# Patient Record
Sex: FEMALE | Race: WHITE | NOT HISPANIC OR LATINO | Employment: UNEMPLOYED | URBAN - METROPOLITAN AREA
[De-identification: names, ages, dates, MRNs, and addresses within clinical notes are randomized per-mention and may not be internally consistent; named-entity substitution may affect disease eponyms.]

---

## 2020-02-22 ENCOUNTER — APPOINTMENT (INPATIENT)
Dept: CT IMAGING | Facility: HOSPITAL | Age: 80
DRG: 195 | End: 2020-02-22
Payer: COMMERCIAL

## 2020-02-22 ENCOUNTER — APPOINTMENT (EMERGENCY)
Dept: RADIOLOGY | Facility: HOSPITAL | Age: 80
DRG: 195 | End: 2020-02-22
Payer: COMMERCIAL

## 2020-02-22 ENCOUNTER — HOSPITAL ENCOUNTER (INPATIENT)
Facility: HOSPITAL | Age: 80
LOS: 3 days | Discharge: HOME | DRG: 195 | End: 2020-02-25
Attending: EMERGENCY MEDICINE | Admitting: STUDENT IN AN ORGANIZED HEALTH CARE EDUCATION/TRAINING PROGRAM
Payer: COMMERCIAL

## 2020-02-22 DIAGNOSIS — J18.9 RIGHT LOWER LOBE PNEUMONIA: Primary | ICD-10-CM

## 2020-02-22 PROBLEM — J96.01 ACUTE RESPIRATORY FAILURE WITH HYPOXIA (HCC): Status: ACTIVE | Noted: 2020-02-22

## 2020-02-22 PROBLEM — E03.9 HYPOTHYROIDISM: Status: ACTIVE | Noted: 2020-02-22

## 2020-02-22 PROBLEM — E78.5 HYPERLIPIDEMIA: Status: ACTIVE | Noted: 2020-02-22

## 2020-02-22 PROBLEM — I48.0 PAROXYSMAL ATRIAL FIBRILLATION (HCC): Status: ACTIVE | Noted: 2020-02-22

## 2020-02-22 PROBLEM — I10 HYPERTENSION: Status: ACTIVE | Noted: 2020-02-22

## 2020-02-22 PROBLEM — I48.91 A-FIB (HCC): Status: ACTIVE | Noted: 2020-02-22

## 2020-02-22 LAB
ALBUMIN SERPL BCP-MCNC: 3.6 G/DL (ref 3.5–5)
ALP SERPL-CCNC: 102 U/L (ref 46–116)
ALT SERPL W P-5'-P-CCNC: 22 U/L (ref 12–78)
ANION GAP SERPL CALCULATED.3IONS-SCNC: 8 MMOL/L (ref 4–13)
APTT PPP: 49 SECONDS (ref 23–37)
AST SERPL W P-5'-P-CCNC: 24 U/L (ref 5–45)
BACTERIA UR QL AUTO: ABNORMAL /HPF
BASOPHILS # BLD AUTO: 0.03 THOUSANDS/ΜL (ref 0–0.1)
BASOPHILS NFR BLD AUTO: 0 % (ref 0–1)
BILIRUB SERPL-MCNC: 0.7 MG/DL (ref 0.2–1)
BILIRUB UR QL STRIP: NEGATIVE
BUN SERPL-MCNC: 12 MG/DL (ref 5–25)
CALCIUM SERPL-MCNC: 8.7 MG/DL (ref 8.3–10.1)
CHLORIDE SERPL-SCNC: 100 MMOL/L (ref 100–108)
CLARITY UR: CLEAR
CO2 SERPL-SCNC: 27 MMOL/L (ref 21–32)
COLOR UR: YELLOW
CREAT SERPL-MCNC: 0.85 MG/DL (ref 0.6–1.3)
EOSINOPHIL # BLD AUTO: 0.02 THOUSAND/ΜL (ref 0–0.61)
EOSINOPHIL NFR BLD AUTO: 0 % (ref 0–6)
ERYTHROCYTE [DISTWIDTH] IN BLOOD BY AUTOMATED COUNT: 13.2 % (ref 11.6–15.1)
FLUAV RNA NPH QL NAA+PROBE: NORMAL
FLUBV RNA NPH QL NAA+PROBE: NORMAL
GFR SERPL CREATININE-BSD FRML MDRD: 66 ML/MIN/1.73SQ M
GLUCOSE SERPL-MCNC: 108 MG/DL (ref 65–140)
GLUCOSE UR STRIP-MCNC: NEGATIVE MG/DL
HCT VFR BLD AUTO: 39.4 % (ref 34.8–46.1)
HGB BLD-MCNC: 13 G/DL (ref 11.5–15.4)
HGB UR QL STRIP.AUTO: ABNORMAL
IMM GRANULOCYTES # BLD AUTO: 0.04 THOUSAND/UL (ref 0–0.2)
IMM GRANULOCYTES NFR BLD AUTO: 0 % (ref 0–2)
INR PPP: 1.93 (ref 0.84–1.19)
INR PPP: 2.19 (ref 0.84–1.19)
KETONES UR STRIP-MCNC: NEGATIVE MG/DL
LACTATE SERPL-SCNC: 2 MMOL/L (ref 0.5–2)
LEUKOCYTE ESTERASE UR QL STRIP: NEGATIVE
LYMPHOCYTES # BLD AUTO: 1.39 THOUSANDS/ΜL (ref 0.6–4.47)
LYMPHOCYTES NFR BLD AUTO: 14 % (ref 14–44)
MCH RBC QN AUTO: 31.1 PG (ref 26.8–34.3)
MCHC RBC AUTO-ENTMCNC: 33 G/DL (ref 31.4–37.4)
MCV RBC AUTO: 94 FL (ref 82–98)
MONOCYTES # BLD AUTO: 0.76 THOUSAND/ΜL (ref 0.17–1.22)
MONOCYTES NFR BLD AUTO: 8 % (ref 4–12)
NEUTROPHILS # BLD AUTO: 7.86 THOUSANDS/ΜL (ref 1.85–7.62)
NEUTS SEG NFR BLD AUTO: 78 % (ref 43–75)
NITRITE UR QL STRIP: NEGATIVE
NON-SQ EPI CELLS URNS QL MICRO: ABNORMAL /HPF
NRBC BLD AUTO-RTO: 0 /100 WBCS
NT-PROBNP SERPL-MCNC: 518 PG/ML
PH UR STRIP.AUTO: 5.5 [PH]
PLATELET # BLD AUTO: 167 THOUSANDS/UL (ref 149–390)
PMV BLD AUTO: 11.2 FL (ref 8.9–12.7)
POTASSIUM SERPL-SCNC: 3.8 MMOL/L (ref 3.5–5.3)
PROT SERPL-MCNC: 8.2 G/DL (ref 6.4–8.2)
PROT UR STRIP-MCNC: NEGATIVE MG/DL
PROTHROMBIN TIME: 22.2 SECONDS (ref 11.6–14.5)
PROTHROMBIN TIME: 24.6 SECONDS (ref 11.6–14.5)
RBC # BLD AUTO: 4.18 MILLION/UL (ref 3.81–5.12)
RBC #/AREA URNS AUTO: ABNORMAL /HPF
RSV RNA NPH QL NAA+PROBE: NORMAL
SODIUM SERPL-SCNC: 135 MMOL/L (ref 136–145)
SP GR UR STRIP.AUTO: <=1.005 (ref 1–1.03)
TROPONIN I SERPL-MCNC: <0.02 NG/ML
UROBILINOGEN UR QL STRIP.AUTO: 0.2 E.U./DL
WBC # BLD AUTO: 10.1 THOUSAND/UL (ref 4.31–10.16)
WBC #/AREA URNS AUTO: ABNORMAL /HPF

## 2020-02-22 PROCEDURE — 99285 EMERGENCY DEPT VISIT HI MDM: CPT

## 2020-02-22 PROCEDURE — 80053 COMPREHEN METABOLIC PANEL: CPT | Performed by: EMERGENCY MEDICINE

## 2020-02-22 PROCEDURE — 83880 ASSAY OF NATRIURETIC PEPTIDE: CPT | Performed by: EMERGENCY MEDICINE

## 2020-02-22 PROCEDURE — 99223 1ST HOSP IP/OBS HIGH 75: CPT | Performed by: STUDENT IN AN ORGANIZED HEALTH CARE EDUCATION/TRAINING PROGRAM

## 2020-02-22 PROCEDURE — 85610 PROTHROMBIN TIME: CPT | Performed by: STUDENT IN AN ORGANIZED HEALTH CARE EDUCATION/TRAINING PROGRAM

## 2020-02-22 PROCEDURE — 81001 URINALYSIS AUTO W/SCOPE: CPT | Performed by: EMERGENCY MEDICINE

## 2020-02-22 PROCEDURE — 99285 EMERGENCY DEPT VISIT HI MDM: CPT | Performed by: EMERGENCY MEDICINE

## 2020-02-22 PROCEDURE — 85730 THROMBOPLASTIN TIME PARTIAL: CPT | Performed by: EMERGENCY MEDICINE

## 2020-02-22 PROCEDURE — 85025 COMPLETE CBC W/AUTO DIFF WBC: CPT | Performed by: EMERGENCY MEDICINE

## 2020-02-22 PROCEDURE — 71046 X-RAY EXAM CHEST 2 VIEWS: CPT

## 2020-02-22 PROCEDURE — 71250 CT THORAX DX C-: CPT

## 2020-02-22 PROCEDURE — 87040 BLOOD CULTURE FOR BACTERIA: CPT | Performed by: EMERGENCY MEDICINE

## 2020-02-22 PROCEDURE — 83605 ASSAY OF LACTIC ACID: CPT | Performed by: EMERGENCY MEDICINE

## 2020-02-22 PROCEDURE — 84484 ASSAY OF TROPONIN QUANT: CPT | Performed by: EMERGENCY MEDICINE

## 2020-02-22 PROCEDURE — 87631 RESP VIRUS 3-5 TARGETS: CPT | Performed by: EMERGENCY MEDICINE

## 2020-02-22 PROCEDURE — 36415 COLL VENOUS BLD VENIPUNCTURE: CPT | Performed by: EMERGENCY MEDICINE

## 2020-02-22 PROCEDURE — 85610 PROTHROMBIN TIME: CPT | Performed by: EMERGENCY MEDICINE

## 2020-02-22 PROCEDURE — 84145 PROCALCITONIN (PCT): CPT | Performed by: EMERGENCY MEDICINE

## 2020-02-22 PROCEDURE — 93005 ELECTROCARDIOGRAM TRACING: CPT

## 2020-02-22 RX ORDER — LISINOPRIL 20 MG/1
TABLET ORAL
COMMUNITY
Start: 2019-04-09 | End: 2022-02-13

## 2020-02-22 RX ORDER — VENLAFAXINE HYDROCHLORIDE 150 MG/1
CAPSULE, EXTENDED RELEASE ORAL
COMMUNITY
Start: 2019-03-07

## 2020-02-22 RX ORDER — WARFARIN SODIUM 7.5 MG/1
7.5 TABLET ORAL
COMMUNITY

## 2020-02-22 RX ORDER — AZITHROMYCIN 250 MG/1
500 TABLET, FILM COATED ORAL EVERY 24 HOURS
Status: COMPLETED | OUTPATIENT
Start: 2020-02-23 | End: 2020-02-24

## 2020-02-22 RX ORDER — GUAIFENESIN 600 MG
600 TABLET, EXTENDED RELEASE 12 HR ORAL EVERY 12 HOURS SCHEDULED
Status: DISCONTINUED | OUTPATIENT
Start: 2020-02-22 | End: 2020-02-24

## 2020-02-22 RX ORDER — ATORVASTATIN CALCIUM 40 MG/1
TABLET, FILM COATED ORAL
COMMUNITY
Start: 2019-11-14

## 2020-02-22 RX ORDER — LEVOTHYROXINE SODIUM 0.1 MG/1
88 TABLET ORAL
COMMUNITY

## 2020-02-22 RX ORDER — LEVOTHYROXINE SODIUM 0.1 MG/1
100 TABLET ORAL
Status: DISCONTINUED | OUTPATIENT
Start: 2020-02-23 | End: 2020-02-25 | Stop reason: HOSPADM

## 2020-02-22 RX ORDER — WARFARIN SODIUM 7.5 MG/1
3.75 TABLET ORAL
COMMUNITY
End: 2020-10-02 | Stop reason: HOSPADM

## 2020-02-22 RX ORDER — LISINOPRIL 20 MG/1
20 TABLET ORAL DAILY
Status: DISCONTINUED | OUTPATIENT
Start: 2020-02-22 | End: 2020-02-25 | Stop reason: HOSPADM

## 2020-02-22 RX ORDER — ATORVASTATIN CALCIUM 10 MG/1
10 TABLET, FILM COATED ORAL
Status: DISCONTINUED | OUTPATIENT
Start: 2020-02-22 | End: 2020-02-25 | Stop reason: HOSPADM

## 2020-02-22 RX ORDER — WARFARIN SODIUM 7.5 MG/1
7.5 TABLET ORAL
Status: COMPLETED | OUTPATIENT
Start: 2020-02-22 | End: 2020-02-22

## 2020-02-22 RX ORDER — ACETAMINOPHEN 325 MG/1
650 TABLET ORAL EVERY 6 HOURS PRN
Status: DISCONTINUED | OUTPATIENT
Start: 2020-02-22 | End: 2020-02-25 | Stop reason: HOSPADM

## 2020-02-22 RX ADMIN — WARFARIN SODIUM 7.5 MG: 7.5 TABLET ORAL at 18:12

## 2020-02-22 RX ADMIN — CEFTRIAXONE SODIUM 1000 MG: 10 INJECTION, POWDER, FOR SOLUTION INTRAVENOUS at 13:10

## 2020-02-22 RX ADMIN — GUAIFENESIN 600 MG: 600 TABLET ORAL at 21:49

## 2020-02-22 RX ADMIN — AZITHROMYCIN MONOHYDRATE 500 MG: 500 INJECTION, POWDER, LYOPHILIZED, FOR SOLUTION INTRAVENOUS at 14:53

## 2020-02-22 RX ADMIN — ATORVASTATIN CALCIUM 10 MG: 10 TABLET, FILM COATED ORAL at 16:24

## 2020-02-22 RX ADMIN — LISINOPRIL 20 MG: 20 TABLET ORAL at 15:06

## 2020-02-22 NOTE — ASSESSMENT & PLAN NOTE
· Patient likely has community-acquired pneumonia as evidenced by hypoxia, fevers, crackles in left lower lung field with worsening cough  · Chest x-ray does not demonstrate any overt opacities or infiltrates, will order CT imaging for further evaluation given patient's severity of symptoms on initial presentation  · Continue with azithromycin and ceftriaxone for community-acquired pneumonia, follow-up cultures, trend fever curve, trend white count, continue with supportive care

## 2020-02-22 NOTE — ASSESSMENT & PLAN NOTE
· Blood pressure adequately controlled, continue lisinopril 20 mg daily  · continue to monitor with vital signs per floor protocol

## 2020-02-22 NOTE — ED NOTES
1  CC - SOB    2  Admission related to injury? - n/a    3  Trauma - no    4  Orientation status - Ox4    5  Abnormal labs/abnormal focused assessment/abnormal vitals - abnormal xray (pneumonia), 2L O2, fever    6  Medications/ drips - rocephin    7  Last time narcotics were given/ pain medication - n/a    8  IV lines/drains/ etc  - 20 L AC    9  Isolation status - standard    10  Skin - intact    11  Ambulation status - Assist x1    12   ED phone #     Tisha Corea RN  02/22/20 [id number]

## 2020-02-22 NOTE — ED PROVIDER NOTES
History  Chief Complaint   Patient presents with    Shortness of Breath     pt is being sent from her PCP for low oxygen and SOB and cough      HPI 68-year-old female apparently at her family physician today was hypoxic oxygen saturation was 89% improved with albuterol there  Patient reports she is very short of breath when she moves around apparently went into her closet at in became increasingly short of breath then collapsed  Patient denies any loss of consciousness  She denies any injury  She did not hit her head  Patient reports regressive shortness of breath over the last few days  She reports being short of breath at rest but markedly worse when she tries to move around  She complains of cough and congestion  Past medical history hypertensive on Coumadin  Family history noncontributory  Social history nonsmoker no history of drug abuse  Prior to Admission Medications   Prescriptions Last Dose Informant Patient Reported? Taking?   atorvastatin (LIPITOR) 40 mg tablet   Yes Yes   Sig: atorvastatin 40 mg tablet   levothyroxine 100 mcg tablet   Yes Yes   Sig: levothyroxine 100 mcg tablet   lisinopril (ZESTRIL) 20 mg tablet   Yes Yes   Sig: lisinopril 20 mg tablet   venlafaxine (EFFEXOR-XR) 150 mg 24 hr capsule   Yes Yes   Sig: venlafaxine  mg capsule,extended release 24 hr   warfarin (COUMADIN) 7 5 mg tablet   Yes Yes   Sig: Take 7 5 mg by mouth daily   warfarin (COUMADIN) 7 5 mg tablet   Yes Yes   Sig: Take 3 75 mg by mouth daily       Facility-Administered Medications: None       History reviewed  No pertinent past medical history  History reviewed  No pertinent surgical history  History reviewed  No pertinent family history  I have reviewed and agree with the history as documented      Social History     Tobacco Use    Smoking status: Never Smoker    Smokeless tobacco: Never Used   Substance Use Topics    Alcohol use: Never     Frequency: Never    Drug use: Never       Review of Systems   Constitutional: Negative for diaphoresis, fatigue and fever  HENT: Negative for congestion, ear pain, nosebleeds and sore throat  Eyes: Negative for photophobia, pain, discharge and visual disturbance  Respiratory: Positive for shortness of breath  Negative for cough, choking, chest tightness and wheezing  Cardiovascular: Negative for chest pain and palpitations  Gastrointestinal: Negative for abdominal distention, abdominal pain, diarrhea and vomiting  Genitourinary: Negative for dysuria, flank pain and frequency  Musculoskeletal: Negative for back pain, gait problem and joint swelling  Skin: Negative for color change and rash  Neurological: Negative for dizziness, syncope and headaches  Psychiatric/Behavioral: Negative for behavioral problems and confusion  The patient is not nervous/anxious  All other systems reviewed and are negative  Physical Exam  Physical Exam   Constitutional: She is oriented to person, place, and time  She appears well-developed and well-nourished  HENT:   Head: Normocephalic  Right Ear: External ear normal    Left Ear: External ear normal    Nose: Nose normal    Mouth/Throat: Oropharynx is clear and moist    Eyes: Pupils are equal, round, and reactive to light  EOM and lids are normal    Neck: Normal range of motion  Neck supple  Cardiovascular: Normal rate, regular rhythm, normal heart sounds and intact distal pulses  Pulmonary/Chest: Effort normal and breath sounds normal  No respiratory distress  Abdominal: Soft  Bowel sounds are normal  There is no tenderness  Musculoskeletal: Normal range of motion  She exhibits no deformity  Neurological: She is alert and oriented to person, place, and time  Skin: Skin is warm and dry  Psychiatric: She has a normal mood and affect  Nursing note and vitals reviewed    Pulse oximetry normal at 94% adequate oxygenation, there is no hypoxia  Patient is febrile    Vital Signs  ED Triage Vitals Temperature Pulse Respirations Blood Pressure SpO2   02/22/20 1101 02/22/20 1101 02/22/20 1101 02/22/20 1101 02/22/20 1101   (!) 101 4 °F (38 6 °C) (!) 110 18 139/82 94 %      Temp Source Heart Rate Source Patient Position - Orthostatic VS BP Location FiO2 (%)   02/22/20 1101 02/22/20 1101 02/22/20 1101 02/22/20 1101 --   Oral Monitor Sitting Left arm       Pain Score       02/22/20 1400       No Pain           Vitals:    02/22/20 1424 02/22/20 1425 02/22/20 1548 02/22/20 2315   BP: 138/75 138/75 137/72 145/75   Pulse: 96 95 98 101   Patient Position - Orthostatic VS:             Visual Acuity      ED Medications  Medications   lisinopril (ZESTRIL) tablet 20 mg (20 mg Oral Given 2/22/20 1506)   levothyroxine tablet 100 mcg (has no administration in time range)   atorvastatin (LIPITOR) tablet 10 mg (10 mg Oral Given 2/22/20 1624)   acetaminophen (TYLENOL) tablet 650 mg (has no administration in time range)   guaiFENesin (MUCINEX) 12 hr tablet 600 mg (600 mg Oral Given 2/22/20 2149)   cefTRIAXone (ROCEPHIN) 1,000 mg in dextrose 5 % 50 mL IVPB (has no administration in time range)   azithromycin (ZITHROMAX) tablet 500 mg (has no administration in time range)   ceftriaxone (ROCEPHIN) 1 g/50 mL in dextrose IVPB (0 mg Intravenous Stopped 2/22/20 1933)   azithromycin (ZITHROMAX) 500 mg in sodium chloride 0 9% 250mL IVPB 500 mg (0 mg Intravenous Stopped 2/22/20 1933)   warfarin (COUMADIN) tablet 7 5 mg (7 5 mg Oral Given 2/22/20 1812)       Diagnostic Studies  Results Reviewed     Procedure Component Value Units Date/Time    Procalcitonin [[id number]] Collected:  02/22/20 1238    Lab Status:   In process Specimen:  Blood from Arm, Left Updated:  02/22/20 1241    Influenza A/B and RSV PCR [[id number]]  (Normal) Collected:  02/22/20 1120    Lab Status:  Final result Specimen:  Nose Updated:  02/22/20 1233     INFLUENZA A PCR None Detected     INFLUENZA B PCR None Detected     RSV PCR None Detected    Blood culture #1 [[id number]] Collected:  02/22/20 1223    Lab Status:   In process Specimen:  Blood from Arm, Right Updated:  02/22/20 1228    Comprehensive metabolic panel [[id number]]  (Abnormal) Collected:  02/22/20 1120    Lab Status:  Final result Specimen:  Blood from Arm, Left Updated:  02/22/20 1226     Sodium 135 mmol/L      Potassium 3 8 mmol/L      Chloride 100 mmol/L      CO2 27 mmol/L      ANION GAP 8 mmol/L      BUN 12 mg/dL      Creatinine 0 85 mg/dL      Glucose 108 mg/dL      Calcium 8 7 mg/dL      AST 24 U/L      ALT 22 U/L      Alkaline Phosphatase 102 U/L      Total Protein 8 2 g/dL      Albumin 3 6 g/dL      Total Bilirubin 0 70 mg/dL      eGFR 66 ml/min/1 73sq m     Narrative:       [place] guidelines for Chronic Kidney Disease (CKD):     Stage 1 with normal or high GFR (GFR > 90 mL/min/1 73 square meters)    Stage 2 Mild CKD (GFR = 60-89 mL/min/1 73 square meters)    Stage 3A Moderate CKD (GFR = 45-59 mL/min/1 73 square meters)    Stage 3B Moderate CKD (GFR = 30-44 mL/min/1 73 square meters)    Stage 4 Severe CKD (GFR = 15-29 mL/min/1 73 square meters)    Stage 5 End Stage CKD (GFR <15 mL/min/1 73 square meters)  Note: GFR calculation is accurate only with a steady state creatinine    NT-BNP PRO [[id number]]  (Abnormal) Collected:  02/22/20 1120    Lab Status:  Final result Specimen:  Blood from Arm, Left Updated:  02/22/20 1226     NT-proBNP 518 pg/mL     Protime-INR [[id number]]  (Abnormal) Collected:  02/22/20 1120    Lab Status:  Final result Specimen:  Blood from Arm, Left Updated:  02/22/20 1225     Protime 22 2 seconds      INR 1 93    APTT [[id number]]  (Abnormal) Collected:  02/22/20 1120    Lab Status:  Final result Specimen:  Blood from Arm, Left Updated:  02/22/20 1225     PTT 49 seconds     Lactic acid x2 [[id number]]  (Normal) Collected:  02/22/20 1120    Lab Status:  Final result Specimen:  Blood from Arm, Left Updated:  02/22/20 1219     LACTIC ACID 2 0 mmol/L Narrative:       Result may be elevated if tourniquet was used during collection  Troponin I [[id number]]  (Normal) Collected:  02/22/20 1120    Lab Status:  Final result Specimen:  Blood from Arm, Left Updated:  02/22/20 1219     Troponin I <0 02 ng/mL     Urine Microscopic [[id number]]  (Abnormal) Collected:  02/22/20 1148    Lab Status:  Final result Specimen:  Urine, Clean Catch Updated:  02/22/20 1207     RBC, UA 0-1 /hpf      WBC, UA None Seen /hpf      Epithelial Cells None Seen /hpf      Bacteria, UA None Seen /hpf     UA w Reflex to Microscopic w Reflex to Culture [[id number]]  (Abnormal) Collected:  02/22/20 1148    Lab Status:  Final result Specimen:  Urine, Clean Catch Updated:  02/22/20 1201     Color, UA Yellow     Clarity, UA Clear     Specific Gravity, UA <=1 005     pH, UA 5 5     Leukocytes, UA Negative     Nitrite, UA Negative     Protein, UA Negative mg/dl      Glucose, UA Negative mg/dl      Ketones, UA Negative mg/dl      Urobilinogen, UA 0 2 E U /dl      Bilirubin, UA Negative     Blood, UA Trace-lysed    CBC and differential [[id number]]  (Abnormal) Collected:  02/22/20 1120    Lab Status:  Final result Specimen:  Blood from Arm, Left Updated:  02/22/20 1156     WBC 10 10 Thousand/uL      RBC 4 18 Million/uL      Hemoglobin 13 0 g/dL      Hematocrit 39 4 %      MCV 94 fL      MCH 31 1 pg      MCHC 33 0 g/dL      RDW 13 2 %      MPV 11 2 fL      Platelets 095 Thousands/uL      nRBC 0 /100 WBCs      Neutrophils Relative 78 %      Immat GRANS % 0 %      Lymphocytes Relative 14 %      Monocytes Relative 8 %      Eosinophils Relative 0 %      Basophils Relative 0 %      Neutrophils Absolute 7 86 Thousands/µL      Immature Grans Absolute 0 04 Thousand/uL      Lymphocytes Absolute 1 39 Thousands/µL      Monocytes Absolute 0 76 Thousand/µL      Eosinophils Absolute 0 02 Thousand/µL      Basophils Absolute 0 03 Thousands/µL     Blood culture #2 [[id number]] Collected:  02/22/20 1120    Lab Status:   In process Specimen:  Blood from Arm, Left Updated:  02/22/20 9195                 CT chest wo contrast   Final Result by Jeb Dugan MD (02/22 2143)      Left lower lobe pneumonia               Workstation performed: [id number]         XR chest pa & lateral   Final Result by Gladys Childs MD (02/22 4694)      No acute cardiopulmonary disease  Workstation performed: [id number]                    Procedures  Procedures         ED Course              chest x-ray showed a normal cardiac silhouette, no pneumothorax, interpreted as a right lower lobe pneumonia, interpreted by me I was initial , later radiology felt it was a low left lower lobe pneumonia  Influenza testing was negative, electrolytes were within normal limits, normal BUN creatinine no sign of renal dysfunction  lactate was within normal limits,  Cardiac troponin was     white count was normal at [address], hemoglobin was normal at 13 no sign of anemia          MDM medical decision making 73-year-old female presents emergency department with cough congestion some hypoxia at home, shortness of breath with minimal exertion, chest x-ray consistent with pneumonia  Treated with IV antibiotics  Discussed with hospitalist service  Lactate was normal   Not hypotensive  No sign of severe sepsis  Discussed with patient agreed to admission        Disposition  Final diagnoses:   Right lower lobe pneumonia ([address] )     Time reflects when diagnosis was documented in both MDM as applicable and the Disposition within this note     Time User Action Codes Description Comment    2/22/2020 12:04 PM Mita Mahoney Add [J18 1] Right lower lobe pneumonia [facility])       ED Disposition     ED Disposition Condition Date/Time Comment    Admit Stable Sat Feb 22, 2020 12:44 PM case was discussed with the hospitalist service patient will be a 2 midnight admission to the service of [address] [place] [address]          Follow-up Information    None         Current Discharge Medication List      CONTINUE these medications which have NOT CHANGED    Details   atorvastatin (LIPITOR) 40 mg tablet atorvastatin 40 mg tablet      levothyroxine 100 mcg tablet levothyroxine 100 mcg tablet      lisinopril (ZESTRIL) 20 mg tablet lisinopril 20 mg tablet      venlafaxine (EFFEXOR-XR) 150 mg 24 hr capsule venlafaxine  mg capsule,extended release 24 hr      !! warfarin (COUMADIN) 7 5 mg tablet Take 7 5 mg by mouth daily      !! warfarin (COUMADIN) 7 5 mg tablet Take 3 75 mg by mouth daily        ! ! - Potential duplicate medications found  Please discuss with provider  No discharge procedures on file      PDMP Review     None          ED Provider  Electronically Signed by           Romayne Dawson, MD  02/22/20 7288

## 2020-02-22 NOTE — H&P
H&P- Ana Ibarra 1940, 70 y o  female MRN: [id number]    Unit/Bed#: -01 Encounter: [id number]    Primary Care Provider: Christina Vera DO   Date and time admitted to hospital: 2/22/2020 11:03 AM        * Community acquired pneumonia  Assessment & Plan  · Patient likely has community-acquired pneumonia as evidenced by hypoxia, fevers, crackles in left lower lung field with worsening cough  · Chest x-ray does not demonstrate any overt opacities or infiltrates, will order CT imaging for further evaluation given patient's severity of symptoms on initial presentation  · Continue with azithromycin and ceftriaxone for community-acquired pneumonia, follow-up cultures, trend fever curve, trend white count, continue with supportive care    Hyperlipidemia  Assessment & Plan  · Continue home dose Lipitor 40 mg daily    Hypertension  Assessment & Plan  · Blood pressure adequately controlled, continue lisinopril 20 mg daily  · continue to monitor with vital signs per floor protocol    Hypothyroidism  Assessment & Plan  · Continue home dose Synthroid 100 mcg daily    Acute respiratory failure with hypoxia ([address] )  Assessment & Plan  · See plan for community-acquired pneumonia    Paroxysmal atrial fibrillation (HCC)  Assessment & Plan  · On Coumadin 7 5 mg daily, continue with daily INR and dose Coumadin accordingly                VTE Prophylaxis: Warfarin (Coumadin)  / sequential compression device   Code Status:  Full code  POLST: POLST form is not discussed and not completed at this time  Discussion with family:  Pneumonia, paroxysmal AFib, hyperlipidemia, hypertension, hypothyroidism    Anticipated Length of Stay:  Patient will be admitted on an Inpatient basis with an anticipated length of stay of 2 midnights  Justification for Hospital Stay:  See above    Total Time for Visit, including Counseling / Coordination of Care: 45 minutes    Greater than 50% of this total time spent on direct patient counseling and coordination of care  Chief Complaint:   Hypoxia, cough, fevers, shortness of breath    History of Present Illness:    Ana Ibarra is a 70 y o  female who presents with worsening shortness of breath and cough for the last several days  Patient went to her primary care doctor was concern for pneumonia however did not give her antibiotics secondary to concerns for interaction with her Coumadin  Now presenting to the ED with worsening hypoxia, shortness of breath, cough, subjective fevers, chills that have been progressively worsening  Patient denies any chest pain, chest palpitations, nausea, vomiting, sore throat, night sweats, lower extremity swelling, abdominal pain, diarrhea, dysuria  Patient also endorses that her granddaughter has been sick with similar symptoms  In the ED patient is noted to be hypoxic to 89% and admitted to Medicine for further management  Review of Systems:    See above    Past Medical and Surgical History:     History reviewed  No pertinent past medical history  History reviewed  No pertinent surgical history  Meds/Allergies:    Prior to Admission medications    Medication Sig Start Date End Date Taking? Authorizing Provider   atorvastatin (LIPITOR) 40 mg tablet atorvastatin 40 mg tablet 11/14/19  Yes Historical Provider, MD   levothyroxine 100 mcg tablet levothyroxine 100 mcg tablet   Yes Historical Provider, MD   lisinopril (ZESTRIL) 20 mg tablet lisinopril 20 mg tablet 4/9/19  Yes Historical Provider, MD   venlafaxine (EFFEXOR-XR) 150 mg 24 hr capsule venlafaxine  mg capsule,extended release 24 hr 3/7/19  Yes Historical Provider, MD   warfarin (COUMADIN) 7 5 mg tablet Take 7 5 mg by mouth daily   Yes Historical Provider, MD   warfarin (COUMADIN) 7 5 mg tablet Take 3 75 mg by mouth daily    Yes Historical Provider, MD     I have reviewed home medications with patient personally      Allergies: No Known Allergies    Social History:     Marital Status: /Civil Union   Occupation: na  Patient Pre-hospital Living Situation: na  Patient Pre-hospital Level of Mobility: na  Patient Pre-hospital Diet Restrictions: na  Substance Use History:   Social History     Substance and Sexual Activity   Alcohol Use Never    Frequency: Never     Social History     Tobacco Use   Smoking Status Never Smoker   Smokeless Tobacco Never Used     Social History     Substance and Sexual Activity   Drug Use Never       Family History:    non-contributory    Physical Exam:     Vitals:    02/22/20 1548   BP: 137/72   Pulse: 98   Resp: 17   Temp: 98 4 °F (36 9 °C)   SpO2: 90%       Resting comfortably, no acute distress  Regular rate rhythm  Crackles in left lower lung field  Bowel sounds positive, nontender nondistended  No lower extremity edema  Alert and oriented x3      Additional Data:     Lab Results: I have personally reviewed pertinent reports  Results from last 7 days   Lab Units 02/22/20  1120   WBC Thousand/uL 10 10   HEMOGLOBIN g/dL 13 0   HEMATOCRIT % 39 4   PLATELETS Thousands/uL 167   NEUTROS PCT % 78*   LYMPHS PCT % 14   MONOS PCT % 8   EOS PCT % 0     Results from last 7 days   Lab Units 02/22/20  1120   SODIUM mmol/L 135*   POTASSIUM mmol/L 3 8   CHLORIDE mmol/L 100   CO2 mmol/L 27   BUN mg/dL 12   CREATININE mg/dL 0 85   ANION GAP mmol/L 8   CALCIUM mg/dL 8 7   ALBUMIN g/dL 3 6   TOTAL BILIRUBIN mg/dL 0 70   ALK PHOS U/L 102   ALT U/L 22   AST U/L 24   GLUCOSE RANDOM mg/dL 108     Results from last 7 days   Lab Units 02/22/20  1120   INR  1 93*             Results from last 7 days   Lab Units 02/22/20  1120   LACTIC ACID mmol/L 2 0       Imaging: I have personally reviewed pertinent reports  XR chest pa & lateral   Final Result by Olga Barry MD (02/22 3749)      No acute cardiopulmonary disease              Workstation performed: [id number]         CT chest wo contrast    (Results Pending)       EKG, Pathology, and Other Studies Reviewed on Admission: · EKG: Na    Allscripts / Epic Records Reviewed: Yes     ** Please Note: This note has been constructed using a voice recognition system   **

## 2020-02-23 LAB
ALBUMIN SERPL BCP-MCNC: 3 G/DL (ref 3.5–5)
ALP SERPL-CCNC: 79 U/L (ref 46–116)
ALT SERPL W P-5'-P-CCNC: 14 U/L (ref 12–78)
ANION GAP SERPL CALCULATED.3IONS-SCNC: 7 MMOL/L (ref 4–13)
AST SERPL W P-5'-P-CCNC: 20 U/L (ref 5–45)
ATRIAL RATE: 101 BPM
BASOPHILS # BLD AUTO: 0.01 THOUSANDS/ΜL (ref 0–0.1)
BASOPHILS NFR BLD AUTO: 0 % (ref 0–1)
BILIRUB SERPL-MCNC: 0.7 MG/DL (ref 0.2–1)
BUN SERPL-MCNC: 11 MG/DL (ref 5–25)
CALCIUM SERPL-MCNC: 8.8 MG/DL (ref 8.3–10.1)
CHLORIDE SERPL-SCNC: 105 MMOL/L (ref 100–108)
CO2 SERPL-SCNC: 29 MMOL/L (ref 21–32)
CREAT SERPL-MCNC: 0.81 MG/DL (ref 0.6–1.3)
EOSINOPHIL # BLD AUTO: 0.04 THOUSAND/ΜL (ref 0–0.61)
EOSINOPHIL NFR BLD AUTO: 0 % (ref 0–6)
ERYTHROCYTE [DISTWIDTH] IN BLOOD BY AUTOMATED COUNT: 13.3 % (ref 11.6–15.1)
GFR SERPL CREATININE-BSD FRML MDRD: 70 ML/MIN/1.73SQ M
GLUCOSE SERPL-MCNC: 121 MG/DL (ref 65–140)
HCT VFR BLD AUTO: 38.4 % (ref 34.8–46.1)
HGB BLD-MCNC: 12.6 G/DL (ref 11.5–15.4)
IMM GRANULOCYTES # BLD AUTO: 0.03 THOUSAND/UL (ref 0–0.2)
IMM GRANULOCYTES NFR BLD AUTO: 0 % (ref 0–2)
INR PPP: 2.03 (ref 0.84–1.19)
LYMPHOCYTES # BLD AUTO: 1.19 THOUSANDS/ΜL (ref 0.6–4.47)
LYMPHOCYTES NFR BLD AUTO: 13 % (ref 14–44)
MAGNESIUM SERPL-MCNC: 2.2 MG/DL (ref 1.6–2.6)
MCH RBC QN AUTO: 31.2 PG (ref 26.8–34.3)
MCHC RBC AUTO-ENTMCNC: 32.8 G/DL (ref 31.4–37.4)
MCV RBC AUTO: 95 FL (ref 82–98)
MONOCYTES # BLD AUTO: 1.01 THOUSAND/ΜL (ref 0.17–1.22)
MONOCYTES NFR BLD AUTO: 11 % (ref 4–12)
NEUTROPHILS # BLD AUTO: 6.71 THOUSANDS/ΜL (ref 1.85–7.62)
NEUTS SEG NFR BLD AUTO: 76 % (ref 43–75)
NRBC BLD AUTO-RTO: 0 /100 WBCS
P AXIS: 38 DEGREES
PLATELET # BLD AUTO: 151 THOUSANDS/UL (ref 149–390)
PMV BLD AUTO: 11 FL (ref 8.9–12.7)
POTASSIUM SERPL-SCNC: 4.1 MMOL/L (ref 3.5–5.3)
PR INTERVAL: 172 MS
PROCALCITONIN SERPL-MCNC: <0.05 NG/ML
PROT SERPL-MCNC: 7.4 G/DL (ref 6.4–8.2)
PROTHROMBIN TIME: 23.1 SECONDS (ref 11.6–14.5)
QRS AXIS: 61 DEGREES
QRSD INTERVAL: 74 MS
QT INTERVAL: 358 MS
QTC INTERVAL: 464 MS
RBC # BLD AUTO: 4.04 MILLION/UL (ref 3.81–5.12)
SODIUM SERPL-SCNC: 141 MMOL/L (ref 136–145)
T WAVE AXIS: 23 DEGREES
VENTRICULAR RATE: 101 BPM
WBC # BLD AUTO: 8.99 THOUSAND/UL (ref 4.31–10.16)

## 2020-02-23 PROCEDURE — 93010 ELECTROCARDIOGRAM REPORT: CPT | Performed by: INTERNAL MEDICINE

## 2020-02-23 PROCEDURE — 85025 COMPLETE CBC W/AUTO DIFF WBC: CPT | Performed by: STUDENT IN AN ORGANIZED HEALTH CARE EDUCATION/TRAINING PROGRAM

## 2020-02-23 PROCEDURE — 99233 SBSQ HOSP IP/OBS HIGH 50: CPT | Performed by: STUDENT IN AN ORGANIZED HEALTH CARE EDUCATION/TRAINING PROGRAM

## 2020-02-23 PROCEDURE — 80053 COMPREHEN METABOLIC PANEL: CPT | Performed by: STUDENT IN AN ORGANIZED HEALTH CARE EDUCATION/TRAINING PROGRAM

## 2020-02-23 PROCEDURE — 83735 ASSAY OF MAGNESIUM: CPT | Performed by: STUDENT IN AN ORGANIZED HEALTH CARE EDUCATION/TRAINING PROGRAM

## 2020-02-23 PROCEDURE — 85610 PROTHROMBIN TIME: CPT | Performed by: STUDENT IN AN ORGANIZED HEALTH CARE EDUCATION/TRAINING PROGRAM

## 2020-02-23 RX ADMIN — LEVOTHYROXINE SODIUM 100 MCG: 100 TABLET ORAL at 07:00

## 2020-02-23 RX ADMIN — GUAIFENESIN 600 MG: 600 TABLET ORAL at 08:32

## 2020-02-23 RX ADMIN — AZITHROMYCIN MONOHYDRATE 500 MG: 250 TABLET ORAL at 15:46

## 2020-02-23 RX ADMIN — CEFTRIAXONE SODIUM 1000 MG: 10 INJECTION, POWDER, FOR SOLUTION INTRAVENOUS at 13:28

## 2020-02-23 RX ADMIN — LISINOPRIL 20 MG: 20 TABLET ORAL at 08:32

## 2020-02-23 RX ADMIN — ACETAMINOPHEN 650 MG: 325 TABLET, FILM COATED ORAL at 01:13

## 2020-02-23 RX ADMIN — GUAIFENESIN 600 MG: 600 TABLET ORAL at 21:43

## 2020-02-23 RX ADMIN — ATORVASTATIN CALCIUM 10 MG: 10 TABLET, FILM COATED ORAL at 15:46

## 2020-02-23 NOTE — UTILIZATION REVIEW
Initial Clinical Review    Admission: Date/Time/Statement: Admission Orders (From admission, onward)     Ordered        02/22/20 1245  Inpatient Admission  Once                   Orders Placed This Encounter   Procedures    Inpatient Admission     Standing Status:   Standing     Number of Occurrences:   1     Order Specific Question:   Admitting Physician     Answer:   Ami Parmar     Order Specific Question:   Level of Care     Answer:   Med Surg [16]     Order Specific Question:   Estimated length of stay     Answer:   More than 2 Midnights     Order Specific Question:   Certification     Answer:   I certify that inpatient services are medically necessary for this patient for a duration of greater than two midnights  See H&P and MD Progress Notes for additional information about the patient's course of treatment  ED Arrival Information     Expected Arrival Acuity Means of Arrival Escorted By Service Admission Type    - 2/22/2020 10:52 Urgent Walk-In Family Member General Medicine Urgent    Arrival Complaint    SOB        Chief Complaint   Patient presents with    Shortness of Breath     pt is being sent from her PCP for low oxygen and SOB and cough      Assessment/Plan:   70 y o  female to ED presents with worsening shortness of breath and cough for the last several days  She was seen by her PCP who was concern for pneumonia but no antibiotics given as he was worried about interaction with her coumadin  Granddaughter is ill with similar symptoms  PMH of Hyperlipidemia, Hypertension, Hypothyroidism and Paroxysmal atrial fibrillation  In ED noted to be hypoxic to 89%  Admit Inpatient level of care for Community acquired pneumonia and Acute respiratory failure with hypoxia  Patient likely has community-acquired pneumonia as evidenced by hypoxia, fevers, crackles in left lower lung field with worsening cough  Continue Iv antibiotics  F/u cultures, trend fever curve and Wbc  Continue home meds   On exam, crackles in left lower lung field  2/23 Progress notes; Chest x-ray does not demonstrate any overt opacities or infiltrates, CT imaging confirming left lower lobe pneumonia  Now clinically appears to be improving  Continue Iv antibiotics  Persistent crackles in left lower lung field      ED Triage Vitals   Temperature Pulse Respirations Blood Pressure SpO2   02/22/20 1101 02/22/20 1101 02/22/20 1101 02/22/20 1101 02/22/20 1101   (!) 101 4 °F (38 6 °C) (!) 110 18 139/82 94 %      Temp Source Heart Rate Source Patient Position - Orthostatic VS BP Location FiO2 (%)   02/22/20 1101 02/22/20 1101 02/22/20 1101 02/22/20 1101 --   Oral Monitor Sitting Left arm       Pain Score       02/22/20 1400       No Pain        Wt Readings from Last 1 Encounters:   02/22/20 92 8 kg (204 lb 9 4 oz)     Additional Vital Signs:   O2 3L N/C    02/23/20 08:31:44  98 5 °F (36 9 °C)  91    119/69  86  93 %       02/22/20 23:15:12  100 4 °F (38 °C)  101  17  145/75  98  91 %       02/22/20 2150            93 %       02/22/20 15:48:56  98 4 °F (36 9 °C)  98  17  137/72  94  90 %       02/22/20 1425  99 6 °F (37 6 °C)  95    138/75  96  95 %       02/22/20 14:24:27    96    138/75  96  95 %       02/22/20 1400  99 6 °F (37 6 °C)  96  18  138/75           02/22/20 1312  99 9 °F (37 7 °C)                 02/22/20 1300    93  20  138/63  88  97 %  Nasal cannula  Lying   02/22/20 1238    93  18  134/61    94 %  None (Room air)  Lying     Pertinent Labs/Diagnostic Test Results:   2/22 CXR - No acute cardiopulmonary disease    2/22 CT Chest - Left lower lobe pneumonia    2/22 EKG - Sinus tachycardia     Results from last 7 days   Lab Units 02/23/20  0549 02/22/20  1120   WBC Thousand/uL 8 99 10 10   HEMOGLOBIN g/dL 12 6 13 0   HEMATOCRIT % 38 4 39 4   PLATELETS Thousands/uL 151 167   NEUTROS ABS Thousands/µL 6 71 7 86*         Results from last 7 days   Lab Units 02/23/20  0549 02/22/20  1120   SODIUM mmol/L 141 135*   POTASSIUM mmol/L 4 1 3 8   CHLORIDE mmol/L 105 100   CO2 mmol/L 29 27   ANION GAP mmol/L 7 8   BUN mg/dL 11 12   CREATININE mg/dL 0 81 0 85   EGFR ml/min/1 73sq m 70 66   CALCIUM mg/dL 8 8 8 7   MAGNESIUM mg/dL 2 2  --      Results from last 7 days   Lab Units 02/23/20  0549 02/22/20  1120   AST U/L 20 24   ALT U/L 14 22   ALK PHOS U/L 79 102   TOTAL PROTEIN g/dL 7 4 8 2   ALBUMIN g/dL 3 0* 3 6   TOTAL BILIRUBIN mg/dL 0 70 0 70         Results from last 7 days   Lab Units 02/23/20  0549 02/22/20  1120   GLUCOSE RANDOM mg/dL 121 108     Results from last 7 days   Lab Units 02/22/20  1120   TROPONIN I ng/mL <0 02         Results from last 7 days   Lab Units 02/23/20  0549 02/22/20  1717 02/22/20  1120   PROTIME seconds 23 1* 24 6* 22 2*   INR  2 03* 2 19* 1 93*   PTT seconds  --   --  49*         Results from last 7 days   Lab Units 02/22/20  1238   PROCALCITONIN ng/ml <0 05     Results from last 7 days   Lab Units 02/22/20  1120   LACTIC ACID mmol/L 2 0             Results from last 7 days   Lab Units 02/22/20  1120   NT-PRO BNP pg/mL 518*     Results from last 7 days   Lab Units 02/22/20  1148   CLARITY UA  Clear   COLOR UA  Yellow   SPEC GRAV UA  <=1 005   PH UA  5 5   GLUCOSE UA mg/dl Negative   KETONES UA mg/dl Negative   BLOOD UA  Trace-lysed*   PROTEIN UA mg/dl Negative   NITRITE UA  Negative   BILIRUBIN UA  Negative   UROBILINOGEN UA E U /dl 0 2   LEUKOCYTES UA  Negative   WBC UA /hpf None Seen   RBC UA /hpf 0-1*   BACTERIA UA /hpf None Seen   EPITHELIAL CELLS WET PREP /hpf None Seen     Results from last 7 days   Lab Units 02/22/20  1120   INFLUENZA A PCR  None Detected   INFLUENZA B PCR  None Detected   RSV PCR  None Detected     Results from last 7 days   Lab Units 02/22/20  1223 02/22/20  1120   BLOOD CULTURE  Received in Microbiology Lab  Culture in Progress  Received in Microbiology Lab  Culture in Progress       ED Treatment:   Medication Administration from 02/22/2020 1051 to 02/22/2020 1351 Date/Time Order Dose Route Action     02/22/2020 1310 ceftriaxone (ROCEPHIN) 1 g/50 mL in dextrose IVPB 1,000 mg Intravenous New Bag        History reviewed  No pertinent past medical history  Present on Admission:  **None**      Admitting Diagnosis: SOB (shortness of breath) [R06 02]  Right lower lobe pneumonia (HCC) [J18 1]  Age/Sex: 70 y o  female     Admission Orders:  Bld culture x2    Scheduled Medications:  Medications:  atorvastatin 10 mg Oral Daily With Dinner   azithromycin 500 mg Oral Q24H   cefTRIAXone 1,000 mg Intravenous Q24H   guaiFENesin 600 mg Oral Q12H [address]   levothyroxine 100 mcg Oral Early Morning   lisinopril 20 mg Oral Daily     Continuous IV Infusions: None     PRN Meds:  acetaminophen 650 mg Oral Q6H PRN  2/23 x1     Network Utilization Review Department  [email or web address]  ATTENTION: Please call with any questions or concerns to [phone] and carefully listen to the prompts so that you are directed to the right person  All voicemails are confidential   Sylvester Whatley all requests for admission clinical reviews, approved or denied determinations and any other requests to dedicated fax number below belonging to the campus where the patient is receiving treatment   List of dedicated fax numbers for the Facilities:  [address] DENIALS (Administrative/Medical Necessity) [phone]   [address] (Maternity/NICU/Pediatrics) [phone]   Cl Rdedy [phone]   Maria Ines Reza [phone]   Jose [place] [phone]   David Mcginnis [phone]   [facility] [address] [place] [address] [phone]   [facility]  [phone]   [address] [facility] [address]  [address] [address] [address] [facility] [phone]

## 2020-02-23 NOTE — PROGRESS NOTES
Progress Note - Raquel Davalos 1940, 70 y o  female MRN: [id number]    Unit/Bed#: -01 Encounter: [id number]    Primary Care Provider: Enrique Bhandari DO   Date and time admitted to hospital: 2/22/2020 11:03 AM        * Community acquired pneumonia  Assessment & Plan  · Patient likely has community-acquired pneumonia as evidenced by hypoxia, fevers, crackles in left lower lung field with worsening cough  · Chest x-ray does not demonstrate any overt opacities or infiltrates, CT imaging confirming left lower lobe pneumonia  · Now clinically appears to be improving  · Continue with azithromycin and ceftriaxone for community-acquired pneumonia, follow-up cultures, trend fever curve, trend white count, continue with supportive care    Hyperlipidemia  Assessment & Plan  · Continue home dose Lipitor 40 mg daily    Hypertension  Assessment & Plan  · Blood pressure adequately controlled, continue lisinopril 20 mg daily  · continue to monitor with vital signs per floor protocol    Hypothyroidism  Assessment & Plan  · Continue home dose Synthroid 100 mcg daily    Acute respiratory failure with hypoxia (HCC)  Assessment & Plan  · See plan for community-acquired pneumonia    Paroxysmal atrial fibrillation (HCC)  Assessment & Plan  · On Coumadin 7 5 mg daily, continue with daily INR and dose Coumadin accordingly          VTE Pharmacologic Prophylaxis:   Pharmacologic: Warfarin (Coumadin)  Mechanical VTE Prophylaxis in Place: No    Patient Centered Rounds: I have performed bedside rounds with nursing staff today  Discussions with Specialists or Other Care Team Provider:  None    Education and Discussions with Family / Patient:  Pneumonia    Time Spent for Care: 45 minutes  More than 50% of total time spent on counseling and coordination of care as described above      Current Length of Stay: 1 day(s)    Current Patient Status: Inpatient   Certification Statement: The patient will continue to require additional inpatient hospital stay due to See above    Discharge Plan:  24-48 hours    Code Status: No Order      Subjective:   Patient feels well today    Objective:     Vitals:   Temp (24hrs), Av 1 °F (37 3 °C), Min:98 4 °F (36 9 °C), Max:100 4 °F (38 °C)    Temp:  [98 4 °F (36 9 °C)-100 4 °F (38 °C)] 98 7 °F (37 1 °C)  HR:  [] 93  Resp:  [17-19] 19  BP: (117-145)/(66-75) 117/66  SpO2:  [90 %-95 %] 94 %  Body mass index is 34 05 kg/m²  Input and Output Summary (last 24 hours): Intake/Output Summary (Last 24 hours) at 2020 1420  Last data filed at 2020 0800  Gross per 24 hour   Intake 820 ml   Output    Net 820 ml       Physical Exam:        Resting comfortably, no acute distress  Regular rate rhythm  Crackles in left lower lung field  Bowel sounds positive, nontender nondistended  No lower extremity edema  Alert and oriented x3       Additional Data:     Labs:    Results from last 7 days   Lab Units 20  0549   WBC Thousand/uL 8 99   HEMOGLOBIN g/dL 12 6   HEMATOCRIT % 38 4   PLATELETS Thousands/uL 151   NEUTROS PCT % 76*   LYMPHS PCT % 13*   MONOS PCT % 11   EOS PCT % 0     Results from last 7 days   Lab Units 20  0549   SODIUM mmol/L 141   POTASSIUM mmol/L 4 1   CHLORIDE mmol/L 105   CO2 mmol/L 29   BUN mg/dL 11   CREATININE mg/dL 0 81   ANION GAP mmol/L 7   CALCIUM mg/dL 8 8   ALBUMIN g/dL 3 0*   TOTAL BILIRUBIN mg/dL 0 70   ALK PHOS U/L 79   ALT U/L 14   AST U/L 20   GLUCOSE RANDOM mg/dL 121     Results from last 7 days   Lab Units 20  0549   INR  2 03*             Results from last 7 days   Lab Units 20  1238 20  1120   LACTIC ACID mmol/L  --  2 0   PROCALCITONIN ng/ml <0 05  --            * I Have Reviewed All Lab Data Listed Above  * Additional Pertinent Lab Tests Reviewed:  All Labs For Current Hospital Admission Reviewed    Imaging:    Imaging Reports Reviewed Today Include:  CT imaging  Imaging Personally Reviewed by Myself Includes:  CT imaging    Recent Cultures (last 7 days):     Results from last 7 days   Lab Units 02/22/20  1223 02/22/20  1120   BLOOD CULTURE  Received in Microbiology Lab  Culture in Progress  Received in Microbiology Lab  Culture in Progress  Last 24 Hours Medication List:     Current Facility-Administered Medications:  acetaminophen 650 mg Oral Q6H PRN Carmen Lan MD    atorvastatin 10 mg Oral Daily With Teri Martinez MD    azithromycin 500 mg Oral Q24H Carmen Lan MD    cefTRIAXone 1,000 mg Intravenous Q24H Carmen Lan MD Last Rate: 1,000 mg (02/23/20 1328)   guaiFENesin 600 mg Oral Q12H [id number] Minor Caldera MD    levothyroxine 100 mcg Oral Early Morning Carmen Lan MD    lisinopril 20 mg Oral Daily Carmen Lan MD         Today, Patient Was Seen By: CAROLINE Guthrie      ** Please Note: Dictation voice to text software may have been used in the creation of this document   **

## 2020-02-23 NOTE — ASSESSMENT & PLAN NOTE
· Patient likely has community-acquired pneumonia as evidenced by hypoxia, fevers, crackles in left lower lung field with worsening cough  · Chest x-ray does not demonstrate any overt opacities or infiltrates, CT imaging confirming left lower lobe pneumonia  · Now clinically appears to be improving  · Continue with azithromycin and ceftriaxone for community-acquired pneumonia, follow-up cultures, trend fever curve, trend white count, continue with supportive care

## 2020-02-24 LAB
ALBUMIN SERPL BCP-MCNC: 3.1 G/DL (ref 3.5–5)
ALP SERPL-CCNC: 86 U/L (ref 46–116)
ALT SERPL W P-5'-P-CCNC: 15 U/L (ref 12–78)
ANION GAP SERPL CALCULATED.3IONS-SCNC: 11 MMOL/L (ref 4–13)
AST SERPL W P-5'-P-CCNC: 19 U/L (ref 5–45)
BASOPHILS # BLD AUTO: 0.03 THOUSANDS/ΜL (ref 0–0.1)
BASOPHILS NFR BLD AUTO: 1 % (ref 0–1)
BILIRUB SERPL-MCNC: 0.5 MG/DL (ref 0.2–1)
BUN SERPL-MCNC: 14 MG/DL (ref 5–25)
CALCIUM SERPL-MCNC: 9 MG/DL (ref 8.3–10.1)
CHLORIDE SERPL-SCNC: 105 MMOL/L (ref 100–108)
CO2 SERPL-SCNC: 25 MMOL/L (ref 21–32)
CREAT SERPL-MCNC: 0.95 MG/DL (ref 0.6–1.3)
EOSINOPHIL # BLD AUTO: 0.13 THOUSAND/ΜL (ref 0–0.61)
EOSINOPHIL NFR BLD AUTO: 2 % (ref 0–6)
ERYTHROCYTE [DISTWIDTH] IN BLOOD BY AUTOMATED COUNT: 13.5 % (ref 11.6–15.1)
GFR SERPL CREATININE-BSD FRML MDRD: 58 ML/MIN/1.73SQ M
GLUCOSE SERPL-MCNC: 166 MG/DL (ref 65–140)
HCT VFR BLD AUTO: 41.7 % (ref 34.8–46.1)
HGB BLD-MCNC: 13.4 G/DL (ref 11.5–15.4)
IMM GRANULOCYTES # BLD AUTO: 0.01 THOUSAND/UL (ref 0–0.2)
IMM GRANULOCYTES NFR BLD AUTO: 0 % (ref 0–2)
INR PPP: 1.99 (ref 0.84–1.19)
LYMPHOCYTES # BLD AUTO: 1.2 THOUSANDS/ΜL (ref 0.6–4.47)
LYMPHOCYTES NFR BLD AUTO: 21 % (ref 14–44)
MAGNESIUM SERPL-MCNC: 2.1 MG/DL (ref 1.6–2.6)
MCH RBC QN AUTO: 30.5 PG (ref 26.8–34.3)
MCHC RBC AUTO-ENTMCNC: 32.1 G/DL (ref 31.4–37.4)
MCV RBC AUTO: 95 FL (ref 82–98)
MONOCYTES # BLD AUTO: 0.65 THOUSAND/ΜL (ref 0.17–1.22)
MONOCYTES NFR BLD AUTO: 12 % (ref 4–12)
NEUTROPHILS # BLD AUTO: 3.63 THOUSANDS/ΜL (ref 1.85–7.62)
NEUTS SEG NFR BLD AUTO: 64 % (ref 43–75)
NRBC BLD AUTO-RTO: 0 /100 WBCS
PLATELET # BLD AUTO: 174 THOUSANDS/UL (ref 149–390)
PMV BLD AUTO: 10.9 FL (ref 8.9–12.7)
POTASSIUM SERPL-SCNC: 3.9 MMOL/L (ref 3.5–5.3)
PROT SERPL-MCNC: 7.9 G/DL (ref 6.4–8.2)
PROTHROMBIN TIME: 22.8 SECONDS (ref 11.6–14.5)
RBC # BLD AUTO: 4.39 MILLION/UL (ref 3.81–5.12)
SODIUM SERPL-SCNC: 141 MMOL/L (ref 136–145)
WBC # BLD AUTO: 5.65 THOUSAND/UL (ref 4.31–10.16)

## 2020-02-24 PROCEDURE — 80053 COMPREHEN METABOLIC PANEL: CPT | Performed by: STUDENT IN AN ORGANIZED HEALTH CARE EDUCATION/TRAINING PROGRAM

## 2020-02-24 PROCEDURE — 99233 SBSQ HOSP IP/OBS HIGH 50: CPT | Performed by: STUDENT IN AN ORGANIZED HEALTH CARE EDUCATION/TRAINING PROGRAM

## 2020-02-24 PROCEDURE — 85610 PROTHROMBIN TIME: CPT | Performed by: STUDENT IN AN ORGANIZED HEALTH CARE EDUCATION/TRAINING PROGRAM

## 2020-02-24 PROCEDURE — 94761 N-INVAS EAR/PLS OXIMETRY MLT: CPT

## 2020-02-24 PROCEDURE — 85025 COMPLETE CBC W/AUTO DIFF WBC: CPT | Performed by: STUDENT IN AN ORGANIZED HEALTH CARE EDUCATION/TRAINING PROGRAM

## 2020-02-24 PROCEDURE — 83735 ASSAY OF MAGNESIUM: CPT | Performed by: STUDENT IN AN ORGANIZED HEALTH CARE EDUCATION/TRAINING PROGRAM

## 2020-02-24 RX ORDER — WARFARIN SODIUM 7.5 MG/1
7.5 TABLET ORAL
Status: DISCONTINUED | OUTPATIENT
Start: 2020-02-24 | End: 2020-02-25 | Stop reason: HOSPADM

## 2020-02-24 RX ORDER — VENLAFAXINE HYDROCHLORIDE 150 MG/1
150 CAPSULE, EXTENDED RELEASE ORAL DAILY
Status: DISCONTINUED | OUTPATIENT
Start: 2020-02-24 | End: 2020-02-25 | Stop reason: HOSPADM

## 2020-02-24 RX ORDER — LEVALBUTEROL INHALATION SOLUTION 0.63 MG/3ML
0.63 SOLUTION RESPIRATORY (INHALATION) ONCE
Status: DISCONTINUED | OUTPATIENT
Start: 2020-02-24 | End: 2020-02-24

## 2020-02-24 RX ORDER — GUAIFENESIN/DEXTROMETHORPHAN 100-10MG/5
10 SYRUP ORAL EVERY 4 HOURS PRN
Status: DISCONTINUED | OUTPATIENT
Start: 2020-02-24 | End: 2020-02-25 | Stop reason: HOSPADM

## 2020-02-24 RX ADMIN — GUAIFENESIN AND DEXTROMETHORPHAN 10 ML: 100; 10 SYRUP ORAL at 06:35

## 2020-02-24 RX ADMIN — AZITHROMYCIN MONOHYDRATE 500 MG: 250 TABLET ORAL at 16:15

## 2020-02-24 RX ADMIN — ATORVASTATIN CALCIUM 10 MG: 10 TABLET, FILM COATED ORAL at 16:15

## 2020-02-24 RX ADMIN — CEFTRIAXONE SODIUM 1000 MG: 10 INJECTION, POWDER, FOR SOLUTION INTRAVENOUS at 13:27

## 2020-02-24 RX ADMIN — WARFARIN SODIUM 7.5 MG: 7.5 TABLET ORAL at 17:41

## 2020-02-24 RX ADMIN — VENLAFAXINE HYDROCHLORIDE 150 MG: 37.5 CAPSULE, EXTENDED RELEASE ORAL at 17:43

## 2020-02-24 RX ADMIN — LEVOTHYROXINE SODIUM 100 MCG: 100 TABLET ORAL at 06:35

## 2020-02-24 RX ADMIN — GUAIFENESIN AND DEXTROMETHORPHAN 10 ML: 100; 10 SYRUP ORAL at 01:33

## 2020-02-24 NOTE — UTILIZATION REVIEW
Notification of Inpatient Admission/Inpatient Authorization Request   This is a Notification of Inpatient Admission for [address]  Be advised that this patient was admitted to our facility under Inpatient Status  Contact Keegan Foy at [phone] for additional admission information  [address] [facility] DEPT DEDICATED Joshua Murray [phone]  Patient Name:   Jose Guadalupe Morocho   YOB: 1940       [address]   [address]:   [facility]    Tax ID: [id number]  NPI: [id number] Attending Provider/NPI: Clyda Carrel, Md [[id number]]   Place of Service Code: 24     Place of Service Name:  [address] [place] [address]   Start Date: 2/22/20 1245     Discharge Date & Time: No discharge date for patient encounter  Type of Admission: Inpatient Status Discharge Disposition   (if discharged): Final discharge disposition not confirmed   Patient Diagnoses: SOB (shortness of breath) [R06 02]  Right lower lobe pneumonia ([address] ) [J18 1]     Orders: Admission Orders (From admission, onward)     Ordered        02/22/20 1245  Inpatient Admission  Once                    Assigned Utilization Review Contact: Keegan Foy  Utilization   Network Utilization Review Department  Phone: [phone]; Fax [phone]  Email: [email or web address]   ATTENTION PAYERS: Please call the assigned Utilization  directly with any questions or concerns ALL voicemails in the department are confidential  Send all requests for admission clinical reviews, approved or denied determinations and any other requests to dedicated fax number belonging to the campus where the patient is receiving treatment

## 2020-02-24 NOTE — SOCIAL WORK
LOS 2 DAYS  PATIENT IS NOT A READMISSION  PATIENT IS NOT A BUNDLE PATIENT  CM met with patient at bedside  Patient reports she lives in [facility] with her daughter in a 2 story home  Patient reports [place]  Patient reports no need for DME  Patient reports she is independent with ADLs  Patient reports no hx of HHC or inpatient rehab  Patient reports her dtr is her caregiver  Patient reports [facility] in [facility] [facility] as her pharmacy and denies any issues affording medication  Patient reports no hx of mental health or substance abuse  Patient reports her dtr, Jazzy Yesica is her health care representative  Patient reports she does drive  Patient reports her dtr will provide transportation at time of discharge  CM reviewed d/c planning process including the following: identifying help at home, patient preference for d/c planning needs,  availability of treatment team to discuss questions or concerns patient and/or family may have regarding understanding medications and recognizing signs and symptoms once discharged  CM also encouraged patient to follow up with all recommended appointments after discharge  Patient advised of importance for patient and family to participate in managing patients medical well being  Patient will be having a home O2 evaluation, needs will be pending results of the evaluation  CM department will follow through discharge

## 2020-02-24 NOTE — ASSESSMENT & PLAN NOTE
· Patient likely has community-acquired pneumonia as evidenced by hypoxia, fevers, crackles in left lower lung field with worsening cough  · Chest x-ray does not demonstrate any overt opacities or infiltrates, CT imaging confirming left lower lobe pneumonia  · Now clinically appears to be improving  · Plan to complete azithromycin today, continue ceftriaxone, cultures negative, afebrile, no white count  · Patient noted to have worsening cough today, oxygen saturations also in the low 90s  · Will order home O2 evaluation, likely has persistent pneumonia, does not appear fluid overloaded suggesting CHF exacerbation, also does have any chest pain, chest palpitations and is on anticoagulation thus PE not likely

## 2020-02-24 NOTE — RESPIRATORY THERAPY NOTE
Home Oxygen Qualifying Test       Patient name: Nelson Balderas        : 1940   Date of Test:  2020  Diagnosis: Community acquired pneumonia     Home Oxygen Test:    **Medicare Guidelines require item(s) 1-5 on all ambulatory patients or 1 and 2 on non-ambulatory patients  1   Baseline SPO2 on Room Air at rest 97%  2   SPO2 during exercise on Room Air 91 %  During exercise monitor SpO2  If SPO2 increases >=89% with ambulation do not add supplemental             oxygen  If <= 88% on room air add O2 via NC and titrate patient  Patient must be ambulated with O2 and titrated to > 88% with exertion  3   Exercise performed:          walking, duration 10 (min), distance 800 (feet)          []  Supplemental Home Oxygen is indicated  [x]  Client does not qualify for home oxygen        Respiratory Additional Notes-   Abigail Guillermo, RT

## 2020-02-24 NOTE — PROGRESS NOTES
Progress Note - Lisa Morel 1940, 70 y o  female MRN: [id number]    Unit/Bed#: -01 Encounter: [id number]    Primary Care Provider: Chhaya Burgess DO   Date and time admitted to hospital: 2/22/2020 11:03 AM        * Community acquired pneumonia  Assessment & Plan  · Patient likely has community-acquired pneumonia as evidenced by hypoxia, fevers, crackles in left lower lung field with worsening cough  · Chest x-ray does not demonstrate any overt opacities or infiltrates, CT imaging confirming left lower lobe pneumonia  · Now clinically appears to be improving  · Plan to complete azithromycin today, continue ceftriaxone, cultures negative, afebrile, no white count  · Patient noted to have worsening cough today, oxygen saturations also in the low 90s  · Will order home O2 evaluation, likely has persistent pneumonia, does not appear fluid overloaded suggesting CHF exacerbation, also does have any chest pain, chest palpitations and is on anticoagulation thus PE not likely    Hyperlipidemia  Assessment & Plan  · Continue home dose Lipitor 40 mg daily    Hypertension  Assessment & Plan  · Blood pressure adequately controlled, continue lisinopril 20 mg daily  · continue to monitor with vital signs per floor protocol    Hypothyroidism  Assessment & Plan  · Continue home dose Synthroid 100 mcg daily    Acute respiratory failure with hypoxia ([facility] [address] )  Assessment & Plan  · See plan for community-acquired pneumonia    Paroxysmal atrial fibrillation (HCC)  Assessment & Plan  · On Coumadin 7 5 mg daily, continue with daily INR and dose Coumadin accordingly          VTE Pharmacologic Prophylaxis:   Pharmacologic: Warfarin (Coumadin)  Mechanical VTE Prophylaxis in Place: Yes    Patient Centered Rounds: I have performed bedside rounds with nursing staff today      Discussions with Specialists or Other Care Team Provider:  None    Education and Discussions with Family / Patient:  Pneumonia, INR, hypoxia    Time Spent for Care: 45 minutes  More than 50% of total time spent on counseling and coordination of care as described above  Current Length of Stay: 2 day(s)    Current Patient Status: Inpatient   Certification Statement: The patient will continue to require additional inpatient hospital stay due to See above    Discharge Plan:  24 hours    Code Status: No Order      Subjective:   Patient feels better today but endorsing worsening cough    Objective:     Vitals:   Temp (24hrs), Av 1 °F (37 3 °C), Min:98 4 °F (36 9 °C), Max:100 °F (37 8 °C)    Temp:  [98 4 °F (36 9 °C)-100 °F (37 8 °C)] 98 4 °F (36 9 °C)  HR:  [90-96] 90  Resp:  [18] 18  BP: ()/(62-79) 98/66  SpO2:  [88 %-94 %] 94 %  Body mass index is 34 05 kg/m²  Input and Output Summary (last 24 hours):     No intake or output data in the 24 hours ending 20 1314    Physical Exam:     Physical Exam   Constitutional: She is oriented to person, place, and time  She appears well-developed and well-nourished  Cardiovascular: Normal rate and regular rhythm  Pulmonary/Chest: Effort normal and breath sounds normal    Abdominal: Soft  Bowel sounds are normal    Neurological: She is alert and oriented to person, place, and time  Skin: Skin is warm and dry  Psychiatric: She has a normal mood and affect   Her behavior is normal        Additional Data:     Labs:    Results from last 7 days   Lab Units 20  0740   WBC Thousand/uL 5 65   HEMOGLOBIN g/dL 13 4   HEMATOCRIT % 41 7   PLATELETS Thousands/uL 174   NEUTROS PCT % 64   LYMPHS PCT % 21   MONOS PCT % 12   EOS PCT % 2     Results from last 7 days   Lab Units 20  0740   SODIUM mmol/L 141   POTASSIUM mmol/L 3 9   CHLORIDE mmol/L 105   CO2 mmol/L 25   BUN mg/dL 14   CREATININE mg/dL 0 95   ANION GAP mmol/L 11   CALCIUM mg/dL 9 0   ALBUMIN g/dL 3 1*   TOTAL BILIRUBIN mg/dL 0 50   ALK PHOS U/L 86   ALT U/L 15   AST U/L 19   GLUCOSE RANDOM mg/dL 166*     Results from last 7 days   Lab Units 02/24/20  0740   INR  1 99*             Results from last 7 days   Lab Units 02/22/20  1238 02/22/20  1120   LACTIC ACID mmol/L  --  2 0   PROCALCITONIN ng/ml <0 05  --            * I Have Reviewed All Lab Data Listed Above  * Additional Pertinent Lab Tests Reviewed: [address] Admission Reviewed    Imaging:    Imaging Reports Reviewed Today Include: na  Imaging Personally Reviewed by Myself Includes:  na    Recent Cultures (last 7 days):     Results from last 7 days   Lab Units 02/22/20  1223 02/22/20  1120   BLOOD CULTURE  No Growth at 24 hrs  No Growth at 24 hrs  Last 24 Hours Medication List:     Current Facility-Administered Medications:  acetaminophen 650 mg Oral Q6H PRN Padmini Cain MD    atorvastatin 10 mg Oral Daily With Janet Crowell MD    azithromycin 500 mg Oral Q24H Padmini Cain MD    cefTRIAXone 1,000 mg Intravenous Q24H Padmini Cain MD Last Rate: 1,000 mg (02/23/20 1328)   dextromethorphan-guaiFENesin 10 mL Oral Q4H PRN Elgin Hernandez MD    levothyroxine 100 mcg Oral Early Morning Padmini Cain MD    lisinopril 20 mg Oral Daily Padmini Cain MD    warfarin 7 5 mg Oral Daily (warfarin) Padmini Cain MD         Today, Patient Was Seen By: CAROLINE Cates      ** Please Note: Dictation voice to text software may have been used in the creation of this document   **

## 2020-02-24 NOTE — RESPIRATORY THERAPY NOTE
RT Protocol Note  Ana Ibarra 70 y o  female MRN: [id number]  Unit/Bed#: -01 Encounter: [id number]    Assessment    Principal Problem:    Community acquired pneumonia  Active Problems:    Paroxysmal atrial fibrillation (HCC)    Acute respiratory failure with hypoxia (HCC)    Hypothyroidism    Hypertension    Hyperlipidemia      Home Pulmonary Medications:  none       History reviewed  No pertinent past medical history    Social History     Socioeconomic History    Marital status: /Civil Union     Spouse name: None    Number of children: None    Years of education: None    Highest education level: None   Occupational History    None   Social Needs    Financial resource strain: None    Food insecurity:     Worry: None     Inability: None    Transportation needs:     Medical: None     Non-medical: None   Tobacco Use    Smoking status: Never Smoker    Smokeless tobacco: Never Used   Substance and Sexual Activity    Alcohol use: Never     Frequency: Never    Drug use: Never    Sexual activity: None   Lifestyle    Physical activity:     Days per week: None     Minutes per session: None    Stress: None   Relationships    Social connections:     Talks on phone: None     Gets together: None     Attends Restorationist service: None     Active member of club or organization: None     Attends meetings of clubs or organizations: None     Relationship status: None    Intimate partner violence:     Fear of current or ex partner: None     Emotionally abused: None     Physically abused: None     Forced sexual activity: None   Other Topics Concern    None   Social History Narrative    None       Subjective         Objective    Physical Exam:   Assessment Type: (P) Pre-treatment  General Appearance: (P) Alert, Awake  Respiratory Pattern: (P) Normal  Chest Assessment: (P) Chest expansion symmetrical  Bilateral Breath Sounds: (P) Diminished    Vitals:  Blood pressure 139/96, pulse 83, temperature 97 9 °F (36 6 °C), resp  rate (P) 18, height 5' 5" (1 651 m), weight 92 8 kg (204 lb 9 4 oz), SpO2 94 %  Imaging and other studies: I have personally reviewed pertinent reports  Plan    Respiratory Plan: (P) No distress/Pulmonary history        Resp Comments: (P) Pt assessed per protocol  Pt  had a one time low saturation  Home 02 evaluation patients oxygen remained steady at 91%  Pt  believes coughing caused lower saturation  No breathing treatments indicated at this time

## 2020-02-25 VITALS
WEIGHT: 204.59 LBS | DIASTOLIC BLOOD PRESSURE: 74 MMHG | BODY MASS INDEX: 34.09 KG/M2 | RESPIRATION RATE: 18 BRPM | OXYGEN SATURATION: 91 % | SYSTOLIC BLOOD PRESSURE: 138 MMHG | TEMPERATURE: 97.9 F | HEART RATE: 84 BPM | HEIGHT: 65 IN

## 2020-02-25 LAB
ALBUMIN SERPL BCP-MCNC: 2.8 G/DL (ref 3.5–5)
ALP SERPL-CCNC: 79 U/L (ref 46–116)
ALT SERPL W P-5'-P-CCNC: 17 U/L (ref 12–78)
ANION GAP SERPL CALCULATED.3IONS-SCNC: 9 MMOL/L (ref 4–13)
AST SERPL W P-5'-P-CCNC: 24 U/L (ref 5–45)
BASOPHILS # BLD AUTO: 0.03 THOUSANDS/ΜL (ref 0–0.1)
BASOPHILS NFR BLD AUTO: 1 % (ref 0–1)
BILIRUB SERPL-MCNC: 0.4 MG/DL (ref 0.2–1)
BUN SERPL-MCNC: 21 MG/DL (ref 5–25)
CALCIUM SERPL-MCNC: 8.8 MG/DL (ref 8.3–10.1)
CHLORIDE SERPL-SCNC: 104 MMOL/L (ref 100–108)
CO2 SERPL-SCNC: 27 MMOL/L (ref 21–32)
CREAT SERPL-MCNC: 0.85 MG/DL (ref 0.6–1.3)
EOSINOPHIL # BLD AUTO: 0.18 THOUSAND/ΜL (ref 0–0.61)
EOSINOPHIL NFR BLD AUTO: 3 % (ref 0–6)
ERYTHROCYTE [DISTWIDTH] IN BLOOD BY AUTOMATED COUNT: 13.4 % (ref 11.6–15.1)
GFR SERPL CREATININE-BSD FRML MDRD: 66 ML/MIN/1.73SQ M
GLUCOSE SERPL-MCNC: 110 MG/DL (ref 65–140)
HCT VFR BLD AUTO: 37 % (ref 34.8–46.1)
HGB BLD-MCNC: 12.1 G/DL (ref 11.5–15.4)
IMM GRANULOCYTES # BLD AUTO: 0.02 THOUSAND/UL (ref 0–0.2)
IMM GRANULOCYTES NFR BLD AUTO: 0 % (ref 0–2)
INR PPP: 1.66 (ref 0.84–1.19)
LYMPHOCYTES # BLD AUTO: 1.41 THOUSANDS/ΜL (ref 0.6–4.47)
LYMPHOCYTES NFR BLD AUTO: 24 % (ref 14–44)
MAGNESIUM SERPL-MCNC: 2.1 MG/DL (ref 1.6–2.6)
MCH RBC QN AUTO: 31.2 PG (ref 26.8–34.3)
MCHC RBC AUTO-ENTMCNC: 32.7 G/DL (ref 31.4–37.4)
MCV RBC AUTO: 95 FL (ref 82–98)
MONOCYTES # BLD AUTO: 0.78 THOUSAND/ΜL (ref 0.17–1.22)
MONOCYTES NFR BLD AUTO: 13 % (ref 4–12)
NEUTROPHILS # BLD AUTO: 3.38 THOUSANDS/ΜL (ref 1.85–7.62)
NEUTS SEG NFR BLD AUTO: 59 % (ref 43–75)
NRBC BLD AUTO-RTO: 0 /100 WBCS
PLATELET # BLD AUTO: 186 THOUSANDS/UL (ref 149–390)
PMV BLD AUTO: 11.1 FL (ref 8.9–12.7)
POTASSIUM SERPL-SCNC: 4.1 MMOL/L (ref 3.5–5.3)
PROT SERPL-MCNC: 7.2 G/DL (ref 6.4–8.2)
PROTHROMBIN TIME: 19.7 SECONDS (ref 11.6–14.5)
RBC # BLD AUTO: 3.88 MILLION/UL (ref 3.81–5.12)
SODIUM SERPL-SCNC: 140 MMOL/L (ref 136–145)
WBC # BLD AUTO: 5.8 THOUSAND/UL (ref 4.31–10.16)

## 2020-02-25 PROCEDURE — 83735 ASSAY OF MAGNESIUM: CPT | Performed by: STUDENT IN AN ORGANIZED HEALTH CARE EDUCATION/TRAINING PROGRAM

## 2020-02-25 PROCEDURE — 80053 COMPREHEN METABOLIC PANEL: CPT | Performed by: STUDENT IN AN ORGANIZED HEALTH CARE EDUCATION/TRAINING PROGRAM

## 2020-02-25 PROCEDURE — 85610 PROTHROMBIN TIME: CPT | Performed by: STUDENT IN AN ORGANIZED HEALTH CARE EDUCATION/TRAINING PROGRAM

## 2020-02-25 PROCEDURE — 99239 HOSP IP/OBS DSCHRG MGMT >30: CPT | Performed by: STUDENT IN AN ORGANIZED HEALTH CARE EDUCATION/TRAINING PROGRAM

## 2020-02-25 PROCEDURE — 85025 COMPLETE CBC W/AUTO DIFF WBC: CPT | Performed by: STUDENT IN AN ORGANIZED HEALTH CARE EDUCATION/TRAINING PROGRAM

## 2020-02-25 RX ORDER — CEFDINIR 300 MG/1
300 CAPSULE ORAL EVERY 12 HOURS SCHEDULED
Qty: 6 CAPSULE | Refills: 0 | Status: SHIPPED | OUTPATIENT
Start: 2020-02-25 | End: 2020-02-28

## 2020-02-25 RX ADMIN — LEVOTHYROXINE SODIUM 100 MCG: 100 TABLET ORAL at 05:39

## 2020-02-25 RX ADMIN — LISINOPRIL 20 MG: 20 TABLET ORAL at 09:36

## 2020-02-25 RX ADMIN — VENLAFAXINE HYDROCHLORIDE 150 MG: 37.5 CAPSULE, EXTENDED RELEASE ORAL at 09:36

## 2020-02-25 RX ADMIN — CEFTRIAXONE SODIUM 1000 MG: 10 INJECTION, POWDER, FOR SOLUTION INTRAVENOUS at 13:37

## 2020-02-25 NOTE — DISCHARGE INSTR - AVS FIRST PAGE
Recommended close follow-up with primary care provider in 3-5 days of discharge  Recommended to have repeat CT chest outpatient in 6-8 weeks to monitor resolution  Recommended Close follow-up outpatient for for INR monitoring and adjust Coumadin as needed

## 2020-02-25 NOTE — PLAN OF CARE
Problem: Potential for Falls  Goal: Patient will remain free of falls  Description  INTERVENTIONS:  - Assess patient frequently for physical needs  -  Identify cognitive and physical deficits and behaviors that affect risk of falls    -  [place] fall precautions as indicated by assessment   - Educate patient/family on patient safety including physical limitations  - Instruct patient to call for assistance with activity based on assessment  - Modify environment to reduce risk of injury  - Consider OT/PT consult to assist with strengthening/mobility  Outcome: Progressing     Problem: PAIN - ADULT  Goal: Verbalizes/displays adequate comfort level or baseline comfort level  Description  Interventions:  - Encourage patient to monitor pain and request assistance  - Assess pain using appropriate pain scale  - Administer analgesics based on type and severity of pain and evaluate response  - Implement non-pharmacological measures as appropriate and evaluate response  - Consider cultural and social influences on pain and pain management  - Notify physician/advanced practitioner if interventions unsuccessful or patient reports new pain  Outcome: Progressing     Problem: INFECTION - ADULT  Goal: Absence or prevention of progression during hospitalization  Description  INTERVENTIONS:  - Assess and monitor for signs and symptoms of infection  - Monitor lab/diagnostic results  - Monitor all insertion sites, i e  indwelling lines, tubes, and drains  - Monitor endotracheal if appropriate and nasal secretions for changes in amount and color  - [place]eer appropriate cooling/warming therapies per order  - Administer medications as ordered  - Instruct and encourage patient and family to use good hand hygiene technique  - Identify and instruct in appropriate isolation precautions for identified infection/condition  Outcome: Progressing  Goal: Absence of fever/infection during neutropenic period  Description  INTERVENTIONS:  - Monitor WBC    Outcome: Progressing     Problem: SAFETY ADULT  Goal: Maintain or return to baseline ADL function  Description  INTERVENTIONS:  -  Assess patient's ability to carry out ADLs; assess patient's baseline for ADL function and identify physical deficits which impact ability to perform ADLs (bathing, care of mouth/teeth, toileting, grooming, dressing, etc )  - Assess/evaluate cause of self-care deficits   - Assess range of motion  - Assess patient's mobility; develop plan if impaired  - Assess patient's need for assistive devices and provide as appropriate  - Encourage maximum independence but intervene and supervise when necessary  - Involve family in performance of ADLs  - Assess for home care needs following discharge   - Consider OT consult to assist with ADL evaluation and planning for discharge  - Provide patient education as appropriate  Outcome: Progressing  Goal: Maintain or return mobility status to optimal level  Description  INTERVENTIONS:  - Assess patient's baseline mobility status (ambulation, transfers, stairs, etc )    - Identify cognitive and physical deficits and behaviors that affect mobility  - Identify mobility aids required to assist with transfers and/or ambulation (gait belt, sit-to-stand, lift, walker, cane, etc )  - [place] fall precautions as indicated by assessment  - Record patient progress and toleration of activity level on Mobility SBAR; progress patient to next Phase/Stage  - Instruct patient to call for assistance with activity based on assessment  - Consider rehabilitation consult to assist with strengthening/weightbearing, etc   Outcome: Progressing     Problem: DISCHARGE PLANNING  Goal: Discharge to home or other facility with appropriate resources  Description  INTERVENTIONS:  - Identify barriers to discharge w/patient and caregiver  - Arrange for needed discharge resources and transportation as appropriate  - Identify discharge learning needs (meds, wound care, etc )  - Arrange for interpretive services to assist at discharge as needed  - Refer to Case Management Department for coordinating discharge planning if the patient needs post-hospital services based on physician/advanced practitioner order or complex needs related to functional status, cognitive ability, or social support system  Outcome: Progressing     Problem: Knowledge Deficit  Goal: Patient/family/caregiver demonstrates understanding of disease process, treatment plan, medications, and discharge instructions  Description  Complete learning assessment and assess knowledge base    Interventions:  - Provide teaching at level of understanding  - Provide teaching via preferred learning methods  Outcome: Progressing

## 2020-02-25 NOTE — UTILIZATION REVIEW
Notification of Discharge  This is a Notification of Discharge from our facility [address]  Please be advised that this patient has been discharge from our facility  Below you will find the admission and discharge date and time including the patients disposition  PRESENTATION DATE: 2/22/2020 11:03 AM  OBS ADMISSION DATE:   IP ADMISSION DATE: 2/22/20 1245   DISCHARGE DATE: No discharge date for patient encounter  DISPOSITION:   Final discharge disposition not confirmed   Admission Orders listed below:  Admission Orders (From admission, onward)     Ordered        02/22/20 1245  Inpatient Admission  Once                   Please contact the UR Department if additional information is required to close this patient's authorization/case  [address] [facility] Utilization Review Department  Main: [phone] x carefully listen to the prompts  All voicemails are confidential   [email or web address]  Send all requests for admission clinical reviews, approved or denied determinations and any other requests to dedicated fax number below belonging to the campus where the patient is receiving treatment   List of dedicated fax numbers:  [address] DENIALS (Administrative/Medical Necessity) [phone]   [address] (Maternity/NICU/Pediatrics) [phone]   Tanner Miramontes [phone]   Boom Christianson [phone]   Jt Srinivasan [phone]   [facility] [address] [place] [address] [phone]   [facility]  [phone]   [address] [facility]  2401 [facility] [place][note 2] [address] [facility] [phone]

## 2020-02-25 NOTE — PROGRESS NOTES
Pt AAOx4 in bed  Pt offers no c/o pain at this time  AM medications given at this time  Call bell in reach

## 2020-02-25 NOTE — DISCHARGE INSTRUCTIONS
Bacterial Pneumonia   WHAT YOU NEED TO KNOW:   Bacterial pneumonia is a lung infection caused by bacteria  It makes your lungs inflamed, which means they cannot work well  Bacterial pneumonia germs are easily spread when an infected person coughs, sneezes, or has close contact with others  DISCHARGE INSTRUCTIONS:   Seek care immediately if:   · You are confused and cannot think clearly  · You are urinating less or not at all  · You cough up blood  · You have more trouble breathing, or your breathing seems faster than normal     · Your heart or pulse beats more than 100 times in 1 minute  · Your lips or fingernails turn blue  Contact your healthcare provider if:   · Your symptoms are the same or get worse 48 hours after you start antibiotics  · You cannot eat or have loss of appetite, nausea, or are vomiting  · You have questions or concerns about your condition or care  Medicines:   · Antibiotics  treat pneumonia caused by bacteria  · Acetaminophen  decreases pain and fever  It is available without a doctor's order  Ask how much to take and how often to take it  Follow directions  Read the labels of all other medicines you are using to see if they also contain acetaminophen, or ask your doctor or pharmacist  Acetaminophen can cause liver damage if not taken correctly  Do not use more than 4 grams (4,000 milligrams) total of acetaminophen in one day  · NSAIDs , such as ibuprofen, help decrease swelling, pain, and fever  This medicine is available with or without a doctor's order  NSAIDs can cause stomach bleeding or kidney problems in certain people  If you take blood thinner medicine, always ask your healthcare provider if NSAIDs are safe for you  Always read the medicine label and follow directions  · Take your medicine as directed  Contact your healthcare provider if you think your medicine is not helping or if you have side effects   Tell him or her if you are allergic to any medicine  Keep a list of the medicines, vitamins, and herbs you take  Include the amounts, and when and why you take them  Bring the list or the pill bottles to follow-up visits  Carry your medicine list with you in case of an emergency  Follow up with your healthcare provider as directed:  Write down your questions so you remember to ask them during your visits  Manage your symptoms:   · Rest as needed  Rest often while you recover  Slowly start to do more each day  · Drink liquids as directed  Ask how much liquid to drink each day and which liquids are best for you  Liquids help thin your mucus, which may make it easier for you to cough it up  · Do not smoke  Avoid secondhand smoke  Smoking increases your risk for pneumonia  Smoking also makes it harder for you to get better after you have had pneumonia  Ask your healthcare provider for information if you currently smoke and need help to quit  E-cigarettes or smokeless tobacco still contain nicotine  Talk to your healthcare provider before you use these products  · Use a cool mist humidifier  to increase air moisture in your home  This may make it easier for you to breathe and help decrease your cough  · Keep your head elevated  You may be able to breathe better if you lie down with the head of your bed up  Prevent bacterial pneumonia:   · Prevent the spread of germs  Wash your hands often with soap and water  Use gel hand cleanser when there is no soap and water available  Do not touch your eyes, nose, or mouth unless you have washed your hands first  Cover your mouth when you cough  Cough into a tissue or your shirtsleeve so you do not spread germs from your hands  If you are sick, stay away from others as much as possible  · Limit alcohol  Women should limit alcohol to 1 drink a day  Men should limit alcohol to 2 drinks a day  A drink of alcohol is 12 ounces of beer, 5 ounces of wine, or 1½ ounces of liquor       · Ask about vaccines  You may need a vaccine to help prevent pneumonia  Get an influenza (flu) vaccine every year as soon as it becomes available  © 2017 [address] [employer]  Information is for End User's use only and may not be sold, redistributed or otherwise used for commercial purposes  All illustrations and images included in CareNotes® are the copyrighted property of A D A M , Inc  or [employer]  The above information is an  only  It is not intended as medical advice for individual conditions or treatments  Talk to your doctor, nurse or pharmacist before following any medical regimen to see if it is safe and effective for you  Cefdinir (By mouth)   Cefdinir (SEF-di-konrad)  Treats bacterial infections  This medicine is a cephalosporin antibiotic  Brand Name(s):   There may be other brand names for this medicine  When This Medicine Should Not Be Used: This medicine is not right for everyone  Do not use it if you had an allergic reaction to cefdinir or to any type of cephalosporin  How to Use This Medicine:   Capsule, Liquid  · Your doctor will tell you how much medicine to use  Do not use more than directed  · Shake the oral liquid well before use  · Measure the oral liquid medicine with a marked measuring spoon, oral syringe, or medicine cup  · Take all of the medicine in your prescription to clear up your infection, even if you feel better after the first few doses  · This medicine is not for long-term use  · Missed dose: Take a dose as soon as you remember  If it is almost time for your next dose, wait until then and take a regular dose  Do not take extra medicine to make up for a missed dose  · Store the medicine in a closed container at room temperature, away from heat, moisture, and direct light  Discard any unused portion of the oral liquid after 10 days    Drugs and Foods to Avoid:   Ask your doctor or pharmacist before using any other medicine, including over-the-counter medicines, vitamins, and herbal products  · Some medicines can affect how cefdinir works  Tell your doctor if you are also taking probenecid  · If you use antacids or iron supplements (including those found in multivitamins), take them at least 2 hours before or 2 hours after you take cefdinir  Warnings While Using This Medicine:   · Tell your doctor if you are pregnant or breastfeeding, have kidney disease, or had an allergic reaction to penicillin antibiotics or any other medicines  · This medicine can cause diarrhea  Call your doctor if the diarrhea becomes severe, does not stop, or is bloody  Do not take any medicine to stop diarrhea until you have talked to your doctor  Diarrhea can occur 2 months or more after you stop taking this medicine  · If you have diabetes, use Clinistix® or Pat-Tape® for urine glucose tests while you are taking cefdinir  Cefdinir capsules and liquid may cause incorrect results on the Clinitest® urine glucose test   · Tell any doctor or dentist who treats you that you are using this medicine  This medicine may affect certain medical test results  · Call your doctor if your symptoms do not improve or if they get worse  · Keep all medicine out of the reach of children  Never share your medicine with anyone    Possible Side Effects While Using This Medicine:   Call your doctor right away if you notice any of these side effects:  · Allergic reaction: Itching or hives, swelling in your face or hands, swelling or tingling in your mouth or throat, chest tightness, trouble breathing  · Blistering, peeling, red skin rash  · Red or black stools  · Severe diarrhea or stomach pain  · Sores or white patches on your lips, mouth, or throat  If you notice these less serious side effects, talk with your doctor:   · Bloated feeling, constipation, loss of appetite, nausea, vomiting, or upset stomach  · Dizziness, drowsiness, sleepiness, or unusual weakness  · Dry mouth  · Trouble sleeping  If you notice other side effects that you think are caused by this medicine, tell your doctor  Call your doctor for medical advice about side effects  You may report side effects to FDA at 1-514-NHI-3062  © 2017 [address] [employer] Information is for End User's use only and may not be sold, redistributed or otherwise used for commercial purposes  The above information is an  only  It is not intended as medical advice for individual conditions or treatments  Talk to your doctor, nurse or pharmacist before following any medical regimen to see if it is safe and effective for you

## 2020-02-26 NOTE — UTILIZATION REVIEW
Notification of Discharge  This is a Notification of Discharge from our facility [address]  Please be advised that this patient has been discharge from our facility  Below you will find the admission and discharge date and time including the patients disposition  PRESENTATION DATE: 2/22/2020 11:03 AM  OBS ADMISSION DATE:   IP ADMISSION DATE: 2/22/20 1245   DISCHARGE DATE: 2/25/2020  2:34 PM  DISPOSITION: Home/Self Care Home/Self Care   Admission Orders listed below:  Admission Orders (From admission, onward)     Ordered        02/22/20 1245  Inpatient Admission  Once                   Please contact the UR Department if additional information is required to close this patient's authorization/case  [address] [facility] Utilization Review Department  Main: [phone] x carefully listen to the prompts  All voicemails are confidential   [email or web address]  Send all requests for admission clinical reviews, approved or denied determinations and any other requests to dedicated fax number below belonging to the campus where the patient is receiving treatment   List of dedicated fax numbers:  [address] DENIALS (Administrative/Medical Necessity) [phone]   [address] (Maternity/NICU/Pediatrics) [phone]   Silvina Purdy [phone]   Krissy Rebel [phone]   [place]  [phone]   145 [facility] 15[address] [facility] [phone]   [facility]  [phone]   [address] [facility]  2401 [facility] [place] 1000 [address] [facility] [phone]

## 2020-02-28 LAB
BACTERIA BLD CULT: NORMAL
BACTERIA BLD CULT: NORMAL

## 2020-03-04 NOTE — DISCHARGE SUMMARY
Discharge- Lisa Morel 1940, 70 y o  female MRN: [id number]    Unit/Bed#: -01 Encounter: [id number]    Primary Care Provider: Chhaya Burgess DO   Date and time admitted to hospital: 2/22/2020 11:03 AM        * Community acquired pneumonia  Assessment & Plan  · Patient likely has community-acquired pneumonia as evidenced by hypoxia, fevers, crackles in left lower lung field with worsening cough  · Chest x-ray does not demonstrate any overt opacities or infiltrates, CT imaging confirming left lower lobe pneumonia  · Now clinically appears to be improving  · No leukocytosis  Procalcitonin within normal limits  · Does not qualify for home O2 per home O2 test   · Since patient clinically doing better will continue Omnicef for 3 more days upon discharge  · Recommended close follow-up with primary care provider  · Recommended to have repeat chest imaging in 6-8 weeks to monitor resolution  · Patient agreeable with above plan  · Hemodynamically stable for discharge  Hyperlipidemia  Assessment & Plan  · Continue home dose Lipitor 40 mg daily    Hypertension  Assessment & Plan  · Blood pressure adequately controlled, continue lisinopril 20 mg daily  · continue to monitor with vital signs per floor protocol    Hypothyroidism  Assessment & Plan  · Continue home dose Synthroid 100 mcg daily    Acute respiratory failure with hypoxia (HCC)  Assessment & Plan  · See plan for community-acquired pneumonia    Paroxysmal atrial fibrillation (HCC)  Assessment & Plan  · On Coumadin 7 5 mg daily, continue with daily INR and dose Coumadin accordingly  · Outpatient follow-up  · Patient agreeable with above plan  · Spoke with Albaro Casas who also agrees with outpatient follow-up plan  Jose M Bowens         Discharging Physician / Practitioner: Trina Gomez MD  PCP: Chhaya Burgess DO  Admission Date:   Admission Orders (From admission, onward)     Ordered        02/22/20 1245  Inpatient Admission  Once Discharge Date: 02/25/20    Resolved Problems  Date Reviewed: 3/3/2020    None            Reason for Admission:  Community-acquired pneumonia    Hospital Course:     Addy Hernandez is a 70 y o  female patient with past medical history of hypothyroidism, paroxysmal AFib on Coumadin, hypertension, depression, who originally presented to the hospital on 2/22/2020 due to hypoxia, cough, fever, dyspnea  Chest x-ray noted for left lower lobe pneumonia  Patient was treated with IV antibiotics  Currently afebrile, hemodynamically stable  No leukocytosis noted  Procalcitonin within normal limits  Suspected viral etiology but since patient has been doing well on antibiotics will continue Omnicef for 3 more days upon discharge  Recommended close follow-up with primary care provider outpatient  Recommended to have repeat chest imaging in 6-8 weeks to monitor resolution  Does not qualify for home O2 per test   Currently hemodynamically stable for discharge  Recommended close follow-up outpatient for INR monitoring and Coumadin adjustment as needed  Discussed with patient's daughter Aren Phillips at length  Recommended close outpatient follow-up  Patient and daughter both agreeable with outpatient follow-up plan  No other events reported  Refer to earlier notes for further clarification  Please see above list of diagnoses and related plan for additional information  Condition at Discharge: fair     Discharge Day Visit / Exam:     Subjective:  Afebrile, hemodynamically stable  O2 saturation well on room air  Patient reports feeling better and eager to go home  Seen ambulating in her room without any new complaints or concerns  Denies chest pain, fever, chills, nausea, vomiting, any new complaints  No other events reported      Vitals: Blood Pressure: 138/74 (02/25/20 0722)  Pulse: 84 (02/25/20 0722)  Temperature: 97 9 °F (36 6 °C) (02/25/20 0722)  Temp Source: Oral (02/25/20 4313)  Respirations: 18 (02/25/20 3927)  Height: 5' 5" (165 1 cm) (02/22/20 1700)  Weight - Scale: 92 8 kg (204 lb 9 4 oz) (02/22/20 1700)  SpO2: 91 % (02/25/20 0722)  Exam:   Physical Exam   HENT:   Head: Normocephalic and atraumatic  Eyes: Pupils are equal, round, and reactive to light  Neck: Normal range of motion  Neck supple  Cardiovascular: Normal rate  Pulmonary/Chest: Effort normal and breath sounds normal  No stridor  No respiratory distress  Abdominal: Soft  Bowel sounds are normal  She exhibits no distension  Musculoskeletal: Normal range of motion  Skin: She is not diaphoretic  Psychiatric: Her behavior is normal      Discussion with Family:  Spoke with daughter at length  Discharge instructions/Information to patient and family:   See after visit summary for information provided to patient and family  Provisions for Follow-Up Care:  See after visit summary for information related to follow-up care and any pertinent home health orders  Disposition:     Home    For Discharges to [facility] SNF:   · Not Applicable to this Patient - Not Applicable to this Patient    Planned Readmission:  None     Discharge Statement:  I spent 35 minutes discharging the patient  This time was spent on the day of discharge  I had direct contact with the patient on the day of discharge  Greater than 50% of the total time was spent examining patient, answering all patient questions, arranging and discussing plan of care with patient as well as directly providing post-discharge instructions  Additional time then spent on discharge activities  Discharge Medications:  See after visit summary for reconciled discharge medications provided to patient and family        ** Please Note: This note has been constructed using a voice recognition system **

## 2020-09-30 ENCOUNTER — APPOINTMENT (EMERGENCY)
Dept: CT IMAGING | Facility: HOSPITAL | Age: 80
DRG: 554 | End: 2020-09-30
Payer: COMMERCIAL

## 2020-09-30 ENCOUNTER — HOSPITAL ENCOUNTER (INPATIENT)
Facility: HOSPITAL | Age: 80
LOS: 1 days | Discharge: HOME | DRG: 554 | End: 2020-10-02
Attending: EMERGENCY MEDICINE | Admitting: INTERNAL MEDICINE
Payer: COMMERCIAL

## 2020-09-30 ENCOUNTER — APPOINTMENT (EMERGENCY)
Dept: RADIOLOGY | Facility: HOSPITAL | Age: 80
DRG: 554 | End: 2020-09-30
Payer: COMMERCIAL

## 2020-09-30 DIAGNOSIS — M25.461 PAIN AND SWELLING OF RIGHT KNEE: ICD-10-CM

## 2020-09-30 DIAGNOSIS — M25.061 KNEE HEMARTHROSIS, RIGHT: Primary | ICD-10-CM

## 2020-09-30 DIAGNOSIS — Z79.01 ANTICOAGULATED ON WARFARIN: ICD-10-CM

## 2020-09-30 DIAGNOSIS — M25.561 PAIN AND SWELLING OF RIGHT KNEE: ICD-10-CM

## 2020-09-30 LAB
ANION GAP SERPL CALCULATED.3IONS-SCNC: 9 MMOL/L (ref 4–13)
APTT PPP: 34 SECONDS (ref 23–37)
BASOPHILS # BLD AUTO: 0.02 THOUSANDS/ΜL (ref 0–0.1)
BASOPHILS NFR BLD AUTO: 0 % (ref 0–1)
BUN SERPL-MCNC: 17 MG/DL (ref 5–25)
CALCIUM SERPL-MCNC: 8.6 MG/DL (ref 8.3–10.1)
CHLORIDE SERPL-SCNC: 103 MMOL/L (ref 100–108)
CO2 SERPL-SCNC: 27 MMOL/L (ref 21–32)
CREAT SERPL-MCNC: 1.01 MG/DL (ref 0.6–1.3)
EOSINOPHIL # BLD AUTO: 0.32 THOUSAND/ΜL (ref 0–0.61)
EOSINOPHIL NFR BLD AUTO: 3 % (ref 0–6)
ERYTHROCYTE [DISTWIDTH] IN BLOOD BY AUTOMATED COUNT: 13.8 % (ref 11.6–15.1)
GFR SERPL CREATININE-BSD FRML MDRD: 54 ML/MIN/1.73SQ M
GLUCOSE SERPL-MCNC: 127 MG/DL (ref 65–140)
HCT VFR BLD AUTO: 38.8 % (ref 34.8–46.1)
HGB BLD-MCNC: 12.6 G/DL (ref 11.5–15.4)
INR PPP: 1.84 (ref 0.84–1.19)
LYMPHOCYTES # BLD AUTO: 1.22 THOUSANDS/ΜL (ref 0.6–4.47)
LYMPHOCYTES NFR BLD AUTO: 12 % (ref 14–44)
MCH RBC QN AUTO: 30.7 PG (ref 26.8–34.3)
MCHC RBC AUTO-ENTMCNC: 32.5 G/DL (ref 31.4–37.4)
MCV RBC AUTO: 94 FL (ref 82–98)
MONOCYTES # BLD AUTO: 0.75 THOUSAND/ΜL (ref 0.17–1.22)
MONOCYTES NFR BLD AUTO: 7 % (ref 4–12)
NEUTROPHILS # BLD AUTO: 8.31 THOUSANDS/ΜL (ref 1.85–7.62)
NEUTS SEG NFR BLD AUTO: 78 % (ref 43–75)
PLATELET # BLD AUTO: 209 THOUSANDS/UL (ref 149–390)
PMV BLD AUTO: 11.1 FL (ref 8.9–12.7)
POTASSIUM SERPL-SCNC: 3.9 MMOL/L (ref 3.5–5.3)
PROTHROMBIN TIME: 21.5 SECONDS (ref 11.6–14.5)
RBC # BLD AUTO: 4.11 MILLION/UL (ref 3.81–5.12)
SODIUM SERPL-SCNC: 139 MMOL/L (ref 136–145)
WBC # BLD AUTO: 10.62 THOUSAND/UL (ref 4.31–10.16)

## 2020-09-30 PROCEDURE — 99285 EMERGENCY DEPT VISIT HI MDM: CPT

## 2020-09-30 PROCEDURE — 36415 COLL VENOUS BLD VENIPUNCTURE: CPT | Performed by: PHYSICIAN ASSISTANT

## 2020-09-30 PROCEDURE — 85025 COMPLETE CBC W/AUTO DIFF WBC: CPT | Performed by: PHYSICIAN ASSISTANT

## 2020-09-30 PROCEDURE — 85730 THROMBOPLASTIN TIME PARTIAL: CPT | Performed by: PHYSICIAN ASSISTANT

## 2020-09-30 PROCEDURE — 73701 CT LOWER EXTREMITY W/DYE: CPT

## 2020-09-30 PROCEDURE — 85610 PROTHROMBIN TIME: CPT | Performed by: PHYSICIAN ASSISTANT

## 2020-09-30 PROCEDURE — 80048 BASIC METABOLIC PNL TOTAL CA: CPT | Performed by: PHYSICIAN ASSISTANT

## 2020-09-30 PROCEDURE — 73564 X-RAY EXAM KNEE 4 OR MORE: CPT

## 2020-09-30 PROCEDURE — 96374 THER/PROPH/DIAG INJ IV PUSH: CPT

## 2020-09-30 PROCEDURE — 99285 EMERGENCY DEPT VISIT HI MDM: CPT | Performed by: PHYSICIAN ASSISTANT

## 2020-09-30 RX ORDER — HYDROMORPHONE HCL/PF 1 MG/ML
0.5 SYRINGE (ML) INJECTION ONCE
Status: COMPLETED | OUTPATIENT
Start: 2020-09-30 | End: 2020-09-30

## 2020-09-30 RX ORDER — LIDOCAINE HYDROCHLORIDE AND EPINEPHRINE 20; 5 MG/ML; UG/ML
5 INJECTION, SOLUTION EPIDURAL; INFILTRATION; INTRACAUDAL; PERINEURAL ONCE
Status: COMPLETED | OUTPATIENT
Start: 2020-10-01 | End: 2020-09-30

## 2020-09-30 RX ORDER — LIDOCAINE HYDROCHLORIDE AND EPINEPHRINE BITARTRATE 20; .01 MG/ML; MG/ML
5 INJECTION, SOLUTION SUBCUTANEOUS ONCE
Status: DISCONTINUED | OUTPATIENT
Start: 2020-10-01 | End: 2020-09-30

## 2020-09-30 RX ORDER — FENTANYL CITRATE 50 UG/ML
1 INJECTION, SOLUTION INTRAMUSCULAR; INTRAVENOUS ONCE
Status: COMPLETED | OUTPATIENT
Start: 2020-09-30 | End: 2020-09-30

## 2020-09-30 RX ORDER — ONDANSETRON 2 MG/ML
1 INJECTION INTRAMUSCULAR; INTRAVENOUS ONCE
Status: COMPLETED | OUTPATIENT
Start: 2020-09-30 | End: 2020-09-30

## 2020-09-30 RX ADMIN — LIDOCAINE HYDROCHLORIDE AND EPINEPHRINE 5 ML: 20; 5 INJECTION, SOLUTION EPIDURAL; INFILTRATION; INTRACAUDAL; PERINEURAL at 23:56

## 2020-09-30 RX ADMIN — IOHEXOL 90 ML: 350 INJECTION, SOLUTION INTRAVENOUS at 23:46

## 2020-09-30 RX ADMIN — HYDROMORPHONE HYDROCHLORIDE 0.5 MG: 1 INJECTION, SOLUTION INTRAMUSCULAR; INTRAVENOUS; SUBCUTANEOUS at 23:29

## 2020-10-01 PROBLEM — M17.10 OSTEOARTHRITIS OF KNEE: Status: ACTIVE | Noted: 2017-08-07

## 2020-10-01 PROBLEM — R26.2 AMBULATORY DYSFUNCTION: Status: ACTIVE | Noted: 2020-10-01

## 2020-10-01 PROBLEM — M17.9 OSTEOARTHRITIS OF KNEE: Status: ACTIVE | Noted: 2017-08-07

## 2020-10-01 PROBLEM — M25.061 HEMARTHROSIS INVOLVING KNEE JOINT, RIGHT: Status: ACTIVE | Noted: 2020-10-01

## 2020-10-01 PROBLEM — F32.0 MAJOR DEPRESSIVE DISORDER, SINGLE EPISODE, MILD (HCC): Status: ACTIVE | Noted: 2019-03-04

## 2020-10-01 LAB
APPEARANCE FLD: ABNORMAL
COLOR FLD: ABNORMAL
CRYSTALS SNV QL MICRO: NORMAL
EOSINOPHIL NFR SNV MANUAL: 2 %
ERYTHROCYTE [DISTWIDTH] IN BLOOD BY AUTOMATED COUNT: 13.3 % (ref 11.6–15.1)
GRAM STN SPEC: NORMAL
GRAM STN SPEC: NORMAL
HCT VFR BLD AUTO: 41.7 % (ref 34.8–46.1)
HGB BLD-MCNC: 13.5 G/DL (ref 11.5–15.4)
LYMPHOCYTES # SNV MANUAL: 24 %
MCH RBC QN AUTO: 30.5 PG (ref 26.8–34.3)
MCHC RBC AUTO-ENTMCNC: 32.4 G/DL (ref 31.4–37.4)
MCV RBC AUTO: 94 FL (ref 82–98)
MONOCYTES NFR SNV MANUAL: 31 %
NEUTROPHILS NFR SNV MANUAL: 43 %
PLATELET # BLD AUTO: 206 THOUSANDS/UL (ref 149–390)
PMV BLD AUTO: 10.8 FL (ref 8.9–12.7)
RBC # BLD AUTO: 4.42 MILLION/UL (ref 3.81–5.12)
SITE: ABNORMAL
TOTAL CELLS COUNTED SPEC: 100
WBC # BLD AUTO: 9.44 THOUSAND/UL (ref 4.31–10.16)
WBC # FLD MANUAL: 2141 /UL (ref 0–200)

## 2020-10-01 PROCEDURE — 87205 SMEAR GRAM STAIN: CPT | Performed by: PHYSICIAN ASSISTANT

## 2020-10-01 PROCEDURE — 97167 OT EVAL HIGH COMPLEX 60 MIN: CPT

## 2020-10-01 PROCEDURE — 99222 1ST HOSP IP/OBS MODERATE 55: CPT | Performed by: ORTHOPAEDIC SURGERY

## 2020-10-01 PROCEDURE — 99220 PR INITIAL OBSERVATION CARE/DAY 70 MINUTES: CPT | Performed by: INTERNAL MEDICINE

## 2020-10-01 PROCEDURE — 0S9C3ZX DRAINAGE OF RIGHT KNEE JOINT, PERCUTANEOUS APPROACH, DIAGNOSTIC: ICD-10-PCS | Performed by: ORTHOPAEDIC SURGERY

## 2020-10-01 PROCEDURE — 20610 DRAIN/INJ JOINT/BURSA W/O US: CPT | Performed by: PHYSICIAN ASSISTANT

## 2020-10-01 PROCEDURE — 97163 PT EVAL HIGH COMPLEX 45 MIN: CPT

## 2020-10-01 PROCEDURE — 89051 BODY FLUID CELL COUNT: CPT | Performed by: PHYSICIAN ASSISTANT

## 2020-10-01 PROCEDURE — 89060 EXAM SYNOVIAL FLUID CRYSTALS: CPT | Performed by: PHYSICIAN ASSISTANT

## 2020-10-01 PROCEDURE — 85027 COMPLETE CBC AUTOMATED: CPT | Performed by: PHYSICIAN ASSISTANT

## 2020-10-01 PROCEDURE — 87070 CULTURE OTHR SPECIMN AEROBIC: CPT | Performed by: PHYSICIAN ASSISTANT

## 2020-10-01 RX ORDER — ALBUTEROL SULFATE 90 UG/1
2 AEROSOL, METERED RESPIRATORY (INHALATION)
COMMUNITY
Start: 2020-07-21 | End: 2022-02-13

## 2020-10-01 RX ORDER — LEVOTHYROXINE SODIUM 88 UG/1
88 TABLET ORAL
Status: DISCONTINUED | OUTPATIENT
Start: 2020-10-01 | End: 2020-10-02 | Stop reason: HOSPADM

## 2020-10-01 RX ORDER — OXYCODONE HYDROCHLORIDE 5 MG/1
2.5 TABLET ORAL EVERY 4 HOURS PRN
Status: DISCONTINUED | OUTPATIENT
Start: 2020-10-01 | End: 2020-10-02 | Stop reason: HOSPADM

## 2020-10-01 RX ORDER — MAGNESIUM HYDROXIDE/ALUMINUM HYDROXICE/SIMETHICONE 120; 1200; 1200 MG/30ML; MG/30ML; MG/30ML
30 SUSPENSION ORAL EVERY 6 HOURS PRN
Status: DISCONTINUED | OUTPATIENT
Start: 2020-10-01 | End: 2020-10-02 | Stop reason: HOSPADM

## 2020-10-01 RX ORDER — OXYCODONE HYDROCHLORIDE 5 MG/1
5 TABLET ORAL EVERY 4 HOURS PRN
Status: DISCONTINUED | OUTPATIENT
Start: 2020-10-01 | End: 2020-10-02 | Stop reason: HOSPADM

## 2020-10-01 RX ORDER — ATORVASTATIN CALCIUM 40 MG/1
40 TABLET, FILM COATED ORAL
Status: DISCONTINUED | OUTPATIENT
Start: 2020-10-01 | End: 2020-10-02 | Stop reason: HOSPADM

## 2020-10-01 RX ORDER — VENLAFAXINE HYDROCHLORIDE 37.5 MG/1
150 CAPSULE, EXTENDED RELEASE ORAL DAILY
Status: DISCONTINUED | OUTPATIENT
Start: 2020-10-01 | End: 2020-10-02 | Stop reason: HOSPADM

## 2020-10-01 RX ORDER — ACETAMINOPHEN 325 MG/1
975 TABLET ORAL EVERY 8 HOURS SCHEDULED
Status: DISCONTINUED | OUTPATIENT
Start: 2020-10-01 | End: 2020-10-02 | Stop reason: HOSPADM

## 2020-10-01 RX ORDER — LISINOPRIL 20 MG/1
20 TABLET ORAL DAILY
Status: DISCONTINUED | OUTPATIENT
Start: 2020-10-01 | End: 2020-10-02 | Stop reason: HOSPADM

## 2020-10-01 RX ORDER — ONDANSETRON 2 MG/ML
4 INJECTION INTRAMUSCULAR; INTRAVENOUS EVERY 6 HOURS PRN
Status: DISCONTINUED | OUTPATIENT
Start: 2020-10-01 | End: 2020-10-02 | Stop reason: HOSPADM

## 2020-10-01 RX ORDER — LIDOCAINE HYDROCHLORIDE 10 MG/ML
5 INJECTION, SOLUTION EPIDURAL; INFILTRATION; INTRACAUDAL; PERINEURAL ONCE
Status: COMPLETED | OUTPATIENT
Start: 2020-10-01 | End: 2020-10-01

## 2020-10-01 RX ORDER — ALBUTEROL SULFATE 90 UG/1
2 AEROSOL, METERED RESPIRATORY (INHALATION) EVERY 6 HOURS PRN
Status: DISCONTINUED | OUTPATIENT
Start: 2020-10-01 | End: 2020-10-02 | Stop reason: HOSPADM

## 2020-10-01 RX ORDER — DOCUSATE SODIUM 100 MG/1
100 CAPSULE, LIQUID FILLED ORAL 2 TIMES DAILY PRN
Status: DISCONTINUED | OUTPATIENT
Start: 2020-10-01 | End: 2020-10-02 | Stop reason: HOSPADM

## 2020-10-01 RX ORDER — HYDRALAZINE HYDROCHLORIDE 20 MG/ML
5 INJECTION INTRAMUSCULAR; INTRAVENOUS EVERY 6 HOURS PRN
Status: DISCONTINUED | OUTPATIENT
Start: 2020-10-01 | End: 2020-10-02 | Stop reason: HOSPADM

## 2020-10-01 RX ORDER — ACETAMINOPHEN 325 MG/1
975 TABLET ORAL EVERY 8 HOURS SCHEDULED
Status: DISCONTINUED | OUTPATIENT
Start: 2020-10-01 | End: 2020-10-01

## 2020-10-01 RX ADMIN — ACETAMINOPHEN 975 MG: 325 TABLET, FILM COATED ORAL at 03:58

## 2020-10-01 RX ADMIN — LEVOTHYROXINE SODIUM 88 MCG: 88 TABLET ORAL at 05:22

## 2020-10-01 RX ADMIN — ATORVASTATIN CALCIUM 40 MG: 40 TABLET, FILM COATED ORAL at 17:41

## 2020-10-01 RX ADMIN — LISINOPRIL 20 MG: 20 TABLET ORAL at 08:50

## 2020-10-01 RX ADMIN — ACETAMINOPHEN 975 MG: 325 TABLET, FILM COATED ORAL at 11:31

## 2020-10-01 RX ADMIN — ACETAMINOPHEN 975 MG: 325 TABLET, FILM COATED ORAL at 19:33

## 2020-10-01 RX ADMIN — VENLAFAXINE HYDROCHLORIDE 150 MG: 37.5 CAPSULE, EXTENDED RELEASE ORAL at 08:51

## 2020-10-01 RX ADMIN — LIDOCAINE HYDROCHLORIDE 5 ML: 10 INJECTION, SOLUTION EPIDURAL; INFILTRATION; INTRACAUDAL; PERINEURAL at 09:47

## 2020-10-02 VITALS
WEIGHT: 224.21 LBS | RESPIRATION RATE: 19 BRPM | DIASTOLIC BLOOD PRESSURE: 84 MMHG | HEIGHT: 65 IN | TEMPERATURE: 99.2 F | BODY MASS INDEX: 37.36 KG/M2 | SYSTOLIC BLOOD PRESSURE: 139 MMHG | HEART RATE: 86 BPM | OXYGEN SATURATION: 90 %

## 2020-10-02 LAB
ANION GAP SERPL CALCULATED.3IONS-SCNC: 9 MMOL/L (ref 4–13)
BASOPHILS # BLD AUTO: 0.02 THOUSANDS/ΜL (ref 0–0.1)
BASOPHILS NFR BLD AUTO: 0 % (ref 0–1)
BUN SERPL-MCNC: 17 MG/DL (ref 5–25)
CALCIUM SERPL-MCNC: 8.5 MG/DL (ref 8.3–10.1)
CHLORIDE SERPL-SCNC: 106 MMOL/L (ref 100–108)
CO2 SERPL-SCNC: 27 MMOL/L (ref 21–32)
CREAT SERPL-MCNC: 0.88 MG/DL (ref 0.6–1.3)
EOSINOPHIL # BLD AUTO: 0.32 THOUSAND/ΜL (ref 0–0.61)
EOSINOPHIL NFR BLD AUTO: 5 % (ref 0–6)
ERYTHROCYTE [DISTWIDTH] IN BLOOD BY AUTOMATED COUNT: 13.7 % (ref 11.6–15.1)
GFR SERPL CREATININE-BSD FRML MDRD: 63 ML/MIN/1.73SQ M
GLUCOSE SERPL-MCNC: 101 MG/DL (ref 65–140)
HCT VFR BLD AUTO: 39.9 % (ref 34.8–46.1)
HGB BLD-MCNC: 12.5 G/DL (ref 11.5–15.4)
IMM GRANULOCYTES # BLD AUTO: 0.02 THOUSAND/UL (ref 0–0.2)
IMM GRANULOCYTES NFR BLD AUTO: 0 % (ref 0–2)
INR PPP: 1.62 (ref 0.84–1.19)
LYMPHOCYTES # BLD AUTO: 1.83 THOUSANDS/ΜL (ref 0.6–4.47)
LYMPHOCYTES NFR BLD AUTO: 29 % (ref 14–44)
MCH RBC QN AUTO: 30.3 PG (ref 26.8–34.3)
MCHC RBC AUTO-ENTMCNC: 31.3 G/DL (ref 31.4–37.4)
MCV RBC AUTO: 97 FL (ref 82–98)
MONOCYTES # BLD AUTO: 0.56 THOUSAND/ΜL (ref 0.17–1.22)
MONOCYTES NFR BLD AUTO: 9 % (ref 4–12)
NEUTROPHILS # BLD AUTO: 3.59 THOUSANDS/ΜL (ref 1.85–7.62)
NEUTS SEG NFR BLD AUTO: 57 % (ref 43–75)
NRBC BLD AUTO-RTO: 0 /100 WBCS
PLATELET # BLD AUTO: 193 THOUSANDS/UL (ref 149–390)
PMV BLD AUTO: 10.7 FL (ref 8.9–12.7)
POTASSIUM SERPL-SCNC: 4.3 MMOL/L (ref 3.5–5.3)
PROTHROMBIN TIME: 19.5 SECONDS (ref 11.6–14.5)
RBC # BLD AUTO: 4.12 MILLION/UL (ref 3.81–5.12)
SODIUM SERPL-SCNC: 142 MMOL/L (ref 136–145)
WBC # BLD AUTO: 6.34 THOUSAND/UL (ref 4.31–10.16)

## 2020-10-02 PROCEDURE — 99231 SBSQ HOSP IP/OBS SF/LOW 25: CPT | Performed by: ORTHOPAEDIC SURGERY

## 2020-10-02 PROCEDURE — 97530 THERAPEUTIC ACTIVITIES: CPT

## 2020-10-02 PROCEDURE — 97116 GAIT TRAINING THERAPY: CPT

## 2020-10-02 PROCEDURE — 80048 BASIC METABOLIC PNL TOTAL CA: CPT | Performed by: INTERNAL MEDICINE

## 2020-10-02 PROCEDURE — 85610 PROTHROMBIN TIME: CPT | Performed by: PHYSICIAN ASSISTANT

## 2020-10-02 PROCEDURE — 85025 COMPLETE CBC W/AUTO DIFF WBC: CPT | Performed by: INTERNAL MEDICINE

## 2020-10-02 PROCEDURE — 99239 HOSP IP/OBS DSCHRG MGMT >30: CPT | Performed by: INTERNAL MEDICINE

## 2020-10-02 RX ORDER — OXYCODONE HYDROCHLORIDE 5 MG/1
2.5 TABLET ORAL EVERY 6 HOURS PRN
Qty: 12 TABLET | Refills: 0 | OUTPATIENT
Start: 2020-10-02 | End: 2022-02-13

## 2020-10-02 RX ADMIN — LEVOTHYROXINE SODIUM 88 MCG: 88 TABLET ORAL at 06:19

## 2020-10-02 RX ADMIN — ACETAMINOPHEN 975 MG: 325 TABLET, FILM COATED ORAL at 10:11

## 2020-10-02 RX ADMIN — ACETAMINOPHEN 975 MG: 325 TABLET, FILM COATED ORAL at 03:38

## 2020-10-02 RX ADMIN — VENLAFAXINE HYDROCHLORIDE 150 MG: 37.5 CAPSULE, EXTENDED RELEASE ORAL at 10:00

## 2020-10-02 RX ADMIN — LISINOPRIL 20 MG: 20 TABLET ORAL at 10:00

## 2020-10-04 LAB
BACTERIA SPEC BFLD CULT: NO GROWTH
GRAM STN SPEC: NORMAL

## 2022-01-24 ENCOUNTER — APPOINTMENT (EMERGENCY)
Dept: RADIOLOGY | Facility: HOSPITAL | Age: 82
End: 2022-01-24
Payer: COMMERCIAL

## 2022-01-24 ENCOUNTER — HOSPITAL ENCOUNTER (EMERGENCY)
Facility: HOSPITAL | Age: 82
Discharge: HOME | End: 2022-01-24
Attending: EMERGENCY MEDICINE
Payer: COMMERCIAL

## 2022-01-24 VITALS
BODY MASS INDEX: 36.61 KG/M2 | SYSTOLIC BLOOD PRESSURE: 150 MMHG | OXYGEN SATURATION: 95 % | TEMPERATURE: 98.1 F | RESPIRATION RATE: 18 BRPM | HEART RATE: 90 BPM | WEIGHT: 220 LBS | DIASTOLIC BLOOD PRESSURE: 85 MMHG

## 2022-01-24 DIAGNOSIS — U07.1 COVID-19: Primary | ICD-10-CM

## 2022-01-24 LAB
2HR DELTA HS TROPONIN: 0 NG/L
ALBUMIN SERPL BCP-MCNC: 3.7 G/DL (ref 3.5–5)
ALP SERPL-CCNC: 104 U/L (ref 46–116)
ALT SERPL W P-5'-P-CCNC: 24 U/L (ref 12–78)
ANION GAP SERPL CALCULATED.3IONS-SCNC: 9 MMOL/L (ref 4–13)
AST SERPL W P-5'-P-CCNC: 28 U/L (ref 5–45)
BASOPHILS # BLD AUTO: 0.03 THOUSANDS/ΜL (ref 0–0.1)
BASOPHILS NFR BLD AUTO: 1 % (ref 0–1)
BILIRUB SERPL-MCNC: 0.36 MG/DL (ref 0.2–1)
BUN SERPL-MCNC: 16 MG/DL (ref 5–25)
CALCIUM SERPL-MCNC: 8.2 MG/DL (ref 8.3–10.1)
CARDIAC TROPONIN I PNL SERPL HS: 12 NG/L
CARDIAC TROPONIN I PNL SERPL HS: 12 NG/L
CHLORIDE SERPL-SCNC: 103 MMOL/L (ref 100–108)
CO2 SERPL-SCNC: 26 MMOL/L (ref 21–32)
CREAT SERPL-MCNC: 0.97 MG/DL (ref 0.6–1.3)
EOSINOPHIL # BLD AUTO: 0.03 THOUSAND/ΜL (ref 0–0.61)
EOSINOPHIL NFR BLD AUTO: 1 % (ref 0–6)
ERYTHROCYTE [DISTWIDTH] IN BLOOD BY AUTOMATED COUNT: 13.7 % (ref 11.6–15.1)
FLUAV RNA RESP QL NAA+PROBE: NEGATIVE
FLUBV RNA RESP QL NAA+PROBE: NEGATIVE
GFR SERPL CREATININE-BSD FRML MDRD: 55 ML/MIN/1.73SQ M
GLUCOSE SERPL-MCNC: 97 MG/DL (ref 65–140)
HCT VFR BLD AUTO: 41 % (ref 34.8–46.1)
HGB BLD-MCNC: 13.4 G/DL (ref 11.5–15.4)
IMM GRANULOCYTES # BLD AUTO: 0 THOUSAND/UL (ref 0–0.2)
IMM GRANULOCYTES NFR BLD AUTO: 0 % (ref 0–2)
LYMPHOCYTES # BLD AUTO: 1.02 THOUSANDS/ΜL (ref 0.6–4.47)
LYMPHOCYTES NFR BLD AUTO: 23 % (ref 14–44)
MCH RBC QN AUTO: 30.1 PG (ref 26.8–34.3)
MCHC RBC AUTO-ENTMCNC: 32.7 G/DL (ref 31.4–37.4)
MCV RBC AUTO: 92 FL (ref 82–98)
MONOCYTES # BLD AUTO: 0.87 THOUSAND/ΜL (ref 0.17–1.22)
MONOCYTES NFR BLD AUTO: 20 % (ref 4–12)
NEUTROPHILS # BLD AUTO: 2.52 THOUSANDS/ΜL (ref 1.85–7.62)
NEUTS SEG NFR BLD AUTO: 55 % (ref 43–75)
NRBC BLD AUTO-RTO: 0 /100 WBCS
NT-PROBNP SERPL-MCNC: 129 PG/ML
PLATELET # BLD AUTO: 158 THOUSANDS/UL (ref 149–390)
PMV BLD AUTO: 10.6 FL (ref 8.9–12.7)
POTASSIUM SERPL-SCNC: 3.7 MMOL/L (ref 3.5–5.3)
PROT SERPL-MCNC: 7.7 G/DL (ref 6.4–8.2)
RBC # BLD AUTO: 4.45 MILLION/UL (ref 3.81–5.12)
RSV RNA RESP QL NAA+PROBE: NEGATIVE
SARS-COV-2 RNA RESP QL NAA+PROBE: POSITIVE
SODIUM SERPL-SCNC: 138 MMOL/L (ref 136–145)
WBC # BLD AUTO: 4.47 THOUSAND/UL (ref 4.31–10.16)

## 2022-01-24 PROCEDURE — 0241U HB NFCT DS VIR RESP RNA 4 TRGT: CPT | Performed by: EMERGENCY MEDICINE

## 2022-01-24 PROCEDURE — 71045 X-RAY EXAM CHEST 1 VIEW: CPT

## 2022-01-24 PROCEDURE — 80053 COMPREHEN METABOLIC PANEL: CPT | Performed by: EMERGENCY MEDICINE

## 2022-01-24 PROCEDURE — 99284 EMERGENCY DEPT VISIT MOD MDM: CPT

## 2022-01-24 PROCEDURE — 84484 ASSAY OF TROPONIN QUANT: CPT | Performed by: EMERGENCY MEDICINE

## 2022-01-24 PROCEDURE — 99285 EMERGENCY DEPT VISIT HI MDM: CPT | Performed by: EMERGENCY MEDICINE

## 2022-01-24 PROCEDURE — 93005 ELECTROCARDIOGRAM TRACING: CPT

## 2022-01-24 PROCEDURE — 85025 COMPLETE CBC W/AUTO DIFF WBC: CPT | Performed by: EMERGENCY MEDICINE

## 2022-01-24 PROCEDURE — 83880 ASSAY OF NATRIURETIC PEPTIDE: CPT | Performed by: EMERGENCY MEDICINE

## 2022-01-24 PROCEDURE — 36415 COLL VENOUS BLD VENIPUNCTURE: CPT | Performed by: EMERGENCY MEDICINE

## 2022-01-24 NOTE — ED PROVIDER NOTES
History  Chief Complaint   Patient presents with    Cough     Pt reports she had a positive covid test and has a cough  PCP told her to come in for chest xray to r/o pneumonia      Patient is 57-year-old female past medical history of atrial fibrillation on Coumadin, hypertension, hyperlipidemia, hypothyroid presenting with cough  Patient notes shortness of breath which has been intermittent since yesterday and 3 days central nonradiating intermittent chest pain which is sharp and she states not exertional in nature  She states that she feels lightheaded with standing for the past week and also notes a cough, tested positive for COVID-19  PCP wanted her to come to emergency department for x-ray  Patient denies any fevers, nausea vomiting, leg swelling, dysuria, rashes, vision changes  Prior to Admission Medications   Prescriptions Last Dose Informant Patient Reported? Taking? albuterol (PROVENTIL HFA,VENTOLIN HFA) 90 mcg/act inhaler   Yes No   Sig: Inhale 2 puffs   atorvastatin (LIPITOR) 40 mg tablet   Yes No   Sig: atorvastatin 40 mg tablet   guaiFENesin (ROBITUSSIN) 100 MG/5ML oral liquid   No No   Sig: Take 10 mL (200 mg total) by mouth 3 (three) times a day as needed for cough   levothyroxine 100 mcg tablet   Yes No   Sig: levothyroxine 100 mcg tablet   lisinopril (ZESTRIL) 20 mg tablet   Yes No   Sig: lisinopril 20 mg tablet   oxyCODONE (ROXICODONE) 5 mg immediate release tablet   No No   Sig: Take 0 5 tablets (2 5 mg total) by mouth every 6 (six) hours as needed for moderate pain or severe painMax Daily Amount: 10 mg   venlafaxine (EFFEXOR-XR) 150 mg 24 hr capsule   Yes No   Sig: venlafaxine  mg capsule,extended release 24 hr   warfarin (COUMADIN) 7 5 mg tablet   Yes No   Sig: Take 7 5 mg by mouth daily      Facility-Administered Medications: None       History reviewed  No pertinent past medical history  History reviewed  No pertinent surgical history  History reviewed   No pertinent family history  I have reviewed and agree with the history as documented  E-Cigarette/Vaping    E-Cigarette Use Never User      E-Cigarette/Vaping Substances    Nicotine No     THC No     CBD No     Flavoring No     Other No     Unknown No      Social History     Tobacco Use    Smoking status: Never Smoker    Smokeless tobacco: Never Used   Vaping Use    Vaping Use: Never used   Substance Use Topics    Alcohol use: Never    Drug use: Never       Review of Systems   All other systems reviewed and are negative  Physical Exam  Physical Exam  Vitals reviewed  Constitutional:       General: She is not in acute distress  Appearance: Normal appearance  She is not ill-appearing  HENT:      Mouth/Throat:      Mouth: Mucous membranes are moist    Eyes:      Conjunctiva/sclera: Conjunctivae normal    Cardiovascular:      Rate and Rhythm: Normal rate and regular rhythm  Heart sounds: Normal heart sounds  Pulmonary:      Effort: Pulmonary effort is normal       Breath sounds: Normal breath sounds  Abdominal:      General: Abdomen is flat  Palpations: Abdomen is soft  Tenderness: There is no abdominal tenderness  Musculoskeletal:         General: No swelling  Normal range of motion  Cervical back: Neck supple  Right lower leg: No edema  Left lower leg: No edema  Skin:     General: Skin is warm and dry  Neurological:      General: No focal deficit present  Mental Status: She is alert     Psychiatric:         Mood and Affect: Mood normal          Vital Signs  ED Triage Vitals   Temperature Pulse Respirations Blood Pressure SpO2   01/24/22 1437 01/24/22 1437 01/24/22 1437 01/24/22 1437 01/24/22 1437   98 1 °F (36 7 °C) 100 20 (!) 183/86 95 %      Temp Source Heart Rate Source Patient Position - Orthostatic VS BP Location FiO2 (%)   01/24/22 1437 01/24/22 1437 01/24/22 1908 01/24/22 1437 --   Oral Monitor Lying Left arm       Pain Score       01/24/22 1437       No Pain           Vitals:    01/24/22 1437 01/24/22 1630 01/24/22 1908   BP: (!) 183/86 155/91 150/85   Pulse: 100 93 90   Patient Position - Orthostatic VS:   Lying         Visual Acuity      ED Medications  Medications - No data to display    Diagnostic Studies  Results Reviewed     Procedure Component Value Units Date/Time    HS Troponin I 2hr [[id number]] Collected: 01/24/22 1809    Lab Status: Final result Specimen: Blood from Arm, Left Updated: 01/24/22 1841     hs TnI 2hr 12 ng/L      Delta 2hr hsTnI 0 ng/L     COVID/FLU/RSV - 2 hour TAT [[id number]]  (Abnormal) Collected: 01/24/22 1532    Lab Status: Final result Specimen: Nares from Nose Updated: 01/24/22 1621     SARS-CoV-2 Positive     INFLUENZA A PCR Negative     INFLUENZA B PCR Negative     RSV PCR Negative    Narrative:      FOR PEDIATRIC PATIENTS - copy/paste COVID Guidelines URL to browser: [email or web address].ly/  [employer]    SARS-CoV-2 assay is a Nucleic Acid Amplification assay intended for the  qualitative detection of nucleic acid from SARS-CoV-2 in nasopharyngeal  swabs  Results are for the presumptive identification of SARS-CoV-2 RNA  Positive results are indicative of infection with SARS-CoV-2, the virus  causing COVID-19, but do not rule out bacterial infection or co-infection  with other viruses  Laboratories within the [place] and its  territories are required to report all positive results to the appropriate  public health authorities  Negative results do not preclude SARS-CoV-2  infection and should not be used as the sole basis for treatment or other  patient management decisions  Negative results must be combined with  clinical observations, patient history, and epidemiological information  This test has not been FDA cleared or approved  This test has been authorized by FDA under an Emergency Use Authorization  (EUA)   This test is only authorized for the duration of time the  declaration that circumstances exist justifying the authorization of the  emergency use of an in vitro diagnostic tests for detection of SARS-CoV-2  virus and/or diagnosis of COVID-19 infection under section 564(b)(1) of  the Act, 21 U  [place] C  667PBE-4(R)(2), unless the authorization is terminated  or revoked sooner  The test has been validated but independent review by FDA  and CLIA is pending  Test performed using [employer] GeneXpert: This RT-PCR assay targets N2,  a region unique to SARS-CoV-2  A conserved region in the E-gene was chosen  for pan-Sarbecovirus detection which includes SARS-CoV-2      NT-BNP PRO [[id number]]  (Normal) Collected: 01/24/22 1532    Lab Status: Final result Specimen: Blood from Arm, Left Updated: 01/24/22 1609     NT-proBNP 129 pg/mL     Comprehensive metabolic panel [[id number]]  (Abnormal) Collected: 01/24/22 1532    Lab Status: Final result Specimen: Blood from Arm, Left Updated: 01/24/22 1603     Sodium 138 mmol/L      Potassium 3 7 mmol/L      Chloride 103 mmol/L      CO2 26 mmol/L      ANION GAP 9 mmol/L      BUN 16 mg/dL      Creatinine 0 97 mg/dL      Glucose 97 mg/dL      Calcium 8 2 mg/dL      AST 28 U/L      ALT 24 U/L      Alkaline Phosphatase 104 U/L      Total Protein 7 7 g/dL      Albumin 3 7 g/dL      Total Bilirubin 0 36 mg/dL      eGFR 55 ml/min/1 73sq m     Narrative:      Zhao guidelines for Chronic Kidney Disease (CKD):     Stage 1 with normal or high GFR (GFR > 90 mL/min/1 73 square meters)    Stage 2 Mild CKD (GFR = 60-89 mL/min/1 73 square meters)    Stage 3A Moderate CKD (GFR = 45-59 mL/min/1 73 square meters)    Stage 3B Moderate CKD (GFR = 30-44 mL/min/1 73 square meters)    Stage 4 Severe CKD (GFR = 15-29 mL/min/1 73 square meters)    Stage 5 End Stage CKD (GFR <15 mL/min/1 73 square meters)  Note: GFR calculation is accurate only with a steady state creatinine    HS Troponin 0hr (reflex protocol) [[id number]] Collected: 01/24/22 1532    Lab Status: Final result Specimen: Blood from Arm, Left Updated: 01/24/22 1559     hs TnI 0hr 12 ng/L     CBC and differential [[id number]]  (Abnormal) Collected: 01/24/22 1532    Lab Status: Final result Specimen: Blood from Arm, Left Updated: 01/24/22 1537     WBC 4 47 Thousand/uL      RBC 4 45 Million/uL      Hemoglobin 13 4 g/dL      Hematocrit 41 0 %      MCV 92 fL      MCH 30 1 pg      MCHC 32 7 g/dL      RDW 13 7 %      MPV 10 6 fL      Platelets 937 Thousands/uL      nRBC 0 /100 WBCs      Neutrophils Relative 55 %      Immat GRANS % 0 %      Lymphocytes Relative 23 %      Monocytes Relative 20 %      Eosinophils Relative 1 %      Basophils Relative 1 %      Neutrophils Absolute 2 52 Thousands/µL      Immature Grans Absolute 0 00 Thousand/uL      Lymphocytes Absolute 1 02 Thousands/µL      Monocytes Absolute 0 87 Thousand/µL      Eosinophils Absolute 0 03 Thousand/µL      Basophils Absolute 0 03 Thousands/µL                  XR chest portable   Final Result by Gissell Lobo MD (01/24 1659)      No acute cardiopulmonary disease                    Workstation performed: [id number]                    Procedures  ECG 12 Lead Documentation Only    Date/Time: 1/24/2022 3:40 PM  Performed by: Pamela Tamez DO  Authorized by: Pamela Tamez DO     ECG reviewed by me, the ED Provider: yes    Patient location:  ED  Previous ECG:     Previous ECG:  Compared to current    Similarity:  No change  Interpretation:     Interpretation: non-specific    Rate:     ECG rate assessment: normal    Rhythm:     Rhythm: sinus rhythm    Ectopy:     Ectopy: none    QRS:     QRS axis:  Normal    QRS intervals:  Normal  Conduction:     Conduction: normal    ST segments:     ST segments:  Normal  T waves:     T waves: normal               ED Course  ED Course as of 01/26/22 1510   Mon Jan 24, 2022   1706 Patient tolerated walking pulse ox, will obtain delta troponin if unremarkable will discharge home with pulse oximetry and outpatient follow-up  Repeat blood pressure in the 160s, patient notes that she did not take her blood pressure medication today  1849 Delta troponin unremarkable, patient discharge with pulse oximeter  Identification of Seniors at Risk      Most Recent Value   (ISAR) Identification of Seniors at Risk    Before the illness or injury that brought you to the Emergency, did you need someone to help you on a regular basis? 0 Filed at: 01/24/2022 1439   In the last 24 hours, have you needed more help than usual? 0 Filed at: 01/24/2022 1439   Have you been hospitalized for one or more nights during the past 6 months? 1 Filed at: 01/24/2022 1439   In general, do you see well? 0 Filed at: 01/24/2022 1439   In general, do you have serious problems with your memory? 0 Filed at: 01/24/2022 1439   Do you take more than three different medications every day? 1 Filed at: 01/24/2022 1439   ISAR Score 2 Filed at: 01/24/2022 1439                                    MDM  Number of Diagnoses or Management Options  COVID-19  Diagnosis management comments: Patient is 72-year-old female past medical history of hypertension, hyperlipidemia, atrial fibrillation on Coumadin, hypothyroid presenting with shortness of breath in the setting COVID-19  Due to patient's age comorbidities will obtain cardiac workup, patient currently saturating appropriately on room air with intermittent desaturations during exam to 88-89%, will obtain walking pulse ox  Disposition  Final diagnoses:   [id number]     Time reflects when diagnosis was documented in both MDM as applicable and the Disposition within this note     Time User Action Codes Description Comment    1/24/2022  6:48 PM Keesha Mcdaniel Add [U07 1] COVID-19       ED Disposition     ED Disposition Condition Date/Time Comment    Discharge Stable Mon Jan 24, 2022  6:48 PM Linda Montague discharge to home/self care              Follow-up Information Follow up With Specialties Details Why [facility], [address] [place] [address] In 1 week  [address] [address] [facility] [address]  [phone]            Discharge Medication List as of 1/24/2022  6:49 PM      CONTINUE these medications which have NOT CHANGED    Details   albuterol (PROVENTIL HFA,VENTOLIN HFA) 90 mcg/act inhaler Inhale 2 puffs, Starting Tue 7/21/2020, Historical Med      atorvastatin (LIPITOR) 40 mg tablet atorvastatin 40 mg tablet, Historical Med      guaiFENesin (ROBITUSSIN) 100 MG/5ML oral liquid Take 10 mL (200 mg total) by mouth 3 (three) times a day as needed for cough, Starting Tue 2/25/2020, Normal      levothyroxine 100 mcg tablet levothyroxine 100 mcg tablet, Historical Med      lisinopril (ZESTRIL) 20 mg tablet lisinopril 20 mg tablet, Historical Med      oxyCODONE (ROXICODONE) 5 mg immediate release tablet Take 0 5 tablets (2 5 mg total) by mouth every 6 (six) hours as needed for moderate pain or severe painMax Daily Amount: 10 mg, Starting Fri 10/2/2020, Normal      venlafaxine (EFFEXOR-XR) 150 mg 24 hr capsule venlafaxine  mg capsule,extended release 24 hr, Historical Med      warfarin (COUMADIN) 7 5 mg tablet Take 7 5 mg by mouth daily, Historical Med             No discharge procedures on file      PDMP Review       Value Time User    PDMP Reviewed  Yes 10/1/2020  2:22 AM Bethanie Woods PA-C          ED Provider  Electronically Signed by           Rangel Cadena DO  01/26/22 5365

## 2022-01-24 NOTE — ED NOTES
Ambulated pt in hallway, HR remained 100 and pulse ox 94-96%   Pt denied SOB  +dry cough     Shahla Zafar, RN  01/24/22 8114

## 2022-01-25 LAB
ATRIAL RATE: 94 BPM
P AXIS: 50 DEGREES
PR INTERVAL: 182 MS
QRS AXIS: 63 DEGREES
QRSD INTERVAL: 70 MS
QT INTERVAL: 360 MS
QTC INTERVAL: 450 MS
T WAVE AXIS: 7 DEGREES
VENTRICULAR RATE: 94 BPM

## 2022-01-25 PROCEDURE — 93010 ELECTROCARDIOGRAM REPORT: CPT | Performed by: INTERNAL MEDICINE

## 2022-02-12 ENCOUNTER — HOSPITAL ENCOUNTER (OUTPATIENT)
Facility: HOSPITAL | Age: 82
Setting detail: OBSERVATION
Discharge: HOME | End: 2022-02-13
Attending: EMERGENCY MEDICINE | Admitting: INTERNAL MEDICINE
Payer: COMMERCIAL

## 2022-02-12 ENCOUNTER — APPOINTMENT (OUTPATIENT)
Dept: CT IMAGING | Facility: HOSPITAL | Age: 82
End: 2022-02-12
Payer: COMMERCIAL

## 2022-02-12 ENCOUNTER — APPOINTMENT (EMERGENCY)
Dept: RADIOLOGY | Facility: HOSPITAL | Age: 82
End: 2022-02-12
Payer: COMMERCIAL

## 2022-02-12 DIAGNOSIS — R07.9 CHEST PAIN, UNSPECIFIED: Primary | ICD-10-CM

## 2022-02-12 DIAGNOSIS — I10 PRIMARY HYPERTENSION: ICD-10-CM

## 2022-02-12 DIAGNOSIS — N39.0 UTI (URINARY TRACT INFECTION): ICD-10-CM

## 2022-02-12 PROBLEM — R10.9 BILATERAL FLANK PAIN: Status: ACTIVE | Noted: 2022-02-12

## 2022-02-12 PROBLEM — E66.9 OBESITY: Status: ACTIVE | Noted: 2022-02-12

## 2022-02-12 PROBLEM — E11.9 DIABETES (HCC): Status: ACTIVE | Noted: 2022-02-12

## 2022-02-12 PROBLEM — R07.89 CHEST TIGHTNESS: Status: ACTIVE | Noted: 2022-02-12

## 2022-02-12 LAB
2HR DELTA HS TROPONIN: 0 NG/L
4HR DELTA HS TROPONIN: -1 NG/L
ALBUMIN SERPL BCP-MCNC: 3.7 G/DL (ref 3.5–5)
ALP SERPL-CCNC: 107 U/L (ref 46–116)
ALT SERPL W P-5'-P-CCNC: 21 U/L (ref 12–78)
ANION GAP SERPL CALCULATED.3IONS-SCNC: 7 MMOL/L (ref 4–13)
AST SERPL W P-5'-P-CCNC: 21 U/L (ref 5–45)
ATRIAL RATE: 93 BPM
BACTERIA UR QL AUTO: ABNORMAL /HPF
BASOPHILS # BLD AUTO: 0.06 THOUSANDS/ΜL (ref 0–0.1)
BASOPHILS NFR BLD AUTO: 1 % (ref 0–1)
BILIRUB SERPL-MCNC: 0.35 MG/DL (ref 0.2–1)
BILIRUB UR QL STRIP: NEGATIVE
BUN SERPL-MCNC: 12 MG/DL (ref 5–25)
CALCIUM SERPL-MCNC: 9 MG/DL (ref 8.3–10.1)
CARDIAC TROPONIN I PNL SERPL HS: 10 NG/L
CARDIAC TROPONIN I PNL SERPL HS: 11 NG/L
CARDIAC TROPONIN I PNL SERPL HS: 11 NG/L
CHLORIDE SERPL-SCNC: 104 MMOL/L (ref 100–108)
CLARITY UR: CLEAR
CO2 SERPL-SCNC: 28 MMOL/L (ref 21–32)
COLOR UR: YELLOW
CREAT SERPL-MCNC: 0.97 MG/DL (ref 0.6–1.3)
D DIMER PPP FEU-MCNC: 0.3 UG/ML FEU
EOSINOPHIL # BLD AUTO: 0.11 THOUSAND/ΜL (ref 0–0.61)
EOSINOPHIL NFR BLD AUTO: 2 % (ref 0–6)
ERYTHROCYTE [DISTWIDTH] IN BLOOD BY AUTOMATED COUNT: 13.4 % (ref 11.6–15.1)
GFR SERPL CREATININE-BSD FRML MDRD: 55 ML/MIN/1.73SQ M
GLUCOSE SERPL-MCNC: 102 MG/DL (ref 65–140)
GLUCOSE SERPL-MCNC: 113 MG/DL (ref 65–140)
GLUCOSE UR STRIP-MCNC: NEGATIVE MG/DL
HCT VFR BLD AUTO: 42.4 % (ref 34.8–46.1)
HGB BLD-MCNC: 14 G/DL (ref 11.5–15.4)
HGB UR QL STRIP.AUTO: NEGATIVE
IMM GRANULOCYTES # BLD AUTO: 0.01 THOUSAND/UL (ref 0–0.2)
IMM GRANULOCYTES NFR BLD AUTO: 0 % (ref 0–2)
INR PPP: 1.83 (ref 0.84–1.19)
KETONES UR STRIP-MCNC: NEGATIVE MG/DL
LEUKOCYTE ESTERASE UR QL STRIP: NEGATIVE
LYMPHOCYTES # BLD AUTO: 1.81 THOUSANDS/ΜL (ref 0.6–4.47)
LYMPHOCYTES NFR BLD AUTO: 24 % (ref 14–44)
MCH RBC QN AUTO: 31 PG (ref 26.8–34.3)
MCHC RBC AUTO-ENTMCNC: 33 G/DL (ref 31.4–37.4)
MCV RBC AUTO: 94 FL (ref 82–98)
MONOCYTES # BLD AUTO: 0.66 THOUSAND/ΜL (ref 0.17–1.22)
MONOCYTES NFR BLD AUTO: 9 % (ref 4–12)
NEUTROPHILS # BLD AUTO: 4.77 THOUSANDS/ΜL (ref 1.85–7.62)
NEUTS SEG NFR BLD AUTO: 64 % (ref 43–75)
NITRITE UR QL STRIP: NEGATIVE
NON-SQ EPI CELLS URNS QL MICRO: ABNORMAL /HPF
NRBC BLD AUTO-RTO: 0 /100 WBCS
NT-PROBNP SERPL-MCNC: 308 PG/ML
P AXIS: 38 DEGREES
PH UR STRIP.AUTO: 6 [PH]
PLATELET # BLD AUTO: 204 THOUSANDS/UL (ref 149–390)
PMV BLD AUTO: 11 FL (ref 8.9–12.7)
POTASSIUM SERPL-SCNC: 4.1 MMOL/L (ref 3.5–5.3)
PR INTERVAL: 176 MS
PROT SERPL-MCNC: 7.8 G/DL (ref 6.4–8.2)
PROT UR STRIP-MCNC: ABNORMAL MG/DL
PROTHROMBIN TIME: 20.9 SECONDS (ref 11.6–14.5)
QRS AXIS: 32 DEGREES
QRSD INTERVAL: 74 MS
QT INTERVAL: 332 MS
QTC INTERVAL: 404 MS
RBC # BLD AUTO: 4.51 MILLION/UL (ref 3.81–5.12)
RBC #/AREA URNS AUTO: ABNORMAL /HPF
SODIUM SERPL-SCNC: 139 MMOL/L (ref 136–145)
SP GR UR STRIP.AUTO: >=1.03 (ref 1–1.03)
T WAVE AXIS: -23 DEGREES
UROBILINOGEN UR QL STRIP.AUTO: 0.2 E.U./DL
VENTRICULAR RATE: 89 BPM
WBC # BLD AUTO: 7.42 THOUSAND/UL (ref 4.31–10.16)
WBC #/AREA URNS AUTO: ABNORMAL /HPF

## 2022-02-12 PROCEDURE — 84484 ASSAY OF TROPONIN QUANT: CPT | Performed by: INTERNAL MEDICINE

## 2022-02-12 PROCEDURE — 74176 CT ABD & PELVIS W/O CONTRAST: CPT

## 2022-02-12 PROCEDURE — 80053 COMPREHEN METABOLIC PANEL: CPT | Performed by: EMERGENCY MEDICINE

## 2022-02-12 PROCEDURE — 85379 FIBRIN DEGRADATION QUANT: CPT

## 2022-02-12 PROCEDURE — 93010 ELECTROCARDIOGRAM REPORT: CPT | Performed by: INTERNAL MEDICINE

## 2022-02-12 PROCEDURE — 99220 PR INITIAL OBSERVATION CARE/DAY 70 MINUTES: CPT | Performed by: INTERNAL MEDICINE

## 2022-02-12 PROCEDURE — 85610 PROTHROMBIN TIME: CPT | Performed by: INTERNAL MEDICINE

## 2022-02-12 PROCEDURE — 85025 COMPLETE CBC W/AUTO DIFF WBC: CPT | Performed by: EMERGENCY MEDICINE

## 2022-02-12 PROCEDURE — 93005 ELECTROCARDIOGRAM TRACING: CPT

## 2022-02-12 PROCEDURE — 87086 URINE CULTURE/COLONY COUNT: CPT

## 2022-02-12 PROCEDURE — 99285 EMERGENCY DEPT VISIT HI MDM: CPT

## 2022-02-12 PROCEDURE — 71045 X-RAY EXAM CHEST 1 VIEW: CPT

## 2022-02-12 PROCEDURE — 96374 THER/PROPH/DIAG INJ IV PUSH: CPT

## 2022-02-12 PROCEDURE — 82948 REAGENT STRIP/BLOOD GLUCOSE: CPT

## 2022-02-12 PROCEDURE — 84484 ASSAY OF TROPONIN QUANT: CPT | Performed by: EMERGENCY MEDICINE

## 2022-02-12 PROCEDURE — 83880 ASSAY OF NATRIURETIC PEPTIDE: CPT

## 2022-02-12 PROCEDURE — 81001 URINALYSIS AUTO W/SCOPE: CPT

## 2022-02-12 PROCEDURE — 99285 EMERGENCY DEPT VISIT HI MDM: CPT | Performed by: EMERGENCY MEDICINE

## 2022-02-12 PROCEDURE — G1004 CDSM NDSC: HCPCS

## 2022-02-12 PROCEDURE — 36415 COLL VENOUS BLD VENIPUNCTURE: CPT

## 2022-02-12 RX ORDER — VENLAFAXINE HYDROCHLORIDE 150 MG/1
150 CAPSULE, EXTENDED RELEASE ORAL DAILY
Status: DISCONTINUED | OUTPATIENT
Start: 2022-02-12 | End: 2022-02-13 | Stop reason: HOSPADM

## 2022-02-12 RX ORDER — LEVOTHYROXINE SODIUM 88 UG/1
88 TABLET ORAL
Status: DISCONTINUED | OUTPATIENT
Start: 2022-02-13 | End: 2022-02-13 | Stop reason: HOSPADM

## 2022-02-12 RX ORDER — ACETAMINOPHEN 325 MG/1
650 TABLET ORAL EVERY 6 HOURS PRN
Status: DISCONTINUED | OUTPATIENT
Start: 2022-02-12 | End: 2022-02-13 | Stop reason: HOSPADM

## 2022-02-12 RX ORDER — WARFARIN SODIUM 2.5 MG/1
7.5 TABLET ORAL
Status: DISCONTINUED | OUTPATIENT
Start: 2022-02-12 | End: 2022-02-13 | Stop reason: HOSPADM

## 2022-02-12 RX ORDER — ATORVASTATIN CALCIUM 40 MG/1
40 TABLET, FILM COATED ORAL
Status: DISCONTINUED | OUTPATIENT
Start: 2022-02-12 | End: 2022-02-13 | Stop reason: HOSPADM

## 2022-02-12 RX ORDER — HYDRALAZINE HYDROCHLORIDE 20 MG/ML
10 INJECTION INTRAMUSCULAR; INTRAVENOUS EVERY 6 HOURS PRN
Status: DISCONTINUED | OUTPATIENT
Start: 2022-02-12 | End: 2022-02-13 | Stop reason: HOSPADM

## 2022-02-12 RX ORDER — ALBUTEROL SULFATE 90 UG/1
2 AEROSOL, METERED RESPIRATORY (INHALATION) ONCE
Status: COMPLETED | OUTPATIENT
Start: 2022-02-12 | End: 2022-02-12

## 2022-02-12 RX ORDER — ASPIRIN 325 MG
325 TABLET ORAL ONCE
Status: COMPLETED | OUTPATIENT
Start: 2022-02-12 | End: 2022-02-12

## 2022-02-12 RX ORDER — ACETAMINOPHEN 325 MG/1
975 TABLET ORAL EVERY 8 HOURS
Status: DISCONTINUED | OUTPATIENT
Start: 2022-02-12 | End: 2022-02-13 | Stop reason: HOSPADM

## 2022-02-12 RX ORDER — ONDANSETRON 2 MG/ML
4 INJECTION INTRAMUSCULAR; INTRAVENOUS EVERY 6 HOURS PRN
Status: DISCONTINUED | OUTPATIENT
Start: 2022-02-12 | End: 2022-02-13 | Stop reason: HOSPADM

## 2022-02-12 RX ADMIN — ALBUTEROL SULFATE 2 PUFF: 90 AEROSOL, METERED RESPIRATORY (INHALATION) at 18:19

## 2022-02-12 RX ADMIN — WARFARIN SODIUM 7.5 MG: 2.5 TABLET ORAL at 20:27

## 2022-02-12 RX ADMIN — VENLAFAXINE HYDROCHLORIDE 150 MG: 150 CAPSULE, EXTENDED RELEASE ORAL at 20:27

## 2022-02-12 RX ADMIN — ASPIRIN 325 MG: 325 TABLET ORAL at 18:19

## 2022-02-12 RX ADMIN — ACETAMINOPHEN 975 MG: 325 TABLET, FILM COATED ORAL at 20:27

## 2022-02-12 RX ADMIN — ATORVASTATIN CALCIUM 40 MG: 40 TABLET, FILM COATED ORAL at 20:27

## 2022-02-12 RX ADMIN — Medication 1000 MG: at 18:19

## 2022-02-13 VITALS
RESPIRATION RATE: 16 BRPM | DIASTOLIC BLOOD PRESSURE: 71 MMHG | SYSTOLIC BLOOD PRESSURE: 136 MMHG | HEIGHT: 65 IN | TEMPERATURE: 98.3 F | HEART RATE: 82 BPM | BODY MASS INDEX: 36.36 KG/M2 | WEIGHT: 218.26 LBS | OXYGEN SATURATION: 94 %

## 2022-02-13 LAB
ANION GAP SERPL CALCULATED.3IONS-SCNC: 8 MMOL/L (ref 4–13)
BASOPHILS # BLD AUTO: 0.05 THOUSANDS/ΜL (ref 0–0.1)
BASOPHILS NFR BLD AUTO: 1 % (ref 0–1)
BUN SERPL-MCNC: 15 MG/DL (ref 5–25)
CALCIUM SERPL-MCNC: 8.8 MG/DL (ref 8.3–10.1)
CHLORIDE SERPL-SCNC: 105 MMOL/L (ref 100–108)
CO2 SERPL-SCNC: 29 MMOL/L (ref 21–32)
CREAT SERPL-MCNC: 1.05 MG/DL (ref 0.6–1.3)
EOSINOPHIL # BLD AUTO: 0.12 THOUSAND/ΜL (ref 0–0.61)
EOSINOPHIL NFR BLD AUTO: 2 % (ref 0–6)
ERYTHROCYTE [DISTWIDTH] IN BLOOD BY AUTOMATED COUNT: 13.7 % (ref 11.6–15.1)
GFR SERPL CREATININE-BSD FRML MDRD: 50 ML/MIN/1.73SQ M
GLUCOSE SERPL-MCNC: 106 MG/DL (ref 65–140)
GLUCOSE SERPL-MCNC: 108 MG/DL (ref 65–140)
GLUCOSE SERPL-MCNC: 110 MG/DL (ref 65–140)
GLUCOSE SERPL-MCNC: 96 MG/DL (ref 65–140)
HCT VFR BLD AUTO: 41.4 % (ref 34.8–46.1)
HGB BLD-MCNC: 13.6 G/DL (ref 11.5–15.4)
IMM GRANULOCYTES # BLD AUTO: 0.02 THOUSAND/UL (ref 0–0.2)
IMM GRANULOCYTES NFR BLD AUTO: 0 % (ref 0–2)
INR PPP: 1.74 (ref 0.84–1.19)
LYMPHOCYTES # BLD AUTO: 1.99 THOUSANDS/ΜL (ref 0.6–4.47)
LYMPHOCYTES NFR BLD AUTO: 32 % (ref 14–44)
MCH RBC QN AUTO: 31 PG (ref 26.8–34.3)
MCHC RBC AUTO-ENTMCNC: 32.9 G/DL (ref 31.4–37.4)
MCV RBC AUTO: 94 FL (ref 82–98)
MONOCYTES # BLD AUTO: 0.65 THOUSAND/ΜL (ref 0.17–1.22)
MONOCYTES NFR BLD AUTO: 10 % (ref 4–12)
NEUTROPHILS # BLD AUTO: 3.47 THOUSANDS/ΜL (ref 1.85–7.62)
NEUTS SEG NFR BLD AUTO: 55 % (ref 43–75)
NRBC BLD AUTO-RTO: 0 /100 WBCS
PLATELET # BLD AUTO: 208 THOUSANDS/UL (ref 149–390)
PMV BLD AUTO: 11.2 FL (ref 8.9–12.7)
POTASSIUM SERPL-SCNC: 4 MMOL/L (ref 3.5–5.3)
PROTHROMBIN TIME: 20.2 SECONDS (ref 11.6–14.5)
RBC # BLD AUTO: 4.39 MILLION/UL (ref 3.81–5.12)
SODIUM SERPL-SCNC: 142 MMOL/L (ref 136–145)
WBC # BLD AUTO: 6.3 THOUSAND/UL (ref 4.31–10.16)

## 2022-02-13 PROCEDURE — 85610 PROTHROMBIN TIME: CPT | Performed by: INTERNAL MEDICINE

## 2022-02-13 PROCEDURE — 80048 BASIC METABOLIC PNL TOTAL CA: CPT | Performed by: INTERNAL MEDICINE

## 2022-02-13 PROCEDURE — 82948 REAGENT STRIP/BLOOD GLUCOSE: CPT

## 2022-02-13 PROCEDURE — 99217 PR OBSERVATION CARE DISCHARGE MANAGEMENT: CPT | Performed by: INTERNAL MEDICINE

## 2022-02-13 PROCEDURE — 85025 COMPLETE CBC W/AUTO DIFF WBC: CPT | Performed by: INTERNAL MEDICINE

## 2022-02-13 PROCEDURE — 36415 COLL VENOUS BLD VENIPUNCTURE: CPT | Performed by: INTERNAL MEDICINE

## 2022-02-13 RX ORDER — WARFARIN SODIUM 2.5 MG/1
2.5 TABLET ORAL
Status: DISCONTINUED | OUTPATIENT
Start: 2022-02-13 | End: 2022-02-13

## 2022-02-13 RX ORDER — LISINOPRIL 5 MG/1
10 TABLET ORAL DAILY
Status: DISCONTINUED | OUTPATIENT
Start: 2022-02-13 | End: 2022-02-13 | Stop reason: HOSPADM

## 2022-02-13 RX ORDER — LISINOPRIL 5 MG/1
20 TABLET ORAL ONCE
Status: DISCONTINUED | OUTPATIENT
Start: 2022-02-13 | End: 2022-02-13

## 2022-02-13 RX ORDER — WARFARIN SODIUM 2.5 MG/1
2.5 TABLET ORAL
Status: COMPLETED | OUTPATIENT
Start: 2022-02-13 | End: 2022-02-13

## 2022-02-13 RX ORDER — LISINOPRIL 5 MG/1
20 TABLET ORAL DAILY
Status: DISCONTINUED | OUTPATIENT
Start: 2022-02-14 | End: 2022-02-13

## 2022-02-13 RX ORDER — CEFDINIR 300 MG/1
300 CAPSULE ORAL EVERY 12 HOURS SCHEDULED
Qty: 8 CAPSULE | Refills: 0 | Status: SHIPPED | OUTPATIENT
Start: 2022-02-13 | End: 2022-02-17

## 2022-02-13 RX ORDER — LISINOPRIL 10 MG/1
10 TABLET ORAL DAILY
Qty: 30 TABLET | Refills: 0 | Status: SHIPPED | OUTPATIENT
Start: 2022-02-13 | End: 2022-03-15

## 2022-02-13 RX ADMIN — METOPROLOL TARTRATE 12.5 MG: 25 TABLET, FILM COATED ORAL at 10:15

## 2022-02-13 RX ADMIN — LISINOPRIL 10 MG: 5 TABLET ORAL at 12:42

## 2022-02-13 RX ADMIN — LEVOTHYROXINE SODIUM 88 MCG: 88 TABLET ORAL at 05:39

## 2022-02-13 RX ADMIN — ACETAMINOPHEN 975 MG: 325 TABLET, FILM COATED ORAL at 04:29

## 2022-02-13 RX ADMIN — WARFARIN SODIUM 2.5 MG: 2.5 TABLET ORAL at 12:43

## 2022-02-13 RX ADMIN — HYDRALAZINE HYDROCHLORIDE 10 MG: 20 INJECTION, SOLUTION INTRAMUSCULAR; INTRAVENOUS at 09:06

## 2022-02-13 NOTE — ASSESSMENT & PLAN NOTE
POA bilateral flank pain  UA showed positive WBC and bacteria  CT renal: No urinary tract calculi or acute intra-abdominal abnormality  Redundant sigmoid colon with diverticulosis but no evidence of diverticulitis  Plan:   Take Omnicef 300 mg twice daily for four days for treatment of UTI

## 2022-02-13 NOTE — ASSESSMENT & PLAN NOTE
Lab Results   Component Value Date    HGBA1C 6 7 (H) 10/14/2021       Recent Labs     02/12/22 2032 02/13/22  0704   POCGLU 102 108       Blood Sugar Average: Last 72 hrs:  (P) 105   Currently diet-controlled diabetes  Not taking medications for this    Monitor blood glucose at home

## 2022-02-13 NOTE — UTILIZATION REVIEW
Initial Clinical Review    Admission: Date/Time/Statement:   Admission Orders (From admission, onward)     Ordered        02/12/22 1833  Place in Observation  Once                      Orders Placed This Encounter   Procedures    Place in Observation     Standing Status:   Standing     Number of Occurrences:   1     Order Specific Question:   Level of Care     Answer:   Med Surg [16]     ED Arrival Information     Expected Arrival Acuity    - 2/12/2022 13:32 Urgent         Means of arrival Escorted by Service Admission type    Walk-In Family Member [facility] Urgent         Arrival complaint    CHEST TIGHTNESS/SOB        Chief Complaint   Patient presents with    Chest Pain     chest tightness/sob onset last night       Initial Presentation: 80 y o  female with a PMH of PAFon Coumadin, hypothyroidism, hypertension-not on medication , diabetes who presents with sudden onset chest tightness while  sleeping last night with associated diaphoresis ans palpitations and worse than usual dyspnea since COVID + 1/24/22  Pt also reports bilat flank pain  On exam, BLE edema, irreg heart rhythm, BP elevated  CXR shows nothing acute per ED provider read  Initial EKG showed TWI in II, III, aVF, and V1, V2  Second EKG showed NSR  Labs- troponin negx2, D dimer neg, UA + WBC and bacteria  Pt given  IV abx , ASA and albuterol in ED  Pt admitted as OBS to telemetry with chest tightness, bilateral flank pain, PAF  Plan - telemetry, trend troponin, obtain echo, continue IV Ceftriaxone, renal stone CT study   Continue statin, monitor BP    Date:     ED Triage Vitals [02/12/22 1342]   Temperature Pulse Respirations Blood Pressure SpO2   97 7 °F (36 5 °C) 91 18 166/93 96 %      Temp Source Heart Rate Source Patient Position - Orthostatic VS BP Location FiO2 (%)   Oral Monitor Sitting Left arm --      Pain Score       4          Wt Readings from Last 1 Encounters:   02/12/22 99 kg (218 lb 4 1 oz)     Additional Vital Signs:   Date/Time Temp Pulse Resp BP MAP (mmHg) SpO2   02/13/22 0710 -- 80 18 176/97 Abnormal  -- 95 %   02/13/22 0438 -- 88 18 173/90 Abnormal  -- 95 %   02/12/22 2159 98 3 °F (36 8 °C) 50 Abnormal  18 129/77 -- 93 %   02/12/22 2020 -- 41 Abnormal  16 135/60 87 92 %   02/12/22 1849 -- 90 18 140/64 -- 93 %   02/12/22 1554 -- 89 18 146/77 -- 91 %       Pertinent Labs/Diagnostic Test Results:   2/12 CXR  2/12 CT renal study  2/12 ECG-Normal sinus rhythm  Nonspecific ST and T wave abnormality      Results from last 7 days   Lab Units 02/13/22  0436 02/12/22  1405   WBC Thousand/uL 6 30 7 42   HEMOGLOBIN g/dL 13 6 14 0   HEMATOCRIT % 41 4 42 4   PLATELETS Thousands/uL 208 204   NEUTROS ABS Thousands/µL 3 47 4 77         Results from last 7 days   Lab Units 02/13/22  0436 02/12/22  1405   SODIUM mmol/L 142 139   POTASSIUM mmol/L 4 0 4 1   CHLORIDE mmol/L 105 104   CO2 mmol/L 29 28   ANION GAP mmol/L 8 7   BUN mg/dL 15 12   CREATININE mg/dL 1 05 0 97   EGFR ml/min/1 73sq m 50 55   CALCIUM mg/dL 8 8 9 0     Results from last 7 days   Lab Units 02/12/22  1405   AST U/L 21   ALT U/L 21   ALK PHOS U/L 107   TOTAL PROTEIN g/dL 7 8   ALBUMIN g/dL 3 7   TOTAL BILIRUBIN mg/dL 0 35     Results from last 7 days   Lab Units 02/13/22  0704 02/12/22  2032   POC GLUCOSE mg/dl 108 102     Results from last 7 days   Lab Units 02/13/22  0436 02/12/22  1405   GLUCOSE RANDOM mg/dL 106 113               Results from last 7 days   Lab Units 02/12/22  2157 02/12/22  1606 02/12/22  1405   HS TNI 0HR ng/L  --   --  11   HS TNI 2HR ng/L  --  11  --    HSTNI D2 ng/L  --  0  --    HS TNI 4HR ng/L 10  --   --    HSTNI D4 ng/L -1  --   --      Results from last 7 days   Lab Units 02/12/22  1405   D-DIMER QUANTITATIVE ug/ml FEU 0 30     Results from last 7 days   Lab Units 02/13/22  0436 02/12/22  2157   PROTIME seconds 20 2* 20 9*   INR  1 74* 1 83*                         Results from last 7 days   Lab Units 02/12/22  1405   NT-PRO BNP pg/mL 308 Results from last 7 days   Lab Units 02/12/22  1527   CLARITY UA  Clear   COLOR UA  Yellow   SPEC GRAV UA  >=1 030   PH UA  6 0   GLUCOSE UA mg/dl Negative   KETONES UA mg/dl Negative   BLOOD UA  Negative   PROTEIN UA mg/dl 30 (1+)*   NITRITE UA  Negative   BILIRUBIN UA  Negative   UROBILINOGEN UA E U /dl 0 2   LEUKOCYTES UA  Negative   WBC UA /hpf 4-10*   RBC UA /hpf 0-5   BACTERIA UA /hpf Moderate*   EPITHELIAL CELLS WET PREP /hpf Occasional                                               ED Treatment:   Medication Administration from 02/12/2022 1331 to 02/13/2022 0718       Date/Time Order Dose Route Action     02/12/2022 1819 ceftriaxone (ROCEPHIN) 1 g/50 mL in dextrose IVPB 1,000 mg Intravenous New Bag     02/12/2022 1819 aspirin tablet 325 mg 325 mg Oral Given     02/12/2022 1819 albuterol (PROVENTIL HFA,VENTOLIN HFA) inhaler 2 puff 2 puff Inhalation Given     02/12/2022 2027 atorvastatin (LIPITOR) tablet 40 mg 40 mg Oral Given     02/13/2022 0539 levothyroxine tablet 88 mcg 88 mcg Oral Given     02/12/2022 2027 venlafaxine (EFFEXOR-XR) 24 hr capsule 150 mg 150 mg Oral Given     02/12/2022 2027 warfarin (COUMADIN) tablet 7 5 mg 7 5 mg Oral Given     02/13/2022 0429 acetaminophen (TYLENOL) tablet 975 mg 975 mg Oral Given     02/12/2022 2027 acetaminophen (TYLENOL) tablet 975 mg 975 mg Oral Given        No past medical history on file    Present on Admission:   Hyperlipidemia   Hypertension   Paroxysmal atrial fibrillation (HCC)   Hypothyroidism      Admitting Diagnosis: Chest tightness [R07 89]  Age/Sex: 80 y o  female  Admission Orders:  Scheduled Medications:  acetaminophen, 975 mg, Oral, Q8H  atorvastatin, 40 mg, Oral, Daily With Dinner  cefTRIAXone, 1,000 mg, Intravenous, Q24H  insulin lispro, 1-5 Units, Subcutaneous, HS  insulin lispro, 1-6 Units, Subcutaneous, TID AC  levothyroxine, 88 mcg, Oral, Early Morning  venlafaxine, 150 mg, Oral, Daily  warfarin, 7 5 mg, Oral, Daily (warfarin)      Continuous IV Infusions:     PRN Meds:  acetaminophen, 650 mg, Oral, Q6H PRN  hydrALAZINE, 10 mg, Intravenous, Q6H PRN  ondansetron, 4 mg, Intravenous, Q6H PRN    telemetry  O2 nc to keep sat at least 90 %  continuous pulse ox  OOB as ronak   SCD's  monitor for hypoglycemia      Network Utilization Review Department  ATTENTION: Please call with any questions or concerns to [phone] and carefully listen to the prompts so that you are directed to the right person  All voicemails are confidential   Robbie Pinedo all requests for admission clinical reviews, approved or denied determinations and any other requests to dedicated fax number below belonging to the campus where the patient is receiving treatment   List of dedicated fax numbers for the Facilities:  [address] DENIALS (Administrative/Medical Necessity) [phone]   [address] (Maternity/NICU/Pediatrics) [phone]   [address]  [address] Medical [place] [address] [address] [place] [address] [address] [place] [address] [phone]

## 2022-02-13 NOTE — DISCHARGE INSTR - AVS FIRST PAGE
Dear Patricia Gray,     It was our pleasure to care for you here at [place], [facility]  It is our hope that we were always able to exceed the expected standards for your care during your stay  You were hospitalized due to chest tightness  You were cared for in the ED by Jael Gee DO under the service of Savage Ventura MD with the [facility] who covers for your primary care physician (PCP), Leopoldo Corrigan, DO, while you were hospitalized  If you have any questions or concerns related to this hospitalization, you may contact us at [phone]  For follow up as well as any medication refills, we recommend that you follow up with your primary care physician  A registered nurse will reach out to you by phone within a few days after your discharge to answer any additional questions that you may have after going home  However, at this time we provide for you here, the most important instructions / recommendations at discharge:     Notable Medication Adjustments -   Start lisinopril 10 mg 1 tablet daily  Start metoprolol 12 5 mg 1 tablet twice daily  Testing Required after Discharge -   Please check your INR and have your warfarin adjusted per PCP/managing providers  Important follow up information -   Follow-up with PCP regarding her blood pressure management and warfarin dose adjustment  Other Instructions -   Monitor blood pressure  If you have significant chest pain or dizziness or falling spells or uncontrolable headache please return to ED  Please review this entire after visit summary as additional general instructions including medication list, appointments, activity, diet, any pertinent wound care, and other additional recommendations from your care team that may be provided for you        Sincerely,     Jael Gee DO

## 2022-02-13 NOTE — ASSESSMENT & PLAN NOTE
Acute onset chest tightness at midnight last night while sleeping  Positive diaphoresis and palpitation  Vital signs were stable in the ED   Troponin negative x2  D-dimer was negative  Initial EKG showed T-wave inversion in 2 3 AVF and 2nd EKG showed normal sinus rhythm  Patient was tested positive for COVID on 1/24/2022  CXR preliminary read was clear  NAN score was 2  Differential diagnosis include:  AFib with RVR, and ACS rule out    Plan:  Trend troponin  Obtain echocardiogram   Continue on telemetry overnight

## 2022-02-13 NOTE — ASSESSMENT & PLAN NOTE
History of atrial fibrillation currently anticoagulated with warfarin  Per cardiology note in 10/2020, patient was supposed to take metoprolol 75 mg once a day  Patient reported currently not taking this medication    Might need to resume it if AFib RVR noted on telemetry

## 2022-02-13 NOTE — H&P
[facility]  H&P- Macho Velasquez 1940, 80 y o  female MRN: [id number]  Unit/Bed#: FT 03 Encounter: [id number]  Primary Care Provider: Kiko Dolanw,    Date and time admitted to hospital: 2/12/2022  2:26 PM    * Chest tightness  Assessment & Plan  Acute onset chest tightness at midnight last night while sleeping  Positive diaphoresis and palpitation  Vital signs were stable in the ED   Troponin negative x2  D-dimer was negative  Initial EKG showed T-wave inversion in 2 3 AVF and 2nd EKG showed normal sinus rhythm  Patient was tested positive for COVID on 1/24/2022  CXR preliminary read was clear  NAN score was 2  Differential diagnosis include:  AFib with RVR, ACS rule out vs CHF    Plan:  Trend troponin  Obtain echocardiogram   Continue on telemetry overnight  Bilateral flank pain  Assessment & Plan  POA bilateral flank pain  UA showed positive WBC and bacteria  Will continue ceftriaxone  Will obtain CT scan renal stone study    Paroxysmal atrial fibrillation [facility])  Assessment & Plan  History of atrial fibrillation currently anticoagulated with warfarin  Per cardiology note in 10/2020, patient was supposed to take metoprolol 75 mg once a day  Patient reported currently not taking this medication  Might need to resume it if AFib RVR noted on telemetry     Obesity  Assessment & Plan  Lifestyle modification    Diabetes [facility])  Assessment & Plan  Lab Results   Component Value Date    HGBA1C 6 7 (H) 10/14/2021       Recent Labs     02/12/22 2032 02/13/22  0704   POCGLU 102 108       Blood Sugar Average: Last 72 hrs:  (P) 105   Currently diet-controlled diabetes  Not taking medications for this  Continue sliding scale  Hypoglycemia protocol      Hyperlipidemia  Assessment & Plan  Continue Lipitor 40 mg once a day    Hypertension  Assessment & Plan  Was noted in the chart however patient is not taking any medication for it    Close monitor blood pressure  Hydralazine 10 mg for SBP more than 160    Hypothyroidism  Assessment & Plan  Continue levothyroxine 88 mcq    VTE Pharmacologic Prophylaxis: VTE Score: 7 High Risk (Score >/= 5) - Pharmacological DVT Prophylaxis Ordered: enoxaparin (Lovenox)  Sequential Compression Devices Ordered  Code Status: Level 1 - Full Code full code  Discussion with family: Updated  (daughter) at bedside  Anticipated Length of Stay: Patient will be admitted on an observation basis with an anticipated length of stay of less than 2 midnights secondary to chest tightness  Chief Complaint: chest tightness while asleep    History of Present Illness:  Lisa Morel is a 80 y o  female with a PMH of PAF, hypothyroidism, hypertension, diabetes who presents with sudden onset chest tightness and midnight while she was sleeping last night  She also endorsed diaphoresis and palpitation at the time  She is short of breath at baseline however at the time she felt like her dyspnea is worse than before  She denied chest pain, orthopnea, radiation, N/V, acid reflux symptoms, fever, or chills  She reported her leg swelling has been stable  She also complained bilateral flank pain but denied urinary symptoms such as dysuria, urinary frequency, or urgency  In the ED, she has received ceftriaxone due to UA showed positive for UTI  Trended troponin was negative times 2  D dimer was negative  CXR was clear  EKG showed likely non specific T wave abnormality  Of note, she was tested positive for COVID on 1/24/2022 and is short of breath at baseline  She will be admitted for further cardiac workup  Review of Systems:  Review of Systems   Constitutional: Positive for diaphoresis  Negative for chills and fever  HENT: Negative for ear pain and sore throat  Eyes: Negative for pain and visual disturbance  Respiratory: Positive for chest tightness and shortness of breath  Negative for cough      Cardiovascular: Positive for palpitations and leg swelling  Negative for chest pain  Gastrointestinal: Negative for abdominal pain and vomiting  Genitourinary: Negative for dysuria and hematuria  Musculoskeletal: Positive for back pain  Negative for arthralgias  Skin: Negative for color change and rash  Neurological: Negative for seizures and syncope  All other systems reviewed and are negative  Past Medical and Surgical History:   No past medical history on file  No past surgical history on file  Meds/Allergies:  Prior to Admission medications    Medication Sig Start Date End Date Taking? Authorizing Provider   atorvastatin (LIPITOR) 40 mg tablet atorvastatin 40 mg tablet 11/14/19  Yes Historical Provider, MD   levothyroxine 100 mcg tablet Take 88 mcg by mouth     Yes Historical Provider, MD   venlafaxine (EFFEXOR-XR) 150 mg 24 hr capsule venlafaxine  mg capsule,extended release 24 hr 3/7/19  Yes Historical Provider, MD   warfarin (COUMADIN) 7 5 mg tablet Take 7 5 mg by mouth daily   Yes Historical Provider, MD   albuterol (PROVENTIL HFA,VENTOLIN HFA) 90 mcg/act inhaler Inhale 2 puffs  Patient not taking: Reported on 2/12/2022 7/21/20   Historical Provider, MD   guaiFENesin (ROBITUSSIN) 100 MG/5ML oral liquid Take 10 mL (200 mg total) by mouth 3 (three) times a day as needed for cough  Patient not taking: Reported on 2/12/2022 2/25/20   Jaimie Mendoza MD   lisinopril (ZESTRIL) 20 mg tablet lisinopril 20 mg tablet  Patient not taking: Reported on 2/12/2022 4/9/19   Historical Provider, MD   oxyCODONE (ROXICODONE) 5 mg immediate release tablet Take 0 5 tablets (2 5 mg total) by mouth every 6 (six) hours as needed for moderate pain or severe painMax Daily Amount: 10 mg  Patient not taking: Reported on 2/12/2022  10/2/20   Magdy Roberts MD     I have reviewed home medications with patient personally      Allergies: No Known Allergies    Social History:  Marital Status: /Civil Union   Occupation: none  Patient Pre-hospital Living Situation: Home  Patient Pre-hospital Level of Mobility: walks  Patient Pre-hospital Diet Restrictions: none  Substance Use History:   Social History     Substance and Sexual Activity   Alcohol Use Never     Social History     Tobacco Use   Smoking Status Never Smoker   Smokeless Tobacco Never Used     Social History     Substance and Sexual Activity   Drug Use Never       Family History:  No family history on file  Physical Exam:     Vitals:   Blood Pressure: (!) 173/90 (02/13/22 0438)  Pulse: 88 (02/13/22 0438)  Temperature: 98 3 °F (36 8 °C) (02/12/22 2159)  Temp Source: Oral (02/12/22 2159)  Respirations: 18 (02/13/22 0438)  Height: 5' 5" (165 1 cm) (02/12/22 2159)  Weight - Scale: 99 kg (218 lb 4 1 oz) (02/12/22 2159)  SpO2: 95 % (02/13/22 0438)    Physical Exam  Vitals and nursing note reviewed  Constitutional:       General: She is not in acute distress  Appearance: She is well-developed  She is obese  HENT:      Head: Normocephalic and atraumatic  Eyes:      Conjunctiva/sclera: Conjunctivae normal    Cardiovascular:      Rate and Rhythm: Normal rate  Rhythm irregular  Heart sounds: No murmur heard  Pulmonary:      Effort: Pulmonary effort is normal  No respiratory distress  Breath sounds: Normal breath sounds  No wheezing or rales  Chest:      Chest wall: No tenderness  Abdominal:      Palpations: Abdomen is soft  Tenderness: There is no abdominal tenderness  Musculoskeletal:      Cervical back: Neck supple  Right lower leg: Edema present  Left lower leg: Edema present  Skin:     General: Skin is warm and dry  Neurological:      Mental Status: She is alert           Additional Data:     Lab Results:  Results from last 7 days   Lab Units 02/13/22  0436   WBC Thousand/uL 6 30   HEMOGLOBIN g/dL 13 6   HEMATOCRIT % 41 4   PLATELETS Thousands/uL 208   NEUTROS PCT % 55   LYMPHS PCT % 32   MONOS PCT % 10   EOS PCT % 2     Results from last 7 days   Lab Units 02/13/22  0436 02/12/22  1405 02/12/22  1405   SODIUM mmol/L 142   < > 139   POTASSIUM mmol/L 4 0   < > 4 1   CHLORIDE mmol/L 105   < > 104   CO2 mmol/L 29   < > 28   BUN mg/dL 15   < > 12   CREATININE mg/dL 1 05   < > 0 97   ANION GAP mmol/L 8   < > 7   CALCIUM mg/dL 8 8   < > 9 0   ALBUMIN g/dL  --   --  3 7   TOTAL BILIRUBIN mg/dL  --   --  0 35   ALK PHOS U/L  --   --  107   ALT U/L  --   --  21   AST U/L  --   --  21   GLUCOSE RANDOM mg/dL 106   < > 113    < > = values in this interval not displayed  Results from last 7 days   Lab Units 02/13/22  0436   INR  1 74*     Results from last 7 days   Lab Units 02/13/22  0704 02/12/22 2032   POC GLUCOSE mg/dl 108 102               Imaging: Personally reviewed the following imaging: chest xray  XR chest 1 view portable    (Results Pending)   CT renal stone study abdomen pelvis wo contrast    (Results Pending)       EKG and Other Studies Reviewed on Admission:   · EKG: initial EKG showed TWI in II, III, aVF, and V1, V2  Second EKG showed NSR     ** Please Note: This note has been constructed using a voice recognition system   **

## 2022-02-13 NOTE — ASSESSMENT & PLAN NOTE
POA bilateral flank pain  UA showed positive WBC and bacteria  Will continue ceftriaxone  Will obtain CT scan renal stone study

## 2022-02-13 NOTE — ASSESSMENT & PLAN NOTE
History of atrial fibrillation currently anticoagulated with warfarin      Plan:   Start Metoprolol 12 5 mg BID  Monitor blood pressure at home  Repeat NR/PT and follow-up with her PCP for Warfarin adjustment

## 2022-02-13 NOTE — DISCHARGE SUMMARY
[facility]  Discharge- [facility] 1940, 80 y o  female MRN: [id number]  Unit/Bed#: FT 03 Encounter: [id number]  Primary Care Provider: Mali Ruelas DO   Date and time admitted to hospital: 2/12/2022  2:26 PM    * Chest tightness  Assessment & Plan  Acute onset chest tightness at midnight while sleeping  Positive diaphoresis and palpitation  Vital signs were stable in the ED   Troponin negative x2  D-dimer was negative  Initial EKG showed T-wave inversion in 2 3 AVF and 2nd EKG showed normal sinus rhythm  Patient was tested positive for COVID on 1/24/2022  CXR: no acute cardiopulmonary disease   NAN score was 2  Etiology: Hypertensive urgency due to noncompliance with her blood pressure medications  Plan:  Start Lisinopril at 10 mg and Metoprolol Tartrate 25 mg           Hypertension  Assessment & Plan  POA: Chest tightness with diaphoresis and palpitations likely in the setting of hypertensive urgency due to noncompliance with her lisinopril as pt reports she had stopped taking it 2-3 weeks ago  Denied chest pain, orthopnea, radiation, N/V, reflux symptoms, fever, and chills  On admission, blood pressure was elevated at 166/93  Received IV Hydralazine with improvement to 146/97 and asymptomatic  Plan:  Start Lisinopril 10 mg daily and Metoprolol Tartrate 25 mg   Monitor blood pressure      Bilateral flank pain  Assessment & Plan  POA bilateral flank pain  UA showed positive WBC and bacteria  CT renal: No urinary tract calculi or acute intra-abdominal abnormality  Redundant sigmoid colon with diverticulosis but no evidence of diverticulitis  Plan:   Take Omnicef 300 mg twice daily for four days for treatment of UTI         Obesity  Assessment & Plan  Lifestyle modification    Diabetes [facility])  Assessment & Plan  Lab Results   Component Value Date    HGBA1C 6 7 (H) 10/14/2021       Recent Labs     02/12/22 2032 02/13/22  0704   POCGLU 102 108       Blood Sugar Average: Last 72 hrs:  (P) 105   Currently diet-controlled diabetes  Not taking medications for this  Monitor blood glucose at home      Hyperlipidemia  Assessment & Plan  Continue Lipitor 40 mg once a day    Hypothyroidism  Assessment & Plan  Continue levothyroxine 88 mcq    Paroxysmal atrial fibrillation (HCC)  Assessment & Plan  History of atrial fibrillation currently anticoagulated with warfarin  Plan:   Start Metoprolol 12 5 mg BID  Monitor blood pressure at home  Repeat NR/PT and follow-up with her PCP for Warfarin adjustment          Medical Problems             Resolved Problems  Date Reviewed: 2/12/2022    None              Discharging Resident: Alejandra Magallanes DO  Discharging Attending: Jana Milton MD  PCP: Sara Avila DO  Admission Date:   Admission Orders (From admission, onward)     Ordered        02/13/22 1102  Inpatient Admission  Once,   Status:  Canceled            02/12/22 1833  Place in Observation  Once                      Discharge Date: 02/13/22    Consultations During Hospital Stay:  · None    Procedures Performed:   · None    Significant Findings / Test Results:   XR chest 1 view portable    Result Date: 2/13/2022  Impression: No acute cardiopulmonary disease  Workstation performed: [id number]     CT renal stone study abdomen pelvis wo contrast    Result Date: 2/13/2022  Impression: No urinary tract calculi or acute intra-abdominal abnormality  Redundant sigmoid colon with diverticulosis but no evidence of diverticulitis  Limited study without IV or oral contrast  Workstation performed: [id number]       XR chest 1 view portable    Result Date: 2/13/2022  Impression No acute cardiopulmonary disease  Workstation performed: [id number]   ·    ·     Incidental Findings:   · None    Test Results Pending at Discharge (will require follow up):   · None     Outpatient Tests Requested:  · Repeat INR/PT with primary care physician    Complications:  none    Reason for Admission: chest tightness and ACS r/o  Hospital Course:   Amy Lopez is a 80 y o  female patient with past medical history of paroxysmal atrial fibrillation on warfarin, HTN, hypothyroidism, and diabetes who originally presented to the hospital on 2/12/2022 due to chest tightness with diaphoresis and palpitations  She additionally reported bilateral flank pain without dysuria, pyuria, or urinary urgency or frequency  In the ED, she was started on Ceftriaxone due to UA findings of proteinuria, moderate bacteruria, and 4-10 WBCs  Troponin was negative x3 and D-dimer was negative  CXR revealed no acute cardiopulmonary findings  CT renal stone study demonstrated no urinary tract calculi, hydronephrosis, or hydroureters  There was no evidence of diverticulitis  On admission, her blood pressure was elevated at 166/93 and improved with IV Hydralazine  On the day of discharge, patient denied any chest pain, tightness, shortness of breath, but did endorse some mild flank pain  Pt was deemed hemodynamically stable and was discharged with Lisinopril 10 mg, Metoprolol Tartrate 25 mg, and Omnicef 300 mg for four days  The patient, initially admitted to the hospital as inpatient, was discharged earlier than expected given the following: resolved symptoms, negative CT findings  Please see above list of diagnoses and related plan for additional information  Condition at Discharge: fair    Discharge Day Visit / Exam:   Subjective:  Pt reports she is feeling well today  Denies any complaints except some mild flank pain  Pt feels she is ready for discharge and was recommended to continue monitoring her blood pressure at home     Vitals: Blood Pressure: 136/71 (02/13/22 1200)  Pulse: 82 (02/13/22 1200)  Temperature: 98 3 °F (36 8 °C) (02/12/22 2159)  Temp Source: Oral (02/12/22 2159)  Respirations: 16 (02/13/22 1200)  Height: 5' 5" (165 1 cm) (02/12/22 2159)  Weight - Scale: 99 kg (218 lb 4 1 oz) (02/12/22 2159)  SpO2: 94 % (02/13/22 1200)  Exam:   Physical Exam  Constitutional:       General: She is awake  She is not in acute distress  Appearance: She is obese  She is not ill-appearing  HENT:      Head: Normocephalic and atraumatic  Nose: Nose normal  No congestion  Mouth/Throat:      Pharynx: Oropharynx is clear  Eyes:      Conjunctiva/sclera: Conjunctivae normal    Cardiovascular:      Rate and Rhythm: Normal rate and regular rhythm  Heart sounds: Normal heart sounds  Pulmonary:      Effort: Pulmonary effort is normal       Breath sounds: Normal breath sounds  Abdominal:      General: Bowel sounds are normal  There is no distension  Palpations: Abdomen is soft  Tenderness: There is no abdominal tenderness  Musculoskeletal:      Right lower leg: Edema present  Left lower leg: Edema present  Skin:     General: Skin is warm and dry  Neurological:      Mental Status: She is alert and oriented to person, place, and time  Psychiatric:         Mood and Affect: Mood normal          Behavior: Behavior normal  Behavior is cooperative  Discussion with Family: Updated  (daughter) via phone  Discharge instructions/Information to patient and family:   See after visit summary for information provided to patient and family  Provisions for Follow-Up Care:  See after visit summary for information related to follow-up care and any pertinent home health orders  Disposition:   Home    Planned Readmission: no    Discharge Medications:  See after visit summary for reconciled discharge medications provided to patient and/or family        **Please Note: This note may have been constructed using a voice recognition system**

## 2022-02-13 NOTE — DISCHARGE INSTRUCTIONS
Chronic Hypertension   WHAT YOU NEED TO KNOW:   Hypertension is high blood pressure  Your blood pressure is the force of your blood moving against the walls of your arteries  Hypertension causes your blood pressure to get so high that your heart has to work much harder than normal  This can damage your heart  Even if you have hypertension for years, lifestyle changes, medicines, or both can help bring your blood pressure to normal   DISCHARGE INSTRUCTIONS:   Call your local emergency number (911 in the [address] [facility]) or have someone call if:   · You have chest pain  · You have any of the following signs of a heart attack:      ? Squeezing, pressure, or pain in your chest    ? You may  also have any of the following:     § Discomfort or pain in your back, neck, jaw, stomach, or arm    § Shortness of breath    § Nausea or vomiting    § Lightheadedness or a sudden cold sweat    · You become confused or have difficulty speaking  · You suddenly feel lightheaded or have trouble breathing  Seek care immediately if:   · You have a severe headache or vision loss  · You have weakness in an arm or leg  Call your doctor or cardiologist if:   · You feel faint, dizzy, confused, or drowsy  · You have been taking your blood pressure medicine but your pressure is higher than your provider says it should be  · You have questions or concerns about your condition or care  Medicines: You may need any of the following:  · Antihypertensives  may be used to help lower your blood pressure  Several kinds of medicines are available  Your healthcare provider may change the medicine or medicines you currently take  This may be needed if your blood pressure is often high when you check it at home or you are having other problems with blood pressure control  · Diuretics  help decrease extra fluid that collects in your body  This will help lower your BP  You may urinate more often while you take this medicine      · Cholesterol medicine  helps lower your cholesterol level  A low cholesterol level helps prevent heart disease and makes it easier to control your blood pressure  · Take your medicine as directed  Contact your healthcare provider if you think your medicine is not helping or if you have side effects  Tell him or her if you are allergic to any medicine  Keep a list of the medicines, vitamins, and herbs you take  Include the amounts, and when and why you take them  Bring the list or the pill bottles to follow-up visits  Carry your medicine list with you in case of an emergency  Stages of hypertension:       · Normal blood pressure is 119/79 or lower   Your healthcare provider may only check your blood pressure each year if it stays at a normal level  · Elevated blood pressure is 120/79 to 129/79   This is sometimes called prehypertension  Your healthcare provider may suggest lifestyle changes to help lower your blood pressure to a normal level  He or she may then check it again in 3 to 6 months  · Stage 1 hypertension is 130/80  to 139/89   Your provider may recommend lifestyle changes, medication, and checks every 3 to 6 months until your blood pressure is controlled  · Stage 2 hypertension is 140/90 or higher   Your provider will recommend lifestyle changes and have you take 2 kinds of hypertension medicines  You will also need to have your blood pressure checked monthly until it is controlled  Manage chronic hypertension:   · Check your blood pressure at home  Avoid smoking, caffeine, and exercise at least 30 minutes before checking your blood pressure  Sit and rest for 5 minutes before you take your blood pressure  Extend your arm and support it on a flat surface  Your arm should be at the same level as your heart  Follow the directions that came with your blood pressure monitor  Check your blood pressure 2 times, 1 minute apart, before you take your medicine in the morning   Also check your blood pressure before your evening meal  Keep a record of your readings and bring it to your follow-up visits  Ask your healthcare provider what your blood pressure should be  · Manage any other health conditions you have  Health conditions such as diabetes can increase your risk for hypertension  Follow your healthcare provider's instructions and take all your medicines as directed  Talk to your healthcare provider about any new health conditions you have recently developed  · Ask about all medicines  Certain medicines can increase your blood pressure  Examples include oral birth control pills, decongestants, herbal supplements, and NSAIDs, such as ibuprofen  Your healthcare provider can tell you which medicines are safe for you to take  This includes prescription and over-the-counter medicines  Lifestyle changes you can make to lower your blood pressure: Your provider may want you to make more lifestyle changes if you are having trouble controlling your blood pressure  This may feel difficult over time, especially if you think you are making good changes but your pressure is still high  It might help to focus on one new change at a time  For example, try to add 1 more day of exercise, or exercise for an extra 10 minutes on 2 days  Small changes can make a big difference  Your healthcare provider can also refer you to specialists such as a dietitian who can help you make small changes  Your family members may be included in helping you learn to create lifestyle changes, such as the following:     · Limit sodium (salt) as directed  Too much sodium can affect your fluid balance  Check labels to find low-sodium or no-salt-added foods  Some low-sodium foods use potassium salts for flavor  Too much potassium can also cause health problems  Your healthcare provider will tell you how much sodium and potassium are safe for you to have in a day  He or she may recommend that you limit sodium to 2,300 mg a day  · Follow the meal plan recommended by your healthcare provider  A dietitian or your provider can give you more information on low-sodium plans or the DASH (Dietary Approaches to Stop Hypertension) eating plan  The DASH plan is low in sodium, processed sugar, unhealthy fats, and total fat  It is high in potassium, calcium, and fiber  These can be found in vegetables, fruit, and whole-grain foods  · Be physically active throughout the day  Physical activity, such as exercise, can help control your blood pressure and your weight  Be physically active for at least 30 minutes per day, on most days of the week  Include aerobic activity, such as walking or riding a bicycle  Also include strength training at least 2 times each week  Your healthcare providers can help you create a physical activity plan  · Decrease stress  This may help lower your blood pressure  Learn ways to relax, such as deep breathing or listening to music  · Limit alcohol as directed  Alcohol can increase your blood pressure  A drink of alcohol is 12 ounces of beer, 5 ounces of wine, or 1½ ounces of liquor  · Do not smoke  Nicotine and other chemicals in cigarettes and cigars can increase your blood pressure and also cause lung damage  Ask your healthcare provider for information if you currently smoke and need help to quit  E-cigarettes or smokeless tobacco still contain nicotine  Talk to your healthcare provider before you use these products  Follow up with your doctor or cardiologist as directed: You will need to return to have your blood pressure checked and to have other lab tests done  Write down your questions so you remember to ask them during your visits  © Copyright [employer] 2021 Information is for End User's use only and may not be sold, redistributed or otherwise used for commercial purposes   All illustrations and images included in CareNotes® are the copyrighted property of [employer]  or Brendan Bradley  The above information is an  only  It is not intended as medical advice for individual conditions or treatments  Talk to your doctor, nurse or pharmacist before following any medical regimen to see if it is safe and effective for you

## 2022-02-13 NOTE — ASSESSMENT & PLAN NOTE
Lab Results   Component Value Date    HGBA1C 6 7 (H) 10/14/2021       No results for input(s): POCGLU in the last 72 hours  Blood Sugar Average: Last 72 hrs:     Currently diet-controlled diabetes  Not taking medications for this    Continue sliding scale  Hypoglycemia protocol

## 2022-02-13 NOTE — ASSESSMENT & PLAN NOTE
POA: Chest tightness with diaphoresis and palpitations likely in the setting of hypertensive urgency due to noncompliance with her lisinopril as pt reports she had stopped taking it 2-3 weeks ago  Denied chest pain, orthopnea, radiation, N/V, reflux symptoms, fever, and chills  On admission, blood pressure was elevated at 166/93  Received IV Hydralazine with improvement to 146/97 and asymptomatic       Plan:  Start Lisinopril 10 mg daily and Metoprolol Tartrate 25 mg   Monitor blood pressure

## 2022-02-13 NOTE — ASSESSMENT & PLAN NOTE
Acute onset chest tightness at midnight while sleeping  Positive diaphoresis and palpitation  Vital signs were stable in the ED   Troponin negative x2  D-dimer was negative  Initial EKG showed T-wave inversion in 2 3 AVF and 2nd EKG showed normal sinus rhythm  Patient was tested positive for COVID on 1/24/2022  CXR: no acute cardiopulmonary disease   NAN score was 2  Etiology: Hypertensive urgency due to noncompliance with her blood pressure medications      Plan:  Start Lisinopril at 10 mg and Metoprolol Tartrate 25 mg

## 2022-02-13 NOTE — ED NOTES
Patient to receive coumadin and then be discharged, per SLIM  Patient finishing lunch, then will assist to waiting room       Patricia Mayer RN  02/13/22 [id number]

## 2022-02-13 NOTE — ASSESSMENT & PLAN NOTE
Was noted in the chart however patient is not taking any medication for it    Close monitor blood pressure  Hydralazine 10 mg for SBP more than 160

## 2022-02-14 LAB — BACTERIA UR CULT: NORMAL

## 2022-02-16 NOTE — ED PROVIDER NOTES
History  Chief Complaint   Patient presents with    Chest Pain     chest tightness/sob onset last night     Patient is a 53-year-old female with a past medical history significant for AFib on Coumadin, hypertension, hyperlipidemia, hypothyroid presenting for chief complaint chest pain  Last night patient states she began experiencing acute onset shortness of breath  She went to bed trying to sleep off this discomfort woke up this morning with a discomfort still there  She denies nausea, vomiting, diarrhea, constipation, abdominal pain  She denies history of MI  she does also endorse some mild left flank pain that began yesterday and is worse today  Prior to Admission Medications   Prescriptions Last Dose Informant Patient Reported? Taking?    albuterol (PROVENTIL HFA,VENTOLIN HFA) 90 mcg/act inhaler Not Taking at Unknown time  Yes No   Sig: Inhale 2 puffs   Patient not taking: Reported on 2/12/2022    atorvastatin (LIPITOR) 40 mg tablet 2/11/2022 at Unknown time  Yes Yes   Sig: atorvastatin 40 mg tablet   guaiFENesin (ROBITUSSIN) 100 MG/5ML oral liquid Not Taking at Unknown time  No No   Sig: Take 10 mL (200 mg total) by mouth 3 (three) times a day as needed for cough   Patient not taking: Reported on 2/12/2022    levothyroxine 100 mcg tablet 2/12/2022 at Unknown time  Yes Yes   Sig: Take 88 mcg by mouth     lisinopril (ZESTRIL) 20 mg tablet Not Taking at Unknown time  Yes No   Sig: lisinopril 20 mg tablet   Patient not taking: Reported on 2/12/2022   oxyCODONE (ROXICODONE) 5 mg immediate release tablet Not Taking at Unknown time  No No   Sig: Take 0 5 tablets (2 5 mg total) by mouth every 6 (six) hours as needed for moderate pain or severe painMax Daily Amount: 10 mg   Patient not taking: Reported on 2/12/2022    venlafaxine (EFFEXOR-XR) 150 mg 24 hr capsule 2/11/2022 at Unknown time  Yes Yes   Sig: venlafaxine  mg capsule,extended release 24 hr   warfarin (COUMADIN) 7 5 mg tablet 2/11/2022 at Unknown time  Yes Yes   Sig: Take 7 5 mg by mouth daily      Facility-Administered Medications: None       No past medical history on file  No past surgical history on file  No family history on file  I have reviewed and agree with the history as documented  E-Cigarette/Vaping    E-Cigarette Use Never User      E-Cigarette/Vaping Substances    Nicotine No     THC No     CBD No     Flavoring No     Other No     Unknown No      Social History     Tobacco Use    Smoking status: Never Smoker    Smokeless tobacco: Never Used   Vaping Use    Vaping Use: Never used   Substance Use Topics    Alcohol use: Never    Drug use: Never        Review of Systems   Constitutional: Negative  HENT: Negative  Eyes: Negative  Respiratory: Positive for chest tightness and shortness of breath  Cardiovascular: Negative  Gastrointestinal: Negative  Endocrine: Negative  Genitourinary: Positive for flank pain  Skin: Negative  Allergic/Immunologic: Negative  Neurological: Negative  Hematological: Negative  Psychiatric/Behavioral: Negative  All other systems reviewed and are negative  Physical Exam  ED Triage Vitals [02/12/22 1342]   Temperature Pulse Respirations Blood Pressure SpO2   97 7 °F (36 5 °C) 91 18 166/93 96 %      Temp Source Heart Rate Source Patient Position - Orthostatic VS BP Location FiO2 (%)   Oral Monitor Sitting Left arm --      Pain Score       4             Orthostatic Vital Signs  Vitals:    02/13/22 1015 02/13/22 1030 02/13/22 1100 02/13/22 1200   BP: 142/65 159/72 140/63 136/71   Pulse: 98 96 90 82   Patient Position - Orthostatic VS:   Sitting Sitting       Physical Exam  Vitals and nursing note reviewed  Constitutional:       General: She is not in acute distress  Appearance: Normal appearance  She is not ill-appearing, toxic-appearing or diaphoretic  HENT:      Head: Normocephalic and atraumatic  Eyes:      General: No scleral icterus  Right eye: No discharge  Left eye: No discharge  Extraocular Movements: Extraocular movements intact  Conjunctiva/sclera: Conjunctivae normal       Pupils: Pupils are equal, round, and reactive to light  Cardiovascular:      Rate and Rhythm: Normal rate  Pulses: Normal pulses  Heart sounds: Normal heart sounds  No murmur heard  No friction rub  No gallop  Pulmonary:      Effort: Pulmonary effort is normal  No respiratory distress  Breath sounds: Normal breath sounds  No stridor  No wheezing, rhonchi or rales  Abdominal:      General: Abdomen is flat  Bowel sounds are normal  There is no distension  Palpations: Abdomen is soft  Tenderness: There is no abdominal tenderness  There is no guarding or rebound  Musculoskeletal:         General: No swelling  Normal range of motion  Cervical back: Normal range of motion  No rigidity  Skin:     General: Skin is warm and dry  Capillary Refill: Capillary refill takes less than 2 seconds  Coloration: Skin is not jaundiced  Findings: No bruising or lesion  Neurological:      General: No focal deficit present  Mental Status: She is alert and oriented to person, place, and time  Mental status is at baseline  Sensory: No sensory deficit  Motor: No weakness  Gait: Gait normal    Psychiatric:         Mood and Affect: Mood normal          Behavior: Behavior normal          Thought Content:  Thought content normal          Judgment: Judgment normal          ED Medications  Medications   ceftriaxone (ROCEPHIN) 1 g/50 mL in dextrose IVPB (0 mg Intravenous Stopped 2/12/22 1849)   aspirin tablet 325 mg (325 mg Oral Given 2/12/22 1819)   albuterol (PROVENTIL HFA,VENTOLIN HFA) inhaler 2 puff (2 puffs Inhalation Given 2/12/22 1819)   warfarin (COUMADIN) tablet 2 5 mg (2 5 mg Oral Given 2/13/22 1243)       Diagnostic Studies  Results Reviewed     Procedure Component Value Units Date/Time    Urine culture [[id number]] Collected: 02/12/22 1527    Lab Status: Final result Specimen: Urine, Clean Catch Updated: 02/14/22 0702     Urine Culture 30,000-39,000 cfu/ml     Fingerstick Glucose (POCT) [[id number]]  (Normal) Collected: 02/13/22 1139    Lab Status: Final result Updated: 02/13/22 1139     POC Glucose 110 mg/dl     Fingerstick Glucose (POCT) [[id number]]  (Normal) Collected: 02/13/22 0904    Lab Status: Final result Updated: 02/13/22 0909     POC Glucose 96 mg/dl     Fingerstick Glucose (POCT) [[id number]]  (Normal) Collected: 02/13/22 0704    Lab Status: Final result Updated: 02/13/22 0705     POC Glucose 108 mg/dl     Basic metabolic panel [[id number]] Collected: 02/13/22 0436    Lab Status: Final result Specimen: Blood from Arm, Left Updated: 02/13/22 0458     Sodium 142 mmol/L      Potassium 4 0 mmol/L      Chloride 105 mmol/L      CO2 29 mmol/L      ANION GAP 8 mmol/L      BUN 15 mg/dL      Creatinine 1 05 mg/dL      Glucose 106 mg/dL      Calcium 8 8 mg/dL      eGFR 50 ml/min/1 73sq m     Narrative:      [place] guidelines for Chronic Kidney Disease (CKD):     Stage 1 with normal or high GFR (GFR > 90 mL/min/1 73 square meters)    Stage 2 Mild CKD (GFR = 60-89 mL/min/1 73 square meters)    Stage 3A Moderate CKD (GFR = 45-59 mL/min/1 73 square meters)    Stage 3B Moderate CKD (GFR = 30-44 mL/min/1 73 square meters)    Stage 4 Severe CKD (GFR = 15-29 mL/min/1 73 square meters)    Stage 5 End Stage CKD (GFR <15 mL/min/1 73 square meters)  Note: GFR calculation is accurate only with a steady state creatinine    Protime-INR [[id number]]  (Abnormal) Collected: 02/13/22 0436    Lab Status: Final result Specimen: Blood from Arm, Left Updated: 02/13/22 0458     Protime 20 2 seconds      INR 1 74    CBC and differential [[id number]] Collected: 02/13/22 0436    Lab Status: Final result Specimen: Blood from Arm, Left Updated: 02/13/22 0447     WBC 6 30 Thousand/uL      RBC 4 39 Million/uL Hemoglobin 13 6 g/dL      Hematocrit 41 4 %      MCV 94 fL      MCH 31 0 pg      MCHC 32 9 g/dL      RDW 13 7 %      MPV 11 2 fL      Platelets 348 Thousands/uL      nRBC 0 /100 WBCs      Neutrophils Relative 55 %      Immat GRANS % 0 %      Lymphocytes Relative 32 %      Monocytes Relative 10 %      Eosinophils Relative 2 %      Basophils Relative 1 %      Neutrophils Absolute 3 47 Thousands/µL      Immature Grans Absolute 0 02 Thousand/uL      Lymphocytes Absolute 1 99 Thousands/µL      Monocytes Absolute 0 65 Thousand/µL      Eosinophils Absolute 0 12 Thousand/µL      Basophils Absolute 0 05 Thousands/µL     HS Troponin I 4hr [[id number]]  (Normal) Collected: 02/12/22 2157    Lab Status: Final result Specimen: Blood from Arm, Left Updated: 02/12/22 2228     hs TnI 4hr 10 ng/L      Delta 4hr hsTnI -1 ng/L     STAT INR [[id number]]  (Abnormal) Collected: 02/12/22 2157    Lab Status: Final result Specimen: Blood from Arm, Left Updated: 02/12/22 2220     Protime 20 9 seconds      INR 1 83    Fingerstick Glucose (POCT) [[id number]]  (Normal) Collected: 02/12/22 2032    Lab Status: Final result Updated: 02/12/22 2036     POC Glucose 102 mg/dl     NT-BNP PRO [[id number]]  (Normal) Collected: 02/12/22 1405    Lab Status: Final result Specimen: Blood from Arm, Right Updated: 02/12/22 1739     NT-proBNP 308 pg/mL     D-dimer, quantitative [[id number]]  (Normal) Collected: 02/12/22 1405    Lab Status: Final result Specimen: Blood from Arm, Right Updated: 02/12/22 1709     D-Dimer, Quant 0 30 ug/ml FEU     HS Troponin I 2hr [[id number]]  (Normal) Collected: 02/12/22 1606    Lab Status: Final result Specimen: Blood from Arm, Left Updated: 02/12/22 1641     hs TnI 2hr 11 ng/L      Delta 2hr hsTnI 0 ng/L     Urine Microscopic [[id number]]  (Abnormal) Collected: 02/12/22 1527    Lab Status: Final result Specimen: Urine, Clean Catch Updated: 02/12/22 1632     RBC, UA 0-5 /hpf      WBC, UA 4-10 /hpf      Epithelial Cells Occasional /hpf      Bacteria, UA Moderate /hpf     UA w Reflex to Microscopic w Reflex to Culture [[id number]]  (Abnormal) Collected: 02/12/22 1527    Lab Status: Final result Specimen: Urine, Clean Catch Updated: 02/12/22 1554     Color, UA Yellow     Clarity, UA Clear     Specific Gravity, UA >=1 030     pH, UA 6 0     Leukocytes, UA Negative     Nitrite, UA Negative     Protein, UA 30 (1+) mg/dl      Glucose, UA Negative mg/dl      Ketones, UA Negative mg/dl      Urobilinogen, UA 0 2 E U /dl      Bilirubin, UA Negative     Blood, UA Negative    HS Troponin 0hr (reflex protocol) [[id number]]  (Normal) Collected: 02/12/22 1405    Lab Status: Final result Specimen: Blood from Arm, Right Updated: 02/12/22 1446     hs TnI 0hr 11 ng/L     Comprehensive metabolic panel [[id number]] Collected: 02/12/22 1405    Lab Status: Final result Specimen: Blood from Arm, Right Updated: 02/12/22 1438     Sodium 139 mmol/L      Potassium 4 1 mmol/L      Chloride 104 mmol/L      CO2 28 mmol/L      ANION GAP 7 mmol/L      BUN 12 mg/dL      Creatinine 0 97 mg/dL      Glucose 113 mg/dL      Calcium 9 0 mg/dL      AST 21 U/L      ALT 21 U/L      Alkaline Phosphatase 107 U/L      Total Protein 7 8 g/dL      Albumin 3 7 g/dL      Total Bilirubin 0 35 mg/dL      eGFR 55 ml/min/1 73sq m     Narrative:      [place] guidelines for Chronic Kidney Disease (CKD):     Stage 1 with normal or high GFR (GFR > 90 mL/min/1 73 square meters)    Stage 2 Mild CKD (GFR = 60-89 mL/min/1 73 square meters)    Stage 3A Moderate CKD (GFR = 45-59 mL/min/1 73 square meters)    Stage 3B Moderate CKD (GFR = 30-44 mL/min/1 73 square meters)    Stage 4 Severe CKD (GFR = 15-29 mL/min/1 73 square meters)    Stage 5 End Stage CKD (GFR <15 mL/min/1 73 square meters)  Note: GFR calculation is accurate only with a steady state creatinine    CBC and differential [[id number]] Collected: 02/12/22 1405    Lab Status: Final result Specimen: Blood from Arm, Right Updated: 02/12/22 1419     WBC 7 42 Thousand/uL      RBC 4 51 Million/uL      Hemoglobin 14 0 g/dL      Hematocrit 42 4 %      MCV 94 fL      MCH 31 0 pg      MCHC 33 0 g/dL      RDW 13 4 %      MPV 11 0 fL      Platelets 392 Thousands/uL      nRBC 0 /100 WBCs      Neutrophils Relative 64 %      Immat GRANS % 0 %      Lymphocytes Relative 24 %      Monocytes Relative 9 %      Eosinophils Relative 2 %      Basophils Relative 1 %      Neutrophils Absolute 4 77 Thousands/µL      Immature Grans Absolute 0 01 Thousand/uL      Lymphocytes Absolute 1 81 Thousands/µL      Monocytes Absolute 0 66 Thousand/µL      Eosinophils Absolute 0 11 Thousand/µL      Basophils Absolute 0 06 Thousands/µL                  CT renal stone study abdomen pelvis wo contrast   Final Result by Abby Zhu DO (02/13 1119)      No urinary tract calculi or acute intra-abdominal abnormality  Redundant sigmoid colon with diverticulosis but no evidence of diverticulitis  Limited study without IV or oral contrast       Workstation performed: [id number]         XR chest 1 view portable   Final Result by Stephanie Welch MD (02/13 1224)      No acute cardiopulmonary disease                    Workstation performed: [id number]               Procedures  ECG 12 Lead Documentation Only    Date/Time: 2/15/2022 9:09 PM  Performed by: Marylou Parada DO  Authorized by: Marylou Parada DO     Indications / Diagnosis:  Sob  Previous ECG:     Previous ECG:  Compared to current    Similarity:  No change  Interpretation:     Interpretation: normal    Rate:     ECG rate:  89    ECG rate assessment: normal    Rhythm:     Rhythm: sinus rhythm    Ectopy:     Ectopy: none    QRS:     QRS axis:  Normal  Conduction:     Conduction: normal    ST segments:     ST segments:  Normal  T waves:     T waves: normal            ED Course             HEART Risk Score      Most Recent Value   Heart Score Risk Calculator    History 2 Filed at: 02/12/2022 1455   ECG 0 Filed at: 02/12/2022 1455   Age 2 Filed at: 02/12/2022 1455   Risk Factors 2 Filed at: 02/12/2022 1455   Troponin 0 Filed at: 02/12/2022 1455   HEART Score 6 Filed at: 02/12/2022 1455                      SBIRT 20yo+      Most Recent Value   SBIRT (23 yo +)    In order to provide better care to our patients, we are screening all of our patients for alcohol and drug use  Would it be okay to ask you these screening questions? Unable to answer at this time Filed at: 02/12/2022 2157        NAN Risk Score      Most Recent Value   Age >= 72 1 Filed at: 02/12/2022 1859   Known CAD (stenosis >= 50%) 0 Filed at: 02/12/2022 1859   Recent (<=24 hrs) Service Angina 0 Filed at: 02/12/2022 1859   ST Deviation >= 0 5 mm 0 Filed at: 02/12/2022 1859   3+ CAD Risk Factors (FHx, HTN, HLP, DM, Smoker) 1 Filed at: 02/12/2022 1859   Aspirin Use Past 7 Days 0 Filed at: 02/12/2022 1859   Elevated Cardiac Markers 0 Filed at: 02/12/2022 1859   NAN Risk Score (Calculated) 2 Filed at: 02/12/2022 1859              MDM  Number of Diagnoses or Management Options  Chest pain, unspecified: new and requires workup  UTI (urinary tract infection): new and requires workup  Diagnosis management comments: -patient 82-year-old female brought in for chief complaint of chest pressure   -physical examination unremarkable  -patient able to ambulate emergency department without drop in  SpO2   -lab work grossly normal per urinalysis  -The patient is still having chest discomfort  Given the heart score of 6, risk factors for cardiac ischemia I think it prudent to bring this patient in for observation         Amount and/or Complexity of Data Reviewed  Clinical lab tests: ordered and reviewed  Tests in the medicine section of CPT®: ordered and reviewed  Decide to obtain previous medical records or to obtain history from someone other than the patient: yes  Review and summarize past medical records: yes  Discuss the patient with other providers: yes  Independent visualization of images, tracings, or specimens: yes        Disposition  Final diagnoses:   Chest pain, unspecified   UTI (urinary tract infection)     Time reflects when diagnosis was documented in both MDM as applicable and the Disposition within this note     Time User Action Codes Description Comment    2/12/2022  6:08 PM Flavio Section Add [R07 9] Chest pain, unspecified     2/12/2022  6:28 PM Dylan Griffin A Add [N39 0] UTI (urinary tract infection)     2/13/2022 12:25 PM Booker Galarza Add [I10] Primary hypertension       ED Disposition     ED Disposition Condition Date/Time Comment    Admit Stable Sat Feb 12, 2022  6:28 PM Case was discussed with Dr Gómez Rouse and the patient's admission status was agreed to be Admission Status: observation status to the service of Dr Gómez Rouse           Follow-up Information     Follow up With Specialties Details Why [address] [place] [address],  Family Medicine Follow up  [facility] [place] [address]  [phone]      Miguel Owen MD Sleep Medicine, Family Medicine   [address] [place] [address]  N  [phone]            Discharge Medication List as of 2/13/2022  1:01 PM      START taking these medications    Details   cefdinir (OMNICEF) 300 mg capsule Take 1 capsule (300 mg total) by mouth every 12 (twelve) hours for 4 days, Starting Sun 2/13/2022, Until u 2/17/2022, Normal      metoprolol tartrate (LOPRESSOR) 25 mg tablet Take 0 5 tablets (12 5 mg total) by mouth every 12 (twelve) hours, Starting Sun 2/13/2022, Until Tue 3/15/2022, Normal         CONTINUE these medications which have CHANGED    Details   lisinopril (ZESTRIL) 10 mg tablet Take 1 tablet (10 mg total) by mouth daily, Starting Sun 2/13/2022, Until Tue 3/15/2022, Normal         CONTINUE these medications which have NOT CHANGED    Details   atorvastatin (LIPITOR) 40 mg tablet atorvastatin 40 mg tablet, Historical Med levothyroxine 100 mcg tablet Take 88 mcg by mouth  , Historical Med      venlafaxine (EFFEXOR-XR) 150 mg 24 hr capsule venlafaxine  mg capsule,extended release 24 hr, Historical Med      warfarin (COUMADIN) 7 5 mg tablet Take 7 5 mg by mouth daily, Historical Med         STOP taking these medications       albuterol (PROVENTIL HFA,VENTOLIN HFA) 90 mcg/act inhaler Comments:   Reason for Stopping:         guaiFENesin (ROBITUSSIN) 100 MG/5ML oral liquid Comments:   Reason for Stopping:         oxyCODONE (ROXICODONE) 5 mg immediate release tablet Comments:   Reason for Stopping:             No discharge procedures on file  PDMP Review       Value Time User    PDMP Reviewed  Yes 10/1/2020  2:22 AM Jiles Osgood, PA-C           ED Provider  Attending physically available and evaluated Tatiana Yonathan MIDDLETON managed the patient along with the ED Attending      Electronically Signed by         Jan Ratliff DO  02/15/22 6305

## 2022-04-18 ENCOUNTER — APPOINTMENT (EMERGENCY)
Dept: CT IMAGING | Facility: HOSPITAL | Age: 82
End: 2022-04-18
Payer: COMMERCIAL

## 2022-04-18 ENCOUNTER — APPOINTMENT (EMERGENCY)
Dept: RADIOLOGY | Facility: HOSPITAL | Age: 82
End: 2022-04-18
Payer: COMMERCIAL

## 2022-04-18 ENCOUNTER — HOSPITAL ENCOUNTER (EMERGENCY)
Facility: HOSPITAL | Age: 82
Discharge: HOME | End: 2022-04-18
Attending: EMERGENCY MEDICINE
Payer: COMMERCIAL

## 2022-04-18 VITALS
SYSTOLIC BLOOD PRESSURE: 142 MMHG | TEMPERATURE: 98.2 F | DIASTOLIC BLOOD PRESSURE: 74 MMHG | OXYGEN SATURATION: 97 % | HEART RATE: 90 BPM | RESPIRATION RATE: 23 BRPM

## 2022-04-18 DIAGNOSIS — J20.9 ACUTE BRONCHITIS WITH BRONCHOSPASM: Primary | ICD-10-CM

## 2022-04-18 LAB
ANION GAP SERPL CALCULATED.3IONS-SCNC: 6 MMOL/L (ref 4–13)
APTT PPP: 30 SECONDS (ref 23–37)
BASOPHILS # BLD MANUAL: 0.16 THOUSAND/UL (ref 0–0.1)
BASOPHILS NFR MAR MANUAL: 2 % (ref 0–1)
BUN SERPL-MCNC: 20 MG/DL (ref 5–25)
CALCIUM SERPL-MCNC: 8.3 MG/DL (ref 8.3–10.1)
CARDIAC TROPONIN I PNL SERPL HS: 10 NG/L
CHLORIDE SERPL-SCNC: 109 MMOL/L (ref 100–108)
CO2 SERPL-SCNC: 27 MMOL/L (ref 21–32)
CREAT SERPL-MCNC: 0.78 MG/DL (ref 0.6–1.3)
EOSINOPHIL # BLD MANUAL: 0 THOUSAND/UL (ref 0–0.4)
EOSINOPHIL NFR BLD MANUAL: 0 % (ref 0–6)
ERYTHROCYTE [DISTWIDTH] IN BLOOD BY AUTOMATED COUNT: 14.2 % (ref 11.6–15.1)
FLUAV RNA RESP QL NAA+PROBE: NEGATIVE
FLUBV RNA RESP QL NAA+PROBE: NEGATIVE
GFR SERPL CREATININE-BSD FRML MDRD: 72 ML/MIN/1.73SQ M
GLUCOSE SERPL-MCNC: 84 MG/DL (ref 65–140)
HCT VFR BLD AUTO: 37.8 % (ref 34.8–46.1)
HGB BLD-MCNC: 12.5 G/DL (ref 11.5–15.4)
INR PPP: 1.93 (ref 0.84–1.19)
LYMPHOCYTES # BLD AUTO: 3.84 THOUSAND/UL (ref 0.6–4.47)
LYMPHOCYTES # BLD AUTO: 47 % (ref 14–44)
MCH RBC QN AUTO: 30.9 PG (ref 26.8–34.3)
MCHC RBC AUTO-ENTMCNC: 33.1 G/DL (ref 31.4–37.4)
MCV RBC AUTO: 94 FL (ref 82–98)
MONOCYTES # BLD AUTO: 0.57 THOUSAND/UL (ref 0–1.22)
MONOCYTES NFR BLD: 7 % (ref 4–12)
NEUTROPHILS # BLD MANUAL: 3.6 THOUSAND/UL (ref 1.85–7.62)
NEUTS BAND NFR BLD MANUAL: 1 % (ref 0–8)
NEUTS SEG NFR BLD AUTO: 43 % (ref 43–75)
NT-PROBNP SERPL-MCNC: 504 PG/ML
PLATELET # BLD AUTO: 182 THOUSANDS/UL (ref 149–390)
PLATELET BLD QL SMEAR: ADEQUATE
PMV BLD AUTO: 11 FL (ref 8.9–12.7)
POTASSIUM SERPL-SCNC: 3.4 MMOL/L (ref 3.5–5.3)
PROTHROMBIN TIME: 21.1 SECONDS (ref 11.6–14.5)
RBC # BLD AUTO: 4.04 MILLION/UL (ref 3.81–5.12)
RBC MORPH BLD: NORMAL
RSV RNA RESP QL NAA+PROBE: NEGATIVE
SARS-COV-2 RNA RESP QL NAA+PROBE: NEGATIVE
SODIUM SERPL-SCNC: 142 MMOL/L (ref 136–145)
WBC # BLD AUTO: 8.18 THOUSAND/UL (ref 4.31–10.16)

## 2022-04-18 PROCEDURE — 83880 ASSAY OF NATRIURETIC PEPTIDE: CPT | Performed by: PHYSICIAN ASSISTANT

## 2022-04-18 PROCEDURE — 71045 X-RAY EXAM CHEST 1 VIEW: CPT

## 2022-04-18 PROCEDURE — 99285 EMERGENCY DEPT VISIT HI MDM: CPT

## 2022-04-18 PROCEDURE — 80048 BASIC METABOLIC PNL TOTAL CA: CPT | Performed by: PHYSICIAN ASSISTANT

## 2022-04-18 PROCEDURE — 84484 ASSAY OF TROPONIN QUANT: CPT | Performed by: PHYSICIAN ASSISTANT

## 2022-04-18 PROCEDURE — 36415 COLL VENOUS BLD VENIPUNCTURE: CPT | Performed by: PHYSICIAN ASSISTANT

## 2022-04-18 PROCEDURE — 0241U HB NFCT DS VIR RESP RNA 4 TRGT: CPT | Performed by: PHYSICIAN ASSISTANT

## 2022-04-18 PROCEDURE — G1004 CDSM NDSC: HCPCS

## 2022-04-18 PROCEDURE — 99285 EMERGENCY DEPT VISIT HI MDM: CPT | Performed by: PHYSICIAN ASSISTANT

## 2022-04-18 PROCEDURE — 87040 BLOOD CULTURE FOR BACTERIA: CPT | Performed by: PHYSICIAN ASSISTANT

## 2022-04-18 PROCEDURE — 85027 COMPLETE CBC AUTOMATED: CPT | Performed by: PHYSICIAN ASSISTANT

## 2022-04-18 PROCEDURE — 85610 PROTHROMBIN TIME: CPT | Performed by: PHYSICIAN ASSISTANT

## 2022-04-18 PROCEDURE — 94640 AIRWAY INHALATION TREATMENT: CPT

## 2022-04-18 PROCEDURE — 71275 CT ANGIOGRAPHY CHEST: CPT

## 2022-04-18 PROCEDURE — 85007 BL SMEAR W/DIFF WBC COUNT: CPT | Performed by: PHYSICIAN ASSISTANT

## 2022-04-18 PROCEDURE — 93005 ELECTROCARDIOGRAM TRACING: CPT

## 2022-04-18 PROCEDURE — 85730 THROMBOPLASTIN TIME PARTIAL: CPT | Performed by: PHYSICIAN ASSISTANT

## 2022-04-18 PROCEDURE — 96374 THER/PROPH/DIAG INJ IV PUSH: CPT

## 2022-04-18 PROCEDURE — 96361 HYDRATE IV INFUSION ADD-ON: CPT

## 2022-04-18 RX ORDER — ALBUTEROL SULFATE 2.5 MG/3ML
5 SOLUTION RESPIRATORY (INHALATION) ONCE
Status: COMPLETED | OUTPATIENT
Start: 2022-04-18 | End: 2022-04-18

## 2022-04-18 RX ORDER — ALBUTEROL SULFATE 2.5 MG/3ML
5 SOLUTION RESPIRATORY (INHALATION) EVERY 6 HOURS PRN
Qty: 75 ML | Refills: 0 | Status: SHIPPED | OUTPATIENT
Start: 2022-04-18

## 2022-04-18 RX ORDER — ALBUTEROL SULFATE 2.5 MG/3ML
5 SOLUTION RESPIRATORY (INHALATION) EVERY 6 HOURS PRN
Qty: 75 ML | Refills: 0 | Status: SHIPPED | OUTPATIENT
Start: 2022-04-18 | End: 2022-04-18 | Stop reason: SDUPTHER

## 2022-04-18 RX ORDER — AZITHROMYCIN 250 MG/1
TABLET, FILM COATED ORAL
Qty: 6 TABLET | Refills: 0 | Status: SHIPPED | OUTPATIENT
Start: 2022-04-18 | End: 2022-04-18 | Stop reason: SDUPTHER

## 2022-04-18 RX ORDER — AZITHROMYCIN 250 MG/1
TABLET, FILM COATED ORAL
Qty: 6 TABLET | Refills: 0 | Status: SHIPPED | OUTPATIENT
Start: 2022-04-18 | End: 2022-04-22

## 2022-04-18 RX ORDER — METHYLPREDNISOLONE SODIUM SUCCINATE 125 MG/2ML
125 INJECTION, POWDER, LYOPHILIZED, FOR SOLUTION INTRAMUSCULAR; INTRAVENOUS ONCE
Status: COMPLETED | OUTPATIENT
Start: 2022-04-18 | End: 2022-04-18

## 2022-04-18 RX ORDER — PREDNISONE 1 MG/1
TABLET ORAL
Qty: 21 TABLET | Refills: 0 | Status: SHIPPED | OUTPATIENT
Start: 2022-04-18 | End: 2022-04-18 | Stop reason: SDUPTHER

## 2022-04-18 RX ORDER — PREDNISONE 1 MG/1
TABLET ORAL
Qty: 21 TABLET | Refills: 0 | Status: SHIPPED | OUTPATIENT
Start: 2022-04-18

## 2022-04-18 RX ADMIN — SODIUM CHLORIDE 1000 ML: 0.9 INJECTION, SOLUTION INTRAVENOUS at 07:02

## 2022-04-18 RX ADMIN — METHYLPREDNISOLONE SODIUM SUCCINATE 125 MG: 125 INJECTION, POWDER, FOR SOLUTION INTRAMUSCULAR; INTRAVENOUS at 07:02

## 2022-04-18 RX ADMIN — ALBUTEROL SULFATE 5 MG: 2.5 SOLUTION RESPIRATORY (INHALATION) at 10:50

## 2022-04-18 RX ADMIN — IOHEXOL 85 ML: 350 INJECTION, SOLUTION INTRAVENOUS at 09:41

## 2022-04-18 RX ADMIN — IPRATROPIUM BROMIDE 0.5 MG: 0.5 SOLUTION RESPIRATORY (INHALATION) at 10:50

## 2022-04-18 RX ADMIN — ALBUTEROL SULFATE 5 MG: 2.5 SOLUTION RESPIRATORY (INHALATION) at 06:56

## 2022-04-18 RX ADMIN — IPRATROPIUM BROMIDE 0.5 MG: 0.5 SOLUTION RESPIRATORY (INHALATION) at 06:56

## 2022-04-18 NOTE — ED PROVIDER NOTES
History  Chief Complaint   Patient presents with    Shortness of Breath     Pt c/o SOB for the last week  Was seen at the doctor on Thursday and was given steroids but states that it is not getting any better  Audible wheezes noted  Pt also c/o cough  History provided by:  Patient and relative   used: No    Shortness of Breath  Severity:  Moderate  Onset quality:  Sudden  Duration:  1 week  Timing:  Constant  Progression:  Worsening  Chronicity:  New  Context: URI    Context: not animal exposure, not emotional upset, not known allergens, not smoke exposure, not strong odors and not weather changes    Relieved by:  Nothing  Worsened by:  Exertion  Ineffective treatments: prednisone  Associated symptoms: cough, sputum production and wheezing    Associated symptoms: no abdominal pain, no chest pain, no claudication, no diaphoresis, no ear pain, no fever, no headaches, no hemoptysis, no neck pain, no PND, no rash, no sore throat, no syncope, no swollen glands and no vomiting    Risk factors: obesity    Risk factors: no recent alcohol use, no family hx of DVT, no hx of cancer, no hx of PE/DVT, no oral contraceptive use, no prolonged immobilization, no recent surgery and no tobacco use        Prior to Admission Medications   Prescriptions Last Dose Informant Patient Reported?  Taking?   atorvastatin (LIPITOR) 40 mg tablet   Yes No   Sig: atorvastatin 40 mg tablet   levothyroxine 100 mcg tablet   Yes No   Sig: Take 88 mcg by mouth     lisinopril (ZESTRIL) 10 mg tablet   No No   Sig: Take 1 tablet (10 mg total) by mouth daily   metoprolol tartrate (LOPRESSOR) 25 mg tablet   No No   Sig: Take 0 5 tablets (12 5 mg total) by mouth every 12 (twelve) hours   venlafaxine (EFFEXOR-XR) 150 mg 24 hr capsule   Yes No   Sig: venlafaxine  mg capsule,extended release 24 hr   warfarin (COUMADIN) 7 5 mg tablet   Yes No   Sig: Take 7 5 mg by mouth daily      Facility-Administered Medications: None History reviewed  No pertinent past medical history  History reviewed  No pertinent surgical history  History reviewed  No pertinent family history  I have reviewed and agree with the history as documented  E-Cigarette/Vaping    E-Cigarette Use Never User      E-Cigarette/Vaping Substances    Nicotine No     THC No     CBD No     Flavoring No     Other No     Unknown No      Social History     Tobacco Use    Smoking status: Never Smoker    Smokeless tobacco: Never Used   Vaping Use    Vaping Use: Never used   Substance Use Topics    Alcohol use: Never    Drug use: Never       Review of Systems   Constitutional: Negative for activity change, appetite change, chills, diaphoresis, fatigue, fever and unexpected weight change  HENT: Positive for congestion  Negative for dental problem, drooling, ear discharge, ear pain, facial swelling, hearing loss, mouth sores, nosebleeds, postnasal drip, sinus pressure, sinus pain, sneezing, sore throat, tinnitus, trouble swallowing and voice change  Eyes: Negative for photophobia, pain, discharge, redness, itching and visual disturbance  Respiratory: Positive for cough, sputum production, shortness of breath and wheezing  Negative for apnea, hemoptysis, choking, chest tightness and stridor  Cardiovascular: Negative for chest pain, palpitations, claudication, leg swelling, syncope and PND  Gastrointestinal: Negative for abdominal distention, abdominal pain, anal bleeding, blood in stool, constipation, diarrhea, nausea, rectal pain and vomiting  Endocrine: Negative for cold intolerance, heat intolerance, polydipsia, polyphagia and polyuria  Genitourinary: Negative for decreased urine volume, difficulty urinating, dysuria, flank pain, hematuria and urgency  Musculoskeletal: Negative for arthralgias, back pain, gait problem, joint swelling, myalgias, neck pain and neck stiffness  Skin: Negative for color change, pallor, rash and wound  Allergic/Immunologic: Negative for environmental allergies, food allergies and immunocompromised state  Neurological: Negative for dizziness, tremors, seizures, syncope, facial asymmetry, speech difficulty, weakness, light-headedness, numbness and headaches  Hematological: Negative for adenopathy  Does not bruise/bleed easily  Psychiatric/Behavioral: Negative for agitation, behavioral problems and confusion  The patient is not nervous/anxious  All other systems reviewed and are negative  Physical Exam  Physical Exam  Vitals and nursing note reviewed  Constitutional:       General: She is not in acute distress  Appearance: Normal appearance  She is not ill-appearing  Comments: BP (!) 185/88 (BP Location: Left arm)   Pulse 74   Temp 98 2 °F (36 8 °C) (Oral)   Resp 20   SpO2 94%      HENT:      Head: Normocephalic and atraumatic  Right Ear: External ear normal       Left Ear: External ear normal       Nose: Nose normal       Mouth/Throat:      Mouth: Mucous membranes are moist    Eyes:      General: No scleral icterus  Right eye: No discharge  Left eye: No discharge  Extraocular Movements: Extraocular movements intact  Conjunctiva/sclera: Conjunctivae normal    Cardiovascular:      Rate and Rhythm: Normal rate and regular rhythm  Pulses: Normal pulses  Pulmonary:      Effort: No respiratory distress  Breath sounds: Wheezing and rhonchi present  Chest:      Chest wall: No tenderness  Abdominal:      General: Abdomen is flat  Palpations: Abdomen is soft  Tenderness: There is no abdominal tenderness  Musculoskeletal:         General: No swelling, tenderness, deformity or signs of injury  Normal range of motion  Cervical back: Normal range of motion and neck supple  No rigidity or tenderness  Skin:     General: Skin is warm and dry  Capillary Refill: Capillary refill takes less than 2 seconds        Coloration: Skin is not jaundiced or pale  Findings: No erythema or rash  Neurological:      General: No focal deficit present  Mental Status: She is alert and oriented to person, place, and time  Mental status is at baseline  Cranial Nerves: No cranial nerve deficit  Sensory: No sensory deficit  Motor: No weakness  Psychiatric:         Mood and Affect: Mood normal          Behavior: Behavior normal          Thought Content:  Thought content normal          Judgment: Judgment normal          Vital Signs  ED Triage Vitals [04/18/22 0629]   Temperature Pulse Respirations Blood Pressure SpO2   98 2 °F (36 8 °C) 74 20 (!) 185/88 94 %      Temp Source Heart Rate Source Patient Position - Orthostatic VS BP Location FiO2 (%)   Oral Monitor Sitting Left arm --      Pain Score       --           Vitals:    04/18/22 0629 04/18/22 0830 04/18/22 1100 04/18/22 1115   BP: (!) 185/88 152/78 142/74    Pulse: 74 74 81 90   Patient Position - Orthostatic VS: Sitting Sitting Sitting          Visual Acuity      ED Medications  Medications   sodium chloride 0 9 % bolus 1,000 mL (0 mL Intravenous Stopped 4/18/22 1051)   methylPREDNISolone sodium succinate (Solu-MEDROL) injection 125 mg (125 mg Intravenous Given 4/18/22 0702)   albuterol inhalation solution 5 mg (5 mg Nebulization Given 4/18/22 0656)   ipratropium (ATROVENT) 0 02 % inhalation solution 0 5 mg (0 5 mg Nebulization Given 4/18/22 0656)   albuterol inhalation solution 5 mg (5 mg Nebulization Given 4/18/22 1050)   ipratropium (ATROVENT) 0 02 % inhalation solution 0 5 mg (0 5 mg Nebulization Given 4/18/22 1050)   iohexol (OMNIPAQUE) 350 MG/ML injection (SINGLE-DOSE) 85 mL (85 mL Intravenous Given 4/18/22 0941)       Diagnostic Studies  Results Reviewed     Procedure Component Value Units Date/Time    Blood culture #1 [[id number]] Collected: 04/18/22 0702    Lab Status: Preliminary result Specimen: Blood from Arm, Right Updated: 04/18/22 1301     Blood Culture Received in Microbiology Lab  Culture in Progress  Blood culture #2 [[id number]] Collected: 04/18/22 0702    Lab Status: Preliminary result Specimen: Blood from Hand, Right Updated: 04/18/22 1301     Blood Culture Received in Microbiology Lab  Culture in Progress  COVID/FLU/RSV - 2 hour TAT [[id number]]  (Normal) Collected: 04/18/22 0702    Lab Status: Final result Specimen: Nares from Nasopharyngeal Swab Updated: 04/18/22 0752     SARS-CoV-2 Negative     INFLUENZA A PCR Negative     INFLUENZA B PCR Negative     RSV PCR Negative    Narrative:      FOR PEDIATRIC PATIENTS - copy/paste COVID Guidelines URL to browser: [email or web address]/  [email or web address]    SARS-CoV-2 assay is a Nucleic Acid Amplification assay intended for the  qualitative detection of nucleic acid from SARS-CoV-2 in nasopharyngeal  swabs  Results are for the presumptive identification of SARS-CoV-2 RNA  Positive results are indicative of infection with SARS-CoV-2, the virus  causing COVID-19, but do not rule out bacterial infection or co-infection  with other viruses  Laboratories within the [place] and its  territories are required to report all positive results to the appropriate  public health authorities  Negative results do not preclude SARS-CoV-2  infection and should not be used as the sole basis for treatment or other  patient management decisions  Negative results must be combined with  clinical observations, patient history, and epidemiological information  This test has not been FDA cleared or approved  This test has been authorized by FDA under an Emergency Use Authorization  (EUA)  This test is only authorized for the duration of time the  declaration that circumstances exist justifying the authorization of the  emergency use of an in vitro diagnostic tests for detection of SARS-CoV-2  virus and/or diagnosis of COVID-19 infection under section 564(b)(1) of  the Act, 21 U  S C  969IYB-3(W)(2), unless the authorization is terminated  or revoked sooner  The test has been validated but independent review by FDA  and CLIA is pending  Test performed using [employer] Gene[employer]pert: This RT-PCR assay targets N2,  a region unique to SARS-CoV-2  A conserved region in the E-gene was chosen  for pan-Sarbecovirus detection which includes SARS-CoV-2  HS Troponin 0hr (reflex protocol) [[id number]]  (Normal) Collected: 04/18/22 0702    Lab Status: Final result Specimen: Blood from Arm, Right Updated: 04/18/22 0737     hs TnI 0hr 10 ng/L     CBC and differential [[id number]]  (Normal) Collected: 04/18/22 0702    Lab Status: Final result Specimen: Blood from Arm, Right Updated: 04/18/22 0736     WBC 8 18 Thousand/uL      RBC 4 04 Million/uL      Hemoglobin 12 5 g/dL      Hematocrit 37 8 %      MCV 94 fL      MCH 30 9 pg      MCHC 33 1 g/dL      RDW 14 2 %      MPV 11 0 fL      Platelets 135 Thousands/uL     Narrative: This is an appended report  These results have been appended to a previously verified report      Manual Differential(PHLEBS Do Not Order) [[id number]]  (Abnormal) Collected: 04/18/22 0702    Lab Status: Final result Specimen: Blood from Arm, Right Updated: 04/18/22 0736     Segmented % 43 %      Bands % 1 %      Lymphocytes % 47 %      Monocytes % 7 %      Eosinophils, % 0 %      Basophils % 2 %      Absolute Neutrophils 3 60 Thousand/uL      Lymphocytes Absolute 3 84 Thousand/uL      Monocytes Absolute 0 57 Thousand/uL      Eosinophils Absolute 0 00 Thousand/uL      Basophils Absolute 0 16 Thousand/uL      Total Counted --     RBC Morphology Normal     Platelet Estimate Adequate    NT-BNP PRO [[id number]]  (Abnormal) Collected: 04/18/22 0702    Lab Status: Final result Specimen: Blood from Arm, Right Updated: 04/18/22 0735     NT-proBNP 504 pg/mL     Basic metabolic panel [[id number]]  (Abnormal) Collected: 04/18/22 0702    Lab Status: Final result Specimen: Blood from Arm, Right Updated: 04/18/22 8681 Sodium 142 mmol/L      Potassium 3 4 mmol/L      Chloride 109 mmol/L      CO2 27 mmol/L      ANION GAP 6 mmol/L      BUN 20 mg/dL      Creatinine 0 78 mg/dL      Glucose 84 mg/dL      Calcium 8 3 mg/dL      eGFR 72 ml/min/1 73sq m     Narrative:      [place] guidelines for Chronic Kidney Disease (CKD):     Stage 1 with normal or high GFR (GFR > 90 mL/min/1 73 square meters)    Stage 2 Mild CKD (GFR = 60-89 mL/min/1 73 square meters)    Stage 3A Moderate CKD (GFR = 45-59 mL/min/1 73 square meters)    Stage 3B Moderate CKD (GFR = 30-44 mL/min/1 73 square meters)    Stage 4 Severe CKD (GFR = 15-29 mL/min/1 73 square meters)    Stage 5 End Stage CKD (GFR <15 mL/min/1 73 square meters)  Note: GFR calculation is accurate only with a steady state creatinine    Protime-INR [[id number]]  (Abnormal) Collected: 04/18/22 0702    Lab Status: Final result Specimen: Blood from Arm, Right Updated: 04/18/22 0724     Protime 21 1 seconds      INR 1 93    APTT [[id number]]  (Normal) Collected: 04/18/22 4479    Lab Status: Final result Specimen: Blood from Arm, Right Updated: 04/18/22 0724     PTT 30 seconds                  CTA ED chest PE study   Final Result by Ameena Anand MD (04/18 1009)      No pulmonary embolus  No acute pulmonary disease  Small hiatal hernia  Workstation performed: [id number]         XR chest 1 view portable   Final Result by Ameena Anand MD (04/18 1066)      No acute cardiopulmonary disease                    Workstation performed: [id number]                    Procedures  ECG 12 Lead Documentation Only    Date/Time: 4/18/2022 6:34 AM  Performed by: Monika Ortiz PA-C  Authorized by: Monika Ortiz PA-C     Indications / Diagnosis:  Sob  ECG reviewed by me, the ED Provider: yes    Patient location:  ED  Previous ECG:     Previous ECG:  Compared to current    Comparison ECG info:  Feb 12, 2022    Similarity:  Changes noted Comparison to cardiac monitor: Yes    Interpretation:     Interpretation: normal    Rate:     ECG rate:  73    ECG rate assessment: normal    Rhythm:     Rhythm: sinus rhythm    Ectopy:     Ectopy: none    QRS:     QRS axis:  Normal    QRS intervals:  Normal  Conduction:     Conduction: normal    ST segments:     ST segments:  Normal  T waves:     T waves: normal               ED Course  ED Course as of 04/18/22 1503   Mon Apr 18, 2022   0859 Re-evaluation,  still with wheezing/sob on repeat exam   Will pursue CT scan to evaluate for pneumonia  Repeat neb treatment  Ambulatory o2 saturation              HEART Risk Score      Most Recent Value   Heart Score Risk Calculator    History 0 Filed at: 04/18/2022 1502   ECG 0 Filed at: 04/18/2022 1502   Age 2 Filed at: 04/18/2022 1502   Risk Factors 2 Filed at: 04/18/2022 1502   Troponin 0 Filed at: 04/18/2022 1502   HEART Score 4 Filed at: 04/18/2022 1502                        SBIRT 20yo+      Most Recent Value   SBIRT (23 yo +)    In order to provide better care to our patients, we are screening all of our patients for alcohol and drug use  Would it be okay to ask you these screening questions? Yes Filed at: 04/18/2022 0719   Initial Alcohol Screen: US AUDIT-C     1  How often do you have a drink containing alcohol? 0 Filed at: 04/18/2022 0719   2  How many drinks containing alcohol do you have on a typical day you are drinking? 0 Filed at: 04/18/2022 0719   3b  FEMALE Any Age, or MALE 65+: How often do you have 4 or more drinks on one occassion? 0 Filed at: 04/18/2022 0719   Audit-C Score 0 Filed at: 04/18/2022 3987   GERALD: How many times in the past year have you    Used an illegal drug or used a prescription medication for non-medical reasons?  Never Filed at: 04/18/2022 5058          Wells' Criteria for PE      Most Recent Value   Wells' Criteria for PE    Clinical signs and symptoms of DVT 0 Filed at: 04/18/2022 1503   PE is primary diagnosis or equally likely 3 Filed at: 04/18/2022 1503   HR >100 0 Filed at: 04/18/2022 1503   Immobilization at least 3 days or Surgery in the previous 4 weeks 0 Filed at: 04/18/2022 1503   Previous, objectively diagnosed PE or DVT 0 Filed at: 04/18/2022 1503   Hemoptysis 0 Filed at: 04/18/2022 1503   Malignancy with treatment within 6 months or palliative 0 Filed at: 04/18/2022 1503   Wells' Criteria Total 3 Filed at: 04/18/2022 1503                MDM  Number of Diagnoses or Management Options  Acute bronchitis with bronchospasm: new and requires workup  Diagnosis management comments: MDM:  Given the large differential diagnosis for this patient, the decision making in this case is of high complexity  ED Summary:  77-year-old female with prior history of legionnaires disease, atrial fibrillation presents to the emergency department with chief complaint of increasing shortness of breath and wheezing x1 week  Seen by PCP this past Thursday and prescribed prednisone without improvement in symptoms  Continues to have persistent wheezing  O2 saturation 94% on room air on arrival      DDX:  Differential diagnosis includes but is not limited to COPD exacerbation, asthma, pneumonia, pleural effusion, pericardial effusion, congestive heart failure, pleurisy, bronchospasm, pericarditis, bronchitis, influenza    Initial ED Plan:  plan SOB working including ekg, cxr, and labs  Bronchodilators, prednisone and reassessment  ED Results:  Reviewed at bedside: CXR images independently visualized and interpreted by me  I was the initial   no infiltrate or PTX  CTA chest negative for PE or infiltrate  Labs reviewed   hgb 12, hct 37 - no anemia  Bmp  BUN 20, cr 0 78 no renal failure  INR 1 93, subtherapeutic  Covid/flu negative  Troponin 10  ED Final Assessment patient received 2 breathing treatments in ED in addition to solumedrol - symptoms improved but still with mild residual wheezing on repeat auscultation    Patient reports she feels improved  No acute infiltrate, PTX or PE on CXR/CTA chest   ekg and troponin not consistent with acute ACS  Discussed treatment options with patient and offered admission for further treatment of bronchospasm but patient declined  Discussed treatment with albuterol via nebulizer, prednisone taper, Zithromax for treatment of bronchitis  F/u with pcp and pulmonology in 2- 3 days instructed  I discussed diagnosis and treatment plan with patient at bedside  Extended discussion with patient regarding the diagnosis, pathophysiology, expectant coarse and treatment plan  Instructed to follow up with pcp and recommended specialist in 3-5 days  Reviewed reasons to return to ed  Patient verbalized understanding of diagnosis and agreement with discharge plan of care as well as understanding of reasons to return to ed  Daughter at bedside agreeable with plan  Amount and/or Complexity of Data Reviewed  Clinical lab tests: ordered and reviewed  Tests in the radiology section of CPT®: ordered and reviewed  Tests in the medicine section of CPT®: ordered and reviewed  Discussion of test results with the performing providers: yes  Obtain history from someone other than the patient: yes (Relative at bedside  )  Review and summarize past medical records: yes  Independent visualization of images, tracings, or specimens: yes    Risk of Complications, Morbidity, and/or Mortality  General comments: Disclaimers:    I have reasonably determine that electronically prescribing a controlled substance would be impractical for the patient to obtain the controlled substance prescribed by electronic prescription or would cause an untimely delay resulting in an adverse impact on the patient's medical condition        Patient was seen during the outbreak of the corona virus epidemic   Resources are limited due to the severity of patient illnesses associated with virus   Testing is also limited at this time   Discussed with patient at the time of this evaluation   Due to the fact that limited resources are available -treatment options are limited  Patient Progress  Patient progress: improved      Disposition  Final diagnoses:   Acute bronchitis with bronchospasm     Time reflects when diagnosis was documented in both MDM as applicable and the Disposition within this note     Time User Action Codes Description Comment    4/18/2022 11:47 AM Doug Dave Add [J20 [id number]] Acute bronchitis with bronchospasm       ED Disposition     ED Disposition Condition Date/Time Comment    Discharge Stable Mon Apr 18, 2022 11:47 AM Joe Eli discharge to home/self care  Follow-up Information     Follow up With Specialties Details Why Contact Info Additional [address], DO Family Medicine Call in 1 day for further evaluation of symptoms [address] [phone] [facility] Emergency Department Emergency Medicine Go to  If symptoms worsen [address] [facility] 109 [facility] Emergency Department, [address] [place] [address]    Janis Mojica MD Pulmonology, Neurology, Pulmonary Disease, Sleep Medicine Call in 3 days for further evaluation of symptoms [address] [address]  [phone]             Discharge Medication List as of 4/18/2022 11:49 AM      START taking these medications    Details   albuterol (2 5 mg/3 mL) 0 083 % nebulizer solution Take 6 mL (5 mg total) by nebulization every 6 (six) hours as needed for wheezing, Starting Mon 4/18/2022, Normal      azithromycin (ZITHROMAX) 250 mg tablet 2 tablets PO on day 1, then 1 tablet PO daily x 4 days  , Normal      predniSONE 5 mg tablet 40 mg PO day 1, then 30 mg PO day 2, 20 mg PO day 3, 10 mg PO day 4, 5 mg PO day 5 then stop, Normal         CONTINUE these medications which have NOT CHANGED    Details   atorvastatin (LIPITOR) 40 mg tablet atorvastatin 40 mg tablet, Historical Med      levothyroxine 100 mcg tablet Take 88 mcg by mouth  , Historical Med      lisinopril (ZESTRIL) 10 mg tablet Take 1 tablet (10 mg total) by mouth daily, Starting Sun 2/13/2022, Until Tue 3/15/2022, Normal      metoprolol tartrate (LOPRESSOR) 25 mg tablet Take 0 5 tablets (12 5 mg total) by mouth every 12 (twelve) hours, Starting Sun 2/13/2022, Until Tue 3/15/2022, Normal      venlafaxine (EFFEXOR-XR) 150 mg 24 hr capsule venlafaxine  mg capsule,extended release 24 hr, Historical Med      warfarin (COUMADIN) 7 5 mg tablet Take 7 5 mg by mouth daily, Historical Med             Outpatient Discharge Orders   Nebulizer       PDMP Review       Value Time User    PDMP Reviewed  Yes 10/1/2020  2:22 AM Alberta Meza PA-C          ED Provider  Electronically Signed by           Annemarie Chavez PA-C  04/18/22 8568

## 2022-04-18 NOTE — Clinical Note
Chryl Lefort was seen and treated in our emergency department on 4/18/2022  Diagnosis:     Anju Rose    She may return on this date: If you have any questions or concerns, please don't hesitate to call        Sapna Flores, DO    ______________________________           _______________          _______________  Hospital Representative                              Date                                Time

## 2022-04-19 LAB
ATRIAL RATE: 73 BPM
P AXIS: 43 DEGREES
PR INTERVAL: 166 MS
QRS AXIS: 38 DEGREES
QRSD INTERVAL: 76 MS
QT INTERVAL: 392 MS
QTC INTERVAL: 431 MS
T WAVE AXIS: 11 DEGREES
VENTRICULAR RATE: 73 BPM

## 2022-04-19 PROCEDURE — 93010 ELECTROCARDIOGRAM REPORT: CPT | Performed by: INTERNAL MEDICINE

## 2022-04-23 LAB
BACTERIA BLD CULT: NORMAL
BACTERIA BLD CULT: NORMAL

## 2022-12-06 ENCOUNTER — HOSPITAL ENCOUNTER (INPATIENT)
Facility: HOSPITAL | Age: 82
LOS: 5 days | Discharge: HOME | End: 2022-12-12
Attending: EMERGENCY MEDICINE | Admitting: STUDENT IN AN ORGANIZED HEALTH CARE EDUCATION/TRAINING PROGRAM

## 2022-12-06 ENCOUNTER — APPOINTMENT (EMERGENCY)
Dept: RADIOLOGY | Facility: HOSPITAL | Age: 82
End: 2022-12-06

## 2022-12-06 ENCOUNTER — APPOINTMENT (EMERGENCY)
Dept: CT IMAGING | Facility: HOSPITAL | Age: 82
End: 2022-12-06

## 2022-12-06 DIAGNOSIS — R05.9 COUGH: ICD-10-CM

## 2022-12-06 DIAGNOSIS — J21.9 BRONCHIOLITIS: ICD-10-CM

## 2022-12-06 DIAGNOSIS — J20.9 BRONCHOSPASM WITH BRONCHITIS, ACUTE: ICD-10-CM

## 2022-12-06 DIAGNOSIS — R77.8 ELEVATED TROPONIN: ICD-10-CM

## 2022-12-06 DIAGNOSIS — R07.9 CHEST PAIN, UNSPECIFIED: ICD-10-CM

## 2022-12-06 DIAGNOSIS — R06.02 SHORTNESS OF BREATH: Primary | ICD-10-CM

## 2022-12-06 PROBLEM — D72.829 LEUKOCYTOSIS: Status: ACTIVE | Noted: 2022-12-06

## 2022-12-06 LAB
ALBUMIN SERPL BCP-MCNC: 3.6 G/DL (ref 3.5–5)
ALP SERPL-CCNC: 95 U/L (ref 46–116)
ALT SERPL W P-5'-P-CCNC: 18 U/L (ref 12–78)
ANION GAP SERPL CALCULATED.3IONS-SCNC: 12 MMOL/L (ref 4–13)
AST SERPL W P-5'-P-CCNC: 21 U/L (ref 5–45)
BASOPHILS # BLD AUTO: 0.02 THOUSANDS/ÂΜL (ref 0–0.1)
BASOPHILS NFR BLD AUTO: 0 % (ref 0–1)
BILIRUB SERPL-MCNC: 0.58 MG/DL (ref 0.2–1)
BUN SERPL-MCNC: 19 MG/DL (ref 5–25)
CALCIUM SERPL-MCNC: 8.6 MG/DL (ref 8.3–10.1)
CARDIAC TROPONIN I PNL SERPL HS: 70 NG/L
CHLORIDE SERPL-SCNC: 103 MMOL/L (ref 96–108)
CO2 SERPL-SCNC: 25 MMOL/L (ref 21–32)
CREAT SERPL-MCNC: 0.84 MG/DL (ref 0.6–1.3)
EOSINOPHIL # BLD AUTO: 0.01 THOUSAND/ÂΜL (ref 0–0.61)
EOSINOPHIL NFR BLD AUTO: 0 % (ref 0–6)
ERYTHROCYTE [DISTWIDTH] IN BLOOD BY AUTOMATED COUNT: 13.8 % (ref 11.6–15.1)
FLUAV RNA RESP QL NAA+PROBE: NEGATIVE
FLUBV RNA RESP QL NAA+PROBE: NEGATIVE
GFR SERPL CREATININE-BSD FRML MDRD: 65 ML/MIN/1.73SQ M
GLUCOSE SERPL-MCNC: 123 MG/DL (ref 65–140)
HCT VFR BLD AUTO: 38 % (ref 34.8–46.1)
HGB BLD-MCNC: 12.6 G/DL (ref 11.5–15.4)
IMM GRANULOCYTES # BLD AUTO: 0.13 THOUSAND/UL (ref 0–0.2)
IMM GRANULOCYTES NFR BLD AUTO: 1 % (ref 0–2)
LYMPHOCYTES # BLD AUTO: 1.01 THOUSANDS/ÂΜL (ref 0.6–4.47)
LYMPHOCYTES NFR BLD AUTO: 8 % (ref 14–44)
MCH RBC QN AUTO: 31.2 PG (ref 26.8–34.3)
MCHC RBC AUTO-ENTMCNC: 33.2 G/DL (ref 31.4–37.4)
MCV RBC AUTO: 94 FL (ref 82–98)
MONOCYTES # BLD AUTO: 0.78 THOUSAND/ÂΜL (ref 0.17–1.22)
MONOCYTES NFR BLD AUTO: 6 % (ref 4–12)
NEUTROPHILS # BLD AUTO: 11.24 THOUSANDS/ÂΜL (ref 1.85–7.62)
NEUTS SEG NFR BLD AUTO: 85 % (ref 43–75)
NRBC BLD AUTO-RTO: 0 /100 WBCS
PLATELET # BLD AUTO: 238 THOUSANDS/UL (ref 149–390)
PMV BLD AUTO: 10.7 FL (ref 8.9–12.7)
POTASSIUM SERPL-SCNC: 4.3 MMOL/L (ref 3.5–5.3)
PROT SERPL-MCNC: 7.6 G/DL (ref 6.4–8.4)
RBC # BLD AUTO: 4.04 MILLION/UL (ref 3.81–5.12)
RSV RNA RESP QL NAA+PROBE: NEGATIVE
SARS-COV-2 RNA RESP QL NAA+PROBE: NEGATIVE
SODIUM SERPL-SCNC: 140 MMOL/L (ref 135–147)
WBC # BLD AUTO: 13.19 THOUSAND/UL (ref 4.31–10.16)

## 2022-12-06 RX ORDER — ENOXAPARIN SODIUM 100 MG/ML
40 INJECTION SUBCUTANEOUS DAILY
Status: DISCONTINUED | OUTPATIENT
Start: 2022-12-07 | End: 2022-12-12 | Stop reason: HOSPADM

## 2022-12-06 RX ORDER — ONDANSETRON 2 MG/ML
4 INJECTION INTRAMUSCULAR; INTRAVENOUS EVERY 6 HOURS PRN
Status: DISCONTINUED | OUTPATIENT
Start: 2022-12-06 | End: 2022-12-12 | Stop reason: HOSPADM

## 2022-12-06 RX ORDER — GUAIFENESIN 600 MG/1
600 TABLET, EXTENDED RELEASE ORAL EVERY 12 HOURS SCHEDULED
Status: DISCONTINUED | OUTPATIENT
Start: 2022-12-06 | End: 2022-12-07

## 2022-12-06 RX ORDER — LISINOPRIL 10 MG/1
10 TABLET ORAL DAILY
Status: DISCONTINUED | OUTPATIENT
Start: 2022-12-07 | End: 2022-12-12 | Stop reason: HOSPADM

## 2022-12-06 RX ORDER — LEVALBUTEROL 1.25 MG/.5ML
1.25 SOLUTION, CONCENTRATE RESPIRATORY (INHALATION)
Status: DISCONTINUED | OUTPATIENT
Start: 2022-12-06 | End: 2022-12-12 | Stop reason: HOSPADM

## 2022-12-06 RX ORDER — METHYLPREDNISOLONE SODIUM SUCCINATE 40 MG/ML
40 INJECTION, POWDER, LYOPHILIZED, FOR SOLUTION INTRAMUSCULAR; INTRAVENOUS EVERY 8 HOURS SCHEDULED
Status: DISCONTINUED | OUTPATIENT
Start: 2022-12-06 | End: 2022-12-07

## 2022-12-06 RX ORDER — ACETAMINOPHEN 325 MG/1
650 TABLET ORAL EVERY 6 HOURS PRN
Status: DISCONTINUED | OUTPATIENT
Start: 2022-12-06 | End: 2022-12-12 | Stop reason: HOSPADM

## 2022-12-06 RX ORDER — IPRATROPIUM BROMIDE AND ALBUTEROL SULFATE 2.5; .5 MG/3ML; MG/3ML
3 SOLUTION RESPIRATORY (INHALATION) ONCE
Status: DISCONTINUED | OUTPATIENT
Start: 2022-12-06 | End: 2022-12-06

## 2022-12-06 RX ORDER — HYDROCODONE POLISTIREX AND CHLORPHENIRAMINE POLISTIREX 10; 8 MG/5ML; MG/5ML
5 SUSPENSION, EXTENDED RELEASE ORAL EVERY 12 HOURS PRN
Status: DISCONTINUED | OUTPATIENT
Start: 2022-12-06 | End: 2022-12-08

## 2022-12-06 RX ORDER — VENLAFAXINE HYDROCHLORIDE 150 MG/1
150 CAPSULE, EXTENDED RELEASE ORAL DAILY
Status: DISCONTINUED | OUTPATIENT
Start: 2022-12-07 | End: 2022-12-12 | Stop reason: HOSPADM

## 2022-12-06 RX ORDER — ASPIRIN 81 MG/1
81 TABLET, CHEWABLE ORAL DAILY
Status: DISCONTINUED | OUTPATIENT
Start: 2022-12-07 | End: 2022-12-12 | Stop reason: HOSPADM

## 2022-12-06 RX ORDER — BUDESONIDE 0.5 MG/2ML
0.5 INHALANT ORAL
Status: DISCONTINUED | OUTPATIENT
Start: 2022-12-06 | End: 2022-12-12 | Stop reason: HOSPADM

## 2022-12-06 RX ORDER — ATORVASTATIN CALCIUM 40 MG/1
40 TABLET, FILM COATED ORAL
Status: DISCONTINUED | OUTPATIENT
Start: 2022-12-06 | End: 2022-12-12 | Stop reason: HOSPADM

## 2022-12-06 RX ORDER — DOXYCYCLINE HYCLATE 100 MG/1
100 CAPSULE ORAL EVERY 12 HOURS
Status: DISCONTINUED | OUTPATIENT
Start: 2022-12-06 | End: 2022-12-07

## 2022-12-06 RX ORDER — SODIUM CHLORIDE FOR INHALATION 0.9 %
3 VIAL, NEBULIZER (ML) INHALATION
Status: DISCONTINUED | OUTPATIENT
Start: 2022-12-06 | End: 2022-12-06

## 2022-12-06 RX ORDER — LEVOTHYROXINE SODIUM 88 UG/1
88 TABLET ORAL
Status: DISCONTINUED | OUTPATIENT
Start: 2022-12-07 | End: 2022-12-12 | Stop reason: HOSPADM

## 2022-12-06 RX ORDER — ALBUTEROL SULFATE 90 UG/1
2 AEROSOL, METERED RESPIRATORY (INHALATION) EVERY 4 HOURS PRN
Status: DISCONTINUED | OUTPATIENT
Start: 2022-12-06 | End: 2022-12-12 | Stop reason: HOSPADM

## 2022-12-06 RX ADMIN — METOPROLOL TARTRATE 12.5 MG: 50 TABLET, FILM COATED ORAL at 22:50

## 2022-12-06 RX ADMIN — METHYLPREDNISOLONE SODIUM SUCCINATE 40 MG: 40 INJECTION, POWDER, FOR SOLUTION INTRAMUSCULAR; INTRAVENOUS at 19:11

## 2022-12-06 RX ADMIN — LEVALBUTEROL HYDROCHLORIDE 1.25 MG: 1.25 SOLUTION, CONCENTRATE RESPIRATORY (INHALATION) at 21:33

## 2022-12-06 RX ADMIN — ATORVASTATIN CALCIUM 40 MG: 40 TABLET, FILM COATED ORAL at 19:11

## 2022-12-06 RX ADMIN — GUAIFENESIN 600 MG: 600 TABLET ORAL at 22:50

## 2022-12-06 RX ADMIN — BUDESONIDE 0.5 MG: 0.5 INHALANT ORAL at 21:34

## 2022-12-06 RX ADMIN — IOHEXOL 100 ML: 350 INJECTION, SOLUTION INTRAVENOUS at 14:35

## 2022-12-06 RX ADMIN — IPRATROPIUM BROMIDE 0.5 MG: 0.5 SOLUTION RESPIRATORY (INHALATION) at 21:33

## 2022-12-06 RX ADMIN — DOXYCYCLINE 100 MG: 100 CAPSULE ORAL at 19:11

## 2022-12-06 NOTE — ASSESSMENT & PLAN NOTE
Patient denies any fevers, chills, CT imaging without pneumonia  Suspect likely due to recent steroid which patient had completed given by PCP

## 2022-12-06 NOTE — H&P
[facility]  H&P- Katja Olvera 1940, [de-identified] y o  female MRN: [id number]  Unit/Bed#: OVR 15 Encounter: [id number]  Primary Care Provider: Jackie Hill DO   Date and time admitted to hospital: 12/6/2022  1:16 PM    * Chest pain  Assessment & Plan  Presented with chest discomfort, substernal pressure-like sensation  Suspect likely due to ongoing bronchiolitis/bronchitis  CT imaging without acute processes, negative for PE  Mildly elevated troponin  No EKG, ordered  Trend troponins, monitor telemetry  Aspirin, statin      Bronchospasm with bronchitis, acute  Assessment & Plan  Reports ongoing cough for 1 week  Flu, COVID and RSV negative  Recently seen PCP last week, prescribe albuterol, Tessalon Perles, Augmentin steroid taper  Patient reported completing medications and continued to have cough with some chest discomfort  Diffuse wheezing noted on lung sounds  CT chest without acute process, negative for PE, tree-in-bud appearance consistent with bronchiolitis  Will give IV steroids, nebulizers and Doxy antibiotic  Continue to monitor at this time and possible discharge home tomorrow    Leukocytosis  Assessment & Plan  Patient denies any fevers, chills, CT imaging without pneumonia  Suspect likely due to recent steroid which patient had completed given by PCP    Hyperlipidemia  Assessment & Plan  Continue statin    Hypertension  Assessment & Plan  Continue lisinopril and metoprolol    Hypothyroidism  Assessment & Plan  Continue levothyroxine    Paroxysmal atrial fibrillation (HCC)  Assessment & Plan  Hold coumadin until INR check  Continue metoprolol 12 5mg BID, rate controlled  EKG ordered  Monitor on telemetry    VTE Prophylaxis: Enoxaparin (Lovenox)  / sequential compression device   Code Status: FC  POLST: There is no POLST form on file for this patient (pre-hospital)  Discussion with family: patient, family bedside    Anticipated Length of Stay:  Patient will be admitted on an Observation basis with an anticipated length of stay of 2 midnights  Justification for Hospital Stay: IV steroids, nebs, trend trop    Total Time for Visit, including Counseling / Coordination of Care: 45 minutes  Greater than 50% of this total time spent on direct patient counseling and coordination of care  Chief Complaint:   CP, Cough, SOB    History of Present Illness:    Dany Laws is a [de-identified] y o  female who presents with cough and shortness of breath for 1 week  Previously seen her PCP, did prescribe Augmentin, short steroid taper, Tessalon Perles with no significant improvement  She denies any sputum production, fevers or chills  Does note worsening chest pain that began 2 days ago mainly substernal of her chest that is nonradiating  Troponin was checked, minimally elevated at 70  No prior history of MI, denies any diaphoresis, radiation of her pain  Wheezing noted on exam, CT imaging without any acute processes that showed tree-in-bud appearance consistent with bronchiolitis  Denies any nausea, vomiting, diarrhea, abdominal pain or other GI symptoms  Review of Systems:    Review of Systems   Constitutional: Negative for activity change, appetite change, chills and fever  HENT: Negative for congestion, facial swelling, sinus pain and sore throat  Respiratory: Positive for cough and shortness of breath  Negative for wheezing  Cardiovascular: Positive for chest pain  Negative for palpitations and leg swelling  Gastrointestinal: Negative for abdominal distention, abdominal pain, constipation, diarrhea, nausea and vomiting  Endocrine: Negative for cold intolerance, heat intolerance, polydipsia, polyphagia and polyuria  Genitourinary: Negative for dysuria, flank pain and urgency  Skin: Negative for color change and pallor  Neurological: Negative for dizziness, syncope, weakness, light-headedness and headaches     Psychiatric/Behavioral: Negative for agitation, confusion and dysphoric mood  Past Medical and Surgical History:     Past Medical History:   Diagnosis Date   • Asthma        History reviewed  No pertinent surgical history  Meds/Allergies:    Prior to Admission medications    Medication Sig Start Date End Date Taking? Authorizing Provider   albuterol (2 5 mg/3 mL) 0 083 % nebulizer solution Take 6 mL (5 mg total) by nebulization every 6 (six) hours as needed for wheezing 4/18/22   Marleny Potter PA-C   atorvastatin (LIPITOR) 40 mg tablet atorvastatin 40 mg tablet 11/14/19   Historical Provider, MD   levothyroxine 100 mcg tablet Take 88 mcg by mouth      Historical Provider, MD   lisinopril (ZESTRIL) 10 mg tablet Take 1 tablet (10 mg total) by mouth daily 2/13/22 3/15/22  Lai Costello MD   metoprolol tartrate (LOPRESSOR) 25 mg tablet Take 0 5 tablets (12 5 mg total) by mouth every 12 (twelve) hours 2/13/22 3/15/22  Lai Costello MD   predniSONE 5 mg tablet 40 mg PO day 1, then 30 mg PO day 2, 20 mg PO day 3, 10 mg PO day 4, 5 mg PO day 5 then stop 4/18/22   Marleny Potter PA-C   venlafaxine [facility] ) 150 mg 24 hr capsule venlafaxine  mg capsule,extended release 24 hr 3/7/19   Historical Provider, MD   warfarin (COUMADIN) 7 5 mg tablet Take 7 5 mg by mouth daily    Historical Provider, MD     I have reviewed home medications using allscripts  Allergies: No Known Allergies    Social History:     Marital Status: /Civil Union   Occupation: Retired  Patient Pre-hospital Living Situation: home  Patient Pre-hospital Level of Mobility: ambulatory  Patient Pre-hospital Diet Restrictions: none  Substance Use History:   Social History     Substance and Sexual Activity   Alcohol Use Never     Social History     Tobacco Use   Smoking Status Never   Smokeless Tobacco Never     Social History     Substance and Sexual Activity   Drug Use Never       Family History:    History reviewed  No pertinent family history      Physical Exam:     Vitals: Blood Pressure: (!) 175/84 (12/06/22 1206)  Pulse: 80 (12/06/22 1206)  Temperature: 97 8 °F (36 6 °C) (12/06/22 1206)  Respirations: (!) 24 (12/06/22 1206)  SpO2: 96 % (12/06/22 1206)    Physical Exam  Vitals and nursing note reviewed  Constitutional:       Appearance: Normal appearance  She is normal weight  HENT:      Head: Normocephalic and atraumatic  Eyes:      General: No scleral icterus  Conjunctiva/sclera: Conjunctivae normal    Cardiovascular:      Rate and Rhythm: Normal rate and regular rhythm  Heart sounds: Normal heart sounds  Pulmonary:      Effort: Pulmonary effort is normal  No respiratory distress  Breath sounds: Wheezing present  No rhonchi  Abdominal:      General: Bowel sounds are normal  There is no distension  Palpations: Abdomen is soft  Musculoskeletal:         General: No swelling  Normal range of motion  Skin:     General: Skin is warm and dry  Neurological:      General: No focal deficit present  Mental Status: She is alert  Mental status is at baseline  Psychiatric:         Mood and Affect: Mood normal            Additional Data:     Lab Results: I have personally reviewed pertinent reports  Results from last 7 days   Lab Units 12/06/22  1210   WBC Thousand/uL 13 19*   HEMOGLOBIN g/dL 12 6   HEMATOCRIT % 38 0   PLATELETS Thousands/uL 238   NEUTROS PCT % 85*   LYMPHS PCT % 8*   MONOS PCT % 6   EOS PCT % 0     Results from last 7 days   Lab Units 12/06/22  1210   SODIUM mmol/L 140   POTASSIUM mmol/L 4 3   CHLORIDE mmol/L 103   CO2 mmol/L 25   BUN mg/dL 19   CREATININE mg/dL 0 84   ANION GAP mmol/L 12   CALCIUM mg/dL 8 6   ALBUMIN g/dL 3 6   TOTAL BILIRUBIN mg/dL 0 58   ALK PHOS U/L 95   ALT U/L 18   AST U/L 21   GLUCOSE RANDOM mg/dL 123                       Imaging: I have personally reviewed pertinent reports  CTA ED chest PE Study   Final Result by Eros Reardon MD (12/06 2830)      No pulmonary embolus        Mild multifocal tree-in-bud nodularity compatible with bronchiolitis  Pulmonary artery enlargement which can be seen with pulmonary hypertension  Small hiatal hernia  Workstation performed: [id number]         XR chest 2 views    (Results Pending)       EKG, Pathology, and Other Studies Reviewed on Admission:   · EKG: None, ordered    Allscripts / Epic Records Reviewed: Yes     ** Please Note: This note has been constructed using a voice recognition system   **

## 2022-12-06 NOTE — RESPIRATORY THERAPY NOTE
RT Protocol Note  Oumar Almazan [de-identified] y o  female MRN: [id number]  Unit/Bed#: OVR 15 Encounter: [id number]    Assessment    Principal Problem:    Chest pain  Active Problems:    Paroxysmal atrial fibrillation (HCC)    Hypothyroidism    Hypertension    Hyperlipidemia    Bronchospasm with bronchitis, acute    Leukocytosis      Home Pulmonary Medications:  PRN Albuterol       Past Medical History:   Diagnosis Date   • Asthma      Social History     Socioeconomic History   • Marital status: /Civil Union     Spouse name: None   • Number of children: None   • Years of education: None   • Highest education level: None   Occupational History   • None   Tobacco Use   • Smoking status: Never   • Smokeless tobacco: Never   Vaping Use   • Vaping Use: Never used   Substance and Sexual Activity   • Alcohol use: Never   • Drug use: Never   • Sexual activity: None   Other Topics Concern   • None   Social History Narrative   • None     Social Determinants of Health     Financial Resource Strain: Not on file   Food Insecurity: Not on file   Transportation Needs: Not on file   Physical Activity: Not on file   Stress: Not on file   Social Connections: Not on file   Intimate Partner Violence: Not on file   Housing Stability: Not on file       Subjective         Objective    Physical Exam:   Assessment Type: Assess only  General Appearance: Alert, Awake  Respiratory Pattern: Dyspnea with exertion  Chest Assessment: Chest expansion symmetrical  Bilateral Breath Sounds: Diminished  Cough: Non-productive, Strong  O2 Device: Room Air    Vitals:  Blood pressure (!) 175/84, pulse 84, temperature 97 8 °F (36 6 °C), resp  rate 20, SpO2 96 %  Imaging and other studies: I have personally reviewed pertinent reports        O2 Device: Room Air     Plan    Respiratory Plan: Mild Distress pathway      Will continue Xopenex/ Atrovent/ Pulmicort ordered by provider

## 2022-12-06 NOTE — ASSESSMENT & PLAN NOTE
Reports ongoing cough for 1 week  Flu, COVID and RSV negative  Recently seen PCP last week, prescribe albuterol, Tessalon Perles, Augmentin steroid taper  Patient reported completing medications and continued to have cough with some chest discomfort  Diffuse wheezing noted on lung sounds  CT chest without acute process, negative for PE, tree-in-bud appearance consistent with bronchiolitis  Will give IV steroids, nebulizers and Doxy antibiotic  Continue to monitor at this time and possible discharge home tomorrow

## 2022-12-06 NOTE — ED PROVIDER NOTES
History  Chief Complaint   Patient presents with   • Shortness of Breath     Pt reports she has been SOB and coughing x1 week and the steroids are not working  Debra Arauz is an 82-year-old female with past medical history including paroxysmal atrial fibrillation on Coumadin and asthma presenting to the emergency department for evaluation with complaint of chest pain, cough, and shortness of breath  Patient states that she has been experiencing symptoms of cough and shortness of breath for approximately 1 week  She was treated with antibiotics and steroids by her PCP, stating that she has completed these treatments without relief  Patient reports dry, nonproductive cough  Denies hemoptysis  She states that yesterday evening she did experience left-sided chest discomfort which was ultimately self limited  She has no active chest pain at this time  Patient denies calf pain or leg swelling, orthopnea, or exertional dyspnea  Prior to Admission Medications   Prescriptions Last Dose Informant Patient Reported? Taking?    albuterol (2 5 mg/3 mL) 0 083 % nebulizer solution   No No   Sig: Take 6 mL (5 mg total) by nebulization every 6 (six) hours as needed for wheezing   atorvastatin (LIPITOR) 40 mg tablet   Yes No   Sig: atorvastatin 40 mg tablet   levothyroxine 100 mcg tablet   Yes No   Sig: Take 88 mcg by mouth     lisinopril (ZESTRIL) 10 mg tablet   No No   Sig: Take 1 tablet (10 mg total) by mouth daily   metoprolol tartrate (LOPRESSOR) 25 mg tablet   No No   Sig: Take 0 5 tablets (12 5 mg total) by mouth every 12 (twelve) hours   predniSONE 5 mg tablet   No No   Si mg PO day 1, then 30 mg PO day 2, 20 mg PO day 3, 10 mg PO day 4, 5 mg PO day 5 then stop   venlafaxine (EFFEXOR-XR) 150 mg 24 hr capsule   Yes No   Sig: venlafaxine  mg capsule,extended release 24 hr   warfarin (COUMADIN) 7 5 mg tablet   Yes No   Sig: Take 7 5 mg by mouth daily      Facility-Administered Medications: None Past Medical History:   Diagnosis Date   • Asthma        History reviewed  No pertinent surgical history  History reviewed  No pertinent family history  I have reviewed and agree with the history as documented  E-Cigarette/Vaping   • E-Cigarette Use Never User      E-Cigarette/Vaping Substances   • Nicotine No    • THC No    • CBD No    • Flavoring No    • Other No    • Unknown No      Social History     Tobacco Use   • Smoking status: Never   • Smokeless tobacco: Never   Vaping Use   • Vaping Use: Never used   Substance Use Topics   • Alcohol use: Never   • Drug use: Never       Review of Systems   Constitutional: Negative for chills, diaphoresis, fatigue and fever  Respiratory: Positive for cough and shortness of breath  Cardiovascular: Positive for chest pain  Gastrointestinal: Negative for abdominal pain, nausea and vomiting  Musculoskeletal: Negative for myalgias  Skin: Negative for rash  Neurological: Negative for dizziness, weakness, light-headedness and headaches  All other systems reviewed and are negative  Physical Exam  Physical Exam  Vitals and nursing note reviewed  Constitutional:       General: She is not in acute distress  Appearance: Normal appearance  She is well-developed  She is not ill-appearing, toxic-appearing or diaphoretic  HENT:      Head: Normocephalic and atraumatic  Right Ear: External ear normal       Left Ear: External ear normal    Eyes:      Conjunctiva/sclera: Conjunctivae normal    Cardiovascular:      Rate and Rhythm: Normal rate and regular rhythm  Pulses: Normal pulses  Pulmonary:      Effort: Pulmonary effort is normal  No tachypnea, accessory muscle usage or respiratory distress  Breath sounds: Wheezing present  No rhonchi or rales  Comments: Dry cough elicited with deep breathing  There is wheeze on auscultation of the posterior lung fields  Patient speaking in full sentences without conversational dyspnea  Oxygen saturation stable  Chest:      Chest wall: No tenderness  Abdominal:      General: There is no distension  Palpations: Abdomen is soft  Tenderness: There is no abdominal tenderness  Musculoskeletal:         General: Normal range of motion  Cervical back: Normal range of motion and neck supple  Right lower leg: No tenderness  No edema  Left lower leg: No tenderness  No edema  Skin:     General: Skin is warm and dry  Capillary Refill: Capillary refill takes less than 2 seconds  Neurological:      Mental Status: She is alert  Motor: Motor function is intact  No weakness  Gait: Gait is intact  Psychiatric:         Mood and Affect: Mood normal          Vital Signs  ED Triage Vitals   Temperature Pulse Respirations Blood Pressure SpO2   12/06/22 1205 12/06/22 1205 12/06/22 1205 12/06/22 1206 12/06/22 1206   97 8 °F (36 6 °C) 89 (!) 24 (!) 175/84 96 %      Temp src Heart Rate Source Patient Position - Orthostatic VS BP Location FiO2 (%)   -- -- -- -- --             Pain Score       --                  Vitals:    12/06/22 1205 12/06/22 1206   BP:  (!) 175/84   Pulse: 89 80         Visual Acuity      ED Medications  Medications   iohexol (OMNIPAQUE) 350 MG/ML injection (SINGLE-DOSE) 100 mL (100 mL Intravenous Given 12/6/22 1435)       Diagnostic Studies  Results Reviewed     Procedure Component Value Units Date/Time    HS Troponin I 4hr [[id number]]     Lab Status: No result Specimen: Blood     FLU/RSV/COVID - if FLU/RSV clinically relevant [[id number]]  (Normal) Collected: 12/06/22 1210    Lab Status: Final result Specimen: Nares from Nose Updated: 12/06/22 1313     SARS-CoV-2 Negative     INFLUENZA A PCR Negative     INFLUENZA B PCR Negative     RSV PCR Negative    Narrative:      FOR PEDIATRIC PATIENTS - copy/paste COVID Guidelines URL to browser: [email or web address]    SARS-CoV-2 assay is a Nucleic Acid Amplification assay intended for the  qualitative detection of nucleic acid from SARS-CoV-2 in nasopharyngeal  swabs  Results are for the presumptive identification of SARS-CoV-2 RNA  Positive results are indicative of infection with SARS-CoV-2, the virus  causing COVID-19, but do not rule out bacterial infection or co-infection  with other viruses  Laboratories within the [place] and its  territories are required to report all positive results to the appropriate  public health authorities  Negative results do not preclude SARS-CoV-2  infection and should not be used as the sole basis for treatment or other  patient management decisions  Negative results must be combined with  clinical observations, patient history, and epidemiological information  This test has not been FDA cleared or approved  This test has been authorized by FDA under an Emergency Use Authorization  (EUA)  This test is only authorized for the duration of time the  declaration that circumstances exist justifying the authorization of the  emergency use of an in vitro diagnostic tests for detection of SARS-CoV-2  virus and/or diagnosis of COVID-19 infection under section 564(b)(1) of  the Act, 21 U  S C  225DHN-4(P)(2), unless the authorization is terminated  or revoked sooner  The test has been validated but independent review by FDA  and CLIA is pending  Test performed using [employer]: This RT-PCR assay targets N2,  a region unique to SARS-CoV-2  A conserved region in the E-gene was chosen  for pan-Sarbecovirus detection which includes SARS-CoV-2  According to CMS-2020-01-R, this platform meets the definition of high-throughput technology      HS Troponin 0hr (reflex protocol) [[id number]]  (Abnormal) Collected: 12/06/22 1210    Lab Status: Final result Specimen: Blood from Arm, Right Updated: 12/06/22 1250     hs TnI 0hr 70 ng/L     HS Troponin I 2hr [[id number]]     Lab Status: No result Specimen: Blood     Comprehensive metabolic panel [[id number]] Collected: 12/06/22 1210    Lab Status: Final result Specimen: Blood from Arm, Right Updated: 12/06/22 1242     Sodium 140 mmol/L      Potassium 4 3 mmol/L      Chloride 103 mmol/L      CO2 25 mmol/L      ANION GAP 12 mmol/L      BUN 19 mg/dL      Creatinine 0 84 mg/dL      Glucose 123 mg/dL      Calcium 8 6 mg/dL      AST 21 U/L      ALT 18 U/L      Alkaline Phosphatase 95 U/L      Total Protein 7 6 g/dL      Albumin 3 6 g/dL      Total Bilirubin 0 58 mg/dL      eGFR 65 ml/min/1 73sq m     Narrative:      National Kidney Disease Foundation guidelines for Chronic Kidney Disease (CKD):   •  Stage 1 with normal or high GFR (GFR > 90 mL/min/1 73 square meters)  •  Stage 2 Mild CKD (GFR = 60-89 mL/min/1 73 square meters)  •  Stage 3A Moderate CKD (GFR = 45-59 mL/min/1 73 square meters)  •  Stage 3B Moderate CKD (GFR = 30-44 mL/min/1 73 square meters)  •  Stage 4 Severe CKD (GFR = 15-29 mL/min/1 73 square meters)  •  Stage 5 End Stage CKD (GFR <15 mL/min/1 73 square meters)  Note: GFR calculation is accurate only with a steady state creatinine    CBC and differential [[id number]]  (Abnormal) Collected: 12/06/22 1210    Lab Status: Final result Specimen: Blood from Arm, Right Updated: 12/06/22 1216     WBC 13 19 Thousand/uL      RBC 4 04 Million/uL      Hemoglobin 12 6 g/dL      Hematocrit 38 0 %      MCV 94 fL      MCH 31 2 pg      MCHC 33 2 g/dL      RDW 13 8 %      MPV 10 7 fL      Platelets 785 Thousands/uL      nRBC 0 /100 WBCs      Neutrophils Relative 85 %      Immat GRANS % 1 %      Lymphocytes Relative 8 %      Monocytes Relative 6 %      Eosinophils Relative 0 %      Basophils Relative 0 %      Neutrophils Absolute 11 24 Thousands/µL      Immature Grans Absolute 0 13 Thousand/uL      Lymphocytes Absolute 1 01 Thousands/µL      Monocytes Absolute 0 78 Thousand/µL      Eosinophils Absolute 0 01 Thousand/µL      Basophils Absolute 0 02 Thousands/µL                  CTA ED chest PE Study Final Result by Margaret Phillips MD (12/06 2425)      No pulmonary embolus  Mild multifocal tree-in-bud nodularity compatible with bronchiolitis  Pulmonary artery enlargement which can be seen with pulmonary hypertension  Small hiatal hernia  Workstation performed: [id number]         XR chest 2 views    (Results Pending)              Procedures  Procedures         ED Course                                             MDM  Number of Diagnoses or Management Options  Bronchiolitis: new and requires workup  Chest pain, unspecified: new and requires workup  Cough: new and requires workup  Elevated troponin: new and requires workup  Shortness of breath: new and requires workup  Diagnosis management comments: This is an 51-year-old female presenting for evaluation of cough, chest pain, shortness of breath  Symptoms developed 1 week ago, and have been persistent despite treatment with antibiotics, antitussives, and steroid course  Differential diagnosis includes but is not limited to: acs/ua/anginal equivalent, arrhythmia, CHF, PE, ptx, pneumonia, bronchitis, bronchospasm, pleurisy, costochondritis, covid/flu/rsv, allergies    Initial ED plan: labs, imaging    Final ED Assessment: Vital signs reviewed on ED presentation, examination as above  All labs and imaging independently reviewed with imaging interpreted by the Radiologist   The patient has a leukocytosis of 13 which may be due to recent steroid use  She also has an elevated troponin of 70  She is negative for COVID/flu/RSV  CTA chest obtained which reports no pulmonary embolus and mild multifocal tree-in-bud nodularities compatible with bronchiolitis  Patient had reported symptom improvement with breathing treatments in the emergency department    All test results were discussed at bedside and patient advised of recommendation for admission for continued care and further management per son which the patient is understanding of and agreeable with  The case was discussed with Dr Gauri Saavedra, who accepts patient for hospital admission  The patient has remained hemodynamically stable without new or worsening symptoms and she is stable for admission  Bridging orders placed  Amount and/or Complexity of Data Reviewed  Clinical lab tests: ordered and reviewed  Tests in the radiology section of CPT®: reviewed and ordered  Review and summarize past medical records: yes  Discuss the patient with other providers: yes  Independent visualization of images, tracings, or specimens: yes    Risk of Complications, Morbidity, and/or Mortality  Presenting problems: moderate  Diagnostic procedures: moderate  Management options: moderate    Patient Progress  Patient progress: stable      Disposition  Final diagnoses:   None     ED Disposition     ED Disposition   Admit    Condition   Stable    Date/Time   Tue Dec 6, 2022  1:36 PM    Comment   Case was discussed with Dr Parviz Anthony and the patient's admission status was agreed to be Admission Status: observation status to the service of Dr aPrviz Anthony   Follow-up Information    None         Patient's Medications   Discharge Prescriptions    No medications on file       No discharge procedures on file      PDMP Review       Value Time User    PDMP Reviewed  Yes 10/1/2020  2:22 AM Bethanie Woods PA-C          ED Provider  Electronically Signed by           Chary Paul PA-C  12/11/22 0054

## 2022-12-06 NOTE — ASSESSMENT & PLAN NOTE
Hold coumadin until INR check  Continue metoprolol 12 5mg BID, rate controlled  EKG ordered  Monitor on telemetry

## 2022-12-06 NOTE — ASSESSMENT & PLAN NOTE
Presented with chest discomfort, substernal pressure-like sensation  Suspect likely due to ongoing bronchiolitis/bronchitis  CT imaging without acute processes, negative for PE  Mildly elevated troponin  No EKG, ordered  Trend troponins, monitor telemetry  Aspirin, statin

## 2022-12-07 LAB
2HR DELTA HS TROPONIN: -4 NG/L
4HR DELTA HS TROPONIN: -4 NG/L
ANION GAP SERPL CALCULATED.3IONS-SCNC: 9 MMOL/L (ref 4–13)
BASOPHILS # BLD AUTO: 0.01 THOUSANDS/ÂΜL (ref 0–0.1)
BASOPHILS NFR BLD AUTO: 0 % (ref 0–1)
BUN SERPL-MCNC: 18 MG/DL (ref 5–25)
CALCIUM SERPL-MCNC: 8.1 MG/DL (ref 8.3–10.1)
CARDIAC TROPONIN I PNL SERPL HS: 35 NG/L
CARDIAC TROPONIN I PNL SERPL HS: 35 NG/L
CARDIAC TROPONIN I PNL SERPL HS: 39 NG/L
CHLORIDE SERPL-SCNC: 102 MMOL/L (ref 96–108)
CO2 SERPL-SCNC: 28 MMOL/L (ref 21–32)
CREAT SERPL-MCNC: 0.92 MG/DL (ref 0.6–1.3)
EOSINOPHIL # BLD AUTO: 0 THOUSAND/ÂΜL (ref 0–0.61)
EOSINOPHIL NFR BLD AUTO: 0 % (ref 0–6)
ERYTHROCYTE [DISTWIDTH] IN BLOOD BY AUTOMATED COUNT: 13.7 % (ref 11.6–15.1)
GFR SERPL CREATININE-BSD FRML MDRD: 58 ML/MIN/1.73SQ M
GLUCOSE SERPL-MCNC: 161 MG/DL (ref 65–140)
HCT VFR BLD AUTO: 34 % (ref 34.8–46.1)
HGB BLD-MCNC: 11.1 G/DL (ref 11.5–15.4)
IMM GRANULOCYTES # BLD AUTO: 0.11 THOUSAND/UL (ref 0–0.2)
IMM GRANULOCYTES NFR BLD AUTO: 1 % (ref 0–2)
INR PPP: 1.45 (ref 0.84–1.19)
INR PPP: 1.79 (ref 0.84–1.19)
LYMPHOCYTES # BLD AUTO: 0.86 THOUSANDS/ÂΜL (ref 0.6–4.47)
LYMPHOCYTES NFR BLD AUTO: 9 % (ref 14–44)
MCH RBC QN AUTO: 30.6 PG (ref 26.8–34.3)
MCHC RBC AUTO-ENTMCNC: 32.6 G/DL (ref 31.4–37.4)
MCV RBC AUTO: 94 FL (ref 82–98)
MONOCYTES # BLD AUTO: 0.43 THOUSAND/ÂΜL (ref 0.17–1.22)
MONOCYTES NFR BLD AUTO: 4 % (ref 4–12)
NEUTROPHILS # BLD AUTO: 8.53 THOUSANDS/ÂΜL (ref 1.85–7.62)
NEUTS SEG NFR BLD AUTO: 86 % (ref 43–75)
NRBC BLD AUTO-RTO: 0 /100 WBCS
PLATELET # BLD AUTO: 226 THOUSANDS/UL (ref 149–390)
PMV BLD AUTO: 10.3 FL (ref 8.9–12.7)
POTASSIUM SERPL-SCNC: 4.3 MMOL/L (ref 3.5–5.3)
PROCALCITONIN SERPL-MCNC: <0.05 NG/ML
PROCALCITONIN SERPL-MCNC: <0.05 NG/ML
PROTHROMBIN TIME: 17.3 SECONDS (ref 11.6–14.5)
PROTHROMBIN TIME: 20.4 SECONDS (ref 11.6–14.5)
RBC # BLD AUTO: 3.63 MILLION/UL (ref 3.81–5.12)
SODIUM SERPL-SCNC: 139 MMOL/L (ref 135–147)
WBC # BLD AUTO: 9.94 THOUSAND/UL (ref 4.31–10.16)

## 2022-12-07 RX ORDER — DOXYCYCLINE HYCLATE 100 MG/1
100 CAPSULE ORAL EVERY 12 HOURS
Status: DISCONTINUED | OUTPATIENT
Start: 2022-12-07 | End: 2022-12-07

## 2022-12-07 RX ORDER — AZITHROMYCIN 500 MG/1
500 TABLET, FILM COATED ORAL EVERY 24 HOURS
Status: DISCONTINUED | OUTPATIENT
Start: 2022-12-07 | End: 2022-12-08

## 2022-12-07 RX ORDER — BENZONATATE 100 MG/1
100 CAPSULE ORAL 3 TIMES DAILY PRN
Status: DISCONTINUED | OUTPATIENT
Start: 2022-12-07 | End: 2022-12-07

## 2022-12-07 RX ORDER — GUAIFENESIN/DEXTROMETHORPHAN 100-10MG/5
10 SYRUP ORAL EVERY 6 HOURS PRN
Status: DISCONTINUED | OUTPATIENT
Start: 2022-12-07 | End: 2022-12-07

## 2022-12-07 RX ORDER — PREDNISONE 20 MG/1
40 TABLET ORAL DAILY
Status: DISCONTINUED | OUTPATIENT
Start: 2022-12-08 | End: 2022-12-10

## 2022-12-07 RX ORDER — GUAIFENESIN/DEXTROMETHORPHAN 100-10MG/5
10 SYRUP ORAL EVERY 8 HOURS
Status: DISCONTINUED | OUTPATIENT
Start: 2022-12-07 | End: 2022-12-12 | Stop reason: HOSPADM

## 2022-12-07 RX ORDER — WARFARIN SODIUM 7.5 MG/1
7.5 TABLET ORAL
Status: DISCONTINUED | OUTPATIENT
Start: 2022-12-08 | End: 2022-12-12 | Stop reason: HOSPADM

## 2022-12-07 RX ADMIN — ENOXAPARIN SODIUM 40 MG: 40 INJECTION SUBCUTANEOUS at 09:00

## 2022-12-07 RX ADMIN — IPRATROPIUM BROMIDE 0.5 MG: 0.5 SOLUTION RESPIRATORY (INHALATION) at 13:32

## 2022-12-07 RX ADMIN — VENLAFAXINE HYDROCHLORIDE 150 MG: 150 CAPSULE, EXTENDED RELEASE ORAL at 09:00

## 2022-12-07 RX ADMIN — DOXYCYCLINE 100 MG: 100 CAPSULE ORAL at 06:25

## 2022-12-07 RX ADMIN — GUAIFENESIN AND DEXTROMETHORPHAN 10 ML: 100; 10 SYRUP ORAL at 17:34

## 2022-12-07 RX ADMIN — ASPIRIN 81 MG 81 MG: 81 TABLET ORAL at 09:00

## 2022-12-07 RX ADMIN — IPRATROPIUM BROMIDE 0.5 MG: 0.5 SOLUTION RESPIRATORY (INHALATION) at 20:04

## 2022-12-07 RX ADMIN — METOPROLOL TARTRATE 12.5 MG: 50 TABLET, FILM COATED ORAL at 09:00

## 2022-12-07 RX ADMIN — IPRATROPIUM BROMIDE 0.5 MG: 0.5 SOLUTION RESPIRATORY (INHALATION) at 07:45

## 2022-12-07 RX ADMIN — LEVALBUTEROL HYDROCHLORIDE 1.25 MG: 1.25 SOLUTION, CONCENTRATE RESPIRATORY (INHALATION) at 07:45

## 2022-12-07 RX ADMIN — LEVALBUTEROL HYDROCHLORIDE 1.25 MG: 1.25 SOLUTION, CONCENTRATE RESPIRATORY (INHALATION) at 20:04

## 2022-12-07 RX ADMIN — BUDESONIDE 0.5 MG: 0.5 INHALANT ORAL at 20:11

## 2022-12-07 RX ADMIN — GUAIFENESIN 600 MG: 600 TABLET ORAL at 09:00

## 2022-12-07 RX ADMIN — METOPROLOL TARTRATE 12.5 MG: 50 TABLET, FILM COATED ORAL at 21:11

## 2022-12-07 RX ADMIN — LEVALBUTEROL HYDROCHLORIDE 1.25 MG: 1.25 SOLUTION, CONCENTRATE RESPIRATORY (INHALATION) at 13:32

## 2022-12-07 RX ADMIN — ATORVASTATIN CALCIUM 40 MG: 40 TABLET, FILM COATED ORAL at 17:33

## 2022-12-07 RX ADMIN — LISINOPRIL 10 MG: 10 TABLET ORAL at 09:00

## 2022-12-07 RX ADMIN — METHYLPREDNISOLONE SODIUM SUCCINATE 40 MG: 40 INJECTION, POWDER, FOR SOLUTION INTRAMUSCULAR; INTRAVENOUS at 06:25

## 2022-12-07 RX ADMIN — AZITHROMYCIN 500 MG: 500 TABLET, FILM COATED ORAL at 17:33

## 2022-12-07 RX ADMIN — BUDESONIDE 0.5 MG: 0.5 INHALANT ORAL at 07:45

## 2022-12-07 RX ADMIN — LEVOTHYROXINE SODIUM 88 MCG: 88 TABLET ORAL at 06:25

## 2022-12-07 NOTE — ASSESSMENT & PLAN NOTE
Reports ongoing cough for 1 week  Flu, COVID and RSV negative  Recently seen PCP last week, prescribe albuterol, Tessalon Perles, Augmentin steroid taper  Patient reported completing medications and continued to have cough with some chest discomfort  Diffuse wheezing noted on lung sounds  CT chest without acute process, negative for PE, tree-in-bud appearance consistent with bronchiolitis  Will give IV steroids, nebulizers and PO azithromycin  O2 saturation 91% on room air at rest   Continue to supplement to keep O2 saturation more than 93%  Continue to wean off of oxygen as able

## 2022-12-07 NOTE — RESPIRATORY THERAPY NOTE
RT Protocol Note  Nelson Balderas [de-identified] y o  female MRN: [id number]  Unit/Bed#: ED 17 Encounter: [id number]    Assessment    Principal Problem:    Chest pain  Active Problems:    Paroxysmal atrial fibrillation (HCC)    Hypothyroidism    Hypertension    Hyperlipidemia    Bronchospasm with bronchitis, acute    Leukocytosis      Home Pulmonary Medications:     Home Devices/Therapy: Other (Comment) (MDI and NEB prn)    Past Medical History:   Diagnosis Date   • Asthma      Social History     Socioeconomic History   • Marital status: /Civil Union     Spouse name: None   • Number of children: None   • Years of education: None   • Highest education level: None   Occupational History   • None   Tobacco Use   • Smoking status: Never   • Smokeless tobacco: Never   Vaping Use   • Vaping Use: Never used   Substance and Sexual Activity   • Alcohol use: Never   • Drug use: Never   • Sexual activity: None   Other Topics Concern   • None   Social History Narrative   • None     Social Determinants of Health     Financial Resource Strain: Not on file   Food Insecurity: Not on file   Transportation Needs: Not on file   Physical Activity: Not on file   Stress: Not on file   Social Connections: Not on file   Intimate Partner Violence: Not on file   Housing Stability: Not on file       Subjective         Objective    Physical Exam:   Assessment Type: Pre-treatment  General Appearance: Alert, Awake  Respiratory Pattern: Dyspnea with exertion  Chest Assessment: Chest expansion symmetrical  Bilateral Breath Sounds: Diminished  Cough: Non-productive, Strong  O2 Device: Room Air    Vitals:  Blood pressure 170/83, pulse 66, temperature 98 8 °F (37 1 °C), temperature source Oral, resp  rate 18, SpO2 93 %  Imaging and other studies: I have personally reviewed pertinent reports  O2 Device: Room Air     Plan    Respiratory Plan: Mild Distress pathway        Resp Comments: pt not in distress at the moment   pt does have consistent cough which makes her short of breath  lungs are clear diminished  spo2 on room air 93%  continue tx's as ordered

## 2022-12-07 NOTE — ASSESSMENT & PLAN NOTE
Hold coumadin until INR check  Continue metoprolol 12 5mg BID, rate controlled  INR subtherapeutic  Resume home dose of Coumadin  Monitor daily INR

## 2022-12-07 NOTE — ASSESSMENT & PLAN NOTE
Patient denies any fevers, chills, CT imaging without pneumonia  Suspect likely due to recent steroid which patient had completed given by PCP  Procalcitonin negative x2

## 2022-12-07 NOTE — UTILIZATION REVIEW
OBSERVATION 12/6/22 @ 1619 CONVERTED TO INAPTIENT 12/7/22 @ 1534 DUE TO BRONCHOSPASMS WITH BRONCHITIS, CHEST PAIN  Initial Clinical Review    Admission: Date/Time/Statement:   Admission Orders (From admission, onward)     Ordered        12/07/22 1534  Inpatient Admission  Once                      Orders Placed This Encounter   Procedures   • Inpatient Admission     Standing Status:   Standing     Number of Occurrences:   1     Order Specific Question:   Level of Care     Answer:   Med Surg [16]     Order Specific Question:   Estimated length of stay     Answer:   More than 2 Midnights     Order Specific Question:   Certification     Answer:   I certify that inpatient services are medically necessary for this patient for a duration of greater than two midnights  See H&P and MD Progress Notes for additional information about the patient's course of treatment  ED Arrival Information     Expected   -    Arrival   12/6/2022 12:01    Acuity   Emergent            Means of arrival   Walk-In    Escorted by   Family Member    Service   [facility]    Admission type   Emergency            Arrival complaint   SOB & cough           Chief Complaint   Patient presents with   • Shortness of Breath     Pt reports she has been SOB and coughing x1 week and the steroids are not working  Initial Presentation: [de-identified] y o  female to the ED form home with complaints of shortness of breath, coughing for a week, now with chest pain  Seen by pcp 12/1, prescribed amoxicillin, cough drops, steroid taper without improvement  COvid, RSV, flu neg  Admitted under observation then converted to inpatient for chest pain, bronchospasm with bronchitis  Wheezing breath sounds  CT chest shows no acute abnormality  Started on iv steroids, nebulizers  Arrives tachypneic  Date: 12/7  PUlse ox 91% on room air at rest   Continue with [facility], wean as able  Mildly elevated troponin, likely nonischemic  Reproducible chest pain   Continue to trend troponins, monitor tele  Continue steroids  Cough, short of breath  Wheezing breath sounds  12/8 UPdate:  Chest pain likely due to coughing  Procal neg  Continue steroids  Check ECHO  Afib with good rate control  Continue Coumadin  Lungs with wheezing breath sounds     ED Triage Vitals   Temperature Pulse Respirations Blood Pressure SpO2   12/06/22 1205 12/06/22 1205 12/06/22 1205 12/06/22 1206 12/06/22 1206   97 8 °F (36 6 °C) 89 (!) 24 (!) 175/84 96 %      Temp Source Heart Rate Source Patient Position - Orthostatic VS BP Location FiO2 (%)   12/06/22 1939 12/06/22 2045 12/06/22 1939 12/06/22 1939 --   Oral Monitor Lying Left arm       Pain Score       12/06/22 2045       No Pain          Wt Readings from Last 1 Encounters:   02/12/22 99 kg (218 lb 4 1 oz)     Additional Vital Signs:   /Time Temp Pulse Resp BP MAP (mmHg) SpO2 Calculated FIO2 (%) - Nasal Cannula Nasal Cannula O2 Flow Rate (L/min) O2 Device Patient Position - Orthostatic VS   12/08/22 0833 -- 77 -- -- -- -- -- -- -- --   12/08/22 0812 -- -- -- -- -- 95 % 28 2 L/min Nasal cannula --   12/08/22 0800 -- 69 20 139/80 99 94 % 28 2 L/min Nasal cannula Sitting   12/08/22 0700 -- 66 14 158/76 -- 97 % 28 2 L/min None (Room air) Sitting   12/08/22 0500 -- 64 14 168/75 -- 93 % 28 2 L/min Nasal cannula --   12/08/22 0000 -- 74 14 171/87 Abnormal  123 94 % 28 2 L/min Nasal cannula        /Time Temp Pulse Resp BP MAP (mmHg) SpO2 Calculated FIO2 (%) - Nasal Cannula Nasal Cannula O2 Flow Rate (L/min) O2 Device Patient Position - Orthostatic VS   12/07/22 0745 -- -- -- -- -- 93 % 28 2 L/min Nasal cannula --   12/07/22 0500 -- 63 16 177/80 Abnormal  115 92 % -- -- -- --   12/07/22 0300 -- 64 16 132/70 95 92 % -- -- -- --   12/06/22 2250 -- 91 -- 145/71 -- -- 28 2 L/min Nasal cannula --   12/06/22 2200 -- 88 26 Abnormal  -- -- 90 % -- -- -- --   12/06/22 2135 -- 66 18 -- -- 93 % -- -- -- --   12/06/22 2100 -- 67 19 170/83 119 88 % Abnormal  28 2 L/min Nasal cannula --   12/06/22 2045 98 8 °F (37 1 °C) -- 18 170/83 -- 93 % -- -- None (Room air) Lying   12/06/22 1939 98 6 °F (37 °C) 69 16 161/80 111 92 % -- -- None (Room air) Lying   12/06/22 1838 -- 84 20 -- -- 96 % -- -- None (Room air) --   12/06/22 1206 97 8 °F (36 6 °C) 80 24 Abnormal  175/84 Abnormal  -- 96 % -- -- None (Room air) --   12/06/22 1205 97 8 °F (36 6 °C) 89 24 Abnormal  -- -- -- -- -- -- --       Pertinent Labs/Diagnostic Test Results:   CTA ED chest PE Study   Final Result by Fabienne Blackburn MD (12/06 1504)      No pulmonary embolus  Mild multifocal tree-in-bud nodularity compatible with bronchiolitis  Pulmonary artery enlargement which can be seen with pulmonary hypertension  Small hiatal hernia  Workstation performed: [id number]         XR chest 2 views   Final Result by Fabienne Blackburn MD (12/06 1634)      No consolidation  Subtle increase in interstitial markings corresponds with tree-in-bud nodularity/bronchiolitis on CT                    Workstation performed: [id number]           Results from last 7 days   Lab Units 12/06/22  1210   SARS-COV-2  Negative     Results from last 7 days   Lab Units 12/08/22  0545 12/07/22  0434 12/06/22  1210   WBC Thousand/uL 13 14* 9 94 13 19*   HEMOGLOBIN g/dL 11 6 11 1* 12 6   HEMATOCRIT % 36 0 34 0* 38 0   PLATELETS Thousands/uL 225 226 238   NEUTROS ABS Thousands/µL  --  8 53* 11 24*         Results from last 7 days   Lab Units 12/07/22  0434 12/06/22  1210   SODIUM mmol/L 139 140   POTASSIUM mmol/L 4 3 4 3   CHLORIDE mmol/L 102 103   CO2 mmol/L 28 25   ANION GAP mmol/L 9 12   BUN mg/dL 18 19   CREATININE mg/dL 0 92 0 84   EGFR ml/min/1 73sq m 58 65   CALCIUM mg/dL 8 1* 8 6     Results from last 7 days   Lab Units 12/06/22  1210   AST U/L 21   ALT U/L 18   ALK PHOS U/L 95   TOTAL PROTEIN g/dL 7 6   ALBUMIN g/dL 3 6   TOTAL BILIRUBIN mg/dL 0 58         Results from last 7 days   Lab Units 12/07/22  0434 12/06/22  1210 GLUCOSE RANDOM mg/dL 161* 123           Results from last 7 days   Lab Units 12/07/22  2111 12/07/22  1909 12/07/22  1700 12/06/22  1210   HS TNI 0HR ng/L  --   --  39 70*   HS TNI 2HR ng/L  --  35  --   --    HSTNI D2 ng/L  --  -4  --   --    HS TNI 4HR ng/L 35  --   --   --    HSTNI D4 ng/L -4  --   --   --          Results from last 7 days   Lab Units 12/08/22  0545 12/07/22  1653 12/07/22  0434   PROTIME seconds 16 9* 17 3* 20 4*   INR  1 41* 1 45* 1 79*         Results from last 7 days   Lab Units 12/08/22  0545 12/07/22  1701 12/07/22  0434   PROCALCITONIN ng/ml <0 05 <0 05 <0 05         Results from last 7 days   Lab Units 12/06/22  1210   INFLUENZA A PCR  Negative   INFLUENZA B PCR  Negative   RSV PCR  Negative       ED Treatment:   Medication Administration from 12/06/2022 1201 to 12/07/2022 0759       Date/Time Order Dose Route Action Comments     12/06/2022 1911 EST atorvastatin (LIPITOR) tablet 40 mg 40 mg Oral Given --     12/07/2022 0625 EST levothyroxine tablet 88 mcg 88 mcg Oral Given --     12/06/2022 2250 EST metoprolol tartrate (LOPRESSOR) tablet 12 5 mg 12 5 mg Oral Given new admission     12/07/2022 0745 EST levalbuterol (XOPENEX) inhalation solution 1 25 mg 1 25 mg Nebulization Given --     12/06/2022 2133 EST levalbuterol (XOPENEX) inhalation solution 1 25 mg 1 25 mg Nebulization Given --     12/07/2022 0745 EST ipratropium (ATROVENT) 0 02 % inhalation solution 0 5 mg 0 5 mg Nebulization Given --     12/06/2022 2133 EST ipratropium (ATROVENT) 0 02 % inhalation solution 0 5 mg 0 5 mg Nebulization Given --     12/07/2022 0745 EST budesonide (PULMICORT) inhalation solution 0 5 mg 0 5 mg Nebulization Given --     12/06/2022 2134 EST budesonide (PULMICORT) inhalation solution 0 5 mg 0 5 mg Nebulization Given --     12/06/2022 1911 EST doxycycline hyclate (VIBRAMYCIN) capsule 100 mg 100 mg Oral Given --     12/07/2022 0625 EST methylPREDNISolone sodium succinate (Solu-MEDROL) injection 40 mg 40 mg Intravenous Given --     12/06/2022 2251 EST methylPREDNISolone sodium succinate (Solu-MEDROL) injection 40 mg 0 mg Intravenous Hold jusdt given at 1900     12/06/2022 1911 EST methylPREDNISolone sodium succinate (Solu-MEDROL) injection 40 mg 40 mg Intravenous Given --     12/06/2022 2250 EST guaiFENesin (MUCINEX) 12 hr tablet 600 mg 600 mg Oral Given new admission     12/07/2022 0625 EST doxycycline hyclate (VIBRAMYCIN) capsule 100 mg 100 mg Oral Given --        Past Medical History:   Diagnosis Date   • Asthma        Admitting Diagnosis: SOB (shortness of breath) [R06 02]  Age/Sex: [de-identified] y o  female  Admission Orders:  Scheduled Medications:  aspirin, 81 mg, Oral, Daily  atorvastatin, 40 mg, Oral, Daily With Dinner  budesonide, 0 5 mg, Nebulization, Q12H  doxycycline hyclate, 100 mg, Oral, Q12H  enoxaparin, 40 mg, Subcutaneous, Daily  guaiFENesin, 600 mg, Oral, Q12H JUAN PABLO  ipratropium, 0 5 mg, Nebulization, TID  levalbuterol, 1 25 mg, Nebulization, TID  levothyroxine, 88 mcg, Oral, Early Morning  lisinopril, 10 mg, Oral, Daily  methylPREDNISolone sodium succinate, 40 mg, Intravenous, Q8H JUAN PABLO  metoprolol tartrate, 12 5 mg, Oral, Q12H JUAN PABLO  venlafaxine, 150 mg, Oral, Daily      Continuous IV Infusions:     PRN Meds:  acetaminophen, 650 mg, Oral, Q6H PRN  albuterol, 2 puff, Inhalation, Q4H PRN  HYDROcodone Bit-Homatrop MBr, 5 mL, Oral, Q6H PRN  ondansetron, 4 mg, Intravenous, Q6H PRN            Network Utilization Review Department  ATTENTION: Please call with any questions or concerns to [phone] and carefully listen to the prompts so that you are directed to the right person  All voicemails are confidential   Alessandra Skipper all requests for admission clinical reviews, approved or denied determinations and any other requests to dedicated fax number below belonging to the campus where the patient is receiving treatment   List of dedicated fax numbers for the Facilities:  FACILITY NAME UR FAX NUMBER   ADMISSION DENIALS (Administrative/Medical Necessity) [phone]   [address] (Maternity/NICU/Pediatrics) Lorin [place] [address] Rachana Piedra  [phone]   [address] [facility] [address] [place] [place] [address] [facility] [address] [facility] [place] [address] [phone]

## 2022-12-07 NOTE — PROGRESS NOTES
[facility]  Progress Note Annemarie Gao 1940, [de-identified] y o  female MRN: [id number]  Unit/Bed#: ED 17 Encounter: [id number]  Primary Care Provider: Christina Vera DO   Date and time admitted to hospital: 12/6/2022  1:16 PM    * Bronchospasm with bronchitis, acute  Assessment & Plan  Reports ongoing cough for 1 week  Flu, COVID and RSV negative  Recently seen PCP last week, prescribe albuterol, Tessalon Perles, Augmentin steroid taper  Patient reported completing medications and continued to have cough with some chest discomfort  Diffuse wheezing noted on lung sounds  CT chest without acute process, negative for PE, tree-in-bud appearance consistent with bronchiolitis  Will give IV steroids, nebulizers and PO azithromycin  O2 saturation 91% on room air at rest   Continue to supplement to keep O2 saturation more than 93%  Continue to wean off of oxygen as able  Chest pain  Assessment & Plan  Presented with chest discomfort, substernal pressure-like sensation  Suspect likely due to ongoing bronchiolitis/bronchitis  CT imaging without acute processes, negative for PE  Mildly elevated troponin likely nonischemic  Currently patient reports feeling little better but  Chest pain is reproducible on exam for  Likely secondary to coughing causing musculoskeletal pain  Trend troponins, monitor telemetry  Aspirin, statin      Leukocytosis  Assessment & Plan  Patient denies any fevers, chills, CT imaging without pneumonia  Suspect likely due to recent steroid which patient had completed given by PCP  Procalcitonin negative x2    Hyperlipidemia  Assessment & Plan  Continue statin    Hypertension  Assessment & Plan  Continue lisinopril and metoprolol    Hypothyroidism  Assessment & Plan  Continue levothyroxine    Paroxysmal atrial fibrillation (HCC)  Assessment & Plan  Hold coumadin until INR check  Continue metoprolol 12 5mg BID, rate controlled  INR subtherapeutic    Resume home dose of Coumadin  Monitor daily INR  VTE Pharmacologic Prophylaxis:   Moderate Risk (Score 3-4) - Pharmacological DVT Prophylaxis Ordered: warfarin (Coumadin)  Patient Centered Rounds: I performed bedside rounds with nursing staff today  Education and Discussions with Family / Patient: Updated  (daughter) at bedside  Time Spent for Care: 30 minutes  More than 50% of total time spent on counseling and coordination of care as described above  Current Length of Stay: 0 day(s)  Current Patient Status: Inpatient   Certification Statement: The patient will continue to require additional inpatient hospital stay due to Mild hypoxia on exertion  Discharge Plan: Anticipate discharge in 24-48 hrs to home  Code Status: Level 1 - Full Code    Subjective:   Patient comfortable when resting however noted with mild hypoxia 87 to 88% on minimal oxygen  Saturation 91% on room air at rest   Complaining of cough and shortness of breath  Reports feeling better compared to yesterday  Denies fever, chills, nausea, vomiting, diaphoresis, any other new complaints  Objective:     Vitals:   Temp (24hrs), Av 7 °F (37 1 °C), Min:98 6 °F (37 °C), Max:98 8 °F (37 1 °C)    Temp:  [98 6 °F (37 °C)-98 8 °F (37 1 °C)] 98 8 °F (37 1 °C)  HR:  [62-91] 71  Resp:  [15-26] 22  BP: (132-177)/(70-83) 161/76  SpO2:  [87 %-96 %] 92 %  There is no height or weight on file to calculate BMI  Input and Output Summary (last 24 hours):   No intake or output data in the 24 hours ending 22 7742    Physical Exam:   Physical Exam  Constitutional:       General: She is not in acute distress  Appearance: Normal appearance  She is obese  She is not ill-appearing  HENT:      Head: Normocephalic and atraumatic  Eyes:      Pupils: Pupils are equal, round, and reactive to light  Cardiovascular:      Rate and Rhythm: Normal rate  Pulses: Normal pulses     Pulmonary:      Effort: Pulmonary effort is normal  No respiratory distress  Breath sounds: Wheezing present  Abdominal:      General: Bowel sounds are normal  There is no distension  Palpations: Abdomen is soft  Tenderness: There is no abdominal tenderness  Musculoskeletal:      Cervical back: Neck supple  No rigidity  Right lower leg: No edema  Left lower leg: No edema  Neurological:      Mental Status: She is alert and oriented to person, place, and time  Mental status is at baseline     Psychiatric:         Mood and Affect: Mood normal          Behavior: Behavior normal           Additional Data:     Labs:  Results from last 7 days   Lab Units 12/07/22  0434   WBC Thousand/uL 9 94   HEMOGLOBIN g/dL 11 1*   HEMATOCRIT % 34 0*   PLATELETS Thousands/uL 226   NEUTROS PCT % 86*   LYMPHS PCT % 9*   MONOS PCT % 4   EOS PCT % 0     Results from last 7 days   Lab Units 12/07/22  0434 12/06/22  1210   SODIUM mmol/L 139 140   POTASSIUM mmol/L 4 3 4 3   CHLORIDE mmol/L 102 103   CO2 mmol/L 28 25   BUN mg/dL 18 19   CREATININE mg/dL 0 92 0 84   ANION GAP mmol/L 9 12   CALCIUM mg/dL 8 1* 8 6   ALBUMIN g/dL  --  3 6   TOTAL BILIRUBIN mg/dL  --  0 58   ALK PHOS U/L  --  95   ALT U/L  --  18   AST U/L  --  21   GLUCOSE RANDOM mg/dL 161* 123     Results from last 7 days   Lab Units 12/07/22  1653   INR  1 45*             Results from last 7 days   Lab Units 12/07/22  1701 12/07/22  0434   PROCALCITONIN ng/ml <0 05 <0 05       Lines/Drains:  Invasive Devices     Peripheral Intravenous Line  Duration           Peripheral IV 12/06/22 Left Antecubital 1 day                  Imaging: Reviewed radiology reports from this admission including: chest CT scan    Recent Cultures (last 7 days):         Last 24 Hours Medication List:   Current Facility-Administered Medications   Medication Dose Route Frequency Provider Last Rate   • acetaminophen  650 mg Oral Q6H PRN Tino Brock MD     • albuterol  2 puff Inhalation Q4H PRN Tino Brock MD     • aspirin  81 mg Oral Daily Kimmy Arthur MD     • atorvastatin  40 mg Oral Daily With Raymond Romero MD     • azithromycin  500 mg Oral Q24H Royer LINK MD     • benzonatate  100 mg Oral TID PRN Andreia LINK MD     • budesonide  0 5 mg Nebulization Q12H Kimmy Arthur MD     • dextromethorphan-guaiFENesin  10 mL Oral Q6H PRN Andreia LINK MD     • enoxaparin  40 mg Subcutaneous Daily Kimmy Arthur MD     • hydrocodone-chlorpheniramine polistirex  5 mL Oral Q12H PRN Kimmy Arthur MD     • ipratropium  0 5 mg Nebulization TID Kimmy Arthur MD     • levalbuterol  1 25 mg Nebulization TID Kimmy Arthur MD     • levothyroxine  88 mcg Oral Early Morning Kimmy Arthur MD     • lisinopril  10 mg Oral Daily Kimmy Arthur MD     • metoprolol tartrate  12 5 mg Oral Q12H Jorge Leo MD     • ondansetron  4 mg Intravenous Q6H PRN Kimmy Arthur MD     • venlafaxine  150 mg Oral Daily Kimmy Arthur MD          Today, Patient Was Seen By: José Lyle MD    **Please Note: This note may have been constructed using a voice recognition system  **

## 2022-12-07 NOTE — ASSESSMENT & PLAN NOTE
Presented with chest discomfort, substernal pressure-like sensation  Suspect likely due to ongoing bronchiolitis/bronchitis  CT imaging without acute processes, negative for PE  Mildly elevated troponin likely nonischemic  Currently patient reports feeling little better but  Chest pain is reproducible on exam for  Likely secondary to coughing causing musculoskeletal pain    Trend troponins, monitor telemetry  Aspirin, statin

## 2022-12-08 ENCOUNTER — APPOINTMENT (INPATIENT)
Dept: NON INVASIVE DIAGNOSTICS | Facility: HOSPITAL | Age: 82
End: 2022-12-08

## 2022-12-08 LAB
AORTIC ROOT: 3.1 CM
APICAL FOUR CHAMBER EJECTION FRACTION: 64 %
ASCENDING AORTA: 3.6 CM
AV LVOT MEAN GRADIENT: 3 MMHG
AV LVOT PEAK GRADIENT: 5 MMHG
AV PEAK GRADIENT: 9 MMHG
DOP CALC LVOT PEAK VEL VTI: 27.5 CM
DOP CALC LVOT PEAK VEL: 1.15 M/S
E WAVE DECELERATION TIME: 278 MS
ERYTHROCYTE [DISTWIDTH] IN BLOOD BY AUTOMATED COUNT: 14.1 % (ref 11.6–15.1)
FRACTIONAL SHORTENING: 33 % (ref 28–44)
HCT VFR BLD AUTO: 36 % (ref 34.8–46.1)
HGB BLD-MCNC: 11.6 G/DL (ref 11.5–15.4)
INR PPP: 1.41 (ref 0.84–1.19)
INTERVENTRICULAR SEPTUM IN DIASTOLE (PARASTERNAL SHORT AXIS VIEW): 1.3 CM
INTERVENTRICULAR SEPTUM: 1.3 CM (ref 0.6–1.1)
LA/AORTA RATIO 2D: 1.35
LAAS-AP2: 22 CM2
LAAS-AP4: 22.1 CM2
LEFT ATRIUM SIZE: 4.2 CM
LEFT INTERNAL DIMENSION IN SYSTOLE: 2.6 CM (ref 2.1–4)
LEFT VENTRICULAR INTERNAL DIMENSION IN DIASTOLE: 3.9 CM (ref 3.5–6)
LEFT VENTRICULAR POSTERIOR WALL IN END DIASTOLE: 1.1 CM
LEFT VENTRICULAR STROKE VOLUME: 41 ML
LVSV (TEICH): 41 ML
MCH RBC QN AUTO: 30.3 PG (ref 26.8–34.3)
MCHC RBC AUTO-ENTMCNC: 32.2 G/DL (ref 31.4–37.4)
MCV RBC AUTO: 94 FL (ref 82–98)
MV E'TISSUE VEL-SEP: 5 CM/S
MV PEAK A VEL: 1.25 M/S
MV PEAK E VEL: 119 CM/S
MV STENOSIS PRESSURE HALF TIME: 81 MS
MV VALVE AREA P 1/2 METHOD: 2.72 CM2
PLATELET # BLD AUTO: 225 THOUSANDS/UL (ref 149–390)
PMV BLD AUTO: 10.3 FL (ref 8.9–12.7)
PROCALCITONIN SERPL-MCNC: <0.05 NG/ML
PROTHROMBIN TIME: 16.9 SECONDS (ref 11.6–14.5)
RBC # BLD AUTO: 3.83 MILLION/UL (ref 3.81–5.12)
SL CV LV EF: 65
SL CV PED ECHO LEFT VENTRICLE DIASTOLIC VOLUME (MOD BIPLANE) 2D: 66 ML
SL CV PED ECHO LEFT VENTRICLE SYSTOLIC VOLUME (MOD BIPLANE) 2D: 25 ML
TR MAX PG: 45 MMHG
TR PEAK VELOCITY: 2.9 M/S
TRICUSPID ANNULAR PLANE SYSTOLIC EXCURSION: 3.1 CM
TRICUSPID VALVE PEAK REGURGITATION VELOCITY: 2.88 M/S
WBC # BLD AUTO: 13.14 THOUSAND/UL (ref 4.31–10.16)

## 2022-12-08 RX ORDER — HYDROCODONE BITARTRATE AND HOMATROPINE METHYLBROMIDE ORAL SOLUTION 5; 1.5 MG/5ML; MG/5ML
5 LIQUID ORAL EVERY 6 HOURS PRN
Status: DISCONTINUED | OUTPATIENT
Start: 2022-12-08 | End: 2022-12-12 | Stop reason: HOSPADM

## 2022-12-08 RX ORDER — AZITHROMYCIN 500 MG/1
250 TABLET, FILM COATED ORAL EVERY 24 HOURS
Status: COMPLETED | OUTPATIENT
Start: 2022-12-08 | End: 2022-12-09

## 2022-12-08 RX ADMIN — LEVALBUTEROL HYDROCHLORIDE 1.25 MG: 1.25 SOLUTION, CONCENTRATE RESPIRATORY (INHALATION) at 21:10

## 2022-12-08 RX ADMIN — AZITHROMYCIN 250 MG: 250 TABLET, FILM COATED ORAL at 16:08

## 2022-12-08 RX ADMIN — ASPIRIN 81 MG 81 MG: 81 TABLET ORAL at 08:33

## 2022-12-08 RX ADMIN — IPRATROPIUM BROMIDE 0.5 MG: 0.5 SOLUTION RESPIRATORY (INHALATION) at 13:10

## 2022-12-08 RX ADMIN — LEVALBUTEROL HYDROCHLORIDE 1.25 MG: 1.25 SOLUTION, CONCENTRATE RESPIRATORY (INHALATION) at 08:11

## 2022-12-08 RX ADMIN — GUAIFENESIN AND DEXTROMETHORPHAN 10 ML: 100; 10 SYRUP ORAL at 17:06

## 2022-12-08 RX ADMIN — ENOXAPARIN SODIUM 40 MG: 40 INJECTION SUBCUTANEOUS at 08:41

## 2022-12-08 RX ADMIN — ATORVASTATIN CALCIUM 40 MG: 40 TABLET, FILM COATED ORAL at 16:08

## 2022-12-08 RX ADMIN — HYDROCODONE POLISTIREX AND CHLORPHENIRAMINE POLISTIREX 5 ML: 10; 8 SUSPENSION, EXTENDED RELEASE ORAL at 07:22

## 2022-12-08 RX ADMIN — LEVOTHYROXINE SODIUM 88 MCG: 88 TABLET ORAL at 05:30

## 2022-12-08 RX ADMIN — METOPROLOL TARTRATE 12.5 MG: 50 TABLET, FILM COATED ORAL at 21:01

## 2022-12-08 RX ADMIN — GUAIFENESIN AND DEXTROMETHORPHAN 10 ML: 100; 10 SYRUP ORAL at 08:36

## 2022-12-08 RX ADMIN — BUDESONIDE 0.5 MG: 0.5 INHALANT ORAL at 08:12

## 2022-12-08 RX ADMIN — IPRATROPIUM BROMIDE 0.5 MG: 0.5 SOLUTION RESPIRATORY (INHALATION) at 21:10

## 2022-12-08 RX ADMIN — LISINOPRIL 10 MG: 10 TABLET ORAL at 08:33

## 2022-12-08 RX ADMIN — METOPROLOL TARTRATE 12.5 MG: 50 TABLET, FILM COATED ORAL at 08:33

## 2022-12-08 RX ADMIN — PREDNISONE 40 MG: 20 TABLET ORAL at 08:33

## 2022-12-08 RX ADMIN — WARFARIN SODIUM 7.5 MG: 7.5 TABLET ORAL at 17:06

## 2022-12-08 RX ADMIN — IPRATROPIUM BROMIDE 0.5 MG: 0.5 SOLUTION RESPIRATORY (INHALATION) at 08:11

## 2022-12-08 RX ADMIN — LEVALBUTEROL HYDROCHLORIDE 1.25 MG: 1.25 SOLUTION, CONCENTRATE RESPIRATORY (INHALATION) at 13:10

## 2022-12-08 RX ADMIN — VENLAFAXINE HYDROCHLORIDE 150 MG: 150 CAPSULE, EXTENDED RELEASE ORAL at 08:40

## 2022-12-08 RX ADMIN — BUDESONIDE 0.5 MG: 0.5 INHALANT ORAL at 21:10

## 2022-12-08 RX ADMIN — ALBUTEROL SULFATE 2 PUFF: 90 AEROSOL, METERED RESPIRATORY (INHALATION) at 07:21

## 2022-12-08 NOTE — UTILIZATION REVIEW
NOTIFICATION OF INPATIENT ADMISSION   AUTHORIZATION REQUEST   SERVICING FACILITY:   [address] [place] [address]  Tax ID: [id number]  NPI: [id number] ATTENDING PROVIDER:  Attending Name and NPI#: Reza Miranda [[id number]]  Address: [address] [place] [address]  Phone: [phone]     ADMISSION INFORMATION:  Place of Service: Inpatient [facility]  [address]  Place of Service Code: 21  Inpatient Admission Date/Time: 12/7/22  3:34 PM  Discharge Date/Time: No discharge date for patient encounter  Admitting Diagnosis Code/Description:  SOB (shortness of breath) [R06 02]     UTILIZATION REVIEW CONTACT:  Lauren Jung Utilization   Network Utilization Review Department  Phone: [phone]  Fax [phone]  Email: [email or web address]  Contact for approvals/pending authorizations, clinical reviews, and discharge  PHYSICIAN ADVISORY SERVICES:  Medical Necessity Denial & [facility] Review  Phone: [phone]  Fax: [phone]  Email: [email or web address]  [email or web address]

## 2022-12-08 NOTE — PLAN OF CARE
Problem: MOBILITY - ADULT  Goal: Maintain or return to baseline ADL function  Description: INTERVENTIONS:  -  Assess patient's ability to carry out ADLs; assess patient's baseline for ADL function and identify physical deficits which impact ability to perform ADLs (bathing, care of mouth/teeth, toileting, grooming, dressing, etc )  - Assess/evaluate cause of self-care deficits   - Assess range of motion  - Assess patient's mobility; develop plan if impaired  - Assess patient's need for assistive devices and provide as appropriate  - Encourage maximum independence but intervene and supervise when necessary  - Involve family in performance of ADLs  - Assess for home care needs following discharge   - Consider OT consult to assist with ADL evaluation and planning for discharge  - Provide patient education as appropriate  Outcome: Progressing  Goal: Maintains/Returns to pre admission functional level  Description: INTERVENTIONS:  - Perform BMAT or MOVE assessment daily    - Set and communicate daily mobility goal to care team and patient/family/caregiver  - Collaborate with rehabilitation services on mobility goals if consulted  - Perform active Range of Motion as tolerated  - Reposition patient every 2 hours    - Dangle patient   - Stand patient   - Ambulate patient   - Out of bed to chair    - Out of bed for meals   - Out of bed for toileting  - Record patient progress and toleration of activity level   Outcome: Progressing     Problem: Potential for Falls  Goal: Patient will remain free of falls  Description: INTERVENTIONS:  - Educate patient/family on patient safety including physical limitations  - Instruct patient to call for assistance with activity   - Consult OT/PT to assist with strengthening/mobility   - Keep Call bell within reach  - Keep bed low and locked with side rails adjusted as appropriate  - Keep care items and personal belongings within reach  - Initiate and maintain comfort rounds  - Make Fall Risk Sign visible to staff  - Offer Toileting every 2 Hours, in advance of need  - Apply yellow socks and bracelet for high fall risk patients  - Consider moving patient to room near nurses station  Outcome: Progressing

## 2022-12-08 NOTE — ASSESSMENT & PLAN NOTE
Currently controlled  Continue metoprolol 12 5mg BID, rate controlled  INR subtherapeutic  Resume home dose of Coumadin  Monitor daily INR

## 2022-12-08 NOTE — ASSESSMENT & PLAN NOTE
Reports ongoing cough for 1 week  Flu, COVID and RSV negative  Recently seen PCP last week, prescribe albuterol, Tessalon Perles, Augmentin steroid taper  Patient reported completing medications and continued to have cough with some chest discomfort    Diffuse wheezing noted on lung sounds  CT chest without acute process, negative for PE, tree-in-bud appearance consistent with bronchiolitis  Will give IV steroids, nebulizers and PO azithromycin  O2 saturation 92-93% on ra at rest  Improves to   Continue to supplement to keep O2 saturation more than 93%  Continue prednisone 40 mg daily, p o  azithromycin, scheduled nebs  Follow-up with 2D echo since finding of pulmonary HTN on CT chest   Continue to wean off of oxygen as able  Continue with supportive care

## 2022-12-08 NOTE — PROGRESS NOTES
[facility]  Progress Note Lidia Simmons 1940, [de-identified] y o  female MRN: [id number]  Unit/Bed#: ED 17 Encounter: [id number]  Primary Care Provider: Michelle Allison DO   Date and time admitted to hospital: 12/6/2022  1:16 PM    * Bronchospasm with bronchitis, acute  Assessment & Plan  Reports ongoing cough for 1 week  Flu, COVID and RSV negative  Recently seen PCP last week, prescribe albuterol, Tessalon Perles, Augmentin steroid taper  Patient reported completing medications and continued to have cough with some chest discomfort  Procal negative x2  Diffuse wheezing noted on lung sounds  CT chest without acute process, negative for PE, tree-in-bud appearance consistent with bronchiolitis  Will give IV steroids, nebulizers and PO azithromycin  O2 saturation 92-93% on ra at rest  Improves to 96% on 1-2NC  Continue to supplement to keep O2 saturation more than 93%  Continue prednisone 40 mg daily, p o  azithromycin, scheduled nebs  Follow-up with 2D echo since finding of pulmonary HTN on CT chest   Continue to wean off of oxygen as able  Continue with supportive care  Chest pain  Assessment & Plan  Presented with chest discomfort, substernal pressure-like sensation  Suspect likely due to ongoing bronchiolitis/bronchitis  CT imaging without acute processes, negative for PE  Mildly elevated troponin likely nonischemic  Follow-up troponins negative with negative delta  Currently patient reports feeling little better  Currently denies chest pain  Likely secondary to coughing causing musculoskeletal pain    Trend troponins, monitor telemetry  Aspirin, statin      Leukocytosis  Assessment & Plan  Patient denies any fevers, chills, CT imaging without pneumonia  Suspect likely due to recent steroid which patient had completed given by PCP  Procalcitonin negative x2    Hyperlipidemia  Assessment & Plan  Continue statin    Hypertension  Assessment & Plan  Continue lisinopril and metoprolol    Hypothyroidism  Assessment & Plan  Continue levothyroxine    Paroxysmal atrial fibrillation (HCC)  Assessment & Plan  Currently controlled  Continue metoprolol 12 5mg BID, rate controlled  INR subtherapeutic  Resume home dose of Coumadin  Monitor daily INR  VTE Pharmacologic Prophylaxis:   Moderate Risk (Score 3-4) - Pharmacological DVT Prophylaxis Ordered: enoxaparin (Lovenox)  Patient Centered Rounds: I performed bedside rounds with nursing staff today  Education and Discussions with Family / Patient: Updated  (daughter) at bedside  Time Spent for Care: 30 minutes  More than 50% of total time spent on counseling and coordination of care as described above  Current Length of Stay: 1 day(s)  Current Patient Status: Inpatient   Certification Statement: The patient will continue to require additional inpatient hospital stay due to Hypoxia improving slowly  Discharge Plan: Anticipate discharge tomorrow to home  Code Status: Level 1 - Full Code    Subjective:   Currently O2 saturation 91 to 93% on room air at rest   Oxygen decreases to 88 to 89% with minimal oxygen  Clinically patient is complaining of cough however reports shortness of breath is improved  Denies chest pain, nausea, vomiting, diaphoresis, any other new complaints  Objective:     Vitals:   No data recorded  HR:  [64-86] 77  Resp:  [14-22] 20  BP: (138-171)/(75-87) 139/80  SpO2:  [87 %-99 %] 95 %  There is no height or weight on file to calculate BMI  Input and Output Summary (last 24 hours):   No intake or output data in the 24 hours ending 12/08/22 1211    Physical Exam:   Physical Exam  Constitutional:       General: She is not in acute distress  Appearance: Normal appearance  She is obese  She is not ill-appearing  HENT:      Head: Normocephalic and atraumatic  Eyes:      Pupils: Pupils are equal, round, and reactive to light  Cardiovascular:      Rate and Rhythm: Normal rate  Pulses: Normal pulses  Pulmonary:      Effort: Pulmonary effort is normal  No respiratory distress  Breath sounds: Wheezing present  Comments: Not in acute respite distress  Respiratory 91-93% on room air at rest   Abdominal:      General: Bowel sounds are normal  There is no distension  Palpations: Abdomen is soft  Tenderness: There is no abdominal tenderness  Musculoskeletal:      Cervical back: Neck supple  No rigidity  Right lower leg: No edema  Left lower leg: No edema  Neurological:      Mental Status: She is alert and oriented to person, place, and time  Mental status is at baseline     Psychiatric:         Mood and Affect: Mood normal          Behavior: Behavior normal        Additional Data:     Labs:  Results from last 7 days   Lab Units 12/08/22  0545 12/07/22  0434   WBC Thousand/uL 13 14* 9 94   HEMOGLOBIN g/dL 11 6 11 1*   HEMATOCRIT % 36 0 34 0*   PLATELETS Thousands/uL 225 226   NEUTROS PCT %  --  86*   LYMPHS PCT %  --  9*   MONOS PCT %  --  4   EOS PCT %  --  0     Results from last 7 days   Lab Units 12/07/22  0434 12/06/22  1210   SODIUM mmol/L 139 140   POTASSIUM mmol/L 4 3 4 3   CHLORIDE mmol/L 102 103   CO2 mmol/L 28 25   BUN mg/dL 18 19   CREATININE mg/dL 0 92 0 84   ANION GAP mmol/L 9 12   CALCIUM mg/dL 8 1* 8 6   ALBUMIN g/dL  --  3 6   TOTAL BILIRUBIN mg/dL  --  0 58   ALK PHOS U/L  --  95   ALT U/L  --  18   AST U/L  --  21   GLUCOSE RANDOM mg/dL 161* 123     Results from last 7 days   Lab Units 12/08/22  0545   INR  1 41*             Results from last 7 days   Lab Units 12/08/22  0545 12/07/22  1701 12/07/22  0434   PROCALCITONIN ng/ml <0 05 <0 05 <0 05       Lines/Drains:  Invasive Devices     Peripheral Intravenous Line  Duration           Peripheral IV 12/06/22 Left Antecubital 1 day                      Recent Cultures (last 7 days):         Last 24 Hours Medication List:   Current Facility-Administered Medications   Medication Dose Route Frequency Provider Last Rate   • acetaminophen  650 mg Oral Q6H PRN Jcaobo Abbott MD     • albuterol  2 puff Inhalation Q4H PRN Jacobo Abbott MD     • aspirin  81 mg Oral Daily Jacobo Abbott MD     • atorvastatin  40 mg Oral Daily With Umesh Leal MD     • azithromycin  500 mg Oral Q24H Royer LINK MD     • budesonide  0 5 mg Nebulization Q12H David Hines MD     • dextromethorphan-guaiFENesin  10 mL Oral Q8H Royer LINK MD     • enoxaparin  40 mg Subcutaneous Daily Jacobo Abbott MD     • HYDROcodone Bit-Homatrop MBr  5 mL Oral Q6H PRN Royer LINK MD     • ipratropium  0 5 mg Nebulization TID Jacobo Abbott MD     • levalbuterol  1 25 mg Nebulization TID Jacobo Abbott MD     • levothyroxine  88 mcg Oral Early Morning Jacobo Abbott MD     • lisinopril  10 mg Oral Daily Jacobo Abbott MD     • metoprolol tartrate  12 5 mg Oral Q12H [address] Lila Hines MD     • ondansetron  4 mg Intravenous Q6H PRN Jacobo Abbott MD     • predniSONE  40 mg Oral Daily Greg LINK MD     • venlafaxine  150 mg Oral Daily Jacobo Abbott MD     • warfarin  7 5 mg Oral Daily (warfarin) Ethel Thomas MD          Today, Patient Was Seen By: Kevin Reese MD    **Please Note: This note may have been constructed using a voice recognition system  **

## 2022-12-08 NOTE — ASSESSMENT & PLAN NOTE
Presented with chest discomfort, substernal pressure-like sensation  Suspect likely due to ongoing bronchiolitis/bronchitis  CT imaging without acute processes, negative for PE  Mildly elevated troponin likely nonischemic  Follow-up troponins negative with negative delta  Currently patient reports feeling little better  Currently denies chest pain  Likely secondary to coughing causing musculoskeletal pain    Trend troponins, monitor telemetry  Aspirin, statin

## 2022-12-09 LAB
ERYTHROCYTE [DISTWIDTH] IN BLOOD BY AUTOMATED COUNT: 14.2 % (ref 11.6–15.1)
HCT VFR BLD AUTO: 36.7 % (ref 34.8–46.1)
HGB BLD-MCNC: 11.8 G/DL (ref 11.5–15.4)
INR PPP: 1.28 (ref 0.84–1.19)
MCH RBC QN AUTO: 31.2 PG (ref 26.8–34.3)
MCHC RBC AUTO-ENTMCNC: 32.2 G/DL (ref 31.4–37.4)
MCV RBC AUTO: 97 FL (ref 82–98)
PLATELET # BLD AUTO: 220 THOUSANDS/UL (ref 149–390)
PMV BLD AUTO: 10.8 FL (ref 8.9–12.7)
PROTHROMBIN TIME: 15.8 SECONDS (ref 11.6–14.5)
RBC # BLD AUTO: 3.78 MILLION/UL (ref 3.81–5.12)
WBC # BLD AUTO: 13.31 THOUSAND/UL (ref 4.31–10.16)

## 2022-12-09 RX ADMIN — VENLAFAXINE HYDROCHLORIDE 150 MG: 150 CAPSULE, EXTENDED RELEASE ORAL at 08:54

## 2022-12-09 RX ADMIN — AZITHROMYCIN 250 MG: 250 TABLET, FILM COATED ORAL at 17:47

## 2022-12-09 RX ADMIN — LEVALBUTEROL HYDROCHLORIDE 1.25 MG: 1.25 SOLUTION, CONCENTRATE RESPIRATORY (INHALATION) at 20:23

## 2022-12-09 RX ADMIN — METOPROLOL TARTRATE 12.5 MG: 50 TABLET, FILM COATED ORAL at 08:54

## 2022-12-09 RX ADMIN — LEVOTHYROXINE SODIUM 88 MCG: 88 TABLET ORAL at 05:42

## 2022-12-09 RX ADMIN — ASPIRIN 81 MG 81 MG: 81 TABLET ORAL at 08:54

## 2022-12-09 RX ADMIN — GUAIFENESIN AND DEXTROMETHORPHAN 10 ML: 100; 10 SYRUP ORAL at 08:54

## 2022-12-09 RX ADMIN — LISINOPRIL 10 MG: 10 TABLET ORAL at 08:54

## 2022-12-09 RX ADMIN — IPRATROPIUM BROMIDE 0.5 MG: 0.5 SOLUTION RESPIRATORY (INHALATION) at 20:23

## 2022-12-09 RX ADMIN — LEVALBUTEROL HYDROCHLORIDE 1.25 MG: 1.25 SOLUTION, CONCENTRATE RESPIRATORY (INHALATION) at 08:27

## 2022-12-09 RX ADMIN — ATORVASTATIN CALCIUM 40 MG: 40 TABLET, FILM COATED ORAL at 17:47

## 2022-12-09 RX ADMIN — WARFARIN SODIUM 7.5 MG: 7.5 TABLET ORAL at 17:47

## 2022-12-09 RX ADMIN — ACETAMINOPHEN 650 MG: 325 TABLET, FILM COATED ORAL at 10:24

## 2022-12-09 RX ADMIN — ENOXAPARIN SODIUM 40 MG: 40 INJECTION SUBCUTANEOUS at 08:54

## 2022-12-09 RX ADMIN — METOPROLOL TARTRATE 12.5 MG: 50 TABLET, FILM COATED ORAL at 20:52

## 2022-12-09 RX ADMIN — PREDNISONE 40 MG: 20 TABLET ORAL at 08:54

## 2022-12-09 RX ADMIN — HYDROCODONE BITARTRATE AND HOMATROPINE METHYLBROMIDE 5 ML: 5; 1.5 SOLUTION ORAL at 10:24

## 2022-12-09 RX ADMIN — BUDESONIDE 0.5 MG: 0.5 INHALANT ORAL at 08:27

## 2022-12-09 RX ADMIN — IPRATROPIUM BROMIDE 0.5 MG: 0.5 SOLUTION RESPIRATORY (INHALATION) at 08:27

## 2022-12-09 RX ADMIN — LEVALBUTEROL HYDROCHLORIDE 1.25 MG: 1.25 SOLUTION, CONCENTRATE RESPIRATORY (INHALATION) at 13:18

## 2022-12-09 RX ADMIN — IPRATROPIUM BROMIDE 0.5 MG: 0.5 SOLUTION RESPIRATORY (INHALATION) at 13:18

## 2022-12-09 RX ADMIN — GUAIFENESIN AND DEXTROMETHORPHAN 10 ML: 100; 10 SYRUP ORAL at 17:47

## 2022-12-09 RX ADMIN — GUAIFENESIN AND DEXTROMETHORPHAN 10 ML: 100; 10 SYRUP ORAL at 01:19

## 2022-12-09 RX ADMIN — BUDESONIDE 0.5 MG: 0.5 INHALANT ORAL at 20:23

## 2022-12-09 NOTE — PROGRESS NOTES
[facility]  Progress Note Dione Servant 1940, [de-identified] y o  female MRN: [id number]  Unit/Bed#: -01 Encounter: [id number]  Primary Care Provider: Enrique Bhandari DO   Date and time admitted to hospital: 12/6/2022  1:16 PM    * Bronchospasm with bronchitis, acute  Assessment & Plan  · Reports ongoing cough for 1 week  · Flu, COVID and RSV negative  · Patient reported completing medications and continued to have cough with some chest discomfort  · CT chest without acute process, negative for PE, tree-in-bud appearance consistent with bronchiolitis  · Continue IV steroids, nebulizers and PO azithromycin  · Continue to supplement to keep O2 saturation more than 93%  · Continue prednisone 40 mg daily, p o  azithromycin, scheduled nebs  · Echocardiogram-EF 65% and tricuspid valve with mild regurgitation and right ventricular pressure mildly to moderately elevated  · Continue to wean off of oxygen as able  · Continue with supportive care  Leukocytosis  Assessment & Plan  · Patient denies any fevers, chills, CT imaging without pneumonia  · Suspect likely due to recent steroid which patient had completed given by PCP  · Procalcitonin negative x2    Chest pain  Assessment & Plan  · Presented with chest discomfort, substernal pressure-like sensation  · Suspect likely due to ongoing bronchiolitis/bronchitis  · CT imaging without acute processes, negative for PE  · Mildly elevated troponin likely nonischemic  · Follow-up troponins negative with negative delta  · Likely secondary to coughing causing musculoskeletal pain    · Aspirin, statin      Hyperlipidemia  Assessment & Plan  · Continue statin    Hypertension  Assessment & Plan  · Continue lisinopril and metoprolol    Hypothyroidism  Assessment & Plan  Continue levothyroxine    Paroxysmal atrial fibrillation (HCC)  Assessment & Plan  · Currently controlled  · Continue metoprolol 12 5mg BID, rate controlled  · INR subtherapeutic  · Resume home dose of Coumadin  · Monitor daily INR  VTE Pharmacologic Prophylaxis:   Moderate Risk (Score 3-4) - Pharmacological DVT Prophylaxis Ordered: warfarin (Coumadin)  Patient Centered Rounds: I performed bedside rounds with nursing staff today  Discussions with Specialists or Other Care Team Provider: Case management    Education and Discussions with Family / Patient: Updated  (daughter) at bedside  Time Spent for Care: 40 minutes  More than 50% of total time spent on counseling and coordination of care as described above  Current Length of Stay: 2 day(s)  Current Patient Status: Inpatient   Certification Statement: The patient will continue to require additional inpatient hospital stay due to Shortness of breath  Discharge Plan: Anticipate discharge in 24-48 hrs to home  Code Status: Level 1 - Full Code    Subjective:   Patient resting comfortable on examination  Patient had no overnight events or complaints on exam this morning  Objective:     Vitals:   Temp (24hrs), Av 2 °F (36 8 °C), Min:97 7 °F (36 5 °C), Max:98 5 °F (36 9 °C)    Temp:  [97 7 °F (36 5 °C)-98 5 °F (36 9 °C)] 98 5 °F (36 9 °C)  HR:  [72-91] 91  Resp:  [14-18] 18  BP: (133-159)/(64-86) 157/86  SpO2:  [91 %-96 %] 92 %  Body mass index is 36 28 kg/m²  Input and Output Summary (last 24 hours): Intake/Output Summary (Last 24 hours) at 2022 1331  Last data filed at 2022 0854  Gross per 24 hour   Intake 260 ml   Output --   Net 260 ml       Physical Exam:   Physical Exam  Vitals and nursing note reviewed  Constitutional:       General: She is not in acute distress  Appearance: She is well-developed  Comments: 2 L nasal cannula  Chronically ill-appearing   HENT:      Head: Normocephalic and atraumatic  Eyes:      General: No scleral icterus  Conjunctiva/sclera: Conjunctivae normal    Cardiovascular:      Rate and Rhythm: Normal rate and regular rhythm  Heart sounds: Normal heart sounds  No murmur heard  No friction rub  No gallop  Pulmonary:      Effort: Pulmonary effort is normal  No respiratory distress  Breath sounds: Normal breath sounds  No wheezing or rales  Abdominal:      General: Bowel sounds are normal  There is no distension  Palpations: Abdomen is soft  Tenderness: There is no abdominal tenderness  Musculoskeletal:         General: Normal range of motion  Skin:     General: Skin is warm  Findings: No rash  Neurological:      Mental Status: She is alert and oriented to person, place, and time  Additional Data:     Labs:  Results from last 7 days   Lab Units 12/09/22  0532 12/08/22  0545 12/07/22  0434   WBC Thousand/uL 13 31*   < > 9 94   HEMOGLOBIN g/dL 11 8   < > 11 1*   HEMATOCRIT % 36 7   < > 34 0*   PLATELETS Thousands/uL 220   < > 226   NEUTROS PCT %  --   --  86*   LYMPHS PCT %  --   --  9*   MONOS PCT %  --   --  4   EOS PCT %  --   --  0    < > = values in this interval not displayed  Results from last 7 days   Lab Units 12/07/22  0434 12/06/22  1210   SODIUM mmol/L 139 140   POTASSIUM mmol/L 4 3 4 3   CHLORIDE mmol/L 102 103   CO2 mmol/L 28 25   BUN mg/dL 18 19   CREATININE mg/dL 0 92 0 84   ANION GAP mmol/L 9 12   CALCIUM mg/dL 8 1* 8 6   ALBUMIN g/dL  --  3 6   TOTAL BILIRUBIN mg/dL  --  0 58   ALK PHOS U/L  --  95   ALT U/L  --  18   AST U/L  --  21   GLUCOSE RANDOM mg/dL 161* 123     Results from last 7 days   Lab Units 12/09/22  0532   INR  1 28*             Results from last 7 days   Lab Units 12/08/22  0545 12/07/22  1701 12/07/22  0434   PROCALCITONIN ng/ml <0 05 <0 05 <0 05       Lines/Drains:  Invasive Devices     Peripheral Intravenous Line  Duration           Peripheral IV 12/06/22 Left Antecubital 2 days                      Imaging: No pertinent imaging reviewed      Recent Cultures (last 7 days):         Last 24 Hours Medication List:   Current Facility-Administered Medications Medication Dose Route Frequency Provider Last Rate   • acetaminophen  650 mg Oral Q6H PRN Jacobo Abbott MD     • albuterol  2 puff Inhalation Q4H PRN Jacobo Abbott MD     • aspirin  81 mg Oral Daily Jacobo Abbott MD     • atorvastatin  40 mg Oral Daily With Umesh Leal MD     • azithromycin  250 mg Oral Q24H Royer LINK MD     • budesonide  0 5 mg Nebulization Q12H Jacobo Abbott MD     • dextromethorphan-guaiFENesin  10 mL Oral Q8H Royer LINK MD     • enoxaparin  40 mg Subcutaneous Daily Jacobo Abbott MD     • HYDROcodone Bit-Homatrop MBr  5 mL Oral Q6H PRN Royer LINK MD     • ipratropium  0 5 mg Nebulization TID Jacobo Abbott MD     • levalbuterol  1 25 mg Nebulization TID Jacobo Abbott MD     • levothyroxine  88 mcg Oral Early Morning Jacobo Abbott MD     • lisinopril  10 mg Oral Daily Jacobo Abbott MD     • metoprolol tartrate  12 5 mg Oral Q12H [address] Lila Hines MD     • ondansetron  4 mg Intravenous Q6H PRN Jacobo Abbott MD     • predniSONE  40 mg Oral Daily Royer LINK MD     • venlafaxine  150 mg Oral Daily Jacobo Abbott MD     • warfarin  7 5 mg Oral Daily (warfarin) Ethel Thomas MD          Today, Patient Was Seen By: Parviz Anthony DO    **Please Note: This note may have been constructed using a voice recognition system  **

## 2022-12-09 NOTE — ASSESSMENT & PLAN NOTE
· Currently controlled  · Continue metoprolol 12 5mg BID, rate controlled  · INR subtherapeutic  · Resume home dose of Coumadin  · Monitor daily INR

## 2022-12-09 NOTE — ASSESSMENT & PLAN NOTE
· Patient denies any fevers, chills, CT imaging without pneumonia  · Suspect likely due to recent steroid which patient had completed given by PCP  · Procalcitonin negative x2

## 2022-12-09 NOTE — ASSESSMENT & PLAN NOTE
· Presented with chest discomfort, substernal pressure-like sensation  · Suspect likely due to ongoing bronchiolitis/bronchitis  · CT imaging without acute processes, negative for PE  · Mildly elevated troponin likely nonischemic  · Follow-up troponins negative with negative delta  · Likely secondary to coughing causing musculoskeletal pain    · Aspirin, statin

## 2022-12-09 NOTE — ASSESSMENT & PLAN NOTE
· Reports ongoing cough for 1 week  · Flu, COVID and RSV negative  · Patient reported completing medications and continued to have cough with some chest discomfort  · CT chest without acute process, negative for PE, tree-in-bud appearance consistent with bronchiolitis  · Continue IV steroids, nebulizers and PO azithromycin  · Continue to supplement to keep O2 saturation more than 93%  · Continue prednisone 40 mg daily, p o  azithromycin, scheduled nebs  · Echocardiogram-EF 65% and tricuspid valve with mild regurgitation and right ventricular pressure mildly to moderately elevated  · Continue to wean off of oxygen as able  · Continue with supportive care

## 2022-12-09 NOTE — RESPIRATORY THERAPY NOTE
RT Protocol Note  Vivian Anger [de-identified] y o  female MRN: [id number]  Unit/Bed#: -01 Encounter: [id number]    Assessment    Principal Problem:    Bronchospasm with bronchitis, acute  Active Problems:    Paroxysmal atrial fibrillation (HCC)    Hypothyroidism    Hypertension    Hyperlipidemia    Chest pain    Leukocytosis      Home Pulmonary Medications:    Home Devices/Therapy: Other (Comment) (Nebs and inhaler daily)    Past Medical History:   Diagnosis Date   • Asthma      Social History     Socioeconomic History   • Marital status: /Civil Union     Spouse name: None   • Number of children: None   • Years of education: None   • Highest education level: None   Occupational History   • None   Tobacco Use   • Smoking status: Never   • Smokeless tobacco: Never   Vaping Use   • Vaping Use: Never used   Substance and Sexual Activity   • Alcohol use: Never   • Drug use: Never   • Sexual activity: None   Other Topics Concern   • None   Social History Narrative   • None     Social Determinants of Health     Financial Resource Strain: Not on file   Food Insecurity: Not on file   Transportation Needs: Not on file   Physical Activity: Not on file   Stress: Not on file   Social Connections: Not on file   Intimate Partner Violence: Not on file   Housing Stability: Not on file       Subjective         Objective    Physical Exam:   Assessment Type: Pre-treatment  General Appearance: Alert, Awake  Respiratory Pattern: Dyspnea with exertion  Chest Assessment: Chest expansion symmetrical  Bilateral Breath Sounds: Diminished, Expiratory wheezes  O2 Device: nc    Vitals:  Blood pressure 157/86, pulse 91, temperature 98 5 °F (36 9 °C), resp  rate 18, height 5' 5" (1 651 m), weight 98 9 kg (218 lb), SpO2 94 %  Imaging and other studies: I have personally reviewed pertinent reports  O2 Device: nc     Plan    Respiratory Plan: Home Bronchodilator Patient pathway        Resp Comments: pt not in distress   lungs are diminished with scattered wheezes  spo2 on 2L nc 94%  continue tx's as ordered

## 2022-12-09 NOTE — PLAN OF CARE
Problem: Potential for Falls  Goal: Patient will remain free of falls  Description: INTERVENTIONS:  - Educate patient/family on patient safety including physical limitations  - Instruct patient to call for assistance with activity   - Consult OT/PT to assist with strengthening/mobility   - Keep Call bell within reach  - Keep bed low and locked with side rails adjusted as appropriate  - Keep care items and personal belongings within reach  - Initiate and maintain comfort rounds  - Make Fall Risk Sign visible to staff  - Offer Toileting every 2 Hours, in advance of need  - Initiate/Maintain bed alarm  - Obtain necessary fall risk management equipment:  - Apply yellow socks and bracelet for high fall risk patients  - Consider moving patient to room near nurses station  Outcome: Progressing     Problem: MOBILITY - ADULT  Goal: Maintain or return to baseline ADL function  Description: INTERVENTIONS:  -  Assess patient's ability to carry out ADLs; assess patient's baseline for ADL function and identify physical deficits which impact ability to perform ADLs (bathing, care of mouth/teeth, toileting, grooming, dressing, etc )  - Assess/evaluate cause of self-care deficits   - Assess range of motion  - Assess patient's mobility; develop plan if impaired  - Assess patient's need for assistive devices and provide as appropriate  - Encourage maximum independence but intervene and supervise when necessary  - Involve family in performance of ADLs  - Assess for home care needs following discharge   - Consider OT consult to assist with ADL evaluation and planning for discharge  - Provide patient education as appropriate  Outcome: Progressing  Goal: Maintains/Returns to pre admission functional level  Description: INTERVENTIONS:  - Perform BMAT or MOVE assessment daily    - Set and communicate daily mobility goal to care team and patient/family/caregiver     - Collaborate with rehabilitation services on mobility goals if consulted  - Perform Range of Motion 3 times a day  - Reposition patient every 2 hours    - Dangle patient 3 times a day  - Stand patient 3 times a day  - Ambulate patient 3 times a day  - Out of bed to chair 3 times a day   - Out of bed for meals 3 times a day  - Out of bed for toileting  - Record patient progress and toleration of activity level   Outcome: Progressing

## 2022-12-10 LAB
ERYTHROCYTE [DISTWIDTH] IN BLOOD BY AUTOMATED COUNT: 14.2 % (ref 11.6–15.1)
HCT VFR BLD AUTO: 37.3 % (ref 34.8–46.1)
HGB BLD-MCNC: 11.9 G/DL (ref 11.5–15.4)
INR PPP: 1.35 (ref 0.84–1.19)
MCH RBC QN AUTO: 30.5 PG (ref 26.8–34.3)
MCHC RBC AUTO-ENTMCNC: 31.9 G/DL (ref 31.4–37.4)
MCV RBC AUTO: 96 FL (ref 82–98)
PLATELET # BLD AUTO: 222 THOUSANDS/UL (ref 149–390)
PMV BLD AUTO: 10.1 FL (ref 8.9–12.7)
PROTHROMBIN TIME: 16.4 SECONDS (ref 11.6–14.5)
RBC # BLD AUTO: 3.9 MILLION/UL (ref 3.81–5.12)
WBC # BLD AUTO: 14.95 THOUSAND/UL (ref 4.31–10.16)

## 2022-12-10 RX ORDER — METHYLPREDNISOLONE SODIUM SUCCINATE 40 MG/ML
40 INJECTION, POWDER, LYOPHILIZED, FOR SOLUTION INTRAMUSCULAR; INTRAVENOUS EVERY 12 HOURS SCHEDULED
Status: DISCONTINUED | OUTPATIENT
Start: 2022-12-10 | End: 2022-12-12 | Stop reason: HOSPADM

## 2022-12-10 RX ADMIN — LEVALBUTEROL HYDROCHLORIDE 1.25 MG: 1.25 SOLUTION, CONCENTRATE RESPIRATORY (INHALATION) at 07:33

## 2022-12-10 RX ADMIN — ATORVASTATIN CALCIUM 40 MG: 40 TABLET, FILM COATED ORAL at 17:59

## 2022-12-10 RX ADMIN — GUAIFENESIN AND DEXTROMETHORPHAN 10 ML: 100; 10 SYRUP ORAL at 10:00

## 2022-12-10 RX ADMIN — METHYLPREDNISOLONE SODIUM SUCCINATE 40 MG: 40 INJECTION, POWDER, FOR SOLUTION INTRAMUSCULAR; INTRAVENOUS at 21:40

## 2022-12-10 RX ADMIN — METOPROLOL TARTRATE 12.5 MG: 50 TABLET, FILM COATED ORAL at 21:37

## 2022-12-10 RX ADMIN — DICLOFENAC SODIUM 2 G: 10 GEL TOPICAL at 12:47

## 2022-12-10 RX ADMIN — IPRATROPIUM BROMIDE 0.5 MG: 0.5 SOLUTION RESPIRATORY (INHALATION) at 21:00

## 2022-12-10 RX ADMIN — BUDESONIDE 0.5 MG: 0.5 INHALANT ORAL at 21:00

## 2022-12-10 RX ADMIN — IPRATROPIUM BROMIDE 0.5 MG: 0.5 SOLUTION RESPIRATORY (INHALATION) at 14:10

## 2022-12-10 RX ADMIN — BUDESONIDE 0.5 MG: 0.5 INHALANT ORAL at 07:34

## 2022-12-10 RX ADMIN — IPRATROPIUM BROMIDE 0.5 MG: 0.5 SOLUTION RESPIRATORY (INHALATION) at 07:33

## 2022-12-10 RX ADMIN — HYDROCODONE BITARTRATE AND HOMATROPINE METHYLBROMIDE 5 ML: 5; 1.5 SOLUTION ORAL at 05:47

## 2022-12-10 RX ADMIN — LEVALBUTEROL HYDROCHLORIDE 1.25 MG: 1.25 SOLUTION, CONCENTRATE RESPIRATORY (INHALATION) at 14:10

## 2022-12-10 RX ADMIN — GUAIFENESIN AND DEXTROMETHORPHAN 10 ML: 100; 10 SYRUP ORAL at 01:47

## 2022-12-10 RX ADMIN — ASPIRIN 81 MG 81 MG: 81 TABLET ORAL at 10:01

## 2022-12-10 RX ADMIN — METOPROLOL TARTRATE 12.5 MG: 50 TABLET, FILM COATED ORAL at 10:01

## 2022-12-10 RX ADMIN — LEVOTHYROXINE SODIUM 88 MCG: 88 TABLET ORAL at 05:45

## 2022-12-10 RX ADMIN — METHYLPREDNISOLONE SODIUM SUCCINATE 40 MG: 40 INJECTION, POWDER, FOR SOLUTION INTRAMUSCULAR; INTRAVENOUS at 10:00

## 2022-12-10 RX ADMIN — DICLOFENAC SODIUM 2 G: 10 GEL TOPICAL at 18:00

## 2022-12-10 RX ADMIN — LISINOPRIL 10 MG: 10 TABLET ORAL at 10:01

## 2022-12-10 RX ADMIN — GUAIFENESIN AND DEXTROMETHORPHAN 10 ML: 100; 10 SYRUP ORAL at 17:59

## 2022-12-10 RX ADMIN — WARFARIN SODIUM 7.5 MG: 7.5 TABLET ORAL at 17:59

## 2022-12-10 RX ADMIN — LEVALBUTEROL HYDROCHLORIDE 1.25 MG: 1.25 SOLUTION, CONCENTRATE RESPIRATORY (INHALATION) at 21:00

## 2022-12-10 RX ADMIN — ENOXAPARIN SODIUM 40 MG: 40 INJECTION SUBCUTANEOUS at 10:00

## 2022-12-10 RX ADMIN — DICLOFENAC SODIUM 2 G: 10 GEL TOPICAL at 21:39

## 2022-12-10 RX ADMIN — VENLAFAXINE HYDROCHLORIDE 150 MG: 150 CAPSULE, EXTENDED RELEASE ORAL at 10:01

## 2022-12-10 NOTE — ASSESSMENT & PLAN NOTE
· Reports ongoing cough for 1 week  · Flu, COVID and RSV negative  · Patient reported completing medications and continued to have cough with some chest discomfort  · CT chest without acute process, negative for PE, tree-in-bud appearance consistent with bronchiolitis  · Continue to supplement to keep O2 saturation more than 93%  · Continue with daily soluMedrol 40 mg twice daily, scheduled nebs  · Echocardiogram-EF 65% and tricuspid valve with mild regurgitation and right ventricular pressure mildly to moderately elevated  · Continue to wean off of oxygen as able  · Continue with supportive care

## 2022-12-10 NOTE — CASE MANAGEMENT
Case Management Assessment & Discharge Planning Note    Patient name Mohamud Check  Location [address] [id number]/-77 MRN [id number]  : 1940 Date 12/10/2022       Current Admission Date: 2022  Current Admission Diagnosis:Bronchospasm with bronchitis, acute   Patient Active Problem List    Diagnosis Date Noted   • Bronchospasm with bronchitis, acute 2022   • Chest pain 2022   • Leukocytosis 2022   • Chest tightness 2022   • Diabetes ([address] ) 2022   • Obesity 2022   • Bilateral flank pain 2022   • Hemarthrosis involving knee joint, right 10/01/2020   • Ambulatory dysfunction 10/01/2020   • Paroxysmal atrial fibrillation ([address] ) 2020   • Community acquired pneumonia 2020   • Acute respiratory failure with hypoxia ([address] ) 2020   • Hypothyroidism 2020   • Hypertension 2020   • Hyperlipidemia 2020   • Major depressive disorder, single episode, mild ([address] ) 2019   • Osteoarthritis of knee 2017      LOS (days): 3  Geometric Mean LOS (GMLOS) (days): 2 10  Days to GMLOS:-0 7     OBJECTIVE:    Risk of Unplanned Readmission Score: 11 32         Current admission status: Inpatient       Preferred Pharmacy:   [facility] [address]  [address] [facility] [address]  [address]  Phone: [phone] Fax: [phone]    Primary Care Provider: Eboni Ordoñez DO    Primary Insurance: [facility]  Secondary Insurance:     ASSESSMENT:  [address], [address] [facility] Representative - Daughter   Primary Phone: [phone] (Home)               Advance Directives  Does patient have a [address]?: No  Was patient offered paperwork?: Yes (declined)  Does patient currently have a Health Care decision maker?: Yes, please see Health Care Proxy section  Does patient have Advance Directives?: No  Was patient offered paperwork?: Yes (declined)  Primary Contact: daughter Joan Irwin Readmission [employer]  30 Day Readmission: No    Patient Information  Admitted from[de-identified] Home  Mental Status: Alert  During Assessment patient was accompanied by: Not accompanied during assessment  Assessment information provided by[de-identified] Patient  Primary Caregiver: Self  Support Systems: Daughter  [place] of Residence: [place] [place]u do you live in?: appEatIT entry access options   Select all that apply : Stairs  Number of steps to enter home : 2  Do the steps have railings?: No  Type of Current Residence: 2 story home  Upon entering residence, is there a bedroom on the main floor (no further steps)?: No  A bedroom is located on the following floor levels of residence (select all that apply):: 2nd Floor  Upon entering residence, is there a bathroom on the main floor (no further steps)?: Yes  Number of steps to 2nd floor from main floor: One Flight  In the last 12 months, how many places have you lived?: 1  In the last 12 months, was there a time when you did not have a steady place to sleep or slept in a shelter (including now)?: No  Homeless/housing insecurity resource given?: No  Living Arrangements: Lives w/ Daughter (lives with daughter Amandeep Vasquez)  Is patient a ?: No    Activities of Daily Living Prior to Admission  Functional Status: Independent  Completes ADLs independently?: Yes  Ambulates independently?: Yes  Does patient use assisted devices?: Yes  Assisted Devices (DME) used: Straight Cane  Does patient currently own DME?: Yes  What DME does the patient currently own?: Straight Cane  Does patient have a history of Outpatient Therapy (PT/OT)?: No  Does the patient have a history of Short-Term Rehab?: No  Does patient have a history of HHC?: No  Does patient currently have [address]?: No         Patient Information Continued  Income Source: Unemployed  Does patient have prescription coverage?: Yes  Within the past 12 months, you worried that your food would run out before you got the money to buy more : Never true  Within the past 12 months, the food you bought just didn't last and you didn't have money to get more : Never true  Food insecurity resource given?: No  Does patient receive dialysis treatments?: No  Does patient have a history of substance abuse?: No  Does patient have a history of Mental Health Diagnosis?: No    PHQ 2/9 Screening   Reviewed PHQ 2/9 Depression Screening Score?: No    Means of Transportation  Means of Transport to Appts[de-identified] Family transport  In the past 12 months, has lack of transportation kept you from medical appointments or from getting medications?: No  In the past 12 months, has lack of transportation kept you from meetings, work, or from getting things needed for daily living?: No  Was application for public transport provided?: No        DISCHARGE DETAILS:    Discharge planning discussed with[de-identified] patient  Freedom of Choice: Yes  Comments - Freedom of Choice: Pt refusing HHC-feels she has adequate family support  She lives with Michelle Earl, but her other daughter and grandchildren visit almost every day and provide food, transport to shopping and transport to appointments  CM contacted family/caregiver?: No- see comments (pt will update family herself)  Were Treatment Team discharge recommendations reviewed with patient/caregiver?: Yes  Did patient/caregiver verbalize understanding of patient care needs?: Yes  Were patient/caregiver advised of the risks associated with not following Treatment Team discharge recommendations?: Yes    Contacts  Patient Contacts: Chris Parks  Relationship to Patient[de-identified] [address]         [facility] the [facility]  at discharge?: No    DME Referral Provided  Referral made for DME?: No    Other Referral/Resources/Interventions Provided:  Referral Comments: Pt refusing HHC-feels she has adequate family support    She lives with Michelle Earl, but her other daughter and grandchildren visit almost every day and provide food, transport to shopping and transport to appointments      Would you like to participate in our [address] service program?  : No - Declined       Discharge Destination Plan[de-identified] Home

## 2022-12-10 NOTE — ASSESSMENT & PLAN NOTE
· Reports ongoing cough for 1 week  · Flu, COVID and RSV negative  · Patient reported completing medications and continued to have cough with some chest discomfort  · CT chest without acute process, negative for PE, tree-in-bud appearance consistent with bronchiolitis  · Continue to supplement to keep O2 saturation more than 93%  · Steroids increased back to 40 mg twice daily, p o  azithromycin, scheduled nebs  · Echocardiogram-EF 65% and tricuspid valve with mild regurgitation and right ventricular pressure mildly to moderately elevated  · Continue to wean off of oxygen as able  · Continue with supportive care

## 2022-12-10 NOTE — PROGRESS NOTES
[address] [facility]  Progress Note - Nader Zamarripa 1940, [de-identified] y o  female MRN: [id number]  Unit/Bed#: -01 Encounter: [id number]  Primary Care Provider: Mac Araya,    Date and time admitted to hospital: 12/6/2022  1:16 PM    * Bronchospasm with bronchitis, acute  Assessment & Plan  · Reports ongoing cough for 1 week  · Flu, COVID and RSV negative  · Patient reported completing medications and continued to have cough with some chest discomfort  · CT chest without acute process, negative for PE, tree-in-bud appearance consistent with bronchiolitis  · Continue to supplement to keep O2 saturation more than 93%  · Steroids increased back to 40 mg twice daily, p o  azithromycin, scheduled nebs  · Echocardiogram-EF 65% and tricuspid valve with mild regurgitation and right ventricular pressure mildly to moderately elevated  · Continue to wean off of oxygen as able  · Continue with supportive care  Leukocytosis  Assessment & Plan  · Patient denies any fevers, chills, CT imaging without pneumonia  · Suspect likely due to recent steroid which patient had completed given by PCP  · Procalcitonin negative x2    Chest pain  Assessment & Plan  · Presented with chest discomfort, substernal pressure-like sensation  · Suspect likely due to ongoing bronchiolitis/bronchitis  · CT imaging without acute processes, negative for PE  · Mildly elevated troponin likely nonischemic  · Follow-up troponins negative with negative delta  · Likely secondary to coughing causing musculoskeletal pain  · Aspirin, statin      Hyperlipidemia  Assessment & Plan  · Continue statin    Hypertension  Assessment & Plan  · Continue lisinopril and metoprolol    Hypothyroidism  Assessment & Plan  Continue levothyroxine    Paroxysmal atrial fibrillation (HCC)  Assessment & Plan  · Currently controlled  · Continue metoprolol 12 5mg BID, rate controlled  · INR subtherapeutic  · Resume home dose of Coumadin    · Monitor daily INR         VTE Pharmacologic Prophylaxis:   Moderate Risk (Score 3-4) - Pharmacological DVT Prophylaxis Ordered: warfarin (Coumadin)  Patient Centered Rounds: I performed bedside rounds with nursing staff today  Discussions with Specialists or Other Care Team Provider: Case management    Education and Discussions with Family / Patient: Attempted to update  (daughter) via phone  Unable to contact  Time Spent for Care: 40 minutes  More than 50% of total time spent on counseling and coordination of care as described above  Current Length of Stay: 3 day(s)  Current Patient Status: Inpatient   Certification Statement: The patient will continue to require additional inpatient hospital stay due to hypoxia   Discharge Plan: Anticipate discharge tomorrow to home  Code Status: Level 1 - Full Code    Subjective:   Patient resting comfortably on examination this morning  Patient states she is feeling slightly better today compared to yesterday  Patient had no other complaints on exam     Objective:     Vitals:   Temp (24hrs), Av 8 °F (37 1 °C), Min:98 7 °F (37 1 °C), Max:98 9 °F (37 2 °C)    Temp:  [98 7 °F (37 1 °C)-98 9 °F (37 2 °C)] 98 8 °F (37 1 °C)  HR:  [] 75  Resp:  [13-19] 13  BP: (128-146)/(71-91) 146/74  SpO2:  [90 %-95 %] 95 %  Body mass index is 36 28 kg/m²  Input and Output Summary (last 24 hours): Intake/Output Summary (Last 24 hours) at 12/10/2022 1133  Last data filed at 12/10/2022 0925  Gross per 24 hour   Intake 480 ml   Output 700 ml   Net -220 ml       Physical Exam:   Physical Exam  Vitals and nursing note reviewed  Constitutional:       General: She is not in acute distress  Appearance: She is well-developed  Comments: 2 L nasal cannula  Chronically ill-appearing   HENT:      Head: Normocephalic and atraumatic  Eyes:      General: No scleral icterus       Conjunctiva/sclera: Conjunctivae normal    Cardiovascular:      Rate and Rhythm: Normal rate and regular rhythm  Heart sounds: Normal heart sounds  No murmur heard  No friction rub  No gallop  Pulmonary:      Effort: Pulmonary effort is normal  No respiratory distress  Breath sounds: Wheezing present  No rales  Comments: Bilateral wheezing noted  Abdominal:      General: Bowel sounds are normal  There is no distension  Palpations: Abdomen is soft  Tenderness: There is no abdominal tenderness  Musculoskeletal:         General: Normal range of motion  Skin:     General: Skin is warm  Findings: No rash  Neurological:      Mental Status: She is alert and oriented to person, place, and time  Additional Data:     Labs:  Results from last 7 days   Lab Units 12/10/22  0643 12/08/22  0545 12/07/22  0434   WBC Thousand/uL 14 95*   < > 9 94   HEMOGLOBIN g/dL 11 9   < > 11 1*   HEMATOCRIT % 37 3   < > 34 0*   PLATELETS Thousands/uL 222   < > 226   NEUTROS PCT %  --   --  86*   LYMPHS PCT %  --   --  9*   MONOS PCT %  --   --  4   EOS PCT %  --   --  0    < > = values in this interval not displayed  Results from last 7 days   Lab Units 12/07/22  0434 12/06/22  1210   SODIUM mmol/L 139 140   POTASSIUM mmol/L 4 3 4 3   CHLORIDE mmol/L 102 103   CO2 mmol/L 28 25   BUN mg/dL 18 19   CREATININE mg/dL 0 92 0 84   ANION GAP mmol/L 9 12   CALCIUM mg/dL 8 1* 8 6   ALBUMIN g/dL  --  3 6   TOTAL BILIRUBIN mg/dL  --  0 58   ALK PHOS U/L  --  95   ALT U/L  --  18   AST U/L  --  21   GLUCOSE RANDOM mg/dL 161* 123     Results from last 7 days   Lab Units 12/10/22  0602   INR  1 35*             Results from last 7 days   Lab Units 12/08/22  0545 12/07/22  1701 12/07/22  0434   PROCALCITONIN ng/ml <0 05 <0 05 <0 05       Lines/Drains:  Invasive Devices     Peripheral Intravenous Line  Duration           Peripheral IV 12/06/22 Left Antecubital 3 days                      Imaging: No pertinent imaging reviewed      Recent Cultures (last 7 days):         Last 24 Hours Medication List: Current Facility-Administered Medications   Medication Dose Route Frequency Provider Last Rate   • acetaminophen  650 mg Oral Q6H PRN Ashley John MD     • albuterol  2 puff Inhalation Q4H PRN Ashley John MD     • aspirin  81 mg Oral Daily Ashley John MD     • atorvastatin  40 mg Oral Daily With Uri Guzman MD     • budesonide  0 5 mg Nebulization Q12H Ashley John MD     • dextromethorphan-guaiFENesin  10 mL Oral Q8H Kelly LINK MD     • Diclofenac Sodium  2 g Topical 4x Daily Ramiro Hong DO     • enoxaparin  40 mg Subcutaneous Daily Ashley John MD     • HYDROcodone Bit-Homatrop MBr  5 mL Oral Q6H PRN Royer LINK MD     • ipratropium  0 5 mg Nebulization TID Ashley John MD     • levalbuterol  1 25 mg Nebulization TID Ashley John MD     • levothyroxine  88 mcg Oral Early Morning Ashley John MD     • lisinopril  10 mg Oral Daily Ashley John MD     • methylPREDNISolone sodium succinate  40 mg Intravenous Q12H [facility] Ramiro Hong DO     • metoprolol tartrate  12 5 mg Oral Q12H [facility] Ashley John MD     • ondansetron  4 mg Intravenous Q6H PRN Ashley John MD     • venlafaxine  150 mg Oral Daily Ashley John MD     • warfarin  7 5 mg Oral Daily (warfarin) Livan Davila MD          Today, Patient Was Seen By: Ramiro Hong DO    **Please Note: This note may have been constructed using a voice recognition system  **

## 2022-12-10 NOTE — ASSESSMENT & PLAN NOTE
· Currently controlled  · Continue metoprolol 12 5mg BID, rate controlled  · Resume home dose of Coumadin  · Monitor daily INR

## 2022-12-10 NOTE — RESPIRATORY THERAPY NOTE
RT Protocol Note  Linda Montague [de-identified] y o  female MRN: [id number]  Unit/Bed#: -01 Encounter: [id number]    Assessment    Principal Problem:    Bronchospasm with bronchitis, acute  Active Problems:    Paroxysmal atrial fibrillation (HCC)    Hypothyroidism    Hypertension    Hyperlipidemia    Chest pain    Leukocytosis      Home Pulmonary Medications:     12/09/22 2024   Respiratory Protocol   Protocol Initiated? No   Protocol Selection Respiratory   Language Barrier? No   Medical & Social History Reviewed? Yes   Diagnostic Studies Reviewed? Yes   Physical Assessment Performed? Yes   Respiratory Plan Home Bronchodilator Patient pathway   Respiratory Assessment   Assessment Type During-treatment   General Appearance Alert; Awake   Respiratory Pattern Normal   Chest Assessment Chest expansion symmetrical   Bilateral Breath Sounds Diminished; Expiratory wheezes   R Breath Sounds Diminished; Expiratory wheezes   L Breath Sounds Diminished; Expiratory wheezes   Location Specific No   Cough None   Resp Comments Resp protocol completed   Cough Description   Sputum Amount None   Additional Assessments   SpO2 93 %     Home Devices/Therapy: Other (Comment) (Nebs and inhaler daily)    Past Medical History:   Diagnosis Date    Asthma      Social History     Socioeconomic History    Marital status: /Civil Union     Spouse name: None    Number of children: None    Years of education: None    Highest education level: None   Occupational History    None   Tobacco Use    Smoking status: Never    Smokeless tobacco: Never   Vaping Use    Vaping Use: Never used   Substance and Sexual Activity    Alcohol use: Never    Drug use: Never    Sexual activity: None   Other Topics Concern    None   Social History Narrative    None     Social Determinants of Health     Financial Resource Strain: Not on file   Food Insecurity: Not on file   Transportation Needs: Not on file   Physical Activity: Not on file   Stress: Not on file   Social Connections: Not on file   Intimate Partner Violence: Not on file   Housing Stability: Not on file       Subjective         Objective    Physical Exam:   Assessment Type: During-treatment  General Appearance: Alert, Awake  Respiratory Pattern: Normal  Chest Assessment: Chest expansion symmetrical  Bilateral Breath Sounds: Diminished, Expiratory wheezes  R Breath Sounds: Diminished, Expiratory wheezes  L Breath Sounds: Diminished, Expiratory wheezes  Location Specific: No  Cough: None    Vitals:  Blood pressure 128/71, pulse 87, temperature 98 9 °F (37 2 °C), resp  rate 18, height 5' 5" (1 651 m), weight 98 9 kg (218 lb), SpO2 93 %  Imaging and other studies: I have personally reviewed pertinent reports        O2 Device: nc     Plan    Respiratory Plan: Home Bronchodilator Patient pathway        Resp Comments: Resp protocol completed

## 2022-12-11 LAB
ANION GAP SERPL CALCULATED.3IONS-SCNC: 8 MMOL/L (ref 4–13)
BASOPHILS # BLD AUTO: 0.02 THOUSANDS/ÂΜL (ref 0–0.1)
BASOPHILS NFR BLD AUTO: 0 % (ref 0–1)
BUN SERPL-MCNC: 22 MG/DL (ref 5–25)
CALCIUM SERPL-MCNC: 8.2 MG/DL (ref 8.3–10.1)
CHLORIDE SERPL-SCNC: 102 MMOL/L (ref 96–108)
CO2 SERPL-SCNC: 27 MMOL/L (ref 21–32)
CREAT SERPL-MCNC: 0.86 MG/DL (ref 0.6–1.3)
EOSINOPHIL # BLD AUTO: 0 THOUSAND/ÂΜL (ref 0–0.61)
EOSINOPHIL NFR BLD AUTO: 0 % (ref 0–6)
ERYTHROCYTE [DISTWIDTH] IN BLOOD BY AUTOMATED COUNT: 13.9 % (ref 11.6–15.1)
GFR SERPL CREATININE-BSD FRML MDRD: 64 ML/MIN/1.73SQ M
GLUCOSE SERPL-MCNC: 165 MG/DL (ref 65–140)
HCT VFR BLD AUTO: 37.3 % (ref 34.8–46.1)
HGB BLD-MCNC: 12.2 G/DL (ref 11.5–15.4)
IMM GRANULOCYTES # BLD AUTO: 0.15 THOUSAND/UL (ref 0–0.2)
IMM GRANULOCYTES NFR BLD AUTO: 1 % (ref 0–2)
LYMPHOCYTES # BLD AUTO: 0.92 THOUSANDS/ÂΜL (ref 0.6–4.47)
LYMPHOCYTES NFR BLD AUTO: 6 % (ref 14–44)
MCH RBC QN AUTO: 30.7 PG (ref 26.8–34.3)
MCHC RBC AUTO-ENTMCNC: 32.7 G/DL (ref 31.4–37.4)
MCV RBC AUTO: 94 FL (ref 82–98)
MONOCYTES # BLD AUTO: 0.58 THOUSAND/ÂΜL (ref 0.17–1.22)
MONOCYTES NFR BLD AUTO: 4 % (ref 4–12)
NEUTROPHILS # BLD AUTO: 14.03 THOUSANDS/ÂΜL (ref 1.85–7.62)
NEUTS SEG NFR BLD AUTO: 89 % (ref 43–75)
NRBC BLD AUTO-RTO: 0 /100 WBCS
PLATELET # BLD AUTO: 226 THOUSANDS/UL (ref 149–390)
PMV BLD AUTO: 10.4 FL (ref 8.9–12.7)
POTASSIUM SERPL-SCNC: 4.8 MMOL/L (ref 3.5–5.3)
RBC # BLD AUTO: 3.97 MILLION/UL (ref 3.81–5.12)
SODIUM SERPL-SCNC: 137 MMOL/L (ref 135–147)
WBC # BLD AUTO: 15.7 THOUSAND/UL (ref 4.31–10.16)

## 2022-12-11 RX ADMIN — VENLAFAXINE HYDROCHLORIDE 150 MG: 150 CAPSULE, EXTENDED RELEASE ORAL at 10:08

## 2022-12-11 RX ADMIN — WARFARIN SODIUM 7.5 MG: 7.5 TABLET ORAL at 17:07

## 2022-12-11 RX ADMIN — IPRATROPIUM BROMIDE 0.5 MG: 0.5 SOLUTION RESPIRATORY (INHALATION) at 14:20

## 2022-12-11 RX ADMIN — LEVALBUTEROL HYDROCHLORIDE 1.25 MG: 1.25 SOLUTION, CONCENTRATE RESPIRATORY (INHALATION) at 14:20

## 2022-12-11 RX ADMIN — ENOXAPARIN SODIUM 40 MG: 40 INJECTION SUBCUTANEOUS at 10:08

## 2022-12-11 RX ADMIN — LEVOTHYROXINE SODIUM 88 MCG: 88 TABLET ORAL at 06:39

## 2022-12-11 RX ADMIN — DICLOFENAC SODIUM 2 G: 10 GEL TOPICAL at 12:00

## 2022-12-11 RX ADMIN — METOPROLOL TARTRATE 12.5 MG: 50 TABLET, FILM COATED ORAL at 22:00

## 2022-12-11 RX ADMIN — GUAIFENESIN AND DEXTROMETHORPHAN 10 ML: 100; 10 SYRUP ORAL at 10:08

## 2022-12-11 RX ADMIN — BUDESONIDE 0.5 MG: 0.5 INHALANT ORAL at 19:46

## 2022-12-11 RX ADMIN — DICLOFENAC SODIUM 2 G: 10 GEL TOPICAL at 22:00

## 2022-12-11 RX ADMIN — DICLOFENAC SODIUM 2 G: 10 GEL TOPICAL at 17:08

## 2022-12-11 RX ADMIN — DICLOFENAC SODIUM 2 G: 10 GEL TOPICAL at 10:13

## 2022-12-11 RX ADMIN — ATORVASTATIN CALCIUM 40 MG: 40 TABLET, FILM COATED ORAL at 17:07

## 2022-12-11 RX ADMIN — GUAIFENESIN AND DEXTROMETHORPHAN 10 ML: 100; 10 SYRUP ORAL at 01:36

## 2022-12-11 RX ADMIN — ASPIRIN 81 MG 81 MG: 81 TABLET ORAL at 10:12

## 2022-12-11 RX ADMIN — METHYLPREDNISOLONE SODIUM SUCCINATE 40 MG: 40 INJECTION, POWDER, FOR SOLUTION INTRAMUSCULAR; INTRAVENOUS at 10:08

## 2022-12-11 RX ADMIN — GUAIFENESIN AND DEXTROMETHORPHAN 10 ML: 100; 10 SYRUP ORAL at 17:07

## 2022-12-11 RX ADMIN — LEVALBUTEROL HYDROCHLORIDE 1.25 MG: 1.25 SOLUTION, CONCENTRATE RESPIRATORY (INHALATION) at 19:46

## 2022-12-11 RX ADMIN — IPRATROPIUM BROMIDE 0.5 MG: 0.5 SOLUTION RESPIRATORY (INHALATION) at 19:46

## 2022-12-11 RX ADMIN — METHYLPREDNISOLONE SODIUM SUCCINATE 40 MG: 40 INJECTION, POWDER, FOR SOLUTION INTRAMUSCULAR; INTRAVENOUS at 22:00

## 2022-12-11 NOTE — ASSESSMENT & PLAN NOTE
· Currently controlled  · Continue metoprolol 12 5mg BID, rate controlled  · Continue Coumadin on discharge

## 2022-12-11 NOTE — RESPIRATORY THERAPY NOTE
RT Protocol Note  Prateek Matta [de-identified] y o  female MRN: [id number]  Unit/Bed#: -01 Encounter: [id number]    Assessment    Principal Problem:    Bronchospasm with bronchitis, acute  Active Problems:    Paroxysmal atrial fibrillation ([address] )    Hypothyroidism    Hypertension    Hyperlipidemia    Chest pain    Leukocytosis      Home Pulmonary Medications:     12/10/22 2100   Respiratory Protocol   Protocol Initiated? No   Protocol Selection Respiratory   Language Barrier? No   Medical & Social History Reviewed? Yes   Diagnostic Studies Reviewed? Yes   Physical Assessment Performed? Yes   Respiratory Plan Home Bronchodilator Patient pathway   Respiratory Assessment   Assessment Type During-treatment   General Appearance Alert; Awake   Respiratory Pattern Normal   Chest Assessment Chest expansion symmetrical   Bilateral Breath Sounds Diminished; Expiratory wheezes   R Breath Sounds Diminished; Expiratory wheezes   L Breath Sounds Diminished; Expiratory wheezes   Location Specific No   Cough None   Resp Comments Resp protocol completed   Cough Description   Sputum Amount None   Additional Assessments   Pulse 86   Respirations 16   SpO2 95 %       Home Devices/Therapy: Other (Comment) (Nebs and inhaler daily)    Past Medical History:   Diagnosis Date    Asthma      Social History     Socioeconomic History    Marital status: /Civil Union     Spouse name: None    Number of children: None    Years of education: None    Highest education level: None   Occupational History    None   Tobacco Use    Smoking status: Never    Smokeless tobacco: Never   Vaping Use    Vaping Use: Never used   Substance and Sexual Activity    Alcohol use: Never    Drug use: Never    Sexual activity: None   Other Topics Concern    None   Social History Narrative    None     Social Determinants of Health     Financial Resource Strain: Not on file   Food Insecurity: No Food Insecurity    Worried About Running Out of Food in the Last Year: Never true Ran Out of Food in the Last Year: Never true   Transportation Needs: No Transportation Needs    Lack of Transportation (Medical): No    Lack of Transportation (Non-Medical): No   Physical Activity: Not on file   Stress: Not on file   Social Connections: Not on file   Intimate Partner Violence: Not on file   Housing Stability: Unknown    Unable to Pay for Housing in the Last Year: Not on file    Number of Places Lived in the Last Year: 1    Unstable Housing in the Last Year: No       Subjective         Objective    Physical Exam:   Assessment Type: During-treatment  General Appearance: Alert, Awake  Respiratory Pattern: Normal  Chest Assessment: Chest expansion symmetrical  Bilateral Breath Sounds: Diminished, Expiratory wheezes  R Breath Sounds: Diminished, Expiratory wheezes  L Breath Sounds: Diminished, Expiratory wheezes  Location Specific: No  Cough: None  O2 Device: nc    Vitals:  Blood pressure 123/69, pulse 86, temperature 98 3 °F (36 8 °C), resp  rate 16, height 5' 5" (1 651 m), weight 98 9 kg (218 lb), SpO2 95 %  Imaging and other studies: I have personally reviewed pertinent reports        O2 Device: nc     Plan    Respiratory Plan: Home Bronchodilator Patient pathway        Resp Comments: Resp protocol completed

## 2022-12-11 NOTE — PLAN OF CARE
Problem: Potential for Falls  Goal: Patient will remain free of falls  Description: INTERVENTIONS:  - Educate patient/family on patient safety including physical limitations  - Instruct patient to call for assistance with activity   - Consult OT/PT to assist with strengthening/mobility   - Keep Call bell within reach  - Keep bed low and locked with side rails adjusted as appropriate  - Keep care items and personal belongings within reach  - Initiate and maintain comfort rounds  - Make Fall Risk Sign visible to staff  - Offer Toileting [id number], in advance of need  - Initiate/[id number]  - Obtain necessary fall risk management equipment:   - Apply yellow socks and bracelet for high fall risk patients  - Consider moving patient to room near nurses station  Outcome: Progressing

## 2022-12-11 NOTE — PROGRESS NOTES
[facility]  Progress Note - Shante Serrano 1940, [de-identified] y o  female MRN: [id number]  Unit/Bed#: -01 Encounter: [id number]  Primary Care Provider: Chantel Singleton,    Date and time admitted to hospital: 12/6/2022  1:16 PM    * Bronchospasm with bronchitis, acute  Assessment & Plan  · Reports ongoing cough for 1 week  · Flu, COVID and RSV negative  · Patient reported completing medications and continued to have cough with some chest discomfort  · CT chest without acute process, negative for PE, tree-in-bud appearance consistent with bronchiolitis  · Continue to supplement to keep O2 saturation more than 93%  · Continue with daily soluMedrol 40 mg twice daily, scheduled nebs  · Echocardiogram-EF 65% and tricuspid valve with mild regurgitation and right ventricular pressure mildly to moderately elevated  · Continue to wean off of oxygen as able  · Continue with supportive care  Leukocytosis  Assessment & Plan  · Patient denies any fevers, chills, CT imaging without pneumonia  · Suspect likely due to recent steroid which patient had completed given by PCP  · Procalcitonin negative x2    Chest pain  Assessment & Plan  · Presented with chest discomfort, substernal pressure-like sensation  · Suspect likely due to ongoing bronchiolitis/bronchitis  · CT imaging without acute processes, negative for PE  · Mildly elevated troponin likely nonischemic  · Follow-up troponins negative with negative delta  · Likely secondary to coughing causing musculoskeletal pain  · Aspirin, statin      Hyperlipidemia  Assessment & Plan  · Continue statin    Hypertension  Assessment & Plan  · Continue lisinopril and metoprolol    Hypothyroidism  Assessment & Plan  Continue levothyroxine    Paroxysmal atrial fibrillation (HCC)  Assessment & Plan  · Currently controlled  · Continue metoprolol 12 5mg BID, rate controlled  · Resume home dose of Coumadin  · Monitor daily INR          VTE Pharmacologic Prophylaxis:   Moderate Risk (Score 3-4) - Pharmacological DVT Prophylaxis Ordered: warfarin (Coumadin)  Patient Centered Rounds: I performed bedside rounds with nursing staff today  Discussions with Specialists or Other Care Team Provider: Case management    Education and Discussions with Family / Patient: Updated  (daughter) at bedside  Time Spent for Care: 38 minutes  More than 50% of total time spent on counseling and coordination of care as described above  Current Length of Stay: 4 day(s)  Current Patient Status: Inpatient   Certification Statement: The patient will continue to require additional inpatient hospital stay due to Shortness of breath and wheezing  Discharge Plan: Anticipate discharge tomorrow to home  Code Status: Level 1 - Full Code    Subjective:   Patient resting comfortably on examination  Patient states that her breathing is better compared to yesterday  Patient had no other complaints on exam     Objective:     Vitals:   Temp (24hrs), Av 1 °F (37 3 °C), Min:98 3 °F (36 8 °C), Max:99 6 °F (37 6 °C)    Temp:  [98 3 °F (36 8 °C)-99 6 °F (37 6 °C)] 99 6 °F (37 6 °C)  HR:  [80-86] 85  Resp:  [15-17] 15  BP: ()/(69-79) 93/70  SpO2:  [90 %-98 %] 92 %  Body mass index is 36 28 kg/m²  Input and Output Summary (last 24 hours): Intake/Output Summary (Last 24 hours) at 2022 0914  Last data filed at 2022 0301  Gross per 24 hour   Intake 780 ml   Output 750 ml   Net 30 ml       Physical Exam:   Physical Exam  Vitals and nursing note reviewed  Constitutional:       General: She is not in acute distress  Appearance: She is well-developed  Comments: 2 L nasal cannula  Chronically ill-appearing   HENT:      Head: Normocephalic and atraumatic  Eyes:      General: No scleral icterus  Conjunctiva/sclera: Conjunctivae normal    Cardiovascular:      Rate and Rhythm: Normal rate and regular rhythm  Heart sounds: Normal heart sounds   No murmur heard  No friction rub  No gallop  Pulmonary:      Effort: Pulmonary effort is normal  No respiratory distress  Breath sounds: Wheezing present  No rales  Abdominal:      General: Bowel sounds are normal  There is no distension  Palpations: Abdomen is soft  Tenderness: There is no abdominal tenderness  Musculoskeletal:         General: Normal range of motion  Skin:     General: Skin is warm  Findings: No rash  Neurological:      Mental Status: She is alert and oriented to person, place, and time  Additional Data:     Labs:  Results from last 7 days   Lab Units 12/11/22  0524   WBC Thousand/uL 15 70*   HEMOGLOBIN g/dL 12 2   HEMATOCRIT % 37 3   PLATELETS Thousands/uL 226   NEUTROS PCT % 89*   LYMPHS PCT % 6*   MONOS PCT % 4   EOS PCT % 0     Results from last 7 days   Lab Units 12/11/22  0524 12/07/22  0434 12/06/22  1210   SODIUM mmol/L 137   < > 140   POTASSIUM mmol/L 4 8   < > 4 3   CHLORIDE mmol/L 102   < > 103   CO2 mmol/L 27   < > 25   BUN mg/dL 22   < > 19   CREATININE mg/dL 0 86   < > 0 84   ANION GAP mmol/L 8   < > 12   CALCIUM mg/dL 8 2*   < > 8 6   ALBUMIN g/dL  --   --  3 6   TOTAL BILIRUBIN mg/dL  --   --  0 58   ALK PHOS U/L  --   --  95   ALT U/L  --   --  18   AST U/L  --   --  21   GLUCOSE RANDOM mg/dL 165*   < > 123    < > = values in this interval not displayed  Results from last 7 days   Lab Units 12/10/22  0602   INR  1 35*             Results from last 7 days   Lab Units 12/08/22  0545 12/07/22  1701 12/07/22  0434   PROCALCITONIN ng/ml <0 05 <0 05 <0 05       Lines/Drains:  Invasive Devices     Peripheral Intravenous Line  Duration           Peripheral IV 12/10/22 Proximal;Right;Ventral (anterior) Forearm <1 day                      Imaging: No pertinent imaging reviewed      Recent Cultures (last 7 days):         Last 24 Hours Medication List:   Current Facility-Administered Medications   Medication Dose Route Frequency Provider Last Rate   • acetaminophen  650 mg Oral Q6H PRN Kathy Sewell MD     • albuterol  2 puff Inhalation Q4H PRN Kathy Sewell MD     • aspirin  81 mg Oral Daily Kathy Sewell MD     • atorvastatin  40 mg Oral Daily With Laura King MD     • budesonide  0 5 mg Nebulization Q12H Kathy Sewell MD     • dextromethorphan-guaiFENesin  10 mL Oral Q8H Memo Tatiana LINK MD     • Diclofenac Sodium  2 g Topical 4x Daily Mary Herr DO     • enoxaparin  40 mg Subcutaneous Daily Kathy Sewell MD     • HYDROcodone Bit-Homatrop MBr  5 mL Oral Q6H PRN Royer LINK MD     • ipratropium  0 5 mg Nebulization TID Kathy Sewell MD     • levalbuterol  1 25 mg Nebulization TID Kathy Sewell MD     • levothyroxine  88 mcg Oral Early Morning Kathy Sewell MD     • lisinopril  10 mg Oral Daily Kathy Sewell MD     • methylPREDNISolone sodium succinate  40 mg Intravenous Q12H [facility] Mary Herr DO     • metoprolol tartrate  12 5 mg Oral Q12H [facility] Kathy Sewell MD     • ondansetron  4 mg Intravenous Q6H PRN Kathy Sewell MD     • venlafaxine  150 mg Oral Daily Kathy Sewell MD     • warfarin  7 5 mg Oral Daily (warfarin) Armando Whyte MD          Today, Patient Was Seen By: Mary Herr DO    **Please Note: This note may have been constructed using a voice recognition system  **

## 2022-12-11 NOTE — ASSESSMENT & PLAN NOTE
· States that breathing is markedly improved compared to presentation  · Flu, COVID and RSV negative  · CT chest without acute process, negative for PE, tree-in-bud appearance consistent with bronchiolitis  · Echocardiogram-EF 65% and tricuspid valve with mild regurgitation and right ventricular pressure mildly to moderately elevated  · Home O2 eval completed patient does not require oxygen  · Steroid taper on discharge

## 2022-12-11 NOTE — ASSESSMENT & PLAN NOTE
· Suspect likely due to ongoing bronchiolitis/bronchitis  · CT imaging without acute processes, negative for PE  · Mildly elevated troponin likely nonischemic  · Follow-up troponins negative with negative delta  · Likely secondary to coughing causing musculoskeletal pain    · Aspirin, statin

## 2022-12-12 ENCOUNTER — ANESTHESIA (EMERGENCY)
Dept: RADIOLOGY | Facility: HOSPITAL | Age: 82
End: 2022-12-12

## 2022-12-12 ENCOUNTER — HOSPITAL ENCOUNTER (INPATIENT)
Facility: HOSPITAL | Age: 82
LOS: 15 days | Discharge: HOME HEALTH | End: 2022-12-28
Attending: SURGERY | Admitting: INTERNAL MEDICINE

## 2022-12-12 ENCOUNTER — APPOINTMENT (EMERGENCY)
Dept: CT IMAGING | Facility: HOSPITAL | Age: 82
End: 2022-12-12

## 2022-12-12 ENCOUNTER — ANESTHESIA EVENT (EMERGENCY)
Dept: RADIOLOGY | Facility: HOSPITAL | Age: 82
End: 2022-12-12

## 2022-12-12 ENCOUNTER — HOSPITAL ENCOUNTER (EMERGENCY)
Facility: HOSPITAL | Age: 82
End: 2022-12-12
Attending: EMERGENCY MEDICINE

## 2022-12-12 ENCOUNTER — APPOINTMENT (EMERGENCY)
Dept: RADIOLOGY | Facility: HOSPITAL | Age: 82
End: 2022-12-12

## 2022-12-12 ENCOUNTER — APPOINTMENT (EMERGENCY)
Dept: RADIOLOGY | Facility: HOSPITAL | Age: 82
End: 2022-12-12
Attending: INTERNAL MEDICINE

## 2022-12-12 VITALS
HEIGHT: 65 IN | HEART RATE: 85 BPM | WEIGHT: 218 LBS | DIASTOLIC BLOOD PRESSURE: 78 MMHG | RESPIRATION RATE: 16 BRPM | TEMPERATURE: 98.2 F | BODY MASS INDEX: 36.32 KG/M2 | OXYGEN SATURATION: 93 % | SYSTOLIC BLOOD PRESSURE: 137 MMHG

## 2022-12-12 DIAGNOSIS — R57.8 HEMORRHAGIC SHOCK (HCC): ICD-10-CM

## 2022-12-12 DIAGNOSIS — S30.1XXA HEMATOMA OF RECTUS SHEATH, INITIAL ENCOUNTER: ICD-10-CM

## 2022-12-12 DIAGNOSIS — R57.8 HEMORRHAGIC SHOCK (HCC): Primary | ICD-10-CM

## 2022-12-12 DIAGNOSIS — S30.1XXA ABDOMINAL WALL HEMATOMA, INITIAL ENCOUNTER: Primary | ICD-10-CM

## 2022-12-12 DIAGNOSIS — T14.8XXA HEMATOMA: ICD-10-CM

## 2022-12-12 DIAGNOSIS — J96.01 ACUTE RESPIRATORY FAILURE WITH HYPOXIA (HCC): ICD-10-CM

## 2022-12-12 LAB
ABO GROUP BLD: NORMAL
ALBUMIN SERPL BCP-MCNC: 2.5 G/DL (ref 3.5–5)
ALP SERPL-CCNC: 75 U/L (ref 46–116)
ALT SERPL W P-5'-P-CCNC: 23 U/L (ref 12–78)
ANION GAP SERPL CALCULATED.3IONS-SCNC: 10 MMOL/L (ref 4–13)
ANION GAP SERPL CALCULATED.3IONS-SCNC: 2 MMOL/L (ref 4–13)
ANION GAP SERPL CALCULATED.3IONS-SCNC: 6 MMOL/L (ref 4–13)
APTT PPP: 27 SECONDS (ref 23–37)
AST SERPL W P-5'-P-CCNC: 18 U/L (ref 5–45)
BASE EX.OXY STD BLDV CALC-SCNC: 93.1 % (ref 60–80)
BASE EXCESS BLDV CALC-SCNC: -0.3 MMOL/L
BASOPHILS # BLD AUTO: 0.03 THOUSANDS/ÂΜL (ref 0–0.1)
BASOPHILS # BLD AUTO: 0.03 THOUSANDS/ÂΜL (ref 0–0.1)
BASOPHILS # BLD AUTO: 0.04 THOUSANDS/ÂΜL (ref 0–0.1)
BASOPHILS NFR BLD AUTO: 0 % (ref 0–1)
BILIRUB SERPL-MCNC: 0.25 MG/DL (ref 0.2–1)
BLD GP AB SCN SERPL QL: NEGATIVE
BUN SERPL-MCNC: 22 MG/DL (ref 5–25)
BUN SERPL-MCNC: 30 MG/DL (ref 5–25)
BUN SERPL-MCNC: 30 MG/DL (ref 5–25)
CA-I BLD-SCNC: 0.99 MMOL/L (ref 1.12–1.32)
CALCIUM ALBUM COR SERPL-MCNC: 8.5 MG/DL (ref 8.3–10.1)
CALCIUM SERPL-MCNC: 7.2 MG/DL (ref 8.3–10.1)
CALCIUM SERPL-MCNC: 7.3 MG/DL (ref 8.3–10.1)
CALCIUM SERPL-MCNC: 8.2 MG/DL (ref 8.3–10.1)
CARDIAC TROPONIN I PNL SERPL HS: 18 NG/L
CHLORIDE SERPL-SCNC: 100 MMOL/L (ref 96–108)
CHLORIDE SERPL-SCNC: 103 MMOL/L (ref 96–108)
CHLORIDE SERPL-SCNC: 107 MMOL/L (ref 96–108)
CO2 SERPL-SCNC: 26 MMOL/L (ref 21–32)
CO2 SERPL-SCNC: 26 MMOL/L (ref 21–32)
CO2 SERPL-SCNC: 28 MMOL/L (ref 21–32)
CREAT SERPL-MCNC: 0.85 MG/DL (ref 0.6–1.3)
CREAT SERPL-MCNC: 1.09 MG/DL (ref 0.6–1.3)
CREAT SERPL-MCNC: 1.34 MG/DL (ref 0.6–1.3)
EOSINOPHIL # BLD AUTO: 0 THOUSAND/ÂΜL (ref 0–0.61)
EOSINOPHIL NFR BLD AUTO: 0 % (ref 0–6)
ERYTHROCYTE [DISTWIDTH] IN BLOOD BY AUTOMATED COUNT: 14.1 % (ref 11.6–15.1)
ERYTHROCYTE [DISTWIDTH] IN BLOOD BY AUTOMATED COUNT: 14.2 % (ref 11.6–15.1)
ERYTHROCYTE [DISTWIDTH] IN BLOOD BY AUTOMATED COUNT: 14.6 % (ref 11.6–15.1)
GFR SERPL CREATININE-BSD FRML MDRD: 37 ML/MIN/1.73SQ M
GFR SERPL CREATININE-BSD FRML MDRD: 48 ML/MIN/1.73SQ M
GFR SERPL CREATININE-BSD FRML MDRD: 64 ML/MIN/1.73SQ M
GLUCOSE SERPL-MCNC: 163 MG/DL (ref 65–140)
GLUCOSE SERPL-MCNC: 208 MG/DL (ref 65–140)
GLUCOSE SERPL-MCNC: 273 MG/DL (ref 65–140)
GLUCOSE SERPL-MCNC: 274 MG/DL (ref 65–140)
HCO3 BLDV-SCNC: 25.4 MMOL/L (ref 24–30)
HCT VFR BLD AUTO: 26 % (ref 34.8–46.1)
HCT VFR BLD AUTO: 27.9 % (ref 34.8–46.1)
HCT VFR BLD AUTO: 39.2 % (ref 34.8–46.1)
HGB BLD-MCNC: 12.5 G/DL (ref 11.5–15.4)
HGB BLD-MCNC: 8.1 G/DL (ref 11.5–15.4)
HGB BLD-MCNC: 8.7 G/DL (ref 11.5–15.4)
IMM GRANULOCYTES # BLD AUTO: 0.31 THOUSAND/UL (ref 0–0.2)
IMM GRANULOCYTES # BLD AUTO: 0.5 THOUSAND/UL (ref 0–0.2)
IMM GRANULOCYTES # BLD AUTO: >0.5 THOUSAND/UL (ref 0–0.2)
IMM GRANULOCYTES NFR BLD AUTO: 2 % (ref 0–2)
INR PPP: 1.8 (ref 0.84–1.19)
INR PPP: 2.54 (ref 0.84–1.19)
LACTATE SERPL-SCNC: 3.7 MMOL/L (ref 0.5–2)
LIPASE SERPL-CCNC: 44 U/L (ref 73–393)
LYMPHOCYTES # BLD AUTO: 0.94 THOUSANDS/ÂΜL (ref 0.6–4.47)
LYMPHOCYTES # BLD AUTO: 1.17 THOUSANDS/ÂΜL (ref 0.6–4.47)
LYMPHOCYTES # BLD AUTO: 1.57 THOUSANDS/ÂΜL (ref 0.6–4.47)
LYMPHOCYTES NFR BLD AUTO: 5 % (ref 14–44)
LYMPHOCYTES NFR BLD AUTO: 6 % (ref 14–44)
LYMPHOCYTES NFR BLD AUTO: 6 % (ref 14–44)
MAGNESIUM SERPL-MCNC: 2.1 MG/DL (ref 1.6–2.6)
MCH RBC QN AUTO: 30 PG (ref 26.8–34.3)
MCH RBC QN AUTO: 30.9 PG (ref 26.8–34.3)
MCH RBC QN AUTO: 31.8 PG (ref 26.8–34.3)
MCHC RBC AUTO-ENTMCNC: 31.2 G/DL (ref 31.4–37.4)
MCHC RBC AUTO-ENTMCNC: 31.9 G/DL (ref 31.4–37.4)
MCHC RBC AUTO-ENTMCNC: 31.9 G/DL (ref 31.4–37.4)
MCV RBC AUTO: 100 FL (ref 82–98)
MCV RBC AUTO: 96 FL (ref 82–98)
MCV RBC AUTO: 97 FL (ref 82–98)
MONOCYTES # BLD AUTO: 0.45 THOUSAND/ÂΜL (ref 0.17–1.22)
MONOCYTES # BLD AUTO: 1.4 THOUSAND/ÂΜL (ref 0.17–1.22)
MONOCYTES # BLD AUTO: 1.73 THOUSAND/ÂΜL (ref 0.17–1.22)
MONOCYTES NFR BLD AUTO: 3 % (ref 4–12)
MONOCYTES NFR BLD AUTO: 6 % (ref 4–12)
MONOCYTES NFR BLD AUTO: 6 % (ref 4–12)
NEUTROPHILS # BLD AUTO: 14.52 THOUSANDS/ÂΜL (ref 1.85–7.62)
NEUTROPHILS # BLD AUTO: 21.8 THOUSANDS/ÂΜL (ref 1.85–7.62)
NEUTROPHILS # BLD AUTO: 23.64 THOUSANDS/ÂΜL (ref 1.85–7.62)
NEUTS SEG NFR BLD AUTO: 86 % (ref 43–75)
NEUTS SEG NFR BLD AUTO: 87 % (ref 43–75)
NEUTS SEG NFR BLD AUTO: 89 % (ref 43–75)
NRBC BLD AUTO-RTO: 0 /100 WBCS
NT-PROBNP SERPL-MCNC: 452 PG/ML
O2 CT BLDV-SCNC: 13 ML/DL
PCO2 BLDV: 46.5 MM HG (ref 42–50)
PH BLDV: 7.36 [PH] (ref 7.3–7.4)
PLATELET # BLD AUTO: 186 THOUSANDS/UL (ref 149–390)
PLATELET # BLD AUTO: 201 THOUSANDS/UL (ref 149–390)
PLATELET # BLD AUTO: 239 THOUSANDS/UL (ref 149–390)
PMV BLD AUTO: 10.7 FL (ref 8.9–12.7)
PO2 BLDV: 74.1 MM HG (ref 35–45)
POTASSIUM SERPL-SCNC: 5 MMOL/L (ref 3.5–5.3)
POTASSIUM SERPL-SCNC: 5.1 MMOL/L (ref 3.5–5.3)
POTASSIUM SERPL-SCNC: 5.8 MMOL/L (ref 3.5–5.3)
PROT SERPL-MCNC: 5 G/DL (ref 6.4–8.4)
PROTHROMBIN TIME: 20.5 SECONDS (ref 11.6–14.5)
PROTHROMBIN TIME: 26.8 SECONDS (ref 11.6–14.5)
RBC # BLD AUTO: 2.61 MILLION/UL (ref 3.81–5.12)
RBC # BLD AUTO: 2.9 MILLION/UL (ref 3.81–5.12)
RBC # BLD AUTO: 4.04 MILLION/UL (ref 3.81–5.12)
RH BLD: POSITIVE
SODIUM SERPL-SCNC: 135 MMOL/L (ref 135–147)
SODIUM SERPL-SCNC: 136 MMOL/L (ref 135–147)
SODIUM SERPL-SCNC: 137 MMOL/L (ref 135–147)
SPECIMEN EXPIRATION DATE: NORMAL
TSH SERPL DL<=0.05 MIU/L-ACNC: 3.73 UIU/ML (ref 0.45–4.5)
WBC # BLD AUTO: 16.25 THOUSAND/UL (ref 4.31–10.16)
WBC # BLD AUTO: 24.94 THOUSAND/UL (ref 4.31–10.16)
WBC # BLD AUTO: 27.48 THOUSAND/UL (ref 4.31–10.16)

## 2022-12-12 PROCEDURE — 30233N1 TRANSFUSION OF NONAUTOLOGOUS RED BLOOD CELLS INTO PERIPHERAL VEIN, PERCUTANEOUS APPROACH: ICD-10-PCS | Performed by: ANESTHESIOLOGY

## 2022-12-12 RX ORDER — SUCCINYLCHOLINE/SOD CL,ISO/PF 100 MG/5ML
SYRINGE (ML) INTRAVENOUS AS NEEDED
Status: DISCONTINUED | OUTPATIENT
Start: 2022-12-12 | End: 2022-12-13

## 2022-12-12 RX ORDER — PREDNISONE 20 MG/1
TABLET ORAL
Qty: 15 TABLET | Refills: 0 | Status: ON HOLD | OUTPATIENT
Start: 2022-12-12 | End: 2022-12-24

## 2022-12-12 RX ORDER — FENTANYL CITRATE 50 UG/ML
50 INJECTION, SOLUTION INTRAMUSCULAR; INTRAVENOUS ONCE
Status: COMPLETED | OUTPATIENT
Start: 2022-12-12 | End: 2022-12-12

## 2022-12-12 RX ORDER — ONDANSETRON 2 MG/ML
1 INJECTION INTRAMUSCULAR; INTRAVENOUS ONCE
Status: COMPLETED | OUTPATIENT
Start: 2022-12-12 | End: 2022-12-12

## 2022-12-12 RX ORDER — FENTANYL CITRATE 50 UG/ML
1 INJECTION, SOLUTION INTRAMUSCULAR; INTRAVENOUS ONCE
Status: DISCONTINUED | OUTPATIENT
Start: 2022-12-12 | End: 2022-12-13

## 2022-12-12 RX ORDER — DEXTROSE MONOHYDRATE 25 G/50ML
INJECTION, SOLUTION INTRAVENOUS AS NEEDED
Status: DISCONTINUED | OUTPATIENT
Start: 2022-12-12 | End: 2022-12-13

## 2022-12-12 RX ORDER — PROPOFOL 10 MG/ML
INJECTION, EMULSION INTRAVENOUS AS NEEDED
Status: DISCONTINUED | OUTPATIENT
Start: 2022-12-12 | End: 2022-12-13

## 2022-12-12 RX ADMIN — BUDESONIDE 0.5 MG: 0.5 INHALANT ORAL at 07:21

## 2022-12-12 RX ADMIN — NOREPINEPHRINE BITARTRATE 5 MCG/MIN: 1 SOLUTION INTRAVENOUS at 21:07

## 2022-12-12 RX ADMIN — GUAIFENESIN AND DEXTROMETHORPHAN 10 ML: 100; 10 SYRUP ORAL at 00:30

## 2022-12-12 RX ADMIN — METOPROLOL TARTRATE 12.5 MG: 50 TABLET, FILM COATED ORAL at 10:37

## 2022-12-12 RX ADMIN — SODIUM CHLORIDE 1000 ML: 0.9 INJECTION, SOLUTION INTRAVENOUS at 20:30

## 2022-12-12 RX ADMIN — GUAIFENESIN AND DEXTROMETHORPHAN 10 ML: 100; 10 SYRUP ORAL at 10:34

## 2022-12-12 RX ADMIN — FENTANYL CITRATE 50 MCG: 50 INJECTION INTRAMUSCULAR; INTRAVENOUS at 21:40

## 2022-12-12 RX ADMIN — VENLAFAXINE HYDROCHLORIDE 150 MG: 150 CAPSULE, EXTENDED RELEASE ORAL at 10:35

## 2022-12-12 RX ADMIN — DICLOFENAC SODIUM 2 G: 10 GEL TOPICAL at 10:37

## 2022-12-12 RX ADMIN — ASPIRIN 81 MG 81 MG: 81 TABLET ORAL at 10:35

## 2022-12-12 RX ADMIN — NOREPINEPHRINE BITARTRATE 6 MCG/MIN: 1 INJECTION INTRAVENOUS at 23:22

## 2022-12-12 RX ADMIN — METHYLPREDNISOLONE SODIUM SUCCINATE 40 MG: 40 INJECTION, POWDER, FOR SOLUTION INTRAMUSCULAR; INTRAVENOUS at 10:45

## 2022-12-12 RX ADMIN — LISINOPRIL 10 MG: 10 TABLET ORAL at 10:35

## 2022-12-12 RX ADMIN — ENOXAPARIN SODIUM 40 MG: 40 INJECTION SUBCUTANEOUS at 10:35

## 2022-12-12 RX ADMIN — DICLOFENAC SODIUM 2 G: 10 GEL TOPICAL at 13:12

## 2022-12-12 RX ADMIN — INSULIN HUMAN 10 UNITS: 100 INJECTION, SOLUTION PARENTERAL at 23:49

## 2022-12-12 RX ADMIN — DEXTROSE MONOHYDRATE 12.5 G: 25 INJECTION, SOLUTION INTRAVENOUS at 23:49

## 2022-12-12 RX ADMIN — IPRATROPIUM BROMIDE 0.5 MG: 0.5 SOLUTION RESPIRATORY (INHALATION) at 13:06

## 2022-12-12 RX ADMIN — SODIUM CHLORIDE 1000 ML: 0.9 INJECTION, SOLUTION INTRAVENOUS at 20:45

## 2022-12-12 RX ADMIN — ROCURONIUM BROMIDE 40 MG: 50 INJECTION INTRAVENOUS at 23:35

## 2022-12-12 RX ADMIN — Medication 100 MG: at 23:28

## 2022-12-12 RX ADMIN — LEVALBUTEROL HYDROCHLORIDE 1.25 MG: 1.25 SOLUTION, CONCENTRATE RESPIRATORY (INHALATION) at 13:06

## 2022-12-12 RX ADMIN — SODIUM BICARBONATE 50 MEQ: 84 INJECTION, SOLUTION INTRAVENOUS at 23:47

## 2022-12-12 RX ADMIN — LEVOTHYROXINE SODIUM 88 MCG: 88 TABLET ORAL at 05:19

## 2022-12-12 RX ADMIN — HYDROCODONE BITARTRATE AND HOMATROPINE METHYLBROMIDE 5 ML: 5; 1.5 SOLUTION ORAL at 06:33

## 2022-12-12 RX ADMIN — IPRATROPIUM BROMIDE 0.5 MG: 0.5 SOLUTION RESPIRATORY (INHALATION) at 07:21

## 2022-12-12 RX ADMIN — PROPOFOL 120 MG: 10 INJECTION, EMULSION INTRAVENOUS at 23:28

## 2022-12-12 RX ADMIN — LIDOCAINE HYDROCHLORIDE 100 MG: 20 INJECTION INTRAVENOUS at 23:28

## 2022-12-12 RX ADMIN — Medication 1500 UNITS: at 21:55

## 2022-12-12 RX ADMIN — LEVALBUTEROL HYDROCHLORIDE 1.25 MG: 1.25 SOLUTION, CONCENTRATE RESPIRATORY (INHALATION) at 07:21

## 2022-12-12 RX ADMIN — IOHEXOL 100 ML: 350 INJECTION, SOLUTION INTRAVENOUS at 21:00

## 2022-12-12 RX ADMIN — SODIUM CHLORIDE: 0.9 INJECTION, SOLUTION INTRAVENOUS at 23:33

## 2022-12-12 NOTE — PLAN OF CARE
Problem: MOBILITY - ADULT  Goal: Maintain or return to baseline ADL function  Description: INTERVENTIONS:  -  Assess patient's ability to carry out ADLs; assess patient's baseline for ADL function and identify physical deficits which impact ability to perform ADLs (bathing, care of mouth/teeth, toileting, grooming, dressing, etc )  - Assess/evaluate cause of self-care deficits   - Assess range of motion  - Assess patient's mobility; develop plan if impaired  - Assess patient's need for assistive devices and provide as appropriate  - Encourage maximum independence but intervene and supervise when necessary  - Involve family in performance of ADLs  - Assess for home care needs following discharge   - Consider OT consult to assist with ADL evaluation and planning for discharge  - Provide patient education as appropriate  12/12/2022 1314 by Pieter Davidson LPN  Outcome: Adequate for Discharge  12/12/2022 1204 by Pieter Davidson LPN  Outcome: Progressing  Goal: Maintains/Returns to pre admission functional level  Description: INTERVENTIONS:  - Perform BMAT or MOVE assessment daily    - Set and communicate daily mobility goal to care team and patient/family/caregiver     - Collaborate with rehabilitation services on mobility goals if consulted  - Perform Range of Motion   - Reposition patient  - Dangle patient   - Stand patient   - Ambulate patient   - Out of bed to chair    - Out of bed for meals   - Out of bed for toileting  - Record patient progress and toleration of activity level   12/12/2022 1314 by Pieter Davidson LPN  Outcome: Adequate for Discharge  12/12/2022 1204 by Pieter Davidson LPN  Outcome: Progressing     Problem: Potential for Falls  Goal: Patient will remain free of falls  Description: INTERVENTIONS:  - Educate patient/family on patient safety including physical limitations  - Instruct patient to call for assistance with activity   - Consult OT/PT to assist with strengthening/mobility   - Keep Call bell within reach  - Keep bed low and locked with side rails adjusted as appropriate  - Keep care items and personal belongings within reach  - Initiate and maintain comfort rounds  - Make Fall Risk Sign visible to staff  - Offer Toileting every 2 Hours, in advance of need  - Initiate/Maintain bedalarm  - Obtain necessary fall risk management equipment:   - Apply yellow socks and bracelet for high fall risk patients  - Consider moving patient to room near nurses station  12/12/2022 1314 by Sara Kowalski LPN  Outcome: Adequate for Discharge  12/12/2022 1204 by Sraa Kowalski LPN  Outcome: Progressing     Problem: RESPIRATORY - ADULT  Goal: Achieves optimal ventilation and oxygenation  Description: INTERVENTIONS:  - Assess for changes in respiratory status  - Assess for changes in mentation and behavior  - Position to facilitate oxygenation and minimize respiratory effort  - Oxygen administered by appropriate delivery if ordered  - Initiate smoking cessation education as indicated  - Encourage broncho-pulmonary hygiene including cough, deep breathe, Incentive Spirometry  - Assess the need for suctioning and aspirate as needed  - Assess and instruct to report SOB or any respiratory difficulty  - Respiratory Therapy support as indicated  12/12/2022 1314 by Sara Kowalski LPN  Outcome: Adequate for Discharge  12/12/2022 1204 by Sara Kowalski LPN  Outcome: Progressing

## 2022-12-12 NOTE — UTILIZATION REVIEW
Continued Stay Review    Date:12/11                          Current Patient Class: INpatient   Current Level of Care: MEd/surg    HPI:82 y o  female initially admitted on 12/7     Assessment/Plan: Continue with IV solumedrol and scheduled nebulizers  Wheezing continues  Remains on [facility]        Vital Signs:   Date/Time Temp Pulse Resp BP MAP (mmHg) SpO2 Calculated FIO2 (%) - Nasal Cannula Nasal Cannula O2 Flow Rate (L/min) O2 Device   12/11/22 22:02:03 -- 102 -- 140/74 96 93 % -- -- --   12/11/22 2200 -- 102 -- 140/74 -- -- -- -- --   12/11/22 2000 -- -- -- -- -- 98 % -- -- --   12/11/22 1946 -- -- -- -- -- 92 % -- -- --   12/11/22 15:51:19 98 2 °F (36 8 °C) 97 17 134/72 93 91 % -- -- --   12/11/22 1420 -- -- -- -- -- 90 % -- -- None (Room air)   12/11/22 10:10:45 -- 80 -- 105/68 80 88 % Abnormal  -- -- --   12/11/22 1008 -- -- -- -- -- 93 % 22 0 5 L/min Nasal cannula   12/11/22 08:02:37 99 6 °F (37 6 °C) 85 15 93/70 78 92 % -- -- --         Pertinent Labs/Diagnostic Results:   Results from last 7 days   Lab Units 12/06/22  1210   SARS-COV-2  Negative     Results from last 7 days   Lab Units 12/11/22  0524 12/10/22  0643 12/09/22  0532 12/08/22  0545 12/07/22  0434   WBC Thousand/uL 15 70* 14 95* 13 31* 13 14* 9 94   HEMOGLOBIN g/dL 12 2 11 9 11 8 11 6 11 1*   HEMATOCRIT % 37 3 37 3 36 7 36 0 34 0*   PLATELETS Thousands/uL 226 222 220 225 226   NEUTROS ABS Thousands/µL 14 03*  --   --   --  8 53*         Results from last 7 days   Lab Units 12/11/22  0524 12/07/22  0434 12/06/22  1210   SODIUM mmol/L 137 139 140   POTASSIUM mmol/L 4 8 4 3 4 3   CHLORIDE mmol/L 102 102 103   CO2 mmol/L 27 28 25   ANION GAP mmol/L 8 9 12   BUN mg/dL 22 18 19   CREATININE mg/dL 0 86 0 92 0 84   EGFR ml/min/1 73sq m 64 58 65   CALCIUM mg/dL 8 2* 8 1* 8 6     Results from last 7 days   Lab Units 12/06/22  1210   AST U/L 21   ALT U/L 18   ALK PHOS U/L 95   TOTAL PROTEIN g/dL 7 6   ALBUMIN g/dL 3 6   TOTAL BILIRUBIN mg/dL 0 58 Results from last 7 days   Lab Units 12/11/22  0524 12/07/22  0434 12/06/22  1210   GLUCOSE RANDOM mg/dL 165* 161* 123         Results from last 7 days   Lab Units 12/07/22  2111 12/07/22  1909 12/07/22  1700 12/06/22  1210   HS TNI 0HR ng/L  --   --  39 70*   HS TNI 2HR ng/L  --  35  --   --    HSTNI D2 ng/L  --  -4  --   --    HS TNI 4HR ng/L 35  --   --   --    HSTNI D4 ng/L -4  --   --   --          Results from last 7 days   Lab Units 12/10/22  0602 12/09/22  0532   PROTIME seconds 16 4* 15 8*   INR  1 35* 1 28*         Results from last 7 days   Lab Units 12/08/22  0545 12/07/22  1701 12/07/22  0434   PROCALCITONIN ng/ml <0 05 <0 05 <0 05     Results from last 7 days   Lab Units 12/06/22  1210   INFLUENZA A PCR  Negative   INFLUENZA B PCR  Negative   RSV PCR  Negative         Medications:   Scheduled Medications:  aspirin, 81 mg, Oral, Daily  atorvastatin, 40 mg, Oral, Daily With Dinner  budesonide, 0 5 mg, Nebulization, Q12H  dextromethorphan-guaiFENesin, 10 mL, Oral, Q8H  Diclofenac Sodium, 2 g, Topical, 4x Daily  enoxaparin, 40 mg, Subcutaneous, Daily  ipratropium, 0 5 mg, Nebulization, TID  levalbuterol, 1 25 mg, Nebulization, TID  levothyroxine, 88 mcg, Oral, Early Morning  lisinopril, 10 mg, Oral, Daily  methylPREDNISolone sodium succinate, 40 mg, Intravenous, Q12H JUAN PABLO  metoprolol tartrate, 12 5 mg, Oral, Q12H JUAN PABLO  venlafaxine, 150 mg, Oral, Daily  warfarin, 7 5 mg, Oral, Daily (warfarin)      Continuous IV Infusions:     PRN Meds:  acetaminophen, 650 mg, Oral, Q6H PRN  albuterol, 2 puff, Inhalation, Q4H PRN  HYDROcodone Bit-Homatrop MBr, 5 mL, Oral, Q6H PRN  ondansetron, 4 mg, Intravenous, Q6H PRN        Discharge Plan: TBD    Network Utilization Review Department  ATTENTION: Please call with any questions or concerns to [phone] and carefully listen to the prompts so that you are directed to the right person   All voicemails are confidential   Schneider Alcira all requests for admission clinical reviews, approved or denied determinations and any other requests to dedicated fax number below belonging to the campus where the patient is receiving treatment   List of dedicated fax numbers for the Facilities:  [address] [place] [address] DENIALS (Administrative/Medical Necessity) [phone]   [address] (Maternity/NICU/Pediatrics) [phone]   [address] [phone]   [facility] [address] [phone]   [address] [place] [address] [place] [address] [phone]   [address] [facility] [address] [facility] [phone]

## 2022-12-12 NOTE — DISCHARGE SUMMARY
[facility]  Discharge- Tamela Arroyo 1940, [de-identified] y o  female MRN: [id number]  Unit/Bed#: -01 Encounter: [id number]  Primary Care Provider: Maria Alejandra Medina DO   Date and time admitted to hospital: 12/6/2022  1:16 PM    * Bronchospasm with bronchitis, acute  Assessment & Plan  · States that breathing is markedly improved compared to presentation  · Flu, COVID and RSV negative  · CT chest without acute process, negative for PE, tree-in-bud appearance consistent with bronchiolitis  · Echocardiogram-EF 65% and tricuspid valve with mild regurgitation and right ventricular pressure mildly to moderately elevated  · Home O2 eval completed patient does not require oxygen  · Steroid taper on discharge    Leukocytosis  Assessment & Plan  · Patient denies any fevers, chills, CT imaging without pneumonia  · Suspect likely due to recent steroid which patient had completed given by PCP  · Procalcitonin negative x2    Chest pain  Assessment & Plan  · Suspect likely due to ongoing bronchiolitis/bronchitis  · CT imaging without acute processes, negative for PE  · Mildly elevated troponin likely nonischemic  · Follow-up troponins negative with negative delta  · Likely secondary to coughing causing musculoskeletal pain    · Aspirin, statin      Hyperlipidemia  Assessment & Plan  · Continue statin    Hypertension  Assessment & Plan  · Continue lisinopril and metoprolol    Hypothyroidism  Assessment & Plan  Continue levothyroxine    Paroxysmal atrial fibrillation (HCC)  Assessment & Plan  · Currently controlled  · Continue metoprolol 12 5mg BID, rate controlled  · Continue Coumadin on discharge      Medical Problems     Resolved Problems  Date Reviewed: 12/8/2022   None       Discharging Physician / Practitioner: Deacon Melgoza DO  PCP: Maria Alejandra Medina DO  Admission Date:   Admission Orders (From admission, onward)     Ordered        12/07/22 1534  Inpatient Admission  Once            12/06/22 1619  Place in Observation  Once                      Discharge Date: 12/12/22    Consultations During Hospital Stay:  · None    Complications:  none    Reason for Admission: Shortness of breath    Hospital Course:   Patricia Gray is a [de-identified] y o  female patient who originally presented to the hospital on 12/6/2022 due to shortness of breath and chest pain  Patient shortness of breath likely from acute bronchitis  Patient had marked improvement with steroids and will be sent home on slow steroid taper  Also checked echocardiogram showing normal ejection fraction  Chest pain likely musculoskeletal etiology as it is associated with her cough  Patient was assessed for need of home oxygen on discharge and oxygen saturations stayed greater than 88% with ambulation  Patient is stable for discharge at this time and should follow-up with primary care provider  Please see above list of diagnoses and related plan for additional information  Condition at Discharge: stable    Discharge Day Visit / Exam:   Subjective: Patient resting comfortably on examination  Patient had no overnight events or complaints on exam   Vitals: Blood Pressure: 137/78 (12/12/22 0743)  Pulse: 85 (12/12/22 0743)  Temperature: 98 2 °F (36 8 °C) (12/12/22 0743)  Temp Source: Oral (12/08/22 1729)  Respirations: 16 (12/12/22 0743)  Height: 5' 5" (165 1 cm) (12/08/22 1312)  Weight - Scale: 98 9 kg (218 lb) (12/08/22 1312)  SpO2: 93 % (12/12/22 1306)  Exam:   Physical Exam  Vitals and nursing note reviewed  Constitutional:       General: She is not in acute distress  Appearance: She is well-developed  HENT:      Head: Normocephalic and atraumatic  Eyes:      General: No scleral icterus  Conjunctiva/sclera: Conjunctivae normal    Cardiovascular:      Rate and Rhythm: Normal rate and regular rhythm  Heart sounds: Normal heart sounds  No murmur heard  No friction rub  No gallop     Pulmonary:      Effort: Pulmonary effort is normal  No respiratory distress  Breath sounds: Normal breath sounds  No wheezing or rales  Abdominal:      General: Bowel sounds are normal  There is no distension  Palpations: Abdomen is soft  Tenderness: There is no abdominal tenderness  Musculoskeletal:         General: Normal range of motion  Skin:     General: Skin is warm  Findings: No rash  Neurological:      Mental Status: She is alert and oriented to person, place, and time  Discussion with Family: Updated  (daughter) at bedside  Discharge instructions/Information to patient and family:   See after visit summary for information provided to patient and family  Provisions for Follow-Up Care:  See after visit summary for information related to follow-up care and any pertinent home health orders  Disposition:   Home    Planned Readmission: none     Discharge Statement:  I spent 40 minutes discharging the patient  This time was spent on the day of discharge  I had direct contact with the patient on the day of discharge  Greater than 50% of the total time was spent examining patient, answering all patient questions, arranging and discussing plan of care with patient as well as directly providing post-discharge instructions  Additional time then spent on discharge activities  Discharge Medications:  See after visit summary for reconciled discharge medications provided to patient and/or family        **Please Note: This note may have been constructed using a voice recognition system**

## 2022-12-12 NOTE — PLAN OF CARE
Problem: RESPIRATORY - ADULT  Goal: Achieves optimal ventilation and oxygenation  Description: INTERVENTIONS:  - Assess for changes in respiratory status  - Assess for changes in mentation and behavior  - Position to facilitate oxygenation and minimize respiratory effort  - Oxygen administered by appropriate delivery if ordered  - Initiate smoking cessation education as indicated  - Encourage broncho-pulmonary hygiene including cough, deep breathe, Incentive Spirometry  - Assess the need for suctioning and aspirate as needed  - Assess and instruct to report SOB or any respiratory difficulty  - Respiratory Therapy support as indicated  Outcome: Progressing     Problem: MOBILITY - ADULT  Goal: Maintain or return to baseline ADL function  Description: INTERVENTIONS:  -  Assess patient's ability to carry out ADLs; assess patient's baseline for ADL function and identify physical deficits which impact ability to perform ADLs (bathing, care of mouth/teeth, toileting, grooming, dressing, etc )  - Assess/evaluate cause of self-care deficits   - Assess range of motion  - Assess patient's mobility; develop plan if impaired  - Assess patient's need for assistive devices and provide as appropriate  - Encourage maximum independence but intervene and supervise when necessary  - Involve family in performance of ADLs  - Assess for home care needs following discharge   - Consider OT consult to assist with ADL evaluation and planning for discharge  - Provide patient education as appropriate  Outcome: Progressing  Goal: Maintains/Returns to pre admission functional level  Description: INTERVENTIONS:  - Perform BMAT or MOVE assessment daily    - Set and communicate daily mobility goal to care team and patient/family/caregiver  - Collaborate with rehabilitation services on mobility goals if consulted  - Perform Range of Motion  - Reposition patient every2 hours    - Dangle patient   - Stand patient  - Ambulate patient  - Out of bed to chair   - Out of bed for meals   - Out of bed for toileting  - Record patient progress and toleration of activity level   Outcome: Progressing

## 2022-12-12 NOTE — NURSING NOTE
Pt O2 measured at rest with 2L O2 via NC Pox 95%  Pox 93% RA at rest  Pox 92% when Ambulating on 2L O2 via NC  Pox fluctuates between 88-90% when ambulating on RA  Pt shows no s/s acute respiratory distress   Will continue to monitor pt for any changes in condition

## 2022-12-13 VITALS
DIASTOLIC BLOOD PRESSURE: 50 MMHG | SYSTOLIC BLOOD PRESSURE: 92 MMHG | OXYGEN SATURATION: 94 % | HEART RATE: 86 BPM | TEMPERATURE: 97.8 F | RESPIRATION RATE: 18 BRPM

## 2022-12-13 PROBLEM — R57.8 HEMORRHAGIC SHOCK (HCC): Status: ACTIVE | Noted: 2022-12-13

## 2022-12-13 PROBLEM — J18.9 COMMUNITY ACQUIRED PNEUMONIA: Status: RESOLVED | Noted: 2020-02-22 | Resolved: 2022-12-13

## 2022-12-13 PROBLEM — S30.1XXA RECTUS SHEATH HEMATOMA: Status: ACTIVE | Noted: 2022-12-13

## 2022-12-13 PROBLEM — R05.3 PERSISTENT DRY COUGH: Status: ACTIVE | Noted: 2022-12-13

## 2022-12-13 LAB
ABO GROUP BLD BPU: NORMAL
ABO GROUP BLD: NORMAL
ALBUMIN SERPL BCP-MCNC: 2.4 G/DL (ref 3.5–5)
ALBUMIN SERPL BCP-MCNC: 2.7 G/DL (ref 3.5–5)
ALP SERPL-CCNC: 69 U/L (ref 46–116)
ALP SERPL-CCNC: 71 U/L (ref 46–116)
ALT SERPL W P-5'-P-CCNC: 19 U/L (ref 12–78)
ALT SERPL W P-5'-P-CCNC: 20 U/L (ref 12–78)
ANION GAP SERPL CALCULATED.3IONS-SCNC: 2 MMOL/L (ref 4–13)
ANION GAP SERPL CALCULATED.3IONS-SCNC: 2 MMOL/L (ref 4–13)
ANISOCYTOSIS BLD QL SMEAR: PRESENT
AST SERPL W P-5'-P-CCNC: 14 U/L (ref 5–45)
AST SERPL W P-5'-P-CCNC: 15 U/L (ref 5–45)
ATRIAL RATE: 85 BPM
ATRIAL RATE: 85 BPM
BASE EXCESS BLDA CALC-SCNC: -0.5 MMOL/L
BASE EXCESS BLDA CALC-SCNC: -2 MMOL/L (ref -2–3)
BASE EXCESS BLDA CALC-SCNC: -3 MMOL/L (ref -2–3)
BASE EXCESS BLDA CALC-SCNC: 0 MMOL/L (ref -2–3)
BASOPHILS # BLD AUTO: 0.05 THOUSANDS/ÂΜL (ref 0–0.1)
BASOPHILS # BLD MANUAL: 0 THOUSAND/UL (ref 0–0.1)
BASOPHILS NFR BLD AUTO: 0 % (ref 0–1)
BASOPHILS NFR MAR MANUAL: 0 % (ref 0–1)
BILIRUB SERPL-MCNC: 0.3 MG/DL (ref 0.2–1)
BILIRUB SERPL-MCNC: 0.44 MG/DL (ref 0.2–1)
BLD GP AB SCN SERPL QL: NEGATIVE
BODY TEMPERATURE: 97.9 DEGREES FEHRENHEIT
BPU ID: NORMAL
BUN SERPL-MCNC: 27 MG/DL (ref 5–25)
BUN SERPL-MCNC: 28 MG/DL (ref 5–25)
CA-I BLD-SCNC: 0.99 MMOL/L (ref 1.12–1.32)
CA-I BLD-SCNC: 1.08 MMOL/L (ref 1.12–1.32)
CA-I BLD-SCNC: 1.11 MMOL/L (ref 1.12–1.32)
CA-I BLD-SCNC: 1.17 MMOL/L (ref 1.12–1.32)
CA-I BLD-SCNC: 1.26 MMOL/L (ref 1.12–1.32)
CALCIUM ALBUM COR SERPL-MCNC: 9 MG/DL (ref 8.3–10.1)
CALCIUM ALBUM COR SERPL-MCNC: 9.3 MG/DL (ref 8.3–10.1)
CALCIUM SERPL-MCNC: 7.7 MG/DL (ref 8.3–10.1)
CALCIUM SERPL-MCNC: 8.3 MG/DL (ref 8.3–10.1)
CFFMA (FUNCTIONAL FIBRINOGEN MAX AMPLITUDE): 24.9 MM (ref 15–32)
CHLORIDE SERPL-SCNC: 109 MMOL/L (ref 96–108)
CHLORIDE SERPL-SCNC: 112 MMOL/L (ref 96–108)
CKLY30: 0.1 % (ref 0–2.6)
CKR(REACTION TIME): 4.8 MIN (ref 4.6–9.1)
CO2 SERPL-SCNC: 28 MMOL/L (ref 21–32)
CO2 SERPL-SCNC: 28 MMOL/L (ref 21–32)
CREAT SERPL-MCNC: 0.92 MG/DL (ref 0.6–1.3)
CREAT SERPL-MCNC: 0.95 MG/DL (ref 0.6–1.3)
CROSSMATCH: NORMAL
CRTMA(RAPIDTEG MAX AMPLITUDE): 62 MM (ref 52–70)
EOSINOPHIL # BLD AUTO: 0 THOUSAND/ÂΜL (ref 0–0.61)
EOSINOPHIL # BLD MANUAL: 0 THOUSAND/UL (ref 0–0.4)
EOSINOPHIL NFR BLD AUTO: 0 % (ref 0–6)
EOSINOPHIL NFR BLD MANUAL: 0 % (ref 0–6)
ERYTHROCYTE [DISTWIDTH] IN BLOOD BY AUTOMATED COUNT: 15.1 % (ref 11.6–15.1)
ERYTHROCYTE [DISTWIDTH] IN BLOOD BY AUTOMATED COUNT: 15.6 % (ref 11.6–15.1)
GFR SERPL CREATININE-BSD FRML MDRD: 56 ML/MIN/1.73SQ M
GFR SERPL CREATININE-BSD FRML MDRD: 58 ML/MIN/1.73SQ M
GLUCOSE SERPL-MCNC: 129 MG/DL (ref 65–140)
GLUCOSE SERPL-MCNC: 164 MG/DL (ref 65–140)
GLUCOSE SERPL-MCNC: 175 MG/DL (ref 65–140)
GLUCOSE SERPL-MCNC: 200 MG/DL (ref 65–140)
GLUCOSE SERPL-MCNC: 224 MG/DL (ref 65–140)
HCO3 BLDA-SCNC: 25.2 MMOL/L (ref 22–28)
HCO3 BLDA-SCNC: 25.3 MMOL/L (ref 22–28)
HCO3 BLDA-SCNC: 25.3 MMOL/L (ref 22–28)
HCO3 BLDA-SCNC: 26.5 MMOL/L (ref 22–28)
HCT VFR BLD AUTO: 32.1 % (ref 34.8–46.1)
HCT VFR BLD AUTO: 32.6 % (ref 34.8–46.1)
HCT VFR BLD AUTO: 32.9 % (ref 34.8–46.1)
HCT VFR BLD CALC: 20 % (ref 34.8–46.1)
HCT VFR BLD CALC: 24 % (ref 34.8–46.1)
HCT VFR BLD CALC: 25 % (ref 34.8–46.1)
HGB BLD-MCNC: 10.7 G/DL (ref 11.5–15.4)
HGB BLD-MCNC: 10.7 G/DL (ref 11.5–15.4)
HGB BLD-MCNC: 11 G/DL (ref 11.5–15.4)
HGB BLDA-MCNC: 6.8 G/DL (ref 11.5–15.4)
HGB BLDA-MCNC: 8.2 G/DL (ref 11.5–15.4)
HGB BLDA-MCNC: 8.5 G/DL (ref 11.5–15.4)
IMM GRANULOCYTES # BLD AUTO: 0.49 THOUSAND/UL (ref 0–0.2)
IMM GRANULOCYTES NFR BLD AUTO: 2 % (ref 0–2)
INR PPP: 1.25 (ref 0.84–1.19)
INR PPP: 1.38 (ref 0.84–1.19)
LACTATE SERPL-SCNC: 1.8 MMOL/L (ref 0.5–2)
LACTATE SERPL-SCNC: 2.9 MMOL/L (ref 0.5–2)
LYMPHOCYTES # BLD AUTO: 1.89 THOUSAND/UL (ref 0.6–4.47)
LYMPHOCYTES # BLD AUTO: 2.24 THOUSANDS/ÂΜL (ref 0.6–4.47)
LYMPHOCYTES # BLD AUTO: 6 % (ref 14–44)
LYMPHOCYTES NFR BLD AUTO: 7 % (ref 14–44)
MAGNESIUM SERPL-MCNC: 2.3 MG/DL (ref 1.6–2.6)
MAGNESIUM SERPL-MCNC: 2.3 MG/DL (ref 1.6–2.6)
MCH RBC QN AUTO: 30.2 PG (ref 26.8–34.3)
MCH RBC QN AUTO: 30.5 PG (ref 26.8–34.3)
MCHC RBC AUTO-ENTMCNC: 32.8 G/DL (ref 31.4–37.4)
MCHC RBC AUTO-ENTMCNC: 33.4 G/DL (ref 31.4–37.4)
MCV RBC AUTO: 91 FL (ref 82–98)
MCV RBC AUTO: 92 FL (ref 82–98)
MONOCYTES # BLD AUTO: 1.26 THOUSAND/UL (ref 0–1.22)
MONOCYTES # BLD AUTO: 2.29 THOUSAND/ÂΜL (ref 0.17–1.22)
MONOCYTES NFR BLD AUTO: 7 % (ref 4–12)
MONOCYTES NFR BLD: 4 % (ref 4–12)
NASAL CANNULA: 2
NEUTROPHILS # BLD AUTO: 27.19 THOUSANDS/ÂΜL (ref 1.85–7.62)
NEUTROPHILS # BLD MANUAL: 28.42 THOUSAND/UL (ref 1.85–7.62)
NEUTS BAND NFR BLD MANUAL: 1 % (ref 0–8)
NEUTS SEG NFR BLD AUTO: 84 % (ref 43–75)
NEUTS SEG NFR BLD AUTO: 89 % (ref 43–75)
NRBC BLD AUTO-RTO: 0 /100 WBCS
O2 CT BLDA-SCNC: 16 ML/DL (ref 16–23)
OXYHGB MFR BLDA: 96.3 % (ref 94–97)
P AXIS: 61 DEGREES
P AXIS: 62 DEGREES
PCO2 BLD: 27 MMOL/L (ref 21–32)
PCO2 BLD: 46.8 MM HG (ref 36–44)
PCO2 BLD: 52.7 MM HG (ref 36–44)
PCO2 BLD: 61.2 MM HG (ref 36–44)
PCO2 BLDA: 54.6 MM HG (ref 36–44)
PH BLD: 7.22 [PH] (ref 7.35–7.45)
PH BLD: 7.29 [PH] (ref 7.35–7.45)
PH BLD: 7.34 [PH] (ref 7.35–7.45)
PH BLDA: 7.3 [PH] (ref 7.35–7.45)
PHOSPHATE SERPL-MCNC: 4.5 MG/DL (ref 2.3–4.1)
PHOSPHATE SERPL-MCNC: 5.1 MG/DL (ref 2.3–4.1)
PLATELET # BLD AUTO: 144 THOUSANDS/UL (ref 149–390)
PLATELET # BLD AUTO: 144 THOUSANDS/UL (ref 149–390)
PLATELET BLD QL SMEAR: ABNORMAL
PMV BLD AUTO: 10.7 FL (ref 8.9–12.7)
PMV BLD AUTO: 10.9 FL (ref 8.9–12.7)
PO2 BLD: 149 MM HG (ref 75–129)
PO2 BLD: 205 MM HG (ref 75–129)
PO2 BLD: 223 MM HG (ref 75–129)
PO2 BLDA: 99 MM HG (ref 75–129)
POTASSIUM BLD-SCNC: 4.6 MMOL/L (ref 3.5–5.3)
POTASSIUM BLD-SCNC: 4.9 MMOL/L (ref 3.5–5.3)
POTASSIUM BLD-SCNC: 6.3 MMOL/L (ref 3.5–5.3)
POTASSIUM SERPL-SCNC: 4.8 MMOL/L (ref 3.5–5.3)
POTASSIUM SERPL-SCNC: 5.2 MMOL/L (ref 3.5–5.3)
PR INTERVAL: 179 MS
PR INTERVAL: 179 MS
PROT SERPL-MCNC: 4.7 G/DL (ref 6.4–8.4)
PROT SERPL-MCNC: 5.3 G/DL (ref 6.4–8.4)
PROTHROMBIN TIME: 15.9 SECONDS (ref 11.6–14.5)
PROTHROMBIN TIME: 17.2 SECONDS (ref 11.6–14.5)
QRS AXIS: 57 DEGREES
QRS AXIS: 58 DEGREES
QRSD INTERVAL: 71 MS
QRSD INTERVAL: 71 MS
QT INTERVAL: 375 MS
QT INTERVAL: 379 MS
QTC INTERVAL: 446 MS
QTC INTERVAL: 451 MS
RBC # BLD AUTO: 3.54 MILLION/UL (ref 3.81–5.12)
RBC # BLD AUTO: 3.61 MILLION/UL (ref 3.81–5.12)
RBC MORPH BLD: PRESENT
RH BLD: POSITIVE
SAO2 % BLD FROM PO2: 100 % (ref 60–85)
SAO2 % BLD FROM PO2: 100 % (ref 60–85)
SAO2 % BLD FROM PO2: 99 % (ref 60–85)
SODIUM BLD-SCNC: 136 MMOL/L (ref 136–145)
SODIUM BLD-SCNC: 140 MMOL/L (ref 136–145)
SODIUM BLD-SCNC: 141 MMOL/L (ref 136–145)
SODIUM SERPL-SCNC: 139 MMOL/L (ref 135–147)
SODIUM SERPL-SCNC: 142 MMOL/L (ref 135–147)
SPECIMEN EXPIRATION DATE: NORMAL
SPECIMEN SOURCE: ABNORMAL
T WAVE AXIS: 13 DEGREES
T WAVE AXIS: 25 DEGREES
UNIT DISPENSE STATUS: NORMAL
UNIT PRODUCT CODE: NORMAL
UNIT PRODUCT VOLUME: 350 ML
UNIT RH: NORMAL
VENTRICULAR RATE: 85 BPM
VENTRICULAR RATE: 85 BPM
WBC # BLD AUTO: 31.58 THOUSAND/UL (ref 4.31–10.16)
WBC # BLD AUTO: 32.26 THOUSAND/UL (ref 4.31–10.16)

## 2022-12-13 PROCEDURE — 04L23DZ OCCLUSION OF GASTRIC ARTERY WITH INTRALUMINAL DEVICE, PERCUTANEOUS APPROACH: ICD-10-PCS | Performed by: INTERNAL MEDICINE

## 2022-12-13 PROCEDURE — B41CYZZ FLUOROSCOPY OF PELVIC ARTERIES USING OTHER CONTRAST: ICD-10-PCS | Performed by: INTERNAL MEDICINE

## 2022-12-13 RX ORDER — ALBUTEROL SULFATE 2.5 MG/3ML
2.5 SOLUTION RESPIRATORY (INHALATION) EVERY 4 HOURS PRN
Status: DISCONTINUED | OUTPATIENT
Start: 2022-12-13 | End: 2022-12-28 | Stop reason: HOSPADM

## 2022-12-13 RX ORDER — OXYCODONE HYDROCHLORIDE 5 MG/1
5 TABLET ORAL EVERY 4 HOURS PRN
Status: DISCONTINUED | OUTPATIENT
Start: 2022-12-13 | End: 2022-12-28 | Stop reason: HOSPADM

## 2022-12-13 RX ORDER — FLUTICASONE FUROATE AND VILANTEROL 200; 25 UG/1; UG/1
1 POWDER RESPIRATORY (INHALATION) DAILY
Status: DISCONTINUED | OUTPATIENT
Start: 2022-12-13 | End: 2022-12-28 | Stop reason: HOSPADM

## 2022-12-13 RX ORDER — ATORVASTATIN CALCIUM 40 MG/1
40 TABLET, FILM COATED ORAL
Status: DISCONTINUED | OUTPATIENT
Start: 2022-12-13 | End: 2022-12-28 | Stop reason: HOSPADM

## 2022-12-13 RX ORDER — ACETAMINOPHEN 325 MG/1
975 TABLET ORAL EVERY 8 HOURS SCHEDULED
Status: DISCONTINUED | OUTPATIENT
Start: 2022-12-13 | End: 2022-12-28 | Stop reason: HOSPADM

## 2022-12-13 RX ORDER — BUDESONIDE 0.5 MG/2ML
0.5 INHALANT ORAL
Status: DISCONTINUED | OUTPATIENT
Start: 2022-12-13 | End: 2022-12-13

## 2022-12-13 RX ORDER — GUAIFENESIN/DEXTROMETHORPHAN 100-10MG/5
10 SYRUP ORAL EVERY 12 HOURS
Status: DISCONTINUED | OUTPATIENT
Start: 2022-12-13 | End: 2022-12-13

## 2022-12-13 RX ORDER — CALCIUM CHLORIDE 100 MG/ML
INJECTION INTRAVENOUS; INTRAVENTRICULAR AS NEEDED
Status: DISCONTINUED | OUTPATIENT
Start: 2022-12-13 | End: 2022-12-13

## 2022-12-13 RX ORDER — GUAIFENESIN/DEXTROMETHORPHAN 100-10MG/5
10 SYRUP ORAL EVERY 4 HOURS PRN
Status: DISCONTINUED | OUTPATIENT
Start: 2022-12-13 | End: 2022-12-15

## 2022-12-13 RX ORDER — SODIUM CHLORIDE, SODIUM GLUCONATE, SODIUM ACETATE, POTASSIUM CHLORIDE, MAGNESIUM CHLORIDE, SODIUM PHOSPHATE, DIBASIC, AND POTASSIUM PHOSPHATE .53; .5; .37; .037; .03; .012; .00082 G/100ML; G/100ML; G/100ML; G/100ML; G/100ML; G/100ML; G/100ML
75 INJECTION, SOLUTION INTRAVENOUS CONTINUOUS
Status: DISCONTINUED | OUTPATIENT
Start: 2022-12-13 | End: 2022-12-13

## 2022-12-13 RX ORDER — ONDANSETRON 2 MG/ML
INJECTION INTRAMUSCULAR; INTRAVENOUS AS NEEDED
Status: DISCONTINUED | OUTPATIENT
Start: 2022-12-13 | End: 2022-12-13

## 2022-12-13 RX ORDER — BENZONATATE 100 MG/1
100 CAPSULE ORAL 3 TIMES DAILY PRN
Status: DISCONTINUED | OUTPATIENT
Start: 2022-12-13 | End: 2022-12-28 | Stop reason: HOSPADM

## 2022-12-13 RX ORDER — ONDANSETRON 2 MG/ML
4 INJECTION INTRAMUSCULAR; INTRAVENOUS EVERY 4 HOURS PRN
Status: DISCONTINUED | OUTPATIENT
Start: 2022-12-13 | End: 2022-12-28 | Stop reason: HOSPADM

## 2022-12-13 RX ORDER — LEVALBUTEROL 1.25 MG/.5ML
1.25 SOLUTION, CONCENTRATE RESPIRATORY (INHALATION)
Status: DISCONTINUED | OUTPATIENT
Start: 2022-12-13 | End: 2022-12-22

## 2022-12-13 RX ORDER — GUAIFENESIN/DEXTROMETHORPHAN 100-10MG/5
10 SYRUP ORAL EVERY 12 HOURS
Status: DISCONTINUED | OUTPATIENT
Start: 2022-12-14 | End: 2022-12-15

## 2022-12-13 RX ORDER — LEVOTHYROXINE SODIUM 88 UG/1
88 TABLET ORAL
Status: DISCONTINUED | OUTPATIENT
Start: 2022-12-13 | End: 2022-12-28 | Stop reason: HOSPADM

## 2022-12-13 RX ORDER — SODIUM CHLORIDE 9 MG/ML
INJECTION, SOLUTION INTRAVENOUS CONTINUOUS PRN
Status: DISCONTINUED | OUTPATIENT
Start: 2022-12-12 | End: 2022-12-13

## 2022-12-13 RX ORDER — FENTANYL CITRATE 50 UG/ML
25 INJECTION, SOLUTION INTRAMUSCULAR; INTRAVENOUS ONCE
Status: COMPLETED | OUTPATIENT
Start: 2022-12-13 | End: 2022-12-13

## 2022-12-13 RX ORDER — VENLAFAXINE HYDROCHLORIDE 150 MG/1
150 CAPSULE, EXTENDED RELEASE ORAL DAILY
Status: DISCONTINUED | OUTPATIENT
Start: 2022-12-13 | End: 2022-12-28 | Stop reason: HOSPADM

## 2022-12-13 RX ORDER — AMOXICILLIN 250 MG
1 CAPSULE ORAL
Status: DISCONTINUED | OUTPATIENT
Start: 2022-12-13 | End: 2022-12-21

## 2022-12-13 RX ORDER — ALBUTEROL SULFATE 2.5 MG/3ML
5 SOLUTION RESPIRATORY (INHALATION) EVERY 6 HOURS PRN
Status: DISCONTINUED | OUTPATIENT
Start: 2022-12-13 | End: 2022-12-13

## 2022-12-13 RX ORDER — ROCURONIUM BROMIDE 10 MG/ML
INJECTION, SOLUTION INTRAVENOUS AS NEEDED
Status: DISCONTINUED | OUTPATIENT
Start: 2022-12-12 | End: 2022-12-13

## 2022-12-13 RX ORDER — IODIXANOL 320 MG/ML
100 INJECTION, SOLUTION INTRAVASCULAR
Status: COMPLETED | OUTPATIENT
Start: 2022-12-13 | End: 2022-12-13

## 2022-12-13 RX ORDER — CALCIUM GLUCONATE 20 MG/ML
2 INJECTION, SOLUTION INTRAVENOUS ONCE
Status: COMPLETED | OUTPATIENT
Start: 2022-12-13 | End: 2022-12-13

## 2022-12-13 RX ORDER — HYDROMORPHONE HCL IN WATER/PF 6 MG/30 ML
0.2 PATIENT CONTROLLED ANALGESIA SYRINGE INTRAVENOUS EVERY 4 HOURS PRN
Status: DISCONTINUED | OUTPATIENT
Start: 2022-12-13 | End: 2022-12-28 | Stop reason: HOSPADM

## 2022-12-13 RX ORDER — OXYCODONE HYDROCHLORIDE 5 MG/1
2.5 TABLET ORAL EVERY 4 HOURS PRN
Status: DISCONTINUED | OUTPATIENT
Start: 2022-12-13 | End: 2022-12-28 | Stop reason: HOSPADM

## 2022-12-13 RX ADMIN — FLUTICASONE FUROATE AND VILANTEROL TRIFENATATE 1 PUFF: 200; 25 POWDER RESPIRATORY (INHALATION) at 18:25

## 2022-12-13 RX ADMIN — OXYCODONE HYDROCHLORIDE 5 MG: 5 TABLET ORAL at 20:41

## 2022-12-13 RX ADMIN — IPRATROPIUM BROMIDE 0.5 MG: 0.5 SOLUTION RESPIRATORY (INHALATION) at 19:24

## 2022-12-13 RX ADMIN — FENTANYL CITRATE 25 MCG: 50 INJECTION INTRAMUSCULAR; INTRAVENOUS at 10:38

## 2022-12-13 RX ADMIN — BENZONATATE 100 MG: 100 CAPSULE ORAL at 20:46

## 2022-12-13 RX ADMIN — ACETAMINOPHEN 975 MG: 325 TABLET, FILM COATED ORAL at 05:15

## 2022-12-13 RX ADMIN — CALCIUM GLUCONATE 2 G: 20 INJECTION, SOLUTION INTRAVENOUS at 05:47

## 2022-12-13 RX ADMIN — Medication 12.5 MG: at 20:41

## 2022-12-13 RX ADMIN — BUDESONIDE 0.5 MG: 0.5 INHALANT ORAL at 08:43

## 2022-12-13 RX ADMIN — SENNOSIDES AND DOCUSATE SODIUM 1 TABLET: 8.6; 5 TABLET ORAL at 21:18

## 2022-12-13 RX ADMIN — SODIUM CHLORIDE: 0.9 INJECTION, SOLUTION INTRAVENOUS at 00:09

## 2022-12-13 RX ADMIN — ACETAMINOPHEN 975 MG: 325 TABLET, FILM COATED ORAL at 21:18

## 2022-12-13 RX ADMIN — LEVALBUTEROL HYDROCHLORIDE 1.25 MG: 1.25 SOLUTION, CONCENTRATE RESPIRATORY (INHALATION) at 19:24

## 2022-12-13 RX ADMIN — GUAIFENESIN AND DEXTROMETHORPHAN 10 ML: 100; 10 SYRUP ORAL at 20:41

## 2022-12-13 RX ADMIN — ONDANSETRON 4 MG: 2 INJECTION INTRAMUSCULAR; INTRAVENOUS at 00:32

## 2022-12-13 RX ADMIN — IPRATROPIUM BROMIDE 0.5 MG: 0.5 SOLUTION RESPIRATORY (INHALATION) at 14:44

## 2022-12-13 RX ADMIN — CALCIUM CHLORIDE 1 G: 100 INJECTION INTRAVENOUS; INTRAVENTRICULAR at 00:26

## 2022-12-13 RX ADMIN — GUAIFENESIN AND DEXTROMETHORPHAN 10 ML: 100; 10 SYRUP ORAL at 16:12

## 2022-12-13 RX ADMIN — OXYCODONE HYDROCHLORIDE 5 MG: 5 TABLET ORAL at 05:15

## 2022-12-13 RX ADMIN — LEVALBUTEROL HYDROCHLORIDE 1.25 MG: 1.25 SOLUTION, CONCENTRATE RESPIRATORY (INHALATION) at 08:43

## 2022-12-13 RX ADMIN — OXYCODONE HYDROCHLORIDE 5 MG: 5 TABLET ORAL at 09:45

## 2022-12-13 RX ADMIN — SUGAMMADEX 200 MG: 100 INJECTION, SOLUTION INTRAVENOUS at 00:31

## 2022-12-13 RX ADMIN — IPRATROPIUM BROMIDE 0.5 MG: 0.5 SOLUTION RESPIRATORY (INHALATION) at 08:43

## 2022-12-13 RX ADMIN — ATORVASTATIN CALCIUM 40 MG: 40 TABLET, FILM COATED ORAL at 16:12

## 2022-12-13 RX ADMIN — VENLAFAXINE HYDROCHLORIDE 150 MG: 150 CAPSULE, EXTENDED RELEASE ORAL at 12:40

## 2022-12-13 RX ADMIN — SODIUM CHLORIDE, SODIUM GLUCONATE, SODIUM ACETATE, POTASSIUM CHLORIDE AND MAGNESIUM CHLORIDE 75 ML/HR: 526; 502; 368; 37; 30 INJECTION, SOLUTION INTRAVENOUS at 05:16

## 2022-12-13 RX ADMIN — IODIXANOL 50 ML: 320 INJECTION, SOLUTION INTRAVASCULAR at 00:22

## 2022-12-13 RX ADMIN — Medication 12.5 MG: at 09:42

## 2022-12-13 RX ADMIN — ACETAMINOPHEN 975 MG: 325 TABLET, FILM COATED ORAL at 14:31

## 2022-12-13 RX ADMIN — LEVOTHYROXINE SODIUM 88 MCG: 88 TABLET ORAL at 05:15

## 2022-12-13 RX ADMIN — LEVALBUTEROL HYDROCHLORIDE 1.25 MG: 1.25 SOLUTION, CONCENTRATE RESPIRATORY (INHALATION) at 14:43

## 2022-12-13 NOTE — EMTALA/ACUTE CARE TRANSFER
[address] [facility] [address]  [address]  [place] [address]  Dept: [phone]      EMTALA TRANSFER CONSENT    NAME Jose Gomes                                         1940                              MRN [id number]    I have been informed of my rights regarding examination, treatment, and transfer   by Dr José Miguel Andersen DO    Benefits: Specialized equipment and/or services available at the receiving facility (Include comment)________________________    Risks: Potential for delay in receiving treatment, Potential deterioration of medical condition, Loss of IV      Consent for Transfer:  I acknowledge that my medical condition has been evaluated and explained to me by the emergency department physician or other qualified medical person and/or my attending physician, who has recommended that I be transferred to the service of  Accepting Physician: Dr Alysa Harrington at [facility] Name, [address] : [place]  The above potential benefits of such transfer, the potential risks associated with such transfer, and the probable risks of not being transferred have been explained to me, and I fully understand them  The doctor has explained that, in my case, the benefits of transfer outweigh the risks  I agree to be transferred  I authorize the performance of emergency medical procedures and treatments upon me in both transit and upon arrival at the receiving facility  Additionally, I authorize the release of any and all medical records to the receiving facility and request they be transported with me, if possible  I understand that the safest mode of transportation during a medical emergency is an ambulance and that the Hospital advocates the use of this mode of transport   Risks of traveling to the receiving facility by car, including absence of medical control, life sustaining equipment, such as oxygen, and medical personnel has been explained to me and I fully understand them  (MONICA CORRECT BOX BELOW)  [ X ]  I consent to the stated transfer and to be transported by ambulance/helicopter  [  ]  I consent to the stated transfer, but refuse transportation by ambulance and accept full responsibility for my transportation by car  I understand the risks of non-ambulance transfers and I exonerate the Hospital and its staff from any deterioration in my condition that results from this refusal     X___________________________________________    DATE  22  TIME________  Signature of patient or legally responsible individual signing on patient behalf           RELATIONSHIP TO PATIENT_________________________          Provider Certification    NAME Jose Gomes                                         1940                              MRN [id number]    A medical screening exam was performed on the above named patient  Based on the examination:    Condition Necessitating Transfer The primary encounter diagnosis was Abdominal wall hematoma, initial encounter  A diagnosis of Hemorrhagic shock (HCC) was also pertinent to this visit      Patient Condition: The patient has been stabilized such that within reasonable medical probability, no material deterioration of the patient condition or the condition of the unborn child(baylee) is likely to result from the transfer    Reason for Transfer: Level of Care needed not available at this facility    Transfer Requirements: Facility [facility]   · Space available and qualified personnel available for treatment as acknowledged by [facility]  · Agreed to accept transfer and to provide appropriate medical treatment as acknowledged by       Dr Alysa Harrington  · Appropriate medical records of the examination and treatment of the patient are provided at the time of transfer   [address] _______  · Transfer will be performed by qualified personnel from    and appropriate transfer equipment as required, including the use of necessary and appropriate life support measures  Provider Certification: I have examined the patient and explained the following risks and benefits of being transferred/refusing transfer to the patient/family:  General risk, such as traffic hazards, adverse weather conditions, rough terrain or turbulence, possible failure of equipment (including vehicle or aircraft), or consequences of actions of persons outside the control of the transport personnel      Based on these reasonable risks and benefits to the patient and/or the unborn child(baylee), and based upon the information available at the time of the patient’s examination, I certify that the medical benefits reasonably to be expected from the provision of appropriate medical treatments at another medical facility outweigh the increasing risks, if any, to the individual’s medical condition, and in the case of labor to the unborn child, from effecting the transfer      X____________________________________________ DATE 12/12/22        TIME_______      ORIGINAL - SEND TO MEDICAL RECORDS   COPY - SEND WITH PATIENT DURING TRANSFER

## 2022-12-13 NOTE — RESPIRATORY THERAPY NOTE
RT Protocol Note  Nay Arroyo [de-identified] y o  female MRN: [id number]  Unit/Bed#: ICU 02 Encounter: [id number]    Assessment    Principal Problem:    Hemorrhagic shock [facility])      Home Pulmonary Medications:     12/13/22 0501   Respiratory Protocol   Protocol Initiated? Yes   Protocol Selection Respiratory   Language Barrier? No   Medical & Social History Reviewed? Yes   Diagnostic Studies Reviewed? Yes   Physical Assessment Performed? Yes   Home Devices/Therapy   (pt takes nebs tid)   Respiratory Plan Home Bronchodilator Patient pathway   Respiratory Assessment   Assessment Type Assess only   General Appearance Alert; Awake   Respiratory Pattern Normal   Chest Assessment Chest expansion symmetrical   Bilateral Breath Sounds Diminished;Clear   Resp Comments Assessment of Respiratory protocol  Pt was transf from [facility] for higher Evaluation  Discharge 12/12 morning had Bronchitits  Was vomitting and lightheadedness during the  late day  Pt was pale diaphoretic  Found to have Large Abdominal wall hematoma by cat scan  Pt adm for Hemorrhagic shock  Pt said she takes resp nebulizers 2-3 x daily  Pt in no distress   Treatments as per home regiment       Home Devices/Therapy:  (pt takes nebs tid)    Past Medical History:   Diagnosis Date    Asthma     Legionnaire's disease ([facility] [address] )      Social History     Socioeconomic History    Marital status: /Civil Union     Spouse name: Not on file    Number of children: Not on file    Years of education: Not on file    Highest education level: Not on file   Occupational History    Not on file   Tobacco Use    Smoking status: Never    Smokeless tobacco: Never   Vaping Use    Vaping Use: Never used   Substance and Sexual Activity    Alcohol use: Never    Drug use: Never    Sexual activity: Not on file   Other Topics Concern    Not on file   Social History Narrative    Not on file     Social Determinants of Health     Financial Resource Strain: Not on file   Food Insecurity: No Food Insecurity    Worried About Running Out of Food in the Last Year: Never true    Ran Out of Food in the Last Year: Never true   Transportation Needs: No Transportation Needs    Lack of Transportation (Medical): No    Lack of Transportation (Non-Medical): No   Physical Activity: Not on file   Stress: Not on file   Social Connections: Not on file   Intimate Partner Violence: Not on file   Housing Stability: Unknown    Unable to Pay for Housing in the Last Year: Not on file    Number of Places Lived in the Last Year: 1    Unstable Housing in the Last Year: No       Subjective         Objective    Physical Exam:   Assessment Type: (P) Assess only  General Appearance: (P) Alert, Awake  Respiratory Pattern: (P) Normal  Chest Assessment: (P) Chest expansion symmetrical  Bilateral Breath Sounds: (P) Diminished, Clear    Vitals:  Blood pressure 115/63, pulse 84, temperature 98 2 °F (36 8 °C), resp  rate 16, height 5' 5" (1 651 m), weight 101 kg (223 lb 8 7 oz), SpO2 100 %  Results from last 7 days   Lab Units 12/13/22  0126   PH ART  7 304*   PCO2 ART mm Hg 54 6*   PO2 ART mm Hg 99 0   HCO3 ART mmol/L 26 5   BASE EXC ART mmol/L -0 5   O2 CONTENT ART mL/dL 16 0   O2 HGB, ARTERIAL % 96 3   ABG SOURCE  Line, Arterial       Imaging and other studies: I have personally reviewed pertinent reports  12/13/22 0501   Respiratory Protocol   Protocol Initiated? Yes   Protocol Selection Respiratory   Language Barrier? No   Medical & Social History Reviewed? Yes   Diagnostic Studies Reviewed? Yes   Physical Assessment Performed? Yes   Home Devices/Therapy   (pt takes nebs tid)   Respiratory Plan Home Bronchodilator Patient pathway   Respiratory Assessment   Assessment Type Assess only   General Appearance Alert; Awake   Respiratory Pattern Normal   Chest Assessment Chest expansion symmetrical   Bilateral Breath Sounds Diminished;Clear   Resp Comments Assessment of Respiratory protocol    Pt was transf from [facility] for higher Evaluation  Discharge 12/12 morning had Bronchitits  Was vomitting and lightheadedness during the  late day  Pt was pale diaphoretic  Found to have Large Abdominal wall hematoma by cat scan  Pt adm for Hemorrhagic shock  Pt said she takes resp nebulizers 2-3 x daily  Pt in no distress  Treatments as per home regiment               Plan    Respiratory Plan: (P) Home Bronchodilator Patient pathway        Resp Comments: (P) Assessment of Respiratory protocol  Pt was transf from [facility] for higher Evaluation  Discharge 12/12 morning had Bronchitits  Was vomitting and lightheadedness during the  late day  Pt was pale diaphoretic  Found to have Large Abdominal wall hematoma by cat scan  Pt adm for Hemorrhagic shock  Pt said she takes resp nebulizers 2-3 x daily  Pt in no distress   Treatments as per home regiment

## 2022-12-13 NOTE — PHYSICAL THERAPY NOTE
Physical Therapy Evaluation     Patient's Name: Jadyn Mayfield    Admitting Diagnosis  Hemorrhagic shock ([facility] [address] ) [R57 8]  Hematoma [T14  8XXA]    Problem List  Patient Active Problem List   Diagnosis    Paroxysmal atrial fibrillation (HCC)    Community acquired pneumonia    Acute respiratory failure with hypoxia (HCC)    Hypothyroidism    Hypertension    Hyperlipidemia    Major depressive disorder, single episode, mild (HCC)    Osteoarthritis of knee    Hemarthrosis involving knee joint, right    Ambulatory dysfunction    Chest tightness    Diabetes ([facility] [address] )    Obesity    Bilateral flank pain    Bronchospasm with bronchitis, acute    Chest pain    Leukocytosis    Hemorrhagic shock (HCC)       Past Medical History  Past Medical History:   Diagnosis Date    Asthma     Atrial fibrillation ([facility] [address] )     Diabetes ([facility] [address] )     Hyperlipidemia     Hypertension     Legionnaire's disease ([facility] [address] )        Past Surgical History  Past Surgical History:   Procedure Laterality Date    IR EMBOLIZATION (SPECIFY VESSEL OR SITE)  12/12/2022    LUNG LOBECTOMY              12/13/22 0855   PT Last Visit   PT Visit Date 12/13/22   Note Type   Note type Evaluation   Pain Assessment   Pain Assessment Tool 0-10   Pain Score 6   Pain Location/Orientation Orientation: Left; Location: Abdomen; Location: Groin   Pain Onset/Description Descriptor: Aching;Descriptor: Discomfort   Hospital Pain Intervention(s) Repositioned; Ambulation/increased activity; Emotional support   Restrictions/Precautions   Weight Bearing Precautions Per Order No   Other Precautions Chair Alarm;Multiple lines;Telemetry; Fall Risk;O2;Pain  (4L NC)   Home Living   Type of [address] [place] [address] Two level;Bed/bath upstairs;1/2 bath on main level;Stairs to enter with rails  (2 [place])   Bathroom Shower/Tub Walk-in shower   Bathroom Toilet Standard   Bathroom Equipment Shower chair   [address]   Prior Function   Level of [address] with ADLs;Independent with functional mobility; Needs assistance with IADLS   Lives With Family  (daughter and grandson)   Receives Help From Family   IADLs Independent with medication management; Independent with meal prep; Independent with driving   Falls in the last 6 months 0   Vocational Retired   General   Family/Caregiver Present No   Cognition   Overall Cognitive Status WFL   Arousal/Participation Cooperative   Attention Within functional limits   Orientation Level Oriented X4   Memory Within functional limits   Following Commands Follows all commands and directions without difficulty   Subjective   Subjective Pt pleasant and willing to participate in therapy  RLE Assessment   RLE Assessment WFL   LLE Assessment   LLE Assessment WFL   Coordination   Movements are Fluid and Coordinated 1   Rapid Alternating Movements Intact   Bed Mobility   Supine to Sit 5  Supervision   Additional items HOB elevated;Verbal cues   Sit to Supine Unable to assess   Additional Comments Pt sat EOB with fair trunk/postural control  Transfers   Sit to Stand 4  Minimal assistance   Additional items Assist x 1; Increased time required;Verbal cues   Stand to Sit 4  Minimal assistance   Additional items Assist x 1; Increased time required;Verbal cues   Additional Comments used RW   Ambulation/Elevation   Gait pattern Wide NASIR; Forward Flexion;Decreased foot clearance;Shuffling;Excessively slow   Gait Assistance 4  Minimal assist   Additional items Assist x 1;Verbal cues   Assistive Device Rolling walker   Distance 3ft to chair   Balance   Static Sitting Fair   Dynamic Sitting Fair -   Static Standing Poor +   Dynamic Standing Poor +   Ambulatory Poor +   Endurance Deficit   Endurance Deficit Yes   Endurance Deficit Description Pt on 4L O2   Activity Tolerance   Activity Tolerance Patient limited by pain; Patient limited by fatigue   Medical Staff Made Aware PT CI, OT, nsg   Nurse Made Aware yes   Assessment   Prognosis Fair   Problem List Decreased strength;Decreased endurance; Impaired balance;Decreased mobility;Pain   Assessment Pt is a [de-identified] y o  female  Presenting s/p embolization of L inferior epigastric artery  Pt  has a past medical history of Asthma, Atrial fibrillation ([facility] [address] ), Diabetes ([facility] ), Hyperlipidemia, Hypertension, and Legionnaire's disease ([facility] )  This is a high complexity evaluation  Pt presented with deficits in general strength, pain, balance, ambulation, and endurance impacting pt functional mobility, safety, and return to PLOF  Pt supine in bed upon arrival with reports of pain in L flank  Pt performed bed mobility at S level  Pt performed both transfers and ambulation with minAx1 using RW  Pt reported weakness during mobility activities, demonstrating below pt baseline and would benefit from continued pt before being D/C home  Pt ended session seated in chair, chair alarm on, all needs in reach  Performed at least 2 patient identifiers during session: Name and Gloria Arm The patient's AM-PAC Basic Mobility Inpatient Short Form Raw Score is 19  A Raw score of greater than 16 suggests the patient may benefit from discharge to home  Please also refer to the recommendation of the Physical Therapist for safe discharge planning  Pt would benefit from continued inpatient skilled physical therapy to address said deficits and to maximum patient functional mobility  PT recommendation is for pt to be D/C to home with HHPT  Barriers to Discharge Inaccessible home environment;Decreased caregiver support   Goals   Patient Goals none stated   [facility] Expiration Date 12/27/22   Short Term Goal #1 1  Pt will perform transfers with Karli to improve effectiveness, efficiency, and safe mobility throughout home  2  Pt will ambulate 50 ft under S level with RW to be able to navigate throughout home  3  Pt will improve static/dynamic balance to 10 minutes to be able to perform functional movements at EOB   4  Pt's strength will improve 1/2 grade to be able to perform functional activities safely  Plan   Treatment/Interventions Functional transfer training;LE strengthening/ROM; Therapeutic exercise; Endurance training;Patient/family training;Bed mobility;Gait training;Equipment eval/education;Spoke to nursing;OT   PT Frequency 3-5x/wk   Recommendation   PT Discharge Recommendation Home with home health rehabilitation   AM-PAC Basic Mobility Inpatient   Turning in Bed Without Bedrails 4   Lying on Back to Sitting on Edge of Flat Bed 4   Moving Bed to Chair 3   Standing Up From Chair 3   Walk in Room 3   Climb 3-5 Stairs 2   Basic Mobility Inpatient Raw Score 19   Basic Mobility Standardized Score 42 48   Highest Level Of Mobility   -HLM Goal 6: Walk 10 steps or more   JH-HLM Achieved 4: Move to chair/commode   Modified Ashley Scale   Modified Ashley Scale 4   End of Consult   Patient Position at End of Consult Bedside chair; All needs within reach;Bed/Chair alarm activated       Rj Steven, SPT

## 2022-12-13 NOTE — CONSULTS
Consult Note - Pulmonary   Karla Chu [de-identified] y o  female MRN: [id number]  Unit/Bed#: ICU 02 Encounter: [id number]      Reason for consultation: cough & dyspnea    Requesting physician: JUAN Bhatia    Assessment & Recommendations:   1  Post Infectious Cough  · Some improvements  · Will continue nebulized bronchodilators, add ICS/LABA, add tessalon perles  · Stop pulmicort nebs  · Trial robitussin DM  · Can add tussionex if cough persists  · No need for further systemic steroids    2  Prior Bronchospasm, possible asthma  · Add Breo 200mcg now  · Consider PFTs as outpatient once rectus hematoma is healed    3  Hemorrhagic shock - resolved - per trauma service    4  Rectus hematoma requiring embolization - per trauma service    5  Atrial fibrillation on warfarin      History of Present Illness   HPI:  Karla Chu is a [de-identified] y o  female who has HTN, HLP, DM, Atrial fibrillation on warfarin,  Presented to [facility] on 12/6 for cough, dyspnea and wheeze  She had seen her PCP 1 week prior and given augmentin and prednisone taper  She did have myalgias and fatigue but denied sick contacts  She reported productive cough  She was admitted and started on solumedrol, nebs, and doxycycline  She was discharged home 12/12  Later that day, she returned for LUQ flank pain, found to have large left flank hematoma with hemorrhagic shock  She was given resuscitation, PCC/DDAVP and taken to IR for left inferior epigastric embolization  Consult requested for continued cough  She reports continued cough, no purulent sputum production, no hemoptysis, no SSCP, no pleurisy  Flank pain is improved  She denied history of asthma but reported nebulizer use in the past  She denied daily inhaler use  She denied history of ILD, no emphysema, denied prior chest surgeries  She did admit to intermittent wheeze        Review of systems:  12 point review of systems was completed and was otherwise negative except as listed in HPI       Historical Information   Past Medical History:   Diagnosis Date   • Asthma    • Atrial fibrillation ([facility] [address] )    • Diabetes ([facility] )    • Hyperlipidemia    • Hypertension    • Legionnaire's disease ([facility] )      Past Surgical History:   Procedure Laterality Date   • IR EMBOLIZATION (SPECIFY VESSEL OR SITE)  12/12/2022   • LUNG LOBECTOMY       No family history on file      Occupational History: retired, housekeeping     Social History: lifelong nonsmoker, pet dog, no exotic animal exposures    Meds/Allergies   Current Facility-Administered Medications   Medication Dose Route Frequency   • acetaminophen (TYLENOL) tablet 975 mg  975 mg Oral Q8H [address]   • albuterol inhalation solution 2 5 mg  2 5 mg Nebulization Q4H PRN   • atorvastatin (LIPITOR) tablet 40 mg  40 mg Oral Daily With Dinner   • benzonatate (TESSALON PERLES) capsule 100 mg  100 mg Oral TID PRN   • dextromethorphan-guaiFENesin (ROBITUSSIN DM) oral syrup 10 mL  10 mL Oral Q4H PRN   • dextromethorphan-guaiFENesin (ROBITUSSIN DM) oral syrup 10 mL  10 mL Oral Q12H   • fentanyl citrate (PF) (FOR EMS ONLY) 100 mcg/2 mL injection 100 mcg  1 each Does not apply Once   • fluticasone-vilanterol 200-25 mcg/actuation 1 puff  1 puff Inhalation Daily   • HYDROmorphone HCl (DILAUDID) injection 0 2 mg  0 2 mg Intravenous Q4H PRN   • ipratropium (ATROVENT) 0 02 % inhalation solution 0 5 mg  0 5 mg Nebulization TID   • levalbuterol (XOPENEX) inhalation solution 1 25 mg  1 25 mg Nebulization TID   • levothyroxine tablet 88 mcg  88 mcg Oral Early Morning   • metoprolol tartrate (LOPRESSOR) partial tablet 12 5 mg  12 5 mg Oral Q12H [address]   • ondansetron (ZOFRAN) injection 4 mg  4 mg Intravenous Q4H PRN   • oxyCODONE (ROXICODONE) IR tablet 2 5 mg  2 5 mg Oral Q4H PRN   • oxyCODONE (ROXICODONE) IR tablet 5 mg  5 mg Oral Q4H PRN   • senna-docusate sodium (SENOKOT S) 8 6-50 mg per tablet 1 tablet  1 tablet Oral HS   • venlafaxine (EFFEXOR-XR) 24 hr capsule 150 mg  150 mg Oral Daily Medications Prior to Admission   Medication   • albuterol (2 5 mg/3 mL) 0 083 % nebulizer solution   • atorvastatin (LIPITOR) 40 mg tablet   • levothyroxine 100 mcg tablet   • lisinopril (ZESTRIL) 10 mg tablet   • metoprolol tartrate (LOPRESSOR) 25 mg tablet   • predniSONE 20 mg tablet   • venlafaxine (EFFEXOR-XR) 150 mg 24 hr capsule   • warfarin (COUMADIN) 7 5 mg tablet     No Known Allergies    Vitals: Blood pressure 115/63, pulse 74, temperature 99 3 °F (37 4 °C), resp  rate 14, height 5' 5" (1 651 m), weight 101 kg (223 lb 8 7 oz), SpO2 94 %  , 2LNC, Body mass index is 37 2 kg/m²  Intake/Output Summary (Last 24 hours) at 12/13/2022 1432  Last data filed at 12/13/2022 1200  Gross per 24 hour   Intake 2455 ml   Output 895 ml   Net 1560 ml       Physical Exam  General: Obese older woman in chair, awake alert and oriented x 3, conversant without conversational dyspnea, NAD, normal affect  HEENT:  PERRL, Sclera noninjected, nonicteric OU, Nares patent, no nasal flaring, no nasal drainage, Mucous membranes moist, no oral lesions, normal dentition  NECK: Trachea midline, no accessory muscle use, no stridor, no cervical or supraclavicular adenopathy, JVP not elevated  CARDIAC: Reg, single s1/S2, no m/r/g  PULM: intermittent NP cough, no wheeze, no rales, no rhonchi, good air movmeent  CHEST: No gross deformities, equal chest expansion on inspiration bilaterally  ABD: left abd hematoma noted, normoactive bowel sounds, soft mild left abd tenderness, no rebound, no rigidity, no guarding  : james in place yellow urine  EXT: No cyanosis, no clubbing, no edema, normal capillary refill  SKIN:  No rashes, no lesions  NEURO: no focal neurologic deficits, AAOx3, moving all extremities appropriately    Labs: I have personally reviewed pertinent lab results    Laboratory and Diagnostics  Results from last 7 days   Lab Units 12/13/22  1247 12/13/22  0459 12/13/22  0128 12/13/22  4748 12/13/22  0018 12/12/22  8200 12/12/22  2313 12/12/22  2025 12/12/22  0521 12/11/22  0524 12/10/22  0643 12/08/22  0545 12/07/22  0434   WBC Thousand/uL  --  32 26* 31 58*  --   --   --  27 48* 24 94* 16 25* 15 70* 14 95*   < > 9 94   HEMOGLOBIN g/dL 10 7* 11 0* 10 7*  --   --   --  8 7* 8 1* 12 5 12 2 11 9   < > 11 1*   I STAT HEMOGLOBIN g/dl  --   --   --  8 5* 6 8* 8 2*  --   --   --   --   --   --   --    HEMATOCRIT % 32 1* 32 9* 32 6*  --   --   --  27 9* 26 0* 39 2 37 3 37 3   < > 34 0*   HEMATOCRIT, ISTAT %  --   --   --  25* 20* 24*  --   --   --   --   --   --   --    PLATELETS Thousands/uL  --  144* 144*  --   --   --  201 186 239 226 222   < > 226   NEUTROS PCT %  --  84*  --   --   --   --  86* 87* 89* 89*  --   --  86*   BANDS PCT %  --   --  1  --   --   --   --   --   --   --   --   --   --    MONOS PCT %  --  7  --   --   --   --  6 6 3* 4  --   --  4   MONO PCT %  --   --  4  --   --   --   --   --   --   --   --   --   --     < > = values in this interval not displayed       Results from last 7 days   Lab Units 12/13/22  0459 12/13/22  0128 12/13/22  0046 12/13/22  0018 12/12/22 2343 12/12/22 2313 12/12/22 2025 12/12/22 0521 12/11/22 0524 12/07/22  0434   SODIUM mmol/L 139 142  --   --   --  135 137 136 137 139   POTASSIUM mmol/L 5 2 4 8  --   --   --  5 8* 5 1 5 0 4 8 4 3   CHLORIDE mmol/L 109* 112*  --   --   --  107 103 100 102 102   CO2 mmol/L 28 28  --   --   --  26 28 26 27 28   CO2, I-STAT mmol/L  --   --  27 27 27  --   --   --   --   --    ANION GAP mmol/L 2* 2*  --   --   --  2* 6 10 8 9   BUN mg/dL 27* 28*  --   --   --  30* 30* 22 22 18   CREATININE mg/dL 0 92 0 95  --   --   --  1 09 1 34* 0 85 0 86 0 92   CALCIUM mg/dL 8 3 7 7*  --   --   --  7 2* 7 3* 8 2* 8 2* 8 1*   GLUCOSE RANDOM mg/dL 129 164*  --   --   --  208* 273* 163* 165* 161*   ALT U/L 20 19  --   --   --   --  23  --   --   --    AST U/L 15 14  --   --   --   --  18  --   --   --    ALK PHOS U/L 71 69  --   --   --   --  75  --   --   -- ALBUMIN g/dL 2 7* 2 4*  --   --   --   --  2 5*  --   --   --    TOTAL BILIRUBIN mg/dL 0 44 0 30  --   --   --   --  0 25  --   --   --      Results from last 7 days   Lab Units 12/13/22 0459 12/13/22 0128 12/12/22 2025   MAGNESIUM mg/dL 2 3 2 3 2 1   PHOSPHORUS mg/dL 4 5* 5 1*  --       Results from last 7 days   Lab Units 12/13/22  0459 12/12/22  2313 12/12/22  2025 12/12/22  0844 12/10/22  0602 12/09/22  0532 12/08/22  0545   INR  1 38* 1 25* 2 54* 1 80* 1 35* 1 28* 1 41*   PTT seconds  --   --  27  --   --   --   --           Results from last 7 days   Lab Units 12/13/22 0128 12/12/22 2313 12/12/22 2025   LACTIC ACID mmol/L 1 8 2 9* 3 7*                     Results from last 7 days   Lab Units 12/08/22  0545 12/07/22  1701 12/07/22  0434   PROCALCITONIN ng/ml <0 05 <0 05 <0 05       ABG:   Results from last 7 days   Lab Units 12/13/22  0126   PH ART  7 304*   PCO2 ART mm Hg 54 6*   PO2 ART mm Hg 99 0   HCO3 ART mmol/L 26 5   BASE EXC ART mmol/L -0 5   ABG SOURCE  Line, Arterial       Imaging and other studies: I have personally reviewed pertinent reports  and I have personally reviewed pertinent films in PACS  CTA 12/12 - no PE/dissection, lungs clear, no sig effusion, mild basilar atelectasis, no TIB infiltrates appreciated as seen on prior CT angio, large left rectus hematoma with active extravasation    Pulmonary function testing: none available    EKG, Pathology, and Other Studies: I have personally reviewed pertinent reports  TTE 12/2022 EF 27%, grade I diastolic dysfunction    Code Status: Level 1 - Full Code      Luke Kaufman DO, [facility]

## 2022-12-13 NOTE — PROGRESS NOTES
Progress Note -Interventional Radiology NP  Susan Shelby [de-identified] y o  female MRN: [id number]  Unit/Bed#: ICU 02 Encounter: [id number]    Assessment/Plan:    Susan Shelby, [de-identified] female who presented with syncope, left abdominal/flank pain  Patient was on Coumadin  CTAP showed -   Large hematoma centered within the left rectus femoris muscle extending into the left lateral abdominal wall musculature/internal oblique muscle measuring up to 14 5 x 5 9 cm in cross-section  Inferiorly hematoma decompresses into the left space of Retzius with a hematoma component measuring on the order of 9 6 x 6 0 x 7 5 cm and resulting in mass effect upon the bladder  Active contrast extravasation noted within left rectus femoris muscle hematoma       Patient is s/p left inferior epigastric artery embolization yesterday  Gelfoam and coils  Right groin - soft, no hematoma    Hgb remains stable   Latest Reference Range & Units 12/12/22 23:13 12/13/22 01:28 12/13/22 04:59 12/13/22 12:47   Hemoglobin 11 5 - 15 4 g/dL 8 7 (L) 10 7 (L) 11 0 (L) 10 7 (L)   (L): Data is abnormally low    Recommend continuing to monitor Hgb and for s/s bleeding  Please have low threshold to reach out to IR with any questions or concerns for bleeding  Subjective:  Patient sitting in bedside chair awake, alert and oriented  Patient reports tenderness to abdomen - left mid and upper  Tenderness with palpation        Patient Active Problem List   Diagnosis   • Paroxysmal atrial fibrillation (HCC)   • Community acquired pneumonia   • Acute respiratory failure with hypoxia ([address] )   • Hypothyroidism   • Hypertension   • Hyperlipidemia   • Major depressive disorder, single episode, mild (HCC)   • Osteoarthritis of knee   • Hemarthrosis involving knee joint, right   • Ambulatory dysfunction   • Chest tightness   • Diabetes ([address] )   • Obesity   • Bilateral flank pain   • Bronchospasm with bronchitis, acute   • Chest pain   • Leukocytosis   • Hemorrhagic shock ([address] ) Objective:    Vitals:  /63   Pulse 74   Temp 99 3 °F (37 4 °C)   Resp 16   Ht 5' 5" (1 651 m)   Wt 101 kg (223 lb 8 7 oz)   SpO2 94%   BMI 37 20 kg/m²   Body mass index is 37 2 kg/m²  Weight (last 2 days)     Date/Time Weight    12/13/22 0130 101 (223 55)          I/Os:    Intake/Output Summary (Last 24 hours) at 12/13/2022 1510  Last data filed at 12/13/2022 1200  Gross per 24 hour   Intake 2455 ml   Output 895 ml   Net 1560 ml       Invasive Devices     Peripheral Intravenous Line  Duration           Peripheral IV 12/12/22 Distal;Right;Ventral (anterior) Forearm <1 day    Peripheral IV 12/12/22 Left Antecubital <1 day    Peripheral IV 12/12/22 Right Antecubital <1 day          Arterial Line  Duration           Arterial Line 12/12/22 Right Radial <1 day          Drain  Duration           Urethral Catheter Temperature probe 16 Fr  <1 day                Physical Exam:  Physical Exam  HENT:      Head: Normocephalic  Mouth/Throat:      Mouth: Mucous membranes are moist    Cardiovascular:      Rate and Rhythm: Normal rate  Pulses: Normal pulses  Pulmonary:      Effort: Pulmonary effort is normal       Comments: Cough  Abdominal:      Palpations: Abdomen is soft  Tenderness: There is abdominal tenderness  Musculoskeletal:      Cervical back: Normal range of motion  Skin:     General: Skin is warm and dry  Capillary Refill: Capillary refill takes less than 2 seconds  Findings: Bruising present  Neurological:      General: No focal deficit present  Mental Status: She is alert and oriented to person, place, and time  Psychiatric:         Mood and Affect: Mood normal          Behavior: Behavior normal          Thought Content:  Thought content normal          Judgment: Judgment normal                       Lab Results and Cultures:   CBC with diff:   Lab Results   Component Value Date    WBC 32 26 (HH) 12/13/2022    HGB 10 7 (L) 12/13/2022    HCT 32 1 (L) 12/13/2022    MCV 91 12/13/2022     (L) 12/13/2022    MCH 30 5 12/13/2022    MCHC 33 4 12/13/2022    RDW 15 6 (H) 12/13/2022    MPV 10 9 12/13/2022    NRBC 0 12/13/2022      BMP/CMP:  Lab Results   Component Value Date    K 5 2 12/13/2022     (H) 12/13/2022    CO2 28 12/13/2022    CO2 27 12/13/2022    BUN 27 (H) 12/13/2022    CREATININE 0 92 12/13/2022    GLUCOSE 175 (H) 12/13/2022    CALCIUM 8 3 12/13/2022    AST 15 12/13/2022    ALT 20 12/13/2022    ALKPHOS 71 12/13/2022    EGFR 58 12/13/2022   ,     Coags:   Lab Results   Component Value Date    PTT 27 12/12/2022    INR 1 38 (H) 12/13/2022   ,   Results from last 7 days   Lab Units 12/13/22  0459 12/12/22  2313 12/12/22 2025 12/12/22  0844 12/10/22  0602   PTT seconds  --   --  27  --   --    INR  1 38* 1 25* 2 54* 1 80* 1 35*        Lipid Panel: No results found for: CHOL  No results found for: HDL  No results found for: HDL  No results found for: LDLCALC  No results found for: TRIG    HgbA1c:   Lab Results   Component Value Date    HGBA1C 6 6 (H) 10/07/2022    HGBA1C 6 7 (H) 04/06/2022    HGBA1C 6 7 (H) 10/14/2021       Blood Culture:   Lab Results   Component Value Date    BLOODCX Received in Microbiology Lab  Culture in Progress  12/12/2022    BLOODCX Received in Microbiology Lab  Culture in Progress   12/12/2022   ,   Urinalysis:   Lab Results   Component Value Date    COLORU Yellow 02/12/2022    CLARITYU Clear 02/12/2022    SPECGRAV >=1 030 02/12/2022    PHUR 6 0 02/12/2022    LEUKOCYTESUR Negative 02/12/2022    NITRITE Negative 02/12/2022    GLUCOSEU Negative 02/12/2022    KETONESU Negative 02/12/2022    BILIRUBINUR Negative 02/12/2022    BLOODU Negative 02/12/2022   ,   Urine Culture:   Lab Results   Component Value Date    URINECX 30,000-39,000 cfu/ml 02/12/2022   ,   Wound Culure:  No results found for: WOUNDCULT    VTE Pharmacologic Prophylaxis: Sequential compression device (Venodyne)       Thank you for allowing me to participate in the care of [facility]  Please don't hesitate to call, text, email, or TigerText with any questions  This text is generated with voice recognition software  There may be translation, syntax,  or grammatical errors  If you have any questions, please contact the dictating provider

## 2022-12-13 NOTE — CONSULTS
Consult - 89 [address] [de-identified] y o  female MRN: [id number]  Unit/Bed#: [facility] Encounter: [id number]      -------------------------------------------------------------------------------------------------------------  Chief Complaint: hemorrhagic shock    History of Present Illness   Paty Kidd is a [de-identified] y o  female who presents as a transfer from [place] with left rectus sheath hematoma  She was discharged from [place] earlier that day after being admitted for treatment of bronchitis  She was feeling better overall, but family called EMS after patient had worsening pain in her left flank throughout the day  She was also vomiting, lightheaded, and had a syncopal event when EMS arrived  Of note, the patient was getting lovenox injections while hospitalized which is suspected to be the cause of this hematoma  The patient was started on levo and was transferred on levo 4 with 1u pRBC running  She was stable on arrival and taken straight to IR for embolization      History obtained from chart review and the patient   -------------------------------------------------------------------------------------------------------------  Assessment and Plan:    Neuro:   • Delirium precautions  o Plan:   - Neuro checks  - Maintain sleep/wake cycle  - CAM-ICU    CV:   • Hemorrhagic shock, resolved  o Off pressors, arrived on levo 4  - Plan:  • Cont to monitor  • Levo as needed for MAP >65 mmHg  • Continuous cardiac monitoring  • Paroxysmal atrial fibrillation  o Home meds: coumadin 7 5mg QD, metoprolol 12 5mg BID  o Plan:   - Holding anticoagulation in setting of hematoma  • Hyperlipidemia  o Home meds: atorvastatin 40mg QD  • Hypertension  o Home meds: lisinopril 10mg QD  o Plan:   - Restart home meds once able to take PO      Pulm:  • Acute bronchitis, resolved  o Discharged 12/12 after 6 days of hospitalization  o Treated with steroids, nebulizers, doxy  o Plan:   - Continuous pulse ox  - SpO2 >92%  - Supplemental oxygen as needed    GI:   • No acute issues  o Plan:   - Add bowel regimen as needed    :   • RUKHSANA  o Creatinine on admission 1 34  o Post-op creatinine 1 09  o No known CKD at baseline  o Baseline creatinine ~0 8  o Plan:   - Maintain james  - Strict I/Os  - Monitor UOP  - Monitor BMP    F/E/N:   • F: off IVF  • E: replete as necessary  • N: NPO      Heme/Onc:   • Acute blood loss anemia in setting of left rectus sheath hematoma  o S/p embolization of left inf epigastric artery with IR 12/12  o Products: kcentra, DDAVP, 1u pRBC  o hgb 10 7 after 1u pRBC (8 7)  o Plan:   - Monitor for s/s of active bleed  - Monitor hgb  - Serial exams  • Leukocytosis  o WBC 31 58, was 16 25 at discharge  o Partly reactive to IR procedure  o afebrile  o Plan:   - Cont to monitor      Endo:   • Hypothyroidism  o Plan:   - Restart home levothyroxine once tolerating PO  • No acute issues  o Plan:   - Monitor blood glucose      ID:   • No acute issues  o Plan:   - Monitor lines and skin for infection    MSK/Skin:   • Left rectus sheath hematoma  o S/p embolization of left inf epigastric artery with IR 12/12  o Products: kcentra, DDAVP, 1u pRBC  o hgb 10 7 after 1u pRBC (8 7)  o Plan:   - Monitor for s/s of active bleed  - Monitor hgb  - Serial exams    Disposition: Admit to Critical Care   Code Status: Level 1 - Full Code  --------------------------------------------------------------------------------------------------------------  Review of Systems   Constitutional: Negative  HENT: Negative  Respiratory: Negative  Cardiovascular: Negative  Gastrointestinal: Positive for abdominal pain  Genitourinary: Negative  Musculoskeletal: Negative  Skin: Negative  Neurological: Negative  Psychiatric/Behavioral: Negative  A 12-point, complete review of systems was reviewed and negative except as stated above     Physical Exam  Vitals reviewed  Constitutional:       General: She is not in acute distress       Appearance: She is obese  She is not ill-appearing  HENT:      Head: Normocephalic and atraumatic  Right Ear: External ear normal       Left Ear: External ear normal       Nose: Nose normal       Mouth/Throat:      Mouth: Mucous membranes are moist    Eyes:      Extraocular Movements: Extraocular movements intact  Pupils: Pupils are equal, round, and reactive to light  Cardiovascular:      Rate and Rhythm: Normal rate and regular rhythm  Pulses: Normal pulses  Pulmonary:      Effort: Pulmonary effort is normal  No respiratory distress  Comments: On supplemental oxygen  Abdominal:      General: There is no distension  Palpations: Abdomen is soft  Tenderness: There is abdominal tenderness  There is no guarding or rebound  Comments: Ecchymosis of the left abdomen   Genitourinary:     Comments: Right groin access site without evidence of hematoma  Musculoskeletal:         General: Normal range of motion  Cervical back: Normal range of motion  Skin:     General: Skin is warm and dry  Neurological:      General: No focal deficit present  Mental Status: She is alert and oriented to person, place, and time  --------------------------------------------------------------------------------------------------------------  Vitals:   Vitals:    12/12/22 2314   BP: 113/52   BP Location: Right arm   Pulse: 84   Resp: 20   SpO2: 98%     Temp  Min: 97 5 °F (36 4 °C)  Max: 99 6 °F (37 6 °C)        There is no height or weight on file to calculate BMI    N/A    Laboratory and Diagnostics:  Results from last 7 days   Lab Units 12/12/22 2313 12/12/22 2025 12/12/22 0521 12/11/22  0524 12/10/22  2081 12/09/22  0532 12/08/22  0545 12/07/22  0434 12/06/22  1210   WBC Thousand/uL 27 48* 24 94* 16 25* 15 70* 14 95* 13 31* 13 14* 9 94 13 19*   HEMOGLOBIN g/dL 8 7* 8 1* 12 5 12 2 11 9 11 8 11 6 11 1* 12 6   HEMATOCRIT % 27 9* 26 0* 39 2 37 3 37 3 36 7 36 0 34 0* 38 0   PLATELETS Thousands/uL 201 186 239 226 222 220 225 226 238   NEUTROS PCT % 86* 87* 89* 89*  --   --   --  86* 85*   MONOS PCT % 6 6 3* 4  --   --   --  4 6     Results from last 7 days   Lab Units 12/12/22 2313 12/12/22 2025 12/12/22  0521 12/11/22  0524 12/07/22  0434 12/06/22  1210   SODIUM mmol/L 135 137 136 137 139 140   POTASSIUM mmol/L 5 8* 5 1 5 0 4 8 4 3 4 3   CHLORIDE mmol/L 107 103 100 102 102 103   CO2 mmol/L 26 28 26 27 28 25   ANION GAP mmol/L 2* 6 10 8 9 12   BUN mg/dL 30* 30* 22 22 18 19   CREATININE mg/dL 1 09 1 34* 0 85 0 86 0 92 0 84   CALCIUM mg/dL 7 2* 7 3* 8 2* 8 2* 8 1* 8 6   GLUCOSE RANDOM mg/dL 208* 273* 163* 165* 161* 123   ALT U/L  --  23  --   --   --  18   AST U/L  --  18  --   --   --  21   ALK PHOS U/L  --  75  --   --   --  95   ALBUMIN g/dL  --  2 5*  --   --   --  3 6   TOTAL BILIRUBIN mg/dL  --  0 25  --   --   --  0 58     Results from last 7 days   Lab Units 12/12/22 2025   MAGNESIUM mg/dL 2 1      Results from last 7 days   Lab Units 12/12/22 2313 12/12/22 2025 12/12/22  0844 12/10/22  0602 12/09/22  0532 12/08/22  0545 12/07/22  1653   INR  1 25* 2 54* 1 80* 1 35* 1 28* 1 41* 1 45*   PTT seconds  --  27  --   --   --   --   --           Results from last 7 days   Lab Units 12/12/22 2025   LACTIC ACID mmol/L 3 7*     ABG:    VBG:  Results from last 7 days   Lab Units 12/12/22 2313   PH NORBERT  7 356   PCO2 NORBERT mm Hg 46 5   PO2 NORBERT mm Hg 74 1*   HCO3 NORBERT mmol/L 25 4   BASE EXC NORBERT mmol/L -0 3     Results from last 7 days   Lab Units 12/08/22  0545 12/07/22  1701 12/07/22  0434   PROCALCITONIN ng/ml <0 05 <0 05 <0 05       Micro:  Results from last 7 days   Lab Units 12/12/22 2025   BLOOD CULTURE  Received in Microbiology Lab  Culture in Progress  Received in Microbiology Lab  Culture in Progress         Imaging: I have personally reviewed pertinent films in PACS    Historical Information   Past Medical History:   Diagnosis Date   • Asthma    • Legionnaire's disease [facility])      Past Surgical History: Procedure Laterality Date   • LUNG LOBECTOMY       Social History   Social History     Substance and Sexual Activity   Alcohol Use Never     Social History     Substance and Sexual Activity   Drug Use Never     Social History     Tobacco Use   Smoking Status Never   Smokeless Tobacco Never     Family History:   No family history on file  I have reviewed this patient's family history and commented on sigificant items within the HPI      Medications:  Current Facility-Administered Medications   Medication Dose Route Frequency   • fentanyl citrate (PF) (FOR EMS ONLY) 100 mcg/2 mL injection 100 mcg  1 each Does not apply Once     Facility-Administered Medications Ordered in Other Encounters   Medication Dose Route Frequency   • dextrose 50 % IV solution   Intravenous PRN   • epinephrine bolus from bag   Intravenous PRN   • insulin regular (HumuLIN R,NovoLIN R) injection   Intravenous PRN   • lidocaine (cardiac) injection   Intravenous PRN   • norepinephrine (LEVOPHED) 4 mg (STANDARD CONCENTRATION) IV in sodium chloride 0 9% 250 mL   Intravenous Continuous PRN   • norepinephrine (LEVOPHED) bolus from bag   Intravenous PRN   • propofol (DIPRIVAN) 200 MG/20ML bolus injection   Intravenous PRN   • ROCuronium (ZEMURON) injection   Intravenous PRN   • sodium bicarbonate 8 4 % injection   Intravenous PRN   • sodium chloride 0 9 % infusion   Intravenous Continuous PRN   • Succinylcholine Chloride 100 mg/5 mL syringe   Intravenous PRN     Home medications:  Prior to Admission Medications   Prescriptions Last Dose Informant Patient Reported? Taking?    albuterol (2 5 mg/3 mL) 0 083 % nebulizer solution   No No   Sig: Take 6 mL (5 mg total) by nebulization every 6 (six) hours as needed for wheezing   atorvastatin (LIPITOR) 40 mg tablet   Yes No   Sig: atorvastatin 40 mg tablet   levothyroxine 100 mcg tablet   Yes No   Sig: Take 88 mcg by mouth     lisinopril (ZESTRIL) 10 mg tablet   No No   Sig: Take 1 tablet (10 mg total) by mouth daily   metoprolol tartrate (LOPRESSOR) 25 mg tablet   No No   Sig: Take 0 5 tablets (12 5 mg total) by mouth every 12 (twelve) hours   predniSONE 20 mg tablet   No No   Sig: Take 2 tablets (40 mg total) by mouth daily for 3 days, THEN 1 5 tablets (30 mg total) daily for 3 days, THEN 1 tablet (20 mg total) daily for 3 days, THEN 0 5 tablets (10 mg total) daily for 3 days  venlafaxine (EFFEXOR-XR) 150 mg 24 hr capsule   Yes No   Sig: venlafaxine  mg capsule,extended release 24 hr   warfarin (COUMADIN) 7 5 mg tablet   Yes No   Sig: Take 7 5 mg by mouth daily      Facility-Administered Medications: None     Allergies:  No Known Allergies  ------------------------------------------------------------------------------------------------------------  Advance Directive and Living Will:      Power of :    POLST:    ------------------------------------------------------------------------------------------------------------  Anticipated Length of Stay is > 2 midnights    Care Time Delivered:       Vasile Paredes MD        Portions of the record may have been created with voice recognition software  Occasional wrong word or "sound a like" substitutions may have occurred due to the inherent limitations of voice recognition software    Read the chart carefully and recognize, using context, where substitutions have occurred

## 2022-12-13 NOTE — ANESTHESIA PREPROCEDURE EVALUATION
Procedure:  IR EMBOLIZATION (SPECIFY VESSEL OR SITE)    Relevant Problems   CARDIO   (+) Chest pain   (+) Hyperlipidemia   (+) Hypertension   (+) Paroxysmal atrial fibrillation (HCC)      ENDO   (+) Hypothyroidism      MUSCULOSKELETAL   (+) Bronchospasm with bronchitis, acute   (+) Osteoarthritis of knee      NEURO/PSYCH   (+) Major depressive disorder, single episode, mild (HCC)      PULMONARY   (+) Acute respiratory failure with hypoxia (HCC)   (+) Community acquired pneumonia    Rectus sheath bleed    Physical Exam    Airway    Mallampati score: II         Dental   No notable dental hx     Cardiovascular      Pulmonary      Other Findings        Anesthesia Plan  ASA Score- 4 Emergent    Anesthesia Type- general with ASA Monitors  Additional Monitors: arterial line and central venous line  Airway Plan: ETT  Comment: I, Dr Simona Kohler, the attending physician, have personally seen and evaluated the patient prior to anesthetic care  I have reviewed the pre-anesthetic record, and other medical records if appropriate to the anesthetic care  If a CRNA is involved in the case, I have reviewed the CRNA assessment, if present, and agree  The patient is in a suitable condition to proceed with my formulated anesthetic plan          Plan Factors-    Chart reviewed  Induction- intravenous  Postoperative Plan-     Informed Consent- Anesthetic plan and risks discussed with patient  I personally reviewed this patient with the CRNA  Discussed and agreed on the Anesthesia Plan with the CRNA  Yaw Zafar

## 2022-12-13 NOTE — ASSESSMENT & PLAN NOTE
· Continue metoprolol as ordered  · Hold anticoagulation for now, discuss with acute care surgery when to resume

## 2022-12-13 NOTE — ANESTHESIA PROCEDURE NOTES
Arterial Line Insertion  Performed by: Tyler Solano MD  Authorized by: Tyler Solano MD   Consent given by: patient  Patient understanding: patient states understanding of the procedure being performed  Required items: required blood products, implants, devices, and special equipment available  Patient identity confirmed: arm band  Preparation: Patient was prepped and draped in the usual sterile fashion    Indications: multiple ABGs and hemodynamic monitoring  Orientation:  Right  Location: radial artery  Procedure Details:  Davon's test normal: yes  Needle gauge: 20  Number of attempts: 1    Post-procedure:  Post-procedure: dressing applied  Waveform: good waveform  Post-procedure CNS: normal  Patient tolerance: patient tolerated the procedure well with no immediate complications

## 2022-12-13 NOTE — ED PROVIDER NOTES
History  Chief Complaint   Patient presents with   • Syncope     Patient BIB EMS from home due to potential syncopal episode  Pt recently released from [facility] inpatient at [facility], which she was admitted for bronchitis  Pt's family reported pt appeared pale and diaphoretic  Hx Legionaires disease  72-year-old female brought in by EMS for syncopal event  She was admitted and  recently and just discharged today for bronchiolitis  Her symptoms had improved and she was feeling better and so she was discharged this morning  On the way out to the parking lot she began having left upper quadrant/left flank pain  Pain is worsened throughout the day  She has had multiple episodes of vomiting throughout the day  She began to feel lightheaded about 1 hour prior to arrival and had a syncopal event  Family was nearby and confirms there was no fall with this  Unclear if any trauma earlier in the day  Patient was pale and diaphoretic upon EMS arrival   She remains pale upon arrival to ED  She continues to complain of left upper quadrant pain worsened by movement and palpation  History provided by:  Patient   used: No    Syncope  Episode history:  Single  Most recent episode: Today  Timing:  Constant  Progression:  Resolved  Chronicity:  New  Relieved by:  Nothing  Worsened by:  Nothing  Ineffective treatments:  None tried  Associated symptoms: dizziness    Associated symptoms: no chest pain, no fever, no palpitations, no seizures, no shortness of breath and no vomiting        Prior to Admission Medications   Prescriptions Last Dose Informant Patient Reported? Taking?    albuterol (2 5 mg/3 mL) 0 083 % nebulizer solution   No No   Sig: Take 6 mL (5 mg total) by nebulization every 6 (six) hours as needed for wheezing   atorvastatin (LIPITOR) 40 mg tablet   Yes No   Sig: atorvastatin 40 mg tablet   levothyroxine 100 mcg tablet   Yes No   Sig: Take 88 mcg by mouth     lisinopril (ZESTRIL) 10 mg tablet   No No   Sig: Take 1 tablet (10 mg total) by mouth daily   metoprolol tartrate (LOPRESSOR) 25 mg tablet   No No   Sig: Take 0 5 tablets (12 5 mg total) by mouth every 12 (twelve) hours   predniSONE 20 mg tablet   No No   Sig: Take 2 tablets (40 mg total) by mouth daily for 3 days, THEN 1 5 tablets (30 mg total) daily for 3 days, THEN 1 tablet (20 mg total) daily for 3 days, THEN 0 5 tablets (10 mg total) daily for 3 days  venlafaxine (EFFEXOR-XR) 150 mg 24 hr capsule   Yes No   Sig: venlafaxine  mg capsule,extended release 24 hr   warfarin (COUMADIN) 7 5 mg tablet   Yes No   Sig: Take 7 5 mg by mouth daily      Facility-Administered Medications: None       Past Medical History:   Diagnosis Date   • Asthma    • Legionnaire's disease ([facility] [address] )        Past Surgical History:   Procedure Laterality Date   • LUNG LOBECTOMY         No family history on file  I have reviewed and agree with the history as documented  E-Cigarette/Vaping   • E-Cigarette Use Never User      E-Cigarette/Vaping Substances   • Nicotine No    • THC No    • CBD No    • Flavoring No    • Other No    • Unknown No      Social History     Tobacco Use   • Smoking status: Never   • Smokeless tobacco: Never   Vaping Use   • Vaping Use: Never used   Substance Use Topics   • Alcohol use: Never   • Drug use: Never       Review of Systems   Constitutional: Negative for chills and fever  HENT: Negative for ear pain and sore throat  Eyes: Negative for pain and visual disturbance  Respiratory: Negative for cough and shortness of breath  Cardiovascular: Positive for syncope  Negative for chest pain and palpitations  Gastrointestinal: Positive for abdominal pain  Negative for abdominal distention and vomiting  Genitourinary: Negative for dysuria and hematuria  Musculoskeletal: Negative for arthralgias and back pain  Skin: Negative for color change and rash  Neurological: Positive for dizziness, syncope and light-headedness  Negative for seizures  All other systems reviewed and are negative  Physical Exam  Physical Exam  Vitals and nursing note reviewed  Constitutional:       General: She is not in acute distress  Appearance: She is well-developed  HENT:      Head: Normocephalic and atraumatic  Eyes:      Conjunctiva/sclera: Conjunctivae normal    Cardiovascular:      Rate and Rhythm: Normal rate and regular rhythm  Heart sounds: No murmur heard  Pulmonary:      Effort: Pulmonary effort is normal  No respiratory distress  Breath sounds: Normal breath sounds  Abdominal:      Palpations: Abdomen is soft  Tenderness: There is abdominal tenderness in the left upper quadrant and left lower quadrant  Comments: There is tenderness in the left upper and left lower quadrant  Localized guarding  There is mild bruising surrounding the areas of reported subcutaneous injections from when she was recently admitted  Musculoskeletal:         General: No swelling  Cervical back: Neck supple  Skin:     General: Skin is warm and dry  Capillary Refill: Capillary refill takes less than 2 seconds  Neurological:      Mental Status: She is alert     Psychiatric:         Mood and Affect: Mood normal          Vital Signs  ED Triage Vitals   Temperature Pulse Respirations Blood Pressure SpO2   12/12/22 2135 12/12/22 2005 12/12/22 2008 12/12/22 2005 12/12/22 2005   97 6 °F (36 4 °C) 88 16 (!) 87/51 93 %      Temp Source Heart Rate Source Patient Position - Orthostatic VS BP Location FiO2 (%)   12/12/22 2140 12/12/22 2008 12/12/22 2008 12/12/22 2008 --   Oral Monitor Lying Right arm       Pain Score       12/12/22 2140       10 - Worst Possible Pain           Vitals:    12/12/22 2015 12/12/22 2027 12/12/22 2115 12/12/22 2140   BP: 95/52 (!) 77/49 151/66 118/54   Pulse: 86 83 82 79   Patient Position - Orthostatic VS: Lying  Lying          Visual Acuity      ED Medications  Medications   norepinephrine (LEVOPHED) 4 mg (STANDARD CONCENTRATION) IV in sodium chloride 0 9% 250 mL (0 mcg/min Intravenous Stopped 12/12/22 2126)   desmopressin (DDAVP) 29 6 mcg in sodium chloride 0 9 % 50 mL IVPB (has no administration in time range)   ondansetron (FOR EMS ONLY) (ZOFRAN) 4 mg/2 mL injection 4 mg (0 mg Does not apply Given to EMS 12/12/22 2029)   iohexol (OMNIPAQUE) 350 MG/ML injection (SINGLE-DOSE) 100 mL (100 mL Intravenous Given 12/12/22 2100)   prothrombin complex concentrate (human) (Kcentra) 1,500 Units (1,500 Units Intravenous Given 12/12/22 2155)   fentanyl citrate (PF) 100 MCG/2ML 50 mcg (50 mcg Intravenous Given 12/12/22 2140)   sodium chloride 0 9 % bolus 1,000 mL (0 mL Intravenous Stopped 12/12/22 2144)   sodium chloride 0 9 % bolus 1,000 mL (0 mL Intravenous Stopped 12/12/22 2144)       Diagnostic Studies  Results Reviewed     Procedure Component Value Units Date/Time    Lactic acid [[id number]]  (Abnormal) Collected: 12/12/22 2025    Lab Status: Final result Specimen: Blood from Arm, Left Updated: 12/12/22 2140     LACTIC ACID 3 7 mmol/L     Narrative:      Result may be elevated if tourniquet was used during collection      Lactic acid 2 Hours [[id number]]     Lab Status: No result Specimen: Blood     CBC and differential [[id number]]  (Abnormal) Collected: 12/12/22 2025    Lab Status: Final result Specimen: Blood from Arm, Left Updated: 12/12/22 2115     WBC 24 94 Thousand/uL      RBC 2 61 Million/uL      Hemoglobin 8 1 g/dL      Hematocrit 26 0 %       fL      MCH 31 8 pg      MCHC 31 9 g/dL      RDW 14 1 %      MPV 10 7 fL      Platelets 872 Thousands/uL      nRBC 0 /100 WBCs      Neutrophils Relative 87 %      Immat GRANS % 2 %      Lymphocytes Relative 5 %      Monocytes Relative 6 %      Eosinophils Relative 0 %      Basophils Relative 0 %      Neutrophils Absolute 21 80 Thousands/µL      Immature Grans Absolute >0 50 Thousand/uL      Lymphocytes Absolute 1 17 Thousands/µL      Monocytes Absolute 1 40 Thousand/µL      Eosinophils Absolute 0 00 Thousand/µL      Basophils Absolute 0 03 Thousands/µL     HS Troponin 0hr (reflex protocol) [[id number]]  (Normal) Collected: 12/12/22 2025    Lab Status: Final result Specimen: Blood from Arm, Left Updated: 12/12/22 2107     hs TnI 0hr 18 ng/L     HS Troponin I 2hr [[id number]]     Lab Status: No result Specimen: Blood     Protime-INR [[id number]]  (Abnormal) Collected: 12/12/22 2025    Lab Status: Final result Specimen: Blood from Arm, Left Updated: 12/12/22 2107     Protime 26 8 seconds      INR 2 54    APTT [[id number]]  (Normal) Collected: 12/12/22 2025    Lab Status: Final result Specimen: Blood from Arm, Left Updated: 12/12/22 2107     PTT 27 seconds     Comprehensive metabolic panel [[id number]]  (Abnormal) Collected: 12/12/22 2025    Lab Status: Final result Specimen: Blood from Arm, Left Updated: 12/12/22 2101     Sodium 137 mmol/L      Potassium 5 1 mmol/L      Chloride 103 mmol/L      CO2 28 mmol/L      ANION GAP 6 mmol/L      BUN 30 mg/dL      Creatinine 1 34 mg/dL      Glucose 273 mg/dL      Calcium 7 3 mg/dL      Corrected Calcium 8 5 mg/dL      AST 18 U/L      ALT 23 U/L      Alkaline Phosphatase 75 U/L      Total Protein 5 0 g/dL      Albumin 2 5 g/dL      Total Bilirubin 0 25 mg/dL      eGFR 37 ml/min/1 73sq m     Narrative:      Zhao guidelines for Chronic Kidney Disease (CKD):   •  Stage 1 with normal or high GFR (GFR > 90 mL/min/1 73 square meters)  •  Stage 2 Mild CKD (GFR = 60-89 mL/min/1 73 square meters)  •  Stage 3A Moderate CKD (GFR = 45-59 mL/min/1 73 square meters)  •  Stage 3B Moderate CKD (GFR = 30-44 mL/min/1 73 square meters)  •  Stage 4 Severe CKD (GFR = 15-29 mL/min/1 73 square meters)  •  Stage 5 End Stage CKD (GFR <15 mL/min/1 73 square meters)  Note: GFR calculation is accurate only with a steady state creatinine    Magnesium [[id number]]  (Normal) Collected: 12/12/22 2025    Lab Status: Final result Specimen: Blood from Arm, Left Updated: 12/12/22 2101     Magnesium 2 1 mg/dL     Lipase [[id number]]  (Abnormal) Collected: 12/12/22 2025    Lab Status: Final result Specimen: Blood from Arm, Left Updated: 12/12/22 2050     Lipase 44 u/L     TSH [[id number]] Collected: 12/12/22 2025    Lab Status: In process Specimen: Blood from Arm, Left Updated: 12/12/22 2031    NT-BNP PRO [[id number]] Collected: 12/12/22 2025    Lab Status: In process Specimen: Blood from Arm, Left Updated: 12/12/22 2031    Blood culture #2 [[id number]] Collected: 12/12/22 2025    Lab Status: In process Specimen: Blood from Hand, Left Updated: 12/12/22 2031    Blood culture #1 [[id number]] Collected: 12/12/22 2025    Lab Status: In process Specimen: Blood from Arm, Left Updated: 12/12/22 2031    Fingerstick Glucose (POCT) [[id number]]  (Abnormal) Collected: 12/12/22 2014    Lab Status: Final result Updated: 12/12/22 2016     POC Glucose 274 mg/dl     UA w Reflex to Microscopic w Reflex to Culture [[id number]]     Lab Status: No result Specimen: Urine                  CTA dissection protocol chest/abdomen/pelvis   Final Result by Joanne Saldana MD (12/12 2131)      No evidence of aortic aneurysm or dissection  Active contrast extravasation within the large intramuscular hematoma centered within the left rectus femoris muscle extending into the left lateral abdominal wall musculature and decompressing into the left space of Retzius as detailed above  The study was marked in [facility] for immediate notification           Workstation performed: [id number]         XR chest 1 view portable    (Results Pending)              Procedures  ECG 12 Lead Documentation Only    Date/Time: 12/12/2022 10:09 PM  Performed by: Hilario Cranker, PA-C  Authorized by: Hilario Cranker, PA-C     ECG reviewed by me, the ED Provider: yes    Patient location:  ED  Interpretation:     Interpretation: normal    Quality:     Tracing quality:  Limited by artifact  Rate:     ECG rate:  91    ECG rate assessment: normal    Rhythm:     Rhythm: sinus rhythm    Ectopy:     Ectopy: PVCs      PVCs:  Infrequent  QRS:     QRS axis:  Normal    QRS intervals:  Normal  Conduction:     Conduction: normal    ST segments:     ST segments:  Normal  T waves:     T waves: non-specific    CriticalCare Time  Performed by: Sushant Pearce PA-C  Authorized by: Sushant Pearce PA-C     Critical care provider statement:     Critical care time (minutes):  45    Critical care time was exclusive of:  Separately billable procedures and treating other patients and teaching time    Critical care was time spent personally by me on the following activities:  Evaluation of patient's response to treatment, examination of patient, re-evaluation of patient's condition, ordering and review of radiographic studies, ordering and review of laboratory studies, ordering and performing treatments and interventions, blood draw for specimens and obtaining history from patient or surrogate    I assumed direction of critical care for this patient from another provider in my specialty: no               ED Course  ED Course as of 12/12/22 2212   Mon Dec 12, 2022   2206 PROCEDURE NOTE:   Skin prepared using Chloraprep  In the left brachial vein, using ultrasound guidance (CPT code 49280), an 18G peripheral IV was placed successfully by me secondary to difficulty placing IV by nursing staff  Successful  One attempt  Good blood return  Good palpable flush  Adhered to skin using Tegederm  Identification of Seniors at [facility] Most Recent Value   (ISAR) Identification of Seniors at Risk    Before the illness or injury that brought you to the Emergency, did you need someone to help you on a regular basis?  1 Filed at: 12/12/2022 2010   In the last 24 hours, have you needed more help than usual? 1 Filed at: 12/12/2022 2010   Have you been hospitalized for one or more nights during the past 6 months? 1 Filed at: 12/12/2022 2010   In general, do you see well? 1 Filed at: 12/12/2022 2010   In general, do you have serious problems with your memory? 0 Filed at: 12/12/2022 2010   Do you take more than three different medications every day? 1 Filed at: 12/12/2022 2010   ISAR Score 5 Filed at: 12/12/2022 2010                                    MDM  Number of Diagnoses or Management Options  Abdominal wall hematoma, initial encounter: new and requires workup  Hemorrhagic shock [facility]): new and requires workup  Diagnosis management comments: Differential diagnosis includes was not limited to dissection, ruptured AAA, MI, PE, colitis, enteritis, perforated ulcer, cholecystitis, appendicitis, other acute surgical process  Plan: Patient was given 1 L  crystalloid prehospital   She was transiently responsive  She may be hypovolemic from her vomiting  We will give a second liter bolus  Will send for stat dissection study  Labs  Dispo pending  Amount and/or Complexity of Data Reviewed  Clinical lab tests: reviewed and ordered  Tests in the radiology section of CPT®: ordered and reviewed  Discuss the patient with other providers: yes  Independent visualization of images, tracings, or specimens: yes    Risk of Complications, Morbidity, and/or Mortality  Presenting problems: moderate  Management options: moderate  General comments: 53-year-old female patient presenting hypotensive after syncopal event and having abdominal pain  Prior to getting CTA her blood pressure was transient responsive to fluids however after second liter she became hypotensive again  She was started on a Levophed drip  Sent over for CT scan  There was revealed she had what appeared to be an abdominal wall hematoma with possible extravasation  Uncrossed blood was ordered  2 units  Levophed was stopped  Blood pressure has responded to  transfusion but still borderline  Maps have remained around 65    She was given Plextronics [facility] for reversal of Coumadin  DDAVP for her antiplatelet  Fentanyl for pain  Discussed with trauma center for transfer who is in agreement  Patient as well as her family was informed of plan and all steps of care and have been in full agreement  Patient Progress  Patient progress: stable      Disposition  Final diagnoses:   Hemorrhagic shock ([facility] [address] )   Abdominal wall hematoma, initial encounter     Time reflects when diagnosis was documented in both MDM as applicable and the Disposition within this note     Time User Action Codes Description Comment    12/12/2022  9:44 PM Mark Singletonley L Add [R57 8] Hemorrhagic shock ([facility] [address] )     12/12/2022  9:44 PM Mark Nutley L Add [S30 1XXA] Abdominal wall hematoma, initial encounter     12/12/2022  9:44 PM Mark Yeh L Modify [R57 8] Hemorrhagic shock ([facility] [address] )     12/12/2022  9:44 PM Kelsi Arndt Modify [S30 1XXA] Abdominal wall hematoma, initial encounter       ED Disposition     ED Disposition   Transfer to Another Facility-In Network    Condition   --    Date/Time   Mon Dec 12, 2022  9:44 PM    Comment   Doc Lunch should be transferred out to B             MD Documentation    Zeferino Dickson Most Recent Value   Patient Condition The patient has been stabilized such that within reasonable medical probability, no material deterioration of the patient condition or the condition of the unborn child(baylee) is likely to result from the transfer   Reason for Transfer Level of Care needed not available at this facility   Benefits of Transfer Specialized equipment and/or services available at the receiving facility (Include comment)________________________   Risks of Transfer Potential for delay in receiving treatment, Potential deterioration of medical condition, Loss of IV   Accepting Physician Dr Clive Corcoran Name, [address]  [facility] the [place]    (Name & Tel number) [facility]   Sending MD Dr Kelly Reinoso PARBIANNA   Provider Certification General risk, such as traffic hazards, adverse weather conditions, rough terrain or turbulence, possible failure of equipment (including vehicle or aircraft), or consequences of actions of persons outside the control of the transport personnel      RN Documentation    Flowsheet Row Most [address] [facility] Name, [address]  SLB    (Name & Tel number) [facility]      Follow-up Information    None         Patient's Medications   Discharge Prescriptions    No medications on file       No discharge procedures on file      PDMP Review       Value Time User    PDMP Reviewed  Yes 10/1/2020  2:22 AM Arnie Cruz PA-C          ED Provider  Electronically Signed by           Umang Butterfield PA-C  12/12/22 [id number]

## 2022-12-13 NOTE — PLAN OF CARE
Problem: PHYSICAL THERAPY ADULT  Goal: Performs mobility at highest level of function for planned discharge setting  See evaluation for individualized goals  Description: Treatment/Interventions: Functional transfer training, LE strengthening/ROM, Therapeutic exercise, Endurance training, Patient/family training, Bed mobility, Gait training, Equipment eval/education, Spoke to nursing, OT          See flowsheet documentation for full assessment, interventions and recommendations  Note: Prognosis: Fair  Problem List: Decreased strength, Decreased endurance, Impaired balance, Decreased mobility, Pain  Assessment: Pt is a [de-identified] y o  female  Presenting s/p embolization of L inferior epigastric artery  Pt  has a past medical history of Asthma, Atrial fibrillation ([facility] [address] ), Diabetes ([facility] [address] ), Hyperlipidemia, Hypertension, and [facility]  This is a high complexity evaluation  Pt presented with deficits in general strength, pain, balance, ambulation, and endurance impacting pt functional mobility, safety, and return to PLOF  Pt supine in bed upon arrival with reports of pain in L flank  Pt performed bed mobility at S level  Pt performed both transfers and ambulation with minAx1 using RW  Pt reported weakness during mobility activities, demonstrating below pt baseline and would benefit from continued pt before being D/C home  Pt ended session seated in chair, chair alarm on, all needs in reach  Performed at least 2 patient identifiers during session: Name and Chanda Martinez The patient's AM-PAC Basic Mobility Inpatient Short Form Raw Score is 19  A Raw score of greater than 16 suggests the patient may benefit from discharge to home  Please also refer to the recommendation of the Physical Therapist for safe discharge planning  Pt would benefit from continued inpatient skilled physical therapy to address said deficits and to maximum patient functional mobility   PT recommendation is for pt to be D/C to home with HHPT   Barriers to Discharge: Inaccessible home environment, Decreased caregiver support     PT Discharge Recommendation: Home with home health rehabilitation    See flowsheet documentation for full assessment

## 2022-12-13 NOTE — PROGRESS NOTES
[address] [facility]  Transfer Note - Beata Hindsumble 1940, [de-identified] y o  female MRN: [id number]  Unit/Bed#: ICU 02 Encounter: [id number]  Primary Care Provider: Sera Trejo DO   Date and time admitted to hospital: 12/12/2022 10:57 PM    Persistent dry cough  Assessment & Plan  · Continue Breo, Xopenex/Atrovent  · Continue mucolytic  · Appreciate pulmonary recommendations    Hyperlipidemia  Assessment & Plan  · Continue home statin    Hypertension  Assessment & Plan  · Continue home metoprolol  · Hold home lisinopril for now    Hypothyroidism  Assessment & Plan  · Continue home levothyroxine    Paroxysmal atrial fibrillation (HCC)  Assessment & Plan  · Continue metoprolol as ordered  · Hold anticoagulation for now, discuss with acute care surgery when to resume    * Rectus sheath hematoma  Assessment & Plan  · Thought to be secondary to persistent coughing in the setting of anticoagulation  · S/p embolization of inferior epigastric artery  · Hemoglobin stable, recheck in AM  · Hold anticoagulation, consider DVT prophylaxis in the AM if stable  · Appreciate general surgery recommendations      Code Status: Level 1 - Full Code  POA:    POLST:      Reason for ICU admission:   Acute blood loss anemia    Active problems:   Principal Problem:    Rectus sheath hematoma  Active Problems:    Paroxysmal atrial fibrillation (HCC)    Hypothyroidism    Hypertension    Hyperlipidemia    Persistent dry cough  Resolved Problems:    * No resolved hospital problems  *      Consultants:   Pulmonary    History of Present Illness:   Patient is an [de-identified]year old female presenting on 12/12 after an episode of dizziness and syncope  She has a past medical history of atrial fibrillation on warfarin, hypothroidism, asthma, diabetes, hypertension, hyperliopidemia, and a recent admission to the [facility] from 12/6-12/12 for cough and dyspnea  Her imaging was concerning for a rectus sheath hematoma   Her anticoagulation was reversed and she was transferred to the [facility] for interventional radiology  Summary of clinical course:   She received 1u PRBC and underwent embolization of her inferior gastric artery  Her hemoglobin stabilized  She was cleared for a diet  The hematoma is thought to be related to coughing on anticoagulation  She continued to complain of persistent cough and was seen by pulmonary  At this point, she is being maintained off anticoagulation and is felt to be stable for transition to a stepdown unit  Recent or scheduled procedures:   12/12 IR embolization of inferior gastric artery  12/12 CT C/A/P- no evidence of aortic aneurysm or dissection, active contrast extravasation within the large intramuscular hematoma centered within the left rectus femoris musvle extending into the lect lateral abdominal wall musculature and compressing into the left space of Retzius  12/13 CXR- bibasilar atelectasis    Outstanding/pending diagnostics:   AM CBC, anticoagulation plan    Cultures:   12/12 Blood x2- pending       Mobilization Plan:    With assistance    Nutrition Plan:   Oral diet    Invasive Devices Review  Invasive Devices     Peripheral Intravenous Line  Duration           Peripheral IV 12/12/22 Distal;Right;Ventral (anterior) Forearm <1 day    Peripheral IV 12/12/22 Left Antecubital <1 day    Peripheral IV 12/12/22 Right Antecubital <1 day          Arterial Line  Duration           Arterial Line 12/12/22 Right Radial <1 day          Drain  Duration           Urethral Catheter Temperature probe 16 Fr  <1 day                Rationale for remaining devices: IV access, discontineu james    VTE Pharmacologic Prophylaxis: begin pending AM hemoglobin  VTE Mechanical Prophylaxis: sequential compression device    Discharge Plan:   Patient should be ready for discharge to home/rehab after stabilization of hemoglobin    Initial Physical Therapy Recommendations: Pending  Initial Occupational Therapy Recommendations: Pendiing  Initial /Plan: Following    Home medications that are not reordered and reason why:   Warfarin- acute blood loss anemia  Lisinopril- improving blood pressures    Spoke with Dr Gutierrez Yoon regarding transfer  Please contact critical care via [address] [place] [address] with any questions or concerns  Portions of the record may have been created with voice recognition software  Occasional wrong word or "sound a like" substitutions may have occurred due to the inherent limitations of voice recognition software  Read the chart carefully and recognize, using context, where substitutions have occurred

## 2022-12-13 NOTE — UTILIZATION REVIEW
NOTIFICATION OF ADMISSION DISCHARGE   This is a Notification of Discharge from [address]  Please be advised that this patient has been discharge from our facility  Below you will find the admission and discharge date and time including the patient’s disposition  UTILIZATION REVIEW CONTACT:  Reese Valencia  Utilization   Network Utilization Review Department  Phone: [phone] x carefully listen to the prompts  All voicemails are confidential   Email: [email or web address]     ADMISSION INFORMATION  PRESENTATION DATE: 12/6/2022  1:16 PM  OBERVATION ADMISSION DATE: 12/06/22  INPATIENT ADMISSION DATE: 12/7/22  3:34 PM   DISCHARGE DATE: 12/12/2022  1:50 PM   DISPOSITION:[facility]    IMPORTANT INFORMATION:  Send all requests for admission clinical reviews, approved or denied determinations and any other requests to dedicated fax number below belonging to the campus where the patient is receiving treatment   List of dedicated fax numbers:  [address] DENIALS (Administrative/Medical Necessity) [phone]   [address] (Maternity/NICU/Pediatrics) [phone]   [facility] [phone]   [place] [address] [phone]   [address] [phone]   [address] [facility] [phone]   [address] [place] [address] [phone]   [address] [place] [address] 119 [phone]   [facility]  [phone]   [address] [facility] [phone] 412 [facility] [address] [facility] [phone]

## 2022-12-13 NOTE — PLAN OF CARE
Problem: OCCUPATIONAL THERAPY ADULT  Goal: Performs self-care activities at highest level of function for planned discharge setting  See evaluation for individualized goals  Description: Treatment Interventions: ADL retraining, Functional transfer training, Endurance training, Patient/family training, Compensatory technique education, Continued evaluation, Energy conservation, Activityengagement          See flowsheet documentation for full assessment, interventions and recommendations  Note: Limitation: Decreased ADL status, Decreased endurance, Decreased self-care trans, Decreased high-level ADLs  Prognosis: Fair  Assessment: Pt is a [de-identified] y/o female seen for OT eval s/p adm to [facility] as a transfer from [facility] w/ dizziness and syncope after being discharged earlier that day  Pt found to have large left sided abdominal wall hematoma  Pt is s/p embolization of L inferior epigastric artery  Pt  has a past medical history of Asthma and Legionnaire's disease ([facility] [address] )  Pt with active OT orders and up with assistance  orders  Pt lives with her dght and grandson (who are always home), in 2 [place], 2 [place], 1st floor setup w/ 1/2 bath and bed  Pt was I w/  ADLS and has assist for IADLS, drove, & required no use of DME PTA (but has cane if needed)  Pt is currently demonstrating the following occupational deficits: S UB ADLS, CGA LB ADLS, S bed mobility, Min A transfers and functional mobility w/ RW  These deficits that are impacting pt's baseline areas of occupation are a result of the following impairments: pain, endurance, activity tolerance, functional mobility, forward functional reach, balance, trunk control, functional standing tolerance and decreased I w/ ADLS/IADLS  The following Occupational Performance Areas to address include: bathing/shower, toilet hygiene, dressing, health maintenance, functional mobility and clothing management  Recommend home OT upon D/C   Pt to continue to benefit from acute immediate OT services to address the following goals 2-4x/wk to  within the next 10-14 days:     OT Discharge Recommendation: Home with home health rehabilitation     Ct Martins MS, OTR/L

## 2022-12-13 NOTE — ED NOTES
Levo restarted by EMS @ 4mcg/min     Nisreen Yo, [address]  12/12/22 5596

## 2022-12-13 NOTE — OCCUPATIONAL THERAPY NOTE
Occupational Therapy Evaluation     Patient Name: Latonya García  [id number] Date: 12/13/2022  Problem List  Principal Problem:    Hemorrhagic shock [facility])    Past Medical History  Past Medical History:   Diagnosis Date    Asthma     Legionnaire's disease [facility])      Past Surgical History  Past Surgical History:   Procedure Laterality Date    IR EMBOLIZATION (SPECIFY VESSEL OR SITE)  12/12/2022    LUNG LOBECTOMY             12/13/22 0854   OT Last Visit   OT Visit Date 12/13/22   Note Type   Note type Evaluation   Pain Assessment   Pain Assessment Tool 0-10   Pain Score 6   Pain Location/Orientation Orientation: Left; Location: Abdomen; Location: Groin   Pain Onset/Description Descriptor: Aching;Descriptor: Discomfort   Patient's Stated Pain Goal No pain   Hospital Pain Intervention(s) Repositioned; Ambulation/increased activity; Emotional support   Restrictions/Precautions   Weight Bearing Precautions Per Order No   Other Precautions Multiple lines;Telemetry; Fall Risk;Pain;O2  (4L O2)   Home Living   Type of [address] [place] [address] Two level; Able to live on main level with bedroom/bathroom;1/2 bath on main level;Bed/bath upstairs  (2 [place])   Bathroom Shower/Tub Walk-in shower   Bathroom Toilet Standard   Bathroom Equipment Shower chair   Home Equipment Cane   Additional Comments Pt reports living in 2 SH, 2 [place], 1st floor setup w/ 1/2 bath and bed; main bathroom on 2nd floor   Prior Function   Level of [place] Independent with ADLs; Independent with functional mobility; Needs assistance with IADLS   Lives With Family; Other (Comment)  (dght and grandson)   Receives Help From Family   IADLs Family/Friend/Other provides meals; Family/Friend/Other provides medication management   Falls in the last 6 months 0   Vocational Retired   Comments (+)    Lifestyle   Autonomy I w/ ADLS, assist IADLS (pt reports occasionally being able to do some cooking/laundry);  I w/ transfers and functional mobility   Reciprocal Relationships Pt lives w/ her dght and grandson   Service to Others Retired; school    Intrinsic Gratification Enjoys spending time around the house   ADL   Eating Assistance 5  [address] [facility] 5  [address] [facility] 5  Supervision/Setup   LB [address] 5  [address]  5  Supervision/Setup    [facility] 4  [address] [facility]  ([address])   LB Dressing Deficit Don/doff L sock; Don/doff R sock   Toileting Assistance  4  Minimal Assistance   Functional Assistance 4  Minimal Assistance   Functional Deficit Steadying;Verbal cueing;Supervision/safety; Increased time to complete   Bed Mobility   Supine to Sit 5  Supervision   Additional items HOB elevated; Increased time required;Verbal cues   Sit to Supine Unable to assess   Additional Comments Pt sat EOB w/ Fair sitting balance/trunk control   Transfers   Sit to Stand 4  Minimal assistance   Additional items Assist x 1; Increased time required;Verbal cues   Stand to Sit 4  Minimal assistance   Additional items Assist x 1; Increased time required;Verbal cues   Additional Comments RW for support; VC for hand placement   Functional Mobility   Functional Mobility 4  Minimal assistance   Additional Comments Pt took few small steps from EOB to chair w/ Min A x1; RW for support   Additional items Rolling walker   Balance   Static Sitting Fair   Dynamic Sitting Fair -   Static Standing Poor +   Dynamic Standing Poor +   Ambulatory Poor +   Activity Tolerance   Activity Tolerance Patient limited by fatigue;Patient limited by pain   Medical Staff Made Aware PT   Nurse Made Aware yes, Dara SORENSEN Assessment   RUE Assessment WFL   LUE Assessment   LUE Assessment WFL   Hand Function   Gross Motor Coordination Functional   Fine Motor Coordination Functional   Sensation   Light Touch No apparent deficits   Proprioception   Proprioception No apparent deficits   Vision-Basic Assessment Current Vision No visual deficits   Cognition   Overall Cognitive Status WFL   Arousal/Participation Responsive; Cooperative   Attention Within functional limits   Orientation Level Oriented X4   Memory Within functional limits   Following Commands Follows all commands and directions without difficulty   Comments Pt is pleasant and cooperative   Assessment   Limitation Decreased ADL status; Decreased endurance;Decreased self-care trans;Decreased high-level ADLs   Prognosis Fair   Assessment Pt is a [de-identified] y/o female seen for OT eval s/p adm to B as a transfer from [facility] w/ dizziness and syncope after being discharged earlier that day  Pt found to have large left sided abdominal wall hematoma  Pt is s/p embolization of L inferior epigastric artery  Pt  has a past medical history of Asthma and Legionnaire's disease ([facility] [address] )  Pt with active OT orders and up with assistance  orders  Pt lives with her dght and grandson (who are always home), in 2 [place], 2 [place], 1st floor setup w/  bath and bed  Pt was I w/  ADLS and has assist for IADLS, drove, & required no use of DME PTA (but has cane if needed)  Pt is currently demonstrating the following occupational deficits: S UB ADLS, CGA LB ADLS, S bed mobility, Min A transfers and functional mobility w/ RW  These deficits that are impacting pt's baseline areas of occupation are a result of the following impairments: pain, endurance, activity tolerance, functional mobility, forward functional reach, balance, trunk control, functional standing tolerance and decreased I w/ ADLS/IADLS  The following Occupational Performance Areas to address include: bathing/shower, toilet hygiene, dressing, health maintenance, functional mobility and clothing management  Recommend home OT upon D/C   Pt to continue to benefit from acute immediate OT services to address the following goals 2-4x/wk to  within the next 10-14 days:   Goals   Patient Goals to be more independent   LTG Time Frame 10-14   Long Term Goal #1 see below listed goals   Plan   Treatment Interventions ADL retraining;Functional transfer training; Endurance training;Patient/family training; Compensatory technique education;Continued evaluation; Energy conservation; Activityengagement   Goal Expiration Date 12/27/22   OT Frequency Other (comment)  (2-4x/wk)   Recommendation   OT Discharge Recommendation Home with home health rehabilitation   Additional Comments  The patient's raw score on the AM-PAC Daily Activity inpatient short form is 21, standardized score is 44 27, greater than 39 4  Patients at this level are likely to benefit from discharge to home  Please refer to the recommendation of the Occupational Therapist for safe discharge planning   Additional Comments 2 Pt seen as a co-evaluation due to the patient's co-morbidities, clinically unstable presentation, and present impairments which are a regression from the patient's baseline  [facility] Daily Activity Inpatient   Lower Body Dressing 3   Bathing 3   Toileting 3   Upper Body Dressing 4   Grooming 4   Eating 4   Daily Activity Raw Score 21   Daily Activity Standardized Score (Calc for Raw Score >=11) 44 27   AM-PAC Applied Cognition Inpatient   Following a Speech/Presentation 4   Understanding Ordinary Conversation 4   Taking Medications 4   Remembering Where Things Are Placed or Put Away 4   Remembering List of 4-5 Errands 4   Taking Care of Complicated Tasks 4   Applied Cognition Raw Score 24   Applied Cognition Standardized Score 62 21   End of Consult   Education Provided Yes   Patient Position at End of Consult Bedside chair;Bed/Chair alarm activated; All needs within reach   Nurse Communication Nurse aware of consult      GOALS    1) Pt will improve activity tolerance to G for 30 min txment sessions for increase engagement in functional tasks  2) Pt will complete UB/LB dressing/self care w/ mod I using adaptive device and DME as needed  3) Pt will complete bathing w/ Mod I w/ use of AE and DME as needed  4) Pt will complete toileting w/ mod I w/ G hygiene/thoroughness using DME as needed  5) Pt will improve functional transfers to Mod I on/off all surfaces using DME as needed w/ G balance/safety   6) Pt will improve functional mobility during ADL/IADL/leisure tasks to Mod I using DME as needed w/ G balance/safety   7) Pt will participate in simulated IADL management task to increase independence to Mod I w/ G safety and endurance  8) Pt will be attentive 100% of the time during ongoing cognitive assessment w/ G participation to assist w/ safe d/c planning/recommendations  9) Pt will demonstrate G carryover of pt/caregiver education and training as appropriate w/o cues w/ good tolerance to increase safety during functional tasks  10) Pt will demonstrate 100% carryover of energy conservation techniques t/o functional I/ADL/leisure tasks w/o cues s/p skilled education to increase endurance during functional tasks     Elizabet Zafar MS, OTR/L

## 2022-12-13 NOTE — BRIEF OP NOTE (RAD/CATH)
INTERVENTIONAL RADIOLOGY PROCEDURE NOTE    Date: 12/13/2022    Procedure:   Embolization of left inferior epigastric artery    Preoperative diagnosis:   1  Hemorrhagic shock ([address] )    2  Hematoma         Postoperative diagnosis: Same  Surgeon: La Domínguez MD     Assistant: None  No qualified resident was available  Blood loss: 10 mL    Specimens: None     Findings: Left inferior epigastric artery angiogram showed active bleeding at a distal branch of the artery  Therefore, the artery was embolized using gelfoam and multiple Embold coils  The right CFA access site was closed using a Mynx device  Complications: None immediate  Anesthesia: general     Recommendations:  - Keep right leg extended for 4 hours  - Monitor for hematoma/bleeding  - Resuscitation as per ICU  - Contact IR if there are any further questions or concerns

## 2022-12-13 NOTE — ASSESSMENT & PLAN NOTE
· Thought to be secondary to persistent coughing in the setting of anticoagulation  · S/p embolization of inferior epigastric artery  · Hemoglobin stable, recheck in AM  · Hold anticoagulation, consider DVT prophylaxis in the AM if stable  · Appreciate general surgery recommendations

## 2022-12-13 NOTE — H&P
H&P - General Surgery  Katie Adams [de-identified] y o  female MRN: [id number]  Unit/Bed#: JASON Encounter: [id number]        Assessment/Plan     Assessment:  [de-identified] F with history of A  fib on Coumadin presents with hemorrhagic shock due to large left-sided abdominal wall hematoma    Plan:  • Urgent IR consult  • Transfuse as needed  • Vasopressors as needed  • TEG  • Trend hemoglobin  • ICU admission  • Please tigertext on call red surgery resident role or acute care floor call role with any questions    History of Present Illness     HPI:  Katie Adams is a [de-identified] y o  female with history of A  fib on Coumadin, hypothyroidism, lung lobectomy who presents to [facility] with dizziness and syncope after being discharged earlier that day  She recently was admitted for management of acute bronchitis  While in the hospital she was on Lovenox  On discharge 12/12 she began having left upper quadrant flank pain that worsened through the day  She had multiple episodes of vomiting  Unsure if patient had any trauma associated with this, but unlikely according to patient and family  In [facility] patient was found to have a large left-sided abdominal wall hematoma  She was in hemorrhagic shock at the time of evaluation  Reversal and resuscitation was started  Patient received [facility] and DDAVP  She was transferred to [facility] for IR evaluation    Review of Systems   Constitutional: Negative for activity change, chills and fever  HENT: Negative for congestion, ear pain and sore throat  Eyes: Negative for pain and visual disturbance  Respiratory: Negative for chest tightness and shortness of breath  Cardiovascular: Negative for chest pain and palpitations  Gastrointestinal: Positive for abdominal distention and abdominal pain  Endocrine: Negative for cold intolerance and heat intolerance  Genitourinary: Negative for difficulty urinating and dysuria  Musculoskeletal: Negative for arthralgias and myalgias     Skin: Negative for color change and pallor  Neurological: Negative for dizziness and weakness  Hematological: Negative for adenopathy  Does not bruise/bleed easily  Psychiatric/Behavioral: Negative for agitation and behavioral problems  All other systems reviewed and are negative  Historical Information   Past Medical History:   Diagnosis Date   • Asthma    • Legionnaire's disease ([facility] [address] )      Past Surgical History:   Procedure Laterality Date   • LUNG LOBECTOMY       Social History   Social History     Substance and Sexual Activity   Alcohol Use Never     Social History     Substance and Sexual Activity   Drug Use Never     Social History     Tobacco Use   Smoking Status Never   Smokeless Tobacco Never     Family History: No family history on file  Meds/Allergies   all medications and allergies reviewed  No Known Allergies    Objective   First Vitals:        Current Vitals:        No intake or output data in the 24 hours ending 12/12/22 2324    Invasive Devices     Peripheral Intravenous Line  Duration           Peripheral IV 12/12/22 Distal;Right;Ventral (anterior) Forearm <1 day    Peripheral IV 12/12/22 Left Antecubital <1 day    Peripheral IV 12/12/22 Left Antecubital <1 day                Physical Exam  Vitals reviewed  Constitutional:       General: She is not in acute distress  Appearance: She is not toxic-appearing  HENT:      Head: Normocephalic and atraumatic  Right Ear: External ear normal       Left Ear: External ear normal       Nose: Nose normal  No rhinorrhea  Mouth/Throat:      Mouth: Mucous membranes are moist       Pharynx: Oropharynx is clear  Eyes:      General: No scleral icterus  Extraocular Movements: Extraocular movements intact  Conjunctiva/sclera: Conjunctivae normal       Pupils: Pupils are equal, round, and reactive to light  Cardiovascular:      Rate and Rhythm: Normal rate and regular rhythm  Pulses: Normal pulses     Pulmonary:      Effort: Pulmonary effort is normal  No respiratory distress  Abdominal:      Comments: Soft, with some firmness in the left side hemiabdomen  No guarding or rebound  Musculoskeletal:         General: No swelling or deformity  Cervical back: Normal range of motion and neck supple  Skin:     General: Skin is warm  Coloration: Skin is not jaundiced  Neurological:      General: No focal deficit present  Mental Status: She is alert and oriented to person, place, and time  Psychiatric:         Mood and Affect: Mood normal          Behavior: Behavior normal            Lab Results: I have personally reviewed pertinent lab results  Imaging: I have personally reviewed pertinent reports  EKG, Pathology, and Other Studies: I have personally reviewed pertinent reports        Code Status: Prior  Advance Directive and Living Will:      Power of :    POLST:

## 2022-12-13 NOTE — PROGRESS NOTES
Progress Note - General Surgery   Dorina Santana [de-identified] y o  female MRN: [id number]  Unit/Bed#: ICU 02 Encounter: [id number]    Assessment:  [de-identified] F with history of A  fib on Coumadin presents with hemorrhagic shock due to large left-sided abdominal wall hematoma  Now s/p left inferior epigastric embolization 12/12    Plan:  • Trend hemoglobin  • Monitor access site  • Advance diet  • Intake/output  • Please TigerText on call Red Surgery or Acute Care Surgery Floor Call with any questions     Subjective/Objective     Subjective:   Feeling well since procedure  Pain improved  No nausea or vomiting  No further blood requirement or pressor requirement after IR  Pertinent review of systems as above  All other review of systems negative  Objective:    Blood pressure 115/63, pulse 84, temperature 99 °F (37 2 °C), resp  rate 14, height 5' 5" (1 651 m), weight 101 kg (223 lb 8 7 oz), SpO2 95 %  ,Body mass index is 37 2 kg/m²  Intake/Output Summary (Last 24 hours) at 12/13/2022 2636  Last data filed at 12/13/2022 0600  Gross per 24 hour   Intake 1950 ml   Output 570 ml   Net 1380 ml       Invasive Devices     Peripheral Intravenous Line  Duration           Peripheral IV 12/12/22 Distal;Right;Ventral (anterior) Forearm <1 day    Peripheral IV 12/12/22 Left Antecubital <1 day    Peripheral IV 12/12/22 Right Antecubital <1 day          Arterial Line  Duration           Arterial Line 12/12/22 Right Radial <1 day          Drain  Duration           Urethral Catheter Temperature probe 16 Fr  <1 day                Physical Exam:   Gen:  NAD  HEENT: NCAT  MMM  CV: well perfused  Lungs: Normal respiratory effort  Abd: soft, mildly tender in the left lower quadrant and flank  No guarding or rebound  Skin: warm/ dry  Extremities: no peripheral edema, no clubbing or cyanosis  Neuro:  AxO x3      Results from last 7 days   Lab Units 12/13/22  0459 12/13/22  0128 12/13/22  0046 12/12/22  2343 12/12/22  2313   WBC Thousand/uL 32 26* 31 58*  --   --  27 48*   HEMOGLOBIN g/dL 11 0* 10 7*  --   --  8 7*   I STAT HEMOGLOBIN g/dl  --   --  8 5*   < >  --    HEMATOCRIT % 32 9* 32 6*  --   --  27 9*   HEMATOCRIT, ISTAT %  --   --  25*   < >  --    PLATELETS Thousands/uL 144* 144*  --   --  201    < > = values in this interval not displayed  Results from last 7 days   Lab Units 12/13/22  0459 12/13/22  0128 12/13/22  0046 12/12/22 2343 12/12/22 2313   POTASSIUM mmol/L 5 2 4 8  --   --  5 8*   CHLORIDE mmol/L 109* 112*  --   --  107   CO2 mmol/L 28 28  --   --  26   CO2, I-STAT mmol/L  --   --  27   < >  --    BUN mg/dL 27* 28*  --   --  30*   CREATININE mg/dL 0 92 0 95  --   --  1 09   GLUCOSE, ISTAT mg/dl  --   --  175*   < >  --    CALCIUM mg/dL 8 3 7 7*  --   --  7 2*    < > = values in this interval not displayed  Results from last 7 days   Lab Units 12/13/22 0459 12/12/22  2313 12/12/22 2025   INR  1 38* 1 25* 2 54*   PTT seconds  --   --  27        I have personally reviewed pertinent films in PACS      Medications:   Scheduled Meds:  Current Facility-Administered Medications   Medication Dose Route Frequency Provider Last Rate   • acetaminophen  975 mg Oral Q8H [address] Joycelyn Ivey DO     • albuterol  2 5 mg Nebulization Q4H PRN Carol Deluca DO     • atorvastatin  40 mg Oral Daily With Tristen Bashir MD     • budesonide  0 5 mg Nebulization Q12H Joycelyn Ivey DO     • dextromethorphan-guaiFENesin  10 mL Oral Q4H PRN Joycelyn Ivey DO     • fentanyl citrate (PF) (FOR EMS ONLY)  1 each Does not apply Once Carol Deluca DO     • HYDROmorphone  0 2 mg Intravenous Q4H PRN Joycelyn Ivey DO     • ipratropium  0 5 mg Nebulization TID Carol Deluca DO     • levalbuterol  1 25 mg Nebulization TID Carol Deluca DO     • levothyroxine  88 mcg Oral Early Morning Johnny Rios MD     • metoprolol tartrate  12 5 mg Oral Q12H [address] Annie Bradley MD     • multi-electrolyte  75 mL/hr Intravenous Continuous Kim Hairston MD 75 mL/hr (12/13/22 0516)   • ondansetron  4 mg Intravenous Q4H PRN Joycelyn Ivey DO     • oxyCODONE  2 5 mg Oral Q4H PRN Joycelyn Ivey DO     • oxyCODONE  5 mg Oral Q4H PRN Joycelyn Ivey DO     • senna-docusate sodium  1 tablet Oral HS Joycelyn Ivey DO     • venlafaxine  150 mg Oral Daily Kim Hairston MD       Continuous Infusions:multi-electrolyte, 75 mL/hr, Last Rate: 75 mL/hr (12/13/22 0516)      PRN Meds:

## 2022-12-13 NOTE — ED NOTES
FROM: [facility]   TR 30-TR 30 ([place] CT SCAN)  TO: [facility] ED--  --  DX:abd wall hematoma/ hemmorhagic shock --  EMS Tx Reason:   Transfer Priority Level (1,2,3): 1  Referring: David Wetzel PA-C  Accepting: Dr Cris Boyd (ALS/BLS, etc):CCT  Reason for ALS/CCT if applicable (O2, tele, IVF, meds):blood/ levo gtt/ cardiac monitor  P/U Time:  Number for Report:  [phone]     Leonora Hernandez, RN  12/12/22 0493

## 2022-12-13 NOTE — TELEMEDICINE
e-Consult (IPC)  - Interventional Radiology  Simone Donohue [de-identified] y o  female MRN: [id number]  Unit/Bed#: ED 06 Encounter: [id number]          Interventional Radiology has been consulted to evaluate Simone Donohue    We were consulted by emergency medicine concerning this patient with rectus sheath hemorrhage  IP Consult to IR  Consult performed by: Emi Puga MD  Consult ordered by: Alejandro Frazier MD        12/12/22    Assessment/Recommendation:   [de-identified]year-old female with history of syncope, left abdominal/flank pain, on coumadin, with CT of the abdomen and pelvis showed large left rectus sheath hematoma with active extravasation of contrast  Patient was transfused pRBC and on pressors currently being rescuscitated, now transferred to [facility] for further care  We will plan for angiogram with possible embolization of the left inferior epigastric artery  11-20 minutes, >50% of the total time devoted to medical consultative verbal/EMR discussion between providers  Written report will be generated in the EMR  Thank you for allowing Interventional Radiology to participate in the care of Simone Donohue  Please don't hesitate to call or TigerText us with any questions       Emi Puga MD

## 2022-12-13 NOTE — ANESTHESIA POSTPROCEDURE EVALUATION
Post-Op Assessment Note    CV Status:  Stable  Pain Score: 0    Pain management: adequate     Mental Status:  Alert and awake   Hydration Status:  Euvolemic   PONV Controlled:  Controlled   Airway Patency:  Patent      Post Op Vitals Reviewed: Yes      Staff: CRNA         No notable events documented      /63 (12/13/22 0106)    Temp (!) 97 °F (36 1 °C) (12/13/22 0106)    Pulse 88 (12/13/22 0106)   Resp 18 (12/13/22 0106)    SpO2 98 % (12/13/22 0106)

## 2022-12-14 LAB
ANION GAP SERPL CALCULATED.3IONS-SCNC: 2 MMOL/L (ref 4–13)
ATRIAL RATE: 91 BPM
BASOPHILS # BLD AUTO: 0.02 THOUSANDS/ÂΜL (ref 0–0.1)
BASOPHILS NFR BLD AUTO: 0 % (ref 0–1)
BUN SERPL-MCNC: 27 MG/DL (ref 5–25)
CALCIUM SERPL-MCNC: 8.1 MG/DL (ref 8.3–10.1)
CHLORIDE SERPL-SCNC: 105 MMOL/L (ref 96–108)
CO2 SERPL-SCNC: 31 MMOL/L (ref 21–32)
CREAT SERPL-MCNC: 0.85 MG/DL (ref 0.6–1.3)
EOSINOPHIL # BLD AUTO: 0.01 THOUSAND/ÂΜL (ref 0–0.61)
EOSINOPHIL NFR BLD AUTO: 0 % (ref 0–6)
ERYTHROCYTE [DISTWIDTH] IN BLOOD BY AUTOMATED COUNT: 15.6 % (ref 11.6–15.1)
GFR SERPL CREATININE-BSD FRML MDRD: 64 ML/MIN/1.73SQ M
GLUCOSE SERPL-MCNC: 99 MG/DL (ref 65–140)
HCT VFR BLD AUTO: 26.9 % (ref 34.8–46.1)
HCT VFR BLD AUTO: 28.4 % (ref 34.8–46.1)
HGB BLD-MCNC: 8.8 G/DL (ref 11.5–15.4)
HGB BLD-MCNC: 9.3 G/DL (ref 11.5–15.4)
IMM GRANULOCYTES # BLD AUTO: 0.17 THOUSAND/UL (ref 0–0.2)
IMM GRANULOCYTES NFR BLD AUTO: 1 % (ref 0–2)
LYMPHOCYTES # BLD AUTO: 2.97 THOUSANDS/ÂΜL (ref 0.6–4.47)
LYMPHOCYTES NFR BLD AUTO: 17 % (ref 14–44)
MAGNESIUM SERPL-MCNC: 2.3 MG/DL (ref 1.6–2.6)
MCH RBC QN AUTO: 30 PG (ref 26.8–34.3)
MCHC RBC AUTO-ENTMCNC: 32.7 G/DL (ref 31.4–37.4)
MCV RBC AUTO: 92 FL (ref 82–98)
MONOCYTES # BLD AUTO: 1.32 THOUSAND/ÂΜL (ref 0.17–1.22)
MONOCYTES NFR BLD AUTO: 7 % (ref 4–12)
NEUTROPHILS # BLD AUTO: 13.36 THOUSANDS/ÂΜL (ref 1.85–7.62)
NEUTS SEG NFR BLD AUTO: 75 % (ref 43–75)
NRBC BLD AUTO-RTO: 0 /100 WBCS
P AXIS: 55 DEGREES
PLATELET # BLD AUTO: 116 THOUSANDS/UL (ref 149–390)
PMV BLD AUTO: 10.5 FL (ref 8.9–12.7)
POTASSIUM SERPL-SCNC: 4.7 MMOL/L (ref 3.5–5.3)
PR INTERVAL: 150 MS
QRS AXIS: 58 DEGREES
QRSD INTERVAL: 64 MS
QT INTERVAL: 388 MS
QTC INTERVAL: 477 MS
RBC # BLD AUTO: 2.93 MILLION/UL (ref 3.81–5.12)
SODIUM SERPL-SCNC: 138 MMOL/L (ref 135–147)
T WAVE AXIS: 43 DEGREES
VENTRICULAR RATE: 91 BPM
WBC # BLD AUTO: 17.85 THOUSAND/UL (ref 4.31–10.16)

## 2022-12-14 RX ADMIN — FLUTICASONE FUROATE AND VILANTEROL TRIFENATATE 1 PUFF: 200; 25 POWDER RESPIRATORY (INHALATION) at 09:45

## 2022-12-14 RX ADMIN — ACETAMINOPHEN 975 MG: 325 TABLET, FILM COATED ORAL at 21:25

## 2022-12-14 RX ADMIN — IPRATROPIUM BROMIDE 0.5 MG: 0.5 SOLUTION RESPIRATORY (INHALATION) at 07:51

## 2022-12-14 RX ADMIN — ATORVASTATIN CALCIUM 40 MG: 40 TABLET, FILM COATED ORAL at 16:25

## 2022-12-14 RX ADMIN — LEVALBUTEROL HYDROCHLORIDE 1.25 MG: 1.25 SOLUTION, CONCENTRATE RESPIRATORY (INHALATION) at 13:18

## 2022-12-14 RX ADMIN — Medication 12.5 MG: at 21:25

## 2022-12-14 RX ADMIN — IPRATROPIUM BROMIDE 0.5 MG: 0.5 SOLUTION RESPIRATORY (INHALATION) at 13:18

## 2022-12-14 RX ADMIN — GUAIFENESIN AND DEXTROMETHORPHAN 10 ML: 100; 10 SYRUP ORAL at 08:54

## 2022-12-14 RX ADMIN — ACETAMINOPHEN 975 MG: 325 TABLET, FILM COATED ORAL at 16:25

## 2022-12-14 RX ADMIN — Medication 12.5 MG: at 08:55

## 2022-12-14 RX ADMIN — SENNOSIDES AND DOCUSATE SODIUM 1 TABLET: 8.6; 5 TABLET ORAL at 21:25

## 2022-12-14 RX ADMIN — IPRATROPIUM BROMIDE 0.5 MG: 0.5 SOLUTION RESPIRATORY (INHALATION) at 19:46

## 2022-12-14 RX ADMIN — LEVALBUTEROL HYDROCHLORIDE 1.25 MG: 1.25 SOLUTION, CONCENTRATE RESPIRATORY (INHALATION) at 07:51

## 2022-12-14 RX ADMIN — LEVALBUTEROL HYDROCHLORIDE 1.25 MG: 1.25 SOLUTION, CONCENTRATE RESPIRATORY (INHALATION) at 19:46

## 2022-12-14 RX ADMIN — VENLAFAXINE HYDROCHLORIDE 150 MG: 150 CAPSULE, EXTENDED RELEASE ORAL at 09:45

## 2022-12-14 RX ADMIN — OXYCODONE HYDROCHLORIDE 2.5 MG: 5 TABLET ORAL at 08:55

## 2022-12-14 NOTE — ASSESSMENT & PLAN NOTE
Thought to be secondary to persistent coughing in the setting of anticoagulation    · S/p reversal of warfarin with Kcentra, dDAVP and 1 unit blood transfusion  · S/p embolization of inferior epigastric artery with IR on 12/12  · Hold Warfarin (Afib)  · Will not restart Warfarin on discharge - can restart as outpatient after discussion with PCP and Surgery  · Appreciate Surgery recommendations   · Pain control with scheduled tylenol and PRN oxycodone, dilaudid  · Continue to monitor Hgb and transfuse for Hgb < 7  · Hemoglobin stable, will continue monitor for another 24 hours and if hemoglobin continues to be stable will discharge

## 2022-12-14 NOTE — UTILIZATION REVIEW
NOTIFICATION OF INPATIENT ADMISSION   AUTHORIZATION REQUEST   SERVICING FACILITY:   [facility]  Address: [address] [place] [address], [address] [place] [address]  Tax ID: [id number]  NPI: [id number] ATTENDING PROVIDER:  Attending Name and NPI#: Ifeoma Rice [[id number]]  Address: [address] [place] [address], [address] [place], [place] [address]  Phone: [phone]   ADMISSION INFORMATION:  Place of Service: Inpatient [address] [facility]  [address]  Place of Service Code: 21  Inpatient Admission Date/Time: 12/13/22 12:13 AM  Discharge Date/Time: No discharge date for patient encounter  Admitting Diagnosis Code/Description:  Hemorrhagic shock ([facility] [address] ) [R57 8]  Hematoma [T14  8XXA]     UTILIZATION REVIEW CONTACT:  Prince Montano Utilization   Network Utilization Review Department  Phone: [phone]  Fax: [phone]  Email: [email or web address]  Contact for approvals/pending authorizations, clinical reviews, and discharge  PHYSICIAN ADVISORY SERVICES:  Medical Necessity Denial & Plae-yx-Phsh Review  Phone: [phone]  Fax: [phone]  Email: [email or web address]

## 2022-12-14 NOTE — PROGRESS NOTES
Progress Note - General Surgery   Kiha Quezada [de-identified] y o  female MRN: [id number]  Unit/Bed#: [facility] 424-01 Encounter: [id number]    Assessment:  [de-identified] F with history of A  fib on Coumadin presents with hemorrhagic shock due to large left-sided abdominal wall hematoma  Now s/p left inferior epigastric embolization 12/12    Plan:  • Trend hemoglobin  • Intake/output   • Regular diet  • Please TigerText on call Red Surgery or Acute Care Surgery Floor Call with any questions     Subjective/Objective     Subjective:   Feeling well  Pain improved  No nausea or vomiting  Pertinent review of systems as above  All other review of systems negative  Objective:    Blood pressure 117/59, pulse 78, temperature 99 °F (37 2 °C), temperature source Oral, resp  rate 16, height 5' 5" (1 651 m), weight 101 kg (223 lb 8 7 oz), SpO2 94 %  ,Body mass index is 37 2 kg/m²  Intake/Output Summary (Last 24 hours) at 12/14/2022 6014  Last data filed at 12/14/2022 0400  Gross per 24 hour   Intake 625 ml   Output 875 ml   Net -250 ml       Invasive Devices     Peripheral Intravenous Line  Duration           Peripheral IV 12/12/22 Distal;Right;Ventral (anterior) Forearm 1 day    Peripheral IV 12/12/22 Left Antecubital 1 day    Peripheral IV 12/12/22 Right Antecubital 1 day          Drain  Duration           Urethral Catheter Temperature probe 16 Fr  1 day                Physical Exam:   Gen:  NAD  HEENT: NCAT  MMM  CV: well perfused, pulses palpable  Lungs: Normal respiratory effort  Abd: soft, nt/nd, ecchymosis of R flank  Skin: warm/ dry  Extremities: no peripheral edema, no cyanosis  Neuro:  AxO x3        Results from last 7 days   Lab Units 12/14/22  0352 12/13/22  1247 12/13/22  0459 12/13/22  0128   WBC Thousand/uL 17 85*  --  32 26* 31 58*   HEMOGLOBIN g/dL 8 8* 10 7* 11 0* 10 7*   HEMATOCRIT % 26 9* 32 1* 32 9* 32 6*   PLATELETS Thousands/uL 116*  --  144* 144*     Results from last 7 days   Lab Units 12/14/22  0352 12/13/22  0459 12/13/22  0128 12/13/22  0046   POTASSIUM mmol/L 4 7 5 2 4 8  --    CHLORIDE mmol/L 105 109* 112*  --    CO2 mmol/L 31 28 28  --    CO2, I-STAT mmol/L  --   --   --  27   BUN mg/dL 27* 27* 28*  --    CREATININE mg/dL 0 85 0 92 0 95  --    GLUCOSE, ISTAT mg/dl  --   --   --  175*   CALCIUM mg/dL 8 1* 8 3 7 7*  --      Results from last 7 days   Lab Units 12/13/22  0459 12/12/22  2313 12/12/22 2025   INR  1 38* 1 25* 2 54*   PTT seconds  --   --  27        I have personally reviewed pertinent films in PACS      Medications:   Scheduled Meds:  Current Facility-Administered Medications   Medication Dose Route Frequency Provider Last Rate   • [MAR Hold] acetaminophen  975 mg Oral Q8H [address] Joycelyn Ivey, DO     • [MAR Hold] albuterol  2 5 mg Nebulization Q4H PRN Cliff Vail DO     • [facility] Hold] atorvastatin  40 mg Oral Daily With Marion Garcia MD     • [facility] Hold] benzonatate  100 mg Oral TID PRN Jovita Kaufman DO     • [facility] Hold] dextromethorphan-guaiFENesin  10 mL Oral Q4H PRN Joycelyn Ivey DO     • [MAR Hold] dextromethorphan-guaiFENesin  10 mL Oral Q12H JUAN Lerner     • [facility] Hold] fluticasone-vilanterol  1 puff Inhalation Daily Jovita Kaufman DO     • [facility] Hold] HYDROmorphone  0 2 mg Intravenous Q4H PRN Joycelyn Ivey DO     • [MAR Hold] ipratropium  0 5 mg Nebulization TID Cliff Vail DO     • [facility] Hold] levalbuterol  1 25 mg Nebulization TID Cliff Vail DO     • [facility] Hold] levothyroxine  88 mcg Oral Early Morning Deloris Santana MD     • [facility] Hold] metoprolol tartrate  12 5 mg Oral Q12H Omar Limon MD     • [facility] Hold] ondansetron  4 mg Intravenous Q4H PRN Joycelyn Ivey DO     • [MAR Hold] oxyCODONE  2 5 mg Oral Q4H PRN Joycelyn Ivey DO     • [MAR Hold] oxyCODONE  5 mg Oral Q4H PRN Joycelyn Ivey DO     • [MAR Hold] senna-docusate sodium  1 tablet Oral HS Joycelyn Ivey DO     • [MAR Hold] venlafaxine  150 mg Oral Daily Margo Pierce MD       Continuous Infusions:   PRN Meds:

## 2022-12-14 NOTE — ASSESSMENT & PLAN NOTE
Rate controlled on metoprolol currently    · Continue Lopressor 12 5 mg BID  · Holding home warfarin at this time in setting of rectus sheath hematoma  · Will continue to hold warfarin on discharge and can be restarted as outpatient after discussion with PCP and Surgery

## 2022-12-14 NOTE — PROGRESS NOTES
PULMONOLOGY PROGRESS NOTE     Name: Ronnie Apgar   Age & Sex: [de-identified] y o  female   MRN: [id number]  Unit/Bed#: [facility] 424-01   Encounter: [id number]    PATIENT INFORMATION     Name: Ronnie Apgar   Age & Sex: [de-identified] y o  female   MRN: [id number]  Hospital Stay Days: 1    ASSESSMENT/PLAN     Assessment:   1  Post-infectious Cough  2  History of bronchospasm, possible asthma  3  Hemorrhagic Shock  4  Rectus Hematoma S/P Embolization  5  Atrial Fibrillation - on Warfarin  - CTA PE study: No pulmonary embolus  Mild multifocal tree-in-bud nodularity compatible with bronchiolitis  Pulmonary artery enlargement which can be seen with pulmonary hypertension  Small hiatal hernia  - CT CAP Dissection Protocol: Lungs are clear  There is no tracheal or endobronchial lesion  Plan:  -Patient feels like her breathing is better today- was weaned 4L -> 2L then up to 6L -> tolerating 3L NC in room   -Wean O2 as tolerated  Not on supplemental O2 at home    -Continue Breo, trial of Robitussin DM   -Continue Atrovent, Xopenex, PRN albuterol, PRN Tessalon Perles   -Encourage incentive spirometry   -Consider outpatient PFTs after hematoma resolution    -Monitor vitals closely  -F/U labs  -Manage of hematoma per trauma/primary team   -Care per primary team      SUBJECTIVE     Patient seen and examined  No acute events overnight  She feels very fatigued today on exam  She notes abdominal pain, left sided  She notes no shortness of breath but does cough when taking deep breaths  She feels like her breathing has improved from yesterday  She not had much appetite  Review of Systems   Constitutional: Positive for fatigue  Negative for chills and fever  HENT: Negative for hearing loss and sore throat  Eyes: Negative for visual disturbance  Respiratory: Positive for cough  Negative for shortness of breath  Nonproductive cough  Gastrointestinal: Positive for abdominal pain  Negative for abdominal distention  Left-sided  Endocrine: Negative for polyuria  Genitourinary: Negative for hematuria  Musculoskeletal: Negative for arthralgias and back pain  Skin: Negative for rash and wound  Neurological: Negative for dizziness, tremors, seizures, weakness, light-headedness and headaches  Psychiatric/Behavioral: The patient is not nervous/anxious  OBJECTIVE     Vitals:    22 0400 22 0500 22 0540 22 0754   BP:   117/59 113/58   BP Location:   Left arm Left arm   Pulse: 74 78 78 86   Resp: 16 17 16 18   Temp: 99 °F (37 2 °C) 99 °F (37 2 °C) 99 °F (37 2 °C) 98 2 °F (36 8 °C)   TempSrc:   Oral Oral   SpO2: 94% 95% 94% 92%   Weight:       Height:          Temperature:   Temp (24hrs), Av 2 °F (37 3 °C), Min:98 2 °F (36 8 °C), Max:99 7 °F (37 6 °C)    Temperature: 98 2 °F (36 8 °C)  Intake & Output:  I/O        0701   0700  0701   0700  0701  12/15 0700    P  O   120     I V  (mL/kg) 1500 (14 9) 505 (5)     Blood 350      IV Piggyback 100      Total Intake(mL/kg) 1950 (19 3) 625 (6 2)     Urine (mL/kg/hr) 570 875 (0 4)     Total Output 570 875     Net +1380 -250                Weights:        Body mass index is 37 2 kg/m²  Weight (last 2 days)     Date/Time Weight    22 0130 101 (223 55)        Physical Exam  Vitals reviewed  Constitutional:       Appearance: Normal appearance  She is obese  HENT:      Head: Normocephalic  Right Ear: External ear normal       Left Ear: External ear normal       Nose: Nose normal       Mouth/Throat:      Mouth: Mucous membranes are moist    Eyes:      Extraocular Movements: Extraocular movements intact  Pupils: Pupils are equal, round, and reactive to light  Cardiovascular:      Rate and Rhythm: Normal rate and regular rhythm  Pulses: Normal pulses  Heart sounds: Normal heart sounds  Pulmonary:      Effort: Pulmonary effort is normal       Breath sounds: Normal breath sounds  No wheezing  Comments: Coughing on deep inspiration, otherwise clear lung sounds  Abdominal:      General: Abdomen is flat  Bowel sounds are normal       Palpations: Abdomen is soft  Tenderness: There is abdominal tenderness  Comments: Tender to palpation of left abdomen  Musculoskeletal:         General: Normal range of motion  Cervical back: Normal range of motion  Right lower leg: No edema  Left lower leg: No edema  Skin:     General: Skin is warm  Capillary Refill: Capillary refill takes less than 2 seconds  Neurological:      General: No focal deficit present  Mental Status: She is alert and oriented to person, place, and time  LABORATORY DATA     Labs: I have personally reviewed pertinent reports  Results from last 7 days   Lab Units 12/14/22  0352 12/13/22  1247 12/13/22  0459 12/13/22  0128 12/12/22  2343 12/12/22  2313   WBC Thousand/uL 17 85*  --  32 26* 31 58*  --  27 48*   HEMOGLOBIN g/dL 8 8* 10 7* 11 0* 10 7*  --  8 7*   I STAT HEMOGLOBIN   --   --   --   --    < >  --    HEMATOCRIT % 26 9* 32 1* 32 9* 32 6*  --  27 9*   HEMATOCRIT, ISTAT   --   --   --   --    < >  --    PLATELETS Thousands/uL 116*  --  144* 144*  --  201   NEUTROS PCT % 75  --  84*  --   --  86*   MONOS PCT % 7  --  7  --   --  6   MONO PCT %  --   --   --  4  --   --     < > = values in this interval not displayed        Results from last 7 days   Lab Units 12/14/22  0352 12/13/22  0459 12/13/22  0128 12/13/22  0046 12/13/22  0018 12/12/22  2343 12/12/22  2313 12/12/22 2025   POTASSIUM mmol/L 4 7 5 2 4 8  --   --   --    < > 5 1   CHLORIDE mmol/L 105 109* 112*  --   --   --    < > 103   CO2 mmol/L 31 28 28  --   --   --    < > 28   CO2, I-STAT mmol/L  --   --   --  27 27 27   < >  --    BUN mg/dL 27* 27* 28*  --   --   --    < > 30*   CREATININE mg/dL 0 85 0 92 0 95  --   --   --    < > 1 34*   CALCIUM mg/dL 8 1* 8 3 7 7*  --   --   --    < > 7 3*   ALK PHOS U/L  --  71 69  --   --   --   --  75 ALT U/L  --  20 19  --   --   --   --  23   AST U/L  --  15 14  --   --   --   --  18   GLUCOSE, ISTAT mg/dl  --   --   --  175* 224* 200*  --   --     < > = values in this interval not displayed  Results from last 7 days   Lab Units 12/14/22  0352 12/13/22 0459 12/13/22 0128   MAGNESIUM mg/dL 2 3 2 3 2 3     Results from last 7 days   Lab Units 12/13/22 0459 12/13/22  0128   PHOSPHORUS mg/dL 4 5* 5 1*      Results from last 7 days   Lab Units 12/13/22  0459 12/12/22  2313 12/12/22 2025   INR  1 38* 1 25* 2 54*   PTT seconds  --   --  27     Results from last 7 days   Lab Units 12/13/22 0128   LACTIC ACID mmol/L 1 8             ABG:   Results from last 7 days   Lab Units 12/13/22 0126   PH ART  7 304*   PCO2 ART mm Hg 54 6*   PO2 ART mm Hg 99 0   HCO3 ART mmol/L 26 5   BASE EXC ART mmol/L -0 5   ABG SOURCE  Line, Arterial       Micro:   Results from last 7 days   Lab Units 12/12/22 2025   BLOOD CULTURE  No Growth at 24 hrs  GRAM STAIN RESULT  Gram positive cocci in clusters*         IMAGING & DIAGNOSTIC TESTING     Radiology Results: I have personally reviewed pertinent reports  XR chest 1 view portable    Result Date: 12/13/2022  Impression: Bibasilar atelectasis more likely than infiltrates  Workstation performed: [id number]     CTA dissection protocol chest/abdomen/pelvis    Result Date: 12/12/2022  Impression: No evidence of aortic aneurysm or dissection  Active contrast extravasation within the large intramuscular hematoma centered within the left rectus femoris muscle extending into the left lateral abdominal wall musculature and decompressing into the left space of Retzius as detailed above  The study was marked in [facility] for immediate notification  Workstation performed: [id number]     IR embolization (specify vessel or site)    Result Date: 12/13/2022  Impression: Impression: Left inferior epigastric artery angiogram showed active bleeding at a distal branch of the artery   Therefore, the artery was embolized using gelfoam and multiple Embold coils  Workstation performed: [id number]     Other Diagnostic Testing: I have personally reviewed pertinent reports  ACTIVE MEDICATIONS     Current Facility-Administered Medications   Medication Dose Route Frequency   • acetaminophen (TYLENOL) tablet 975 mg  975 mg Oral Q8H [address]   • albuterol inhalation solution 2 5 mg  2 5 mg Nebulization Q4H PRN   • atorvastatin (LIPITOR) tablet 40 mg  40 mg Oral Daily With Dinner   • benzonatate (TESSALON PERLES) capsule 100 mg  100 mg Oral TID PRN   • dextromethorphan-guaiFENesin (ROBITUSSIN DM) oral syrup 10 mL  10 mL Oral Q4H PRN   • [MAR Hold] dextromethorphan-guaiFENesin (ROBITUSSIN DM) oral syrup 10 mL  10 mL Oral Q12H   • fluticasone-vilanterol 200-25 mcg/actuation 1 puff  1 puff Inhalation Daily   • HYDROmorphone HCl (DILAUDID) injection 0 2 mg  0 2 mg Intravenous Q4H PRN   • ipratropium (ATROVENT) 0 02 % inhalation solution 0 5 mg  0 5 mg Nebulization TID   • levalbuterol (XOPENEX) inhalation solution 1 25 mg  1 25 mg Nebulization TID   • levothyroxine tablet 88 mcg  88 mcg Oral Early Morning   • metoprolol tartrate (LOPRESSOR) partial tablet 12 5 mg  12 5 mg Oral Q12H JUAN PABLO   • ondansetron (ZOFRAN) injection 4 mg  4 mg Intravenous Q4H PRN   • oxyCODONE (ROXICODONE) IR tablet 2 5 mg  2 5 mg Oral Q4H PRN   • oxyCODONE (ROXICODONE) IR tablet 5 mg  5 mg Oral Q4H PRN   • senna-docusate sodium (SENOKOT S) 8 6-50 mg per tablet 1 tablet  1 tablet Oral HS   • venlafaxine (EFFEXOR-XR) 24 hr capsule 150 mg  150 mg Oral Daily       VTE Pharmacologic Prophylaxis: Reason for no pharmacologic prophylaxis history of bleed  VTE Mechanical Prophylaxis: sequential compression device      Disclaimer: Portions of the record may have been created with voice recognition software  Occasional wrong word or "sound a like" substitutions may have occurred due to the inherent limitations of voice recognition software   Careful consideration should be taken to recognize, using context, where substitutions have occurred      Priya Baumann MD   PGY 1, Internal Medicine Residency

## 2022-12-14 NOTE — UTILIZATION REVIEW
Initial Clinical Review    Admission: Date/Time/Statement:   Admission Orders (From admission, onward)     Ordered        12/13/22 0013  Inpatient Admission  Once                      Orders Placed This Encounter   Procedures   • Inpatient Admission     Standing Status:   Standing     Number of Occurrences:   1     Order Specific Question:   Level of Care     Answer:   Critical Care [15]     Order Specific Question:   Estimated length of stay     Answer:   More than 2 Midnights     Order Specific Question:   Certification     Answer:   I certify that inpatient services are medically necessary for this patient for a duration of greater than two midnights  See H&P and MD Progress Notes for additional information about the patient's course of treatment  ED Arrival Information     Expected   12/12/2022     Arrival   12/12/2022 22:57    Acuity   Emergent            Means of arrival   Ambulance    Escorted by   [facility])    Service   [facility]    Admission type   Emergency            Arrival complaint   abd wall hematoma/ hemmorhagic shock           Chief Complaint   Patient presents with   • Abdominal Injury     Surg transfer, abd bleed       Initial Presentation: [de-identified] y o  female , presented to the ED @ [address] [facility], Transferred to [facility] level of [facility], via EMS  Admitted as Inpatient due to history of A  fib on Coumadin presents with hemorrhagic shock due to large left-sided abdominal wall hematoma (rectus muscle hematoma)  Date: 12/13/2022   history of A  fib on Coumadin, hypothyroidism, lung lobectomy who presents to THE [facility] with dizziness and syncope after being discharged earlier that day  She recently was admitted for management of acute bronchitis  While in the hospital she was on Lovenox  On discharge 12/12 she began having left upper quadrant flank pain that worsened through the day  She had multiple episodes of vomiting    Unsure if patient had any trauma associated with this, but unlikely according to patient and family  In THE [facility] patient was found to have a large left-sided abdominal wall hematoma  She was in hemorrhagic shock at the time of evaluation  Reversal and resuscitation was started  Patient received [facility] and DDAVP  She was transferred to [facility] for CrossTx evaluation  Urgent IR consult  Transfuse as needed  Vasopressors as needed  TEG  Trend hemoglobin  INTERVENTIONAL RADIOLOGY PROCEDURE NOTE  Date: 12/13/2022  Procedure: Embolization of left inferior epigastric artery  Anesthesia: general  Findings: Left inferior epigastric artery angiogram showed active bleeding at a distal branch of the artery  Therefore, the artery was embolized using gelfoam and multiple Embold coils  Day 2:12/14/2022 s/p left inferior epigastric embolization 12/12  Trend Hgl  Cardio-Pulmonary Monitoring  I&O  Regular diet  Hold coumadin  Pain control w scheduled & ORN tylenol / Oxycodone / dilaudid          ED Triage Vitals   Temperature Pulse Respirations Blood Pressure SpO2   12/13/22 0106 12/12/22 2314 12/12/22 2314 12/12/22 2314 12/12/22 2314   (!) 97 °F (36 1 °C) 84 20 113/52 98 %      Temp Source Heart Rate Source Patient Position - Orthostatic VS BP Location FiO2 (%)   12/13/22 0130 12/12/22 2314 12/12/22 2314 12/12/22 2314 --   Probe Monitor Lying Right arm       Pain Score       12/12/22 2314       10 - Worst Possible Pain          Wt Readings from Last 1 Encounters:   12/13/22 101 kg (223 lb 8 7 oz)     Additional Vital Signs:   Date/Time Temp Pulse Resp BP MAP (mmHg) Arterial Line BP MAP SpO2 Calculated FIO2 (%) - Nasal Cannula O2 Flow Rate (L/min) Nasal Cannula O2 Flow Rate (L/min) O2 Device Patient Position - Orthostatic VS   12/14/22 1319 -- -- -- -- -- -- -- 95 % -- -- -- -- --   12/14/22 0858 -- -- -- -- -- -- -- 96 % 44 -- 6 L/min -- --   12/14/22 0855 -- -- -- -- -- -- -- 86 % Abnormal  44 -- 6 L/min -- --   12/14/22 0754 98 2 °F (36 8 °C) 86 18 113/58 81 -- -- 92 % -- -- -- -- Sitting   12/14/22 0540 99 °F (37 2 °C) 78 16 117/59 81 -- -- 94 % 28 -- 2 L/min Nasal cannula Sitting   12/14/22 0500 99 °F (37 2 °C) 78 17 -- -- -- -- 95 % -- -- -- -- --   12/14/22 0400 99 °F (37 2 °C) 74 16 -- -- 146/56 80 mmHg 94 % 36 -- 4 L/min Nasal cannula --   12/14/22 0300 99 3 °F (37 4 °C) 74 17 -- -- 142/54 76 mmHg 94 % -- -- -- -- --   12/14/22 0200 99 3 °F (37 4 °C) 74 15 -- -- 136/52 74 mmHg 90 % -- -- -- -- --   12/14/22 0100 99 3 °F (37 4 °C) 80 11 Abnormal  -- -- 140/52 76 mmHg 95 % -- -- -- -- --   12/14/22 0000 99 3 °F (37 4 °C) 74 17 -- -- 120/52 72 mmHg 94 % -- -- -- -- --   12/13/22 2300 99 3 °F (37 4 °C) 78 18 -- -- 128/56 78 mmHg 96 % -- -- -- -- --   12/13/22 2200 99 3 °F (37 4 °C) 76 15 -- -- 126/52 72 mmHg 94 % -- -- -- -- --   12/13/22 2100 99 3 °F (37 4 °C) 84 17 -- -- 146/50 72 mmHg 93 % -- -- -- -- --   12/13/22 2041 -- 87 -- 152/59 -- -- -- -- -- -- -- -- --   12/13/22 2000 99 3 °F (37 4 °C) 84 12 -- -- 70/62 68 mmHg 94 % 36 -- 4 L/min Nasal cannula --   12/13/22 1924 -- -- -- -- -- -- -- 95 % -- -- -- -- --   12/13/22 1800 99 3 °F (37 4 °C) 80 15 -- -- 138/46 68 mmHg 93 % -- -- -- -- --   12/13/22 1700 99 3 °F (37 4 °C) 80 19 -- -- 148/52 74 mmHg 93 % -- -- -- -- --   12/13/22 1600 99 7 °F (37 6 °C) 80 18 -- -- 144/54 76 mmHg 93 % -- -- -- -- --   12/13/22 1500 99 7 °F (37 6 °C) 80 14 -- -- 146/54 78 mmHg 98 % -- -- -- -- --   12/13/22 1445 -- -- -- -- -- -- -- 94 % -- -- -- -- --   12/13/22 1400 99 3 °F (37 4 °C) 74 16 -- -- 152/56 82 mmHg 93 % -- -- -- -- --   12/13/22 1300 99 3 °F (37 4 °C) 74 14 -- -- 142/56 80 mmHg 94 % -- -- -- -- --   12/13/22 1200 99 3 °F (37 4 °C) 80 17 -- -- 134/56 76 mmHg 95 % 28 -- 2 L/min Nasal cannula --   12/13/22 1100 99 3 °F (37 4 °C) 90 18 -- -- 128/56 74 mmHg 93 % -- -- -- -- --   12/13/22 1000 99 3 °F (37 4 °C) 90 16 -- -- 148/66 86 mmHg 92 % -- -- -- -- --   12/13/22 0900 99 3 °F (37 4 °C) 90 11 Abnormal  -- -- -- -- 99 % -- -- -- -- --   12/13/22 0843 -- 88 20 -- -- -- -- 96 % 36 -- 4 L/min Nasal cannula --   12/13/22 0800 99 °F (37 2 °C) 86 19 -- -- 158/60 90 mmHg 95 % 36 -- 4 L/min Nasal cannula --   12/13/22 0700 99 °F (37 2 °C) 86 19 -- -- 164/66 96 mmHg 95 % -- -- -- -- --   12/13/22 0600 99 °F (37 2 °C) 84 14 -- -- 160/64 96 mmHg -- -- -- -- -- --   12/13/22 0530 99 °F (37 2 °C) 84 18 -- -- 166/68 98 mmHg 95 % -- -- -- -- --   12/13/22 0500 99 °F (37 2 °C) 86 16 -- -- 160/66 96 mmHg 96 % -- -- -- -- --   12/13/22 0445 98 6 °F (37 °C) 90 16 -- -- 166/76 106 mmHg -- -- -- -- -- --   12/13/22 0430 98 6 °F (37 °C) 90 18 -- -- 172/72 104 mmHg 98 % -- -- -- -- --   12/13/22 0415 98 6 °F (37 °C) 86 18 -- -- 170/72 104 mmHg -- -- -- -- -- --   12/13/22 0400 98 6 °F (37 °C) 86 18 -- -- 168/72 104 mmHg 99 % -- -- -- -- --   12/13/22 0345 98 2 °F (36 8 °C) 86 18 -- -- 170/72 102 mmHg -- -- -- -- -- --   12/13/22 0330 98 6 °F (37 °C) 86 14 -- -- 168/70 102 mmHg 100 % -- -- -- -- --   12/13/22 0315 98 2 °F (36 8 °C) 86 11 Abnormal  -- -- 166/68 96 mmHg -- -- -- -- -- --   12/13/22 0300 98 2 °F (36 8 °C) 84 16 -- -- 162/66 96 mmHg 100 % -- -- -- -- --   12/13/22 0245 98 2 °F (36 8 °C) 86 17 -- -- 168/64 94 mmHg -- -- -- -- -- --   12/13/22 0230 98 2 °F (36 8 °C) 86 18 -- -- 168/64 92 mmHg 100 % -- -- -- -- --   12/13/22 0215 98 2 °F (36 8 °C) 82 18 -- -- 158/62 90 mmHg -- -- -- -- -- --   12/13/22 0200 98 2 °F (36 8 °C) 82 18 -- -- 164/64 92 mmHg 100 % -- -- -- -- --   12/13/22 0145 97 9 °F (36 6 °C) 82 17 -- -- 146/58 82 mmHg -- -- -- -- -- --   12/13/22 0130 97 9 °F (36 6 °C) 86 20 -- -- 158/60 84 mmHg 98 % -- 2 L/min -- Simple mask --   12/13/22 0106 97 °F (36 1 °C) Abnormal  88 18 115/63 -- -- -- 98 % -- 6 L/min -- Simple mask --       Date and Time Eye Opening Best Verbal Response Best Motor Response [place] Coma Scale Score   12/14/22 0800 4 5 6 15   12/14/22 0540 4 5 6 15   12/14/22 0400 4 5 6 15   12/14/22 0000 4 5 6 15   12/13/22 2000 4 5 6 15   12/13/22 1600 4 5 6 15   12/13/22 1200 4 5 6 15   12/13/22 0800 4 5 6 15   12/13/22 0400 4 5 6 15   12/13/22 0130 4 5 6 15       Pertinent Labs/Diagnostic Test Results:   IR embolization (specify vessel or site)   Final Result by Jomar Wade MD (12/13 0118)   Impression:    Left inferior epigastric artery angiogram showed active bleeding at a distal branch of the artery  Therefore, the artery was embolized using gelfoam and multiple Embold coils  Results from last 7 days   Lab Units 12/14/22  1207 12/14/22  0352 12/13/22  1247 12/13/22  0459 12/13/22  0128 12/12/22  2343 12/12/22  2313   WBC Thousand/uL  --  17 85*  --  32 26* 31 58*  --  27 48*   HEMOGLOBIN g/dL 9 3* 8 8* 10 7* 11 0* 10 7*  --  8 7*   I STAT HEMOGLOBIN   --   --   --   --   --    < >  --    HEMATOCRIT % 28 4* 26 9* 32 1* 32 9* 32 6*  --  27 9*   HEMATOCRIT, ISTAT   --   --   --   --   --    < >  --    PLATELETS Thousands/uL  --  116*  --  144* 144*  --  201   NEUTROS ABS Thousands/µL  --  13 36*  --  27 19*  --   --  23 64*   BANDS PCT %  --   --   --   --  1  --   --     < > = values in this interval not displayed       Results from last 7 days   Lab Units 12/14/22  0352 12/13/22  0459 12/13/22  0128 12/13/22  0046 12/13/22  0018 12/12/22  2343 12/12/22  2343 12/12/22  2313 12/12/22 2025   SODIUM mmol/L 138 139 142  --   --   --   --  135 137   POTASSIUM mmol/L 4 7 5 2 4 8  --   --   --   --  5 8* 5 1   CHLORIDE mmol/L 105 109* 112*  --   --   --   --  107 103   CO2 mmol/L 31 28 28  --   --   --   --  26 28   CO2, I-STAT mmol/L  --   --   --  27 27   < > 27  --   --    ANION GAP mmol/L 2* 2* 2*  --   --   --   --  2* 6   BUN mg/dL 27* 27* 28*  --   --   --   --  30* 30*   CREATININE mg/dL 0 85 0 92 0 95  --   --   --   --  1 09 1 34*   EGFR ml/min/1 73sq m 64 58 56  --   --   --   --  48 37   CALCIUM mg/dL 8 1* 8 3 7 7*  --   --   --   --  7 2* 7 3*   CALCIUM, IONIZED mmol/L  --  1 11* 1 17  --   --   --   --  0 99*  -- CALCIUM, IONIZED, ISTAT mmol/L  --   --   --  1 26 0 99*  --  1 08*  --   --    MAGNESIUM mg/dL 2 3 2 3 2 3  --   --   --   --   --  2 1   PHOSPHORUS mg/dL  --  4 5* 5 1*  --   --   --   --   --   --     < > = values in this interval not displayed       Results from last 7 days   Lab Units 12/13/22  0459 12/13/22  0128 12/12/22 2025   AST U/L 15 14 18   ALT U/L 20 19 23   ALK PHOS U/L 71 69 75   TOTAL PROTEIN g/dL 5 3* 4 7* 5 0*   ALBUMIN g/dL 2 7* 2 4* 2 5*   TOTAL BILIRUBIN mg/dL 0 44 0 30 0 25     Results from last 7 days   Lab Units 12/12/22  2014   POC GLUCOSE mg/dl 274*     Results from last 7 days   Lab Units 12/14/22  0352 12/13/22  0459 12/13/22  0128 12/12/22  2313 12/12/22 2025 12/12/22  0521 12/11/22  0524   GLUCOSE RANDOM mg/dL 99 129 164* 208* 273* 163* 165*      Results from last 7 days   Lab Units 12/13/22  0126   PH ART  7 304*   PCO2 ART mm Hg 54 6*   PO2 ART mm Hg 99 0   HCO3 ART mmol/L 26 5   BASE EXC ART mmol/L -0 5   O2 CONTENT ART mL/dL 16 0   O2 HGB, ARTERIAL % 96 3   ABG SOURCE  Line, Arterial     Results from last 7 days   Lab Units 12/12/22  2313   PH NORBERT  7 356   PCO2 NORBERT mm Hg 46 5   PO2 NORBERT mm Hg 74 1*   HCO3 NORBERT mmol/L 25 4   BASE EXC NORBERT mmol/L -0 3   O2 CONTENT NORBERT ml/dL 13 0   O2 HGB, VENOUS % 93 1*     Results from last 7 days   Lab Units 12/13/22  0046 12/13/22  0018 12/12/22  2343   I STAT BASE EXC mmol/L -3* 0 -2   I STAT O2 SAT % 99* 100* 100*   ISTAT PH ART  7 224* 7 341* 7 288*   I STAT ART PCO2 mm HG 61 2* 46 8* 52 7*   I STAT ART PO2 mm  0* 223 0* 205 0*   I STAT ART HCO3 mmol/L 25 3 25 3 25 2     Results from last 7 days   Lab Units 12/12/22 2025 12/07/22  2111 12/07/22  1909 12/07/22  1700   HS TNI 0HR ng/L 18  --   --  39   HS TNI 2HR ng/L  --   --  35  --    HSTNI D2 ng/L  --   --  -4  --    HS TNI 4HR ng/L  --  35  --   --    HSTNI D4 ng/L  --  -4  --   --      Results from last 7 days   Lab Units 12/13/22  0459 12/12/22  2313 12/12/22 2025   PROTIME seconds 17 2* 15 9* 26 8*   INR  1 38* 1 25* 2 54*   PTT seconds  --   --  27     Results from last 7 days   Lab Units 12/12/22 2025   TSH 3RD GENERATON uIU/mL 3 726     Results from last 7 days   Lab Units 12/08/22  0545 12/07/22  1701   PROCALCITONIN ng/ml <0 05 <0 05     Results from last 7 days   Lab Units 12/13/22  0128 12/12/22  2313 12/12/22 2025   LACTIC ACID mmol/L 1 8 2 9* 3 7*     Results from last 7 days   Lab Units 12/12/22 2025   NT-PRO BNP pg/mL 452*     Results from last 7 days   Lab Units 12/12/22 2025   LIPASE u/L 44*     Results from last 7 days   Lab Units 12/12/22 2025   BLOOD CULTURE  No Growth at 24 hrs  GRAM STAIN RESULT  Gram positive cocci in clusters*     ED Treatment:   Medication Administration from 12/12/2022 2129 to 12/13/2022 0058       Date/Time Order Dose Route Action     12/13/2022 0022 EST iodixanol (VISIPAQUE) 320 MG/ML injection 100 mL 50 mL Intra-arterial Given        Past Medical History:   Diagnosis Date   • Asthma    • Atrial fibrillation ([facility] )    • Diabetes ([facility] )    • Hyperlipidemia    • Hypertension    • Legionnaire's disease ([facility] )      Present on Admission:  • Rectus sheath hematoma  • Paroxysmal atrial fibrillation (HCC)  • Hypothyroidism  • Hypertension  • Hyperlipidemia  • Persistent dry cough      Admitting Diagnosis: Hemorrhagic shock ([facility] ) [R57 8]  Hematoma [T14  8XXA]  Age/Sex: [de-identified] y o  female  Admission Orders:  Regular Diet  Fall precautions  Complete Bed Rest, do not elevate HOB > 30 degrees / Up w Assistance  Cardio-Pulmonary Monitoring  Log roll patient  Daily weight / I&O  Neuro checks q8h  Consult PT/OT  Miguelito SCDs        Scheduled Medications:  acetaminophen, 975 mg, Oral, Q8H JUAN PABLO  atorvastatin, 40 mg, Oral, Daily With Dinner  [facility] Hold] dextromethorphan-guaiFENesin, 10 mL, Oral, Q12H  fluticasone-vilanterol, 1 puff, Inhalation, Daily  ipratropium, 0 5 mg, Nebulization, TID  levalbuterol, 1 25 mg, Nebulization, TID  levothyroxine, 88 mcg, Oral, Early Morning  metoprolol tartrate, 12 5 mg, Oral, Q12H JUAN PABLO  senna-docusate sodium, 1 tablet, Oral, HS  venlafaxine, 150 mg, Oral, Daily      Continuous IV Infusions:     PRN Meds:  albuterol, 2 5 mg, Nebulization, Q4H PRN  benzonatate, 100 mg, Oral, TID PRN  dextromethorphan-guaiFENesin, 10 mL, Oral, Q4H PRN  HYDROmorphone, 0 2 mg, Intravenous, Q4H PRN  ondansetron, 4 mg, Intravenous, Q4H PRN  oxyCODONE, 2 5 mg, Oral, Q4H PRN  oxyCODONE, 5 mg, Oral, Q4H PRN        INPATIENT CONSULT TO IR  IP CONSULT TO CASE MANAGEMENT  IP CONSULT TO PULMONOLOGY      Network Utilization Review Department  ATTENTION: Please call with any questions or concerns to [phone] and carefully listen to the prompts so that you are directed to the right person  All voicemails are confidential   Linda Shah all requests for admission clinical reviews, approved or denied determinations and any other requests to dedicated fax number below belonging to the campus where the patient is receiving treatment   List of dedicated fax numbers for the Facilities:  [address] [place] [address] DENIALS (Administrative/Medical Necessity) [phone]   [address] (Maternity/NICU/Pediatrics) [phone]   [address] Dorina Reich [phone]   [facility] [address] [phone]   [address] [place]ri [address] [place] [address] [place] [address] [phone]   1551 [facility] [place] [facility] 134 815 [facility] [phone]

## 2022-12-14 NOTE — ASSESSMENT & PLAN NOTE
Stable normotensive    · Continue PTA Lopressor 12 5 mg BID  · Have been holding lisinopril - continue to hold for now as pressures acceptable  · Continue to monitor BP and reinitiate lisinopril if needed

## 2022-12-14 NOTE — PHYSICAL THERAPY NOTE
PHYSICAL THERAPY NOTE          Patient Name: Jose Gomes  PHTSO'P Date: 12/14/2022 12/14/22 1425   PT Last Visit   PT Visit Date 12/14/22   Note Type   Note Type Treatment   Pain Assessment   Pain Assessment Tool FLACC   Pain Location/Orientation Orientation: Left; Location: Abdomen   Pain Onset/Description Onset: Ongoing;Frequency: Intermittent; Descriptor: Aching;Descriptor: Discomfort   Effect of Pain on Daily Activities pain w/ positional changes   Patient's Stated Pain Goal No pain   Hospital Pain Intervention(s) Repositioned; Ambulation/increased activity; Emotional support   Pain Rating: FLACC (Rest) - Face 0   Pain Rating: FLACC (Rest) - Legs 0   Pain Rating: FLACC (Rest) - Activity 0   Pain Rating: FLACC (Rest) - Cry 0   Pain Rating: FLACC (Rest) - Consolability 0   Score: FLACC (Rest) 0   Pain Rating: FLACC (Activity) - Face 1   Pain Rating: FLACC (Activity) - Legs 0   Pain Rating: FLACC (Activity) - Activity 1   Pain Rating: FLACC (Activity) - Cry 1   Pain Rating: FLACC (Activity) - Consolability 1   Score: FLACC (Activity) 4   Restrictions/Precautions   Other Precautions Multiple lines;Telemetry;O2;Pain; Fall Risk   General   Chart Reviewed Yes   Additional Pertinent History cleared for Tx session (spoke to nsg)   Response to Previous Treatment Patient with no complaints from previous session  Cognition   Overall Cognitive Status WFL   Arousal/Participation Cooperative; Alert   Attention Within functional limits   Orientation Level Oriented to person;Oriented to place;Oriented to situation   Memory Within functional limits   Following Commands Follows one step commands without difficulty   Subjective   Subjective Alert; in bed; agreeable to mobilize   Bed Mobility   Supine to Sit 3  Moderate assistance   Additional items Assist x 1; Increased time required;Verbal cues   Sit to Supine 3  Moderate assistance Additional items Assist x 1; Increased time required;Verbal cues;LE management   Transfers   Sit to Stand 4  Minimal assistance  (After initial mod (A))   Additional items Assist x 1;Verbal cues   Stand to Sit 4  Minimal assistance   Additional items Assist x 1;Verbal cues   Stand pivot 4  Minimal assistance   Additional items Assist x 1; Increased time required;Verbal cues  (chair to bed to the (L) side)   Ambulation/Elevation   Gait pattern Excessively slow; Short stride; Foward flexed; Inconsistent maggie   Gait Assistance 4  Minimal assist   Additional items Assist x 1;Verbal cues; Tactile cues   Assistive Device Rolling walker   Distance 20 ft + 10 ft + 5 ft w/ extended seated rest periods in between   Balance   Static Sitting Fair   Dynamic Sitting Fair -   Static Standing Fair -   Dynamic Standing Poor +   Ambulatory Poor +   Activity Tolerance   Activity Tolerance Patient limited by fatigue;Patient limited by pain   Nurse Made Aware spoke to FARA Mcneal   Assessment   Prognosis Good   Problem List Decreased strength;Decreased endurance; Impaired balance;Decreased mobility;Obesity;Pain   Assessment Pt demonstrated min improvement in mobility skills ambulating further distances w/ rw, pt still exhibits abd discomfort and guarding w/ associated decreased tolerance to mobilization requiring (A)x1 w/ all aspects of mobility to assure safety  Pt reports having appropriate level of support at home and denies any steps at home  Based on above, cont to anticipate pt will return home w/ family support upon D/C pending additional progress and when medically cleared; home PT follow up is recommended; will follow   Goals   Patient Goals to feel better   STG Expiration Date 12/27/22   PT Treatment Day 1   Plan   Treatment/Interventions Functional transfer training;LE strengthening/ROM; Elevations; Therapeutic exercise; Endurance training;Gait training;Spoke to nursing;Spoke to case management   Progress Progressing toward goals PT Frequency 3-5x/wk   Recommendation   PT Discharge Recommendation Home with home health rehabilitation   Equipment Recommended [place] walker   AM-PAC Basic Mobility Inpatient   Turning in Bed Without Bedrails 3   Lying on Back to Sitting on Edge of Flat Bed 2   Moving Bed to Chair 3   Standing Up From Chair 3   Walk in Room 3   Climb 3-5 Stairs 2   Basic Mobility Inpatient Raw Score 16   Basic Mobility Standardized Score 38 32   Highest Level Of Mobility   JH-HLM Goal 5: Stand one or more mins   JH-HLM Achieved 7: Walk 25 feet or more   Education   Education Provided Mobility training;Assistive device   Patient Demonstrates verbal understanding   End of Consult   Patient Position at End of Consult Supine; All needs within [facility]

## 2022-12-14 NOTE — CASE MANAGEMENT
Case Management Discharge Planning Note    Patient name Latonya García  Location [facility] 997-01 MRN [id number]  : 1940 Date 2022       Current Admission Date: 2022  Current Admission Diagnosis:Rectus sheath hematoma   Patient Active Problem List    Diagnosis Date Noted   • Rectus sheath hematoma 2022   • Persistent dry cough 2022   • Bronchospasm with bronchitis, acute 2022   • Chest pain 2022   • Leukocytosis 2022   • Chest tightness 2022   • Diabetes ([facility] ) 2022   • Obesity 2022   • Bilateral flank pain 2022   • Hemarthrosis involving knee joint, right 10/01/2020   • Ambulatory dysfunction 10/01/2020   • Paroxysmal atrial fibrillation ([facility] ) 2020   • Acute respiratory failure with hypoxia ([facility] ) 2020   • Hypothyroidism 2020   • Hypertension 2020   • Hyperlipidemia 2020   • Major depressive disorder, single episode, mild ([facility] ) 2019   • Osteoarthritis of knee 2017      LOS (days): 1  Geometric Mean LOS (GMLOS) (days): 7 70  Days to GMLOS:6     OBJECTIVE:  Risk of Unplanned Readmission Score: 22 1         Current admission status: Inpatient   Preferred Pharmacy:   [facility] [address]  [place] [address] [address] [place] [address]  [address]  Phone: [phone] Fax: [phone]    Primary Care Provider: Zachary Harris DO    Primary Insurance: Eboni Mach[facility]  Secondary Insurance:     DISCHARGE DETAILS:                                          Other Referral/Resources/Interventions Provided:  Interventions: [facility]  Referral Comments: CM spoke with daughter Akin Simpson via phone to discuss [facility] services -- Daughter declined offer, stating pt would "rather not " Upon conversation with previous CM, it was noted that pt has family that visits "almost every day" to provide food and supports  No further CM needs identified at this time  CM will remain available           Treatment Team Recommendation: Home with [address] [facility]  Discharge Destination Plan[de-identified] Home

## 2022-12-14 NOTE — PROGRESS NOTES
SLIM PROGRESS NOTE     Name: Geovanna Savage   Age & Sex: [de-identified] y o  female   MRN: [id number]  Unit/Bed#: [facility] 424-01   Encounter: [id number]    PATIENT INFORMATION     Name: Geovanna Savage   Age & Sex: [de-identified] y o  female   MRN: [id number]  Hospital Stay Days: 1    ASSESSMENT/PLAN     Principal Problem:    Rectus sheath hematoma  Active Problems:    Persistent dry cough    Paroxysmal atrial fibrillation (HCC)    Hypothyroidism    Hypertension    Hyperlipidemia      * Rectus sheath hematoma  Assessment & Plan  Thought to be secondary to persistent coughing in the setting of anticoagulation  · S/p reversal of warfarin with Kcentra, dDAVP and 1 unit blood transfusion  · S/p embolization of inferior epigastric artery with IR on 12/12  · Hemoglobin stable at this time - follow up  · Hold Warfarin (Afib)  · Will not restart Warfarin on discharge - can restart as outpatient after discussion with PCP and Surgery  · Appreciate Surgery recommendations   · Pain control with scheduled tylenol and PRN oxycodone, dilaudid    Persistent dry cough  Assessment & Plan  Likely post infectious  · Pulmonology following, appreciate recs  · Continue Atrovent, Xopenex, PRN albuterol, PRN robitussin, PRN tessalon perles  · Continue Breo ellipta for bronchospasm    Hyperlipidemia  Assessment & Plan  · Continue home atorvastatin 40 mg    Hypertension  Assessment & Plan  Stable normotensive  · Continue PTA Lopressor 12 5 mg BID  · Have been holding lisinopril - continue to hold for now as pressures acceptable  · Continue to monitor BP and reinitiate lisinopril if needed    Hypothyroidism  Assessment & Plan  · Continue home levothyroxine 88 mcg qd    Paroxysmal atrial fibrillation (HCC)  Assessment & Plan  Rate controlled on metoprolol currently    · Continue Lopressor 12 5 mg BID  · Holding home warfarin at this time in setting of rectus sheath hematoma  · Will continue to hold warfarin on discharge and can be restarted as outpatient after discussion with PCP and Surgery      Disposition: Continue current inpatient monitoring, anticipate discharge in next 24-48 hrs pending further trauma surgery recs    SUBJECTIVE     Patient seen and examined  She reports the pain in her abdomen is much improved and the current pain regimen is keeping it well controlled  She does have some cough  She denies fever, chills  She denies any overt signs of bleeding - she was having hematemesis prior to coming in  Called patient's daughter with updates  OBJECTIVE     Vitals:    22 0855 22 0858 22 1319 22 1400   BP:    133/91   BP Location:    Right arm   Pulse:    80   Resp:    12   Temp:    98 2 °F (36 8 °C)   TempSrc:    Oral   SpO2: (!) 86% 96% 95% 97%   Weight:       Height:          Temperature:   Temp (24hrs), Av 1 °F (37 3 °C), Min:98 2 °F (36 8 °C), Max:99 7 °F (37 6 °C)    Temperature: 98 2 °F (36 8 °C)  Intake & Output:  I/O        0701   0700  0701   0700  0701  12/15 0700    P  O   120     I V  (mL/kg) 1500 (14 9) 505 (5)     Blood 350      IV Piggyback 100      Total Intake(mL/kg) 1950 (19 3) 625 (6 2)     Urine (mL/kg/hr) 570 875 (0 4)     Total Output 570 875     Net +1380 -250                Weights:        Body mass index is 37 2 kg/m²    Weight (last 2 days)     Date/Time Weight    22 0130 101 (223 55)        Physical Exam   General: Patient lying comfortably in bed, in NAD, cooperative  HEENT: EOM grossly intact, moist mucous membranes  Neck: Supple, no increased JVP  Heart: Regular rate and rhythm, normal S1, S2, no murmur appreciated  Lungs: Dry cough with wheeze and crackles throughout  Abdomen: Soft, nontender, nondistended, normal BS  : Parks draining yellow urine  Extremities: Warm, well perfused, moving all 4 limbs spontaneously, no peripheral edema   Neurologic: Alert and oriented x3, CN 2-12 grossly intact, no focal neuro deficit  Skin: Dry and intact  Psychiatric: Normal mood and behavior  LABORATORY DATA     Labs: I have personally reviewed pertinent reports  Results from last 7 days   Lab Units 12/14/22  1207 12/14/22  0352 12/13/22  1247 12/13/22  0459 12/13/22  0128 12/12/22  2343 12/12/22  2313   WBC Thousand/uL  --  17 85*  --  32 26* 31 58*  --  27 48*   HEMOGLOBIN g/dL 9 3* 8 8* 10 7* 11 0* 10 7*  --  8 7*   I STAT HEMOGLOBIN   --   --   --   --   --    < >  --    HEMATOCRIT % 28 4* 26 9* 32 1* 32 9* 32 6*  --  27 9*   HEMATOCRIT, ISTAT   --   --   --   --   --    < >  --    PLATELETS Thousands/uL  --  116*  --  144* 144*  --  201   NEUTROS PCT %  --  75  --  84*  --   --  86*   MONOS PCT %  --  7  --  7  --   --  6   MONO PCT %  --   --   --   --  4  --   --     < > = values in this interval not displayed  Results from last 7 days   Lab Units 12/14/22  0352 12/13/22  0459 12/13/22  0128 12/13/22  0046 12/13/22  0018 12/12/22  2343 12/12/22  2313 12/12/22  2025   POTASSIUM mmol/L 4 7 5 2 4 8  --   --   --    < > 5 1   CHLORIDE mmol/L 105 109* 112*  --   --   --    < > 103   CO2 mmol/L 31 28 28  --   --   --    < > 28   CO2, I-STAT mmol/L  --   --   --  27 27 27   < >  --    BUN mg/dL 27* 27* 28*  --   --   --    < > 30*   CREATININE mg/dL 0 85 0 92 0 95  --   --   --    < > 1 34*   CALCIUM mg/dL 8 1* 8 3 7 7*  --   --   --    < > 7 3*   ALK PHOS U/L  --  71 69  --   --   --   --  75   ALT U/L  --  20 19  --   --   --   --  23   AST U/L  --  15 14  --   --   --   --  18   GLUCOSE, ISTAT mg/dl  --   --   --  175* 224* 200*  --   --     < > = values in this interval not displayed       Results from last 7 days   Lab Units 12/14/22  0352 12/13/22  0459 12/13/22 0128   MAGNESIUM mg/dL 2 3 2 3 2 3     Results from last 7 days   Lab Units 12/13/22  0459 12/13/22  0128   PHOSPHORUS mg/dL 4 5* 5 1*      Results from last 7 days   Lab Units 12/13/22  0459 12/12/22  2313 12/12/22 2025   INR  1 38* 1 25* 2 54*   PTT seconds  --   --  27     Results from last 7 days   Lab Units 12/13/22  0128   LACTIC ACID mmol/L 1 8           IMAGING & DIAGNOSTIC TESTING     Radiology Results: I have personally reviewed pertinent reports  XR chest 1 view portable    Result Date: 12/13/2022  Impression: Bibasilar atelectasis more likely than infiltrates  Workstation performed: [id number]     CTA dissection protocol chest/abdomen/pelvis    Result Date: 12/12/2022  Impression: No evidence of aortic aneurysm or dissection  Active contrast extravasation within the large intramuscular hematoma centered within the left rectus femoris muscle extending into the left lateral abdominal wall musculature and decompressing into the left space of Retzius as detailed above  The study was marked in [facility] for immediate notification  Workstation performed: [id number]     IR embolization (specify vessel or site)    Result Date: 12/13/2022  Impression: Impression: Left inferior epigastric artery angiogram showed active bleeding at a distal branch of the artery  Therefore, the artery was embolized using gelfoam and multiple Embold coils  Workstation performed: [id number]     Other Diagnostic Testing: I have personally reviewed pertinent reports      ACTIVE MEDICATIONS     Current Facility-Administered Medications   Medication Dose Route Frequency   • acetaminophen (TYLENOL) tablet 975 mg  975 mg Oral Q8H [address]   • albuterol inhalation solution 2 5 mg  2 5 mg Nebulization Q4H PRN   • atorvastatin (LIPITOR) tablet 40 mg  40 mg Oral Daily With Dinner   • benzonatate (TESSALON PERLES) capsule 100 mg  100 mg Oral TID PRN   • dextromethorphan-guaiFENesin (ROBITUSSIN DM) oral syrup 10 mL  10 mL Oral Q4H PRN   • [MAR Hold] dextromethorphan-guaiFENesin (ROBITUSSIN DM) oral syrup 10 mL  10 mL Oral Q12H   • fluticasone-vilanterol 200-25 mcg/actuation 1 puff  1 puff Inhalation Daily   • HYDROmorphone HCl (DILAUDID) injection 0 2 mg  0 2 mg Intravenous Q4H PRN   • ipratropium (ATROVENT) 0 02 % inhalation solution 0 5 mg  0 5 mg Nebulization TID   • levalbuterol (XOPENEX) inhalation solution 1 25 mg  1 25 mg Nebulization TID   • levothyroxine tablet 88 mcg  88 mcg Oral Early Morning   • metoprolol tartrate (LOPRESSOR) partial tablet 12 5 mg  12 5 mg Oral Q12H JUAN PABLO   • ondansetron (ZOFRAN) injection 4 mg  4 mg Intravenous Q4H PRN   • oxyCODONE (ROXICODONE) IR tablet 2 5 mg  2 5 mg Oral Q4H PRN   • oxyCODONE (ROXICODONE) IR tablet 5 mg  5 mg Oral Q4H PRN   • senna-docusate sodium (SENOKOT S) 8 6-50 mg per tablet 1 tablet  1 tablet Oral HS   • venlafaxine (EFFEXOR-XR) 24 hr capsule 150 mg  150 mg Oral Daily       VTE Pharmacologic Prophylaxis: Reason for no pharmacologic prophylaxis Holding for rectus sheath hematoma  VTE Mechanical Prophylaxis: sequential compression device    Portions of the record may have been created with voice recognition software  Occasional wrong word or "sound a like" substitutions may have occurred due to the inherent limitations of voice recognition software    Read the chart carefully and recognize, using context, where substitutions have occurred   ==  Deja Zamarripa, [address]  Internal Medicine Residency PGY-2

## 2022-12-14 NOTE — ASSESSMENT & PLAN NOTE
Likely post infectious    · Pulmonology following, appreciate recs  · Continue Atrovent, Xopenex, PRN albuterol, PRN robitussin, PRN tessalon perles  · Continue Breo ellipta for bronchospasm  · Adding tussinex PRN as per Pulmonology recs  · Procal rechecked  - WNL  · Checked COVID/flu/RSV today as patient noted to be diaphoretic - negative

## 2022-12-14 NOTE — PLAN OF CARE
Problem: PHYSICAL THERAPY ADULT  Goal: Performs mobility at highest level of function for planned discharge setting  See evaluation for individualized goals  Description: Treatment/Interventions: Functional transfer training, LE strengthening/ROM, Therapeutic exercise, Endurance training, Patient/family training, Bed mobility, Gait training, Equipment eval/education, Spoke to nursing, OT          See flowsheet documentation for full assessment, interventions and recommendations  Outcome: Progressing  Note: Prognosis: Good  Problem List: Decreased strength, Decreased endurance, Impaired balance, Decreased mobility, Obesity, Pain  Assessment: Pt demonstrated min improvement in mobility skills ambulating further distances w/ rw, pt still exhibits abd discomfort and guarding w/ associated decreased tolerance to mobilization requiring (A)x1 w/ all aspects of mobility to assure safety  Pt reports having appropriate level of support at home and denies any steps at home  Based on above, cont to anticipate pt will return home w/ family support upon D/C pending additional progress and when medically cleared; home PT follow up is recommended; will follow  Barriers to Discharge: Inaccessible home environment, Decreased caregiver support     PT Discharge Recommendation: Home with home health rehabilitation    See flowsheet documentation for full assessment

## 2022-12-15 PROBLEM — R09.02 HYPOXIA: Status: ACTIVE | Noted: 2022-12-15

## 2022-12-15 LAB
ANION GAP SERPL CALCULATED.3IONS-SCNC: 4 MMOL/L (ref 4–13)
BASOPHILS # BLD AUTO: 0.02 THOUSANDS/ÂΜL (ref 0–0.1)
BASOPHILS NFR BLD AUTO: 0 % (ref 0–1)
BUN SERPL-MCNC: 26 MG/DL (ref 5–25)
CALCIUM SERPL-MCNC: 8.1 MG/DL (ref 8.3–10.1)
CHLORIDE SERPL-SCNC: 103 MMOL/L (ref 96–108)
CO2 SERPL-SCNC: 30 MMOL/L (ref 21–32)
CREAT SERPL-MCNC: 0.92 MG/DL (ref 0.6–1.3)
EOSINOPHIL # BLD AUTO: 0.08 THOUSAND/ÂΜL (ref 0–0.61)
EOSINOPHIL NFR BLD AUTO: 1 % (ref 0–6)
ERYTHROCYTE [DISTWIDTH] IN BLOOD BY AUTOMATED COUNT: 14.7 % (ref 11.6–15.1)
ERYTHROCYTE [DISTWIDTH] IN BLOOD BY AUTOMATED COUNT: 14.8 % (ref 11.6–15.1)
GFR SERPL CREATININE-BSD FRML MDRD: 58 ML/MIN/1.73SQ M
GLUCOSE SERPL-MCNC: 174 MG/DL (ref 65–140)
HCT VFR BLD AUTO: 26.6 % (ref 34.8–46.1)
HCT VFR BLD AUTO: 26.7 % (ref 34.8–46.1)
HGB BLD-MCNC: 8.4 G/DL (ref 11.5–15.4)
HGB BLD-MCNC: 8.4 G/DL (ref 11.5–15.4)
IMM GRANULOCYTES # BLD AUTO: 0.1 THOUSAND/UL (ref 0–0.2)
IMM GRANULOCYTES NFR BLD AUTO: 1 % (ref 0–2)
LYMPHOCYTES # BLD AUTO: 1.78 THOUSANDS/ÂΜL (ref 0.6–4.47)
LYMPHOCYTES NFR BLD AUTO: 13 % (ref 14–44)
MCH RBC QN AUTO: 29.5 PG (ref 26.8–34.3)
MCH RBC QN AUTO: 30.3 PG (ref 26.8–34.3)
MCHC RBC AUTO-ENTMCNC: 31.5 G/DL (ref 31.4–37.4)
MCHC RBC AUTO-ENTMCNC: 31.6 G/DL (ref 31.4–37.4)
MCV RBC AUTO: 94 FL (ref 82–98)
MCV RBC AUTO: 96 FL (ref 82–98)
MONOCYTES # BLD AUTO: 1.04 THOUSAND/ÂΜL (ref 0.17–1.22)
MONOCYTES NFR BLD AUTO: 8 % (ref 4–12)
NEUTROPHILS # BLD AUTO: 10.31 THOUSANDS/ÂΜL (ref 1.85–7.62)
NEUTS SEG NFR BLD AUTO: 77 % (ref 43–75)
NRBC BLD AUTO-RTO: 0 /100 WBCS
PLATELET # BLD AUTO: 111 THOUSANDS/UL (ref 149–390)
PLATELET # BLD AUTO: 132 THOUSANDS/UL (ref 149–390)
PMV BLD AUTO: 10.8 FL (ref 8.9–12.7)
PMV BLD AUTO: 11.3 FL (ref 8.9–12.7)
POTASSIUM SERPL-SCNC: 4.5 MMOL/L (ref 3.5–5.3)
PROCALCITONIN SERPL-MCNC: 0.08 NG/ML
RBC # BLD AUTO: 2.77 MILLION/UL (ref 3.81–5.12)
RBC # BLD AUTO: 2.85 MILLION/UL (ref 3.81–5.12)
SODIUM SERPL-SCNC: 137 MMOL/L (ref 135–147)
WBC # BLD AUTO: 13.22 THOUSAND/UL (ref 4.31–10.16)
WBC # BLD AUTO: 13.33 THOUSAND/UL (ref 4.31–10.16)

## 2022-12-15 RX ORDER — GUAIFENESIN/DEXTROMETHORPHAN 100-10MG/5
10 SYRUP ORAL 2 TIMES DAILY
Status: DISCONTINUED | OUTPATIENT
Start: 2022-12-15 | End: 2022-12-28 | Stop reason: HOSPADM

## 2022-12-15 RX ORDER — HYDROCODONE POLISTIREX AND CHLORPHENIRAMINE POLISTIREX 10; 8 MG/5ML; MG/5ML
5 SUSPENSION, EXTENDED RELEASE ORAL EVERY 12 HOURS PRN
Status: DISCONTINUED | OUTPATIENT
Start: 2022-12-15 | End: 2022-12-28 | Stop reason: HOSPADM

## 2022-12-15 RX ORDER — LIDOCAINE 50 MG/G
1 PATCH TOPICAL DAILY
Status: DISCONTINUED | OUTPATIENT
Start: 2022-12-15 | End: 2022-12-28 | Stop reason: HOSPADM

## 2022-12-15 RX ADMIN — Medication 12.5 MG: at 09:36

## 2022-12-15 RX ADMIN — LIDOCAINE 5% 1 PATCH: 700 PATCH TOPICAL at 10:49

## 2022-12-15 RX ADMIN — IPRATROPIUM BROMIDE 0.5 MG: 0.5 SOLUTION RESPIRATORY (INHALATION) at 07:24

## 2022-12-15 RX ADMIN — ACETAMINOPHEN 975 MG: 325 TABLET, FILM COATED ORAL at 13:28

## 2022-12-15 RX ADMIN — OXYCODONE HYDROCHLORIDE 5 MG: 5 TABLET ORAL at 09:46

## 2022-12-15 RX ADMIN — VENLAFAXINE HYDROCHLORIDE 150 MG: 150 CAPSULE, EXTENDED RELEASE ORAL at 09:41

## 2022-12-15 RX ADMIN — BENZONATATE 100 MG: 100 CAPSULE ORAL at 09:46

## 2022-12-15 RX ADMIN — GUAIFENESIN AND DEXTROMETHORPHAN 10 ML: 100; 10 SYRUP ORAL at 11:29

## 2022-12-15 RX ADMIN — ONDANSETRON 4 MG: 2 INJECTION INTRAMUSCULAR; INTRAVENOUS at 10:52

## 2022-12-15 RX ADMIN — LEVALBUTEROL HYDROCHLORIDE 1.25 MG: 1.25 SOLUTION, CONCENTRATE RESPIRATORY (INHALATION) at 07:24

## 2022-12-15 RX ADMIN — SENNOSIDES AND DOCUSATE SODIUM 1 TABLET: 8.6; 5 TABLET ORAL at 21:28

## 2022-12-15 RX ADMIN — ACETAMINOPHEN 975 MG: 325 TABLET, FILM COATED ORAL at 06:11

## 2022-12-15 RX ADMIN — FLUTICASONE FUROATE AND VILANTEROL TRIFENATATE 1 PUFF: 200; 25 POWDER RESPIRATORY (INHALATION) at 09:41

## 2022-12-15 RX ADMIN — LEVALBUTEROL HYDROCHLORIDE 1.25 MG: 1.25 SOLUTION, CONCENTRATE RESPIRATORY (INHALATION) at 13:25

## 2022-12-15 RX ADMIN — GUAIFENESIN AND DEXTROMETHORPHAN 10 ML: 100; 10 SYRUP ORAL at 21:29

## 2022-12-15 RX ADMIN — ACETAMINOPHEN 975 MG: 325 TABLET, FILM COATED ORAL at 21:29

## 2022-12-15 RX ADMIN — LEVOTHYROXINE SODIUM 88 MCG: 88 TABLET ORAL at 06:11

## 2022-12-15 RX ADMIN — ATORVASTATIN CALCIUM 40 MG: 40 TABLET, FILM COATED ORAL at 16:30

## 2022-12-15 RX ADMIN — LEVALBUTEROL HYDROCHLORIDE 1.25 MG: 1.25 SOLUTION, CONCENTRATE RESPIRATORY (INHALATION) at 19:45

## 2022-12-15 RX ADMIN — IPRATROPIUM BROMIDE 0.5 MG: 0.5 SOLUTION RESPIRATORY (INHALATION) at 19:45

## 2022-12-15 RX ADMIN — Medication 12.5 MG: at 21:29

## 2022-12-15 RX ADMIN — IPRATROPIUM BROMIDE 0.5 MG: 0.5 SOLUTION RESPIRATORY (INHALATION) at 13:25

## 2022-12-15 RX ADMIN — GUAIFENESIN AND DEXTROMETHORPHAN 10 ML: 100; 10 SYRUP ORAL at 06:16

## 2022-12-15 NOTE — ASSESSMENT & PLAN NOTE
Requiring supplemental O2, which is new to her  Maybe in setting deconditioning vs continued post-infectious symptoms    · Continue supplemental O2 as needed  · Ambulatory pulse ox/home O2 eval ordered - patient does qualify

## 2022-12-15 NOTE — PLAN OF CARE
Problem: OCCUPATIONAL THERAPY ADULT  Goal: Performs self-care activities at highest level of function for planned discharge setting  See evaluation for individualized goals  Description: Treatment Interventions: ADL retraining, Functional transfer training, Endurance training, Patient/family training, Compensatory technique education, Continued evaluation, Energy conservation, Activityengagement          See flowsheet documentation for full assessment, interventions and recommendations  Outcome: Progressing  Note: Limitation: Decreased ADL status, Decreased endurance, Decreased self-care trans, Decreased high-level ADLs  Prognosis: Fair  Assessment: Patient participated in Skilled OT session this date with interventions consisting of ADL re training with the use of correct body mechanics and  therapeutic activities to: increase activity tolerance   Patient agreeable to OT treatment session, upon arrival patient was found seated OOB to Chair  In comparison to previous session, patient with improvements in functional transfers and activity tolerance overall  Patient requiring frequent rest periods  From OT standpoint, recommendation at time of d/c would be Home with family support and Home OT  Pt reports that she requires A for ADLS at baseline and has very supportive family  Recommend continued participation in [facility] and functional mobility with staff  No further acute OT needs, d/c OT       OT Discharge Recommendation: Home with home health rehabilitation (and increased social support as needed)

## 2022-12-15 NOTE — PROGRESS NOTES
SLIM PROGRESS NOTE     Name: Mendez Ibanez   Age & Sex: [de-identified] y o  female   MRN: [id number]  Unit/Bed#: [facility] 424-01   Encounter: [id number]    PATIENT INFORMATION     Name: Mendez Ibanez   Age & Sex: [de-identified] y o  female   MRN: [id number]  Hospital Stay Days: 2    ASSESSMENT/PLAN     Principal Problem:    Rectus sheath hematoma  Active Problems:    Persistent dry cough    Paroxysmal atrial fibrillation (HCC)    Hypothyroidism    Hypertension    Hyperlipidemia    Hypoxia      * Rectus sheath hematoma  Assessment & Plan  Thought to be secondary to persistent coughing in the setting of anticoagulation  · S/p reversal of warfarin with Kcentra, dDAVP and 1 unit blood transfusion  · S/p embolization of inferior epigastric artery with IR on 12/12  · Hemoglobin stable at this time - follow up  · Hold Warfarin (Afib)  · Will not restart Warfarin on discharge - can restart as outpatient after discussion with PCP and Surgery  · Appreciate Surgery recommendations   · Pain control with scheduled tylenol and PRN oxycodone, dilaudid  · Recheck CBC and platelet later today    Persistent dry cough  Assessment & Plan  Likely post infectious  · Pulmonology following, appreciate recs  · Continue Atrovent, Xopenex, PRN albuterol, PRN robitussin, PRN tessalon perles  · Continue Breo ellipta for bronchospasm  · Adding tussinex PRN as per Pulmonology recs  · Recheck procal    Hypoxia  Assessment & Plan  Requiring supplemental O2, which is new to her  Maybe in setting deconditioning vs continued post-infectious symptoms  · Continue supplemental O2 as needed  · Ambulatory pulse ox/home O2 eval ordered    Hyperlipidemia  Assessment & Plan  · Continue home atorvastatin 40 mg    Hypertension  Assessment & Plan  Stable normotensive    · Continue PTA Lopressor 12 5 mg BID  · Have been holding lisinopril - continue to hold for now as pressures acceptable  · Continue to monitor BP and reinitiate lisinopril if needed    Hypothyroidism  Assessment & Plan  · Continue home levothyroxine 88 mcg qd    Paroxysmal atrial fibrillation (HCC)  Assessment & Plan  Rate controlled on metoprolol currently  · Continue Lopressor 12 5 mg BID  · Holding home warfarin at this time in setting of rectus sheath hematoma  · Will continue to hold warfarin on discharge and can be restarted as outpatient after discussion with PCP and Surgery      Disposition: Anticipate discharge likely in 24 hours    SUBJECTIVE     Patient seen and examined  Patient complaining of pain in her left ribs when coughing  She has the sensation of phlegm that she is having difficulty getting up  She reports her phlegm is yellow  Her breathing she feels is "so-so"  She denies abdominal pain, nausea, fever, chills  OBJECTIVE     Vitals:    12/15/22 1110 12/15/22 1115 12/15/22 1120 12/15/22 1327   BP:       BP Location:       Pulse:       Resp:       Temp:       TempSrc:       SpO2: 90% (!) 86% 92% 92%   Weight:       Height:          Temperature:   Temp (24hrs), Av 6 °F (37 °C), Min:98 °F (36 7 °C), Max:99 5 °F (37 5 °C)    Temperature: 98 2 °F (36 8 °C)  Intake & Output:  I/O        0701   0700  0701  12/15 0700 12/15 0701   0700    P  O  120 120     I V  (mL/kg) 505 (5)      Blood       IV Piggyback       Total Intake(mL/kg) 625 (6 2) 120 (1 2)     Urine (mL/kg/hr) 875 (0 4) 1390 (0 6)     Total Output 875 1390     Net -250 -1270                Weights:        Body mass index is 36 65 kg/m²    Weight (last 2 days)     Date/Time Weight    12/15/22 0600 99 9 (220 24)    22 0130 101 (223 55)        Physical Exam   General: Patient lying comfortably in bed, in NAD, cooperative  HEENT: EOM grossly intact, moist mucous membranes  Neck: Supple, no increased JVP  Heart: Regular rate and rhythm, normal S1, S2, no murmur appreciated  Lungs: Clear to auscultation bilaterally, no crackles, ronchi, or wheezing, currently on 2L O2NC  Abdomen: Soft, nontender, nondistended, normal BS  Extremities: Warm, well perfused, moving all 4 limbs spontaneously, no peripheral edema  Neurologic: Alert and oriented x3, CN 2-12 grossly intact, no focal neuro deficit  Skin: Dry and intact  Psychiatric: Normal mood and behavior  LABORATORY DATA     Labs: I have personally reviewed pertinent reports  Results from last 7 days   Lab Units 12/15/22  0534 12/14/22  1207 12/14/22  0352 12/13/22  1247 12/13/22  0459   WBC Thousand/uL 13 33*  --  17 85*  --  32 26*   HEMOGLOBIN g/dL 8 4* 9 3* 8 8*   < > 11 0*   HEMATOCRIT % 26 6* 28 4* 26 9*   < > 32 9*   PLATELETS Thousands/uL 111*  --  116*  --  144*   NEUTROS PCT % 77*  --  75  --  84*   MONOS PCT % 8  --  7  --  7    < > = values in this interval not displayed  Results from last 7 days   Lab Units 12/14/22  0352 12/13/22  0459 12/13/22  0128 12/13/22  0046 12/13/22  0018 12/12/22  2343 12/12/22  2313 12/12/22  2025   POTASSIUM mmol/L 4 7 5 2 4 8  --   --   --    < > 5 1   CHLORIDE mmol/L 105 109* 112*  --   --   --    < > 103   CO2 mmol/L 31 28 28  --   --   --    < > 28   CO2, I-STAT mmol/L  --   --   --  27 27 27   < >  --    BUN mg/dL 27* 27* 28*  --   --   --    < > 30*   CREATININE mg/dL 0 85 0 92 0 95  --   --   --    < > 1 34*   CALCIUM mg/dL 8 1* 8 3 7 7*  --   --   --    < > 7 3*   ALK PHOS U/L  --  71 69  --   --   --   --  75   ALT U/L  --  20 19  --   --   --   --  23   AST U/L  --  15 14  --   --   --   --  18   GLUCOSE, ISTAT mg/dl  --   --   --  175* 224* 200*  --   --     < > = values in this interval not displayed       Results from last 7 days   Lab Units 12/14/22  0352 12/13/22  0459 12/13/22  0128   MAGNESIUM mg/dL 2 3 2 3 2 3     Results from last 7 days   Lab Units 12/13/22  0459 12/13/22 0128   PHOSPHORUS mg/dL 4 5* 5 1*      Results from last 7 days   Lab Units 12/13/22  0459 12/12/22 2313 12/12/22 2025   INR  1 38* 1 25* 2 54*   PTT seconds  --   --  27     Results from last 7 days   Lab Units 12/13/22 0128   LACTIC ACID mmol/L 1  8           IMAGING & DIAGNOSTIC TESTING     Radiology Results: I have personally reviewed pertinent reports  XR chest 1 view portable    Result Date: 12/13/2022  Impression: Bibasilar atelectasis more likely than infiltrates  Workstation performed: [id number]     CTA dissection protocol chest/abdomen/pelvis    Result Date: 12/12/2022  Impression: No evidence of aortic aneurysm or dissection  Active contrast extravasation within the large intramuscular hematoma centered within the left rectus femoris muscle extending into the left lateral abdominal wall musculature and decompressing into the left space of Retzius as detailed above  The study was marked in [facility] for immediate notification  Workstation performed: [id number]     IR embolization (specify vessel or site)    Result Date: 12/13/2022  Impression: Impression: Left inferior epigastric artery angiogram showed active bleeding at a distal branch of the artery  Therefore, the artery was embolized using gelfoam and multiple Embold coils  Workstation performed: [id number]     Other Diagnostic Testing: I have personally reviewed pertinent reports      ACTIVE MEDICATIONS     Current Facility-Administered Medications   Medication Dose Route Frequency   • acetaminophen (TYLENOL) tablet 975 mg  975 mg Oral Q8H [address]   • albuterol inhalation solution 2 5 mg  2 5 mg Nebulization Q4H PRN   • atorvastatin (LIPITOR) tablet 40 mg  40 mg Oral Daily With Dinner   • benzonatate (TESSALON PERLES) capsule 100 mg  100 mg Oral TID PRN   • dextromethorphan-guaiFENesin (ROBITUSSIN DM) oral syrup 10 mL  10 mL Oral BID   • fluticasone-vilanterol 200-25 mcg/actuation 1 puff  1 puff Inhalation Daily   • hydrocodone-chlorpheniramine polistirex (TUSSIONEX) ER suspension 5 mL  5 mL Oral Q12H PRN   • HYDROmorphone HCl (DILAUDID) injection 0 2 mg  0 2 mg Intravenous Q4H PRN   • ipratropium (ATROVENT) 0 02 % inhalation solution 0 5 mg  0 5 mg Nebulization TID   • levalbuterol (Isatu Júnior) inhalation solution 1 25 mg  1 25 mg Nebulization TID   • levothyroxine tablet 88 mcg  88 mcg Oral Early Morning   • lidocaine (LIDODERM) 5 % patch 1 patch  1 patch Topical Daily   • metoprolol tartrate (LOPRESSOR) partial tablet 12 5 mg  12 5 mg Oral Q12H [address]   • ondansetron (ZOFRAN) injection 4 mg  4 mg Intravenous Q4H PRN   • oxyCODONE (ROXICODONE) IR tablet 2 5 mg  2 5 mg Oral Q4H PRN   • oxyCODONE (ROXICODONE) IR tablet 5 mg  5 mg Oral Q4H PRN   • senna-docusate sodium (SENOKOT S) 8 6-50 mg per tablet 1 tablet  1 tablet Oral HS   • venlafaxine (EFFEXOR-XR) 24 hr capsule 150 mg  150 mg Oral Daily       VTE Pharmacologic Prophylaxis: Reason for no pharmacologic prophylaxis Holding in setting of rectal sheath hematoma  VTE Mechanical Prophylaxis: sequential compression device    Portions of the record may have been created with voice recognition software  Occasional wrong word or "sound a like" substitutions may have occurred due to the inherent limitations of voice recognition software    Read the chart carefully and recognize, using context, where substitutions have occurred   ==  Bhargav Calero, [address]  Internal Medicine Residency PGY-2

## 2022-12-15 NOTE — OCCUPATIONAL THERAPY NOTE
Occupational Therapy Progress Note     Patient Name: Tejas Chaudhry  PZOLS'P Date: 12/15/2022  Problem List  Principal Problem:    Rectus sheath hematoma  Active Problems:    Paroxysmal atrial fibrillation (HCC)    Hypothyroidism    Hypertension    Hyperlipidemia    Persistent dry cough          12/15/22 0834   OT Last Visit   OT Visit Date 12/15/22   Note Type   Note Type Treatment   Pain Assessment   Pain Assessment Tool FLACC   Pain Location/Orientation Location: Abdomen   Hospital Pain Intervention(s) Repositioned; Ambulation/increased activity; Emotional support   Pain Rating: FLACC (Rest) - Face 0   Pain Rating: FLACC (Rest) - Legs 0   Pain Rating: FLACC (Rest) - Activity 0   Pain Rating: FLACC (Rest) - Cry 0   Pain Rating: FLACC (Rest) - Consolability 0   Score: FLACC (Rest) 0   Pain Rating: FLACC (Activity) - Face 1   Pain Rating: FLACC (Activity) - Legs 0   Pain Rating: FLACC (Activity) - Activity 0   Pain Rating: FLACC (Activity) - Cry 1   Pain Rating: FLACC (Activity) - Consolability 0   Score: FLACC (Activity) 2   Restrictions/Precautions   Other Precautions Multiple lines;Telemetry;O2;Pain   Lifestyle   Autonomy Pt reports that she requires A with ADLS and IADLS PTA>   Reciprocal Relationships Pt lives w/ her [email or web address] and grandson   Service to Others Retired; school    Intrinsic Gratification Enjoys spending time around the house   ADL   Where Assessed Chair   Grooming Assistance 5  Supervision/Setup   Grooming Deficit Increased time to complete   LB Dressing Assistance 3  Moderate Assistance   LB Dressing Deficit Setup;Supervision/safety; Increased time to complete   LB Dressing Comments Pt reports that family helps her with this at home     Transfers   Sit to Stand 5  Supervision   Additional items Increased time required   Stand to Sit 5  Supervision   Additional items Increased time required   Functional Mobility   Functional Mobility 5  Supervision   Additional Comments Pt demonstrated household mobility with seated rest break  Additional items Rolling walker   Cognition   Overall Cognitive Status WFL   Arousal/Participation Cooperative; Alert   Attention Within functional limits   Orientation Level Oriented X4   Memory Within functional limits   Following Commands Follows one step commands without difficulty   Comments Pt is very pleasant and cooperative  Activity Tolerance   Activity Tolerance Patient limited by fatigue   Medical Staff Made Aware RN confirmed okay to see pt   Assessment   Assessment Patient participated in Skilled OT session this date with interventions consisting of ADL re training with the use of correct body mechanics and  therapeutic activities to: increase activity tolerance   Patient agreeable to OT treatment session, upon arrival patient was found seated OOB to Chair  In comparison to previous session, patient with improvements in functional transfers and activity tolerance overall  Patient requiring frequent rest periods  From OT standpoint, recommendation at time of d/c would be Home with family support and Home OT  Pt reports that she requires A for ADLS at baseline and has very supportive family  Recommend continued participation in [facility] and functional mobility with staff  No further acute OT needs, d/c OT  Plan   Treatment Interventions ADL retraining;Functional transfer training;UE strengthening/ROM; Endurance training; Compensatory technique education;Continued evaluation   Goal Expiration Date 12/27/22   OT Treatment Day 1   OT Frequency   (2-4x)   Recommendation   OT Discharge Recommendation Home with home health rehabilitation  (and increased social support as needed)   AM-PAC Daily Activity Inpatient   Lower Body Dressing 2   Bathing 3   Toileting 3   Upper Body Dressing 3   Grooming 4   Eating 4   Daily Activity Raw Score 19   Daily Activity Standardized Score (Calc for Raw Score >=11) 40 22   AM-PAC Applied Cognition Inpatient   Following a Speech/Presentation 4   Understanding Ordinary Conversation 4   Taking Medications 4   Remembering Where Things Are Placed or Put Away 4   Remembering List of 4-5 Errands 4   Taking Care of Complicated Tasks 4   Applied Cognition Raw Score 24   Applied Cognition Standardized Score 62 21   Modified Owsley Scale   Modified Ashley Scale 3       Odalis Valverde, BRENDA, OTR/L

## 2022-12-15 NOTE — RESPIRATORY THERAPY NOTE
Home Oxygen Qualifying Test     Patient name: Treasure Jacob        : 1940   Date of Test:  December 15, 2022  Diagnosis:    Home Oxygen Test:    **Medicare Guidelines require item(s) 1-5 on all ambulatory patients or 1 and 2 on non-ambulatory patients  1  Baseline SPO2 on Room Air at rest 86%   a  If <= 88% on Room Air add O2 via NC to obtain SpO2 >=88%  If LPM needed, document LPM 2 LPM needed to reach =>88%    2  SPO2 during exertion on Room Air 85%  a  During exertion monitor SPO2  If SPO2 increases >=89%, do not add supplemental oxygen    3  SPO2 on Oxygen at Rest  95% at  2 LPM    4  SPO2 during exertion on Oxygen  92% at  2 LPM    5  Test performed during exertion activity  [x]  Supplemental Home Oxygen is indicated  []  Client does not qualify for home oxygen      Respiratory Additional Notes-    Loan Pearce, RT

## 2022-12-16 LAB
ANION GAP SERPL CALCULATED.3IONS-SCNC: 3 MMOL/L (ref 4–13)
BACTERIA BLD CULT: ABNORMAL
BASOPHILS # BLD AUTO: 0.02 THOUSANDS/ÂΜL (ref 0–0.1)
BASOPHILS NFR BLD AUTO: 0 % (ref 0–1)
BUN SERPL-MCNC: 21 MG/DL (ref 5–25)
CALCIUM SERPL-MCNC: 7.9 MG/DL (ref 8.3–10.1)
CHLORIDE SERPL-SCNC: 105 MMOL/L (ref 96–108)
CO2 SERPL-SCNC: 31 MMOL/L (ref 21–32)
CREAT SERPL-MCNC: 0.77 MG/DL (ref 0.6–1.3)
DME PARACHUTE DELIVERY DATE REQUESTED: NORMAL
DME PARACHUTE DELIVERY NOTE: NORMAL
DME PARACHUTE ITEM DESCRIPTION: NORMAL
DME PARACHUTE ORDER STATUS: NORMAL
DME PARACHUTE SUPPLIER NAME: NORMAL
DME PARACHUTE SUPPLIER PHONE: NORMAL
EOSINOPHIL # BLD AUTO: 0.15 THOUSAND/ÂΜL (ref 0–0.61)
EOSINOPHIL NFR BLD AUTO: 1 % (ref 0–6)
ERYTHROCYTE [DISTWIDTH] IN BLOOD BY AUTOMATED COUNT: 14.7 % (ref 11.6–15.1)
FLUAV RNA RESP QL NAA+PROBE: NEGATIVE
FLUBV RNA RESP QL NAA+PROBE: NEGATIVE
GFR SERPL CREATININE-BSD FRML MDRD: 73 ML/MIN/1.73SQ M
GLUCOSE SERPL-MCNC: 116 MG/DL (ref 65–140)
GRAM STN SPEC: ABNORMAL
HCT VFR BLD AUTO: 25 % (ref 34.8–46.1)
HGB BLD-MCNC: 8 G/DL (ref 11.5–15.4)
IMM GRANULOCYTES # BLD AUTO: 0.1 THOUSAND/UL (ref 0–0.2)
IMM GRANULOCYTES NFR BLD AUTO: 1 % (ref 0–2)
LYMPHOCYTES # BLD AUTO: 1.81 THOUSANDS/ÂΜL (ref 0.6–4.47)
LYMPHOCYTES NFR BLD AUTO: 18 % (ref 14–44)
MCH RBC QN AUTO: 30.2 PG (ref 26.8–34.3)
MCHC RBC AUTO-ENTMCNC: 32 G/DL (ref 31.4–37.4)
MCV RBC AUTO: 94 FL (ref 82–98)
MECA+MECC ISLT/SPM QL: DETECTED
MONOCYTES # BLD AUTO: 0.74 THOUSAND/ÂΜL (ref 0.17–1.22)
MONOCYTES NFR BLD AUTO: 7 % (ref 4–12)
NEUTROPHILS # BLD AUTO: 7.53 THOUSANDS/ÂΜL (ref 1.85–7.62)
NEUTS SEG NFR BLD AUTO: 73 % (ref 43–75)
NRBC BLD AUTO-RTO: 0 /100 WBCS
PLATELET # BLD AUTO: 140 THOUSANDS/UL (ref 149–390)
PMV BLD AUTO: 11 FL (ref 8.9–12.7)
POTASSIUM SERPL-SCNC: 4.4 MMOL/L (ref 3.5–5.3)
RBC # BLD AUTO: 2.65 MILLION/UL (ref 3.81–5.12)
RSV RNA RESP QL NAA+PROBE: NEGATIVE
S EPIDERMIDIS DNA BLD POS QL NAA+NON-PRB: DETECTED
SARS-COV-2 RNA RESP QL NAA+PROBE: NEGATIVE
SODIUM SERPL-SCNC: 139 MMOL/L (ref 135–147)
WBC # BLD AUTO: 10.35 THOUSAND/UL (ref 4.31–10.16)

## 2022-12-16 RX ADMIN — LEVALBUTEROL HYDROCHLORIDE 1.25 MG: 1.25 SOLUTION, CONCENTRATE RESPIRATORY (INHALATION) at 19:55

## 2022-12-16 RX ADMIN — ATORVASTATIN CALCIUM 40 MG: 40 TABLET, FILM COATED ORAL at 17:00

## 2022-12-16 RX ADMIN — LEVALBUTEROL HYDROCHLORIDE 1.25 MG: 1.25 SOLUTION, CONCENTRATE RESPIRATORY (INHALATION) at 07:45

## 2022-12-16 RX ADMIN — IPRATROPIUM BROMIDE 0.5 MG: 0.5 SOLUTION RESPIRATORY (INHALATION) at 07:45

## 2022-12-16 RX ADMIN — IPRATROPIUM BROMIDE 0.5 MG: 0.5 SOLUTION RESPIRATORY (INHALATION) at 19:55

## 2022-12-16 RX ADMIN — Medication 12.5 MG: at 09:21

## 2022-12-16 RX ADMIN — Medication 12.5 MG: at 20:57

## 2022-12-16 RX ADMIN — ACETAMINOPHEN 975 MG: 325 TABLET, FILM COATED ORAL at 21:00

## 2022-12-16 RX ADMIN — LIDOCAINE 5% 1 PATCH: 700 PATCH TOPICAL at 09:21

## 2022-12-16 RX ADMIN — LEVOTHYROXINE SODIUM 88 MCG: 88 TABLET ORAL at 05:17

## 2022-12-16 RX ADMIN — ACETAMINOPHEN 975 MG: 325 TABLET, FILM COATED ORAL at 05:17

## 2022-12-16 RX ADMIN — SENNOSIDES AND DOCUSATE SODIUM 1 TABLET: 8.6; 5 TABLET ORAL at 21:00

## 2022-12-16 RX ADMIN — IPRATROPIUM BROMIDE 0.5 MG: 0.5 SOLUTION RESPIRATORY (INHALATION) at 14:18

## 2022-12-16 RX ADMIN — GUAIFENESIN AND DEXTROMETHORPHAN 10 ML: 100; 10 SYRUP ORAL at 09:21

## 2022-12-16 RX ADMIN — VENLAFAXINE HYDROCHLORIDE 150 MG: 150 CAPSULE, EXTENDED RELEASE ORAL at 09:23

## 2022-12-16 RX ADMIN — FLUTICASONE FUROATE AND VILANTEROL TRIFENATATE 1 PUFF: 200; 25 POWDER RESPIRATORY (INHALATION) at 09:21

## 2022-12-16 RX ADMIN — ACETAMINOPHEN 975 MG: 325 TABLET, FILM COATED ORAL at 17:00

## 2022-12-16 RX ADMIN — LEVALBUTEROL HYDROCHLORIDE 1.25 MG: 1.25 SOLUTION, CONCENTRATE RESPIRATORY (INHALATION) at 14:18

## 2022-12-16 RX ADMIN — GUAIFENESIN AND DEXTROMETHORPHAN 10 ML: 100; 10 SYRUP ORAL at 20:43

## 2022-12-16 NOTE — PHYSICAL THERAPY NOTE
Physical Therapy Treatment Note       12/16/22 0955   PT Last Visit   PT Visit Date 12/16/22   Note Type   Note Type Treatment   Pain Assessment   Pain Assessment Tool 0-10   Pain Score 6   Pain Location/Orientation   (R LE)   Patient's Stated Pain Goal No pain   Hospital Pain Intervention(s) Ambulation/increased activity   Restrictions/Precautions   Weight Bearing Precautions Per Order No   Other Precautions Multiple lines;Telemetry; Fall Risk;Pain   General   Chart Reviewed Yes   Family/Caregiver Present No   Cognition   Overall Cognitive Status WFL   Arousal/Participation Responsive   Attention Attends with cues to redirect   Orientation Level Oriented X4   Memory Unable to assess   Following Commands Follows one step commands without difficulty   Subjective   Subjective states she feels OK  some new pain R LE w/ mobility   Transfers   Sit to Stand 4  Minimal assistance   Additional items Assist x 1   Stand to Sit 5  Supervision   Additional items Assist x 1   Ambulation/Elevation   Gait pattern   (slow, antalgic, short step length, knee buckling x 2)   Gait Assistance 4  Minimal assist   Additional items Assist x 1  (2nd for chair follow)   Assistive Device Rolling walker   Distance 45'x2, seated rest x 5-7 min   Balance   Static Sitting Fair   Dynamic Sitting Fair -   Static Standing Fair -   Dynamic Standing Fair -   Ambulatory Poor +   Endurance Deficit   Endurance Deficit Yes   Endurance Deficit Description fatigue, weakness, pain   Activity Tolerance   Activity Tolerance Patient limited by fatigue;Patient limited by pain;Treatment limited secondary to medical complications (Comment)   Nurse Made Aware yes   Assessment   Prognosis Fair   Problem List Decreased strength;Decreased endurance; Impaired balance;Decreased mobility;Pain   Assessment Pt seen for session for setup, transfers, gait w/ rest time, repositioning  Pt cooperative but notes increased R LE pain today w/ gait    Had 2 episides of buckling w/ LOB due to same  Note improved endurance, and less SOB throughout  recommend home w/ home PT pending continued progress   Goals   Patient Goals to feel better   STG Expiration Date 12/27/22   PT Treatment Day 2   Plan   Treatment/Interventions Functional transfer training;LE strengthening/ROM; Therapeutic exercise; Endurance training;Bed mobility;Gait training;Equipment eval/education;Patient/family training   Progress Improving as expected   PT Frequency 3-5x/wk   Recommendation   PT Discharge Recommendation Home with home health rehabilitation   Equipment Recommended [place] walker   AM-PAC Basic Mobility Inpatient   Turning in Bed Without Bedrails 3   Lying on Back to Sitting on Edge of Flat Bed 3   Moving Bed to Chair 3   Standing Up From Chair 3   Walk in Room 3   Climb 3-5 Stairs 2   Basic Mobility Inpatient Raw Score 17   Basic Mobility Standardized Score 39 67   Highest Level Of Mobility   JH-HLM Goal 5: Stand one or more mins   JH-HLM Achieved 7: Walk 25 feet or more     Arneta Cowden PT, DPT CSRS

## 2022-12-16 NOTE — RESTORATIVE TECHNICIAN NOTE
Restorative Technician Note      Patient Name: Geovanna Savage     Restorative Tech Visit Date: 12/16/22  Note Type: Mobility  Patient Position Upon Consult: Other (Comment) (in the BR)  Activity Performed: Ambulated  Assistive Device: Roller walker  Patient Position at End of Consult: All needs within reach;  Bedside chair

## 2022-12-16 NOTE — PROGRESS NOTES
SLIM PROGRESS NOTE     Name: Dalila Pace   Age & Sex: [de-identified] y o  female   MRN: [id number]  Unit/Bed#: [facility] 424-01   Encounter: [id number]    PATIENT INFORMATION     Name: Dalila Pace   Age & Sex: [de-identified] y o  female   MRN: [id number]  Hospital Stay Days: 3    ASSESSMENT/PLAN     Principal Problem:    Rectus sheath hematoma  Active Problems:    Persistent dry cough    Paroxysmal atrial fibrillation (HCC)    Hypothyroidism    Hypertension    Hyperlipidemia    Hypoxia      * Rectus sheath hematoma  Assessment & Plan  Thought to be secondary to persistent coughing in the setting of anticoagulation  · S/p reversal of warfarin with Kcentra, dDAVP and 1 unit blood transfusion  · S/p embolization of inferior epigastric artery with IR on 12/12  · Hemoglobin stable but trended down from 8 4 to 8 0 - no overt signs of bleeding  · Hold Warfarin (Afib)  · Will not restart Warfarin on discharge - can restart as outpatient after discussion with PCP and Surgery  · Appreciate Surgery recommendations   · Pain control with scheduled tylenol and PRN oxycodone, dilaudid  · Continue to monitor Hgb and transfuse for Hgb < 7    Persistent dry cough  Assessment & Plan  Likely post infectious  · Pulmonology following, appreciate recs  · Continue Atrovent, Xopenex, PRN albuterol, PRN robitussin, PRN tessalon perles  · Continue Breo ellipta for bronchospasm  · Adding tussinex PRN as per Pulmonology recs  · Procal rechecked yesterday - WNL  · Checked COVID/flu/RSV today as patient noted to be diaphoretic - negative    Hypoxia  Assessment & Plan  Requiring supplemental O2, which is new to her  Maybe in setting deconditioning vs continued post-infectious symptoms  · Continue supplemental O2 as needed  · Ambulatory pulse ox/home O2 eval ordered - patient does qualify    Hyperlipidemia  Assessment & Plan  · Continue home atorvastatin 40 mg    Hypertension  Assessment & Plan  Stable normotensive    · Continue PTA Lopressor 12 5 mg BID  · Have been holding lisinopril - continue to hold for now as pressures acceptable  · Continue to monitor BP and reinitiate lisinopril if needed    Hypothyroidism  Assessment & Plan  · Continue home levothyroxine 88 mcg qd    Paroxysmal atrial fibrillation (HCC)  Assessment & Plan  Rate controlled on metoprolol currently  · Continue Lopressor 12 5 mg BID  · Holding home warfarin at this time in setting of rectus sheath hematoma  · Will continue to hold warfarin on discharge and can be restarted as outpatient after discussion with PCP and Surgery      Disposition: Potentially discharge tomorrow if remains stable    SUBJECTIVE     Patient seen and examined  She continues to complain of cough and pain in her left side associated with this  Her breathing is okay  She reports that while walking earlier today she noticed that her knee would feel like it is dropping  She has declined recommendations for home PT but she says this was due to cost  She denies any signs of bleeding  She does complain of some epigastric pain today  OBJECTIVE     Vitals:    22 0800 22 0920 22 1152 22 1200   BP:  122/64 118/59    BP Location:   Left arm    Pulse:  90 80    Resp:   17    Temp:   98 °F (36 7 °C)    TempSrc:   Oral    SpO2: 95%  96% 98%   Weight:       Height:          Temperature:   Temp (24hrs), Av 1 °F (36 7 °C), Min:97 9 °F (36 6 °C), Max:98 3 °F (36 8 °C)    Temperature: 98 °F (36 7 °C)  Intake & Output:  I/O        0701  12/15 0700 12/15 0701   0700  0701   0700    P  O  120 120     I V  (mL/kg)       Total Intake(mL/kg) 120 (1 2) 120 (1 2)     Urine (mL/kg/hr) 1390 (0 6) 1160 (0 5)     Total Output 1390 1160     Net -1270 -1040                Weights:        Body mass index is 36 32 kg/m²    Weight (last 2 days)     Date/Time Weight    22 0536 99 (218 26)    12/15/22 0600 99 9 (220 24)        Physical Exam   General: Patient lying comfortably in bed, in NAD, diaphoretic  HEENT: EOM grossly intact, moist mucous membranes  Neck: Supple, no increased JVP  Heart: Regular rate and rhythm, normal S1, S2, no murmur appreciated  Lungs: Clear to auscultation bilaterally, no crackles, ronchi, or wheezing  Abdomen: Soft, nontender, nondistended, normal BS  Extremities: Warm, well perfused, moving all 4 limbs spontaneously, no peripheral edema, 5/5 strength in BUE and BLE  : james draining clear yellow urine  Neurologic: Alert and oriented x3, CN 2-12 grossly intact, no focal neuro deficit  Skin: Dry and intact  Psychiatric: Normal mood and behavior  LABORATORY DATA     Labs: I have personally reviewed pertinent reports  Results from last 7 days   Lab Units 12/16/22  0415 12/15/22  1843 12/15/22  0534 12/14/22  1207 12/14/22  0352   WBC Thousand/uL 10 35* 13 22* 13 33*  --  17 85*   HEMOGLOBIN g/dL 8 0* 8 4* 8 4*   < > 8 8*   HEMATOCRIT % 25 0* 26 7* 26 6*   < > 26 9*   PLATELETS Thousands/uL 140* 132* 111*  --  116*   NEUTROS PCT % 73  --  77*  --  75   MONOS PCT % 7  --  8  --  7    < > = values in this interval not displayed        Results from last 7 days   Lab Units 12/16/22  0415 12/15/22  1843 12/14/22  0352 12/13/22  0459 12/13/22  0128 12/13/22  0046 12/13/22  0018 12/12/22  2343 12/12/22  2313 12/12/22  2025   POTASSIUM mmol/L 4 4 4 5 4 7 5 2 4 8  --   --   --    < > 5 1   CHLORIDE mmol/L 105 103 105 109* 112*  --   --   --    < > 103   CO2 mmol/L 31 30 31 28 28  --   --   --    < > 28   CO2, I-STAT mmol/L  --   --   --   --   --  27 27 27   < >  --    BUN mg/dL 21 26* 27* 27* 28*  --   --   --    < > 30*   CREATININE mg/dL 0 77 0 92 0 85 0 92 0 95  --   --   --    < > 1 34*   CALCIUM mg/dL 7 9* 8 1* 8 1* 8 3 7 7*  --   --   --    < > 7 3*   ALK PHOS U/L  --   --   --  71 69  --   --   --   --  75   ALT U/L  --   --   --  20 19  --   --   --   --  23   AST U/L  --   --   --  15 14  --   --   --   --  18   GLUCOSE, ISTAT mg/dl  --   --   --   --   --  175* 224* 200*  --   --     < > = values in this interval not displayed  Results from last 7 days   Lab Units 12/14/22  0352 12/13/22  0459 12/13/22  0128   MAGNESIUM mg/dL 2 3 2 3 2 3     Results from last 7 days   Lab Units 12/13/22  0459 12/13/22  0128   PHOSPHORUS mg/dL 4 5* 5 1*      Results from last 7 days   Lab Units 12/13/22  0459 12/12/22  2313 12/12/22 2025   INR  1 38* 1 25* 2 54*   PTT seconds  --   --  27     Results from last 7 days   Lab Units 12/13/22  0128   LACTIC ACID mmol/L 1 8           IMAGING & DIAGNOSTIC TESTING     Radiology Results: I have personally reviewed pertinent reports  XR chest 1 view portable    Result Date: 12/13/2022  Impression: Bibasilar atelectasis more likely than infiltrates  Workstation performed: [id number]     CTA dissection protocol chest/abdomen/pelvis    Result Date: 12/12/2022  Impression: No evidence of aortic aneurysm or dissection  Active contrast extravasation within the large intramuscular hematoma centered within the left rectus femoris muscle extending into the left lateral abdominal wall musculature and decompressing into the left space of Retzius as detailed above  The study was marked in [facility] for immediate notification  Workstation performed: [id number]     IR embolization (specify vessel or site)    Result Date: 12/13/2022  Impression: Impression: Left inferior epigastric artery angiogram showed active bleeding at a distal branch of the artery  Therefore, the artery was embolized using gelfoam and multiple Embold coils  Workstation performed: [id number]     Other Diagnostic Testing: I have personally reviewed pertinent reports      ACTIVE MEDICATIONS     Current Facility-Administered Medications   Medication Dose Route Frequency   • acetaminophen (TYLENOL) tablet 975 mg  975 mg Oral Q8H [facility]   • albuterol inhalation solution 2 5 mg  2 5 mg Nebulization Q4H PRN   • atorvastatin (LIPITOR) tablet 40 mg  40 mg Oral Daily With Dinner   • benzonatate (TESSALON PERLES) capsule 100 mg  100 mg Oral TID PRN   • dextromethorphan-guaiFENesin (ROBITUSSIN DM) oral syrup 10 mL  10 mL Oral BID   • fluticasone-vilanterol 200-25 mcg/actuation 1 puff  1 puff Inhalation Daily   • hydrocodone-chlorpheniramine polistirex (TUSSIONEX) ER suspension 5 mL  5 mL Oral Q12H PRN   • HYDROmorphone HCl (DILAUDID) injection 0 2 mg  0 2 mg Intravenous Q4H PRN   • ipratropium (ATROVENT) 0 02 % inhalation solution 0 5 mg  0 5 mg Nebulization TID   • levalbuterol (XOPENEX) inhalation solution 1 25 mg  1 25 mg Nebulization TID   • levothyroxine tablet 88 mcg  88 mcg Oral Early Morning   • lidocaine (LIDODERM) 5 % patch 1 patch  1 patch Topical Daily   • metoprolol tartrate (LOPRESSOR) partial tablet 12 5 mg  12 5 mg Oral Q12H JUAN PABLO   • ondansetron (ZOFRAN) injection 4 mg  4 mg Intravenous Q4H PRN   • oxyCODONE (ROXICODONE) IR tablet 2 5 mg  2 5 mg Oral Q4H PRN   • oxyCODONE (ROXICODONE) IR tablet 5 mg  5 mg Oral Q4H PRN   • senna-docusate sodium (SENOKOT S) 8 6-50 mg per tablet 1 tablet  1 tablet Oral HS   • venlafaxine (EFFEXOR-XR) 24 hr capsule 150 mg  150 mg Oral Daily       VTE Pharmacologic Prophylaxis: Reason for no pharmacologic prophylaxis Holding warfarin in setting of hematoma  VTE Mechanical Prophylaxis: sequential compression device    Portions of the record may have been created with voice recognition software  Occasional wrong word or "sound a like" substitutions may have occurred due to the inherent limitations of voice recognition software    Read the chart carefully and recognize, using context, where substitutions have occurred   ==  Brain Duncan, [address] [facility]  Internal Medicine Residency PGY-2

## 2022-12-16 NOTE — CASE MANAGEMENT
Case Management Discharge Planning Note    Patient name Daquan Hidalgo  Location [facility] 424/[facility] 026-00 MRN [id number]  : 1940 Date 2022       Current Admission Date: 2022  Current Admission Diagnosis:Rectus sheath hematoma   Patient Active Problem List    Diagnosis Date Noted   • Hypoxia 12/15/2022   • Rectus sheath hematoma 2022   • Persistent dry cough 2022   • Bronchospasm with bronchitis, acute 2022   • Chest pain 2022   • Leukocytosis 2022   • Chest tightness 2022   • Diabetes ([address] ) 2022   • Obesity 2022   • Bilateral flank pain 2022   • Hemarthrosis involving knee joint, right 10/01/2020   • Ambulatory dysfunction 10/01/2020   • Paroxysmal atrial fibrillation ([address] ) 2020   • Acute respiratory failure with hypoxia ([address] ) 2020   • Hypothyroidism 2020   • Hypertension 2020   • Hyperlipidemia 2020   • Major depressive disorder, single episode, mild ([address] ) 2019   • Osteoarthritis of knee 2017      LOS (days): 3  Geometric Mean LOS (GMLOS) (days): 7 70  Days to GMLOS:4     OBJECTIVE:  Risk of Unplanned Readmission Score: 16 85         Current admission status: Inpatient   Preferred Pharmacy:   [facility] DR DYLAN Cameron [address] [place] [address]  [address]  Phone: [phone] Fax: [phone]    Primary Care Provider: Blanca Padgett DO    Primary Insurance: Jail[facility]  Secondary Insurance:     DISCHARGE DETAILS:    Discharge planning discussed with[de-identified] Patient and daughter Yolanda Santos (present via iPad) at [address]         Is the patient interested in [address] at discharge?: Yes  [address] requested[de-identified] Physical Therapy, [address] Name[de-identified] [address] Provider[de-identified] PCP  [address] Needed[de-identified] Strengthening/Theraputic Exercises to Improve Function, Gait/ADL Training, Evaluate Functional Status and Safety, Oxygen Via Nasal Cannula  Oxygen LPM Ordered (if applicable based on home health services needed):: 2 LPM  Homebound Criteria Met[de-identified] Requires the Assistance of Another Person for Safe Ambulation or to Leave the Home, Uses an Assist Device (i e  cane, walker, etc)  Supporting Clincal Findings[de-identified] Requires Oxygen, Limited Endurance    DME Referral Provided  Referral made for DME?: Yes  DME referral completed for the following items[de-identified] Portable Oxygen concentrator  DME Supplier Name[de-identified] [employer] Fredo Sharma given at bedside)    Other Referral/Resources/Interventions Provided:  Interventions: HHC  Referral Comments: CM spoke with patient at bedside (with daughter Elvin Carbajal present via iPad, p: [phone]) to discuss home O2 requirements -- Both in agreement with CM placing order via [place]  Pt requires 2 L/min O2, therefore pt is eligible for the portable [place] -- CM placed order via [employer] and delivered Rosalino at bedside (SN# [id number])  Copay is $23 50  [employer] contact information added to AVS -- Daughter will follow up to coordinate a more permanent set up  CM reviewed delivery ticket information w/ daughter Elvin Constayden via phone as pt would prefer not to sign the document without her daughter's involvement -- Copy given to patient at beside  Bedside RN updated  Patient and daughter also in agreement with Manuela Nolasco recommendation in light of new home O2 requirement -- [facility] has accepted (PT/SN) and will facilitate with daughter re: SOC  AVS updated to reflect their contact information  Pt reports already owning a RW -- No further DME needs identified         Treatment Team Recommendation: Home with [address] [facility]  Discharge Destination Plan[de-identified] Home with [address] [facility]                               IMM Given (Date):: 12/16/22  IMM Given to[de-identified] Family

## 2022-12-16 NOTE — PLAN OF CARE
Problem: PHYSICAL THERAPY ADULT  Goal: Performs mobility at highest level of function for planned discharge setting  See evaluation for individualized goals  Description: Treatment/Interventions: Functional transfer training, LE strengthening/ROM, Therapeutic exercise, Endurance training, Patient/family training, Bed mobility, Gait training, Equipment eval/education, Spoke to nursing, OT          See flowsheet documentation for full assessment, interventions and recommendations  Outcome: Progressing  Note: Prognosis: Fair  Problem List: Decreased strength, Decreased endurance, Impaired balance, Decreased mobility, Pain  Assessment: Pt seen for session for setup, transfers, gait w/ rest time, repositioning  Pt cooperative but notes increased R LE pain today w/ gait  Had 2 episides of buckling w/ LOB due to same  Note improved endurance, and less SOB throughout  recommend home w/ home PT pending continued progress  Barriers to Discharge: Inaccessible home environment, Decreased caregiver support     PT Discharge Recommendation: Home with home health rehabilitation    See flowsheet documentation for full assessment

## 2022-12-17 LAB
BACTERIA BLD CULT: NORMAL
ERYTHROCYTE [DISTWIDTH] IN BLOOD BY AUTOMATED COUNT: 14.7 % (ref 11.6–15.1)
HCT VFR BLD AUTO: 26.5 % (ref 34.8–46.1)
HGB BLD-MCNC: 8.1 G/DL (ref 11.5–15.4)
MCH RBC QN AUTO: 29.9 PG (ref 26.8–34.3)
MCHC RBC AUTO-ENTMCNC: 30.6 G/DL (ref 31.4–37.4)
MCV RBC AUTO: 98 FL (ref 82–98)
PLATELET # BLD AUTO: 156 THOUSANDS/UL (ref 149–390)
PMV BLD AUTO: 11.1 FL (ref 8.9–12.7)
RBC # BLD AUTO: 2.71 MILLION/UL (ref 3.81–5.12)
WBC # BLD AUTO: 10.95 THOUSAND/UL (ref 4.31–10.16)

## 2022-12-17 RX ADMIN — ACETAMINOPHEN 975 MG: 325 TABLET, FILM COATED ORAL at 16:35

## 2022-12-17 RX ADMIN — IPRATROPIUM BROMIDE 0.5 MG: 0.5 SOLUTION RESPIRATORY (INHALATION) at 21:17

## 2022-12-17 RX ADMIN — LEVALBUTEROL HYDROCHLORIDE 1.25 MG: 1.25 SOLUTION, CONCENTRATE RESPIRATORY (INHALATION) at 21:17

## 2022-12-17 RX ADMIN — ATORVASTATIN CALCIUM 40 MG: 40 TABLET, FILM COATED ORAL at 16:35

## 2022-12-17 RX ADMIN — LEVALBUTEROL HYDROCHLORIDE 1.25 MG: 1.25 SOLUTION, CONCENTRATE RESPIRATORY (INHALATION) at 07:43

## 2022-12-17 RX ADMIN — ACETAMINOPHEN 975 MG: 325 TABLET, FILM COATED ORAL at 21:38

## 2022-12-17 RX ADMIN — Medication 12.5 MG: at 21:38

## 2022-12-17 RX ADMIN — IPRATROPIUM BROMIDE 0.5 MG: 0.5 SOLUTION RESPIRATORY (INHALATION) at 14:27

## 2022-12-17 RX ADMIN — GUAIFENESIN AND DEXTROMETHORPHAN 10 ML: 100; 10 SYRUP ORAL at 08:37

## 2022-12-17 RX ADMIN — VENLAFAXINE HYDROCHLORIDE 150 MG: 150 CAPSULE, EXTENDED RELEASE ORAL at 08:37

## 2022-12-17 RX ADMIN — ACETAMINOPHEN 975 MG: 325 TABLET, FILM COATED ORAL at 05:07

## 2022-12-17 RX ADMIN — LIDOCAINE 5% 1 PATCH: 700 PATCH TOPICAL at 08:36

## 2022-12-17 RX ADMIN — Medication 12.5 MG: at 08:37

## 2022-12-17 RX ADMIN — GUAIFENESIN AND DEXTROMETHORPHAN 10 ML: 100; 10 SYRUP ORAL at 21:38

## 2022-12-17 RX ADMIN — LEVALBUTEROL HYDROCHLORIDE 1.25 MG: 1.25 SOLUTION, CONCENTRATE RESPIRATORY (INHALATION) at 14:27

## 2022-12-17 RX ADMIN — LEVOTHYROXINE SODIUM 88 MCG: 88 TABLET ORAL at 05:08

## 2022-12-17 RX ADMIN — FLUTICASONE FUROATE AND VILANTEROL TRIFENATATE 1 PUFF: 200; 25 POWDER RESPIRATORY (INHALATION) at 08:36

## 2022-12-17 RX ADMIN — IPRATROPIUM BROMIDE 0.5 MG: 0.5 SOLUTION RESPIRATORY (INHALATION) at 07:43

## 2022-12-17 NOTE — PROGRESS NOTES
[address] [facility]  Progress Note Marcelina Green 1940, [de-identified] y o  female MRN: [id number]  Unit/Bed#: [facility] 424-01 Encounter: [id number]  Primary Care Provider: Felecia Wren DO   Date and time admitted to hospital: 12/12/2022 10:57 PM    Hypoxia  Assessment & Plan  Requiring supplemental O2, which is new to her  Maybe in setting deconditioning vs continued post-infectious symptoms  · Continue supplemental O2 as needed  · Ambulatory pulse ox/home O2 eval ordered - patient does qualify    Persistent dry cough  Assessment & Plan  Likely post infectious  · Pulmonology following, appreciate recs  · Continue Atrovent, Xopenex, PRN albuterol, PRN robitussin, PRN tessalon perles  · Continue Breo ellipta for bronchospasm  · Adding tussinex PRN as per Pulmonology recs  · Procal rechecked  - WNL  · Checked COVID/flu/RSV today as patient noted to be diaphoretic - negative    Hyperlipidemia  Assessment & Plan  · Continue home atorvastatin 40 mg    Hypertension  Assessment & Plan  Stable normotensive  · Continue PTA Lopressor 12 5 mg BID  · Have been holding lisinopril - continue to hold for now as pressures acceptable  · Continue to monitor BP and reinitiate lisinopril if needed    Hypothyroidism  Assessment & Plan  · Continue home levothyroxine 88 mcg qd    Paroxysmal atrial fibrillation (HCC)  Assessment & Plan  Rate controlled on metoprolol currently  · Continue Lopressor 12 5 mg BID  · Holding home warfarin at this time in setting of rectus sheath hematoma  · Will continue to hold warfarin on discharge and can be restarted as outpatient after discussion with PCP and Surgery    * Rectus sheath hematoma  Assessment & Plan  Thought to be secondary to persistent coughing in the setting of anticoagulation    · S/p reversal of warfarin with Kcentra, dDAVP and 1 unit blood transfusion  · S/p embolization of inferior epigastric artery with IR on 12/12  · Hold Warfarin (Afib)  · Will not restart Warfarin on discharge - can restart as outpatient after discussion with PCP and Surgery  · Appreciate Surgery recommendations   · Pain control with scheduled tylenol and PRN oxycodone, dilaudid  · Continue to monitor Hgb and transfuse for Hgb < 7  · Hemoglobin stable, will continue monitor for another 24 hours and if hemoglobin continues to be stable will discharge        VTE Pharmacologic Prophylaxis: VTE Score: 6 Low Risk (Score 0-2) - Encourage Ambulation  Patient Centered Rounds: I performed bedside rounds with nursing staff today  Discussions with Specialists or Other Care Team Provider: n/a    Education and Discussions with Family / Patient: Patient declined call to   Time Spent for Care: 30 minutes  More than 50% of total time spent on counseling and coordination of care as described above  Current Length of Stay: 4 day(s)  Current Patient Status: Inpatient   Certification Statement: The patient will continue to require additional inpatient hospital stay due to Monitoring hemoglobin in the setting of hematoma  Discharge Plan: Anticipate discharge tomorrow to home with home services  Code Status: Level 1 - Full Code    Subjective:   Patient denies any acute complaints apart from ongoing cough    Objective:     Vitals:   Temp (24hrs), Av °F (36 7 °C), Min:97 7 °F (36 5 °C), Max:98 3 °F (36 8 °C)    Temp:  [97 7 °F (36 5 °C)-98 3 °F (36 8 °C)] 98 1 °F (36 7 °C)  HR:  [75-95] 78  Resp:  [15-20] 20  BP: (113-126)/(59-72) 119/60  SpO2:  [96 %-98 %] 98 %  Body mass index is 36 25 kg/m²  Input and Output Summary (last 24 hours): Intake/Output Summary (Last 24 hours) at 2022 1532  Last data filed at 2022 1155  Gross per 24 hour   Intake 476 ml   Output 1375 ml   Net -899 ml       Physical Exam:   Physical Exam  Vitals and nursing note reviewed  Constitutional:       General: She is not in acute distress  Appearance: She is well-developed   She is not ill-appearing, toxic-appearing or diaphoretic  HENT:      Head: Normocephalic and atraumatic  Eyes:      General: No scleral icterus  Conjunctiva/sclera: Conjunctivae normal    Cardiovascular:      Rate and Rhythm: Normal rate and regular rhythm  Heart sounds: No murmur heard  No friction rub  No gallop  Pulmonary:      Effort: Pulmonary effort is normal  No respiratory distress  Breath sounds: No stridor  No wheezing, rhonchi or rales  Comments: 2 L nasal cannula with decreased breath sounds  Chest:      Chest wall: No tenderness  Abdominal:      General: There is no distension  Palpations: Abdomen is soft  There is no mass  Tenderness: There is no abdominal tenderness  There is no guarding or rebound  Hernia: No hernia is present  Musculoskeletal:         General: No swelling, tenderness, deformity or signs of injury  Cervical back: Neck supple  Comments: Bruising over left flank   Skin:     General: Skin is warm and dry  Capillary Refill: Capillary refill takes less than 2 seconds  Coloration: Skin is not jaundiced or pale  Neurological:      Mental Status: She is alert and oriented to person, place, and time  Psychiatric:         Mood and Affect: Mood normal           Additional Data:     Labs:  Results from last 7 days   Lab Units 12/17/22  0513 12/16/22 0415 12/13/22 0459 12/13/22  0128   WBC Thousand/uL 10 95* 10 35*   < > 31 58*   HEMOGLOBIN g/dL 8 1* 8 0*   < > 10 7*   HEMATOCRIT % 26 5* 25 0*   < > 32 6*   PLATELETS Thousands/uL 156 140*   < > 144*   BANDS PCT %  --   --   --  1   NEUTROS PCT %  --  73   < >  --    LYMPHS PCT %  --  18   < >  --    LYMPHO PCT %  --   --   --  6*   MONOS PCT %  --  7   < >  --    MONO PCT %  --   --   --  4   EOS PCT %  --  1   < > 0    < > = values in this interval not displayed       Results from last 7 days   Lab Units 12/16/22 0415 12/14/22  0352 12/13/22 0459   SODIUM mmol/L 139   < > 139 POTASSIUM mmol/L 4 4   < > 5 2   CHLORIDE mmol/L 105   < > 109*   CO2 mmol/L 31   < > 28   BUN mg/dL 21   < > 27*   CREATININE mg/dL 0 77   < > 0 92   ANION GAP mmol/L 3*   < > 2*   CALCIUM mg/dL 7 9*   < > 8 3   ALBUMIN g/dL  --   --  2 7*   TOTAL BILIRUBIN mg/dL  --   --  0 44   ALK PHOS U/L  --   --  71   ALT U/L  --   --  20   AST U/L  --   --  15   GLUCOSE RANDOM mg/dL 116   < > 129    < > = values in this interval not displayed  Results from last 7 days   Lab Units 12/13/22  0459   INR  1 38*     Results from last 7 days   Lab Units 12/12/22 2014   POC GLUCOSE mg/dl 274*         Results from last 7 days   Lab Units 12/15/22  1843 12/13/22  0128 12/12/22  2313 12/12/22 2025   LACTIC ACID mmol/L  --  1 8 2 9* 3 7*   PROCALCITONIN ng/ml 0 08  --   --   --        Lines/Drains:  Invasive Devices     Peripheral Intravenous Line  Duration           Peripheral IV 12/15/22 Left;Ventral (anterior) Wrist 1 day                      Imaging: No pertinent imaging reviewed  Recent Cultures (last 7 days):   Results from last 7 days   Lab Units 12/12/22 2025   BLOOD CULTURE  No Growth After 4 Days    Staphylococcus coagulase negative*   GRAM STAIN RESULT  Gram positive cocci in clusters*       Last 24 Hours Medication List:   Current Facility-Administered Medications   Medication Dose Route Frequency Provider Last Rate   • acetaminophen  975 mg Oral [facility] JUAN Saldaña     • albuterol  2 5 mg Nebulization Q4H PRN JUAN Saldaña     • atorvastatin  40 mg Oral Daily With JUAN Pereira     • benzonatate  100 mg Oral TID PRN JUAN Saldaña     • dextromethorphan-guaiFENesin  10 mL Oral BID Jose Ramon Ralph MD     • fluticasone-vilanterol  1 puff Inhalation Daily JUAN Saldaña     • hydrocodone-chlorpheniramine polistirex  5 mL Oral Q12H PRN Jose Ramon Ralph MD     • HYDROmorphone  0 2 mg Intravenous Q4H PRN JUAN Saldaña     • ipratropium  0 5 mg Nebulization TID Kev Gain, CRNP     • levalbuterol  1 25 mg Nebulization TID Kev Gain, CRNP     • levothyroxine  88 mcg Oral Early Morning Kev Gain, CRNP     • lidocaine  1 patch Topical Daily Belinda Wheeler MD     • metoprolol tartrate  12 5 mg Oral Q12H [facility] Kev Gain, CRNP     • ondansetron  4 mg Intravenous Q4H PRN Kev Gain, CRNP     • oxyCODONE  2 5 mg Oral Q4H PRN Kev Gain, CRNP     • oxyCODONE  5 mg Oral Q4H PRN Kev Gain, CRNP     • senna-docusate sodium  1 tablet Oral HS Kev Gain, CRNP     • venlafaxine  150 mg Oral Daily Kev Gain, CRNP          Today, Patient Was Seen By: Breanna Dickson DO    **Please Note: This note may have been constructed using a voice recognition system  **

## 2022-12-18 ENCOUNTER — APPOINTMENT (INPATIENT)
Dept: RADIOLOGY | Facility: HOSPITAL | Age: 82
End: 2022-12-18

## 2022-12-18 LAB
ANION GAP SERPL CALCULATED.3IONS-SCNC: 4 MMOL/L (ref 4–13)
BASOPHILS # BLD AUTO: 0.02 THOUSANDS/ÂΜL (ref 0–0.1)
BASOPHILS NFR BLD AUTO: 0 % (ref 0–1)
BUN SERPL-MCNC: 13 MG/DL (ref 5–25)
CALCIUM SERPL-MCNC: 7.9 MG/DL (ref 8.3–10.1)
CHLORIDE SERPL-SCNC: 109 MMOL/L (ref 96–108)
CO2 SERPL-SCNC: 26 MMOL/L (ref 21–32)
CREAT SERPL-MCNC: 0.67 MG/DL (ref 0.6–1.3)
EOSINOPHIL # BLD AUTO: 0.24 THOUSAND/ÂΜL (ref 0–0.61)
EOSINOPHIL NFR BLD AUTO: 3 % (ref 0–6)
ERYTHROCYTE [DISTWIDTH] IN BLOOD BY AUTOMATED COUNT: 15 % (ref 11.6–15.1)
GFR SERPL CREATININE-BSD FRML MDRD: 83 ML/MIN/1.73SQ M
GLUCOSE SERPL-MCNC: 102 MG/DL (ref 65–140)
HCT VFR BLD AUTO: 27.7 % (ref 34.8–46.1)
HGB BLD-MCNC: 8.7 G/DL (ref 11.5–15.4)
IMM GRANULOCYTES # BLD AUTO: 0.08 THOUSAND/UL (ref 0–0.2)
IMM GRANULOCYTES NFR BLD AUTO: 1 % (ref 0–2)
INR PPP: 1.01 (ref 0.84–1.19)
LYMPHOCYTES # BLD AUTO: 1.54 THOUSANDS/ÂΜL (ref 0.6–4.47)
LYMPHOCYTES NFR BLD AUTO: 16 % (ref 14–44)
MCH RBC QN AUTO: 30.4 PG (ref 26.8–34.3)
MCHC RBC AUTO-ENTMCNC: 31.4 G/DL (ref 31.4–37.4)
MCV RBC AUTO: 97 FL (ref 82–98)
MONOCYTES # BLD AUTO: 0.8 THOUSAND/ÂΜL (ref 0.17–1.22)
MONOCYTES NFR BLD AUTO: 8 % (ref 4–12)
NEUTROPHILS # BLD AUTO: 6.98 THOUSANDS/ÂΜL (ref 1.85–7.62)
NEUTS SEG NFR BLD AUTO: 72 % (ref 43–75)
NRBC BLD AUTO-RTO: 0 /100 WBCS
PLATELET # BLD AUTO: 202 THOUSANDS/UL (ref 149–390)
PMV BLD AUTO: 10.2 FL (ref 8.9–12.7)
POTASSIUM SERPL-SCNC: 4.5 MMOL/L (ref 3.5–5.3)
PROTHROMBIN TIME: 13.5 SECONDS (ref 11.6–14.5)
RBC # BLD AUTO: 2.86 MILLION/UL (ref 3.81–5.12)
SODIUM SERPL-SCNC: 139 MMOL/L (ref 135–147)
WBC # BLD AUTO: 9.66 THOUSAND/UL (ref 4.31–10.16)

## 2022-12-18 RX ORDER — WARFARIN SODIUM 7.5 MG/1
7.5 TABLET ORAL
Status: DISCONTINUED | OUTPATIENT
Start: 2022-12-18 | End: 2022-12-23

## 2022-12-18 RX ORDER — MAGNESIUM HYDROXIDE/ALUMINUM HYDROXICE/SIMETHICONE 120; 1200; 1200 MG/30ML; MG/30ML; MG/30ML
30 SUSPENSION ORAL EVERY 4 HOURS PRN
Status: DISCONTINUED | OUTPATIENT
Start: 2022-12-18 | End: 2022-12-28 | Stop reason: HOSPADM

## 2022-12-18 RX ADMIN — IPRATROPIUM BROMIDE 0.5 MG: 0.5 SOLUTION RESPIRATORY (INHALATION) at 14:26

## 2022-12-18 RX ADMIN — Medication 12.5 MG: at 22:06

## 2022-12-18 RX ADMIN — LEVALBUTEROL HYDROCHLORIDE 1.25 MG: 1.25 SOLUTION, CONCENTRATE RESPIRATORY (INHALATION) at 14:26

## 2022-12-18 RX ADMIN — ATORVASTATIN CALCIUM 40 MG: 40 TABLET, FILM COATED ORAL at 17:12

## 2022-12-18 RX ADMIN — VENLAFAXINE HYDROCHLORIDE 150 MG: 150 CAPSULE, EXTENDED RELEASE ORAL at 08:45

## 2022-12-18 RX ADMIN — Medication 12.5 MG: at 08:44

## 2022-12-18 RX ADMIN — ACETAMINOPHEN 975 MG: 325 TABLET, FILM COATED ORAL at 05:47

## 2022-12-18 RX ADMIN — LEVOTHYROXINE SODIUM 88 MCG: 88 TABLET ORAL at 05:47

## 2022-12-18 RX ADMIN — IPRATROPIUM BROMIDE 0.5 MG: 0.5 SOLUTION RESPIRATORY (INHALATION) at 19:53

## 2022-12-18 RX ADMIN — GUAIFENESIN AND DEXTROMETHORPHAN 10 ML: 100; 10 SYRUP ORAL at 08:44

## 2022-12-18 RX ADMIN — ACETAMINOPHEN 975 MG: 325 TABLET, FILM COATED ORAL at 22:06

## 2022-12-18 RX ADMIN — FLUTICASONE FUROATE AND VILANTEROL TRIFENATATE 1 PUFF: 200; 25 POWDER RESPIRATORY (INHALATION) at 08:45

## 2022-12-18 RX ADMIN — LIDOCAINE 5% 1 PATCH: 700 PATCH TOPICAL at 08:45

## 2022-12-18 RX ADMIN — GUAIFENESIN AND DEXTROMETHORPHAN 10 ML: 100; 10 SYRUP ORAL at 22:06

## 2022-12-18 RX ADMIN — WARFARIN SODIUM 7.5 MG: 7.5 TABLET ORAL at 17:12

## 2022-12-18 RX ADMIN — LEVALBUTEROL HYDROCHLORIDE 1.25 MG: 1.25 SOLUTION, CONCENTRATE RESPIRATORY (INHALATION) at 19:53

## 2022-12-18 RX ADMIN — ACETAMINOPHEN 975 MG: 325 TABLET, FILM COATED ORAL at 13:04

## 2022-12-18 NOTE — ASSESSMENT & PLAN NOTE
Thought to be secondary to persistent coughing in the setting of anticoagulation    · S/p reversal of warfarin with Kcentra, dDAVP and 1 unit blood transfusion  · S/p embolization of inferior epigastric artery with IR on 12/12  · Appreciate Surgery recommendations   · Pain control with scheduled tylenol and PRN oxycodone, dilaudid  · Continue to monitor Hgb and transfuse for Hgb < 7  · Hemoglobin stable, resuming warfarin today

## 2022-12-18 NOTE — PROGRESS NOTES
[address] [facility]  Progress Note Jeremy Koo 1940, [de-identified] y o  female MRN: [id number]  Unit/Bed#: [facility] 424-01 Encounter: [id number]  Primary Care Provider: Janell Barakat DO   Date and time admitted to hospital: 12/12/2022 10:57 PM    Hypoxia  Assessment & Plan  Requiring supplemental O2, which is new to her  Maybe in setting deconditioning vs continued post-infectious symptoms  · Continue supplemental O2 as needed  · Ambulatory pulse ox/home O2 eval ordered - patient does qualify    Persistent dry cough  Assessment & Plan  Likely post infectious  · Pulmonology following, appreciate recs  · Continue Atrovent, Xopenex, PRN albuterol, PRN robitussin, PRN tessalon perles  · Continue Breo ellipta for bronchospasm  · Adding tussinex PRN as per Pulmonology recs  ·     Hyperlipidemia  Assessment & Plan  · Continue home atorvastatin 40 mg    Hypertension  Assessment & Plan  Stable normotensive  · Continue PTA Lopressor 12 5 mg BID  · Have been holding lisinopril - continue to hold for now as pressures acceptable  · Continue to monitor BP and reinitiate lisinopril if needed    Hypothyroidism  Assessment & Plan  · Continue home levothyroxine 88 mcg qd    Paroxysmal atrial fibrillation (HCC)  Assessment & Plan  Rate controlled on metoprolol currently  · Continue Lopressor 12 5 mg BID  · Discussed with family risks and benefits of holding warfarin vs resuming  Plan to resume warfarin, monitor inr, once therapeutic-reimage to confirm stability of hematoma and discharge with outpatient lab work to follow  * Rectus sheath hematoma  Assessment & Plan  Thought to be secondary to persistent coughing in the setting of anticoagulation    · S/p reversal of warfarin with Kcentra, dDAVP and 1 unit blood transfusion  · S/p embolization of inferior epigastric artery with IR on 12/12  · Appreciate Surgery recommendations   · Pain control with scheduled tylenol and PRN oxycodone, dilaudid  · Continue to monitor Hgb and transfuse for Hgb < 7  · Hemoglobin stable, resuming warfarin today          VTE Pharmacologic Prophylaxis: VTE Score: 6 High Risk (Score >/= 5) - Pharmacological DVT Prophylaxis Ordered: warfarin (Coumadin)  Sequential Compression Devices Ordered  Patient Centered Rounds: I performed bedside rounds with nursing staff today  Discussions with Specialists or Other Care Team Provider: n/a    Education and Discussions with Family / Patient: Updated  (daughter) at bedside  Time Spent for Care: 30 minutes  More than 50% of total time spent on counseling and coordination of care as described above  Current Length of Stay: 5 day(s)  Current Patient Status: Inpatient   Certification Statement: The patient will continue to require additional inpatient hospital stay due to resuming warfarin monitoring for bleeding  Discharge Plan: Anticipate discharge in 48-72 hrs to home with home services  Code Status: Level 1 - Full Code    Subjective:   Patient denies any acute complaints    Objective:     Vitals:   Temp (24hrs), Av °F (36 7 °C), Min:97 7 °F (36 5 °C), Max:98 3 °F (36 8 °C)    Temp:  [97 7 °F (36 5 °C)-98 3 °F (36 8 °C)] 98 °F (36 7 °C)  HR:  [78-95] 79  Resp:  [17-18] 17  BP: (109-146)/(57-74) 109/57  SpO2:  [94 %-98 %] 94 %  Body mass index is 36 32 kg/m²  Input and Output Summary (last 24 hours): Intake/Output Summary (Last 24 hours) at 2022 1627  Last data filed at 2022 1228  Gross per 24 hour   Intake 598 ml   Output 750 ml   Net -152 ml       Physical Exam:   Physical Exam  Vitals and nursing note reviewed  Constitutional:       General: She is not in acute distress  Appearance: She is well-developed  She is not ill-appearing, toxic-appearing or diaphoretic  HENT:      Head: Normocephalic and atraumatic  Eyes:      General: No scleral icterus       Conjunctiva/sclera: Conjunctivae normal    Cardiovascular:      Rate and Rhythm: Normal rate and regular rhythm  Heart sounds: No murmur heard  No friction rub  No gallop  Pulmonary:      Effort: Pulmonary effort is normal  No respiratory distress  Breath sounds: No stridor  No wheezing, rhonchi or rales  Comments: 2 L nasal cannula with decreased breath sounds  Chest:      Chest wall: No tenderness  Abdominal:      General: There is no distension  Palpations: Abdomen is soft  There is no mass  Tenderness: There is no abdominal tenderness  There is no guarding or rebound  Hernia: No hernia is present  Musculoskeletal:         General: No swelling, tenderness, deformity or signs of injury  Cervical back: Neck supple  Comments: Bruising over left flank   Skin:     General: Skin is warm and dry  Capillary Refill: Capillary refill takes less than 2 seconds  Coloration: Skin is not jaundiced or pale  Neurological:      Mental Status: She is alert and oriented to person, place, and time  Psychiatric:         Mood and Affect: Mood normal           Additional Data:     Labs:  Results from last 7 days   Lab Units 12/18/22  0513 12/13/22  0459 12/13/22  0128   WBC Thousand/uL 9 66   < > 31 58*   HEMOGLOBIN g/dL 8 7*   < > 10 7*   HEMATOCRIT % 27 7*   < > 32 6*   PLATELETS Thousands/uL 202   < > 144*   BANDS PCT %  --   --  1   NEUTROS PCT % 72   < >  --    LYMPHS PCT % 16   < >  --    LYMPHO PCT %  --   --  6*   MONOS PCT % 8   < >  --    MONO PCT %  --   --  4   EOS PCT % 3   < > 0    < > = values in this interval not displayed       Results from last 7 days   Lab Units 12/18/22  0513 12/14/22  0352 12/13/22  0459   SODIUM mmol/L 139   < > 139   POTASSIUM mmol/L 4 5   < > 5 2   CHLORIDE mmol/L 109*   < > 109*   CO2 mmol/L 26   < > 28   BUN mg/dL 13   < > 27*   CREATININE mg/dL 0 67   < > 0 92   ANION GAP mmol/L 4   < > 2*   CALCIUM mg/dL 7 9*   < > 8 3   ALBUMIN g/dL  --   --  2 7*   TOTAL BILIRUBIN mg/dL  --   --  0 44   ALK PHOS U/L  --   --  71   ALT U/L  --   --  20   AST U/L  --   --  15   GLUCOSE RANDOM mg/dL 102   < > 129    < > = values in this interval not displayed  Results from last 7 days   Lab Units 12/18/22  1524   INR  1 01     Results from last 7 days   Lab Units 12/12/22 2014   POC GLUCOSE mg/dl 274*         Results from last 7 days   Lab Units 12/15/22  1843 12/13/22  0128 12/12/22  2313 12/12/22 2025   LACTIC ACID mmol/L  --  1 8 2 9* 3 7*   PROCALCITONIN ng/ml 0 08  --   --   --        Lines/Drains:  Invasive Devices     Peripheral Intravenous Line  Duration           Peripheral IV 12/15/22 Left;Ventral (anterior) Wrist 2 days                      Imaging: No pertinent imaging reviewed  Recent Cultures (last 7 days):   Results from last 7 days   Lab Units 12/12/22 2025   BLOOD CULTURE  No Growth After 5 Days    Staphylococcus coagulase negative*   GRAM STAIN RESULT  Gram positive cocci in clusters*       Last 24 Hours Medication List:   Current Facility-Administered Medications   Medication Dose Route Frequency Provider Last Rate   • acetaminophen  975 mg Oral [facility] Phil Roseline, CRNP     • albuterol  2 5 mg Nebulization Q4H PRN Phil Dukes CRNP     • atorvastatin  40 mg Oral Daily With JUAN Pereira     • benzonatate  100 mg Oral TID PRN Phil Dukes CRNP     • dextromethorphan-guaiFENesin  10 mL Oral BID Eric Rivera MD     • fluticasone-vilanterol  1 puff Inhalation Daily ALEX PhamNP     • hydrocodone-chlorpheniramine polistirex  5 mL Oral Q12H PRN Eric Rivera MD     • HYDROmorphone  0 2 mg Intravenous Q4H PRN Phil Dukes CRNP     • ipratropium  0 5 mg Nebulization TID Phil Dukes CRSTEFANIE     • levalbuterol  1 25 mg Nebulization TID Phil Dukes CRNP     • levothyroxine  88 mcg Oral Early Morning Phil Beat, CRNP     • lidocaine  1 patch Topical Daily Eric Rivera MD     • metoprolol tartrate  12 5 mg Oral Q12H [address] [facility] JUAN Bhatia     • ondansetron  4 mg Intravenous Q4H PRN Ayesha Lints, CRNP     • oxyCODONE  2 5 mg Oral Q4H PRN Ayesha Lints, CRNP     • oxyCODONE  5 mg Oral Q4H PRN Ayesha Lints, CRNP     • senna-docusate sodium  1 tablet Oral HS Ayesha Lints, CRNP     • venlafaxine  150 mg Oral Daily Ayesha Lints, CRNP     • warfarin  7 5 mg Oral Daily (warfarin) James Darden DO          Today, Patient Was Seen By: James Darden DO    **Please Note: This note may have been constructed using a voice recognition system  **

## 2022-12-18 NOTE — ASSESSMENT & PLAN NOTE
Likely post infectious    · Pulmonology following, appreciate recs  · Continue Atrovent, Xopenex, PRN albuterol, PRN robitussin, PRN tessalon perles  · Continue Breo ellipta for bronchospasm  · Adding tussinex PRN as per Pulmonology recs  ·

## 2022-12-18 NOTE — ASSESSMENT & PLAN NOTE
Rate controlled on metoprolol currently  · Continue Lopressor 12 5 mg BID  · Discussed with family risks and benefits of holding warfarin vs resuming  Plan to resume warfarin, monitor inr, once therapeutic-reimage to confirm stability of hematoma and discharge with outpatient lab work to follow

## 2022-12-19 LAB
ERYTHROCYTE [DISTWIDTH] IN BLOOD BY AUTOMATED COUNT: 15.3 % (ref 11.6–15.1)
HCT VFR BLD AUTO: 28 % (ref 34.8–46.1)
HGB BLD-MCNC: 8.6 G/DL (ref 11.5–15.4)
MCH RBC QN AUTO: 29.9 PG (ref 26.8–34.3)
MCHC RBC AUTO-ENTMCNC: 30.7 G/DL (ref 31.4–37.4)
MCV RBC AUTO: 97 FL (ref 82–98)
PLATELET # BLD AUTO: 232 THOUSANDS/UL (ref 149–390)
PMV BLD AUTO: 10.1 FL (ref 8.9–12.7)
RBC # BLD AUTO: 2.88 MILLION/UL (ref 3.81–5.12)
WBC # BLD AUTO: 8.66 THOUSAND/UL (ref 4.31–10.16)

## 2022-12-19 RX ADMIN — GUAIFENESIN AND DEXTROMETHORPHAN 10 ML: 100; 10 SYRUP ORAL at 09:24

## 2022-12-19 RX ADMIN — LEVALBUTEROL HYDROCHLORIDE 1.25 MG: 1.25 SOLUTION, CONCENTRATE RESPIRATORY (INHALATION) at 14:23

## 2022-12-19 RX ADMIN — ALUMINUM HYDROXIDE, MAGNESIUM HYDROXIDE, AND SIMETHICONE 30 ML: 200; 200; 20 SUSPENSION ORAL at 09:24

## 2022-12-19 RX ADMIN — LEVOTHYROXINE SODIUM 88 MCG: 88 TABLET ORAL at 05:41

## 2022-12-19 RX ADMIN — IPRATROPIUM BROMIDE 0.5 MG: 0.5 SOLUTION RESPIRATORY (INHALATION) at 08:12

## 2022-12-19 RX ADMIN — FLUTICASONE FUROATE AND VILANTEROL TRIFENATATE 1 PUFF: 200; 25 POWDER RESPIRATORY (INHALATION) at 09:24

## 2022-12-19 RX ADMIN — LEVALBUTEROL HYDROCHLORIDE 1.25 MG: 1.25 SOLUTION, CONCENTRATE RESPIRATORY (INHALATION) at 08:12

## 2022-12-19 RX ADMIN — ACETAMINOPHEN 975 MG: 325 TABLET, FILM COATED ORAL at 05:41

## 2022-12-19 RX ADMIN — IPRATROPIUM BROMIDE 0.5 MG: 0.5 SOLUTION RESPIRATORY (INHALATION) at 21:02

## 2022-12-19 RX ADMIN — WARFARIN SODIUM 7.5 MG: 7.5 TABLET ORAL at 17:13

## 2022-12-19 RX ADMIN — ACETAMINOPHEN 975 MG: 325 TABLET, FILM COATED ORAL at 22:25

## 2022-12-19 RX ADMIN — LIDOCAINE 5% 1 PATCH: 700 PATCH TOPICAL at 09:24

## 2022-12-19 RX ADMIN — Medication 12.5 MG: at 22:25

## 2022-12-19 RX ADMIN — GUAIFENESIN AND DEXTROMETHORPHAN 10 ML: 100; 10 SYRUP ORAL at 22:25

## 2022-12-19 RX ADMIN — IPRATROPIUM BROMIDE 0.5 MG: 0.5 SOLUTION RESPIRATORY (INHALATION) at 14:23

## 2022-12-19 RX ADMIN — ATORVASTATIN CALCIUM 40 MG: 40 TABLET, FILM COATED ORAL at 17:14

## 2022-12-19 RX ADMIN — VENLAFAXINE HYDROCHLORIDE 150 MG: 150 CAPSULE, EXTENDED RELEASE ORAL at 09:24

## 2022-12-19 RX ADMIN — LEVALBUTEROL HYDROCHLORIDE 1.25 MG: 1.25 SOLUTION, CONCENTRATE RESPIRATORY (INHALATION) at 21:02

## 2022-12-19 RX ADMIN — Medication 12.5 MG: at 09:24

## 2022-12-19 RX ADMIN — ACETAMINOPHEN 975 MG: 325 TABLET, FILM COATED ORAL at 13:24

## 2022-12-19 RX ADMIN — SENNOSIDES AND DOCUSATE SODIUM 1 TABLET: 8.6; 5 TABLET ORAL at 22:25

## 2022-12-19 NOTE — CASE MANAGEMENT
Case Management Progress Note    Patient name Geovanna Savage  Location [facility] 519-01 MRN [id number]  : 1940 Date 2022       LOS (days): 6  Geometric Mean LOS (GMLOS) (days): 7 70  Days to GMLOS:1 1        OBJECTIVE:        Current admission status: Inpatient  Preferred Pharmacy:   [facility] [address]  [place], [address] - [address]  [address] [facility] [address] [facility]  [address]  Phone: [phone] Fax: [phone]    Primary Care Provider: José Gutierrez DO    Primary Insurance: Madina DRAKE  Secondary Insurance:     PROGRESS NOTE:    Pt reviewed in care coordination rounds -- Pt is no longer medically stable for d/c as the team continues to trend hemoglobin for stability, as well as PT/INR starting tomorrow  Pt will likely be stable for d/c Thurs/Fri  CM cancelled home O2 order as pt's LOS is expected to be an additional >48hrs  Pt is on room-air, thus a repeat home O2 study should be done closer to discharge to determine accurate home O2 needs  Renetta Yeh from [facility] will collect the Monty Martins CM will continue to monitor

## 2022-12-19 NOTE — ASSESSMENT & PLAN NOTE
Rate controlled on metoprolol currently  · Continue Lopressor 12 5 mg BID  · Discussed with family risks and benefits of holding warfarin vs resuming  Plan to resume warfarin, monitor inr, once therapeutic, will reassess abdominal exam, if stable, patient will be okay for discharge    · INR today 1 57 , continuie coumadin and trend INR

## 2022-12-19 NOTE — ASSESSMENT & PLAN NOTE
Thought to be secondary to persistent coughing in the setting of anticoagulation  · S/p reversal of warfarin with Kcentra, dDAVP and 1 unit blood transfusion  · S/p embolization of inferior epigastric artery with IR on 12/12  · Hb stable between 8-10  · Appreciate Surgery recommendations   · Pain control with scheduled tylenol and PRN oxycodone, dilaudid  · Continue to monitor Hgb and transfuse for Hgb < 7  · Hemoglobin stable, resumed warfarin on 12/18  · INR remains subtherapeutic but trending up  · When INR therapeutic, will reassess abdominal exam, if stable, patient will be okay for discharge    · Add robaxin for additional pain coverage

## 2022-12-19 NOTE — ASSESSMENT & PLAN NOTE
Within acceptable range  · Continue PTA Lopressor 12 5 mg BID  · Have been holding lisinopril - continue to hold for now as pressures acceptable  · Continue to monitor BP and reinitiate lisinopril if needed

## 2022-12-19 NOTE — ASSESSMENT & PLAN NOTE
Requiring supplemental O2, which is new to her  Maybe in setting deconditioning vs continued post-infectious symptoms    · Continue supplemental O2 as needed  · Ambulatory pulse ox/home O2 eval ordered - patient does qualify  · Patient currently on room air

## 2022-12-19 NOTE — PROGRESS NOTES
INTERNAL MEDICINE PROGRESS NOTE     Name: Shelly Damon   Age & Sex: [de-identified] y o  female   MRN: [id number]  Unit/Bed#: [facility] 424-01   Encounter: [id number]  Team: [id number]    PATIENT INFORMATION     Name: Shelly Damon   Age & Sex: [de-identified] y o  female   MRN: [id number]  Hospital Stay Days: 6    ASSESSMENT/PLAN     Principal Problem:    Rectus sheath hematoma  Active Problems:    Paroxysmal atrial fibrillation (HCC)    Hypothyroidism    Hypertension    Hyperlipidemia    Persistent dry cough    Hypoxia      Hypoxia  Assessment & Plan  Requiring supplemental O2, which is new to her  Maybe in setting deconditioning vs continued post-infectious symptoms  · Continue supplemental O2 as needed  · Ambulatory pulse ox/home O2 eval ordered - patient does qualify    Persistent dry cough  Assessment & Plan  Likely post infectious  · Pulmonology following, appreciate recs  · Continue Atrovent, Xopenex, PRN albuterol, PRN robitussin, PRN tessalon perles  · Continue Breo ellipta for bronchospasm  · Adding tussinex PRN as per Pulmonology recs    Hyperlipidemia  Assessment & Plan  · Continue home atorvastatin 40 mg    Hypertension  Assessment & Plan  Stable normotensive  · Continue PTA Lopressor 12 5 mg BID  · Have been holding lisinopril - continue to hold for now as pressures acceptable  · Continue to monitor BP and reinitiate lisinopril if needed    Hypothyroidism  Assessment & Plan  · Continue home levothyroxine 88 mcg qd    Paroxysmal atrial fibrillation (HCC)  Assessment & Plan  Rate controlled on metoprolol currently  · Continue Lopressor 12 5 mg BID  · Discussed with family risks and benefits of holding warfarin vs resuming  Plan to resume warfarin, monitor inr, once therapeutic-reimage to confirm stability of hematoma and discharge with outpatient lab work to follow  * Rectus sheath hematoma  Assessment & Plan  Thought to be secondary to persistent coughing in the setting of anticoagulation    · S/p reversal of warfarin with Kcentra, dDAVP and 1 unit blood transfusion  · S/p embolization of inferior epigastric artery with IR on   · Appreciate Surgery recommendations   · Pain control with scheduled tylenol and PRN oxycodone, dilaudid  · Continue to monitor Hgb and transfuse for Hgb < 7  · Hemoglobin stable, resumed warfarin on   · Plan to repeat imaging when INR therapeutic to reassess stability of hematoma      SUBJECTIVE     Patient seen and examined  No acute events overnight  Patient reports doing well  Reports abdominal distension and discomfort but stable  Denies chest pain, SOB  Reports normal BM  Denies bloody stool, hematuria, or any other signs of bleeding  OBJECTIVE     Vitals:    22 0619 22 0813 22 0857 22 0900   BP: 158/79  124/57 124/57   Pulse: 83  88 86   Resp: 18      Temp: 98 °F (36 7 °C)      TempSrc:       SpO2: 94% 94% 92% 92%   Weight:       Height:          Temperature:   Temp (24hrs), Av 1 °F (36 7 °C), Min:98 °F (36 7 °C), Max:98 3 °F (36 8 °C)    Temperature: 98 °F (36 7 °C)  Intake & Output:  I/O        0701   0700  0701   0700  0701   0700    P  O  476 1200 300    Total Intake(mL/kg) 476 (4 8) 1200 (12 2) 300 (3 1)    Urine (mL/kg/hr) 1050 (0 4) 1400 (0 6) 300 (0 6)    Stool       Total Output 1050 1400 300    Net -574 -200 0               Weights:        Body mass index is 35 99 kg/m²  Weight (last 2 days)     Date/Time Weight    22 0600 98 1 (216 27)    22 0548 99 (218 26)    22 0600 98 8 (217 81)    22 0529 98 8 (217 81)        Physical Exam  Vitals and nursing note reviewed  Constitutional:       General: She is not in acute distress  Appearance: She is well-developed  She is obese  HENT:      Head: Normocephalic and atraumatic  Eyes:      Conjunctiva/sclera: Conjunctivae normal    Cardiovascular:      Rate and Rhythm: Normal rate and regular rhythm  Heart sounds: No murmur heard    Pulmonary: Effort: Pulmonary effort is normal  No respiratory distress  Breath sounds: Normal breath sounds  Abdominal:      General: There is no distension  Palpations: Abdomen is soft  Tenderness: There is no abdominal tenderness  Musculoskeletal:         General: No swelling  Cervical back: Neck supple  Skin:     General: Skin is warm and dry  Capillary Refill: Capillary refill takes less than 2 seconds  Findings: Bruising (left mid-lower back) present  Neurological:      Mental Status: She is alert  Psychiatric:         Mood and Affect: Mood normal        LABORATORY DATA     Labs: I have personally reviewed pertinent reports  Results from last 7 days   Lab Units 12/19/22  0542 12/18/22  0513 12/17/22  0513 12/16/22  0415 12/15/22  1843 12/15/22  0534   WBC Thousand/uL 8 66 9 66 10 95* 10 35*   < > 13 33*   HEMOGLOBIN g/dL 8 6* 8 7* 8 1* 8 0*   < > 8 4*   HEMATOCRIT % 28 0* 27 7* 26 5* 25 0*   < > 26 6*   PLATELETS Thousands/uL 232 202 156 140*   < > 111*   NEUTROS PCT %  --  72  --  73  --  77*   MONOS PCT %  --  8  --  7  --  8    < > = values in this interval not displayed        Results from last 7 days   Lab Units 12/18/22  0513 12/16/22  0415 12/15/22  1843 12/14/22  0352 12/13/22  0459 12/13/22  0128 12/13/22  0046 12/13/22  0018 12/12/22  2343 12/12/22  2313 12/12/22  2025   POTASSIUM mmol/L 4 5 4 4 4 5   < > 5 2 4 8  --   --   --    < > 5 1   CHLORIDE mmol/L 109* 105 103   < > 109* 112*  --   --   --    < > 103   CO2 mmol/L 26 31 30   < > 28 28  --   --   --    < > 28   CO2, I-STAT mmol/L  --   --   --   --   --   --  27 27 27   < >  --    BUN mg/dL 13 21 26*   < > 27* 28*  --   --   --    < > 30*   CREATININE mg/dL 0 67 0 77 0 92   < > 0 92 0 95  --   --   --    < > 1 34*   CALCIUM mg/dL 7 9* 7 9* 8 1*   < > 8 3 7 7*  --   --   --    < > 7 3*   ALK PHOS U/L  --   --   --   --  71 69  --   --   --   --  75   ALT U/L  --   --   --   --  20 19  --   --   --   --  23   AST U/L  --   --   --   --  15 14  --   --   --   --  18   GLUCOSE, ISTAT mg/dl  --   --   --   --   --   --  175* 224* 200*  --   --     < > = values in this interval not displayed  Results from last 7 days   Lab Units 12/14/22  0352 12/13/22  0459 12/13/22  0128   MAGNESIUM mg/dL 2 3 2 3 2 3     Results from last 7 days   Lab Units 12/13/22  0459 12/13/22  0128   PHOSPHORUS mg/dL 4 5* 5 1*      Results from last 7 days   Lab Units 12/18/22  1524 12/13/22  0459 12/12/22  2313 12/12/22 2025   INR  1 01 1 38* 1 25* 2 54*   PTT seconds  --   --   --  27     Results from last 7 days   Lab Units 12/13/22  0128   LACTIC ACID mmol/L 1 8           IMAGING & DIAGNOSTIC TESTING     Radiology Results: I have personally reviewed pertinent reports  XR chest 1 view portable    Result Date: 12/13/2022  Impression: Bibasilar atelectasis more likely than infiltrates  Workstation performed: [id number]     CTA dissection protocol chest/abdomen/pelvis    Result Date: 12/12/2022  Impression: No evidence of aortic aneurysm or dissection  Active contrast extravasation within the large intramuscular hematoma centered within the left rectus femoris muscle extending into the left lateral abdominal wall musculature and decompressing into the left space of Retzius as detailed above  The study was marked in [facility] for immediate notification  Workstation performed: [id number]     IR embolization (specify vessel or site)    Result Date: 12/13/2022  Impression: Impression: Left inferior epigastric artery angiogram showed active bleeding at a distal branch of the artery  Therefore, the artery was embolized using gelfoam and multiple Embold coils  Workstation performed: [id number]     Other Diagnostic Testing: I have personally reviewed pertinent reports      ACTIVE MEDICATIONS     Current Facility-Administered Medications   Medication Dose Route Frequency   • acetaminophen (TYLENOL) tablet 975 mg  975 mg Oral Q8H [address]   • albuterol inhalation solution 2 5 mg  2 5 mg Nebulization Q4H PRN   • aluminum-magnesium hydroxide-simethicone (MYLANTA) oral suspension 30 mL  30 mL Oral Q4H PRN   • atorvastatin (LIPITOR) tablet 40 mg  40 mg Oral Daily With Dinner   • benzonatate (TESSALON PERLES) capsule 100 mg  100 mg Oral TID PRN   • dextromethorphan-guaiFENesin (ROBITUSSIN DM) oral syrup 10 mL  10 mL Oral BID   • fluticasone-vilanterol 200-25 mcg/actuation 1 puff  1 puff Inhalation Daily   • hydrocodone-chlorpheniramine polistirex (TUSSIONEX) ER suspension 5 mL  5 mL Oral Q12H PRN   • HYDROmorphone HCl (DILAUDID) injection 0 2 mg  0 2 mg Intravenous Q4H PRN   • ipratropium (ATROVENT) 0 02 % inhalation solution 0 5 mg  0 5 mg Nebulization TID   • levalbuterol (XOPENEX) inhalation solution 1 25 mg  1 25 mg Nebulization TID   • levothyroxine tablet 88 mcg  88 mcg Oral Early Morning   • lidocaine (LIDODERM) 5 % patch 1 patch  1 patch Topical Daily   • metoprolol tartrate (LOPRESSOR) partial tablet 12 5 mg  12 5 mg Oral Q12H JUAN PABLO   • ondansetron (ZOFRAN) injection 4 mg  4 mg Intravenous Q4H PRN   • oxyCODONE (ROXICODONE) IR tablet 2 5 mg  2 5 mg Oral Q4H PRN   • oxyCODONE (ROXICODONE) IR tablet 5 mg  5 mg Oral Q4H PRN   • senna-docusate sodium (SENOKOT S) 8 6-50 mg per tablet 1 tablet  1 tablet Oral HS   • venlafaxine (EFFEXOR-XR) 24 hr capsule 150 mg  150 mg Oral Daily   • warfarin (COUMADIN) tablet 7 5 mg  7 5 mg Oral Daily (warfarin)       VTE Pharmacologic Prophylaxis: Warfarin  VTE Mechanical Prophylaxis: sequential compression device    Portions of the record may have been created with voice recognition software  Occasional wrong word or "sound a like" substitutions may have occurred due to the inherent limitations of voice recognition software    Read the chart carefully and recognize, using context, where substitutions have occurred   ==  Robbi Pan MD  [address] [facility]  Internal Medicine Residency PGY-2

## 2022-12-19 NOTE — ASSESSMENT & PLAN NOTE
Likely post infectious    · Pulmonology following, appreciate recs  · Continue Atrovent, Xopenex, PRN albuterol, PRN robitussin, PRN tessalon perles  · Continue Breo ellipta for bronchospasm  · Adding tussinex PRN as per Pulmonology recs

## 2022-12-20 LAB
BASOPHILS # BLD AUTO: 0.03 THOUSANDS/ÂΜL (ref 0–0.1)
BASOPHILS NFR BLD AUTO: 0 % (ref 0–1)
EOSINOPHIL # BLD AUTO: 0.22 THOUSAND/ÂΜL (ref 0–0.61)
EOSINOPHIL NFR BLD AUTO: 3 % (ref 0–6)
ERYTHROCYTE [DISTWIDTH] IN BLOOD BY AUTOMATED COUNT: 15.8 % (ref 11.6–15.1)
HCT VFR BLD AUTO: 28.6 % (ref 34.8–46.1)
HGB BLD-MCNC: 8.8 G/DL (ref 11.5–15.4)
IMM GRANULOCYTES # BLD AUTO: 0.08 THOUSAND/UL (ref 0–0.2)
IMM GRANULOCYTES NFR BLD AUTO: 1 % (ref 0–2)
INR PPP: 1.07 (ref 0.84–1.19)
LYMPHOCYTES # BLD AUTO: 1.39 THOUSANDS/ÂΜL (ref 0.6–4.47)
LYMPHOCYTES NFR BLD AUTO: 17 % (ref 14–44)
MCH RBC QN AUTO: 30.4 PG (ref 26.8–34.3)
MCHC RBC AUTO-ENTMCNC: 30.8 G/DL (ref 31.4–37.4)
MCV RBC AUTO: 99 FL (ref 82–98)
MONOCYTES # BLD AUTO: 0.71 THOUSAND/ÂΜL (ref 0.17–1.22)
MONOCYTES NFR BLD AUTO: 9 % (ref 4–12)
NEUTROPHILS # BLD AUTO: 5.62 THOUSANDS/ÂΜL (ref 1.85–7.62)
NEUTS SEG NFR BLD AUTO: 70 % (ref 43–75)
NRBC BLD AUTO-RTO: 0 /100 WBCS
PLATELET # BLD AUTO: 256 THOUSANDS/UL (ref 149–390)
PMV BLD AUTO: 9.9 FL (ref 8.9–12.7)
PROTHROMBIN TIME: 14.1 SECONDS (ref 11.6–14.5)
RBC # BLD AUTO: 2.89 MILLION/UL (ref 3.81–5.12)
WBC # BLD AUTO: 8.05 THOUSAND/UL (ref 4.31–10.16)

## 2022-12-20 RX ORDER — POLYETHYLENE GLYCOL 3350 17 G/17G
17 POWDER, FOR SOLUTION ORAL DAILY PRN
Status: DISCONTINUED | OUTPATIENT
Start: 2022-12-20 | End: 2022-12-24

## 2022-12-20 RX ADMIN — LEVOTHYROXINE SODIUM 88 MCG: 88 TABLET ORAL at 06:08

## 2022-12-20 RX ADMIN — GUAIFENESIN AND DEXTROMETHORPHAN 10 ML: 100; 10 SYRUP ORAL at 08:40

## 2022-12-20 RX ADMIN — LEVALBUTEROL HYDROCHLORIDE 1.25 MG: 1.25 SOLUTION, CONCENTRATE RESPIRATORY (INHALATION) at 20:46

## 2022-12-20 RX ADMIN — ACETAMINOPHEN 975 MG: 325 TABLET, FILM COATED ORAL at 22:06

## 2022-12-20 RX ADMIN — LEVALBUTEROL HYDROCHLORIDE 1.25 MG: 1.25 SOLUTION, CONCENTRATE RESPIRATORY (INHALATION) at 13:26

## 2022-12-20 RX ADMIN — ATORVASTATIN CALCIUM 40 MG: 40 TABLET, FILM COATED ORAL at 17:38

## 2022-12-20 RX ADMIN — ACETAMINOPHEN 975 MG: 325 TABLET, FILM COATED ORAL at 13:41

## 2022-12-20 RX ADMIN — ACETAMINOPHEN 975 MG: 325 TABLET, FILM COATED ORAL at 06:08

## 2022-12-20 RX ADMIN — IPRATROPIUM BROMIDE 0.5 MG: 0.5 SOLUTION RESPIRATORY (INHALATION) at 13:26

## 2022-12-20 RX ADMIN — GUAIFENESIN AND DEXTROMETHORPHAN 10 ML: 100; 10 SYRUP ORAL at 22:06

## 2022-12-20 RX ADMIN — SENNOSIDES AND DOCUSATE SODIUM 1 TABLET: 8.6; 5 TABLET ORAL at 22:06

## 2022-12-20 RX ADMIN — LIDOCAINE 5% 1 PATCH: 700 PATCH TOPICAL at 08:40

## 2022-12-20 RX ADMIN — IPRATROPIUM BROMIDE 0.5 MG: 0.5 SOLUTION RESPIRATORY (INHALATION) at 07:53

## 2022-12-20 RX ADMIN — LEVALBUTEROL HYDROCHLORIDE 1.25 MG: 1.25 SOLUTION, CONCENTRATE RESPIRATORY (INHALATION) at 07:53

## 2022-12-20 RX ADMIN — Medication 12.5 MG: at 22:06

## 2022-12-20 RX ADMIN — IPRATROPIUM BROMIDE 0.5 MG: 0.5 SOLUTION RESPIRATORY (INHALATION) at 20:46

## 2022-12-20 RX ADMIN — Medication 12.5 MG: at 08:40

## 2022-12-20 RX ADMIN — WARFARIN SODIUM 7.5 MG: 7.5 TABLET ORAL at 17:38

## 2022-12-20 NOTE — PLAN OF CARE
Problem: MOBILITY - ADULT  Goal: Maintain or return to baseline ADL function  Description: INTERVENTIONS:  -  Assess patient's ability to carry out ADLs; assess patient's baseline for ADL function and identify physical deficits which impact ability to perform ADLs (bathing, care of mouth/teeth, toileting, grooming, dressing, etc )  - Assess/evaluate cause of self-care deficits   - Assess range of motion  - Assess patient's mobility; develop plan if impaired  - Assess patient's need for assistive devices and provide as appropriate  - Encourage maximum independence but intervene and supervise when necessary  - Involve family in performance of ADLs  - Assess for home care needs following discharge   - Consider OT consult to assist with ADL evaluation and planning for discharge  - Provide patient education as appropriate  Outcome: Progressing  Goal: Maintains/Returns to pre admission functional level  Description: INTERVENTIONS:  - Perform BMAT or MOVE assessment daily    - Set and communicate daily mobility goal to care team and patient/family/caregiver  - Collaborate with rehabilitation services on mobility goals if consulted  - Perform Range of Motion 3 times a day  - Reposition patient every 2 hours    - Dangle patient 3 times a day  - Stand patient 3 times a day  - Ambulate patient 3 times a day  - Out of bed to chair 3 times a day   - Out of bed for meals 3 times a day  - Out of bed for toileting  - Record patient progress and toleration of activity level   Outcome: Progressing     Problem: Potential for Falls  Goal: Patient will remain free of falls  Description: INTERVENTIONS:  - Educate patient/family on patient safety including physical limitations  - Instruct patient to call for assistance with activity   - Consult OT/PT to assist with strengthening/mobility   - Keep Call bell within reach  - Keep bed low and locked with side rails adjusted as appropriate  - Keep care items and personal belongings within reach  - Initiate and maintain comfort rounds  - Make Fall Risk Sign visible to staff  - Offer Toileting every 2 Hours, in advance of need  - Initiate/Maintain on alarm  - Obtain necessary fall risk management equipment:  - Apply yellow socks and bracelet for high fall risk patients  - Consider moving patient to room near nurses station  Outcome: Progressing     Problem: Prexisting or High Potential for Compromised Skin Integrity  Goal: Skin integrity is maintained or improved  Description: INTERVENTIONS:  - Identify patients at risk for skin breakdown  - Assess and monitor skin integrity  - Assess and monitor nutrition and hydration status  - Monitor labs   - Assess for incontinence   - Turn and reposition patient  - Assist with mobility/ambulation  - Relieve pressure over bony prominences  - Avoid friction and shearing  - Provide appropriate hygiene as needed including keeping skin clean and dry  - Evaluate need for skin moisturizer/barrier cream  - Collaborate with interdisciplinary team   - Patient/family teaching  - Consider wound care consult   Outcome: Progressing     Problem: PAIN - ADULT  Goal: Verbalizes/displays adequate comfort level or baseline comfort level  Description: Interventions:  - Encourage patient to monitor pain and request assistance  - Assess pain using appropriate pain scale  - Administer analgesics based on type and severity of pain and evaluate response  - Implement non-pharmacological measures as appropriate and evaluate response  - Consider cultural and social influences on pain and pain management  - Notify physician/advanced practitioner if interventions unsuccessful or patient reports new pain  Outcome: Progressing

## 2022-12-20 NOTE — PHYSICAL THERAPY NOTE
Physical Therapy Progress Note     12/20/22 1130   PT Last Visit   PT Visit Date 12/20/22   Note Type   Note Type Treatment   Pain Assessment   Pain Score No Pain   Restrictions/Precautions   Other Precautions Fall Risk;Pain   Subjective   Subjective pt encountered supine in bed, pleasant and agreeable to treatment  Reports some abdominal pain & discomfort today, but reports this has been ongoing since prior to admit  Denies R knee pain today  No changes in status noted with activity  Bed Mobility   Supine to Sit 5  Supervision   Additional items Assist x 1; Increased time required   Transfers   Sit to Stand 5  Supervision   Additional items Assist x 1; Armrests; Increased time required   Stand to Sit 5  Supervision   Additional items Assist x 1; Armrests; Increased time required;Verbal cues  (instructions for chair approach with RW)   Ambulation/Elevation   Gait pattern Short stride; Foward flexed; Inconsistent maggie;Decreased foot clearance; Improper Weight shift   Gait Assistance 4  Minimal assist   Additional items Assist x 1   Assistive Device Rolling walker   Distance 110' x 2   Balance   Static Sitting Fair   Static Standing Fair -   Ambulatory Fair -   Activity Tolerance   Activity Tolerance Patient tolerated treatment well;Patient limited by fatigue   Nurse Yohana Celestin RN   Assessment   Prognosis Fair   Problem List Decreased strength;Decreased endurance; Impaired balance;Decreased mobility;Pain   Assessment Pt demonstrated improved functional endurance today, ambulating total community distances with only brief standing rest in between without complaints of significant fatigue or dyspnea  did so on RA with all vital signs appearing stble when assessed on [place] monitoring during session  No LOB or RLE instability noted & pt reports no changes in status with activity  Min A maintained during ambulation for guarding, but additional assist was not required today    Pt is appropriate for formal stair trial next session to assess ability to enter/negotiate home at d/c  Anticipate pt will be able to d/c home with family assist & follow up home PT services once medically cleared to do so  Barriers to Discharge Inaccessible home environment   Barriers to Discharge Comments steps to enter   Goals   Patient Goals to keep getting better   STG Expiration Date 12/27/22   PT Treatment Day 3   Plan   Treatment/Interventions Functional transfer training;LE strengthening/ROM; Therapeutic exercise; Endurance training;Patient/family training;Equipment eval/education; Bed mobility;Gait training   Progress Progressing toward goals   PT Frequency 3-5x/wk   Recommendation   PT Discharge Recommendation Home with home health rehabilitation   Equipment Recommended [place] walker   AM-PAC Basic Mobility Inpatient   Turning in Bed Without Bedrails 4   Lying on Back to Sitting on Edge of Flat Bed 4   Moving Bed to Chair 3   Standing Up From Chair 4   Walk in Room 3   Climb 3-5 Stairs 2   Basic Mobility Inpatient Raw Score 20   Basic Mobility Standardized Score 43 99   Highest Level Of Mobility   JH-HLM Goal 6: Walk 10 steps or more   JH-HLM Achieved 7: Walk 25 feet or more       Joaquín Gordon PTA    An [facility] Basic Mobility Standardized Score of 40 78 suggests pt would benefit from post acute rehab

## 2022-12-20 NOTE — PROGRESS NOTES
INTERNAL MEDICINE PROGRESS NOTE     Name: Nay Arroyo   Age & Sex: [de-identified] y o  female   MRN: [id number]  Unit/Bed#: [facility] 619-01   Encounter: [id number]  Team: SLIM    PATIENT INFORMATION     Name: Nay Arroyo   Age & Sex: [de-identified] y o  female   MRN: [id number]  Hospital Stay Days: 7    ASSESSMENT/PLAN     Principal Problem:    Rectus sheath hematoma  Active Problems:    Paroxysmal atrial fibrillation (HCC)    Hypothyroidism    Hypertension    Hyperlipidemia    Persistent dry cough    Hypoxia      Hypoxia  Assessment & Plan  Requiring supplemental O2, which is new to her  Maybe in setting deconditioning vs continued post-infectious symptoms  · Continue supplemental O2 as needed  · Ambulatory pulse ox/home O2 eval ordered - patient does qualify  · Patient currently on room air    Persistent dry cough  Assessment & Plan  Likely post infectious  · Pulmonology following, appreciate recs  · Continue Atrovent, Xopenex, PRN albuterol, PRN robitussin, PRN tessalon perles  · Continue Breo ellipta for bronchospasm  · Adding tussinex PRN as per Pulmonology recs    Hyperlipidemia  Assessment & Plan  · Continue home atorvastatin 40 mg    Hypertension  Assessment & Plan  Stable normotensive  · Continue PTA Lopressor 12 5 mg BID  · Have been holding lisinopril - continue to hold for now as pressures acceptable  · Continue to monitor BP and reinitiate lisinopril if needed    Hypothyroidism  Assessment & Plan  · Continue home levothyroxine 88 mcg qd    Paroxysmal atrial fibrillation (HCC)  Assessment & Plan  Rate controlled on metoprolol currently  · Continue Lopressor 12 5 mg BID  · Discussed with family risks and benefits of holding warfarin vs resuming  Plan to resume warfarin, monitor inr, once therapeutic-reimage to confirm stability of hematoma and discharge with outpatient lab work to follow        * Rectus sheath hematoma  Assessment & Plan  Thought to be secondary to persistent coughing in the setting of anticoagulation  · S/p reversal of warfarin with Kcentra, dDAVP and 1 unit blood transfusion  · S/p embolization of inferior epigastric artery with IR on   · Appreciate Surgery recommendations   · Pain control with scheduled tylenol and PRN oxycodone, dilaudid  · Continue to monitor Hgb and transfuse for Hgb < 7  · Hemoglobin stable, resumed warfarin on   · Plan to repeat imaging when INR therapeutic to reassess stability of hematoma    SUBJECTIVE     Patient seen and examined  No acute events overnight  Patient reports doing well with no complaints  Denies chest pain, shortness of breath, fever, chills, abdominal or back pain  Denies any signs of bleeding  OBJECTIVE     Vitals:    22 2234 22 0631 22 0754 22 0836   BP:  140/71     Pulse: 104 75 78    Resp:  19 16    Temp: 98 5 °F (36 9 °C) 97 5 °F (36 4 °C)     TempSrc:       SpO2: 94% 92% 98% 93%   Weight:       Height:          Temperature:   Temp (24hrs), Av 3 °F (36 8 °C), Min:97 5 °F (36 4 °C), Max:99 °F (37 2 °C)    Temperature: 97 5 °F (36 4 °C)  Intake & Output:  I/O        0701   0700  0701   0700  0701   0700    P  O  1200 780 240    Total Intake(mL/kg) 1200 (12 2) 780 (8) 240 (2 4)    Urine (mL/kg/hr) 1400 (0 6) 300 (0 1)     Total Output 1400 300     Net -200 +480 +240               Weights:        Body mass index is 35 99 kg/m²  Weight (last 2 days)     Date/Time Weight    22 0600 98 1 (216 27)    22 0548 99 (218 26)        Physical Exam  Vitals and nursing note reviewed  Constitutional:       General: She is not in acute distress  Appearance: She is well-developed  She is obese  HENT:      Head: Normocephalic and atraumatic  Eyes:      Conjunctiva/sclera: Conjunctivae normal    Cardiovascular:      Rate and Rhythm: Normal rate and regular rhythm  Heart sounds: No murmur heard    Pulmonary:      Effort: Pulmonary effort is normal  No respiratory distress  Breath sounds: Normal breath sounds  Abdominal:      Palpations: Abdomen is soft  Tenderness: There is no abdominal tenderness  Musculoskeletal:         General: No swelling  Cervical back: Neck supple  Skin:     General: Skin is warm and dry  Capillary Refill: Capillary refill takes less than 2 seconds  Findings: Bruising (Left mid and lower back and lower abdomen) present  Neurological:      Mental Status: She is alert  Psychiatric:         Mood and Affect: Mood normal        LABORATORY DATA     Labs: I have personally reviewed pertinent reports  Results from last 7 days   Lab Units 12/20/22  0506 12/19/22  0542 12/18/22  0513 12/17/22  0513 12/16/22  0415   WBC Thousand/uL 8 05 8 66 9 66   < > 10 35*   HEMOGLOBIN g/dL 8 8* 8 6* 8 7*   < > 8 0*   HEMATOCRIT % 28 6* 28 0* 27 7*   < > 25 0*   PLATELETS Thousands/uL 256 232 202   < > 140*   NEUTROS PCT % 70  --  72  --  73   MONOS PCT % 9  --  8  --  7    < > = values in this interval not displayed  Results from last 7 days   Lab Units 12/18/22  0513 12/16/22  0415 12/15/22  1843   POTASSIUM mmol/L 4 5 4 4 4 5   CHLORIDE mmol/L 109* 105 103   CO2 mmol/L 26 31 30   BUN mg/dL 13 21 26*   CREATININE mg/dL 0 67 0 77 0 92   CALCIUM mg/dL 7 9* 7 9* 8 1*     Results from last 7 days   Lab Units 12/14/22  0352   MAGNESIUM mg/dL 2 3          Results from last 7 days   Lab Units 12/20/22  0506 12/18/22  1524   INR  1 07 1 01               IMAGING & DIAGNOSTIC TESTING     Radiology Results: I have personally reviewed pertinent reports  XR chest 1 view portable    Result Date: 12/13/2022  Impression: Bibasilar atelectasis more likely than infiltrates  Workstation performed: [id number]     CTA dissection protocol chest/abdomen/pelvis    Result Date: 12/12/2022  Impression: No evidence of aortic aneurysm or dissection   Active contrast extravasation within the large intramuscular hematoma centered within the left rectus femoris muscle extending into the left lateral abdominal wall musculature and decompressing into the left space of Retzius as detailed above  The study was marked in [facility] for immediate notification  Workstation performed: [id number]     IR embolization (specify vessel or site)    Result Date: 12/13/2022  Impression: Impression: Left inferior epigastric artery angiogram showed active bleeding at a distal branch of the artery  Therefore, the artery was embolized using gelfoam and multiple Embold coils  Workstation performed: [id number]     Other Diagnostic Testing: I have personally reviewed pertinent reports      ACTIVE MEDICATIONS     Current Facility-Administered Medications   Medication Dose Route Frequency   • acetaminophen (TYLENOL) tablet 975 mg  975 mg Oral Q8H [address]   • albuterol inhalation solution 2 5 mg  2 5 mg Nebulization Q4H PRN   • aluminum-magnesium hydroxide-simethicone (MYLANTA) oral suspension 30 mL  30 mL Oral Q4H PRN   • atorvastatin (LIPITOR) tablet 40 mg  40 mg Oral Daily With Dinner   • benzonatate (TESSALON PERLES) capsule 100 mg  100 mg Oral TID PRN   • dextromethorphan-guaiFENesin (ROBITUSSIN DM) oral syrup 10 mL  10 mL Oral BID   • fluticasone-vilanterol 200-25 mcg/actuation 1 puff  1 puff Inhalation Daily   • hydrocodone-chlorpheniramine polistirex (TUSSIONEX) ER suspension 5 mL  5 mL Oral Q12H PRN   • HYDROmorphone HCl (DILAUDID) injection 0 2 mg  0 2 mg Intravenous Q4H PRN   • ipratropium (ATROVENT) 0 02 % inhalation solution 0 5 mg  0 5 mg Nebulization TID   • levalbuterol (XOPENEX) inhalation solution 1 25 mg  1 25 mg Nebulization TID   • levothyroxine tablet 88 mcg  88 mcg Oral Early Morning   • lidocaine (LIDODERM) 5 % patch 1 patch  1 patch Topical Daily   • metoprolol tartrate (LOPRESSOR) partial tablet 12 5 mg  12 5 mg Oral Q12H JUAN PABLO   • ondansetron (ZOFRAN) injection 4 mg  4 mg Intravenous Q4H PRN   • oxyCODONE (ROXICODONE) IR tablet 2 5 mg  2 5 mg Oral Q4H PRN   • oxyCODONE (ROXICODONE) IR tablet 5 mg  5 mg Oral Q4H PRN   • senna-docusate sodium (SENOKOT S) 8 6-50 mg per tablet 1 tablet  1 tablet Oral HS   • venlafaxine (EFFEXOR-XR) 24 hr capsule 150 mg  150 mg Oral Daily   • warfarin (COUMADIN) tablet 7 5 mg  7 5 mg Oral Daily (warfarin)       VTE Pharmacologic Prophylaxis: Warfarin  VTE Mechanical Prophylaxis: sequential compression device    Portions of the record may have been created with voice recognition software  Occasional wrong word or "sound a like" substitutions may have occurred due to the inherent limitations of voice recognition software    Read the chart carefully and recognize, using context, where substitutions have occurred   ==  Yana Pan MD  [facility]  Internal Medicine Residency PGY-2

## 2022-12-20 NOTE — PLAN OF CARE
Problem: PHYSICAL THERAPY ADULT  Goal: Performs mobility at highest level of function for planned discharge setting  See evaluation for individualized goals  Description: Treatment/Interventions: Functional transfer training, LE strengthening/ROM, Therapeutic exercise, Endurance training, Patient/family training, Bed mobility, Gait training, Equipment eval/education, Spoke to nursing, OT          See flowsheet documentation for full assessment, interventions and recommendations  Outcome: Progressing  Note: Prognosis: Fair  Problem List: Decreased strength, Decreased endurance, Impaired balance, Decreased mobility, Pain  Assessment: Pt demonstrated improved functional endurance today, ambulating total community distances with only brief standing rest in between without complaints of significant fatigue or dyspnea  did so on RA with all vital signs appearing stble when assessed on Massmio monitoring during session  No LOB or RLE instability noted & pt reports no changes in status with activity  Min A maintained during ambulation for guarding, but additional assist was not required today  Pt is appropriate for formal stair trial next session to assess ability to enter/negotiate home at d/c  Anticipate pt will be able to d/c home with family assist & follow up home PT services once medically cleared to do so  Barriers to Discharge: Inaccessible home environment  Barriers to Discharge Comments: steps to enter  PT Discharge Recommendation: Home with home health rehabilitation    See flowsheet documentation for full assessment

## 2022-12-21 PROBLEM — K59.00 CONSTIPATION: Status: ACTIVE | Noted: 2022-12-21

## 2022-12-21 LAB
ANION GAP SERPL CALCULATED.3IONS-SCNC: 5 MMOL/L (ref 4–13)
BASOPHILS # BLD AUTO: 0.03 THOUSANDS/ÂΜL (ref 0–0.1)
BASOPHILS NFR BLD AUTO: 0 % (ref 0–1)
BUN SERPL-MCNC: 18 MG/DL (ref 5–25)
CALCIUM SERPL-MCNC: 8.5 MG/DL (ref 8.3–10.1)
CHLORIDE SERPL-SCNC: 110 MMOL/L (ref 96–108)
CO2 SERPL-SCNC: 25 MMOL/L (ref 21–32)
CREAT SERPL-MCNC: 0.7 MG/DL (ref 0.6–1.3)
EOSINOPHIL # BLD AUTO: 0.2 THOUSAND/ÂΜL (ref 0–0.61)
EOSINOPHIL NFR BLD AUTO: 3 % (ref 0–6)
ERYTHROCYTE [DISTWIDTH] IN BLOOD BY AUTOMATED COUNT: 16.2 % (ref 11.6–15.1)
GFR SERPL CREATININE-BSD FRML MDRD: 82 ML/MIN/1.73SQ M
GLUCOSE SERPL-MCNC: 122 MG/DL (ref 65–140)
HCT VFR BLD AUTO: 29.4 % (ref 34.8–46.1)
HGB BLD-MCNC: 9.1 G/DL (ref 11.5–15.4)
IMM GRANULOCYTES # BLD AUTO: 0.1 THOUSAND/UL (ref 0–0.2)
IMM GRANULOCYTES NFR BLD AUTO: 1 % (ref 0–2)
INR PPP: 1.18 (ref 0.84–1.19)
LYMPHOCYTES # BLD AUTO: 1.3 THOUSANDS/ÂΜL (ref 0.6–4.47)
LYMPHOCYTES NFR BLD AUTO: 16 % (ref 14–44)
MCH RBC QN AUTO: 30.8 PG (ref 26.8–34.3)
MCHC RBC AUTO-ENTMCNC: 31 G/DL (ref 31.4–37.4)
MCV RBC AUTO: 100 FL (ref 82–98)
MONOCYTES # BLD AUTO: 0.69 THOUSAND/ÂΜL (ref 0.17–1.22)
MONOCYTES NFR BLD AUTO: 9 % (ref 4–12)
NEUTROPHILS # BLD AUTO: 5.82 THOUSANDS/ÂΜL (ref 1.85–7.62)
NEUTS SEG NFR BLD AUTO: 71 % (ref 43–75)
NRBC BLD AUTO-RTO: 0 /100 WBCS
PLATELET # BLD AUTO: 268 THOUSANDS/UL (ref 149–390)
PMV BLD AUTO: 9.8 FL (ref 8.9–12.7)
POTASSIUM SERPL-SCNC: 4.3 MMOL/L (ref 3.5–5.3)
PROTHROMBIN TIME: 15.2 SECONDS (ref 11.6–14.5)
RBC # BLD AUTO: 2.95 MILLION/UL (ref 3.81–5.12)
SODIUM SERPL-SCNC: 140 MMOL/L (ref 135–147)
WBC # BLD AUTO: 8.14 THOUSAND/UL (ref 4.31–10.16)

## 2022-12-21 RX ORDER — DOCUSATE SODIUM 100 MG/1
100 CAPSULE, LIQUID FILLED ORAL 2 TIMES DAILY
Status: DISCONTINUED | OUTPATIENT
Start: 2022-12-21 | End: 2022-12-28 | Stop reason: HOSPADM

## 2022-12-21 RX ORDER — SENNOSIDES 8.6 MG
1 TABLET ORAL
Status: DISCONTINUED | OUTPATIENT
Start: 2022-12-21 | End: 2022-12-21

## 2022-12-21 RX ORDER — SENNOSIDES 8.6 MG
2 TABLET ORAL 2 TIMES DAILY
Status: DISCONTINUED | OUTPATIENT
Start: 2022-12-21 | End: 2022-12-28 | Stop reason: HOSPADM

## 2022-12-21 RX ADMIN — GUAIFENESIN AND DEXTROMETHORPHAN 10 ML: 100; 10 SYRUP ORAL at 09:08

## 2022-12-21 RX ADMIN — Medication 12.5 MG: at 09:08

## 2022-12-21 RX ADMIN — LIDOCAINE 5% 1 PATCH: 700 PATCH TOPICAL at 09:08

## 2022-12-21 RX ADMIN — SENNOSIDES 17.2 MG: 8.6 TABLET, FILM COATED ORAL at 18:03

## 2022-12-21 RX ADMIN — ACETAMINOPHEN 975 MG: 325 TABLET, FILM COATED ORAL at 06:00

## 2022-12-21 RX ADMIN — ATORVASTATIN CALCIUM 40 MG: 40 TABLET, FILM COATED ORAL at 18:03

## 2022-12-21 RX ADMIN — IPRATROPIUM BROMIDE 0.5 MG: 0.5 SOLUTION RESPIRATORY (INHALATION) at 08:16

## 2022-12-21 RX ADMIN — IPRATROPIUM BROMIDE 0.5 MG: 0.5 SOLUTION RESPIRATORY (INHALATION) at 19:25

## 2022-12-21 RX ADMIN — LEVALBUTEROL HYDROCHLORIDE 1.25 MG: 1.25 SOLUTION, CONCENTRATE RESPIRATORY (INHALATION) at 14:44

## 2022-12-21 RX ADMIN — DOCUSATE SODIUM 100 MG: 100 CAPSULE, LIQUID FILLED ORAL at 13:43

## 2022-12-21 RX ADMIN — Medication 12.5 MG: at 22:55

## 2022-12-21 RX ADMIN — LEVALBUTEROL HYDROCHLORIDE 1.25 MG: 1.25 SOLUTION, CONCENTRATE RESPIRATORY (INHALATION) at 08:16

## 2022-12-21 RX ADMIN — LEVOTHYROXINE SODIUM 88 MCG: 88 TABLET ORAL at 06:01

## 2022-12-21 RX ADMIN — LEVALBUTEROL HYDROCHLORIDE 1.25 MG: 1.25 SOLUTION, CONCENTRATE RESPIRATORY (INHALATION) at 19:25

## 2022-12-21 RX ADMIN — IPRATROPIUM BROMIDE 0.5 MG: 0.5 SOLUTION RESPIRATORY (INHALATION) at 14:44

## 2022-12-21 RX ADMIN — ACETAMINOPHEN 975 MG: 325 TABLET, FILM COATED ORAL at 22:55

## 2022-12-21 RX ADMIN — WARFARIN SODIUM 7.5 MG: 7.5 TABLET ORAL at 18:03

## 2022-12-21 RX ADMIN — VENLAFAXINE HYDROCHLORIDE 150 MG: 150 CAPSULE, EXTENDED RELEASE ORAL at 09:08

## 2022-12-21 RX ADMIN — ACETAMINOPHEN 975 MG: 325 TABLET, FILM COATED ORAL at 13:43

## 2022-12-21 RX ADMIN — GUAIFENESIN AND DEXTROMETHORPHAN 10 ML: 100; 10 SYRUP ORAL at 22:55

## 2022-12-21 NOTE — PROGRESS NOTES
INTERNAL MEDICINE PROGRESS NOTE     Name: Mendez Ibanez   Age & Sex: [de-identified] y o  female   MRN: [id number]  Unit/Bed#: [facility] 619-01   Encounter: [id number]  Team: SLIM    PATIENT INFORMATION     Name: Mendez Ibanez   Age & Sex: [de-identified] y o  female   MRN: [id number]  Hospital Stay Days: 8    ASSESSMENT/PLAN     Principal Problem:    Rectus sheath hematoma  Active Problems:    Paroxysmal atrial fibrillation ([address] )    Hypothyroidism    Hypertension    Hyperlipidemia    Persistent dry cough    Hypoxia    Constipation      Constipation  Assessment & Plan  Patient reports no bowel movement for past 3 days  Abdominal tenderness on exam     Plan: Will Colace and senna scheduled  Continue MiraLAX as needed  Hypoxia  Assessment & Plan  Requiring supplemental O2, which is new to her  Maybe in setting deconditioning vs continued post-infectious symptoms  · Continue supplemental O2 as needed  · Ambulatory pulse ox/home O2 eval ordered - patient does qualify  · Patient currently on room air    Persistent dry cough  Assessment & Plan  Likely post infectious  · Pulmonology following, appreciate recs  · Continue Atrovent, Xopenex, PRN albuterol, PRN robitussin, PRN tessalon perles  · Continue Breo ellipta for bronchospasm  · Adding tussinex PRN as per Pulmonology recs    Hyperlipidemia  Assessment & Plan  · Continue home atorvastatin 40 mg    Hypertension  Assessment & Plan  Stable normotensive  · Continue PTA Lopressor 12 5 mg BID  · Have been holding lisinopril - continue to hold for now as pressures acceptable  · Continue to monitor BP and reinitiate lisinopril if needed    Hypothyroidism  Assessment & Plan  · Continue home levothyroxine 88 mcg qd    Paroxysmal atrial fibrillation (HCC)  Assessment & Plan  Rate controlled on metoprolol currently  · Continue Lopressor 12 5 mg BID  Discussed with family risks and benefits of holding warfarin vs resuming   Plan to resume warfarin, monitor inr, once therapeutic, will reassess abdominal exam, if stable, patient will be okay for discharge  * Rectus sheath hematoma  Assessment & Plan  Thought to be secondary to persistent coughing in the setting of anticoagulation  · S/p reversal of warfarin with Kcentra, dDAVP and 1 unit blood transfusion  · S/p embolization of inferior epigastric artery with IR on   · Appreciate Surgery recommendations   · Pain control with scheduled tylenol and PRN oxycodone, dilaudid  · Continue to monitor Hgb and transfuse for Hgb < 7  · Hemoglobin stable, resumed warfarin on   · When INR therapeutic, will reassess abdominal exam, if stable, patient will be okay for discharge  SUBJECTIVE     Patient seen and examined  No acute events overnight  Patient reports doing well  Reports mild abdominal pain most likely secondary to not having bowel movements for the past 3 days  OBJECTIVE     Vitals:    22 2206 22 0555 22 0817 22 0901   BP: 130/68 138/67     Pulse: 88 75     Resp:  18     Temp:  98 6 °F (37 °C)     TempSrc:       SpO2: 94% 90% 96% (!) 89%   Weight:       Height:          Temperature:   Temp (24hrs), Av 4 °F (36 9 °C), Min:98 1 °F (36 7 °C), Max:98 6 °F (37 °C)    Temperature: 98 6 °F (37 °C)  Intake & Output:  I/O        0701   0700  0701   0700  0701   0700    P  O  780 680 360    Total Intake(mL/kg) 780 (8) 680 (6 9) 360 (3 7)    Urine (mL/kg/hr) 300 (0 1)      Total Output 300      Net +480 +680 +360           Unmeasured Urine Occurrence  3 x         Weights:        Body mass index is 35 99 kg/m²  Weight (last 2 days)     Date/Time Weight    22 0600 98 1 (216 27)        Physical Exam  Vitals and nursing note reviewed  Constitutional:       General: She is not in acute distress  Appearance: She is well-developed  She is obese  HENT:      Head: Normocephalic and atraumatic     Eyes:      Conjunctiva/sclera: Conjunctivae normal    Cardiovascular:      Rate and Rhythm: Normal rate and regular rhythm  Heart sounds: No murmur heard  Pulmonary:      Effort: Pulmonary effort is normal  No respiratory distress  Breath sounds: Normal breath sounds  Abdominal:      Palpations: Abdomen is soft  Tenderness: There is abdominal tenderness  Musculoskeletal:         General: No swelling  Cervical back: Neck supple  Skin:     General: Skin is warm and dry  Capillary Refill: Capillary refill takes less than 2 seconds  Findings: Bruising (Lower abdomen and mid lower back) present  Neurological:      Mental Status: She is alert  Psychiatric:         Mood and Affect: Mood normal        LABORATORY DATA     Labs: I have personally reviewed pertinent reports  Results from last 7 days   Lab Units 12/21/22  0530 12/20/22  0506 12/19/22  0542 12/18/22  0513   WBC Thousand/uL 8 14 8 05 8 66 9 66   HEMOGLOBIN g/dL 9 1* 8 8* 8 6* 8 7*   HEMATOCRIT % 29 4* 28 6* 28 0* 27 7*   PLATELETS Thousands/uL 268 256 232 202   NEUTROS PCT % 71 70  --  72   MONOS PCT % 9 9  --  8      Results from last 7 days   Lab Units 12/21/22  0530 12/18/22  0513 12/16/22  0415   POTASSIUM mmol/L 4 3 4 5 4 4   CHLORIDE mmol/L 110* 109* 105   CO2 mmol/L 25 26 31   BUN mg/dL 18 13 21   CREATININE mg/dL 0 70 0 67 0 77   CALCIUM mg/dL 8 5 7 9* 7 9*              Results from last 7 days   Lab Units 12/21/22  1008 12/20/22  0506 12/18/22  1524   INR  1 18 1 07 1 01               IMAGING & DIAGNOSTIC TESTING     Radiology Results: I have personally reviewed pertinent reports  XR chest 1 view portable    Result Date: 12/13/2022  Impression: Bibasilar atelectasis more likely than infiltrates  Workstation performed: [id number]     CTA dissection protocol chest/abdomen/pelvis    Result Date: 12/12/2022  Impression: No evidence of aortic aneurysm or dissection   Active contrast extravasation within the large intramuscular hematoma centered within the left rectus femoris muscle extending into the left lateral abdominal wall musculature and decompressing into the left space of Retzius as detailed above  The study was marked in [facility] for immediate notification  Workstation performed: [id number]     IR embolization (specify vessel or site)    Result Date: 12/13/2022  Impression: Impression: Left inferior epigastric artery angiogram showed active bleeding at a distal branch of the artery  Therefore, the artery was embolized using gelfoam and multiple Embold coils  Workstation performed: [id number]     Other Diagnostic Testing: I have personally reviewed pertinent reports      ACTIVE MEDICATIONS     Current Facility-Administered Medications   Medication Dose Route Frequency   • acetaminophen (TYLENOL) tablet 975 mg  975 mg Oral Q8H [facility]   • albuterol inhalation solution 2 5 mg  2 5 mg Nebulization Q4H PRN   • aluminum-magnesium hydroxide-simethicone (MYLANTA) oral suspension 30 mL  30 mL Oral Q4H PRN   • atorvastatin (LIPITOR) tablet 40 mg  40 mg Oral Daily With Dinner   • benzonatate (TESSALON PERLES) capsule 100 mg  100 mg Oral TID PRN   • dextromethorphan-guaiFENesin (ROBITUSSIN DM) oral syrup 10 mL  10 mL Oral BID   • docusate sodium (COLACE) capsule 100 mg  100 mg Oral BID   • fluticasone-vilanterol 200-25 mcg/actuation 1 puff  1 puff Inhalation Daily   • hydrocodone-chlorpheniramine polistirex (TUSSIONEX) ER suspension 5 mL  5 mL Oral Q12H PRN   • HYDROmorphone HCl (DILAUDID) injection 0 2 mg  0 2 mg Intravenous Q4H PRN   • ipratropium (ATROVENT) 0 02 % inhalation solution 0 5 mg  0 5 mg Nebulization TID   • levalbuterol (XOPENEX) inhalation solution 1 25 mg  1 25 mg Nebulization TID   • levothyroxine tablet 88 mcg  88 mcg Oral Early Morning   • lidocaine (LIDODERM) 5 % patch 1 patch  1 patch Topical Daily   • metoprolol tartrate (LOPRESSOR) partial tablet 12 5 mg  12 5 mg Oral Q12H JUAN PABLO   • ondansetron (ZOFRAN) injection 4 mg  4 mg Intravenous Q4H PRN   • oxyCODONE (ROXICODONE) IR tablet 2 5 mg  2 5 mg Oral Q4H PRN   • oxyCODONE (ROXICODONE) IR tablet 5 mg  5 mg Oral Q4H PRN   • polyethylene glycol (MIRALAX) packet 17 g  17 g Oral Daily PRN   • senna (SENOKOT) tablet 8 6 mg  1 tablet Oral HS   • venlafaxine (EFFEXOR-XR) 24 hr capsule 150 mg  150 mg Oral Daily   • warfarin (COUMADIN) tablet 7 5 mg  7 5 mg Oral Daily (warfarin)       VTE Pharmacologic Prophylaxis: Warfarin  VTE Mechanical Prophylaxis: sequential compression device    Portions of the record may have been created with voice recognition software  Occasional wrong word or "sound a like" substitutions may have occurred due to the inherent limitations of voice recognition software    Read the chart carefully and recognize, using context, where substitutions have occurred   ==  Elissa Pan MD  [address] [facility]  Internal Medicine Residency PGY-2

## 2022-12-21 NOTE — RESTORATIVE TECHNICIAN NOTE
Restorative Technician Note      Patient Name: Aurea Gómez     Restorative Tech Visit Date: 12/21/22  Note Type: Mobility  Patient Position Upon Consult: Supine  Activity Performed: Ambulated  Assistive Device: Roller walker  Patient Position at End of Consult: Bedside chair;  All needs within reach    Seda Waggoner Restorative Technician

## 2022-12-21 NOTE — ASSESSMENT & PLAN NOTE
Patient reports no bowel movement for past 4 days  Abdominal tenderness on exam  Large bowel movement on 12/22    Plan:  Added Colace and senna scheduled    Continue MiraLAX

## 2022-12-21 NOTE — UTILIZATION REVIEW
Continued Stay Review    Date: 12/21/22                         Current Patient Class: inpatient Current Level of Care: med/surg    HPI:82 y o  female initially admitted on 12/12 with rectus sheath hematoma    Assessment/Plan: Reports mild abdominal pain most likely 2/2 not having bm for the past 3 days  Colace and senna scheduled  Continue MiraLAX prn  Warfarin resumed 12/18  When INR therapeutic, will reassess abdominal exam, if stable, patient will be okay for discharge  Continue to monitor H&H daily, currently stable  INR with warfarin daily  Currently stable on room air, no current need for home O2  Continue po meds, nebs, supportive care       Vital Signs:   Date/Time Temp Pulse Resp BP MAP (mmHg) SpO2 Calculated FIO2 (%) - Nasal Cannula O2 Flow Rate (L/min) Nasal Cannula O2 Flow Rate (L/min) O2 Device   12/21/22 0901 -- -- -- -- -- 89 % Abnormal   28 -- 2 L/min --   SpO2: o2 off at 12/21/22 0901 12/21/22 0817 -- -- -- -- -- 96 % -- -- -- --   12/21/22 05:55:27 98 6 °F (37 °C) 75 18 138/67 91 90 % -- -- -- --   12/20/22 2330 -- -- -- -- -- -- -- -- -- None (Room air)   12/20/22 22:06:11 -- 88 -- 130/68 89 94 % -- -- -- --   12/20/22 21:58:22 98 1 °F (36 7 °C) 89 18 127/70 89 93 % -- -- -- --   12/20/22 1600 -- -- -- -- -- 93 % -- -- -- None (Room air)   12/20/22 14:22:31 -- 80 19 131/67 88 92 % -- -- -- --   12/20/22 1328 -- -- -- -- -- 93 % -- -- -- --   12/20/22 0836 -- -- -- -- -- 93 % -- -- -- None (Room air)   12/20/22 0754 -- 78 16 -- -- 98 % -- -- -- --   12/20/22 06:31:07 97 5 °F (36 4 °C) 75 19 140/71 94 92 % -- -- -- --   12/20/22 0334 -- -- -- -- -- -- -- -- -- None (Room air)   12/19/22 22:34:11 98 5 °F (36 9 °C) 104 -- -- -- 94 % -- -- -- --   12/19/22 22:26:25 -- 88 -- 118/69 85 97 % -- -- -- --   12/19/22 2225 -- 88 -- 118/69 -- -- -- -- -- --   12/19/22 2102 -- -- -- -- -- 95 % 28 -- 2 L/min Nasal cannula   12/19/22 20:20:52 99 °F (37 2 °C) 85 17 135/78 97 97 % 28 -- 2 L/min Nasal cannula 12/19/22 14:58:29 98 2 °F (36 8 °C) 82 -- 120/58 79 95 % -- -- -- --   12/19/22 1425 -- 89 -- -- -- 96 % -- 2 L/min -- Nasal cannula   12/19/22 0900 -- 86 -- 124/57 79 92 % -- -- -- --   12/19/22 0857 -- 88 -- 124/57 -- 92 % -- -- -- --   12/19/22 0813 -- -- -- -- -- 94 % -- -- -- None (Room air)   12/19/22 06:19:07 98 °F (36 7 °C) 83 18 158/79 105 94 % -- -- -- --       Pertinent Labs/Diagnostic Results:   Results from last 7 days   Lab Units 12/21/22  0530 12/20/22  0506 12/19/22  0542 12/18/22  0513 12/17/22  0513   WBC Thousand/uL 8 14 8 05 8 66 9 66 10 95*   HEMOGLOBIN g/dL 9 1* 8 8* 8 6* 8 7* 8 1*   HEMATOCRIT % 29 4* 28 6* 28 0* 27 7* 26 5*   PLATELETS Thousands/uL 268 256 232 202 156   NEUTROS ABS Thousands/µL 5 82 5 62  --  6 98  --      Results from last 7 days   Lab Units 12/21/22  0530 12/18/22  0513 12/16/22  0415 12/15/22  1843   SODIUM mmol/L 140 139 139 137   POTASSIUM mmol/L 4 3 4 5 4 4 4 5   CHLORIDE mmol/L 110* 109* 105 103   CO2 mmol/L 25 26 31 30   ANION GAP mmol/L 5 4 3* 4   BUN mg/dL 18 13 21 26*   CREATININE mg/dL 0 70 0 67 0 77 0 92   EGFR ml/min/1 73sq m 82 83 73 58   CALCIUM mg/dL 8 5 7 9* 7 9* 8 1*     Results from last 7 days   Lab Units 12/21/22  0530 12/18/22  0513 12/16/22  0415 12/15/22  1843   GLUCOSE RANDOM mg/dL 122 102 116 174*     Results from last 7 days   Lab Units 12/21/22  1008 12/20/22  0506 12/18/22  1524   PROTIME seconds 15 2* 14 1 13 5   INR  1 18 1 07 1 01       Medications:   Scheduled Medications:  acetaminophen, 975 mg, Oral, Q8H JUAN PABLO  atorvastatin, 40 mg, Oral, Daily With Dinner  dextromethorphan-guaiFENesin, 10 mL, Oral, BID  docusate sodium, 100 mg, Oral, BID  fluticasone-vilanterol, 1 puff, Inhalation, Daily  ipratropium, 0 5 mg, Nebulization, TID  levalbuterol, 1 25 mg, Nebulization, TID  levothyroxine, 88 mcg, Oral, Early Morning  lidocaine, 1 patch, Topical, Daily  metoprolol tartrate, 12 5 mg, Oral, Q12H [facility]  senna, 2 tablet, Oral, BID  venlafaxine, 150 mg, Oral, Daily  warfarin, 7 5 mg, Oral, Daily (warfarin)    PRN Meds:  albuterol, 2 5 mg, Nebulization, Q4H PRN  aluminum-magnesium hydroxide-simethicone, 30 mL, Oral, Q4H PRN  benzonatate, 100 mg, Oral, TID PRN  hydrocodone-chlorpheniramine polistirex, 5 mL, Oral, Q12H PRN  HYDROmorphone, 0 2 mg, Intravenous, Q4H PRN  ondansetron, 4 mg, Intravenous, Q4H PRN  oxyCODONE, 2 5 mg, Oral, Q4H PRN  oxyCODONE, 5 mg, Oral, Q4H PRN  polyethylene glycol, 17 g, Oral, Daily PRN        Discharge Plan: home with [facility] Utilization Review Department  ATTENTION: Please call with any questions or concerns to [phone] and carefully listen to the prompts so that you are directed to the right person  All voicemails are confidential   [facility] all requests for admission clinical reviews, approved or denied determinations and any other requests to dedicated fax number below belonging to the campus where the patient is receiving treatment   List of dedicated fax numbers for the Facilities:  [address] DENIALS (Administrative/Medical Necessity) [phone]   [address] (Maternity/NICU/Pediatrics) [phone]   918 Dorina Reich [phone]   [facility] [address] [phone]   [address] [place] [address] [facility] [phone]   [facility] [place] [facility] 134 815 [facility] [phone]

## 2022-12-22 LAB
ANION GAP SERPL CALCULATED.3IONS-SCNC: 4 MMOL/L (ref 4–13)
BASOPHILS # BLD AUTO: 0.02 THOUSANDS/ÂΜL (ref 0–0.1)
BASOPHILS NFR BLD AUTO: 0 % (ref 0–1)
BUN SERPL-MCNC: 17 MG/DL (ref 5–25)
CALCIUM SERPL-MCNC: 8.6 MG/DL (ref 8.3–10.1)
CHLORIDE SERPL-SCNC: 111 MMOL/L (ref 96–108)
CO2 SERPL-SCNC: 25 MMOL/L (ref 21–32)
CREAT SERPL-MCNC: 0.65 MG/DL (ref 0.6–1.3)
EOSINOPHIL # BLD AUTO: 0.24 THOUSAND/ÂΜL (ref 0–0.61)
EOSINOPHIL NFR BLD AUTO: 3 % (ref 0–6)
ERYTHROCYTE [DISTWIDTH] IN BLOOD BY AUTOMATED COUNT: 16.8 % (ref 11.6–15.1)
GFR SERPL CREATININE-BSD FRML MDRD: 84 ML/MIN/1.73SQ M
GLUCOSE SERPL-MCNC: 110 MG/DL (ref 65–140)
HCT VFR BLD AUTO: 29 % (ref 34.8–46.1)
HGB BLD-MCNC: 8.8 G/DL (ref 11.5–15.4)
IMM GRANULOCYTES # BLD AUTO: 0.07 THOUSAND/UL (ref 0–0.2)
IMM GRANULOCYTES NFR BLD AUTO: 1 % (ref 0–2)
INR PPP: 1.36 (ref 0.84–1.19)
LYMPHOCYTES # BLD AUTO: 1.61 THOUSANDS/ÂΜL (ref 0.6–4.47)
LYMPHOCYTES NFR BLD AUTO: 20 % (ref 14–44)
MCH RBC QN AUTO: 29.9 PG (ref 26.8–34.3)
MCHC RBC AUTO-ENTMCNC: 30.3 G/DL (ref 31.4–37.4)
MCV RBC AUTO: 99 FL (ref 82–98)
MONOCYTES # BLD AUTO: 0.69 THOUSAND/ÂΜL (ref 0.17–1.22)
MONOCYTES NFR BLD AUTO: 8 % (ref 4–12)
NEUTROPHILS # BLD AUTO: 5.61 THOUSANDS/ÂΜL (ref 1.85–7.62)
NEUTS SEG NFR BLD AUTO: 68 % (ref 43–75)
NRBC BLD AUTO-RTO: 0 /100 WBCS
PLATELET # BLD AUTO: 273 THOUSANDS/UL (ref 149–390)
PMV BLD AUTO: 10.2 FL (ref 8.9–12.7)
POTASSIUM SERPL-SCNC: 4.2 MMOL/L (ref 3.5–5.3)
PROTHROMBIN TIME: 17 SECONDS (ref 11.6–14.5)
RBC # BLD AUTO: 2.94 MILLION/UL (ref 3.81–5.12)
SODIUM SERPL-SCNC: 140 MMOL/L (ref 135–147)
WBC # BLD AUTO: 8.24 THOUSAND/UL (ref 4.31–10.16)

## 2022-12-22 RX ORDER — MINERAL OIL 100 G/100G
1 OIL RECTAL ONCE
Status: DISCONTINUED | OUTPATIENT
Start: 2022-12-22 | End: 2022-12-22

## 2022-12-22 RX ORDER — SODIUM PHOSPHATE, DIBASIC AND SODIUM PHOSPHATE, MONOBASIC 7; 19 G/133ML; G/133ML
1 ENEMA RECTAL ONCE
Status: COMPLETED | OUTPATIENT
Start: 2022-12-22 | End: 2022-12-22

## 2022-12-22 RX ADMIN — GUAIFENESIN AND DEXTROMETHORPHAN 10 ML: 100; 10 SYRUP ORAL at 21:35

## 2022-12-22 RX ADMIN — GUAIFENESIN AND DEXTROMETHORPHAN 10 ML: 100; 10 SYRUP ORAL at 08:18

## 2022-12-22 RX ADMIN — SENNOSIDES 17.2 MG: 8.6 TABLET, FILM COATED ORAL at 08:18

## 2022-12-22 RX ADMIN — LEVALBUTEROL HYDROCHLORIDE 1.25 MG: 1.25 SOLUTION, CONCENTRATE RESPIRATORY (INHALATION) at 07:38

## 2022-12-22 RX ADMIN — ACETAMINOPHEN 975 MG: 325 TABLET, FILM COATED ORAL at 05:12

## 2022-12-22 RX ADMIN — ATORVASTATIN CALCIUM 40 MG: 40 TABLET, FILM COATED ORAL at 17:38

## 2022-12-22 RX ADMIN — Medication 12.5 MG: at 21:35

## 2022-12-22 RX ADMIN — DOCUSATE SODIUM 100 MG: 100 CAPSULE, LIQUID FILLED ORAL at 08:18

## 2022-12-22 RX ADMIN — ACETAMINOPHEN 975 MG: 325 TABLET, FILM COATED ORAL at 21:35

## 2022-12-22 RX ADMIN — ACETAMINOPHEN 975 MG: 325 TABLET, FILM COATED ORAL at 13:25

## 2022-12-22 RX ADMIN — LIDOCAINE 5% 1 PATCH: 700 PATCH TOPICAL at 08:19

## 2022-12-22 RX ADMIN — WARFARIN SODIUM 7.5 MG: 7.5 TABLET ORAL at 17:38

## 2022-12-22 RX ADMIN — Medication 12.5 MG: at 08:18

## 2022-12-22 RX ADMIN — FLUTICASONE FUROATE AND VILANTEROL TRIFENATATE 1 PUFF: 200; 25 POWDER RESPIRATORY (INHALATION) at 08:19

## 2022-12-22 RX ADMIN — SENNOSIDES 17.2 MG: 8.6 TABLET, FILM COATED ORAL at 17:38

## 2022-12-22 RX ADMIN — DOCUSATE SODIUM 100 MG: 100 CAPSULE, LIQUID FILLED ORAL at 17:38

## 2022-12-22 RX ADMIN — SODIUM PHOSPHATE 1 ENEMA: 7; 19 ENEMA RECTAL at 13:25

## 2022-12-22 RX ADMIN — POLYETHYLENE GLYCOL 3350 17 G: 17 POWDER, FOR SOLUTION ORAL at 08:18

## 2022-12-22 RX ADMIN — LEVOTHYROXINE SODIUM 88 MCG: 88 TABLET ORAL at 05:12

## 2022-12-22 RX ADMIN — IPRATROPIUM BROMIDE 0.5 MG: 0.5 SOLUTION RESPIRATORY (INHALATION) at 07:38

## 2022-12-22 RX ADMIN — VENLAFAXINE HYDROCHLORIDE 150 MG: 150 CAPSULE, EXTENDED RELEASE ORAL at 08:17

## 2022-12-22 NOTE — PLAN OF CARE
Problem: MOBILITY - ADULT  Goal: Maintain or return to baseline ADL function  Description: INTERVENTIONS:  -  Assess patient's ability to carry out ADLs; assess patient's baseline for ADL function and identify physical deficits which impact ability to perform ADLs (bathing, care of mouth/teeth, toileting, grooming, dressing, etc )  - Assess/evaluate cause of self-care deficits   - Assess range of motion  - Assess patient's mobility; develop plan if impaired  - Assess patient's need for assistive devices and provide as appropriate  - Encourage maximum independence but intervene and supervise when necessary  - Involve family in performance of ADLs  - Assess for home care needs following discharge   - Consider OT consult to assist with ADL evaluation and planning for discharge  - Provide patient education as appropriate  Outcome: Progressing  Goal: Maintains/Returns to pre admission functional level  Description: INTERVENTIONS:  - Perform BMAT or MOVE assessment daily    - Set and communicate daily mobility goal to care team and patient/family/caregiver  - Collaborate with rehabilitation services on mobility goals if consulted  - Perform Range of Motion *3** times a day  - Reposition patient every **2* hours    - Dangle patient *3** times a day  - Stand patient *3** times a day  - Ambulate patient **3* times a day  - Out of bed to chair *3** times a day   - Out of bed for meals *3** times a day  - Out of bed for toileting  - Record patient progress and toleration of activity level   Outcome: Progressing     Problem: Potential for Falls  Goal: Patient will remain free of falls  Description: INTERVENTIONS:  - Educate patient/family on patient safety including physical limitations  - Instruct patient to call for assistance with activity   - Consult OT/PT to assist with strengthening/mobility   - Keep Call bell within reach  - Keep bed low and locked with side rails adjusted as appropriate  - Keep care items and personal belongings within reach  - Initiate and maintain comfort rounds  - Make Fall Risk Sign visible to staff  - Offer Toileting every **2* Hours, in advance of need  - Initiate/Maintain *bed/chair **alarm  - Obtain necessary fall risk management equipment:   - Apply yellow socks and bracelet for high fall risk patients  - Consider moving patient to room near nurses station  Outcome: Progressing     Problem: Prexisting or High Potential for Compromised Skin Integrity  Goal: Skin integrity is maintained or improved  Description: INTERVENTIONS:  - Identify patients at risk for skin breakdown  - Assess and monitor skin integrity  - Assess and monitor nutrition and hydration status  - Monitor labs   - Assess for incontinence   - Turn and reposition patient  - Assist with mobility/ambulation  - Relieve pressure over bony prominences  - Avoid friction and shearing  - Provide appropriate hygiene as needed including keeping skin clean and dry  - Evaluate need for skin moisturizer/barrier cream  - Collaborate with interdisciplinary team   - Patient/family teaching  - Consider wound care consult   Outcome: Progressing     Problem: PAIN - ADULT  Goal: Verbalizes/displays adequate comfort level or baseline comfort level  Description: Interventions:  - Encourage patient to monitor pain and request assistance  - Assess pain using appropriate pain scale  - Administer analgesics based on type and severity of pain and evaluate response  - Implement non-pharmacological measures as appropriate and evaluate response  - Consider cultural and social influences on pain and pain management  - Notify physician/advanced practitioner if interventions unsuccessful or patient reports new pain  Outcome: Progressing

## 2022-12-22 NOTE — RESTORATIVE TECHNICIAN NOTE
Restorative Technician Note      Patient Name: Doc Carlee     Restorative Tech Visit Date: 12/22/22  Note Type: Mobility  Patient Position Upon Consult: Supine  Activity Performed: Transferred  Assistive Device: Roller walker  Patient Position at End of Consult: Bedside chair;  All needs within reach    Tobi Dakins, Restorative Technician

## 2022-12-22 NOTE — PROGRESS NOTES
INTERNAL MEDICINE PROGRESS NOTE     Name: Sanjeev Cee   Age & Sex: [de-identified] y o  female   MRN: [id number]  Unit/Bed#: [facility] 619-01   Encounter: [id number]  Team: [email or web address]    PATIENT INFORMATION     Name: Sanjeev Cee   Age & Sex: [de-identified] y o  female   MRN: [id number]  Hospital Stay Days: 9    ASSESSMENT/PLAN     Principal Problem:    Rectus sheath hematoma  Active Problems:    Paroxysmal atrial fibrillation (HCC)    Hypothyroidism    Hypertension    Hyperlipidemia    Persistent dry cough    Hypoxia    Constipation      Constipation  Assessment & Plan  Patient reports no bowel movement for past 3 days  Abdominal tenderness on exam     Plan: Will Colace and senna scheduled  Continue MiraLAX as needed  Hypoxia  Assessment & Plan  Requiring supplemental O2, which is new to her  Maybe in setting deconditioning vs continued post-infectious symptoms  · Continue supplemental O2 as needed  · Ambulatory pulse ox/home O2 eval ordered - patient does qualify  · Patient currently on room air    Persistent dry cough  Assessment & Plan  Likely post infectious  · Pulmonology following, appreciate recs  · Continue Atrovent, Xopenex, PRN albuterol, PRN robitussin, PRN tessalon perles  · Continue Breo ellipta for bronchospasm  · Adding tussinex PRN as per Pulmonology recs    Hyperlipidemia  Assessment & Plan  · Continue home atorvastatin 40 mg    Hypertension  Assessment & Plan  Stable normotensive  · Continue PTA Lopressor 12 5 mg BID  · Have been holding lisinopril - continue to hold for now as pressures acceptable  · Continue to monitor BP and reinitiate lisinopril if needed    Hypothyroidism  Assessment & Plan  · Continue home levothyroxine 88 mcg qd    Paroxysmal atrial fibrillation (HCC)  Assessment & Plan  Rate controlled on metoprolol currently  · Continue Lopressor 12 5 mg BID  Discussed with family risks and benefits of holding warfarin vs resuming   Plan to resume warfarin, monitor inr, once therapeutic, will reassess abdominal exam, if stable, patient will be okay for discharge  * Rectus sheath hematoma  Assessment & Plan  Thought to be secondary to persistent coughing in the setting of anticoagulation  · S/p reversal of warfarin with Kcentra, dDAVP and 1 unit blood transfusion  · S/p embolization of inferior epigastric artery with IR on   · Appreciate Surgery recommendations   · Pain control with scheduled tylenol and PRN oxycodone, dilaudid  · Continue to monitor Hgb and transfuse for Hgb < 7  · Hemoglobin stable, resumed warfarin on   · When INR therapeutic, will reassess abdominal exam, if stable, patient will be okay for discharge  SUBJECTIVE     Patient seen and examined  No acute events overnight  Patient reports mild abdominal pain because of constipation, however, stable from before  Denies nausea, vomiting, fever, chills, dizziness, chest pain or shortness of breath  Patient reports eating well  OBJECTIVE     Vitals:    22 2222 22 2255 22 0600 22 0752   BP: 142/69 146/72  145/74   Pulse: 91 80  75   Resp: 16   18   Temp: 98 4 °F (36 9 °C)   (!) 96 9 °F (36 1 °C)   TempSrc:       SpO2: 91% 93%  97%   Weight:   98 7 kg (217 lb 8 oz)    Height:          Temperature:   Temp (24hrs), Av 5 °F (36 9 °C), Min:96 9 °F (36 1 °C), Max:100 2 °F (37 9 °C)    Temperature: (!) 96 9 °F (36 1 °C)  Intake & Output:  I/O        0701   0700  0701   0700  0701   0700    P  O  680 600     Total Intake(mL/kg) 680 (6 9) 600 (6 1)     Urine (mL/kg/hr)       Total Output       Net +680 +600            Unmeasured Urine Occurrence 3 x 1 x         Weights:        Body mass index is 36 19 kg/m²  Weight (last 2 days)     Date/Time Weight    22 0600 98 7 (217 5)        Physical Exam  Vitals and nursing note reviewed  Constitutional:       General: She is not in acute distress  Appearance: She is well-developed     HENT:      Head: Normocephalic and atraumatic  Eyes:      Conjunctiva/sclera: Conjunctivae normal    Cardiovascular:      Rate and Rhythm: Normal rate and regular rhythm  Heart sounds: No murmur heard  Pulmonary:      Effort: Pulmonary effort is normal  No respiratory distress  Breath sounds: Normal breath sounds  Abdominal:      General: There is distension  Palpations: Abdomen is soft  Tenderness: There is abdominal tenderness  Musculoskeletal:         General: No swelling  Cervical back: Neck supple  Skin:     General: Skin is warm and dry  Capillary Refill: Capillary refill takes less than 2 seconds  Findings: Bruising (Lower abdomen and mid lower back) present  Neurological:      Mental Status: She is alert  Psychiatric:         Mood and Affect: Mood normal        LABORATORY DATA     Labs: I have personally reviewed pertinent reports  Results from last 7 days   Lab Units 12/22/22  0427 12/21/22  0530 12/20/22  0506   WBC Thousand/uL 8 24 8 14 8 05   HEMOGLOBIN g/dL 8 8* 9 1* 8 8*   HEMATOCRIT % 29 0* 29 4* 28 6*   PLATELETS Thousands/uL 273 268 256   NEUTROS PCT % 68 71 70   MONOS PCT % 8 9 9      Results from last 7 days   Lab Units 12/22/22  0427 12/21/22  0530 12/18/22  0513   POTASSIUM mmol/L 4 2 4 3 4 5   CHLORIDE mmol/L 111* 110* 109*   CO2 mmol/L 25 25 26   BUN mg/dL 17 18 13   CREATININE mg/dL 0 65 0 70 0 67   CALCIUM mg/dL 8 6 8 5 7 9*              Results from last 7 days   Lab Units 12/22/22  0427 12/21/22  1008 12/20/22  0506   INR  1 36* 1 18 1 07               IMAGING & DIAGNOSTIC TESTING     Radiology Results: I have personally reviewed pertinent reports  XR chest 1 view portable    Result Date: 12/13/2022  Impression: Bibasilar atelectasis more likely than infiltrates  Workstation performed: [id number]     CTA dissection protocol chest/abdomen/pelvis    Result Date: 12/12/2022  Impression: No evidence of aortic aneurysm or dissection   Active contrast extravasation within the large intramuscular hematoma centered within the left rectus femoris muscle extending into the left lateral abdominal wall musculature and decompressing into the left space of Retzius as detailed above  The study was marked in [facility] for immediate notification  Workstation performed: [id number]     IR embolization (specify vessel or site)    Result Date: 12/13/2022  Impression: Impression: Left inferior epigastric artery angiogram showed active bleeding at a distal branch of the artery  Therefore, the artery was embolized using gelfoam and multiple Embold coils  Workstation performed: [id number]     Other Diagnostic Testing: I have personally reviewed pertinent reports      ACTIVE MEDICATIONS     Current Facility-Administered Medications   Medication Dose Route Frequency   • acetaminophen (TYLENOL) tablet 975 mg  975 mg Oral Q8H [address]   • albuterol inhalation solution 2 5 mg  2 5 mg Nebulization Q4H PRN   • aluminum-magnesium hydroxide-simethicone (MYLANTA) oral suspension 30 mL  30 mL Oral Q4H PRN   • atorvastatin (LIPITOR) tablet 40 mg  40 mg Oral Daily With Dinner   • benzonatate (TESSALON PERLES) capsule 100 mg  100 mg Oral TID PRN   • dextromethorphan-guaiFENesin (ROBITUSSIN DM) oral syrup 10 mL  10 mL Oral BID   • docusate sodium (COLACE) capsule 100 mg  100 mg Oral BID   • fluticasone-vilanterol 200-25 mcg/actuation 1 puff  1 puff Inhalation Daily   • hydrocodone-chlorpheniramine polistirex (TUSSIONEX) ER suspension 5 mL  5 mL Oral Q12H PRN   • HYDROmorphone HCl (DILAUDID) injection 0 2 mg  0 2 mg Intravenous Q4H PRN   • ipratropium (ATROVENT) 0 02 % inhalation solution 0 5 mg  0 5 mg Nebulization TID   • levalbuterol (XOPENEX) inhalation solution 1 25 mg  1 25 mg Nebulization TID   • levothyroxine tablet 88 mcg  88 mcg Oral Early Morning   • lidocaine (LIDODERM) 5 % patch 1 patch  1 patch Topical Daily   • metoprolol tartrate (LOPRESSOR) partial tablet 12 5 mg  12 5 mg Oral Q12H [address]   • mineral oil enema 1 enema 1 enema Rectal Once   • ondansetron (ZOFRAN) injection 4 mg  4 mg Intravenous Q4H PRN   • oxyCODONE (ROXICODONE) IR tablet 2 5 mg  2 5 mg Oral Q4H PRN   • oxyCODONE (ROXICODONE) IR tablet 5 mg  5 mg Oral Q4H PRN   • polyethylene glycol (MIRALAX) packet 17 g  17 g Oral Daily PRN   • senna (SENOKOT) tablet 17 2 mg  2 tablet Oral BID   • venlafaxine (EFFEXOR-XR) 24 hr capsule 150 mg  150 mg Oral Daily   • warfarin (COUMADIN) tablet 7 5 mg  7 5 mg Oral Daily (warfarin)       VTE Pharmacologic Prophylaxis: Warfarin  VTE Mechanical Prophylaxis: sequential compression device    Portions of the record may have been created with voice recognition software  Occasional wrong word or "sound a like" substitutions may have occurred due to the inherent limitations of voice recognition software    Read the chart carefully and recognize, using context, where substitutions have occurred   ==  Irma Pan MD  [address] [facility]  Internal Medicine Residency PGY-2

## 2022-12-22 NOTE — RESTORATIVE TECHNICIAN NOTE
Restorative Technician Note      Patient Name: Jose Gomes     Restorative Tech Visit Date: 12/22/22  Note Type: Mobility  Patient Position Upon Consult: Bedside chair  Activity Performed: Ambulated  Assistive Device: Roller walker  Patient Position at End of Consult: Bedside chair;  All needs within reach    Eleanor Moyer Restorative Technician

## 2022-12-23 LAB
BASOPHILS # BLD AUTO: 0.04 THOUSANDS/ÂΜL (ref 0–0.1)
BASOPHILS NFR BLD AUTO: 1 % (ref 0–1)
EOSINOPHIL # BLD AUTO: 0.26 THOUSAND/ÂΜL (ref 0–0.61)
EOSINOPHIL NFR BLD AUTO: 3 % (ref 0–6)
ERYTHROCYTE [DISTWIDTH] IN BLOOD BY AUTOMATED COUNT: 17 % (ref 11.6–15.1)
HCT VFR BLD AUTO: 29.3 % (ref 34.8–46.1)
HGB BLD-MCNC: 9.2 G/DL (ref 11.5–15.4)
IMM GRANULOCYTES # BLD AUTO: 0.06 THOUSAND/UL (ref 0–0.2)
IMM GRANULOCYTES NFR BLD AUTO: 1 % (ref 0–2)
INR PPP: 1.44 (ref 0.84–1.19)
LYMPHOCYTES # BLD AUTO: 1.29 THOUSANDS/ÂΜL (ref 0.6–4.47)
LYMPHOCYTES NFR BLD AUTO: 17 % (ref 14–44)
MCH RBC QN AUTO: 31 PG (ref 26.8–34.3)
MCHC RBC AUTO-ENTMCNC: 31.4 G/DL (ref 31.4–37.4)
MCV RBC AUTO: 99 FL (ref 82–98)
MONOCYTES # BLD AUTO: 0.64 THOUSAND/ÂΜL (ref 0.17–1.22)
MONOCYTES NFR BLD AUTO: 8 % (ref 4–12)
NEUTROPHILS # BLD AUTO: 5.32 THOUSANDS/ÂΜL (ref 1.85–7.62)
NEUTS SEG NFR BLD AUTO: 70 % (ref 43–75)
NRBC BLD AUTO-RTO: 0 /100 WBCS
PLATELET # BLD AUTO: 272 THOUSANDS/UL (ref 149–390)
PMV BLD AUTO: 9.8 FL (ref 8.9–12.7)
PROTHROMBIN TIME: 17.8 SECONDS (ref 11.6–14.5)
RBC # BLD AUTO: 2.97 MILLION/UL (ref 3.81–5.12)
WBC # BLD AUTO: 7.61 THOUSAND/UL (ref 4.31–10.16)

## 2022-12-23 RX ORDER — WARFARIN SODIUM 5 MG/1
10 TABLET ORAL
Status: DISCONTINUED | OUTPATIENT
Start: 2022-12-23 | End: 2022-12-24

## 2022-12-23 RX ADMIN — SENNOSIDES 17.2 MG: 8.6 TABLET, FILM COATED ORAL at 08:45

## 2022-12-23 RX ADMIN — DOCUSATE SODIUM 100 MG: 100 CAPSULE, LIQUID FILLED ORAL at 08:45

## 2022-12-23 RX ADMIN — LEVOTHYROXINE SODIUM 88 MCG: 88 TABLET ORAL at 05:58

## 2022-12-23 RX ADMIN — ACETAMINOPHEN 975 MG: 325 TABLET, FILM COATED ORAL at 05:58

## 2022-12-23 RX ADMIN — SENNOSIDES 17.2 MG: 8.6 TABLET, FILM COATED ORAL at 17:21

## 2022-12-23 RX ADMIN — GUAIFENESIN AND DEXTROMETHORPHAN 10 ML: 100; 10 SYRUP ORAL at 08:48

## 2022-12-23 RX ADMIN — DOCUSATE SODIUM 100 MG: 100 CAPSULE, LIQUID FILLED ORAL at 17:21

## 2022-12-23 RX ADMIN — Medication 12.5 MG: at 21:52

## 2022-12-23 RX ADMIN — Medication 12.5 MG: at 08:45

## 2022-12-23 RX ADMIN — ACETAMINOPHEN 975 MG: 325 TABLET, FILM COATED ORAL at 21:52

## 2022-12-23 RX ADMIN — LIDOCAINE 5% 1 PATCH: 700 PATCH TOPICAL at 08:45

## 2022-12-23 RX ADMIN — GUAIFENESIN AND DEXTROMETHORPHAN 10 ML: 100; 10 SYRUP ORAL at 21:52

## 2022-12-23 RX ADMIN — VENLAFAXINE HYDROCHLORIDE 150 MG: 150 CAPSULE, EXTENDED RELEASE ORAL at 08:50

## 2022-12-23 RX ADMIN — WARFARIN SODIUM 10 MG: 5 TABLET ORAL at 17:21

## 2022-12-23 RX ADMIN — ACETAMINOPHEN 975 MG: 325 TABLET, FILM COATED ORAL at 14:01

## 2022-12-23 RX ADMIN — ATORVASTATIN CALCIUM 40 MG: 40 TABLET, FILM COATED ORAL at 17:21

## 2022-12-23 RX ADMIN — FLUTICASONE FUROATE AND VILANTEROL TRIFENATATE 1 PUFF: 200; 25 POWDER RESPIRATORY (INHALATION) at 08:49

## 2022-12-23 NOTE — TELEMEDICINE
e-Consult (IPC)  - Interventional Radiology  Jenae Avalos [de-identified] y o  female MRN: [id number]  Unit/Bed#: [facility] 619-01 Encounter: [id number]          Interventional Radiology has been consulted to evaluate Jenae Avalos    We were consulted by emergency medicine concerning this patient with rectus sheath hemorrhage  Consults  12/23/22    Assessment/Recommendation:   [de-identified]year-old female with history of syncope, left abdominal/flank pain, on coumadin, with CT of the abdomen and pelvis showed large left rectus sheath hematoma with active extravasation of contrast  Patient was transfused pRBC and on pressors currently being rescuscitated, now transferred to [place] for further care  We will plan for angiogram with possible embolization of the left inferior epigastric artery  11-20 minutes, >50% of the total time devoted to medical consultative verbal/EMR discussion between providers  Written report will be generated in the EMR  Thank you for allowing Interventional Radiology to participate in the care of Jenae Avalos  Please don't hesitate to call or TigerText us with any questions       La Domínguez MD

## 2022-12-23 NOTE — PROGRESS NOTES
INTERNAL MEDICINE PROGRESS NOTE     Name: Simone Donohue   Age & Sex: [de-identified] y o  female   MRN: [id number]  Unit/Bed#: [facility] 619-01   Encounter: [id number]  Team: SLIM    PATIENT INFORMATION     Name: Simone Donohue   Age & Sex: [de-identified] y o  female   MRN: [id number]  Hospital Stay Days: 10    ASSESSMENT/PLAN     Principal Problem:    Rectus sheath hematoma  Active Problems:    Paroxysmal atrial fibrillation (HCC)    Hypothyroidism    Hypertension    Hyperlipidemia    Persistent dry cough    Hypoxia    Constipation      Constipation  Assessment & Plan  Patient reports no bowel movement for past 4 days  Abdominal tenderness on exam  Large bowel movement yesterday  Plan:  Added Colace and senna scheduled  Continue MiraLAX as needed  Hypoxia  Assessment & Plan  Requiring supplemental O2, which is new to her  Maybe in setting deconditioning vs continued post-infectious symptoms  · Continue supplemental O2 as needed  · Ambulatory pulse ox/home O2 eval ordered - patient does qualify  · Patient currently on room air    Persistent dry cough  Assessment & Plan  Likely post infectious  · Pulmonology following, appreciate recs  · Continue Atrovent, Xopenex, PRN albuterol, PRN robitussin, PRN tessalon perles  · Continue Breo ellipta for bronchospasm  · Adding tussinex PRN as per Pulmonology recs    Hyperlipidemia  Assessment & Plan  · Continue home atorvastatin 40 mg    Hypertension  Assessment & Plan  Stable normotensive  · Continue PTA Lopressor 12 5 mg BID  · Have been holding lisinopril - continue to hold for now as pressures acceptable  · Continue to monitor BP and reinitiate lisinopril if needed    Hypothyroidism  Assessment & Plan  · Continue home levothyroxine 88 mcg qd    Paroxysmal atrial fibrillation (HCC)  Assessment & Plan  Rate controlled on metoprolol currently  · Continue Lopressor 12 5 mg BID  Discussed with family risks and benefits of holding warfarin vs resuming   Plan to resume warfarin, monitor inr, once therapeutic, will reassess abdominal exam, if stable, patient will be okay for discharge  INR today 1 44  Warfarin 10mg today  * Rectus sheath hematoma  Assessment & Plan  Thought to be secondary to persistent coughing in the setting of anticoagulation  · S/p reversal of warfarin with Kcentra, dDAVP and 1 unit blood transfusion  · S/p embolization of inferior epigastric artery with IR on   · Appreciate Surgery recommendations   · Pain control with scheduled tylenol and PRN oxycodone, dilaudid  · Continue to monitor Hgb and transfuse for Hgb < 7  · Hemoglobin stable, resumed warfarin on   · When INR therapeutic, will reassess abdominal exam, if stable, patient will be okay for discharge  SUBJECTIVE     Patient seen and examined  No acute events overnight  Patient reports feeling well with no complaints  Reports abdominal pain is improved today after her bowel movement  OBJECTIVE     Vitals:    22 2137 22 2240 22 0545 22 0600   BP: 158/77 147/72 145/72    BP Location:  Right arm     Pulse: 83 77 79    Resp:  16 18    Temp:  98 3 °F (36 8 °C) 98 6 °F (37 °C)    TempSrc:  Oral     SpO2: 92% 93% 91%    Weight:    97 6 kg (215 lb 2 7 oz)   Height:          Temperature:   Temp (24hrs), Av 6 °F (37 °C), Min:98 3 °F (36 8 °C), Max:98 8 °F (37 1 °C)    Temperature: 98 6 °F (37 °C)  Intake & Output:  I/O        0701   0700  0701   0700  0701   0700    P  O  600 100     Total Intake(mL/kg) 600 (6 1) 100 (1)     Net +600 +100            Unmeasured Urine Occurrence 1 x          Weights:        Body mass index is 35 81 kg/m²  Weight (last 2 days)     Date/Time Weight    22 0600 97 6 (215 17)    22 0600 98 7 (217 5)        Physical Exam  Vitals and nursing note reviewed  Constitutional:       General: She is not in acute distress  Appearance: She is well-developed  HENT:      Head: Normocephalic and atraumatic     Eyes: Conjunctiva/sclera: Conjunctivae normal    Cardiovascular:      Rate and Rhythm: Normal rate and regular rhythm  Heart sounds: No murmur heard  Pulmonary:      Effort: Pulmonary effort is normal  No respiratory distress  Breath sounds: Normal breath sounds  Abdominal:      Palpations: Abdomen is soft  Tenderness: There is no abdominal tenderness  Musculoskeletal:         General: No swelling  Cervical back: Neck supple  Skin:     General: Skin is warm and dry  Capillary Refill: Capillary refill takes less than 2 seconds  Findings: Bruising (mid and lower back) present  Neurological:      Mental Status: She is alert  Psychiatric:         Mood and Affect: Mood normal        LABORATORY DATA     Labs: I have personally reviewed pertinent reports  Results from last 7 days   Lab Units 12/23/22  0543 12/22/22  0427 12/21/22  0530   WBC Thousand/uL 7 61 8 24 8 14   HEMOGLOBIN g/dL 9 2* 8 8* 9 1*   HEMATOCRIT % 29 3* 29 0* 29 4*   PLATELETS Thousands/uL 272 273 268   NEUTROS PCT % 70 68 71   MONOS PCT % 8 8 9      Results from last 7 days   Lab Units 12/22/22  0427 12/21/22  0530 12/18/22  0513   POTASSIUM mmol/L 4 2 4 3 4 5   CHLORIDE mmol/L 111* 110* 109*   CO2 mmol/L 25 25 26   BUN mg/dL 17 18 13   CREATININE mg/dL 0 65 0 70 0 67   CALCIUM mg/dL 8 6 8 5 7 9*              Results from last 7 days   Lab Units 12/23/22  0543 12/22/22  0427 12/21/22  1008   INR  1 44* 1 36* 1 18               IMAGING & DIAGNOSTIC TESTING     Radiology Results: I have personally reviewed pertinent reports  XR chest 1 view portable    Result Date: 12/13/2022  Impression: Bibasilar atelectasis more likely than infiltrates  Workstation performed: [id number]     CTA dissection protocol chest/abdomen/pelvis    Result Date: 12/12/2022  Impression: No evidence of aortic aneurysm or dissection   Active contrast extravasation within the large intramuscular hematoma centered within the left rectus femoris muscle extending into the left lateral abdominal wall musculature and decompressing into the left space of Retzius as detailed above  The study was marked in [facility] for immediate notification  Workstation performed: [id number]     IR embolization (specify vessel or site)    Result Date: 12/13/2022  Impression: Impression: Left inferior epigastric artery angiogram showed active bleeding at a distal branch of the artery  Therefore, the artery was embolized using gelfoam and multiple Embold coils  Workstation performed: [id number]     Other Diagnostic Testing: I have personally reviewed pertinent reports      ACTIVE MEDICATIONS     Current Facility-Administered Medications   Medication Dose Route Frequency   • acetaminophen (TYLENOL) tablet 975 mg  975 mg Oral Q8H [facility]   • albuterol inhalation solution 2 5 mg  2 5 mg Nebulization Q4H PRN   • aluminum-magnesium hydroxide-simethicone (MYLANTA) oral suspension 30 mL  30 mL Oral Q4H PRN   • atorvastatin (LIPITOR) tablet 40 mg  40 mg Oral Daily With Dinner   • benzonatate (TESSALON PERLES) capsule 100 mg  100 mg Oral TID PRN   • dextromethorphan-guaiFENesin (ROBITUSSIN DM) oral syrup 10 mL  10 mL Oral BID   • docusate sodium (COLACE) capsule 100 mg  100 mg Oral BID   • fluticasone-vilanterol 200-25 mcg/actuation 1 puff  1 puff Inhalation Daily   • hydrocodone-chlorpheniramine polistirex (TUSSIONEX) ER suspension 5 mL  5 mL Oral Q12H PRN   • HYDROmorphone HCl (DILAUDID) injection 0 2 mg  0 2 mg Intravenous Q4H PRN   • levothyroxine tablet 88 mcg  88 mcg Oral Early Morning   • lidocaine (LIDODERM) 5 % patch 1 patch  1 patch Topical Daily   • metoprolol tartrate (LOPRESSOR) partial tablet 12 5 mg  12 5 mg Oral Q12H JUAN PABLO   • ondansetron (ZOFRAN) injection 4 mg  4 mg Intravenous Q4H PRN   • oxyCODONE (ROXICODONE) IR tablet 2 5 mg  2 5 mg Oral Q4H PRN   • oxyCODONE (ROXICODONE) IR tablet 5 mg  5 mg Oral Q4H PRN   • polyethylene glycol (MIRALAX) packet 17 g  17 g Oral Daily PRN   • senna (SENOKOT) tablet 17 2 mg  2 tablet Oral BID   • venlafaxine (EFFEXOR-XR) 24 hr capsule 150 mg  150 mg Oral Daily   • warfarin (COUMADIN) tablet 10 mg  10 mg Oral Daily (warfarin)       VTE Pharmacologic Prophylaxis: Warfarin  VTE Mechanical Prophylaxis: sequential compression device    Portions of the record may have been created with voice recognition software  Occasional wrong word or "sound a like" substitutions may have occurred due to the inherent limitations of voice recognition software    Read the chart carefully and recognize, using context, where substitutions have occurred   ==  Tiffanie Pan MD  [address] [facility]  Internal Medicine Residency PGY-2

## 2022-12-23 NOTE — PLAN OF CARE
Problem: MOBILITY - ADULT  Goal: Maintain or return to baseline ADL function  Description: INTERVENTIONS:  -  Assess patient's ability to carry out ADLs; assess patient's baseline for ADL function and identify physical deficits which impact ability to perform ADLs (bathing, care of mouth/teeth, toileting, grooming, dressing, etc )  - Assess/evaluate cause of self-care deficits   - Assess range of motion  - Assess patient's mobility; develop plan if impaired  - Assess patient's need for assistive devices and provide as appropriate  - Encourage maximum independence but intervene and supervise when necessary  - Involve family in performance of ADLs  - Assess for home care needs following discharge   - Consider OT consult to assist with ADL evaluation and planning for discharge  - Provide patient education as appropriate  Outcome: Progressing     Problem: MOBILITY - ADULT  Goal: Maintains/Returns to pre admission functional level  Description: INTERVENTIONS:  - Perform BMAT or MOVE assessment daily    - Set and communicate daily mobility goal to care team and patient/family/caregiver     - Collaborate with rehabilitation services on mobility goals if consulted  - Stand patient 3 times a day  - Ambulate patient 2 times a day  - Out of bed to chair 3 times a day   - Out of bed for meals 3 times a day  - Out of bed for toileting  - Record patient progress and toleration of activity level   Outcome: Progressing     Problem: Prexisting or High Potential for Compromised Skin Integrity  Goal: Skin integrity is maintained or improved  Description: INTERVENTIONS:  - Identify patients at risk for skin breakdown  - Assess and monitor skin integrity  - Assess and monitor nutrition and hydration status  - Monitor labs   - Assess for incontinence   - Turn and reposition patient  - Assist with mobility/ambulation  - Relieve pressure over bony prominences  - Avoid friction and shearing  - Provide appropriate hygiene as needed including keeping skin clean and dry  - Evaluate need for skin moisturizer/barrier cream  - Collaborate with interdisciplinary team   - Patient/family teaching  - Consider wound care consult   Outcome: Progressing     Problem: PAIN - ADULT  Goal: Verbalizes/displays adequate comfort level or baseline comfort level  Description: Interventions:  - Encourage patient to monitor pain and request assistance  - Assess pain using appropriate pain scale  - Administer analgesics based on type and severity of pain and evaluate response  - Implement non-pharmacological measures as appropriate and evaluate response  - Consider cultural and social influences on pain and pain management  - Notify physician/advanced practitioner if interventions unsuccessful or patient reports new pain  Outcome: Progressing     Problem: Potential for Falls  Goal: Patient will remain free of falls  Description: INTERVENTIONS:  - Educate patient/family on patient safety including physical limitations  - Instruct patient to call for assistance with activity   - Consult OT/PT to assist with strengthening/mobility   - Keep Call bell within reach  - Keep bed low and locked with side rails adjusted as appropriate  - Keep care items and personal belongings within reach  - Initiate and maintain comfort rounds  - Make Fall Risk Sign visible to staff  - Offer Toileting every 2 Hours, in advance of need  - Initiate/Maintain bed alarm  - Obtain necessary fall risk management equipment: walker  - Apply yellow socks and bracelet for high fall risk patients  - Consider moving patient to room near nurses station  Outcome: Progressing

## 2022-12-23 NOTE — PHYSICAL THERAPY NOTE
Physical Therapy Progress Note     12/23/22 1337   PT Last Visit   PT Visit Date 12/23/22   Note Type   Note Type Treatment   Pain Assessment   Pain Score No Pain   Restrictions/Precautions   Other Precautions Fall Risk   Subjective   Subjective Pt encountered seated in recliner, pleasant and agreeable to treatment  Rerpots no new cmplaints  Is hopeful to d/c home soon because she misses her family & dog  Transfers   Sit to Stand 6  Modified independent   Stand to Sit 6  Modified independent   Ambulation/Elevation   Gait pattern Short stride;Decreased foot clearance; Foward flexed   Gait Assistance 6  Modified independent   Assistive Device Rolling walker   Distance 300'   Balance   Static Sitting Good   Static Standing Fair   Ambulatory Fair   Activity Tolerance   Activity Tolerance Patient tolerated treatment well   Nurse Yohana Farrar RN   Assessment   Prognosis Good   Problem List Decreased strength;Decreased endurance; Impaired balance;Decreased mobility;Pain   Assessment Pt continues to make steady progress towards baseline independence today, ambulating community distances with use of RW & no LOB  She performed all transfers safely at [facility] & reports no complaints of fatigue or weakness with activity  From PT persepctive, pt is appropriate to d/c home with social support prn & follow up home PT to continue progressing to PLOF  She no longer requires skilled PT interventions to continue progress during acute hospital stay  Discussed the same with supervising PT, Bharat Lima DPT who is in agreement to same  PT will sign off at this time  Goals   Patient Goals to go home to see her family   STG Expiration Date 12/27/22   PT Treatment Day 4   Plan   Treatment/Interventions Functional transfer training;LE strengthening/ROM; Therapeutic exercise; Endurance training;Equipment eval/education;Patient/family training;Bed mobility;Gait training   Progress Progressing toward goals   PT Frequency 3-5x/wk Recommendation   PT Discharge Recommendation Home with home health rehabilitation   [place] walker   AM-PAC Basic Mobility Inpatient   Turning in Bed Without Bedrails 4   Lying on Back to Sitting on Edge of Flat Bed 4   Moving Bed to Chair 4   Standing Up From Chair 4   Walk in Room 4   Climb 3-5 Stairs 4   Basic Mobility Inpatient Raw Score 24   Basic Mobility Standardized Score 57 68   Highest Level Of Mobility   JH-HLM Goal 8: Walk 250 feet or more   JH-HLM Achieved 8: Walk 250 feet ot more       Emogene Ip, PTA    An [facility] Basic Mobility Standardized Score of 40 78 suggests pt would benefit from post acute rehab

## 2022-12-23 NOTE — RESTORATIVE TECHNICIAN NOTE
Restorative Technician Note      Patient Name: Jenae Avalos     Restorative Tech Visit Date: 12/23/22  Note Type: Mobility  Patient Position Upon Consult: Bedside chair  Activity Performed: Ambulated  Assistive Device: Roller walker  Patient Position at End of Consult: Bedside chair;  All needs within reach    David Ghotra Technician

## 2022-12-23 NOTE — RESTORATIVE TECHNICIAN NOTE
Restorative Technician Note      Patient Name: Dorina Santana     Restorative Tech Visit Date: 12/23/22  Note Type: Mobility  Patient Position Upon Consult: Bedside chair  Activity Performed: Ambulated  Assistive Device: Roller walker  Patient Position at End of Consult: Bedside chair;  All needs within reach      David Vila Technnguyen

## 2022-12-23 NOTE — PLAN OF CARE
Problem: PHYSICAL THERAPY ADULT  Goal: Performs mobility at highest level of function for planned discharge setting  See evaluation for individualized goals  Description: Treatment/Interventions: Functional transfer training, LE strengthening/ROM, Therapeutic exercise, Endurance training, Patient/family training, Bed mobility, Gait training, Equipment eval/education, Spoke to nursing, OT          See flowsheet documentation for full assessment, interventions and recommendations  Outcome: Progressing  Note: Prognosis: Good  Problem List: Decreased strength, Decreased endurance, Impaired balance, Decreased mobility, Pain  Assessment: Pt continues to make steady progress towards baseline independence today, ambulating community distances with use of RW & no LOB  She performed all transfers safely at [facility] & reports no complaints of fatigue or weakness with activity  From PT persepctive, pt is appropriate to d/c home with social support prn & follow up home PT to continue progressing to PLOF  She no longer requires skilled PT interventions to continue progress during acute hospital stay  Discussed the same with supervising PT, Jelena Enrique DPT who is in agreement to same  PT will sign off at this time  Barriers to Discharge: Inaccessible home environment  Barriers to Discharge Comments: steps to enter  PT Discharge Recommendation: Home with home health rehabilitation    See flowsheet documentation for full assessment

## 2022-12-24 PROBLEM — R05.3 PERSISTENT DRY COUGH: Status: RESOLVED | Noted: 2022-12-13 | Resolved: 2022-12-24

## 2022-12-24 LAB
BASOPHILS # BLD AUTO: 0.02 THOUSANDS/ÂΜL (ref 0–0.1)
BASOPHILS NFR BLD AUTO: 0 % (ref 0–1)
EOSINOPHIL # BLD AUTO: 0.23 THOUSAND/ÂΜL (ref 0–0.61)
EOSINOPHIL NFR BLD AUTO: 3 % (ref 0–6)
ERYTHROCYTE [DISTWIDTH] IN BLOOD BY AUTOMATED COUNT: 17.2 % (ref 11.6–15.1)
HCT VFR BLD AUTO: 29.7 % (ref 34.8–46.1)
HGB BLD-MCNC: 9.4 G/DL (ref 11.5–15.4)
IMM GRANULOCYTES # BLD AUTO: 0.04 THOUSAND/UL (ref 0–0.2)
IMM GRANULOCYTES NFR BLD AUTO: 1 % (ref 0–2)
INR PPP: 1.52 (ref 0.84–1.19)
LYMPHOCYTES # BLD AUTO: 1.36 THOUSANDS/ÂΜL (ref 0.6–4.47)
LYMPHOCYTES NFR BLD AUTO: 20 % (ref 14–44)
MCH RBC QN AUTO: 31.1 PG (ref 26.8–34.3)
MCHC RBC AUTO-ENTMCNC: 31.6 G/DL (ref 31.4–37.4)
MCV RBC AUTO: 98 FL (ref 82–98)
MONOCYTES # BLD AUTO: 0.53 THOUSAND/ÂΜL (ref 0.17–1.22)
MONOCYTES NFR BLD AUTO: 8 % (ref 4–12)
NEUTROPHILS # BLD AUTO: 4.5 THOUSANDS/ÂΜL (ref 1.85–7.62)
NEUTS SEG NFR BLD AUTO: 68 % (ref 43–75)
NRBC BLD AUTO-RTO: 0 /100 WBCS
PLATELET # BLD AUTO: 276 THOUSANDS/UL (ref 149–390)
PMV BLD AUTO: 9.5 FL (ref 8.9–12.7)
PROTHROMBIN TIME: 18.6 SECONDS (ref 11.6–14.5)
RBC # BLD AUTO: 3.02 MILLION/UL (ref 3.81–5.12)
WBC # BLD AUTO: 6.68 THOUSAND/UL (ref 4.31–10.16)

## 2022-12-24 RX ORDER — METHOCARBAMOL 500 MG/1
500 TABLET, FILM COATED ORAL EVERY 6 HOURS SCHEDULED
Status: DISCONTINUED | OUTPATIENT
Start: 2022-12-24 | End: 2022-12-28 | Stop reason: HOSPADM

## 2022-12-24 RX ORDER — POLYETHYLENE GLYCOL 3350 17 G/17G
17 POWDER, FOR SOLUTION ORAL DAILY
Status: DISCONTINUED | OUTPATIENT
Start: 2022-12-24 | End: 2022-12-26

## 2022-12-24 RX ORDER — WARFARIN SODIUM 7.5 MG/1
7.5 TABLET ORAL
Status: DISCONTINUED | OUTPATIENT
Start: 2022-12-24 | End: 2022-12-28 | Stop reason: HOSPADM

## 2022-12-24 RX ADMIN — GUAIFENESIN AND DEXTROMETHORPHAN 10 ML: 100; 10 SYRUP ORAL at 22:08

## 2022-12-24 RX ADMIN — SENNOSIDES 8.6 MG: 8.6 TABLET, FILM COATED ORAL at 08:28

## 2022-12-24 RX ADMIN — SENNOSIDES 17.2 MG: 8.6 TABLET, FILM COATED ORAL at 17:15

## 2022-12-24 RX ADMIN — LEVOTHYROXINE SODIUM 88 MCG: 88 TABLET ORAL at 06:51

## 2022-12-24 RX ADMIN — VENLAFAXINE HYDROCHLORIDE 150 MG: 150 CAPSULE, EXTENDED RELEASE ORAL at 08:29

## 2022-12-24 RX ADMIN — GUAIFENESIN AND DEXTROMETHORPHAN 10 ML: 100; 10 SYRUP ORAL at 08:28

## 2022-12-24 RX ADMIN — Medication 12.5 MG: at 22:08

## 2022-12-24 RX ADMIN — METHOCARBAMOL 500 MG: 500 TABLET ORAL at 14:09

## 2022-12-24 RX ADMIN — POLYETHYLENE GLYCOL 3350 17 G: 17 POWDER, FOR SOLUTION ORAL at 17:15

## 2022-12-24 RX ADMIN — DOCUSATE SODIUM 100 MG: 100 CAPSULE, LIQUID FILLED ORAL at 08:28

## 2022-12-24 RX ADMIN — LIDOCAINE 5% 1 PATCH: 700 PATCH TOPICAL at 08:28

## 2022-12-24 RX ADMIN — WARFARIN SODIUM 7.5 MG: 7.5 TABLET ORAL at 17:15

## 2022-12-24 RX ADMIN — ACETAMINOPHEN 975 MG: 325 TABLET, FILM COATED ORAL at 22:08

## 2022-12-24 RX ADMIN — DOCUSATE SODIUM 100 MG: 100 CAPSULE, LIQUID FILLED ORAL at 17:15

## 2022-12-24 RX ADMIN — ATORVASTATIN CALCIUM 40 MG: 40 TABLET, FILM COATED ORAL at 17:15

## 2022-12-24 RX ADMIN — ACETAMINOPHEN 975 MG: 325 TABLET, FILM COATED ORAL at 14:09

## 2022-12-24 RX ADMIN — FLUTICASONE FUROATE AND VILANTEROL TRIFENATATE 1 PUFF: 200; 25 POWDER RESPIRATORY (INHALATION) at 08:29

## 2022-12-24 RX ADMIN — ACETAMINOPHEN 975 MG: 325 TABLET, FILM COATED ORAL at 06:51

## 2022-12-24 RX ADMIN — Medication 12.5 MG: at 08:28

## 2022-12-24 NOTE — PLAN OF CARE
Problem: MOBILITY - ADULT  Goal: Maintain or return to baseline ADL function  Description: INTERVENTIONS:  -  Assess patient's ability to carry out ADLs; assess patient's baseline for ADL function and identify physical deficits which impact ability to perform ADLs (bathing, care of mouth/teeth, toileting, grooming, dressing, etc )  - Assess/evaluate cause of self-care deficits   - Assess range of motion  - Assess patient's mobility; develop plan if impaired  - Assess patient's need for assistive devices and provide as appropriate  - Encourage maximum independence but intervene and supervise when necessary  - Involve family in performance of ADLs  - Assess for home care needs following discharge   - Consider OT consult to assist with ADL evaluation and planning for discharge  - Provide patient education as appropriate  Outcome: Progressing  Goal: Maintains/Returns to pre admission functional level  Description: INTERVENTIONS:  - Perform BMAT or MOVE assessment daily    - Set and communicate daily mobility goal to care team and patient/family/caregiver  - Collaborate with rehabilitation services on mobility goals if consulted  - Perform Range of Motion    times a day  - Reposition patient every    hours    - Dangle patient    times a day  - Stand patient    times a day  - Ambulate patient    times a day  - Out of bed to chair    times a day   - Out of bed for meals    times a day  - Out of bed for toileting  - Record patient progress and toleration of activity level   Outcome: Progressing     Problem: Potential for Falls  Goal: Patient will remain free of falls  Description: INTERVENTIONS:  - Educate patient/family on patient safety including physical limitations  - Instruct patient to call for assistance with activity   - Consult OT/PT to assist with strengthening/mobility   - Keep Call bell within reach  - Keep bed low and locked with side rails adjusted as appropriate  - Keep care items and personal belongings within reach  - Initiate and maintain comfort rounds  - Make Fall Risk Sign visible to staff  - Offer Toileting every    Hours, in advance of need  - Initiate/Maintain   alarm  - Obtain necessary fall risk management equipment:   - Apply yellow socks and bracelet for high fall risk patients  - Consider moving patient to room near nurses station  Outcome: Progressing     Problem: Prexisting or High Potential for Compromised Skin Integrity  Goal: Skin integrity is maintained or improved  Description: INTERVENTIONS:  - Identify patients at risk for skin breakdown  - Assess and monitor skin integrity  - Assess and monitor nutrition and hydration status  - Monitor labs   - Assess for incontinence   - Turn and reposition patient  - Assist with mobility/ambulation  - Relieve pressure over bony prominences  - Avoid friction and shearing  - Provide appropriate hygiene as needed including keeping skin clean and dry  - Evaluate need for skin moisturizer/barrier cream  - Collaborate with interdisciplinary team   - Patient/family teaching  - Consider wound care consult   Outcome: Progressing     Problem: PAIN - ADULT  Goal: Verbalizes/displays adequate comfort level or baseline comfort level  Description: Interventions:  - Encourage patient to monitor pain and request assistance  - Assess pain using appropriate pain scale  - Administer analgesics based on type and severity of pain and evaluate response  - Implement non-pharmacological measures as appropriate and evaluate response  - Consider cultural and social influences on pain and pain management  - Notify physician/advanced practitioner if interventions unsuccessful or patient reports new pain  Outcome: Progressing

## 2022-12-24 NOTE — PLAN OF CARE
Problem: MOBILITY - ADULT  Goal: Maintain or return to baseline ADL function  Description: INTERVENTIONS:  -  Assess patient's ability to carry out ADLs; assess patient's baseline for ADL function and identify physical deficits which impact ability to perform ADLs (bathing, care of mouth/teeth, toileting, grooming, dressing, etc )  - Assess/evaluate cause of self-care deficits   - Assess range of motion  - Assess patient's mobility; develop plan if impaired  - Assess patient's need for assistive devices and provide as appropriate  - Encourage maximum independence but intervene and supervise when necessary  - Involve family in performance of ADLs  - Assess for home care needs following discharge   - Consider OT consult to assist with ADL evaluation and planning for discharge  - Provide patient education as appropriate  Outcome: Progressing  Goal: Maintains/Returns to pre admission functional level  Description: INTERVENTIONS:  - Perform BMAT or MOVE assessment daily    - Set and communicate daily mobility goal to care team and patient/family/caregiver  - Collaborate with rehabilitation services on mobility goals if consulted  - Perform Range of Motion 3 times a day  - Reposition patient every 2 hours    - Dangle patient 3 times a day  - Stand patient 3 times a day  - Ambulate patient 3 times a day  - Out of bed to chair 3 times a day   - Out of bed for meals 3 times a day  - Out of bed for toileting  - Record patient progress and toleration of activity level   Outcome: Progressing     Problem: Potential for Falls  Goal: Patient will remain free of falls  Description: INTERVENTIONS:  - Educate patient/family on patient safety including physical limitations  - Instruct patient to call for assistance with activity   - Consult OT/PT to assist with strengthening/mobility   - Keep Call bell within reach  - Keep bed low and locked with side rails adjusted as appropriate  - Keep care items and personal belongings within reach  - Initiate and maintain comfort rounds  - Make Fall Risk Sign visible to staff  - Offer Toileting every 2 Hours, in advance of need  - Initiate/Maintain 2 alarm  - Obtain necessary fall risk management equipment:   - Apply yellow socks and bracelet for high fall risk patients  - Consider moving patient to room near nurses station  Outcome: Progressing     Problem: Prexisting or High Potential for Compromised Skin Integrity  Goal: Skin integrity is maintained or improved  Description: INTERVENTIONS:  - Identify patients at risk for skin breakdown  - Assess and monitor skin integrity  - Assess and monitor nutrition and hydration status  - Monitor labs   - Assess for incontinence   - Turn and reposition patient  - Assist with mobility/ambulation  - Relieve pressure over bony prominences  - Avoid friction and shearing  - Provide appropriate hygiene as needed including keeping skin clean and dry  - Evaluate need for skin moisturizer/barrier cream  - Collaborate with interdisciplinary team   - Patient/family teaching  - Consider wound care consult   Outcome: Progressing     Problem: PAIN - ADULT  Goal: Verbalizes/displays adequate comfort level or baseline comfort level  Description: Interventions:  - Encourage patient to monitor pain and request assistance  - Assess pain using appropriate pain scale  - Administer analgesics based on type and severity of pain and evaluate response  - Implement non-pharmacological measures as appropriate and evaluate response  - Consider cultural and social influences on pain and pain management  - Notify physician/advanced practitioner if interventions unsuccessful or patient reports new pain  Outcome: Progressing

## 2022-12-24 NOTE — PROGRESS NOTES
[address] [facility]  Progress Note Gianluca Hutchins 1940, [de-identified] y o  female MRN: [id number]  Unit/Bed#: [facility] 443-79 Encounter: [id number]  Primary Care Provider: Tk Parker DO   Date and time admitted to hospital: 12/12/2022 10:57 PM    * Rectus sheath hematoma  Assessment & Plan  Thought to be secondary to persistent coughing in the setting of anticoagulation  · S/p reversal of warfarin with Kcentra, dDAVP and 1 unit blood transfusion  · S/p embolization of inferior epigastric artery with IR on 12/12  · Hb stable between 8-10  · Appreciate Surgery recommendations   · Pain control with scheduled tylenol and PRN oxycodone, dilaudid  · Continue to monitor Hgb and transfuse for Hgb < 7  · Hemoglobin stable, resumed warfarin on 12/18  · INR remains subtherapeutic but trending up  · When INR therapeutic, will reassess abdominal exam, if stable, patient will be okay for discharge  · Add robaxin for additional pain coverage    Constipation  Assessment & Plan  Patient reports no bowel movement for past 4 days  Abdominal tenderness on exam  Large bowel movement on 12/22    Plan:  Added Colace and senna scheduled  Continue MiraLAX     Hypoxia  Assessment & Plan  Requiring supplemental O2, which is new to her  Maybe in setting deconditioning vs continued post-infectious symptoms    · Continue supplemental O2 as needed  · Ambulatory pulse ox/home O2 eval ordered - patient does qualify  · Patient currently on room air    Hyperlipidemia  Assessment & Plan  · Continue home atorvastatin 40 mg    Hypertension  Assessment & Plan  Within acceptable range  · Continue PTA Lopressor 12 5 mg BID  · Have been holding lisinopril - continue to hold for now as pressures acceptable  · Continue to monitor BP and reinitiate lisinopril if needed    Hypothyroidism  Assessment & Plan  · Continue home levothyroxine 88 mcg qd    Paroxysmal atrial fibrillation (HCC)  Assessment & Plan  Rate controlled on metoprolol currently  · Continue Lopressor 12 5 mg BID  · Discussed with family risks and benefits of holding warfarin vs resuming  Plan to resume warfarin, monitor inr, once therapeutic, will reassess abdominal exam, if stable, patient will be okay for discharge  · INR today 1 57 , continuie coumadin and trend INR    Persistent dry cough-resolved as of 2022  Assessment & Plan  Likely post infectious  · Pulmonology following, appreciate recs  · Continue Atrovent, Xopenex, PRN albuterol, PRN robitussin, PRN tessalon perles  · Continue Breo ellipta for bronchospasm  · Adding tussinex PRN as per Pulmonology recs        VTE Pharmacologic Prophylaxis:   Pharmacologic: Warfarin (Coumadin)  Mechanical VTE Prophylaxis in Place: Yes    Patient Centered Rounds: I have performed bedside rounds with nursing staff today  Education and Discussions with Family / Patient: plan of care, patient and family at bedside    Time Spent for Care: 30 minutes  More than 50% of total time spent on counseling and coordination of care as described above  Current Length of Stay: 11 day(s)    Current Patient Status: Inpatient   Certification Statement: The patient will continue to require additional inpatient hospital stay due to pending therapeutic INR    Discharge Plan: home when stable    Code Status: Level 1 - Full Code      Subjective:   Had some sharp lower chest discomfort last night but since has improved  Currently only mild soreness  Had a BM on     Objective:     Vitals:   Temp (24hrs), Av 2 °F (36 8 °C), Min:98 °F (36 7 °C), Max:98 3 °F (36 8 °C)    Temp:  [98 °F (36 7 °C)-98 3 °F (36 8 °C)] 98 °F (36 7 °C)  HR:  [73-80] 74  Resp:  [18] 18  BP: (143-146)/(72-73) 145/73  SpO2:  [90 %-94 %] 90 %  Body mass index is 35 88 kg/m²  Input and Output Summary (last 24 hours):        Intake/Output Summary (Last 24 hours) at 2022 1645  Last data filed at 2022 0900  Gross per 24 hour   Intake 545 ml Output --   Net 545 ml       Physical Exam:     Physical Exam  Constitutional:       General: She is not in acute distress  Cardiovascular:      Rate and Rhythm: Normal rate and regular rhythm  Heart sounds: Normal heart sounds  No murmur heard  Pulmonary:      Effort: No respiratory distress  Breath sounds: Normal breath sounds  No wheezing or rales  Abdominal:      General: Bowel sounds are normal  There is no distension  Palpations: Abdomen is soft  Tenderness: There is no abdominal tenderness  Musculoskeletal:         General: No swelling  Skin:     General: Skin is warm  Neurological:      Mental Status: She is alert  Coordination: Abnormal coordination:  imaging  Comments: Awake alert and communicative             Additional Data:     Labs:    Results from last 7 days   Lab Units 12/24/22  0701   WBC Thousand/uL 6 68   HEMOGLOBIN g/dL 9 4*   HEMATOCRIT % 29 7*   PLATELETS Thousands/uL 276   NEUTROS PCT % 68   LYMPHS PCT % 20   MONOS PCT % 8   EOS PCT % 3     Results from last 7 days   Lab Units 12/22/22  0427   SODIUM mmol/L 140   POTASSIUM mmol/L 4 2   CHLORIDE mmol/L 111*   CO2 mmol/L 25   BUN mg/dL 17   CREATININE mg/dL 0 65   ANION GAP mmol/L 4   CALCIUM mg/dL 8 6   GLUCOSE RANDOM mg/dL 110     Results from last 7 days   Lab Units 12/24/22  0701   INR  1 52*                       * I Have Reviewed All Lab Data Listed Above  * Additional Pertinent Lab Tests Reviewed: All Labs Within Last 24 Hours Reviewed    Imaging:    CT abdomen pelvis wo contrast   Final Result by Arthur Fraser MD (12/18 6782)      Mild improvement of left rectus abdominous muscle and left pelvic extraperitoneal hematoma  Hematoma within the lateral abdominal wall musculature has essentially resolved              Workstation performed: [id number]         IR embolization (specify vessel or site)   Final Result by Hipolito Scott MD (12/13 0967)   Impression:    Left inferior epigastric artery angiogram showed active bleeding at a distal branch of the artery  Therefore, the artery was embolized using gelfoam and multiple Embold coils           Workstation performed: [id number]             Recent Cultures (last 7 days):           Last 24 Hours Medication List:   Current Facility-Administered Medications   Medication Dose Route Frequency Provider Last Rate   • acetaminophen  975 mg Oral [facility] Phil Beat, CRNP     • albuterol  2 5 mg Nebulization Q4H PRN Phil Beat, CRNP     • aluminum-magnesium hydroxide-simethicone  30 mL Oral Q4H PRN Neha Hong DO     • atorvastatin  40 mg Oral Daily With [employer], CRNP     • benzonatate  100 mg Oral TID PRN Phil Beat, CRNP     • dextromethorphan-guaiFENesin  10 mL Oral BID Eric Rivera MD     • docusate sodium  100 mg Oral BID Velma Pan MD     • fluticasone-vilanterol  1 puff Inhalation Daily Phil Beat, CRNP     • hydrocodone-chlorpheniramine polistirex  5 mL Oral Q12H PRN Eric Rivera MD     • HYDROmorphone  0 2 mg Intravenous Q4H PRN Phil Beat, CRNP     • levothyroxine  88 mcg Oral Early Morning Phil Beat, CRNP     • lidocaine  1 patch Topical Daily Eric Rivera MD     • methocarbamol  500 mg Oral Q6H [facility] Jessy Gaucher, MD     • metoprolol tartrate  12 5 mg Oral Q12H [facility] Phil Beat, CRNP     • ondansetron  4 mg Intravenous Q4H PRN Phil Beat, CRNP     • oxyCODONE  2 5 mg Oral Q4H PRN Phil Beat, CRNP     • oxyCODONE  5 mg Oral Q4H PRN Phil Beat, CRNP     • polyethylene glycol  17 g Oral Daily PRN Velma Pan MD     • senna  2 tablet Oral BID Jessy Gaucher, MD     • venlafaxine  150 mg Oral Daily Phil Beat, CRNP     • warfarin  7 5 mg Oral Daily (warfarin) Jessy Gaucher, MD          Today, Patient Was Seen By: Jessy Gaucher, MD    ** Please Note: Dictation voice to text software may have been used in the creation of this document   **

## 2022-12-24 NOTE — RESTORATIVE TECHNICIAN NOTE
Restorative Technician Note      Patient Name: Bill November     Restorative Tech Visit Date: 12/24/22  Note Type: Mobility  Patient Position Upon Consult: Supine  Activity Performed: Ambulated  Assistive Device: Roller walker  Patient Position at End of Consult: Bedside chair;  All needs within reach    [facility] 2, BA, RT

## 2022-12-25 LAB
ERYTHROCYTE [DISTWIDTH] IN BLOOD BY AUTOMATED COUNT: 17.5 % (ref 11.6–15.1)
HCT VFR BLD AUTO: 31.6 % (ref 34.8–46.1)
HGB BLD-MCNC: 9.8 G/DL (ref 11.5–15.4)
INR PPP: 1.73 (ref 0.84–1.19)
MCH RBC QN AUTO: 31.1 PG (ref 26.8–34.3)
MCHC RBC AUTO-ENTMCNC: 31 G/DL (ref 31.4–37.4)
MCV RBC AUTO: 100 FL (ref 82–98)
PLATELET # BLD AUTO: 269 THOUSANDS/UL (ref 149–390)
PMV BLD AUTO: 9.7 FL (ref 8.9–12.7)
PROTHROMBIN TIME: 20.5 SECONDS (ref 11.6–14.5)
RBC # BLD AUTO: 3.15 MILLION/UL (ref 3.81–5.12)
WBC # BLD AUTO: 6.9 THOUSAND/UL (ref 4.31–10.16)

## 2022-12-25 RX ADMIN — METHOCARBAMOL 500 MG: 500 TABLET ORAL at 00:20

## 2022-12-25 RX ADMIN — SENNOSIDES 17.2 MG: 8.6 TABLET, FILM COATED ORAL at 17:01

## 2022-12-25 RX ADMIN — ACETAMINOPHEN 975 MG: 325 TABLET, FILM COATED ORAL at 13:15

## 2022-12-25 RX ADMIN — ATORVASTATIN CALCIUM 40 MG: 40 TABLET, FILM COATED ORAL at 17:01

## 2022-12-25 RX ADMIN — VENLAFAXINE HYDROCHLORIDE 150 MG: 150 CAPSULE, EXTENDED RELEASE ORAL at 08:31

## 2022-12-25 RX ADMIN — LIDOCAINE 5% 1 PATCH: 700 PATCH TOPICAL at 08:35

## 2022-12-25 RX ADMIN — METHOCARBAMOL 500 MG: 500 TABLET ORAL at 23:54

## 2022-12-25 RX ADMIN — GUAIFENESIN AND DEXTROMETHORPHAN 10 ML: 100; 10 SYRUP ORAL at 22:17

## 2022-12-25 RX ADMIN — POLYETHYLENE GLYCOL 3350 17 G: 17 POWDER, FOR SOLUTION ORAL at 08:32

## 2022-12-25 RX ADMIN — DOCUSATE SODIUM 100 MG: 100 CAPSULE, LIQUID FILLED ORAL at 17:01

## 2022-12-25 RX ADMIN — ACETAMINOPHEN 975 MG: 325 TABLET, FILM COATED ORAL at 05:00

## 2022-12-25 RX ADMIN — WARFARIN SODIUM 7.5 MG: 7.5 TABLET ORAL at 17:01

## 2022-12-25 RX ADMIN — FLUTICASONE FUROATE AND VILANTEROL TRIFENATATE 1 PUFF: 200; 25 POWDER RESPIRATORY (INHALATION) at 08:38

## 2022-12-25 RX ADMIN — METHOCARBAMOL 500 MG: 500 TABLET ORAL at 17:01

## 2022-12-25 RX ADMIN — METHOCARBAMOL 500 MG: 500 TABLET ORAL at 05:00

## 2022-12-25 RX ADMIN — DOCUSATE SODIUM 100 MG: 100 CAPSULE, LIQUID FILLED ORAL at 08:30

## 2022-12-25 RX ADMIN — SENNOSIDES 17.2 MG: 8.6 TABLET, FILM COATED ORAL at 08:30

## 2022-12-25 RX ADMIN — Medication 12.5 MG: at 08:31

## 2022-12-25 RX ADMIN — Medication 12.5 MG: at 22:17

## 2022-12-25 RX ADMIN — GUAIFENESIN AND DEXTROMETHORPHAN 10 ML: 100; 10 SYRUP ORAL at 08:31

## 2022-12-25 RX ADMIN — ACETAMINOPHEN 975 MG: 325 TABLET, FILM COATED ORAL at 22:16

## 2022-12-25 RX ADMIN — LEVOTHYROXINE SODIUM 88 MCG: 88 TABLET ORAL at 05:00

## 2022-12-25 RX ADMIN — METHOCARBAMOL 500 MG: 500 TABLET ORAL at 13:16

## 2022-12-25 NOTE — ASSESSMENT & PLAN NOTE
Rate controlled on metoprolol currently  · Continue Lopressor 12 5 mg BID  · Discussed with family risks and benefits of holding warfarin vs resuming  Plan to resume warfarin, monitor inr, once therapeutic, will reassess abdominal exam, if stable, patient will be okay for discharge    · INR today 1 73 , continuie coumadin and trend INR

## 2022-12-25 NOTE — PLAN OF CARE
Problem: MOBILITY - ADULT  Goal: Maintain or return to baseline ADL function  Description: INTERVENTIONS:  -  Assess patient's ability to carry out ADLs; assess patient's baseline for ADL function and identify physical deficits which impact ability to perform ADLs (bathing, care of mouth/teeth, toileting, grooming, dressing, etc )  - Assess/evaluate cause of self-care deficits   - Assess range of motion  - Assess patient's mobility; develop plan if impaired  - Assess patient's need for assistive devices and provide as appropriate  - Encourage maximum independence but intervene and supervise when necessary  - Involve family in performance of ADLs  - Assess for home care needs following discharge   - Consider OT consult to assist with ADL evaluation and planning for discharge  - Provide patient education as appropriate  Outcome: Progressing  Goal: Maintains/Returns to pre admission functional level  Description: INTERVENTIONS:  - Perform BMAT or MOVE assessment daily    - Set and communicate daily mobility goal to care team and patient/family/caregiver  - Collaborate with rehabilitation services on mobility goals if consulted  - Perform Range of Motion 3 times a day  - Reposition patient every 2 hours    - Dangle patient 3 times a day  - Stand patient 3 times a day  - Ambulate patient 3 times a day  - Out of bed to chair 3 times a day   - Out of bed for meals 3 times a day  - Out of bed for toileting  - Record patient progress and toleration of activity level   Outcome: Progressing     Problem: Potential for Falls  Goal: Patient will remain free of falls  Description: INTERVENTIONS:  - Educate patient/family on patient safety including physical limitations  - Instruct patient to call for assistance with activity   - Consult OT/PT to assist with strengthening/mobility   - Keep Call bell within reach  - Keep bed low and locked with side rails adjusted as appropriate  - Keep care items and personal belongings within reach  - Initiate and maintain comfort rounds  - Make Fall Risk Sign visible to staff  - Offer Toileting every 2 Hours, in advance of need  - Initiate/Maintain bedalarm  - Obtain necessary fall risk management equipment:   - Apply yellow socks and bracelet for high fall risk patients  - Consider moving patient to room near nurses station  Outcome: Progressing     Problem: Prexisting or High Potential for Compromised Skin Integrity  Goal: Skin integrity is maintained or improved  Description: INTERVENTIONS:  - Identify patients at risk for skin breakdown  - Assess and monitor skin integrity  - Assess and monitor nutrition and hydration status  - Monitor labs   - Assess for incontinence   - Turn and reposition patient  - Assist with mobility/ambulation  - Relieve pressure over bony prominences  - Avoid friction and shearing  - Provide appropriate hygiene as needed including keeping skin clean and dry  - Evaluate need for skin moisturizer/barrier cream  - Collaborate with interdisciplinary team   - Patient/family teaching  - Consider wound care consult   Outcome: Progressing     Problem: PAIN - ADULT  Goal: Verbalizes/displays adequate comfort level or baseline comfort level  Description: Interventions:  - Encourage patient to monitor pain and request assistance  - Assess pain using appropriate pain scale  - Administer analgesics based on type and severity of pain and evaluate response  - Implement non-pharmacological measures as appropriate and evaluate response  - Consider cultural and social influences on pain and pain management  - Notify physician/advanced practitioner if interventions unsuccessful or patient reports new pain  Outcome: Progressing

## 2022-12-25 NOTE — PROGRESS NOTES
[address] [facility]  Progress Note Rox Mulligan 1940, [de-identified] y o  female MRN: [id number]  Unit/Bed#: [facility] 561-19 Encounter: [id number]  Primary Care Provider: Anita Omalley DO   Date and time admitted to hospital: 12/12/2022 10:57 PM    * Rectus sheath hematoma  Assessment & Plan  Thought to be secondary to persistent coughing in the setting of anticoagulation  · S/p reversal of warfarin with Kcentra, dDAVP and 1 unit blood transfusion  · S/p embolization of inferior epigastric artery with IR on 12/12  · Hb stable between 8-10  · Appreciate Surgery recommendations   · Pain control with scheduled tylenol and PRN oxycodone, dilaudid  · Continue to monitor Hgb and transfuse for Hgb < 7  · Hemoglobin stable, resumed warfarin on 12/18  · INR remains subtherapeutic but trending up  1 73  · When INR therapeutic, will reassess abdominal exam, if stable, patient will be okay for discharge  · Added robaxin for additional pain coverage    Constipation  Assessment & Plan  Patient reports no bowel movement for past 4 days  Abdominal tenderness on exam  Large bowel movement on 12/22    Plan:  Added Colace and senna scheduled  Continue MiraLAX     Hypoxia  Assessment & Plan  Requiring supplemental O2, which is new to her  Maybe in setting deconditioning vs continued post-infectious symptoms    · Continue supplemental O2 as needed  · Ambulatory pulse ox/home O2 eval ordered - patient does qualify  · Patient currently on room air    Hyperlipidemia  Assessment & Plan  · Continue home atorvastatin 40 mg    Hypertension  Assessment & Plan  Within acceptable range  · Continue PTA Lopressor 12 5 mg BID  · Have been holding lisinopril - continue to hold for now as pressures acceptable  · Continue to monitor BP and reinitiate lisinopril if needed    Hypothyroidism  Assessment & Plan  · Continue home levothyroxine 88 mcg qd    Paroxysmal atrial fibrillation (HCC)  Assessment & Plan  Rate controlled on metoprolol currently  · Continue Lopressor 12 5 mg BID  · Discussed with family risks and benefits of holding warfarin vs resuming  Plan to resume warfarin, monitor inr, once therapeutic, will reassess abdominal exam, if stable, patient will be okay for discharge  · INR today 1 73 , continuie coumadin and trend INR        VTE Pharmacologic Prophylaxis:   Pharmacologic: Warfarin (Coumadin)  Mechanical VTE Prophylaxis in Place: Yes    Patient Centered Rounds: I have performed bedside rounds with nursing staff today  Education and Discussions with Family / Patient: Plan of care discussed with patient and also called and updated her daughter  Time Spent for Care: 30 minutes  More than 50% of total time spent on counseling and coordination of care as described above  Current Length of Stay: 12 day(s)    Current Patient Status: Inpatient   Certification Statement: The patient will continue to require additional inpatient hospital stay due to not Medically stable due to subtherapeutic INR    Discharge Plan: Home if stable at therapeutic INR    Code Status: Level 1 - Full Code      Subjective:   Overnight events reported  Patient reports that her left-sided abdominal discomfort is improving  She has been walking around the hallway  Objective:     Vitals:   Temp (24hrs), Av 3 °F (36 8 °C), Min:98 °F (36 7 °C), Max:98 4 °F (36 9 °C)    Temp:  [98 °F (36 7 °C)-98 4 °F (36 9 °C)] 98 3 °F (36 8 °C)  HR:  [74-82] 77  Resp:  [16-18] 16  BP: (130-145)/(71-77) 130/76  SpO2:  [90 %-93 %] 93 %  Body mass index is 35 99 kg/m²  Input and Output Summary (last 24 hours): Intake/Output Summary (Last 24 hours) at 2022 1512  Last data filed at 2022 1340  Gross per 24 hour   Intake 240 ml   Output --   Net 240 ml       Physical Exam:     Physical Exam  Constitutional:       General: She is not in acute distress  Cardiovascular:      Rate and Rhythm: Normal rate and regular rhythm        Heart sounds: Normal heart sounds  No murmur heard  Pulmonary:      Effort: No respiratory distress  Breath sounds: Normal breath sounds  No wheezing or rales  Abdominal:      General: Bowel sounds are normal  There is no distension  Palpations: Abdomen is soft  Tenderness: There is no abdominal tenderness  abdominal bruise noted which is relatively stable  Musculoskeletal:         General: No swelling  Skin:     General: Skin is warm  Neurological:      Mental Status: She is alert  Comments: Awake alert and communicative    Additional Data:     Labs:    Results from last 7 days   Lab Units 12/25/22  0455 12/24/22  0701   WBC Thousand/uL 6 90 6 68   HEMOGLOBIN g/dL 9 8* 9 4*   HEMATOCRIT % 31 6* 29 7*   PLATELETS Thousands/uL 269 276   NEUTROS PCT %  --  68   LYMPHS PCT %  --  20   MONOS PCT %  --  8   EOS PCT %  --  3     Results from last 7 days   Lab Units 12/22/22  0427   SODIUM mmol/L 140   POTASSIUM mmol/L 4 2   CHLORIDE mmol/L 111*   CO2 mmol/L 25   BUN mg/dL 17   CREATININE mg/dL 0 65   ANION GAP mmol/L 4   CALCIUM mg/dL 8 6   GLUCOSE RANDOM mg/dL 110     Results from last 7 days   Lab Units 12/25/22  0455   INR  1 73*                       * I Have Reviewed All Lab Data Listed Above  * Additional Pertinent Lab Tests Reviewed: All Labs Within Last 24 Hours Reviewed    Imaging:    CT abdomen pelvis wo contrast   Final Result by Alexey Fitzpatrick MD (12/18 7768)      Mild improvement of left rectus abdominous muscle and left pelvic extraperitoneal hematoma  Hematoma within the lateral abdominal wall musculature has essentially resolved  Workstation performed: [id number]         IR embolization (specify vessel or site)   Final Result by Dany Espinoaz MD (12/13 1301)   Impression:    Left inferior epigastric artery angiogram showed active bleeding at a distal branch of the artery  Therefore, the artery was embolized using gelfoam and multiple Embold coils  Workstation performed: [id number]               Recent Cultures (last 7 days):           Last 24 Hours Medication List:   Current Facility-Administered Medications   Medication Dose Route Frequency Provider Last Rate   • acetaminophen  975 mg Oral [facility] JUAN Koenig     • albuterol  2 5 mg Nebulization Q4H PRN JUAN Koenig     • aluminum-magnesium hydroxide-simethicone  30 mL Oral Q4H PRN Neha Hong DO     • atorvastatin  40 mg Oral Daily With JUAN Pereira     • benzonatate  100 mg Oral TID PRN JUAN Koenig     • dextromethorphan-guaiFENesin  10 mL Oral BID Rosi Fernandez MD     • docusate sodium  100 mg Oral BID Festus Pan MD     • fluticasone-vilanterol  1 puff Inhalation Daily JUAN Koenig     • hydrocodone-chlorpheniramine polistirex  5 mL Oral Q12H PRN Rosi Fernandez MD     • HYDROmorphone  0 2 mg Intravenous Q4H PRN JUAN Koenig     • levothyroxine  88 mcg Oral Early Morning JUAN Koenig     • lidocaine  1 patch Topical Daily Rosi Fernandez MD     • methocarbamol  500 mg Oral Q6H [facility] Khoa Jones MD     • metoprolol tartrate  12 5 mg Oral Q12H [facility] JUAN Koenig     • ondansetron  4 mg Intravenous Q4H PRN JUAN Koenig     • oxyCODONE  2 5 mg Oral Q4H PRN JUAN Koenig     • oxyCODONE  5 mg Oral Q4H PRN JUAN Koenig     • polyethylene glycol  17 g Oral Daily Khoa Jones MD     • senna  2 tablet Oral BID Khoa Jones MD     • venlafaxine  150 mg Oral Daily JUAN Koenig     • warfarin  7 5 mg Oral Daily (warfarin) Khoa Jones MD          Today, Patient Was Seen By: Khoa Jones MD    ** Please Note: Dictation voice to text software may have been used in the creation of this document   **

## 2022-12-25 NOTE — ASSESSMENT & PLAN NOTE
Thought to be secondary to persistent coughing in the setting of anticoagulation  · S/p reversal of warfarin with Kcentra, dDAVP and 1 unit blood transfusion  · S/p embolization of inferior epigastric artery with IR on 12/12  · Hb stable between 8-10  · Appreciate Surgery recommendations   · Pain control with scheduled tylenol and PRN oxycodone, dilaudid  · Continue to monitor Hgb and transfuse for Hgb < 7  · Hemoglobin stable, resumed warfarin on 12/18  · INR remains subtherapeutic but trending up  1 73  · When INR therapeutic, will reassess abdominal exam, if stable, patient will be okay for discharge    · Added robaxin for additional pain coverage

## 2022-12-26 ENCOUNTER — APPOINTMENT (INPATIENT)
Dept: RADIOLOGY | Facility: HOSPITAL | Age: 82
End: 2022-12-26
Attending: INTERNAL MEDICINE

## 2022-12-26 LAB
ERYTHROCYTE [DISTWIDTH] IN BLOOD BY AUTOMATED COUNT: 17.7 % (ref 11.6–15.1)
HCT VFR BLD AUTO: 33.2 % (ref 34.8–46.1)
HGB BLD-MCNC: 9.6 G/DL (ref 11.5–15.4)
INR PPP: 1.88 (ref 0.84–1.19)
MCH RBC QN AUTO: 30.8 PG (ref 26.8–34.3)
MCHC RBC AUTO-ENTMCNC: 28.9 G/DL (ref 31.4–37.4)
MCV RBC AUTO: 104 FL (ref 82–98)
PLATELET # BLD AUTO: 276 THOUSANDS/UL (ref 149–390)
PMV BLD AUTO: 9.7 FL (ref 8.9–12.7)
PROTHROMBIN TIME: 21.9 SECONDS (ref 11.6–14.5)
RBC # BLD AUTO: 3.12 MILLION/UL (ref 3.81–5.12)
WBC # BLD AUTO: 6.06 THOUSAND/UL (ref 4.31–10.16)

## 2022-12-26 RX ORDER — POLYETHYLENE GLYCOL 3350 17 G/17G
17 POWDER, FOR SOLUTION ORAL 2 TIMES DAILY
Status: DISCONTINUED | OUTPATIENT
Start: 2022-12-26 | End: 2022-12-28 | Stop reason: HOSPADM

## 2022-12-26 RX ADMIN — POLYETHYLENE GLYCOL 3350 17 G: 17 POWDER, FOR SOLUTION ORAL at 17:20

## 2022-12-26 RX ADMIN — VENLAFAXINE HYDROCHLORIDE 150 MG: 150 CAPSULE, EXTENDED RELEASE ORAL at 09:25

## 2022-12-26 RX ADMIN — METHOCARBAMOL 500 MG: 500 TABLET ORAL at 23:32

## 2022-12-26 RX ADMIN — METHOCARBAMOL 500 MG: 500 TABLET ORAL at 11:36

## 2022-12-26 RX ADMIN — METHOCARBAMOL 500 MG: 500 TABLET ORAL at 17:20

## 2022-12-26 RX ADMIN — ACETAMINOPHEN 975 MG: 325 TABLET, FILM COATED ORAL at 13:10

## 2022-12-26 RX ADMIN — ATORVASTATIN CALCIUM 40 MG: 40 TABLET, FILM COATED ORAL at 17:20

## 2022-12-26 RX ADMIN — Medication 12.5 MG: at 21:28

## 2022-12-26 RX ADMIN — FLUTICASONE FUROATE AND VILANTEROL TRIFENATATE 1 PUFF: 200; 25 POWDER RESPIRATORY (INHALATION) at 09:19

## 2022-12-26 RX ADMIN — LEVOTHYROXINE SODIUM 88 MCG: 88 TABLET ORAL at 05:30

## 2022-12-26 RX ADMIN — DOCUSATE SODIUM 100 MG: 100 CAPSULE, LIQUID FILLED ORAL at 17:20

## 2022-12-26 RX ADMIN — ACETAMINOPHEN 975 MG: 325 TABLET, FILM COATED ORAL at 05:30

## 2022-12-26 RX ADMIN — ACETAMINOPHEN 975 MG: 325 TABLET, FILM COATED ORAL at 21:27

## 2022-12-26 RX ADMIN — IOHEXOL 85 ML: 350 INJECTION, SOLUTION INTRAVENOUS at 17:43

## 2022-12-26 RX ADMIN — POLYETHYLENE GLYCOL 3350 17 G: 17 POWDER, FOR SOLUTION ORAL at 09:24

## 2022-12-26 RX ADMIN — LIDOCAINE 5% 1 PATCH: 700 PATCH TOPICAL at 09:24

## 2022-12-26 RX ADMIN — Medication 12.5 MG: at 09:24

## 2022-12-26 RX ADMIN — SENNOSIDES 17.2 MG: 8.6 TABLET, FILM COATED ORAL at 17:20

## 2022-12-26 RX ADMIN — METHOCARBAMOL 500 MG: 500 TABLET ORAL at 05:30

## 2022-12-26 RX ADMIN — GUAIFENESIN AND DEXTROMETHORPHAN 10 ML: 100; 10 SYRUP ORAL at 21:27

## 2022-12-26 RX ADMIN — DOCUSATE SODIUM 100 MG: 100 CAPSULE, LIQUID FILLED ORAL at 09:24

## 2022-12-26 RX ADMIN — SENNOSIDES 17.2 MG: 8.6 TABLET, FILM COATED ORAL at 09:24

## 2022-12-26 RX ADMIN — WARFARIN SODIUM 7.5 MG: 7.5 TABLET ORAL at 17:20

## 2022-12-26 RX ADMIN — GUAIFENESIN AND DEXTROMETHORPHAN 10 ML: 100; 10 SYRUP ORAL at 09:24

## 2022-12-26 NOTE — ASSESSMENT & PLAN NOTE
Thought to be secondary to persistent coughing in the setting of anticoagulation  · S/p reversal of warfarin with Kcentra, dDAVP and 1 unit blood transfusion  · S/p embolization of inferior epigastric artery with IR on 12/12  · Hb stable between 8-10  · Appreciate Surgery recommendations   · Pain control with scheduled tylenol and PRN oxycodone, dilaudid  · Continue to monitor Hgb and transfuse for Hgb < 7  · Hemoglobin stable, resumed warfarin on 12/18  · INR remains subtherapeutic but trending up  1 88  · Given left upper quadrant discomfort, will obtain CT abdomen pelvis without contrast   Dolores Ali is stable and patient's vitals are stable so worsening hematoma is less likely    · Added robaxin for additional pain coverage

## 2022-12-26 NOTE — ASSESSMENT & PLAN NOTE
Patient reports no bowel movement for past 4 days  Abdominal tenderness on exam  Large bowel movement on 12/22    Plan:  Added Colace and senna scheduled    Continue MiraLAX >> increase to twice daily

## 2022-12-26 NOTE — ASSESSMENT & PLAN NOTE
Rate controlled on metoprolol currently  · Continue Lopressor 12 5 mg BID  · Discussed with family risks and benefits of holding warfarin vs resuming  Plan to resume warfarin, monitor inr, once therapeutic, will reassess abdominal exam, if stable, patient will be okay for discharge    · INR today 1 88 , continuie coumadin and trend INR

## 2022-12-26 NOTE — PLAN OF CARE
Problem: MOBILITY - ADULT  Goal: Maintain or return to baseline ADL function  Description: INTERVENTIONS:  -  Assess patient's ability to carry out ADLs; assess patient's baseline for ADL function and identify physical deficits which impact ability to perform ADLs (bathing, care of mouth/teeth, toileting, grooming, dressing, etc )  - Assess/evaluate cause of self-care deficits   - Assess range of motion  - Assess patient's mobility; develop plan if impaired  - Assess patient's need for assistive devices and provide as appropriate  - Encourage maximum independence but intervene and supervise when necessary  - Involve family in performance of ADLs  - Assess for home care needs following discharge   - Consider OT consult to assist with ADL evaluation and planning for discharge  - Provide patient education as appropriate  Outcome: Progressing  Goal: Maintains/Returns to pre admission functional level  Description: INTERVENTIONS:  - Perform BMAT or MOVE assessment daily    - Set and communicate daily mobility goal to care team and patient/family/caregiver  - Collaborate with rehabilitation services on mobility goals if consulted  - Perform Range of Motion 3 times a day  - Reposition patient every 2 hours    - Dangle patient 3 times a day  - Stand patient 3 times a day  - Ambulate patient 3 times a day  - Out of bed to chair 3 times a day   - Out of bed for meals 3 times a day  - Out of bed for toileting  - Record patient progress and toleration of activity level   Outcome: Progressing     Problem: Potential for Falls  Goal: Patient will remain free of falls  Description: INTERVENTIONS:  - Educate patient/family on patient safety including physical limitations  - Instruct patient to call for assistance with activity   - Consult OT/PT to assist with strengthening/mobility   - Keep Call bell within reach  - Keep bed low and locked with side rails adjusted as appropriate  - Keep care items and personal belongings within reach  - Initiate and maintain comfort rounds  - Make Fall Risk Sign visible to staff  - Offer Toileting every *2** Hours, in advance of need  - Initiate/Maintain *bed/chair**alarm  - Obtain necessary fall risk management equipment:   - Apply yellow socks and bracelet for high fall risk patients  - Consider moving patient to room near nurses station  Outcome: Progressing     Problem: Prexisting or High Potential for Compromised Skin Integrity  Goal: Skin integrity is maintained or improved  Description: INTERVENTIONS:  - Identify patients at risk for skin breakdown  - Assess and monitor skin integrity  - Assess and monitor nutrition and hydration status  - Monitor labs   - Assess for incontinence   - Turn and reposition patient  - Assist with mobility/ambulation  - Relieve pressure over bony prominences  - Avoid friction and shearing  - Provide appropriate hygiene as needed including keeping skin clean and dry  - Evaluate need for skin moisturizer/barrier cream  - Collaborate with interdisciplinary team   - Patient/family teaching  - Consider wound care consult   Outcome: Progressing     Problem: PAIN - ADULT  Goal: Verbalizes/displays adequate comfort level or baseline comfort level  Description: Interventions:  - Encourage patient to monitor pain and request assistance  - Assess pain using appropriate pain scale  - Administer analgesics based on type and severity of pain and evaluate response  - Implement non-pharmacological measures as appropriate and evaluate response  - Consider cultural and social influences on pain and pain management  - Notify physician/advanced practitioner if interventions unsuccessful or patient reports new pain  Outcome: Progressing

## 2022-12-26 NOTE — PROGRESS NOTES
[facility]  Progress Note Starlette Res 1940, [de-identified] y o  female MRN: [id number]  Unit/Bed#: [facility] 228-24 Encounter: [id number]  Primary Care Provider: Jennifer Coleman DO   Date and time admitted to hospital: 12/12/2022 10:57 PM    * Rectus sheath hematoma  Assessment & Plan  Thought to be secondary to persistent coughing in the setting of anticoagulation  · S/p reversal of warfarin with Kcentra, dDAVP and 1 unit blood transfusion  · S/p embolization of inferior epigastric artery with IR on 12/12  · Hb stable between 8-10  · Appreciate Surgery recommendations   · Pain control with scheduled tylenol and PRN oxycodone, dilaudid  · Continue to monitor Hgb and transfuse for Hgb < 7  · Hemoglobin stable, resumed warfarin on 12/18  · INR remains subtherapeutic but trending up  1 88  · Given left upper quadrant discomfort, will obtain CT abdomen pelvis without contrast   Cloretta Jaleesa is stable and patient's vitals are stable so worsening hematoma is less likely  · Added robaxin for additional pain coverage    Constipation  Assessment & Plan  Patient reports no bowel movement for past 4 days  Abdominal tenderness on exam  Large bowel movement on 12/22    Plan:  Added Colace and senna scheduled  Continue MiraLAX >> increase to twice daily    Hypoxia  Assessment & Plan  Requiring supplemental O2, which is new to her  Maybe in setting deconditioning vs continued post-infectious symptoms    · Continue supplemental O2 as needed  · Ambulatory pulse ox/home O2 eval ordered - patient does qualify  · Patient currently on room air    Hyperlipidemia  Assessment & Plan  · Continue home atorvastatin 40 mg    Hypertension  Assessment & Plan  Within acceptable range  · Continue PTA Lopressor 12 5 mg BID  · Have been holding lisinopril - continue to hold for now as pressures acceptable  · Continue to monitor BP and reinitiate lisinopril if needed    Hypothyroidism  Assessment & Plan  · Continue home levothyroxine 88 mcg qd    Paroxysmal atrial fibrillation (HCC)  Assessment & Plan  Rate controlled on metoprolol currently  · Continue Lopressor 12 5 mg BID  · Discussed with family risks and benefits of holding warfarin vs resuming  Plan to resume warfarin, monitor inr, once therapeutic, will reassess abdominal exam, if stable, patient will be okay for discharge  · INR today 1 88 , continuie coumadin and trend INR        VTE Pharmacologic Prophylaxis:   Pharmacologic: Warfarin (Coumadin)  Mechanical VTE Prophylaxis in Place: Yes    Patient Centered Rounds: I have performed bedside rounds with nursing staff today  Discussions with Specialists or Other Care Team Provider:     Education and Discussions with Family / Patient: Discussed plan of care with patient and also called and updated her daughter    Time Spent for Care: 30 minutes  More than 50% of total time spent on counseling and coordination of care as described above  Current Length of Stay: 13 day(s)    Current Patient Status: Inpatient   Certification Statement: The patient will continue to require additional inpatient hospital stay due to Not medically stable given subtherapeutic INR    Discharge Plan: Marily Candle discharge in next 24 to 48 hours if stable with therapeutic INR    Code Status: Level 1 - Full Code      Subjective:   Overnight events  Still with left upper quadrant discomfort  Not worsening  Objective:     Vitals:   Temp (24hrs), Av °F (36 7 °C), Min:97 7 °F (36 5 °C), Max:98 3 °F (36 8 °C)    Temp:  [97 7 °F (36 5 °C)-98 3 °F (36 8 °C)] 97 9 °F (36 6 °C)  HR:  [69-79] 75  Resp:  [16-19] 18  BP: (144-150)/(68-84) 150/68  SpO2:  [90 %-92 %] 90 %  Body mass index is 35 99 kg/m²  Input and Output Summary (last 24 hours):        Intake/Output Summary (Last 24 hours) at 2022 1657  Last data filed at 2022 1300  Gross per 24 hour   Intake 840 ml   Output --   Net 840 ml       Physical Exam:     Physical Exam  Constitutional:       General: She is not in acute distress  Cardiovascular:      Rate and Rhythm: Normal rate and regular rhythm       Heart sounds: Normal heart sounds  No murmur heard  Pulmonary:      Effort: No respiratory distress       Breath sounds: Normal breath sounds  No wheezing or rales  Abdominal:      General: Bowel sounds are normal  There is no distension       Palpations: Abdomen is soft       Tenderness: There is no abdominal tenderness  abdominal bruise noted which is relatively stable  Musculoskeletal:         General: No swelling  Skin:     General: Skin is warm  Neurological:      Mental Status: She is alert      Comments: Awake alert and communicative    Additional Data:     Labs:    Results from last 7 days   Lab Units 12/26/22  0549 12/25/22  0455 12/24/22  0701   WBC Thousand/uL 6 06   < > 6 68   HEMOGLOBIN g/dL 9 6*   < > 9 4*   HEMATOCRIT % 33 2*   < > 29 7*   PLATELETS Thousands/uL 276   < > 276   NEUTROS PCT %  --   --  68   LYMPHS PCT %  --   --  20   MONOS PCT %  --   --  8   EOS PCT %  --   --  3    < > = values in this interval not displayed  Results from last 7 days   Lab Units 12/22/22  0427   SODIUM mmol/L 140   POTASSIUM mmol/L 4 2   CHLORIDE mmol/L 111*   CO2 mmol/L 25   BUN mg/dL 17   CREATININE mg/dL 0 65   ANION GAP mmol/L 4   CALCIUM mg/dL 8 6   GLUCOSE RANDOM mg/dL 110     Results from last 7 days   Lab Units 12/26/22  0734   INR  1 88*                       * I Have Reviewed All Lab Data Listed Above  * Additional Pertinent Lab Tests Reviewed:  All Labs Within Last 24 Hours Reviewed      Recent Cultures (last 7 days):           Last 24 Hours Medication List:   Current Facility-Administered Medications   Medication Dose Route Frequency Provider Last Rate   • acetaminophen  975 mg Oral [facility] JUAN Martin     • albuterol  2 5 mg Nebulization Q4H PRN JUAN Martin     • aluminum-magnesium hydroxide-simethicone  30 mL Oral Q4H PRN Neha Hong, DO     • atorvastatin  40 mg Oral Daily With JUAN Pereira     • benzonatate  100 mg Oral TID PRN Robin Cowden, CRNP     • dextromethorphan-guaiFENesin  10 mL Oral BID Lawanda Wisdom MD     • docusate sodium  100 mg Oral BID Yana Pan MD     • fluticasone-vilanterol  1 puff Inhalation Daily Robin Cowden, CRNP     • hydrocodone-chlorpheniramine polistirex  5 mL Oral Q12H PRN Lawanda Wisdom MD     • HYDROmorphone  0 2 mg Intravenous Q4H PRN Robin Cowden, CRNP     • levothyroxine  88 mcg Oral Early Morning Robin Cowden, CRNP     • lidocaine  1 patch Topical Daily Lawanda Wisdom MD     • methocarbamol  500 mg Oral Q6H [facility] Sujit Earl MD     • metoprolol tartrate  12 5 mg Oral Q12H [facility] Robin Cowden, CRNP     • ondansetron  4 mg Intravenous Q4H PRN Robin Cowden, CRNP     • oxyCODONE  2 5 mg Oral Q4H PRN Robin Cowden, CRNP     • oxyCODONE  5 mg Oral Q4H PRN Robin Cowden, CRNP     • polyethylene glycol  17 g Oral BID Sujit Earl MD     • senna  2 tablet Oral BID Sujit Earl MD     • venlafaxine  150 mg Oral Daily Robin Cowden, CRNP     • warfarin  7 5 mg Oral Daily (warfarin) Sujit Earl MD          Today, Patient Was Seen By: Sujit Earl MD    ** Please Note: Dictation voice to text software may have been used in the creation of this document   **

## 2022-12-26 NOTE — RESTORATIVE TECHNICIAN NOTE
Restorative Technician Note      Patient Name: Jenae Avalos     Restorative Tech Visit Date: 12/26/22  Note Type: Mobility  Patient Position Upon Consult: Bedside chair  Activity Performed: Ambulated  Assistive Device: Roller walker  Patient Position at End of Consult: Bedside chair;  All needs within reach    David Ghotra Technician

## 2022-12-27 LAB
ANION GAP SERPL CALCULATED.3IONS-SCNC: 6 MMOL/L (ref 4–13)
BUN SERPL-MCNC: 11 MG/DL (ref 5–25)
CALCIUM SERPL-MCNC: 8.8 MG/DL (ref 8.3–10.1)
CHLORIDE SERPL-SCNC: 109 MMOL/L (ref 96–108)
CO2 SERPL-SCNC: 26 MMOL/L (ref 21–32)
CREAT SERPL-MCNC: 0.64 MG/DL (ref 0.6–1.3)
ERYTHROCYTE [DISTWIDTH] IN BLOOD BY AUTOMATED COUNT: 17.8 % (ref 11.6–15.1)
FOLATE SERPL-MCNC: 14.7 NG/ML (ref 3.1–17.5)
GFR SERPL CREATININE-BSD FRML MDRD: 84 ML/MIN/1.73SQ M
GLUCOSE SERPL-MCNC: 88 MG/DL (ref 65–140)
HCT VFR BLD AUTO: 31.3 % (ref 34.8–46.1)
HGB BLD-MCNC: 9.8 G/DL (ref 11.5–15.4)
INR PPP: 1.92 (ref 0.84–1.19)
MCH RBC QN AUTO: 31.4 PG (ref 26.8–34.3)
MCHC RBC AUTO-ENTMCNC: 31.3 G/DL (ref 31.4–37.4)
MCV RBC AUTO: 100 FL (ref 82–98)
PLATELET # BLD AUTO: 261 THOUSANDS/UL (ref 149–390)
PMV BLD AUTO: 9.9 FL (ref 8.9–12.7)
POTASSIUM SERPL-SCNC: 4.3 MMOL/L (ref 3.5–5.3)
PROTHROMBIN TIME: 22.2 SECONDS (ref 11.6–14.5)
RBC # BLD AUTO: 3.12 MILLION/UL (ref 3.81–5.12)
SODIUM SERPL-SCNC: 141 MMOL/L (ref 135–147)
VIT B12 SERPL-MCNC: 648 PG/ML (ref 100–900)
WBC # BLD AUTO: 5.79 THOUSAND/UL (ref 4.31–10.16)

## 2022-12-27 RX ORDER — LISINOPRIL 10 MG/1
10 TABLET ORAL DAILY
Status: DISCONTINUED | OUTPATIENT
Start: 2022-12-27 | End: 2022-12-28 | Stop reason: HOSPADM

## 2022-12-27 RX ADMIN — SENNOSIDES 17.2 MG: 8.6 TABLET, FILM COATED ORAL at 08:22

## 2022-12-27 RX ADMIN — ACETAMINOPHEN 975 MG: 325 TABLET, FILM COATED ORAL at 05:43

## 2022-12-27 RX ADMIN — ACETAMINOPHEN 975 MG: 325 TABLET, FILM COATED ORAL at 21:24

## 2022-12-27 RX ADMIN — LIDOCAINE 5% 1 PATCH: 700 PATCH TOPICAL at 08:23

## 2022-12-27 RX ADMIN — DOCUSATE SODIUM 100 MG: 100 CAPSULE, LIQUID FILLED ORAL at 17:53

## 2022-12-27 RX ADMIN — WARFARIN SODIUM 7.5 MG: 7.5 TABLET ORAL at 17:53

## 2022-12-27 RX ADMIN — GUAIFENESIN AND DEXTROMETHORPHAN 10 ML: 100; 10 SYRUP ORAL at 08:22

## 2022-12-27 RX ADMIN — METHOCARBAMOL 500 MG: 500 TABLET ORAL at 05:43

## 2022-12-27 RX ADMIN — VENLAFAXINE HYDROCHLORIDE 150 MG: 150 CAPSULE, EXTENDED RELEASE ORAL at 08:23

## 2022-12-27 RX ADMIN — Medication 12.5 MG: at 08:22

## 2022-12-27 RX ADMIN — LISINOPRIL 10 MG: 10 TABLET ORAL at 17:53

## 2022-12-27 RX ADMIN — METHOCARBAMOL 500 MG: 500 TABLET ORAL at 12:09

## 2022-12-27 RX ADMIN — LEVOTHYROXINE SODIUM 88 MCG: 88 TABLET ORAL at 05:43

## 2022-12-27 RX ADMIN — ATORVASTATIN CALCIUM 40 MG: 40 TABLET, FILM COATED ORAL at 17:53

## 2022-12-27 RX ADMIN — DOCUSATE SODIUM 100 MG: 100 CAPSULE, LIQUID FILLED ORAL at 08:23

## 2022-12-27 RX ADMIN — Medication 12.5 MG: at 21:24

## 2022-12-27 RX ADMIN — ACETAMINOPHEN 975 MG: 325 TABLET, FILM COATED ORAL at 14:17

## 2022-12-27 RX ADMIN — POLYETHYLENE GLYCOL 3350 17 G: 17 POWDER, FOR SOLUTION ORAL at 08:22

## 2022-12-27 RX ADMIN — POLYETHYLENE GLYCOL 3350 17 G: 17 POWDER, FOR SOLUTION ORAL at 17:53

## 2022-12-27 RX ADMIN — GUAIFENESIN AND DEXTROMETHORPHAN 10 ML: 100; 10 SYRUP ORAL at 21:24

## 2022-12-27 RX ADMIN — METHOCARBAMOL 500 MG: 500 TABLET ORAL at 17:53

## 2022-12-27 RX ADMIN — SENNOSIDES 17.2 MG: 8.6 TABLET, FILM COATED ORAL at 17:53

## 2022-12-27 RX ADMIN — FLUTICASONE FUROATE AND VILANTEROL TRIFENATATE 1 PUFF: 200; 25 POWDER RESPIRATORY (INHALATION) at 08:19

## 2022-12-27 NOTE — RESTORATIVE TECHNICIAN NOTE
Restorative Technician Note      Patient Name: Kiah Quezada     Restorative Tech Visit Date: 12/27/22  Note Type: Mobility  Patient Position Upon Consult: Bedside chair  Activity Performed: Ambulated  Assistive Device: Roller walker  Patient Position at End of Consult: Bedside chair;  All needs within reach    Carmen Rivera Restorative Technician

## 2022-12-27 NOTE — PLAN OF CARE
Problem: MOBILITY - ADULT  Goal: Maintain or return to baseline ADL function  Description: INTERVENTIONS:  -  Assess patient's ability to carry out ADLs; assess patient's baseline for ADL function and identify physical deficits which impact ability to perform ADLs (bathing, care of mouth/teeth, toileting, grooming, dressing, etc )  - Assess/evaluate cause of self-care deficits   - Assess range of motion  - Assess patient's mobility; develop plan if impaired  - Assess patient's need for assistive devices and provide as appropriate  - Encourage maximum independence but intervene and supervise when necessary  - Involve family in performance of ADLs  - Assess for home care needs following discharge   - Consider OT consult to assist with ADL evaluation and planning for discharge  - Provide patient education as appropriate  12/27/2022 0732 by Neftaly Munoz RN  Outcome: Progressing  12/27/2022 0732 by Neftaly Munoz RN  Outcome: Progressing  Goal: Maintains/Returns to pre admission functional level  Description: INTERVENTIONS:  - Perform BMAT or MOVE assessment daily    - Set and communicate daily mobility goal to care team and patient/family/caregiver  - Collaborate with rehabilitation services on mobility goals if consulted  - Perform Range of Motion *3** times a day  - Reposition patient every *2** hours    - Dangle patient *3** times a day  - Stand patient **3* times a day  - Ambulate patient *3** times a day  - Out of bed to chair *3** times a day   - Out of bed for meals *3** times a day  - Out of bed for toileting  - Record patient progress and toleration of activity level   12/27/2022 0732 by Neftaly Munoz RN  Outcome: Progressing  12/27/2022 0732 by Neftaly Munoz RN  Outcome: Progressing     Problem: Potential for Falls  Goal: Patient will remain free of falls  Description: INTERVENTIONS:  - Educate patient/family on patient safety including physical limitations  - Instruct patient to call for assistance with activity   - Consult OT/PT to assist with strengthening/mobility   - Keep Call bell within reach  - Keep bed low and locked with side rails adjusted as appropriate  - Keep care items and personal belongings within reach  - Initiate and maintain comfort rounds  - Make Fall Risk Sign visible to staff  - Offer Toileting every *2** Hours, in advance of need  - Initiate/Maintain **bed/chair*alarm  - Obtain necessary fall risk management equipment:   - Apply yellow socks and bracelet for high fall risk patients  - Consider moving patient to room near nurses station  12/27/2022 0732 by Neftaly Munoz RN  Outcome: Progressing  12/27/2022 0732 by Neftaly Munoz RN  Outcome: Progressing     Problem: Prexisting or High Potential for Compromised Skin Integrity  Goal: Skin integrity is maintained or improved  Description: INTERVENTIONS:  - Identify patients at risk for skin breakdown  - Assess and monitor skin integrity  - Assess and monitor nutrition and hydration status  - Monitor labs   - Assess for incontinence   - Turn and reposition patient  - Assist with mobility/ambulation  - Relieve pressure over bony prominences  - Avoid friction and shearing  - Provide appropriate hygiene as needed including keeping skin clean and dry  - Evaluate need for skin moisturizer/barrier cream  - Collaborate with interdisciplinary team   - Patient/family teaching  - Consider wound care consult   12/27/2022 0732 by Neftaly Munoz RN  Outcome: Progressing  12/27/2022 0732 by Neftaly Munoz RN  Outcome: Progressing     Problem: PAIN - ADULT  Goal: Verbalizes/displays adequate comfort level or baseline comfort level  Description: Interventions:  - Encourage patient to monitor pain and request assistance  - Assess pain using appropriate pain scale  - Administer analgesics based on type and severity of pain and evaluate response  - Implement non-pharmacological measures as appropriate and evaluate response  - Consider cultural and social influences on pain and pain management  - Notify physician/advanced practitioner if interventions unsuccessful or patient reports new pain  12/27/2022 0732 by Nixon Banda RN  Outcome: Progressing  12/27/2022 0732 by Nixon Banda RN  Outcome: Progressing     Problem: GASTROINTESTINAL - ADULT  Goal: Maintains or returns to baseline bowel function  Description: INTERVENTIONS:  - Assess bowel function  - Encourage oral fluids to ensure adequate hydration  - Administer IV fluids if ordered to ensure adequate hydration  - Administer ordered medications as needed  - Encourage mobilization and activity  - Consider nutritional services referral to assist patient with adequate nutrition and appropriate food choices  Outcome: Progressing  Goal: Maintains adequate nutritional intake  Description: INTERVENTIONS:  - Monitor percentage of each meal consumed  - Identify factors contributing to decreased intake, treat as appropriate  - Assist with meals as needed  - Monitor I&O, weight, and lab values if indicated  - Obtain nutrition services referral as needed  Outcome: Progressing  Goal: Oral mucous membranes remain intact  Description: INTERVENTIONS  - Assess oral mucosa and hygiene practices  - Implement preventative oral hygiene regimen  - Implement oral medicated treatments as ordered  - Initiate Nutrition services referral as needed  Outcome: Progressing

## 2022-12-27 NOTE — ASSESSMENT & PLAN NOTE
Rate controlled on metoprolol currently  · Continue Lopressor 12 5 mg BID  · Discussed with family risks and benefits of holding warfarin vs resuming  Plan to resume warfarin, monitor inr, once therapeutic, will reassess abdominal exam, if stable, patient will be okay for discharge    · INR today 1 9 , continuie coumadin and trend INR

## 2022-12-27 NOTE — CASE MANAGEMENT
Case Management Discharge Planning Note    Patient name Almeta Koyanagi  [facility] [id number]  : 1940 Date 2022       Current Admission Date: 2022  Current Admission Diagnosis:Rectus sheath hematoma   Patient Active Problem List    Diagnosis Date Noted   • Constipation 2022   • Hypoxia 12/15/2022   • Rectus sheath hematoma 2022   • Bronchospasm with bronchitis, acute 2022   • Chest pain 2022   • Leukocytosis 2022   • Chest tightness 2022   • Diabetes ([facility] [address] ) 2022   • Obesity 2022   • Bilateral flank pain 2022   • Hemarthrosis involving knee joint, right 10/01/2020   • Ambulatory dysfunction 10/01/2020   • Paroxysmal atrial fibrillation ([address] ) 2020   • Acute respiratory failure with hypoxia ([facility] [address] ) 2020   • Hypothyroidism 2020   • Hypertension 2020   • Hyperlipidemia 2020   • Major depressive disorder, single episode, mild ([address] ) 2019   • Osteoarthritis of knee 2017      LOS (days): 14  Geometric Mean LOS (GMLOS) (days): 7 80  Days to GMLOS:-6 6     OBJECTIVE:  Risk of Unplanned Readmission Score: 18 28         Current admission status: Inpatient   Preferred Pharmacy:   [facility] [address]  [address] [facility] [address]  [address]  Phone: [phone] Fax: [phone]    Primary Care Provider: Pawan Phillips DO    Primary Insurance: Rosita Jimenez [facility]  Secondary Insurance:     DISCHARGE DETAILS: INR today 1 92  Message sent to [facility] [place] updating    Patient has been on RA, will continue to assess need for new home 02 eval

## 2022-12-27 NOTE — RESTORATIVE TECHNICIAN NOTE
Restorative Technician Note      Patient Name: Shelly Damon     Restorative Tech Visit Date: 12/27/22  Note Type: Mobility  Patient Position Upon Consult: Bedside chair  Activity Performed: Ambulated  Assistive Device: Roller walker  Patient Position at End of Consult: Bedside chair;  All needs within reach    Tito Wen, Restorative Technician

## 2022-12-27 NOTE — UTILIZATION REVIEW
Continued Stay Review    Date:  12/25/22                          Current Patient Class: IP  Current Level of Care: Med/Surg    HPI:82 y o  female initially admitted on 12/13 for rectus sheath hematoma  Assessment/Plan: Reports L-sided abdominal pain improving and has been ambulating  Physical exam unremarkable  INR 1 73, remains subtherapeutic but trending up  Plan: analgesics, bowel regimen, continue PTA meds, continue warfarin, Trend labs, replete electrolytes as needed; Trend INR, once therapeutic will be able to discharge       Vital Signs:   Date/Time Temp Pulse Resp BP MAP (mmHg) SpO2 O2 Device   12/27/22 0722 98 5 °F (36 9 °C) 72 16 151/72 -- 92 % None (Room air)   12/27/22 0500 -- -- -- -- -- -- None (Room air)   12/26/22 21:27:24 98 7 °F (37 1 °C) 78 18 147/70 96 91 % --   12/26/22 21:26:54 -- 79 -- 147/70 96 90 % --   12/26/22 2000 -- -- -- -- -- -- None (Room air)   12/26/22 15:06:31 97 9 °F (36 6 °C) 75 18 150/68 95 90 % --   12/26/22 07:18:22 97 7 °F (36 5 °C) 69 16 149/68 95 92 % --   12/25/22 22:20:09 98 3 °F (36 8 °C) 78 19 144/84 104 92 % --   12/25/22 2216 -- 79 -- 144/84 -- -- --   12/25/22 2020 -- -- -- -- -- -- None (Room air)   12/25/22 14:31:31 98 3 °F (36 8 °C) 77 16 130/76 94 93 % --   12/25/22 07:27:01 98 3 °F (36 8 °C) 74 -- 132/77 95 90 % --   12/25/22 07:25:39 98 3 °F (36 8 °C) -- -- 132/77 95 -- --     Pertinent Labs/Diagnostic Results:   Results from last 7 days   Lab Units 12/27/22  0535 12/26/22  0549 12/25/22  0455 12/24/22  0701 12/23/22  0543 12/22/22  0427   WBC Thousand/uL 5 79 6 06 6 90 6 68 7 61 8 24   HEMOGLOBIN g/dL 9 8* 9 6* 9 8* 9 4* 9 2* 8 8*   HEMATOCRIT % 31 3* 33 2* 31 6* 29 7* 29 3* 29 0*   PLATELETS Thousands/uL 261 276 269 276 272 273   NEUTROS ABS Thousands/µL  --   --   --  4 50 5 32 5 61     Results from last 7 days   Lab Units 12/27/22  0535 12/22/22  0427 12/21/22  0530   SODIUM mmol/L 141 140 140   POTASSIUM mmol/L 4 3 4 2 4 3   CHLORIDE mmol/L 109* 111* 110*   CO2 mmol/L 26 25 25   ANION GAP mmol/L 6 4 5   BUN mg/dL 11 17 18   CREATININE mg/dL 0 64 0 65 0 70   EGFR ml/min/1 73sq m 84 84 82   CALCIUM mg/dL 8 8 8 6 8 5     Results from last 7 days   Lab Units 12/27/22  0535 12/22/22  0427 12/21/22  0530   GLUCOSE RANDOM mg/dL 88 110 122       Results from last 7 days   Lab Units 12/27/22  0535 12/26/22  0734 12/25/22  0455   PROTIME seconds 22 2* 21 9* 20 5*   INR  1 92* 1 88* 1 73*       Medications:   Scheduled Medications:  acetaminophen, 975 mg, Oral, Q8H JUAN PABLO  atorvastatin, 40 mg, Oral, Daily With Dinner  dextromethorphan-guaiFENesin, 10 mL, Oral, BID  docusate sodium, 100 mg, Oral, BID  fluticasone-vilanterol, 1 puff, Inhalation, Daily  levothyroxine, 88 mcg, Oral, Early Morning  lidocaine, 1 patch, Topical, Daily  methocarbamol, 500 mg, Oral, Q6H JUAN PABLO  metoprolol tartrate, 12 5 mg, Oral, Q12H JUAN PABLO  polyethylene glycol, 17 g, Oral, BID  senna, 2 tablet, Oral, BID  venlafaxine, 150 mg, Oral, Daily  warfarin, 7 5 mg, Oral, Daily (warfarin)    Continuous IV Infusions: none    PRN Meds:  albuterol, 2 5 mg, Nebulization, Q4H PRN  aluminum-magnesium hydroxide-simethicone, 30 mL, Oral, Q4H PRN  benzonatate, 100 mg, Oral, TID PRN  hydrocodone-chlorpheniramine polistirex, 5 mL, Oral, Q12H PRN  HYDROmorphone, 0 2 mg, Intravenous, Q4H PRN  ondansetron, 4 mg, Intravenous, Q4H PRN  oxyCODONE, 2 5 mg, Oral, Q4H PRN  oxyCODONE, 5 mg, Oral, Q4H PRN        Discharge Plan: D    Network Utilization Review Department  ATTENTION: Please call with any questions or concerns to [phone] and carefully listen to the prompts so that you are directed to the right person  All voicemails are confidential   [facility] all requests for admission clinical reviews, approved or denied determinations and any other requests to dedicated fax number below belonging to the campus where the patient is receiving treatment   List of dedicated fax numbers for the Facilities:  [facility] ADMISSION DENIALS (Administrative/Medical Necessity) [phone]   [address] (Maternity/NICU/Pediatrics) [place]in [place] [address] Rachana Piedra  [phone]   [address] [address] Medical [place] [place] [address] Radha [facility] [address] [facility] [address] [phone]

## 2022-12-27 NOTE — PLAN OF CARE
Problem: MOBILITY - ADULT  Goal: Maintain or return to baseline ADL function  Description: INTERVENTIONS:  -  Assess patient's ability to carry out ADLs; assess patient's baseline for ADL function and identify physical deficits which impact ability to perform ADLs (bathing, care of mouth/teeth, toileting, grooming, dressing, etc )  - Assess/evaluate cause of self-care deficits   - Assess range of motion  - Assess patient's mobility; develop plan if impaired  - Assess patient's need for assistive devices and provide as appropriate  - Encourage maximum independence but intervene and supervise when necessary  - Involve family in performance of ADLs  - Assess for home care needs following discharge   - Consider OT consult to assist with ADL evaluation and planning for discharge  - Provide patient education as appropriate  Outcome: Progressing  Goal: Maintains/Returns to pre admission functional level  Description: INTERVENTIONS:  - Perform BMAT or MOVE assessment daily    - Set and communicate daily mobility goal to care team and patient/family/caregiver  - Collaborate with rehabilitation services on mobility goals if consulted  - Perform Range of Motion 3 times a day  - Reposition patient every 2 hours    - Dangle patient 3 times a day  - Stand patient 3 times a day  - Ambulate patient 3 times a day  - Out of bed to chair 3 times a day   - Out of bed for meals 3  Problem: Potential for Falls  Goal: Patient will remain free of falls  Description: INTERVENTIONS:  - Educate patient/family on patient safety including physical limitations  - Instruct patient to call for assistance with activity   - Consult OT/PT to assist with strengthening/mobility   - Keep Call bell within reach  - Keep bed low and locked with side rails adjusted as appropriate  - Keep care items and personal belongings within reach  - Initiate and maintain comfort rounds  - Make Fall Risk Sign visible to staff  - Offer Toileting every 2 Hours, in advance of need  - Initiate/Maintain on   Problem: Prexisting or High Potential for Compromised Skin Integrity  Goal: Skin integrity is maintained or improved  Description: INTERVENTIONS:  - Identify patients at risk for skin breakdown  - Assess and monitor skin integrity  - Assess and monitor nutrition and hydration status  - Monitor labs   - Assess for incontinence   - Turn and reposition patient  - Assist with mobility/ambulation  - Relieve pressure over bony prominences  - Avoid friction and shearing  - Provide appropriate hygiene as needed including keeping skin clean and dry  - Evaluate need for skin moisturizer/barrier cream  - Collaborate with interdisciplinary team   - Patient/family teaching  - Consider wound care consult   Outcome: Progressing     Problem: PAIN - ADULT  Goal: Verbalizes/displays adequate comfort level or baseline comfort level  Description: Interventions:  - Encourage patient to monitor pain and request assistance  - Assess pain using appropriate pain scale  - Administer analgesics based on type and severity of pain and evaluate response  - Implement non-pharmacological measures as appropriate and evaluate response  - Consider cultural and social influences on pain and pain management  - Notify physician/advanced practitioner if interventions unsuccessful or patient reports new pain  Outcome: Progressing   alarm  - Obtain necessary fall risk management equipment:  - Apply yellow socks and bracelet for high fall risk patients  - Consider moving patient to room near nurses station  Outcome: Progressing    times a day  - Out of bed for toileting  - Record patient progress and toleration of activity level   Outcome: Progressing

## 2022-12-27 NOTE — ASSESSMENT & PLAN NOTE
Patient reports no bowel movement for past 4 days  Abdominal tenderness on exam  Large bowel movement on 12/22    Added Colace and senna scheduled  Continue MiraLAX >> increase to twice daily      12/27 still no bowel movement, soap suds enema ordered

## 2022-12-27 NOTE — ASSESSMENT & PLAN NOTE
Thought to be secondary to persistent coughing in the setting of anticoagulation    · S/p reversal of warfarin with Kcentra, dDAVP and 1 unit blood transfusion  · S/p embolization of inferior epigastric artery with IR on 12/12  · Hb stable between 8-10  · Appreciate Surgery recommendations   · Pain control with scheduled tylenol and PRN oxycodone, dilaudid  · Continue to monitor Hgb and transfuse for Hgb < 7  · Hemoglobin stable, resumed warfarin on 12/18  · 12/26: Given left upper quadrant discomfort, CT abdomen pelvis without contrast was obtained  · CT abdomen came back today showing: Mild interval increase in size of the left abdominal rectus muscle hematoma, now more bulbous in appearance with an AP thickness increased to 5 3 cm from 3 7 cm  · Discussed with IR, hg stable and vitals stable, no acute intervention at this point  · If patient changes clinically IR recommends obtaining CTA (not CT w/o contrast)   · INR 1 92, hopefully D/C tomorrow if INR therapeutic

## 2022-12-27 NOTE — PROGRESS NOTES
[facility]  Progress Note Jonathan Ace 1940, [de-identified] y o  female MRN: [id number]  Unit/Bed#: [facility] 056-02 Encounter: [id number]  Primary Care Provider: Malathi Phillips DO   Date and time admitted to hospital: 12/12/2022 10:57 PM    * Rectus sheath hematoma  Assessment & Plan  Thought to be secondary to persistent coughing in the setting of anticoagulation  · S/p reversal of warfarin with Kcentra, dDAVP and 1 unit blood transfusion  · S/p embolization of inferior epigastric artery with IR on 12/12  · Hb stable between 8-10  · Appreciate Surgery recommendations   · Pain control with scheduled tylenol and PRN oxycodone, dilaudid  · Continue to monitor Hgb and transfuse for Hgb < 7  · Hemoglobin stable, resumed warfarin on 12/18  · 12/26: Given left upper quadrant discomfort, CT abdomen pelvis without contrast was obtained  · CT abdomen came back today showing: Mild interval increase in size of the left abdominal rectus muscle hematoma, now more bulbous in appearance with an AP thickness increased to 5 3 cm from 3 7 cm  · Discussed with IR, hg stable and vitals stable, no acute intervention at this point  · If patient changes clinically IR recommends obtaining CTA (not CT w/o contrast)   · INR 1 92, hopefully D/C tomorrow if INR therapeutic    Constipation  Assessment & Plan  Patient reports no bowel movement for past 4 days  Abdominal tenderness on exam  Large bowel movement on 12/22    Added Colace and senna scheduled  Continue MiraLAX >> increase to twice daily  12/27 still no bowel movement, soap suds enema ordered        Hypoxia  Assessment & Plan  Requiring supplemental O2, which is new to her  Maybe in setting deconditioning vs continued post-infectious symptoms    · Continue supplemental O2 as needed  · Ambulatory pulse ox/home O2 eval ordered - patient does qualify  · Patient currently on room air    Hyperlipidemia  Assessment & Plan  · Continue home atorvastatin 40 mg    Hypertension  Assessment & Plan  Within acceptable range  · Continue PTA Lopressor 12 5 mg BID  · Resume home lisinopril    Hypothyroidism  Assessment & Plan  · Continue home levothyroxine 88 mcg qd    Paroxysmal atrial fibrillation (HCC)  Assessment & Plan  Rate controlled on metoprolol currently  · Continue Lopressor 12 5 mg BID  · Discussed with family risks and benefits of holding warfarin vs resuming  Plan to resume warfarin, monitor inr, once therapeutic, will reassess abdominal exam, if stable, patient will be okay for discharge  · INR today 1 9 , continuie coumadin and trend INR        VTE Pharmacologic Prophylaxis: VTE Score: 6 High Risk (Score >/= 5) - Pharmacological DVT Prophylaxis Contraindicated  Sequential Compression Devices Ordered  Patient Centered Rounds: I performed bedside rounds with nursing staff today  Discussions with Specialists or Other Care Team Provider: case management    Education and Discussions with Family / Patient: Updated  (daughter) via phone  Time Spent for Care: 30 minutes  More than 50% of total time spent on counseling and coordination of care as described above  Current Length of Stay: 14 day(s)  Current Patient Status: Inpatient   Certification Statement: The patient will continue to require additional inpatient hospital stay due to subtherapeutic INR, constipation  Discharge Plan: Anticipate discharge tomorrow to home with home services  Code Status: Level 1 - Full Code    Subjective:   Patient seen examined at bedside  No acute events overnight  Still admits to having some mild abdominal pain denies any chest pain or shortness of breath    Objective:     Vitals:   Temp (24hrs), Av 4 °F (36 9 °C), Min:97 9 °F (36 6 °C), Max:98 7 °F (37 1 °C)    Temp:  [97 9 °F (36 6 °C)-98 7 °F (37 1 °C)] 98 3 °F (36 8 °C)  HR:  [72-81] 81  Resp:  [16-20] 20  BP: (131-151)/(67-72) 131/67  SpO2:  [90 %-92 %] 90 %  Body mass index is 35 77 kg/m²  Input and Output Summary (last 24 hours): Intake/Output Summary (Last 24 hours) at 12/27/2022 1457  Last data filed at 12/27/2022 1300  Gross per 24 hour   Intake 118 ml   Output --   Net 118 ml       Physical Exam:   Physical Exam  Vitals reviewed  HENT:      Head: Normocephalic and atraumatic  Right Ear: External ear normal       Left Ear: External ear normal       Nose: Nose normal       Mouth/Throat:      Mouth: Mucous membranes are moist       Pharynx: Oropharynx is clear  Eyes:      Extraocular Movements: Extraocular movements intact  Cardiovascular:      Rate and Rhythm: Normal rate and regular rhythm  Heart sounds: Normal heart sounds  Pulmonary:      Effort: Pulmonary effort is normal       Breath sounds: Normal breath sounds  Abdominal:      General: Abdomen is flat  Palpations: Abdomen is soft  Tenderness: There is abdominal tenderness  There is no guarding or rebound  Musculoskeletal:      Cervical back: Normal range of motion  Right lower leg: No edema  Left lower leg: No edema  Skin:     General: Skin is warm and dry  Neurological:      Mental Status: She is alert  Mental status is at baseline  Psychiatric:         Mood and Affect: Mood normal          Behavior: Behavior normal           Additional Data:     Labs:  Results from last 7 days   Lab Units 12/27/22  0535 12/25/22  0455 12/24/22  0701   WBC Thousand/uL 5 79   < > 6 68   HEMOGLOBIN g/dL 9 8*   < > 9 4*   HEMATOCRIT % 31 3*   < > 29 7*   PLATELETS Thousands/uL 261   < > 276   NEUTROS PCT %  --   --  68   LYMPHS PCT %  --   --  20   MONOS PCT %  --   --  8   EOS PCT %  --   --  3    < > = values in this interval not displayed       Results from last 7 days   Lab Units 12/27/22  0535   SODIUM mmol/L 141   POTASSIUM mmol/L 4 3   CHLORIDE mmol/L 109*   CO2 mmol/L 26   BUN mg/dL 11   CREATININE mg/dL 0 64   ANION GAP mmol/L 6   CALCIUM mg/dL 8 8   GLUCOSE RANDOM mg/dL 88     Results from last 7 days   Lab Units 12/27/22  0535   INR  1 92*                   Lines/Drains:  Invasive Devices     Peripheral Intravenous Line  Duration           Peripheral IV 12/24/22 Left;Ventral (anterior) Forearm 3 days                      Imaging: Reviewed radiology reports from this admission including: abdominal/pelvic CT    Recent Cultures (last 7 days):         Last 24 Hours Medication List:   Current Facility-Administered Medications   Medication Dose Route Frequency Provider Last Rate   • acetaminophen  975 mg Oral [facility] JUAN Boland     • albuterol  2 5 mg Nebulization Q4H PRN JUAN Boland     • aluminum-magnesium hydroxide-simethicone  30 mL Oral Q4H PRN Neha Hong DO     • atorvastatin  40 mg Oral Daily With JUAN Pereira     • benzonatate  100 mg Oral TID PRN JUAN Boland     • dextromethorphan-guaiFENesin  10 mL Oral BID Sharona Cardona MD     • docusate sodium  100 mg Oral BID Sorayaase Kassie Pan MD     • fluticasone-vilanterol  1 puff Inhalation Daily JUAN Boland     • hydrocodone-chlorpheniramine polistirex  5 mL Oral Q12H PRN Sharona Cardona MD     • HYDROmorphone  0 2 mg Intravenous Q4H PRN JUAN Boland     • levothyroxine  88 mcg Oral Early Morning JUAN Boland     • lidocaine  1 patch Topical Daily Sharona Cardona MD     • lisinopril  10 mg Oral Daily Dennis Vasquez DO     • methocarbamol  500 mg Oral Q6H [facility] Junior Katie MD     • metoprolol tartrate  12 5 mg Oral Q12H [facility] JUAN Boland     • ondansetron  4 mg Intravenous Q4H PRN JUAN Boland     • oxyCODONE  2 5 mg Oral Q4H PRN JUAN Boland     • oxyCODONE  5 mg Oral Q4H PRN JUAN Boland     • polyethylene glycol  17 g Oral BID Junior Katie MD     • senna  2 tablet Oral BID Junior Katie MD     • venlafaxine  150 mg Oral Daily JUAN Boland     • warfarin  7 5 mg Oral Daily (warfarin) Junior Katie MD Today, Patient Was Seen By: Judith Kong DO    **Please Note: This note may have been constructed using a voice recognition system  **

## 2022-12-28 VITALS
TEMPERATURE: 98.1 F | RESPIRATION RATE: 16 BRPM | HEART RATE: 69 BPM | OXYGEN SATURATION: 95 % | BODY MASS INDEX: 35.81 KG/M2 | SYSTOLIC BLOOD PRESSURE: 136 MMHG | WEIGHT: 214.95 LBS | HEIGHT: 65 IN | DIASTOLIC BLOOD PRESSURE: 81 MMHG

## 2022-12-28 LAB
ERYTHROCYTE [DISTWIDTH] IN BLOOD BY AUTOMATED COUNT: 18.1 % (ref 11.6–15.1)
HCT VFR BLD AUTO: 31.4 % (ref 34.8–46.1)
HGB BLD-MCNC: 9.6 G/DL (ref 11.5–15.4)
INR PPP: 1.64 (ref 0.84–1.19)
MAGNESIUM SERPL-MCNC: 2.5 MG/DL (ref 1.6–2.6)
MCH RBC QN AUTO: 30.7 PG (ref 26.8–34.3)
MCHC RBC AUTO-ENTMCNC: 30.6 G/DL (ref 31.4–37.4)
MCV RBC AUTO: 100 FL (ref 82–98)
PLATELET # BLD AUTO: 309 THOUSANDS/UL (ref 149–390)
PMV BLD AUTO: 10.3 FL (ref 8.9–12.7)
PROTHROMBIN TIME: 19.7 SECONDS (ref 11.6–14.5)
RBC # BLD AUTO: 3.13 MILLION/UL (ref 3.81–5.12)
WBC # BLD AUTO: 5.99 THOUSAND/UL (ref 4.31–10.16)

## 2022-12-28 RX ADMIN — METHOCARBAMOL 500 MG: 500 TABLET ORAL at 06:22

## 2022-12-28 RX ADMIN — METHOCARBAMOL 500 MG: 500 TABLET ORAL at 01:00

## 2022-12-28 RX ADMIN — METHOCARBAMOL 500 MG: 500 TABLET ORAL at 12:32

## 2022-12-28 RX ADMIN — GUAIFENESIN AND DEXTROMETHORPHAN 10 ML: 100; 10 SYRUP ORAL at 08:29

## 2022-12-28 RX ADMIN — SENNOSIDES 17.2 MG: 8.6 TABLET, FILM COATED ORAL at 08:29

## 2022-12-28 RX ADMIN — ACETAMINOPHEN 975 MG: 325 TABLET, FILM COATED ORAL at 06:22

## 2022-12-28 RX ADMIN — POLYETHYLENE GLYCOL 3350 17 G: 17 POWDER, FOR SOLUTION ORAL at 08:30

## 2022-12-28 RX ADMIN — VENLAFAXINE HYDROCHLORIDE 150 MG: 150 CAPSULE, EXTENDED RELEASE ORAL at 08:30

## 2022-12-28 RX ADMIN — Medication 12.5 MG: at 08:29

## 2022-12-28 RX ADMIN — DOCUSATE SODIUM 100 MG: 100 CAPSULE, LIQUID FILLED ORAL at 08:29

## 2022-12-28 RX ADMIN — FLUTICASONE FUROATE AND VILANTEROL TRIFENATATE 1 PUFF: 200; 25 POWDER RESPIRATORY (INHALATION) at 08:28

## 2022-12-28 RX ADMIN — LEVOTHYROXINE SODIUM 88 MCG: 88 TABLET ORAL at 06:22

## 2022-12-28 RX ADMIN — LISINOPRIL 10 MG: 10 TABLET ORAL at 08:29

## 2022-12-28 RX ADMIN — ACETAMINOPHEN 975 MG: 325 TABLET, FILM COATED ORAL at 13:32

## 2022-12-28 NOTE — QUICK NOTE
Patient walked hallway with Jos bonilla - throughout the walk oxygen level on room air stayed between 93-94% , just for a few seconds oxygen level dropped to  88% for a few seconds and then went back up to 90's

## 2022-12-28 NOTE — ASSESSMENT & PLAN NOTE
Rate controlled on metoprolol currently  · Continue Lopressor 12 5 mg BID  · Discussed with family risks and benefits of holding warfarin vs resuming  Plan to resume warfarin, monitor inr, once therapeutic, will reassess abdominal exam, if stable, patient will be okay for discharge  · INR today 1 6 today  Discussed with interventional radiology given that patient's vitals have remained stable along with her hemoglobin remaining in the mid 9 range okay for discharge  · Discussed with patient's daughter advised patient to follow-up with her Coumadin clinic on Friday, 12/30/2022 for repeat INR check

## 2022-12-28 NOTE — RESTORATIVE TECHNICIAN NOTE
Restorative Technician Note      Patient Name: Kiah Quezada     Restorative Tech Visit Date: 12/28/22  Note Type: Mobility  Patient Position Upon Consult: Bedside chair  Activity Performed: Ambulated  Assistive Device: Roller walker  Patient Position at End of Consult: Bedside chair;  All needs within reach    Carmen Rivera Restorative Technician

## 2022-12-28 NOTE — RESTORATIVE TECHNICIAN NOTE
Restorative Technician Note      Patient Name: Blaine Chamorro     Restorative Tech Visit Date: 12/28/22  Note Type: Mobility  Patient Position Upon Consult: Bedside chair  Activity Performed: Ambulated  Assistive Device: Roller walker  Patient Position at End of Consult: Bedside chair;  All needs within reach      Amish Javier Restorative Technician

## 2022-12-28 NOTE — ASSESSMENT & PLAN NOTE
Thought to be secondary to persistent coughing in the setting of anticoagulation  · S/p reversal of warfarin with Kcentra, dDAVP and 1 unit blood transfusion  · S/p embolization of inferior epigastric artery with IR on 12/12  · Hb stable between 8-10  · Appreciate Surgery recommendations   · Pain control with scheduled tylenol and PRN oxycodone, dilaudid  · Continue to monitor Hgb and transfuse for Hgb < 7  · Hemoglobin stable, resumed warfarin on 12/18  · 12/26: Given left upper quadrant discomfort, CT abdomen pelvis without contrast was obtained  · CT abdomen came back today showing: Mild interval increase in size of the left abdominal rectus muscle hematoma, now more bulbous in appearance with an AP thickness increased to 5 3 cm from 3 7 cm  · Discussed with IR, hg stable and vitals stable, no acute intervention at this point  · 12/28: Discussed with IR today  Cleared for discharge as her hemoglobin and vitals have remained stable

## 2022-12-28 NOTE — ASSESSMENT & PLAN NOTE
Patient reports no bowel movement for past 4 days  Abdominal tenderness on exam  Large bowel movement on 12/22    Added Colace and senna scheduled  Continue MiraLAX >> increase to twice daily      12/27 still no bowel movement, soap suds enema ordered, with good bowel movement  12/28 patient requesting 1 more enema prior to being discharged which has been ordered

## 2022-12-28 NOTE — DISCHARGE INSTR - AVS FIRST PAGE
You underwent embolization of inferior epigastric artery due to bleeding  Your vital signs and hemoglobin have been stable  Interventional radiology cleared you for discharge  Please follow-up with your primary care doctor after discharge  Your INR is 1 6 today    Continue taking your Coumadin and follow-up with the Coumadin clinic on Friday, 12/30/2022 for INR check

## 2022-12-28 NOTE — DISCHARGE SUMMARY
[address] [facility]  Discharge- Tamara Loyola 1940, [de-identified] y o  female MRN: [id number]  Unit/Bed#: [facility] 048-39 Encounter: [id number]  Primary Care Provider: Teagan Reece DO   Date and time admitted to hospital: 12/12/2022 10:57 PM    * Rectus sheath hematoma  Assessment & Plan  Thought to be secondary to persistent coughing in the setting of anticoagulation  · S/p reversal of warfarin with Kcentra, dDAVP and 1 unit blood transfusion  · S/p embolization of inferior epigastric artery with IR on 12/12  · Hb stable between 8-10  · Appreciate Surgery recommendations   · Pain control with scheduled tylenol and PRN oxycodone, dilaudid  · Continue to monitor Hgb and transfuse for Hgb < 7  · Hemoglobin stable, resumed warfarin on 12/18  · 12/26: Given left upper quadrant discomfort, CT abdomen pelvis without contrast was obtained  · CT abdomen came back today showing: Mild interval increase in size of the left abdominal rectus muscle hematoma, now more bulbous in appearance with an AP thickness increased to 5 3 cm from 3 7 cm  · Discussed with IR, hg stable and vitals stable, no acute intervention at this point  · 12/28: Discussed with IR today  Cleared for discharge as her hemoglobin and vitals have remained stable  Constipation  Assessment & Plan  Patient reports no bowel movement for past 4 days  Abdominal tenderness on exam  Large bowel movement on 12/22    Added Colace and senna scheduled  Continue MiraLAX >> increase to twice daily  12/27 still no bowel movement, soap suds enema ordered, with good bowel movement  12/28 patient requesting 1 more enema prior to being discharged which has been ordered        Paroxysmal atrial fibrillation ([facility] [address] )  Assessment & Plan  Rate controlled on metoprolol currently  · Continue Lopressor 12 5 mg BID  · Discussed with family risks and benefits of holding warfarin vs resuming   Plan to resume warfarin, monitor inr, once therapeutic, will reassess abdominal exam, if stable, patient will be okay for discharge  · INR today 1 6 today  Discussed with interventional radiology given that patient's vitals have remained stable along with her hemoglobin remaining in the mid 9 range okay for discharge  · Discussed with patient's daughter advised patient to follow-up with her Coumadin clinic on Friday, 12/30/2022 for repeat INR check  Hyperlipidemia  Assessment & Plan  · Continue home atorvastatin 40 mg    Hypertension  Assessment & Plan  Within acceptable range  · Continue PTA Lopressor 12 5 mg BID  · Resume home lisinopril    Hypothyroidism  Assessment & Plan  · Continue home levothyroxine 88 mcg qd      Medical Problems     Resolved Problems  Date Reviewed: 12/28/2022   None       Discharging Physician / Practitioner: Keyana Hart DO  PCP: Judith Urbina DO  Admission Date:   Admission Orders (From admission, onward)     Ordered        12/13/22 0013  Inpatient Admission  Once                      Discharge Date: 12/28/22    Consultations During Hospital Stay:  · Critical care  · Interventional radiology    Procedures Performed:   · S/p embolization of inferior epigastric artery with IR on 12/12      Significant Findings / Test Results:   CTA showing: Active contrast extravasation within the large intramuscular hematoma centered within the left rectus femoris muscle extending into the left lateral abdominal wall musculature and decompressing into the left space of Retzius as detailed above  Incidental Findings:   · See above   · I reviewed the above mentioned incidental findings with the patient and/or family and they expressed understanding      Test Results Pending at Discharge (will require follow up):   · none     Outpatient Tests Requested:  · Check INR 77/25/55    Complications:  none    Reason for Admission: acute blood loss anemia    Hospital Course:   Treasure Jacob is a [de-identified] y o  female patient who originally presented to the hospital on 12/12/2022 due to hemorrhagic shock due to large left-sided abdominal wall hematoma  Patient initially presented to [facility] with dizziness and syncopy  Patient was found to have left upper quadrant/flank pain that worsened throughout the day with multiple episodes of vomiting  CT showed large left-sided abdominal wall hematoma  Patient received Kcentra, DDAVP and 1 unit of blood  Patient was transferred to [facility] where she underwent embolization of the inferior epigastric artery with interventional radiology on 1212  Patient's hemoglobin has remained stable since the procedure and has been cleared to be discharged by interventional radiology  Hemoglobin on day of discharge is 9 6 and has remained in the 9 6-9 8 range over the last few days  Patient's INR on day of discharge is 1 6  Patient has been advised to continue taking her Coumadin and follow-up with Coumadin clinic on 12/30/2022  Patient was also treated for constipation which resolved after soapsuds enema  Was given a second enema on day of discharge per patient's request         Please see above list of diagnoses and related plan for additional information  Condition at Discharge: good    Discharge Day Visit / Exam:   Subjective: Patient states that she feels well currently has no acute complaints  Denies any chest pain shortness of breath or abdominal pain  Vitals: Blood Pressure: 136/81 (12/28/22 0743)  Pulse: 69 (12/28/22 0743)  Temperature: 98 1 °F (36 7 °C) (12/28/22 0743)  Temp Source: Oral (12/27/22 0722)  Respirations: 16 (12/28/22 0743)  Height: 5' 5" (165 1 cm) (12/13/22 0130)  Weight - Scale: 97 5 kg (214 lb 15 2 oz) (12/27/22 0722)  SpO2: 95 % (12/28/22 0743)  Exam:   Physical Exam  Vitals reviewed  HENT:      Head: Normocephalic and atraumatic        Right Ear: External ear normal       Left Ear: External ear normal       Nose: Nose normal       Mouth/Throat:      Mouth: Mucous membranes are moist  Pharynx: Oropharynx is clear  Cardiovascular:      Rate and Rhythm: Normal rate and regular rhythm  Heart sounds: Normal heart sounds  Pulmonary:      Effort: Pulmonary effort is normal       Breath sounds: Normal breath sounds  Abdominal:      General: Abdomen is flat  Palpations: Abdomen is soft  Tenderness: There is no abdominal tenderness  Musculoskeletal:      Cervical back: Normal range of motion  Right lower leg: No edema  Left lower leg: No edema  Skin:     General: Skin is warm and dry  Neurological:      Mental Status: She is alert  Mental status is at baseline  Psychiatric:         Mood and Affect: Mood normal          Behavior: Behavior normal           Discussion with Family: Updated  (daughter) via phone  Discharge instructions/Information to patient and family:   See after visit summary for information provided to patient and family  Provisions for Follow-Up Care:  See after visit summary for information related to follow-up care and any pertinent home health orders  Disposition:   Home    Planned Readmission: no     Discharge Statement:  I spent 60 minutes discharging the patient  This time was spent on the day of discharge  I had direct contact with the patient on the day of discharge  Greater than 50% of the total time was spent examining patient, answering all patient questions, arranging and discussing plan of care with patient as well as directly providing post-discharge instructions  Additional time then spent on discharge activities  Discharge Medications:  See after visit summary for reconciled discharge medications provided to patient and/or family        **Please Note: This note may have been constructed using a voice recognition system**

## 2022-12-28 NOTE — CASE MANAGEMENT
Case Management Discharge Planning Note    Patient name Jessica Casas  Location [facility] 802-31 MRN [id number]  : 1940 Date 2022       Current Admission Date: 2022  Current Admission Diagnosis:Rectus sheath hematoma   Patient Active Problem List    Diagnosis Date Noted   • Constipation 2022   • Hypoxia 12/15/2022   • Rectus sheath hematoma 2022   • Bronchospasm with bronchitis, acute 2022   • Chest pain 2022   • Leukocytosis 2022   • Chest tightness 2022   • Diabetes ([address] ) 2022   • Obesity 2022   • Bilateral flank pain 2022   • Hemarthrosis involving knee joint, right 10/01/2020   • Ambulatory dysfunction 10/01/2020   • Paroxysmal atrial fibrillation ([address] ) 2020   • Acute respiratory failure with hypoxia ([address] ) 2020   • Hypothyroidism 2020   • Hypertension 2020   • Hyperlipidemia 2020   • Major depressive disorder, single episode, mild ([address] ) 2019   • Osteoarthritis of knee 2017      LOS (days): 15  Geometric Mean LOS (GMLOS) (days): 7 80  Days to GMLOS:-7 6     OBJECTIVE:  Risk of Unplanned Readmission Score: 18 73         Current admission status: Inpatient   Preferred Pharmacy:   [facility] [address]  [address] [facility], [address] [facility] [address] [place] [address] 59  N  Phone: [phone] Fax: [phone]    Primary Care Provider: Rosa Burr DO    Primary Insurance: [facility]  Secondary Insurance:     DISCHARGE DETAILS:    IMM Given (Date):: 22  IMM Given to[de-identified] Patient    IMM reviewed with patient, patient agrees with discharge determination  Discussed with SLIM, possibly home O2 eval to be ordered, INR not therapeutic at this time

## 2022-12-29 LAB
ALBUMIN SERPL BCP-MCNC: 3 G/DL (ref 3.5–5)
ANION GAP SERPL CALCULATED.3IONS-SCNC: 5 MMOL/L (ref 4–13)
BUN SERPL-MCNC: 16 MG/DL (ref 5–25)
CALCIUM ALBUM COR SERPL-MCNC: 9.4 MG/DL (ref 8.3–10.1)
CALCIUM SERPL-MCNC: 8.6 MG/DL (ref 8.3–10.1)
CHLORIDE SERPL-SCNC: 109 MMOL/L (ref 96–108)
CO2 SERPL-SCNC: 26 MMOL/L (ref 21–32)
CREAT SERPL-MCNC: 0.63 MG/DL (ref 0.6–1.3)
GFR SERPL CREATININE-BSD FRML MDRD: 84 ML/MIN/1.73SQ M
GLUCOSE SERPL-MCNC: 95 MG/DL (ref 65–140)
PHOSPHATE SERPL-MCNC: 3.8 MG/DL (ref 2.3–4.1)
POTASSIUM SERPL-SCNC: 4.3 MMOL/L (ref 3.5–5.3)
SODIUM SERPL-SCNC: 140 MMOL/L (ref 135–147)

## 2022-12-29 NOTE — UTILIZATION REVIEW
NOTIFICATION OF ADMISSION DISCHARGE   This is a Notification of Discharge from [facility]  Please be advised that this patient has been discharge from our facility  Below you will find the admission and discharge date and time including the patient’s disposition  UTILIZATION REVIEW CONTACT:  Judy Ocampo  Utilization   Network Utilization Review Department  Phone: [phone] x carefully listen to the prompts  All voicemails are confidential   Email: [email or web address]     ADMISSION INFORMATION  PRESENTATION DATE: 12/12/2022 10:57 PM  OBERVATION ADMISSION DATE:   INPATIENT ADMISSION DATE: 12/13/22 12:13 AM   DISCHARGE DATE: 12/28/2022  5:30 PM   DISPOSITION:Home with Home Health Care    IMPORTANT INFORMATION:  Send all requests for admission clinical reviews, approved or denied determinations and any other requests to dedicated fax number below belonging to the campus where the patient is receiving treatment   List of dedicated fax numbers:  [address] DENIALS (Administrative/Medical Necessity) [phone]   [address] (Maternity/NICU/Pediatrics) [phone]   [facility] [phone]   [place] [address] [phone]   [address] [phone]   220 [facility] [phone] 90 [facility] [phone]   [facility] 119 [phone]   [facility]  [phone]   [address] [facility] [phone]   412 [facility] [address] [facility] [phone]

## 2024-01-08 NOTE — ED NOTES
- Vitamin D level for routine monitoring.   Patient transported to [address] [place], [place] [address]arcadio Winter RN  12/12/22 [id number]       Kandis Abarca RN  12/13/22 [id number]

## 2024-03-07 NOTE — ED ATTENDING ATTESTATION
2/12/2022  IRaghu MD, saw and evaluated the patient  I have discussed the patient with the resident/non-physician practitioner and agree with the resident's/non-physician practitioner's findings, Plan of Care, and MDM as documented in the resident's/non-physician practitioner's note, except where noted  All available labs and Radiology studies were reviewed  I was present for key portions of any procedure(s) performed by the resident/non-physician practitioner and I was immediately available to provide assistance  At this point I agree with the current assessment done in the Emergency Department  I have conducted an independent evaluation of this patient a history and physical is as follows:    Patient is a 37-year-old female history of hypertension, hypothyroidism and atrial fibrillation on warfarin who presents to the emergency after concerning episode of chest pressure and dyspnea  Onset around midnight without identified provocation  She describes having noted her shoulders moving up-correlating increased work of breathing during this time  She describes having had an intense squeezing in the center of her chest   She has also noticed a bit of coughing-dry  Upon waking at 08:00 today central chest squeezing sensation and dyspnea were still present although to milder degree  They have gradually improved although not resolved; continuing to not feel back to her baseline she presents for further evaluation  She also reports left flank pain x2 days and shares she was recently treated for urinary tract infection  No fever, leg swelling or cramping, appreciated reflux, diaphoresis, n/v/d or current urinary symptoms  PMHx as above  Diagnosed w/ COVID infection on 1/24; had full resolution of symptoms  Has been under increased stress w/ recent family member loss  Denies having had similar CP previously  As a result of this has had decrease in activity today  Has never required Medication: naproxen (NAPROSYN) 500 MG tablet  passed protocol.   Last office visit date: 12/22/23  Next appointment scheduled?: Yes   Number of refills given: 0     cardiac evaluation beyond EKG  On exam pt  Is alert & pleasant  She appears mildly malaised  Mucous membranes are moist   Speech is clear  Heart sounds regular  No chest wall tn   Lungs CTA  Abdomen soft & NT   Mild L CVAT  NO overlying skin changes  No R CVAT  LE NT & NE     Etiology uncertain  DDX: includes but is not limited to ACS, pna, PE, CHF, URI, GERD/ gastritis, anxiety, incomplete recovery from COVID  BP not significantly elevated  Will check UA & CTA/P given flank tn & recent UTI - for recurrence, stone or alternate cause  ED Course     Pt, continued to feel malaised while in the ED  EKG & 2 troponins wnl  No concerning findings on CXR or other labs  HEART score 6  Sats varied from upper 90s to low 90s without significant position/ activity changes  Bronchodilator trialed w/o relief  Symptoms continued&  were new from anything previously experienced in this pt  (as supported by family present)  Disposition for DC & PCP F/U w/ outpt cardiac eval was considered in discussion w/ SLIM  With some concern remaining for ACS or other not-yet-identified etiology pt , family & ED team felt observation stay w/ further evaluation most appropriate         Critical Care Time  Procedures

## 2024-05-16 ENCOUNTER — APPOINTMENT (EMERGENCY)
Dept: RADIOLOGY | Facility: HOSPITAL | Age: 84
End: 2024-05-16
Payer: COMMERCIAL

## 2024-05-16 ENCOUNTER — HOSPITAL ENCOUNTER (OUTPATIENT)
Facility: HOSPITAL | Age: 84
Setting detail: OBSERVATION
Discharge: HOME | End: 2024-05-17
Attending: STUDENT IN AN ORGANIZED HEALTH CARE EDUCATION/TRAINING PROGRAM | Admitting: INTERNAL MEDICINE
Payer: COMMERCIAL

## 2024-05-16 DIAGNOSIS — R94.31 ABNORMAL ECG: ICD-10-CM

## 2024-05-16 DIAGNOSIS — I10 PRIMARY HYPERTENSION: ICD-10-CM

## 2024-05-16 DIAGNOSIS — R07.9 CHEST PAIN: ICD-10-CM

## 2024-05-16 DIAGNOSIS — I16.1 HYPERTENSIVE EMERGENCY: Primary | ICD-10-CM

## 2024-05-16 PROBLEM — I16.0 HYPERTENSIVE URGENCY: Status: ACTIVE | Noted: 2024-05-16

## 2024-05-16 LAB
2HR DELTA HS TROPONIN: 0 NG/L
4HR DELTA HS TROPONIN: -2 NG/L
ALBUMIN SERPL BCP-MCNC: 4.1 G/DL (ref 3.5–5)
ALP SERPL-CCNC: 85 U/L (ref 34–104)
ALT SERPL W P-5'-P-CCNC: 9 U/L (ref 7–52)
ANION GAP SERPL CALCULATED.3IONS-SCNC: 5 MMOL/L (ref 4–13)
AST SERPL W P-5'-P-CCNC: 21 U/L (ref 13–39)
BASOPHILS # BLD AUTO: 0.04 THOUSANDS/ÂΜL (ref 0–0.1)
BASOPHILS NFR BLD AUTO: 1 % (ref 0–1)
BILIRUB SERPL-MCNC: 0.51 MG/DL (ref 0.2–1)
BNP SERPL-MCNC: 276 PG/ML (ref 0–100)
BUN SERPL-MCNC: 16 MG/DL (ref 5–25)
CALCIUM SERPL-MCNC: 9 MG/DL (ref 8.4–10.2)
CARDIAC TROPONIN I PNL SERPL HS: 18 NG/L
CARDIAC TROPONIN I PNL SERPL HS: 20 NG/L
CARDIAC TROPONIN I PNL SERPL HS: 20 NG/L
CHLORIDE SERPL-SCNC: 106 MMOL/L (ref 96–108)
CO2 SERPL-SCNC: 28 MMOL/L (ref 21–32)
CREAT SERPL-MCNC: 0.92 MG/DL (ref 0.6–1.3)
EOSINOPHIL # BLD AUTO: 0.1 THOUSAND/ÂΜL (ref 0–0.61)
EOSINOPHIL NFR BLD AUTO: 1 % (ref 0–6)
ERYTHROCYTE [DISTWIDTH] IN BLOOD BY AUTOMATED COUNT: 13.9 % (ref 11.6–15.1)
GFR SERPL CREATININE-BSD FRML MDRD: 58 ML/MIN/1.73SQ M
GLUCOSE SERPL-MCNC: 86 MG/DL (ref 65–140)
HCT VFR BLD AUTO: 39.8 % (ref 34.8–46.1)
HGB BLD-MCNC: 12.9 G/DL (ref 11.5–15.4)
IMM GRANULOCYTES # BLD AUTO: 0.02 THOUSAND/UL (ref 0–0.2)
IMM GRANULOCYTES NFR BLD AUTO: 0 % (ref 0–2)
INR PPP: 2.83 (ref 0.84–1.19)
LYMPHOCYTES # BLD AUTO: 2.16 THOUSANDS/ÂΜL (ref 0.6–4.47)
LYMPHOCYTES NFR BLD AUTO: 31 % (ref 14–44)
MCH RBC QN AUTO: 31 PG (ref 26.8–34.3)
MCHC RBC AUTO-ENTMCNC: 32.4 G/DL (ref 31.4–37.4)
MCV RBC AUTO: 96 FL (ref 82–98)
MONOCYTES # BLD AUTO: 0.56 THOUSAND/ÂΜL (ref 0.17–1.22)
MONOCYTES NFR BLD AUTO: 8 % (ref 4–12)
NEUTROPHILS # BLD AUTO: 4.15 THOUSANDS/ÂΜL (ref 1.85–7.62)
NEUTS SEG NFR BLD AUTO: 59 % (ref 43–75)
NRBC BLD AUTO-RTO: 0 /100 WBCS
PLATELET # BLD AUTO: 167 THOUSANDS/UL (ref 149–390)
PLATELET # BLD AUTO: 172 THOUSANDS/UL (ref 149–390)
PMV BLD AUTO: 11.1 FL (ref 8.9–12.7)
PMV BLD AUTO: 11.7 FL (ref 8.9–12.7)
POTASSIUM SERPL-SCNC: 3.9 MMOL/L (ref 3.5–5.3)
PROT SERPL-MCNC: 7.3 G/DL (ref 6.4–8.4)
PROTHROMBIN TIME: 30.7 SECONDS (ref 11.6–14.5)
RBC # BLD AUTO: 4.16 MILLION/UL (ref 3.81–5.12)
SODIUM SERPL-SCNC: 139 MMOL/L (ref 135–147)
WBC # BLD AUTO: 7.03 THOUSAND/UL (ref 4.31–10.16)

## 2024-05-16 PROCEDURE — 85610 PROTHROMBIN TIME: CPT

## 2024-05-16 PROCEDURE — 36415 COLL VENOUS BLD VENIPUNCTURE: CPT

## 2024-05-16 PROCEDURE — 71046 X-RAY EXAM CHEST 2 VIEWS: CPT

## 2024-05-16 PROCEDURE — 85049 AUTOMATED PLATELET COUNT: CPT

## 2024-05-16 PROCEDURE — 85025 COMPLETE CBC W/AUTO DIFF WBC: CPT

## 2024-05-16 PROCEDURE — 80053 COMPREHEN METABOLIC PANEL: CPT

## 2024-05-16 PROCEDURE — 99285 EMERGENCY DEPT VISIT HI MDM: CPT

## 2024-05-16 PROCEDURE — 99223 1ST HOSP IP/OBS HIGH 75: CPT | Performed by: INTERNAL MEDICINE

## 2024-05-16 PROCEDURE — 83880 ASSAY OF NATRIURETIC PEPTIDE: CPT

## 2024-05-16 PROCEDURE — 84484 ASSAY OF TROPONIN QUANT: CPT

## 2024-05-16 PROCEDURE — 99285 EMERGENCY DEPT VISIT HI MDM: CPT | Performed by: STUDENT IN AN ORGANIZED HEALTH CARE EDUCATION/TRAINING PROGRAM

## 2024-05-16 PROCEDURE — 93005 ELECTROCARDIOGRAM TRACING: CPT

## 2024-05-16 RX ORDER — HYDRALAZINE HYDROCHLORIDE 20 MG/ML
5 INJECTION INTRAMUSCULAR; INTRAVENOUS EVERY 4 HOURS PRN
Status: DISCONTINUED | OUTPATIENT
Start: 2024-05-16 | End: 2024-05-16

## 2024-05-16 RX ORDER — VENLAFAXINE HYDROCHLORIDE 150 MG/1
150 CAPSULE, EXTENDED RELEASE ORAL DAILY
Status: DISCONTINUED | OUTPATIENT
Start: 2024-05-17 | End: 2024-05-17 | Stop reason: HOSPADM

## 2024-05-16 RX ORDER — WARFARIN SODIUM 7.5 MG/1
7.5 TABLET ORAL
Status: DISCONTINUED | OUTPATIENT
Start: 2024-05-16 | End: 2024-05-17 | Stop reason: HOSPADM

## 2024-05-16 RX ORDER — LEVOTHYROXINE SODIUM 88 UG/1
88 TABLET ORAL
Status: DISCONTINUED | OUTPATIENT
Start: 2024-05-17 | End: 2024-05-17 | Stop reason: HOSPADM

## 2024-05-16 RX ORDER — LABETALOL HYDROCHLORIDE 5 MG/ML
5 INJECTION, SOLUTION INTRAVENOUS EVERY 4 HOURS PRN
Status: DISCONTINUED | OUTPATIENT
Start: 2024-05-16 | End: 2024-05-16

## 2024-05-16 RX ORDER — ALBUTEROL SULFATE 2.5 MG/3ML
5 SOLUTION RESPIRATORY (INHALATION) EVERY 6 HOURS PRN
Status: DISCONTINUED | OUTPATIENT
Start: 2024-05-16 | End: 2024-05-17 | Stop reason: HOSPADM

## 2024-05-16 RX ORDER — LISINOPRIL 10 MG/1
10 TABLET ORAL DAILY
Status: DISCONTINUED | OUTPATIENT
Start: 2024-05-17 | End: 2024-05-17 | Stop reason: HOSPADM

## 2024-05-16 RX ORDER — ENOXAPARIN SODIUM 100 MG/ML
40 INJECTION SUBCUTANEOUS DAILY
Status: DISCONTINUED | OUTPATIENT
Start: 2024-05-17 | End: 2024-05-16

## 2024-05-16 RX ORDER — ATORVASTATIN CALCIUM 40 MG/1
40 TABLET, FILM COATED ORAL
Status: DISCONTINUED | OUTPATIENT
Start: 2024-05-16 | End: 2024-05-17 | Stop reason: HOSPADM

## 2024-05-16 RX ORDER — AMLODIPINE BESYLATE 5 MG/1
5 TABLET ORAL DAILY
Status: DISCONTINUED | OUTPATIENT
Start: 2024-05-17 | End: 2024-05-17 | Stop reason: HOSPADM

## 2024-05-16 RX ORDER — HYDRALAZINE HYDROCHLORIDE 20 MG/ML
5 INJECTION INTRAMUSCULAR; INTRAVENOUS EVERY 4 HOURS PRN
Status: DISCONTINUED | OUTPATIENT
Start: 2024-05-16 | End: 2024-05-17 | Stop reason: HOSPADM

## 2024-05-16 RX ADMIN — WARFARIN SODIUM 7.5 MG: 7.5 TABLET ORAL at 19:30

## 2024-05-16 RX ADMIN — NITROGLYCERIN 0.5 INCH: 20 OINTMENT TOPICAL at 15:59

## 2024-05-16 RX ADMIN — ATORVASTATIN CALCIUM 40 MG: 40 TABLET, FILM COATED ORAL at 19:30

## 2024-05-16 NOTE — ED PROVIDER NOTES
History  Chief Complaint   Patient presents with    Abnormal ECG     Pt went to PCP today for chest pain. Pt sent here for abnormal EKG, frequent PVCs & twave abnormality. +SOB, no chest pain right now     83-year-old female with A-fib on and compliant with Coumadin, hypertension, hyperlipidemia, sent in to ER by PCP for abnormal ECG and episodes of chest pain.  Patient reports on 5/11 and 5/14, was having a few episodes of chest pain per day lasting an hour each.  Chest pain was located retrocardiac, exertional, nonpleuritic, nonreproducible, radiating into the left arm.  Chest pain sometimes associated with dyspnea.  Daughter would check pt's BP during the episode and it would be 189/90, with HR in 150s. Patient symptoms resolved with sublingual nitroglycerin.  Patient is usually in rate controlled A-fib with heart rate below 100.  During the episodes of chest pain, daughter notes diaphoresis, nausea, did have 1 episode of vomiting.  Today, patient went to her PCP for evaluation, and ECG was taken there, revealed sinus rhythm with frequent PVCs, nonspecific ST and T wave abnormality.  Given patient's symptoms and ECG changes, she was told to go to the ER for an evaluation.  Patient currently denies any episodes of chest pain.  States last time she had chest pain was 2 days ago.  Does not check her BP every day. Is not sure what meds she takes for HTN but did not take any of them today. Currently denies any shortness of breath and CP.  Denies weakness, paresthesias, headache, vision changes, abdominal pain, urinary symptoms, URI symptoms, fever or chills.    MDM: Overall clinical picture concerning for hypertensive emergency, possible heart failure.  Troponin = 20, HEART score = 8.  CXR with mild interstitial edema, BNP of 273.  Patient hypertensive in the ED with BP of 200/89.  Despite hypertension, declined any symptoms.  Was given Nitropaste.  Case was discussed with internal medicine on-call, patient admitted  to their service.      Wt Readings from Last 3 Encounters:   05/16/24 102 kg (224 lb 10.4 oz)   12/27/22 97.5 kg (214 lb 15.2 oz)   12/08/22 98.9 kg (218 lb)       ECG from patient's PCP office from today is below:                Prior to Admission Medications   Prescriptions Last Dose Informant Patient Reported? Taking?   albuterol (2.5 mg/3 mL) 0.083 % nebulizer solution   No No   Sig: Take 6 mL (5 mg total) by nebulization every 6 (six) hours as needed for wheezing   atorvastatin (LIPITOR) 40 mg tablet   Yes No   Sig: atorvastatin 40 mg tablet   levothyroxine 100 mcg tablet   Yes No   Sig: Take 88 mcg by mouth     lisinopril (ZESTRIL) 10 mg tablet   No No   Sig: Take 1 tablet (10 mg total) by mouth daily   metoprolol tartrate (LOPRESSOR) 25 mg tablet   No No   Sig: Take 0.5 tablets (12.5 mg total) by mouth every 12 (twelve) hours   venlafaxine (EFFEXOR-XR) 150 mg 24 hr capsule   Yes No   Sig: venlafaxine  mg capsule,extended release 24 hr   warfarin (COUMADIN) 7.5 mg tablet   Yes No   Sig: Take 7.5 mg by mouth daily      Facility-Administered Medications: None       Past Medical History:   Diagnosis Date    Asthma     Atrial fibrillation (HCC)     Diabetes (HCC)     Hyperlipidemia     Hypertension     Legionnaire's disease (HCC)        Past Surgical History:   Procedure Laterality Date    IR EMBOLIZATION (SPECIFY VESSEL OR SITE)  12/12/2022    LUNG LOBECTOMY         History reviewed. No pertinent family history.  I have reviewed and agree with the history as documented.    E-Cigarette/Vaping    E-Cigarette Use Never User      E-Cigarette/Vaping Substances    Nicotine No     THC No     CBD No     Flavoring No     Other No     Unknown No      Social History     Tobacco Use    Smoking status: Never    Smokeless tobacco: Never   Vaping Use    Vaping status: Never Used   Substance Use Topics    Alcohol use: Never    Drug use: Never        Review of Systems   Constitutional:  Positive for diaphoresis. Negative  for chills and fever.   HENT:  Negative for ear pain and sore throat.    Eyes:  Negative for pain and visual disturbance.   Respiratory:  Positive for shortness of breath. Negative for cough.    Cardiovascular:  Positive for chest pain. Negative for palpitations.   Gastrointestinal:  Positive for nausea and vomiting. Negative for abdominal pain.   Genitourinary:  Negative for dysuria and hematuria.   Musculoskeletal:  Negative for arthralgias and back pain.   Skin:  Negative for color change and rash.   Neurological:  Negative for seizures and syncope.   All other systems reviewed and are negative.      Physical Exam  ED Triage Vitals   Temperature Pulse Respirations Blood Pressure SpO2   05/16/24 1447 05/16/24 1443 05/16/24 1443 05/16/24 1443 05/16/24 1443   98.2 °F (36.8 °C) 97 20 (!) 125/101 95 %      Temp Source Heart Rate Source Patient Position - Orthostatic VS BP Location FiO2 (%)   05/16/24 1447 05/16/24 1505 05/16/24 1505 05/16/24 1505 --   Oral Monitor Lying Right arm       Pain Score       --                    Orthostatic Vital Signs  Vitals:    05/16/24 1443 05/16/24 1505 05/16/24 1520   BP: (!) 125/101 (!) 198/78 (!) 200/89   Pulse: 97 92 90   Patient Position - Orthostatic VS:  Lying        Physical Exam  Vitals and nursing note reviewed.   Constitutional:       General: She is not in acute distress.     Appearance: She is well-developed.   HENT:      Head: Normocephalic and atraumatic.   Eyes:      Conjunctiva/sclera: Conjunctivae normal.   Cardiovascular:      Rate and Rhythm: Normal rate and regular rhythm.      Pulses:           Radial pulses are 2+ on the right side and 2+ on the left side.        Dorsalis pedis pulses are 2+ on the right side and 2+ on the left side.      Heart sounds: Normal heart sounds, S1 normal and S2 normal. No murmur heard.  Pulmonary:      Effort: Pulmonary effort is normal. No respiratory distress.      Breath sounds: Normal breath sounds and air entry.   Abdominal:       Palpations: Abdomen is soft.      Tenderness: There is no abdominal tenderness.   Musculoskeletal:         General: No swelling.      Cervical back: Neck supple.      Right lower le+ Pitting Edema present.      Left lower le+ Pitting Edema present.   Skin:     General: Skin is warm and dry.      Capillary Refill: Capillary refill takes less than 2 seconds.   Neurological:      Mental Status: She is alert.   Psychiatric:         Mood and Affect: Mood normal.         ED Medications  Medications   nitroglycerin (NITRO-BID) 2 % TD ointment 0.5 inch (0.5 inches Topical Given 24 3487)       Diagnostic Studies  Results Reviewed       Procedure Component Value Units Date/Time    HS Troponin I 4hr [[id number]]     Lab Status: No result Specimen: Blood     B-Type Natriuretic Peptide(BNP) [[id number]]  (Abnormal) Collected: 24 145    Lab Status: Final result Specimen: Blood from Arm, Left Updated: 24 1539      pg/mL     HS Troponin 0hr (reflex protocol) [[id number]]  (Normal) Collected: 24    Lab Status: Final result Specimen: Blood from Arm, Left Updated: 24 1536     hs TnI 0hr 20 ng/L     HS Troponin I 2hr [[id number]]     Lab Status: No result Specimen: Blood     Comprehensive metabolic panel [[id number]] Collected: 24    Lab Status: Final result Specimen: Blood from Arm, Left Updated: 24 1528     Sodium 139 mmol/L      Potassium 3.9 mmol/L      Chloride 106 mmol/L      CO2 28 mmol/L      ANION GAP 5 mmol/L      BUN 16 mg/dL      Creatinine 0.92 mg/dL      Glucose 86 mg/dL      Calcium 9.0 mg/dL      AST 21 U/L      ALT 9 U/L      Alkaline Phosphatase 85 U/L      Total Protein 7.3 g/dL      Albumin 4.1 g/dL      Total Bilirubin 0.51 mg/dL      eGFR 58 ml/min/1.73sq m     Narrative:      [employer] guidelines for Chronic Kidney Disease (CKD):     Stage 1 with normal or high GFR (GFR > 90 mL/min/1.73 square meters)    Stage 2 Mild CKD (GFR  = 60-89 mL/min/1.73 square meters)    Stage 3A Moderate CKD (GFR = 45-59 mL/min/1.73 square meters)    Stage 3B Moderate CKD (GFR = 30-44 mL/min/1.73 square meters)    Stage 4 Severe CKD (GFR = 15-29 mL/min/1.73 square meters)    Stage 5 End Stage CKD (GFR <15 mL/min/1.73 square meters)  Note: GFR calculation is accurate only with a steady state creatinine    Protime-INR [[id number]]  (Abnormal) Collected: 05/16/24 1459    Lab Status: Final result Specimen: Blood from Arm, Left Updated: 05/16/24 1527     Protime 30.7 seconds      INR 2.83    CBC and differential [[id number]] Collected: 05/16/24 1459    Lab Status: Final result Specimen: Blood from Arm, Left Updated: 05/16/24 1512     WBC 7.03 Thousand/uL      RBC 4.16 Million/uL      Hemoglobin 12.9 g/dL      Hematocrit 39.8 %      MCV 96 fL      MCH 31.0 pg      MCHC 32.4 g/dL      RDW 13.9 %      MPV 11.7 fL      Platelets 172 Thousands/uL      nRBC 0 /100 WBCs      Segmented % 59 %      Immature Grans % 0 %      Lymphocytes % 31 %      Monocytes % 8 %      Eosinophils Relative 1 %      Basophils Relative 1 %      Absolute Neutrophils 4.15 Thousands/µL      Absolute Immature Grans 0.02 Thousand/uL      Absolute Lymphocytes 2.16 Thousands/µL      Absolute Monocytes 0.56 Thousand/µL      Eosinophils Absolute 0.10 Thousand/µL      Basophils Absolute 0.04 Thousands/µL                    XR chest 2 views   ED Interpretation by Ivett Anders MD (05/16 1523)   Bilateral interstitial edema            Procedures  ECG 12 Lead Documentation Only    Date/Time: 5/16/2024 3:04 PM    Performed by: Ivett Anders MD  Authorized by: Ivett Anders MD    Indications / Diagnosis:  ECG changes  Patient location:  ED  Previous ECG:     Previous ECG:  Compared to current    Comparison ECG info:  12/13/2022    Similarity:  Changes noted (Q wave in lead III now present, PVCs now present, nonspecific T wave abnormality worse in anterior leads)  Interpretation:     Interpretation:  abnormal    Rate:     ECG rate:  95    ECG rate assessment: normal    Rhythm:     Rhythm: sinus rhythm    Ectopy:     Ectopy: PVCs      PVCs:  Frequent  QRS:     QRS axis:  Normal    QRS intervals:  Normal  Conduction:     Conduction: normal    ST segments:     ST segments:  Normal  T waves:     T waves: non-specific    Q waves:     Q waves:  III  Other findings:     Other findings: prolonged qTc interval      Other findings comment:  QTc mildly prolonged at 462 ms        ED Course  ED Course as of 05/16/24 1614   Thu May 16, 2024   1523 XR chest 2 views  Bilateral interstitial edema   1534 Blood Pressure(!): 200/89  Pt arrived to ED with BP of 125/101, is now hypertensive at 200/89. BP cuff is proper fit, working appropriately. Pt denies any sx's. No HA, dizziness, presyncope/syncope, vision changes, CP, SOB, ab pain, urinary sx's. Will not treat this BP at this time given she is asymptomatic. Will give nitro paste    1538 hs TnI 0hr: 20   1538 POCT INR(!): 2.83  Therapeutic INR   1540 BNP(!): 276   1545 Given patient's ECG changes, troponin = 20, heart score = 8, will admit patient.  Patient is agreeable.  Patient was updated about all labs and imaging.             HEART Risk Score      Flowsheet Row Most Recent Value   Heart Score Risk Calculator    History 2 Filed at: 05/16/2024 1537   ECG 1 Filed at: 05/16/2024 1537   Age 2 Filed at: 05/16/2024 1537   Risk Factors 2 Filed at: 05/16/2024 1537   Troponin 1 Filed at: 05/16/2024 1537   HEART Score 8 Filed at: 05/16/2024 1537                                  Medical Decision Making  83-year-old female presented ED for evaluation of ECG changes, a few episodes of chest pain over the past few days.  Was sent in by PCP due to her EKG changes.    Differentials including but not limited to: ACS, arrhythmia, hypertensive urgency, hypertensive emergency, anemia, electrolyte abnormality, new onset heart failure, pleurisy, myocarditis, pericarditis, pleural effusion, PNA.   Doubt PE as patient is anticoagulated and compliant with Coumadin.  Doubt dissection given patient has not had any chest pain since 5/11.    Will obtain labs, ECG, CXR, anticipate admission.    Amount and/or Complexity of Data Reviewed  External Data Reviewed: ECG and notes.  Labs: ordered. Decision-making details documented in ED Course.  Radiology: ordered and independent interpretation performed. Decision-making details documented in ED Course.  ECG/medicine tests: ordered and independent interpretation performed.    Risk  Prescription drug management.  Decision regarding hospitalization.          Disposition  Final diagnoses:   Hypertensive emergency   Chest pain   Abnormal ECG     Time reflects when diagnosis was documented in both MDM as applicable and the Disposition within this note       Time User Action Codes Description Comment    5/16/2024  3:13 PM Rendano, Ivett Add [R94.31] Abnormal EKG     5/16/2024  3:13 PM Rendano, Ivett Add [R07.9] Chest pain     5/16/2024  3:58 PM Rendano, Ivett Add [I16.1] Hypertensive emergency     5/16/2024  3:58 PM Rendano, Ivett Remove [R07.9] Chest pain     5/16/2024  3:58 PM Rendano, Ivett Add [R07.9] Chest pain     5/16/2024  3:58 PM Rendano, Ivett Add [R94.31] Abnormal ECG     5/16/2024  3:58 PM Rendano, Ivett Modify [I16.1] Hypertensive emergency     5/16/2024  3:58 PM Rendano, Ivett Remove [R94.31] Abnormal EKG           ED Disposition       ED Disposition   Admit    Condition   Stable    Date/Time   Thu May 16, 2024 5727    Comment   Case was discussed with NAYA and the patient's admission status was agreed to be Admission Status: observation status to the service of Dr. Mchugh .               Follow-up Information    None         Patient's Medications   Discharge Prescriptions    No medications on file     No discharge procedures on file.    PDMP Review         Value Time User    PDMP Reviewed  Yes 10/1/2020  2:22 AM Traci Ellsworth PA-C             ED  Provider  Attending physically available and evaluated Bertha Brown. I managed the patient along with the ED Attending.    Electronically Signed by           Ivett Anders MD  05/16/24 [id number]

## 2024-05-16 NOTE — H&P
"[facility]  H&P  Name: Bertha Brown 83 y.o. female I MRN: [id number]  Unit/Bed#: S -[facility] I Date of Admission: 5/16/2024   Date of Service: 5/16/2024 I Hospital Day: 0      Assessment & Plan   * Hypertensive urgency  Assessment & Plan  Blood Pressure: 160/74    POA: at time of admission blood pressure was elevated at 200/89. Received nitroglycerine paste with improvement to 160/74   Asymptomatic for entire day of admission      Chest pain  Assessment & Plan  Chest tightness on 5/11 and 5/14 with extension into arms. One episode of nausea/vomiting 5 days ago. Felt no symptoms on day of admission, but PCP found EKG with non-specific st segment and T wave changes  Troponin negative x2  HEART score = 8   EKG with PVCs and non-specific ST segment changes  CXR: no acute cardiopulmonary disease   BNP = 276  Trace pedal edema    Echocardiogram from 12/2022 - Left ventricular cavity size is normal. Wall thickness is increased. There is mild to moderate concentric hypertrophy. The left ventricular ejection fraction is 65%. Systolic function is normal. Wall motion is normal. Diastolic function is mildly abnormal, consistent with grade I (abnormal) relaxation.   No known prior myocardial infarction    Etiology: Hypertensive urgency versus atypical unstable angina    Plan:  Blood pressure control - continue home lisinopril 10 mg  Hydralazine IV 5 mg every 4 hours as needed for SBP>180  Echocardiogram  Inpatient adenosine stress test  Withholding beta-blockers for stress test, if patient becomes tachycardic, will restart metoprolol 25 mg twice daily.    Diabetes (HCC)  Assessment & Plan  Lab Results   Component Value Date    HGBA1C 6.4 (H) 02/19/2024       No results for input(s): \"POCGLU\" in the last 72 hours.    Blood Sugar Average: Last 72 hrs:    ISS  Hypoglycemic protocol      Hyperlipidemia  Assessment & Plan  Continue PTA atorvastatin 40mg    Paroxysmal atrial fibrillation (HCC)  Assessment " & Plan  History of atrial fibrillation currently anticoagulated with warfarin.     Plan:   Monitor INR         VTE Pharmacologic Prophylaxis: VTE Score: 5 High Risk (Score >/= 5) - Pharmacological DVT Prophylaxis Contraindicated. Sequential Compression Devices Ordered. Patient is on warfarin with therapeutic INR.  Code Status: Level 1 - Full Code   Discussion with family: Updated  (granddaughter) at bedside.    Anticipated Length of Stay: Patient will be admitted on an observation basis with an anticipated length of stay of less than 2 midnights secondary to chest pain.    Chief Complaint: Chest pain    History of Present Illness:yudi Brown is a 83 y.o. female with a PMH of hypertension, hyperlipidemia, type II diabetes mellitus, and paroxysmal atrial fibrillation on Coumadin who presents with intermittent episodes of non-exertional chest pain. She describes the pain as substernal pressure that radiates to her left arm, onset while she is sitting at home. Pain is associated with lightheadedness, palpitations, and diaphoresis. She had two episodes, one 5 days ago with nausea and one episode of emesis, and one 2 days ago, both relieved with sublingual nitroglycerin. She was seen by her family doctor today and referred here due to non-specific ST and T wave changes on EKG. She has been asymptomatic since her last chest pain episode 2 days ago, denies chest pain, shortness of breath.    On presentation, EKG with PVCs but no other impressive findings.  Troponins negative.    Denies headache, vision changes, weakness, numbness, abdominal pain, fever chills.    Review of Systems:  Review of Systems   Constitutional:  Positive for diaphoresis. Negative for chills and fever.   HENT:  Negative for ear pain and sore throat.    Eyes:  Negative for pain and visual disturbance.   Respiratory:  Positive for chest tightness and shortness of breath. Negative for cough and wheezing.    Cardiovascular:   Positive for chest pain, palpitations and leg swelling.   Gastrointestinal:  Positive for nausea and vomiting. Negative for abdominal distention, abdominal pain and diarrhea.   Genitourinary:  Negative for dysuria and hematuria.   Musculoskeletal:  Negative for arthralgias and back pain.   Skin:  Negative for color change and rash.   Neurological:  Positive for light-headedness. Negative for dizziness, seizures, syncope and weakness.   All other systems reviewed and are negative.      Past Medical and Surgical History:   Past Medical History:   Diagnosis Date    Asthma     Atrial fibrillation (HCC)     Diabetes (HCC)     Hyperlipidemia     Hypertension     Legionnaire's disease (HCC)        Past Surgical History:   Procedure Laterality Date    IR EMBOLIZATION (SPECIFY VESSEL OR SITE)  12/12/2022    LUNG LOBECTOMY         Meds/Allergies:  Prior to Admission medications    Medication Sig Start Date End Date Taking? Authorizing Provider   albuterol (2.5 mg/3 mL) 0.083 % nebulizer solution Take 6 mL (5 mg total) by nebulization every 6 (six) hours as needed for wheezing 4/18/22   Cl Espinal PA-C   atorvastatin (LIPITOR) 40 mg tablet atorvastatin 40 mg tablet 11/14/19   Historical Provider, MD   levothyroxine 100 mcg tablet Take 88 mcg by mouth      Historical Provider, MD   lisinopril (ZESTRIL) 10 mg tablet Take 1 tablet (10 mg total) by mouth daily 2/13/22 3/15/22  Letty Arthur MD   metoprolol tartrate (LOPRESSOR) 25 mg tablet Take 0.5 tablets (12.5 mg total) by mouth every 12 (twelve) hours 2/13/22 12/8/22  Letty Arthur MD   venlafaxine (EFFEXOR-XR) 150 mg 24 hr capsule venlafaxine  mg capsule,extended release 24 hr 3/7/19   Historical Provider, MD   warfarin (COUMADIN) 7.5 mg tablet Take 7.5 mg by mouth daily    Historical Provider, MD     I have reviewed home medications with patient family member.    Allergies: No Known Allergies    Social History:  Marital Status: /Civil Union    Occupation:   Patient Pre-hospital Living Situation: Home, With other family member: daughter  Patient Pre-hospital Level of Mobility: walks  Patient Pre-hospital Diet Restrictions: None  Substance Use History:   Social History     Substance and Sexual Activity   Alcohol Use Never     Social History     Tobacco Use   Smoking Status Never   Smokeless Tobacco Never     Social History     Substance and Sexual Activity   Drug Use Never       Family History:  Brother and sister passed in their 40-50s from cardiac disease.  Father with history of cardiac disease.  Mother alive in her 90s.    Physical Exam:     Vitals:   Blood Pressure: 160/74 (05/16/24 1630)  Pulse: 93 (05/16/24 1630)  Temperature: 98.2 °F (36.8 °C) (05/16/24 1447)  Temp Source: Oral (05/16/24 1447)  Respirations: 20 (05/16/24 1505)  Weight - Scale: 102 kg (224 lb 10.4 oz) (05/16/24 1443)  SpO2: 94 % (05/16/24 1630)    Physical Exam  Vitals and nursing note reviewed.   Constitutional:       General: She is not in acute distress.     Appearance: She is well-developed. She is obese. She is not diaphoretic.   HENT:      Head: Normocephalic and atraumatic.      Right Ear: External ear normal.      Left Ear: External ear normal.      Nose: Nose normal.      Mouth/Throat:      Mouth: Mucous membranes are moist.   Eyes:      Extraocular Movements: Extraocular movements intact.      Conjunctiva/sclera: Conjunctivae normal.      Pupils: Pupils are equal, round, and reactive to light.   Cardiovascular:      Rate and Rhythm: Normal rate and regular rhythm.      Heart sounds: No murmur heard.     No gallop.   Pulmonary:      Effort: Pulmonary effort is normal. No respiratory distress.      Breath sounds: Normal breath sounds. No wheezing, rhonchi or rales.   Chest:      Chest wall: No tenderness.   Abdominal:      General: Abdomen is flat. There is no distension.      Palpations: Abdomen is soft.      Tenderness: There is no abdominal tenderness.    Musculoskeletal:         General: Swelling (non-pitting to the level of her mid-leg) present. Normal range of motion.      Cervical back: Normal range of motion and neck supple.   Skin:     General: Skin is warm and dry.      Capillary Refill: Capillary refill takes less than 2 seconds.   Neurological:      General: No focal deficit present.      Mental Status: She is alert and oriented to person, place, and time.      Cranial Nerves: No cranial nerve deficit.      Sensory: No sensory deficit.      Motor: No weakness.      Coordination: Coordination normal.   Psychiatric:         Mood and Affect: Mood normal.        Additional Data:     Lab Results:  Results from last 7 days   Lab Units 05/16/24  1459   WBC Thousand/uL 7.03   HEMOGLOBIN g/dL 12.9   HEMATOCRIT % 39.8   PLATELETS Thousands/uL 172   SEGS PCT % 59   LYMPHO PCT % 31   MONO PCT % 8   EOS PCT % 1     Results from last 7 days   Lab Units 05/16/24  1459   SODIUM mmol/L 139   POTASSIUM mmol/L 3.9   CHLORIDE mmol/L 106   CO2 mmol/L 28   BUN mg/dL 16   CREATININE mg/dL 0.92   ANION GAP mmol/L 5   CALCIUM mg/dL 9.0   ALBUMIN g/dL 4.1   TOTAL BILIRUBIN mg/dL 0.51   ALK PHOS U/L 85   ALT U/L 9   AST U/L 21   GLUCOSE RANDOM mg/dL 86     Results from last 7 days   Lab Units 05/16/24  1459   INR  2.83*                   Lines/Drains:  Invasive Devices       Peripheral Intravenous Line  Duration             Peripheral IV 05/16/24 Left Antecubital <1 day                  Imaging: Reviewed radiology reports from this admission including: chest xray  XR chest 2 views   ED Interpretation by Ivett Anders MD (05/16 1523)   Bilateral interstitial edema          EKG and Other Studies Reviewed on Admission:   EKG: NSR. HR 97.  PVCs present    ** Please Note: This note has been constructed using a voice recognition system. **

## 2024-05-16 NOTE — ASSESSMENT & PLAN NOTE
"Lab Results   Component Value Date    HGBA1C 6.4 (H) 02/19/2024       No results for input(s): \"POCGLU\" in the last 72 hours.    Blood Sugar Average: Last 72 hrs:    ISS  Hypoglycemic protocol    "

## 2024-05-16 NOTE — ASSESSMENT & PLAN NOTE
Blood Pressure: 160/74    POA: at time of admission blood pressure was elevated at 200/89. Received nitroglycerine paste with improvement to 160/74   Asymptomatic for entire day of admission

## 2024-05-16 NOTE — ASSESSMENT & PLAN NOTE
Chest tightness on 5/11 and 5/14 with extension into arms. One episode of nausea/vomiting 5 days ago. Felt no symptoms on day of admission, but PCP found EKG with non-specific st segment and T wave changes  Troponin negative x2  HEART score = 8   EKG with PVCs and non-specific ST segment changes  CXR: no acute cardiopulmonary disease   BNP = 276  Trace pedal edema    Echocardiogram from 12/2022 - Left ventricular cavity size is normal. Wall thickness is increased. There is mild to moderate concentric hypertrophy. The left ventricular ejection fraction is 65%. Systolic function is normal. Wall motion is normal. Diastolic function is mildly abnormal, consistent with grade I (abnormal) relaxation.   No known prior myocardial infarction    Etiology: Hypertensive urgency versus atypical unstable angina    Plan:  Blood pressure control - continue home lisinopril 10 mg  Hydralazine IV 5 mg every 4 hours as needed for SBP>180  Echocardiogram  Inpatient adenosine stress test  Withholding beta-blockers for stress test, if patient becomes tachycardic, will restart metoprolol 25 mg twice daily.

## 2024-05-16 NOTE — Clinical Note
Case was discussed with NAYA and the patient's admission status was agreed to be Admission Status: observation status to the service of Dr. AMEZCUA .

## 2024-05-16 NOTE — ED ATTENDING ATTESTATION
5/16/2024  I, Jefferson Tilley DO, saw and evaluated the patient. I have discussed the patient with the resident/non-physician practitioner and agree with the resident's/non-physician practitioner's findings, Plan of Care, and MDM as documented in the resident's/non-physician practitioner's note, except where noted. All available labs and Radiology studies were reviewed.  I was present for key portions of any procedure(s) performed by the resident/non-physician practitioner and I was immediately available to provide assistance.       At this point I agree with the current assessment done in the Emergency Department.  I have conducted an independent evaluation of this patient a history and physical is as follows:    83-year-old female presents to the ED for evaluation of intermittent chest pains.  Has been seen by primary care earlier in the day for this.  Had intermittent episodes of chest pains nonexertional.  At the time of my evaluation, has no complaints of pain.  Did not take any of her medications today.  Was seen by primary care and subsequently referred to the ED for evaluation due to abnormal EKG.  No history of prior MI.  On exam, is resting comfortably no acute distress, speaking full sentences, normal heart sounds, normal lung sounds, moving all extremities with no gross motor deficit.  EKG has PVCs present with nonspecific T wave changes as interpreted by myself.  Chest x-ray shows no acute cardiopulmonary pathology as interpreted by myself pending final radiology read.  Labs show no leukocytosis, stable hemoglobin, no acute electrode abnormalities, normal renal function, INR 2.83 on warfarin, indeterminate initial troponin of 20 will need second to trend and calculate delta, elevated .  Patient is noted to be hypertensive while in the emergency department.  Nitropaste applied.  Heart score of 8: 2 for history, 1 for EKG, 2 for age, 2 for risk factors, 1 for troponin.  Resident discussed results with  patient/family.  Plan will be for hospitalization.  Resident discussed with admitting team.  Accepted onto their service for further care and management.  Stable at time of disposition    ED Course         Critical Care Time  Procedures

## 2024-05-17 ENCOUNTER — APPOINTMENT (OUTPATIENT)
Dept: NON INVASIVE DIAGNOSTICS | Facility: HOSPITAL | Age: 84
End: 2024-05-17
Payer: COMMERCIAL

## 2024-05-17 ENCOUNTER — APPOINTMENT (OUTPATIENT)
Dept: NUCLEAR MEDICINE | Facility: HOSPITAL | Age: 84
End: 2024-05-17
Payer: COMMERCIAL

## 2024-05-17 VITALS
TEMPERATURE: 98.7 F | SYSTOLIC BLOOD PRESSURE: 151 MMHG | RESPIRATION RATE: 18 BRPM | HEIGHT: 65 IN | DIASTOLIC BLOOD PRESSURE: 84 MMHG | HEART RATE: 87 BPM | OXYGEN SATURATION: 95 % | BODY MASS INDEX: 37.32 KG/M2 | WEIGHT: 224 LBS

## 2024-05-17 PROBLEM — I16.0 HYPERTENSIVE URGENCY: Status: RESOLVED | Noted: 2024-05-16 | Resolved: 2024-05-17

## 2024-05-17 LAB
ANION GAP SERPL CALCULATED.3IONS-SCNC: 6 MMOL/L (ref 4–13)
AORTIC ROOT: 3.4 CM
APICAL FOUR CHAMBER EJECTION FRACTION: 51 %
ASCENDING AORTA: 3.4 CM
ATRIAL RATE: 84 BPM
ATRIAL RATE: 87 BPM
ATRIAL RATE: 87 BPM
ATRIAL RATE: 95 BPM
ATRIAL RATE: 97 BPM
BSA FOR ECHO PROCEDURE: 2.08 M2
BUN SERPL-MCNC: 13 MG/DL (ref 5–25)
CALCIUM SERPL-MCNC: 8.7 MG/DL (ref 8.4–10.2)
CHLORIDE SERPL-SCNC: 106 MMOL/L (ref 96–108)
CO2 SERPL-SCNC: 27 MMOL/L (ref 21–32)
CREAT SERPL-MCNC: 0.88 MG/DL (ref 0.6–1.3)
E WAVE DECELERATION TIME: 349 MS
E/A RATIO: 0.64
ERYTHROCYTE [DISTWIDTH] IN BLOOD BY AUTOMATED COUNT: 14 % (ref 11.6–15.1)
FRACTIONAL SHORTENING: 40 (ref 28–44)
GFR SERPL CREATININE-BSD FRML MDRD: 61 ML/MIN/1.73SQ M
GLUCOSE P FAST SERPL-MCNC: 100 MG/DL (ref 65–99)
GLUCOSE SERPL-MCNC: 100 MG/DL (ref 65–140)
HCT VFR BLD AUTO: 38.6 % (ref 34.8–46.1)
HGB BLD-MCNC: 12.5 G/DL (ref 11.5–15.4)
INR PPP: 2.35 (ref 0.84–1.19)
INTERVENTRICULAR SEPTUM IN DIASTOLE (PARASTERNAL SHORT AXIS VIEW): 1.5 CM
INTERVENTRICULAR SEPTUM: 1.5 CM (ref 0.6–1.1)
LAAS-AP2: 16.8 CM2
LAAS-AP4: 15.2 CM2
LEFT ATRIUM SIZE: 4.5 CM
LEFT ATRIUM VOLUME (MOD BIPLANE): 37 ML
LEFT ATRIUM VOLUME INDEX (MOD BIPLANE): 17.8 ML/M2
LEFT INTERNAL DIMENSION IN SYSTOLE: 2.4 CM (ref 2.1–4)
LEFT VENTRICULAR INTERNAL DIMENSION IN DIASTOLE: 4 CM (ref 3.5–6)
LEFT VENTRICULAR POSTERIOR WALL IN END DIASTOLE: 1 CM
LEFT VENTRICULAR STROKE VOLUME: 47 ML
LVSV (TEICH): 47 ML
MAGNESIUM SERPL-MCNC: 2.1 MG/DL (ref 1.9–2.7)
MAX DIASTOLIC BP: 94 MMHG
MAX PREDICTED HEART RATE: 139 BPM
MCH RBC QN AUTO: 30.8 PG (ref 26.8–34.3)
MCHC RBC AUTO-ENTMCNC: 32.4 G/DL (ref 31.4–37.4)
MCV RBC AUTO: 95 FL (ref 82–98)
MV E'TISSUE VEL-LAT: 5 CM/S
MV E'TISSUE VEL-SEP: 6 CM/S
MV PEAK A VEL: 1.03 M/S
MV PEAK E VEL: 66 CM/S
MV STENOSIS PRESSURE HALF TIME: 101 MS
MV VALVE AREA P 1/2 METHOD: 2.18
NUC STRESS EJECTION FRACTION: 70 %
P AXIS: 20 DEGREES
P AXIS: 21 DEGREES
P AXIS: 47 DEGREES
P AXIS: 49 DEGREES
P AXIS: 56 DEGREES
PLATELET # BLD AUTO: 169 THOUSANDS/UL (ref 149–390)
PMV BLD AUTO: 11.4 FL (ref 8.9–12.7)
POTASSIUM SERPL-SCNC: 4.1 MMOL/L (ref 3.5–5.3)
PR INTERVAL: 184 MS
PR INTERVAL: 186 MS
PR INTERVAL: 192 MS
PROTHROMBIN TIME: 26.7 SECONDS (ref 11.6–14.5)
PROTOCOL NAME: NORMAL
QRS AXIS: 27 DEGREES
QRS AXIS: 28 DEGREES
QRS AXIS: 44 DEGREES
QRS AXIS: 50 DEGREES
QRS AXIS: 60 DEGREES
QRSD INTERVAL: 66 MS
QRSD INTERVAL: 68 MS
QRSD INTERVAL: 68 MS
QRSD INTERVAL: 70 MS
QRSD INTERVAL: 70 MS
QT INTERVAL: 368 MS
QT INTERVAL: 390 MS
QT INTERVAL: 402 MS
QT INTERVAL: 404 MS
QT INTERVAL: 404 MS
QTC INTERVAL: 462 MS
QTC INTERVAL: 475 MS
QTC INTERVAL: 486 MS
QTC INTERVAL: 486 MS
QTC INTERVAL: 495 MS
RA PRESSURE ESTIMATED: 5 MMHG
RATE PRESSURE PRODUCT: NORMAL
RBC # BLD AUTO: 4.06 MILLION/UL (ref 3.81–5.12)
RIGHT ATRIUM AREA SYSTOLE A4C: 12.9 CM2
RIGHT VENTRICLE ID DIMENSION: 4.4 CM
RV PSP: 31 MMHG
SL CV LEFT ATRIUM LENGTH A2C: 5.9 CM
SL CV LV EF: 70
SL CV PED ECHO LEFT VENTRICLE DIASTOLIC VOLUME (MOD BIPLANE) 2D: 68 ML
SL CV PED ECHO LEFT VENTRICLE SYSTOLIC VOLUME (MOD BIPLANE) 2D: 21 ML
SL CV REST NUCLEAR ISOTOPE DOSE: 11 MCI
SL CV STRESS NUCLEAR ISOTOPE DOSE: 33 MCI
SL CV STRESS RECOVERY BP: NORMAL MMHG
SL CV STRESS RECOVERY HR: 111 BPM
SL CV STRESS RECOVERY O2 SAT: 97 %
SODIUM SERPL-SCNC: 139 MMOL/L (ref 135–147)
STRESS ANGINA INDEX: 0
STRESS BASELINE BP: NORMAL MMHG
STRESS BASELINE HR: 100 BPM
STRESS O2 SAT REST: 95 %
STRESS PEAK HR: 123 BPM
STRESS POST EXERCISE DUR MIN: 3 MIN
STRESS POST EXERCISE DUR SEC: 0 SEC
STRESS POST O2 SAT PEAK: 97 %
STRESS POST PEAK BP: 164 MMHG
STRESS POST PEAK HR: 123 BPM
STRESS POST PEAK SYSTOLIC BP: 164 MMHG
STRESS/REST PERFUSION RATIO: 1.26
T WAVE AXIS: -17 DEGREES
T WAVE AXIS: -4 DEGREES
T WAVE AXIS: 5 DEGREES
T WAVE AXIS: 76 DEGREES
T WAVE AXIS: 79 DEGREES
TARGET HR FORMULA: NORMAL
TEST INDICATION: NORMAL
TR MAX PG: 26 MMHG
TR PEAK VELOCITY: 2.6 M/S
TRICUSPID ANNULAR PLANE SYSTOLIC EXCURSION: 1.5 CM
TRICUSPID VALVE PEAK REGURGITATION VELOCITY: 2.55 M/S
VENTRICULAR RATE: 84 BPM
VENTRICULAR RATE: 87 BPM
VENTRICULAR RATE: 87 BPM
VENTRICULAR RATE: 95 BPM
VENTRICULAR RATE: 97 BPM
WBC # BLD AUTO: 6.96 THOUSAND/UL (ref 4.31–10.16)

## 2024-05-17 PROCEDURE — 93306 TTE W/DOPPLER COMPLETE: CPT

## 2024-05-17 PROCEDURE — 93306 TTE W/DOPPLER COMPLETE: CPT | Performed by: INTERNAL MEDICINE

## 2024-05-17 PROCEDURE — 97165 OT EVAL LOW COMPLEX 30 MIN: CPT

## 2024-05-17 PROCEDURE — 80048 BASIC METABOLIC PNL TOTAL CA: CPT

## 2024-05-17 PROCEDURE — A9502 TC99M TETROFOSMIN: HCPCS

## 2024-05-17 PROCEDURE — 93017 CV STRESS TEST TRACING ONLY: CPT

## 2024-05-17 PROCEDURE — 93018 CV STRESS TEST I&R ONLY: CPT | Performed by: INTERNAL MEDICINE

## 2024-05-17 PROCEDURE — 93010 ELECTROCARDIOGRAM REPORT: CPT | Performed by: INTERNAL MEDICINE

## 2024-05-17 PROCEDURE — 85027 COMPLETE CBC AUTOMATED: CPT

## 2024-05-17 PROCEDURE — 83735 ASSAY OF MAGNESIUM: CPT

## 2024-05-17 PROCEDURE — 97162 PT EVAL MOD COMPLEX 30 MIN: CPT

## 2024-05-17 PROCEDURE — 99239 HOSP IP/OBS DSCHRG MGMT >30: CPT | Performed by: INTERNAL MEDICINE

## 2024-05-17 PROCEDURE — 78452 HT MUSCLE IMAGE SPECT MULT: CPT

## 2024-05-17 PROCEDURE — 78452 HT MUSCLE IMAGE SPECT MULT: CPT | Performed by: INTERNAL MEDICINE

## 2024-05-17 PROCEDURE — 93016 CV STRESS TEST SUPVJ ONLY: CPT | Performed by: INTERNAL MEDICINE

## 2024-05-17 PROCEDURE — 85610 PROTHROMBIN TIME: CPT

## 2024-05-17 RX ORDER — REGADENOSON 0.08 MG/ML
0.4 INJECTION, SOLUTION INTRAVENOUS ONCE
Status: COMPLETED | OUTPATIENT
Start: 2024-05-17 | End: 2024-05-17

## 2024-05-17 RX ORDER — ACETAMINOPHEN 325 MG/1
650 TABLET ORAL EVERY 6 HOURS PRN
Status: DISCONTINUED | OUTPATIENT
Start: 2024-05-17 | End: 2024-05-17 | Stop reason: HOSPADM

## 2024-05-17 RX ORDER — LISINOPRIL 10 MG/1
10 TABLET ORAL DAILY
Qty: 30 TABLET | Refills: 0 | Status: SHIPPED | OUTPATIENT
Start: 2024-05-17 | End: 2024-06-16

## 2024-05-17 RX ORDER — AMLODIPINE BESYLATE 5 MG/1
5 TABLET ORAL DAILY
Qty: 30 TABLET | Refills: 0 | Status: CANCELLED | OUTPATIENT
Start: 2024-05-18

## 2024-05-17 RX ADMIN — AMLODIPINE BESYLATE 5 MG: 5 TABLET ORAL at 08:59

## 2024-05-17 RX ADMIN — LISINOPRIL 10 MG: 10 TABLET ORAL at 08:59

## 2024-05-17 RX ADMIN — VENLAFAXINE HYDROCHLORIDE 150 MG: 150 CAPSULE, EXTENDED RELEASE ORAL at 08:59

## 2024-05-17 RX ADMIN — LEVOTHYROXINE SODIUM 88 MCG: 88 TABLET ORAL at 05:11

## 2024-05-17 RX ADMIN — REGADENOSON 0.4 MG: 0.08 INJECTION, SOLUTION INTRAVENOUS at 10:20

## 2024-05-17 NOTE — ASSESSMENT & PLAN NOTE
Recent Labs     05/16/24  1459 05/17/24  0534   INR 2.83* 2.35*       History of atrial fibrillation currently anticoagulated with warfarin.     Plan:   Monitor INR

## 2024-05-17 NOTE — ASSESSMENT & PLAN NOTE
Chest tightness on 5/11 and 5/14 with extension into arms. One episode of nausea/vomiting 5 days ago. Felt no symptoms on day of admission, but PCP found EKG with non-specific st segment and T wave changes  Troponin negative x2  HEART score = 8   EKG with PVCs and non-specific ST segment changes  CXR: no acute cardiopulmonary disease   BNP = 276  Trace pedal edema    Echocardiogram from 12/2022 - Left ventricular cavity size is normal. Wall thickness is increased. There is mild to moderate concentric hypertrophy. The left ventricular ejection fraction is 65%. Systolic function is normal. Wall motion is normal. Diastolic function is mildly abnormal, consistent with grade I (abnormal) relaxation.   No known prior myocardial infarction    Etiology: Hypertensive urgency versus atypical unstable angina    Plan:  Blood pressure control - continue home lisinopril 10 mg, added amlodipine 5 Mg  Hydralazine IV 5 mg every 4 hours as needed for SBP>180  Echocardiogram  Inpatient adenosine stress test  Withholding beta-blockers for stress test, if patient becomes tachycardic, will restart metoprolol 25 mg twice daily.

## 2024-05-17 NOTE — PHYSICAL THERAPY NOTE
PHYSICAL THERAPY EVALUATION  DATE: 05/17/24  TIME: 0807-0821    NAME:  Bertha Brown  AGE:   83 y.o.  Mrn:   [id number]  Length Of Stay: 0    ADMIT DX:  Chest pain [R07.9]  Abnormal ECG [R94.31]  Abnormal EKG [R94.31]  Hypertensive emergency [I16.1]    Past Medical History:   Diagnosis Date    Asthma     Atrial fibrillation (HCC)     Diabetes (HCC)     Hyperlipidemia     Hypertension     Legionnaire's disease (HCC)      Past Surgical History:   Procedure Laterality Date    IR EMBOLIZATION (SPECIFY VESSEL OR SITE)  12/12/2022    LUNG LOBECTOMY         Performed at least 2 patient identifiers during session: Name, Birthday, and ID bracelet     05/17/24 0807   PT Last Visit   PT Visit Date 05/17/24   Note Type   Note type Evaluation   Pain Assessment   Pain Assessment Tool 0-10   Pain Score No Pain   Restrictions/Precautions   Weight Bearing Precautions Per Order No   Other Precautions Telemetry;Hard of hearing   Home Living   Type of Home House   Home Layout Two level;1/2 bath on main level;Bed/bath upstairs;Stairs to enter with rails  (2 [place] with HR, full flight of steps w/ HR to 2nd floor bedroom and full bathroom; laundry on first floor)   Bathroom Shower/Tub Walk-in shower   Bathroom Toilet Standard   Bathroom Equipment Other (Comment)  (none)   Bathroom Accessibility Accessible   Home Equipment Walker;Cane   Prior Function   Level of [place] Independent with ADLs;Independent with functional mobility;Independent with IADLS   Lives With Daughter   Receives Help From Family  (reports dtr is home at all times, other local family as well)   IADLs Independent with meal prep;Independent with medication management;Independent with driving   Falls in the last 6 months 0  (pt and family deny)   Vocational Retired   Comments Pt reports that at baseline she is fully independent with all aspects of self care and functional mobility of household and community distances with no AD. Enjoys gardening and cleaning.  "  General   Additional Pertinent History Pt is an 83 yr old admitted under observation sent by PCP w/ c/o chest pain, abnormal EKG, SOB, hypertensive emergency.   Family/Caregiver Present Yes   Cognition   Overall Cognitive Status WFL   Arousal/Participation Cooperative   Orientation Level Oriented X4   Memory Within functional limits   Following Commands Follows multistep commands without difficulty   Subjective   Subjective \"I'm walking normal.\"   RUE Assessment   RUE Assessment WFL   LUE Assessment   LUE Assessment WFL   RLE Assessment   RLE Assessment WFL   LLE Assessment   LLE Assessment WFL   Coordination   Movements are Fluid and Coordinated 1   Sensation WFL   Self Selected Walking Speed   SSWS Trial 1 (Seconds) 4.99 Seconds   SSWS Trial 2 (Seconds) 4.86 Seconds   SSWS Trial 3 (Seconds) 4.87 Seconds   SSWS Average Time (Seconds) 4.9 seconds   SSWS Average Score (m/sec) 1.1 m/sec   Bed Mobility   Supine to Sit 6  Modified independent   Additional items HOB elevated   Sit to Supine   (NT as pt was left in bathroom at end of session)   Additional Comments Pt denies any lightheadedness or dizziness, denies palpitations or SOB with changes in positioning.   Transfers   Sit to Stand 7  Independent   Stand to Sit 7  Independent   Stand pivot 7  Independent   Toilet transfer 7  Independent   Additional Comments No evidence of instability with transfers or static vs dynamic standing balance.   Ambulation/Elevation   Gait pattern WNL;Decreased foot clearance;Step through pattern   Gait Assistance 7  Independent   Assistive Device None   Distance 300ft  (with change in direction and elevations training retirement)   Stair Management Assistance 6  Modified independent   Additional items Increased time required   Stair Management Technique One rail R;Foreward;Reciprocal   Number of Stairs 14  (14 steps up and down)   Balance   Static Sitting Normal   Dynamic Sitting Good   Static Standing Good   Dynamic Standing Good " "  Ambulatory Good   Endurance Deficit   Endurance Deficit No   Activity Tolerance   Activity Tolerance Patient tolerated treatment well   Medical Staff Made Aware Spoke with RN Beata, ANASTASIYA Acosta   Assessment   Prognosis Excellent   Assessment Pt seen for PT evaluation for mobility assessment & discharge needs. Activity orders: Up and OOB as tolerated. Pt admitted 5/16/2024 sent from PCP w/ Chest pain and abnormal EKG results. Comorbidities affecting pt's fnxl performance include: Afib, HTN, depression, OA, DM. During PT IE, pt independently completes all bed mobility, transfers, ambulation of 300ft with no AD, and negotiation of 14 steps up/down w/ u/l HR. Pt displays above outlined functional impairments & limitations, and presents at/close to her baseline level of functional mobility. Pt displays a gait speed of 1.1m/s, qualifying pt as \"a community ambulator\", and at low risk of falls as compared to age matched peers, however may have difficulty crossing crosswalks in allotted time. The AM-PAC & Barthel Index outcome tools were used to assist in determining pt safety w/ mobility/self care & appropriate d/c recommendations, see above for scores. Pt is at risk of falls d/t acuity of medical illness and ongoing medical treatment of primary dx. Pt's clinical presentation is currently evolving as seen in pt's presentation of ongoing medical diagnostics and imaging. Based on pt presentation & limited functional impairments, pt presents at/close to her baseline level of functional mobility and would most appropriately benefit from return to home with family support upon d/c, no additional skilled PT services indicated.   Barriers to Discharge None   Goals   Patient Goals \"to go home\"   PT Treatment Day 0   Discharge Recommendation   Rehab Resource Intensity Level, PT No post-acute rehabilitation needs   AM-PAC Basic Mobility Inpatient   Turning in Flat Bed Without Bedrails 4   Lying on Back to Sitting on Edge of Flat " Bed Without Bedrails 4   Moving Bed to Chair 4   Standing Up From Chair Using Arms 4   Walk in Room 4   Climb 3-5 Stairs With Railing 4   Basic Mobility Inpatient Raw Score 24   Basic Mobility Standardized Score 57.68   [facility] Highest Level Of Mobility   -HLM Goal 8: Walk 250 feet or more   JH-HLM Achieved 8: Walk 250 feet ot more   Modified Ashley Scale   Modified Ashley Scale 1   Barthel Index   Feeding 10   Bathing 5   Grooming Score 5   Dressing Score 10   Bladder Score 10   Bowels Score 10   Toilet Use Score 10   Transfers (Bed/Chair) Score 15   Mobility (Level Surface) Score 15   Stairs Score 10   Barthel Index Score 100   End of Consult   Patient Position at End of Consult Other (comment)  (seated on toilet in bathroom, family and imaging tech present in room)         Gait Speed Interpretation:  Gain of 0.1 m/s is a predictor of well-being in those w/ abnormal walking speed compared to age matched peers  <0.7m/s is at an increased risk for falls  Household ambulator: <0.4 m/s  Limited community ambulator: 0.4-0.8 m/s  Community ambulator: 0.8-1.2 m/s  Able to safely cross streets: >1.2 m/s    Based on patient's [facility] Highest Level of Mobility scores today, patient currently has a goal of -[facility] Levels: 8: WALK 250 FEET OR MORE, to be completed with RN staffing each shift, in order to improve overall activity tolerance and mobility, combat hospital related deconditioning, and maximize outcomes for d/c from the acute care setting.     The patient's AM-PAC Basic Mobility Inpatient Short Form Raw Score is 24. A Raw score of greater than 16 suggests the patient may benefit from discharge to home. Please also refer to the recommendation of the Physical Therapist for safe discharge planning.        Janki Heaton PT, DPT   Available via [email or web address]  NPI # [id number]  [place] License - [id number]  5/17/2024

## 2024-05-17 NOTE — PLAN OF CARE
Problem: SAFETY ADULT  Goal: Patient will remain free of falls  Description: INTERVENTIONS:  - Educate patient/family on patient safety including physical limitations  - Instruct patient to call for assistance with activity   - Consult OT/PT to assist with strengthening/mobility   - Keep Call bell within reach  - Keep bed low and locked with side rails adjusted as appropriate  - Keep care items and personal belongings within reach  - Initiate and maintain comfort rounds  - Make Fall Risk Sign visible to staff  - Apply yellow socks and bracelet for high fall risk patients  - Consider moving patient to room near nurses station  Outcome: Progressing  Goal: Maintain or return to baseline ADL function  Description: INTERVENTIONS:  -  Assess patient's ability to carry out ADLs; assess patient's baseline for ADL function and identify physical deficits which impact ability to perform ADLs (bathing, care of mouth/teeth, toileting, grooming, dressing, etc.)  - Assess/evaluate cause of self-care deficits   - Assess range of motion  - Assess patient's mobility; develop plan if impaired  - Assess patient's need for assistive devices and provide as appropriate  - Encourage maximum independence but intervene and supervise when necessary  - Involve family in performance of ADLs  - Assess for home care needs following discharge   - Consider OT consult to assist with ADL evaluation and planning for discharge  - Provide patient education as appropriate  Outcome: Progressing  Goal: Maintains/Returns to pre admission functional level  Description: INTERVENTIONS:  - Perform AM-PAC 6 Click Basic Mobility/ Daily Activity assessment daily.  - Set and communicate daily mobility goal to care team and patient/family/caregiver.   - Collaborate with rehabilitation services on mobility goals if consulted    - Out of bed for toileting  - Record patient progress and toleration of activity level   Outcome: Progressing

## 2024-05-17 NOTE — ASSESSMENT & PLAN NOTE
Recent Labs     05/16/24  1459 05/17/24  0534   INR 2.83* 2.35*     History of atrial fibrillation currently anticoagulated with warfarin.     Plan:   Continue home warfarin

## 2024-05-17 NOTE — PLAN OF CARE
Problem: SAFETY ADULT  Goal: Patient will remain free of falls  Description: INTERVENTIONS:  - Educate patient/family on patient safety including physical limitations  - Instruct patient to call for assistance with activity   - Consult OT/PT to assist with strengthening/mobility   - Keep Call bell within reach  - Keep bed low and locked with side rails adjusted as appropriate  - Keep care items and personal belongings within reach  - Initiate and maintain comfort rounds  - Make Fall Risk Sign visible to staff  - Offer Toileting every 2 Hours, in advance of need  - Initiate/Maintain bed/chair alarm  - Obtain necessary fall risk management equipment  - Apply yellow socks and bracelet for high fall risk patients  - Consider moving patient to room near nurses station  Outcome: Progressing     Problem: CARDIOVASCULAR - ADULT  Goal: Maintains optimal cardiac output and hemodynamic stability  Description: INTERVENTIONS:  - Monitor I/O, vital signs and rhythm  - Monitor for S/S and trends of decreased cardiac output  - Administer and titrate ordered vasoactive medications to optimize hemodynamic stability  - Assess quality of pulses, skin color and temperature  - Assess for signs of decreased coronary artery perfusion  - Instruct patient to report change in severity of symptoms  Outcome: Progressing

## 2024-05-17 NOTE — DISCHARGE SUMMARY
"[facility]  Discharge- Bertha Brown 1940, 83 y.o. female MRN: [id number]  Unit/Bed#: S -[facility] Encounter: [id number]  Primary Care Provider: Jarred Armstrong DO   Date and time admitted to hospital: 5/16/2024  2:38 PM    * Chest pain  Assessment & Plan  Chest tightness on 5/11 and 5/14 with extension into arms. One episode of nausea/vomiting 5 days ago. Felt no symptoms on day of admission, but PCP found EKG with non-specific st segment and T wave changes  Troponin negative x3  EKG with PVCs and non-specific T wave inversions  CXR: no acute cardiopulmonary disease   BNP = 276  Trace non-pitting pedal edema  Echocardiogram from 12/2022 - Left ventricular cavity size is normal, wall thickness increase, mild-moderate concentric hypertrophy. Left ventricular ejection fraction is 65%. Grade I diastolic dysfunction.  No known prior myocardial infarction  Echocardiogram - EF 70%, unremarkable  Inpatient adenosine stress test - negative for ST deviation or ischemia  Blood pressure well controlled during admission - 130/80s  Symptoms likely related to uncontrolled blood pressure at home.    Plan:  Continue home lisinopril 10 mg, metoprolol 12.5 twice daily  Follow up with PCP next week      Hypertensive urgency-resolved as of 5/17/2024  Assessment & Plan  Blood Pressure: 132/89    POA: at time of admission blood pressure was elevated at 200/89. Received nitroglycerine paste with improvement to 160/74   Asymptomatic for entire day of admission      Diabetes (HCC)  Assessment & Plan  Lab Results   Component Value Date    HGBA1C 6.4 (H) 02/19/2024       No results for input(s): \"POCGLU\" in the last 72 hours.    Blood Sugar Average: Last 72 hrs:    ISS  Hypoglycemic protocol      Hyperlipidemia  Assessment & Plan  Continue PTA atorvastatin 40mg    Paroxysmal atrial fibrillation (HCC)  Assessment & Plan  Recent Labs     05/16/24  1459 05/17/24  0534   INR 2.83* 2.35*     History of atrial " fibrillation currently anticoagulated with warfarin.     Plan:   Continue home warfarin      Medical Problems       Resolved Problems  Date Reviewed: 12/28/2022            Resolved    Hypertensive urgency 5/17/2024     Resolved by  Lorie Foley (Paul)        Discharging Resident: Gurinder Llanos MD  Discharging Attending: Kimber Contreras MD  PCP: Jarred Armstrong DO  Admission Date:   Admission Orders (From admission, onward)       Ordered        05/16/24 1557  Place in Observation  Once                          Discharge Date: 05/17/24    Consultations During Hospital Stay:  None    Procedures Performed:   Inpatient pharmacologic stress test    Significant Findings / Test Results:   None    Incidental Findings:   None   I reviewed the above mentioned incidental findings with the patient and/or family and they expressed understanding.    Test Results Pending at Discharge (will require follow up):  None     Outpatient Tests Requested:  None    Complications:  None    Reason for Admission: Chest pain    Hospital Course:   Bertha Brown is a 83 y.o. female patient with history of type II diabetes mellitus, hypertension, hyperlipidemia, and paroxysmal atrial fibrillation on Coumadin who originally presented to the hospital on 5/16/2024 due to intermittent chest pain. She had two episodes of nonexertional, substernal pressure chest pain radiating to her left arm over the past week which resolved with nitroglycerin. She was sent by her PCP for an EKG with non-specific ST/T wave changes.     In the emergency department, she was noted to be asymptomatic but had a blood pressure of 200/89 which improved to 160/74 after nitro paste. Initial troponin negative at 20 with a negative delta at 4 hours. BNP elevated at 276. CXR negative for cardiopulmonary disease. EKG demonstrated occasional PVCs and nonspecific T wave inversions, but unchanged from prior.    Inpatient adenosine stress test and echocardiogram negative for  "ischemia or cardiac dysfunction. She has been asymptomatic during the admission, vitals within normal limits, stable for discharge with follow up with her PCP for blood pressure management.    Please see above list of diagnoses and related plan for additional information.     Condition at Discharge: good    Discharge Day Visit / Exam:   Subjective:  Patient feels good today, was able to sleep through the night. She does report a 5/10 posterior headache that came on gradually last night but denies any vision changes, weakness, abnormal sensations. She has not had any chest pain, shortness of breath, dizziness, or palpitations since her last episode 3 days ago. Denies fevers, chills, nausea, vomiting, diarrhea, urinary symptoms.    Vitals: Blood Pressure: 132/89 (05/17/24 0810)  Pulse: 93 (05/17/24 0810)  Temperature: 98.2 °F (36.8 °C) (05/17/24 0715)  Temp Source: Oral (05/16/24 1447)  Respirations: 18 (05/17/24 0715)  Height: 5' 5\" (165.1 cm) (05/17/24 0810)  Weight - Scale: 102 kg (224 lb) (05/17/24 0810)  SpO2: 94 % (05/17/24 0800)    Exam:   Physical Exam  Vitals and nursing note reviewed.   Constitutional:       General: She is not in acute distress.     Appearance: She is well-developed. She is obese.   HENT:      Head: Normocephalic and atraumatic.   Eyes:      Conjunctiva/sclera: Conjunctivae normal.   Cardiovascular:      Rate and Rhythm: Normal rate and regular rhythm.      Heart sounds: No murmur heard.     No gallop.   Pulmonary:      Effort: Pulmonary effort is normal. No respiratory distress.      Breath sounds: Normal breath sounds.   Abdominal:      Palpations: Abdomen is soft.      Tenderness: There is no abdominal tenderness.   Musculoskeletal:         General: Swelling present.      Cervical back: Neck supple.      Comments: Bilateral trace non-pitting lower extremity edema.   Skin:     General: Skin is warm and dry.      Capillary Refill: Capillary refill takes less than 2 seconds.   Neurological: "      General: No focal deficit present.      Mental Status: She is alert and oriented to person, place, and time.      Cranial Nerves: No cranial nerve deficit.      Sensory: No sensory deficit.      Motor: No weakness.      Coordination: Coordination normal.   Psychiatric:         Mood and Affect: Mood normal.        Discussion with Family: Updated  (daughter) at bedside.    Discharge instructions/Information to patient and family:   See after visit summary for information provided to patient and family.      Provisions for Follow-Up Care:  See after visit summary for information related to follow-up care and any pertinent home health orders.      Mobility at time of Discharge:   Basic Mobility Inpatient Raw Score: 24  JH-HLM Goal: 8: Walk 250 feet or more  JH-HLM Achieved: 8: Walk 250 feet ot more  HLM Goal achieved. Continue to encourage appropriate mobility.     Disposition:   Home    Planned Readmission: N/A    Discharge Medications:  See after visit summary for reconciled discharge medications provided to patient and/or family.      **Please Note: This note may have been constructed using a voice recognition system**

## 2024-05-17 NOTE — ASSESSMENT & PLAN NOTE
Chest tightness on 5/11 and 5/14 with extension into arms. One episode of nausea/vomiting 5 days ago. Felt no symptoms on day of admission, but PCP found EKG with non-specific st segment and T wave changes  Troponin negative x3  EKG with PVCs and non-specific T wave inversions  CXR: no acute cardiopulmonary disease   BNP = 276  Trace non-pitting pedal edema  Echocardiogram from 12/2022 - Left ventricular cavity size is normal, wall thickness increase, mild-moderate concentric hypertrophy. Left ventricular ejection fraction is 65%. Grade I diastolic dysfunction.  No known prior myocardial infarction  Echocardiogram - EF 70%, unremarkable  Inpatient adenosine stress test - negative for ST deviation or ischemia  Blood pressure well controlled during admission - 130/80s  Symptoms likely related to uncontrolled blood pressure at home.    Plan:  Continue home lisinopril 10 mg, metoprolol 12.5 twice daily, added amlodipine 5 mg  Follow up with PCP next week

## 2024-05-17 NOTE — PROGRESS NOTES
"[facility]  Progress Note  Name: Bertha Brown I  MRN: [id number]  Unit/Bed#: S -01 I Date of Admission: 5/16/2024   Date of Service: 5/17/2024 I Hospital Day: 0    Assessment & Plan   * Chest pain  Assessment & Plan  Chest tightness on 5/11 and 5/14 with extension into arms. One episode of nausea/vomiting 5 days ago. Felt no symptoms on day of admission, but PCP found EKG with non-specific st segment and T wave changes  Troponin negative x2  HEART score = 8   EKG with PVCs and non-specific ST segment changes  CXR: no acute cardiopulmonary disease   BNP = 276  Trace pedal edema    Echocardiogram from 12/2022 - Left ventricular cavity size is normal. Wall thickness is increased. There is mild to moderate concentric hypertrophy. The left ventricular ejection fraction is 65%. Systolic function is normal. Wall motion is normal. Diastolic function is mildly abnormal, consistent with grade I (abnormal) relaxation.   No known prior myocardial infarction    Etiology: Hypertensive urgency versus atypical unstable angina    Plan:  Blood pressure control - continue home lisinopril 10 mg, added amlodipine 5 Mg  Hydralazine IV 5 mg every 4 hours as needed for SBP>180  Echocardiogram  Inpatient adenosine stress test  Withholding beta-blockers for stress test, if patient becomes tachycardic, will restart metoprolol 25 mg twice daily.    Hypertensive urgency  Assessment & Plan  Blood Pressure: 132/89    POA: at time of admission blood pressure was elevated at 200/89. Received nitroglycerine paste with improvement to 160/74   Asymptomatic for entire day of admission      Diabetes (HCC)  Assessment & Plan  Lab Results   Component Value Date    HGBA1C 6.4 (H) 02/19/2024       No results for input(s): \"POCGLU\" in the last 72 hours.    Blood Sugar Average: Last 72 hrs:    ISS  Hypoglycemic protocol      Hyperlipidemia  Assessment & Plan  Continue PTA atorvastatin 40mg    Paroxysmal atrial fibrillation " ([facility])  Assessment & Plan  Recent Labs     24  1459 24  0534   INR 2.83* 2.35*       History of atrial fibrillation currently anticoagulated with warfarin.     Plan:   Monitor INR             VTE Pharmacologic Prophylaxis: VTE Score: 5 High Risk (Score >/= 5) - Pharmacological DVT Prophylaxis Ordered: warfarin (Coumadin). Sequential Compression Devices Ordered.    Mobility:        Patient Centered Rounds: I performed bedside rounds with nursing staff today.  Discussions with Specialists or Other Care Team Provider: None    Education and Discussions with Family / Patient:  Will update patient's daughter by phone.     Current Length of Stay: 0 day(s)  Current Patient Status: Observation   Discharge Plan: Anticipate discharge in 24-48 hrs to discharge location to be determined pending rehab evaluations.    Code Status: Level 1 - Full Code    Subjective:   Patient seen at bedside today.  No acute events overnight.  Patient states that she is feeling well. Patient denies any fevers, chills, headaches, chest pain, shortness of breath, abdominal pain, nausea, vomiting, constipation, diarrhea.  Patient has no acute or significant concerns or complaints.  Inquires when she will be allowed to go home.  Patient is doing well overall.      Objective:     Vitals:   Temp (24hrs), Av.2 °F (36.8 °C), Min:98.1 °F (36.7 °C), Max:98.2 °F (36.8 °C)    Temp:  [98.1 °F (36.7 °C)-98.2 °F (36.8 °C)] 98.2 °F (36.8 °C)  HR:  [83-97] 93  Resp:  [18-20] 18  BP: (115-200)/() 132/89  SpO2:  [88 %-96 %] 93 %  Body mass index is 37.38 kg/m².     Input and Output Summary (last 24 hours):   No intake or output data in the 24 hours ending 24 0832    Physical Exam:   Physical Exam  Vitals and nursing note reviewed.   Constitutional:       General: She is not in acute distress.     Appearance: She is well-developed. She is obese. She is not diaphoretic.   HENT:      Head: Normocephalic and atraumatic.      Right Ear: External  ear normal.      Left Ear: External ear normal.      Nose: Nose normal.      Mouth/Throat:      Mouth: Mucous membranes are moist.   Eyes:      Extraocular Movements: Extraocular movements intact.      Conjunctiva/sclera: Conjunctivae normal.      Pupils: Pupils are equal, round, and reactive to light.   Cardiovascular:      Rate and Rhythm: Normal rate and regular rhythm.      Heart sounds: No murmur heard.     No gallop.   Pulmonary:      Effort: Pulmonary effort is normal. No respiratory distress.      Breath sounds: Normal breath sounds. No wheezing, rhonchi or rales.   Chest:      Chest wall: No tenderness.   Abdominal:      General: Abdomen is flat. There is no distension.      Palpations: Abdomen is soft.      Tenderness: There is no abdominal tenderness.   Musculoskeletal:         General: Swelling (non-pitting to the level of her mid-leg) present. Normal range of motion.      Cervical back: Normal range of motion and neck supple.      Right lower leg: Edema (trace) present.      Left lower leg: Edema (trace) present.   Skin:     General: Skin is warm and dry.      Capillary Refill: Capillary refill takes less than 2 seconds.   Neurological:      General: No focal deficit present.      Mental Status: She is alert and oriented to person, place, and time.      Cranial Nerves: No cranial nerve deficit.      Sensory: No sensory deficit.      Motor: No weakness.      Coordination: Coordination normal.   Psychiatric:         Mood and Affect: Mood normal.         Additional Data:     Labs:  Results from last 7 days   Lab Units 05/17/24  0534 05/16/24  1925 05/16/24  1459   WBC Thousand/uL 6.96  --  7.03   HEMOGLOBIN g/dL 12.5  --  12.9   HEMATOCRIT % 38.6  --  39.8   PLATELETS Thousands/uL 169   < > 172   SEGS PCT %  --   --  59   LYMPHO PCT %  --   --  31   MONO PCT %  --   --  8   EOS PCT %  --   --  1    < > = values in this interval not displayed.     Results from last 7 days   Lab Units 05/17/24  0534  05/16/24  1459   SODIUM mmol/L 139 139   POTASSIUM mmol/L 4.1 3.9   CHLORIDE mmol/L 106 106   CO2 mmol/L 27 28   BUN mg/dL 13 16   CREATININE mg/dL 0.88 0.92   ANION GAP mmol/L 6 5   CALCIUM mg/dL 8.7 9.0   ALBUMIN g/dL  --  4.1   TOTAL BILIRUBIN mg/dL  --  0.51   ALK PHOS U/L  --  85   ALT U/L  --  9   AST U/L  --  21   GLUCOSE RANDOM mg/dL 100 86     Results from last 7 days   Lab Units 05/17/24  0534   INR  2.35*                   Lines/Drains:  Invasive Devices       Peripheral Intravenous Line  Duration             Peripheral IV 05/16/24 Left Antecubital <1 day                      Telemetry:  Telemetry Orders (From admission, onward)               24 Hour Telemetry Monitoring  Continuous x 24 Hours (Telem)        Question:  Reason for 24 Hour Telemetry  Answer:  Decompensated CHF- and any one of the following: continuous diuretic infusion or total diuretic dose >200 mg daily, associated electrolyte derangement (I.e. K < 3.0), ionotropic drip (continuous infusion), hx of ventricular arrhythmia, or new EF < 35%                     Telemetry Reviewed: Normal Sinus Rhythm  Indication for Continued Telemetry Use: Acute MI/Unstable Angina/Rule out ACS           XR chest 2 views   ED Interpretation by Ivett Anders MD (05/16 1523)   Bilateral interstitial edema            Imaging: Reviewed radiology reports from this admission including: above    Recent Cultures (last 7 days):         Last 24 Hours Medication List:   Current Facility-Administered Medications   Medication Dose Route Frequency Provider Last Rate    albuterol  5 mg Nebulization Q6H PRN Gurinder Llanos MD      amLODIPine  5 mg Oral Daily Gurinder Llanos MD      atorvastatin  40 mg Oral Daily With Dinner Gurinder Llanos MD      hydrALAZINE  5 mg Intravenous Q4H PRN Gurinder Llanos MD      levothyroxine  88 mcg Oral Early Morning Gurinder Llanos MD      lisinopril  10 mg Oral Daily Gurinder Llanos MD      venlafaxine  150 mg Oral Daily Gurinder Llanos MD       warfarin  7.5 mg Oral Daily (warfarin) Gurinder Llanos MD          Today, Patient Was Seen By: Gurinder Llanos MD    **Please Note: This note may have been constructed using a voice recognition system.**

## 2024-05-17 NOTE — UTILIZATION REVIEW
Initial Clinical Review    Admission: Date/Time/Statement:   Admission Orders (From admission, onward)       Ordered        05/16/24 1557  Place in Observation  Once                          Orders Placed This Encounter   Procedures    Place in Observation     Standing Status:   Standing     Number of Occurrences:   1     Order Specific Question:   Level of Care     Answer:   Med Surg [16]     ED Arrival Information       Expected   -    Arrival   5/16/2024 14:35    Acuity   Urgent              Means of arrival   Walk-In    Escorted by   Family Member    Service   [facility]    Admission type   Emergency              Arrival complaint   abnormal EKG             Chief Complaint   Patient presents with    Abnormal ECG     Pt went to PCP today for chest pain. Pt sent here for abnormal EKG, frequent PVCs & twave abnormality. +SOB, no chest pain right now       Initial Presentation: 83 y.o. female pmh paroxysmal A-fib, diabetes, hypertension, hyperlipidemia  asthma directed to ED by PCP due to abn EKG as Observation admission due to chest pain. Reports intermittent episodes of non-exertional chest pain. She describes the pain as substernal pressure that radiates to her left arm, onset while  sitting at home. Pain is associated with lightheadedness, palpitations, and diaphoresis.   She had two episodes, one 5 days ago with nausea and one episode of emesis, and one 2 days ago, both relieved with sublingual nitroglycerin. Denies headache, vision changes, weakness, numbness, abdominal pain, fever chills.       EKG reveals non-specific ST and T wave changes on EKG   EXAM  EKG w PVCs, HTN urgency, HEART score = 8. Denies headache, vision changes, weakness, numbness, abdominal pain, fever chills, labs sl BNP elevation, Trace pedal edema. PLAN Observation admission due to chest pain. Tele, Cont BP control, cont home Lisinopril, IV Hydralazine PRN for SBP > 180, ECHO, IP adenosine stress test; hold BB for test; if tachy start  "Metoprolol 25 mg BID. Given nitro paste     Date: 5/17   Day 2:   No acute events overnight. Reports feeling well. Patient denies any fevers, chills, headaches, chest pain, shortness of breath, abdominal pain, nausea, vomiting, constipation, diarrhea.      ED Triage Vitals   Temperature Pulse Respirations Blood Pressure SpO2   05/16/24 1447 05/16/24 1443 05/16/24 1443 05/16/24 1443 05/16/24 1443   98.2 °F (36.8 °C) 97 20 (!) 125/101 95 %      Temp Source Heart Rate Source Patient Position - Orthostatic VS BP Location FiO2 (%)   05/16/24 1447 05/16/24 1505 05/16/24 1505 05/16/24 1505 --   Oral Monitor Lying Right arm       Pain Score       05/16/24 1700       No Pain          Wt Readings from Last 1 Encounters:   05/17/24 102 kg (224 lb)     Additional Vital Signs:   Date/Time Temp Pulse Resp BP MAP (mmHg) SpO2 Calculated FIO2 (%) - Nasal Cannula Nasal Cannula O2 Flow Rate (L/min) O2 Device Patient Position - Orthostatic VS   05/17/24 0810 -- 93 -- 132/89 -- -- -- -- -- --   05/17/24 07:15:30 98.2 °F (36.8 °C) 93 18 132/89 103 93 % -- -- None (Room air) --   05/17/24 02:32:54 98.1 °F (36.7 °C) 83 -- 115/82 93 92 % 24 1 L/min Nasal cannula --   05/17/24 0200 -- -- -- 130/88 -- -- -- -- -- --   05/17/24 0100 -- -- -- -- -- 88 % Abnormal  -- -- None (Room air) --   05/16/24 1630 -- 93 -- 160/74 106 94 % -- -- None (Room air) Sitting   05/16/24 1520 -- 90 -- 200/89 Abnormal  128 94 % -- -- None (Room air) --   05/16/24 1505 -- 92 20 198/78 Abnormal  -- 96 % -- -- None (Room air) Lying   05/16/24 1447 98.2 °F (36.8 °C) -- -- -- -- -- -- -- -- --   05/16/24 1443 -- 97 20 125/101 Abnormal  -- 95 % -- -- None (Room air) --     Weights (last 14 days)    Date/Time Weight Weight Method Height   05/17/24 0810 102 kg (224 lb) -- 5' 5\" (1.651 m)   05/16/24 1443 102 kg (224 lb 10.4 oz) Bed scale --     Pertinent Labs/Diagnostic Test Results:   5/16 EKG reveals non-specific ST and T wave changes on EKG    Echocardiogram from 12/2022 " "- Left ventricular cavity size is normal. Wall thickness is increased. There is mild to moderate concentric hypertrophy. The left ventricular ejection fraction is 65%. Systolic function is normal. Wall motion is normal. Diastolic function is mildly abnormal, consistent with grade I (abnormal) relaxation.   No known prior myocardial infarction  XR chest 2 views   ED Interpretation by Ivett Anders MD (05/16 1523)   Bilateral interstitial edema            Results from last 7 days   Lab Units 05/17/24 0534 05/16/24 1925 05/16/24  1459   WBC Thousand/uL 6.96  --  7.03   HEMOGLOBIN g/dL 12.5  --  12.9   HEMATOCRIT % 38.6  --  39.8   PLATELETS Thousands/uL 169 167 172   TOTAL NEUT ABS Thousands/µL  --   --  4.15         Results from last 7 days   Lab Units 05/17/24 0534 05/16/24  1459   SODIUM mmol/L 139 139   POTASSIUM mmol/L 4.1 3.9   CHLORIDE mmol/L 106 106   CO2 mmol/L 27 28   ANION GAP mmol/L 6 5   BUN mg/dL 13 16   CREATININE mg/dL 0.88 0.92   EGFR ml/min/1.73sq m 61 58   CALCIUM mg/dL 8.7 9.0   MAGNESIUM mg/dL 2.1  --      Results from last 7 days   Lab Units 05/16/24  1459   AST U/L 21   ALT U/L 9   ALK PHOS U/L 85   TOTAL PROTEIN g/dL 7.3   ALBUMIN g/dL 4.1   TOTAL BILIRUBIN mg/dL 0.51         Results from last 7 days   Lab Units 05/17/24  0534 05/16/24  1459   GLUCOSE RANDOM mg/dL 100 86             No results found for: \"BETA-HYDROXYBUTYRATE\"                   Results from last 7 days   Lab Units 05/16/24 1925 05/16/24  1657 05/16/24  1459   HS TNI 0HR ng/L  --   --  20   HS TNI 2HR ng/L  --  20  --    HSTNI D2 ng/L  --  0  --    HS TNI 4HR ng/L 18  --   --    HSTNI D4 ng/L -2  --   --          Results from last 7 days   Lab Units 05/17/24 0534 05/16/24  1459   PROTIME seconds 26.7* 30.7*   INR  2.35* 2.83*                         Results from last 7 days   Lab Units 05/16/24  1459   BNP pg/mL 276*                                                                                       ED Treatment: "   Medication Administration from 05/16/2024 1434 to 05/16/2024 1708         Date/Time Order Dose Route Action Action by Comments     05/16/2024 1559 EDT nitroglycerin (NITRO-BID) 2 % TD ointment 0.5 inch 0.5 inch Topical Given Chivo Maldonado RN --          Past Medical History:   Diagnosis Date    Asthma     Atrial fibrillation (HCC)     Diabetes (HCC)     Hyperlipidemia     Hypertension     Legionnaire's disease (HCC)      Present on Admission:   Diabetes (HCC)   Chest pain   Hyperlipidemia   Paroxysmal atrial fibrillation (HCC)      Admitting Diagnosis: Chest pain [R07.9]  Abnormal ECG [R94.31]  Abnormal EKG [R94.31]  Hypertensive emergency [I16.1]  Age/Sex: 83 y.o. female  Admission Orders:  Telemetry  echo  IP stress test ( adenosine nuclear stress )    Scheduled Medications:  amLODIPine, 5 mg, Oral, Daily  atorvastatin, 40 mg, Oral, Daily With Dinner  levothyroxine, 88 mcg, Oral, Early Morning  lisinopril, 10 mg, Oral, Daily  venlafaxine, 150 mg, Oral, Daily  warfarin, 7.5 mg, Oral, Daily (warfarin)      Continuous IV Infusions:     PRN Meds:  albuterol, 5 mg, Nebulization, Q6H PRN  hydrALAZINE, 5 mg, Intravenous, Q4H PRN            Network Utilization Review Department  ATTENTION: Please call with any questions or concerns to [phone] and carefully listen to the prompts so that you are directed to the right person. All voicemails are confidential.   For Discharge needs, contact Care Management DC Support Team at [phone] opt. [phone]  Send all requests for admission clinical reviews, approved or denied determinations and any other requests to dedicated fax number below belonging to the campus where the patient is receiving treatment. List of dedicated fax numbers for the Facilities:  FACILITY NAME UR FAX NUMBER   ADMISSION DENIALS (Administrative/Medical Necessity) [phone]   DISCHARGE SUPPORT TEAM (NETWORK) [phone]   PARENT CHILD HEALTH (Maternity/NICU/Pediatrics) [phone]   [facility]  [facility] [phone]   [facility] [phone]   Pending sale to [facility] [phone]   [facility] [phone]   [facility] [phone]   [facility] [phone]   [facility] [phone]   [facility] [phone]   [facility] [phone]   Critical access hospital [phone]   [facility] [phone]   [facility] [phone]

## 2024-05-17 NOTE — DISCHARGE INSTR - AVS FIRST PAGE
Dear Bertha Brown,     It was our pleasure to care for you here at [facility].  It is our hope that we were always able to exceed the expected standards for your care during your stay.  You were hospitalized due to chest pain.  You were cared for on the [place] second floor by Gurinder Llanos MD under the service of Kimber Contreras MD with the [facility] who covers for your primary care physician (PCP), Jarred Armstrong DO, while you were hospitalized.  If you have any questions or concerns related to this hospitalization, you may contact us at .  For follow up as well as any medication refills, we recommend that you follow up with your primary care physician.  A registered nurse will reach out to you by phone within a few days after your discharge to answer any additional questions that you may have after going home.  However, at this time we provide for you here, the most important instructions / recommendations at discharge:     Notable Medication Adjustments -   We made no significant changes to medications  Testing Required after Discharge -   None  ** Please contact your PCP to request testing orders for any of the testing recommended here **  Important follow up information -   Follow up with your primary care physician in 1 week to discuss any necessary medication changes and your hospitalization.  Other Instructions -   Please check your blood pressure at home daily.  Please return to the hospital if you develop headache, weakness, numbness/tingling, loss of consciousness, chest pain, shortness of breath, palpitations, abdominal pain, or nausea/vomiting.  Please review this entire after visit summary as additional general instructions including medication list, appointments, activity, diet, any pertinent wound care, and other additional recommendations from your care team that may be provided for you.      Sincerely,     Gurinder  MD Viet

## 2024-05-17 NOTE — ASSESSMENT & PLAN NOTE
Blood Pressure: 132/89    POA: at time of admission blood pressure was elevated at 200/89. Received nitroglycerine paste with improvement to 160/74   Asymptomatic for entire day of admission

## 2024-05-17 NOTE — OCCUPATIONAL THERAPY NOTE
Occupational Therapy Evaluation     Patient Name: Bertha Brown  Today's Date: 5/17/2024  Problem List  Principal Problem:    Chest pain  Active Problems:    Paroxysmal atrial fibrillation (HCC)    Hyperlipidemia    Diabetes (HCC)    Hypertensive urgency    Past Medical History  Past Medical History:   Diagnosis Date    Asthma     Atrial fibrillation (HCC)     Diabetes (HCC)     Hyperlipidemia     Hypertension     Legionnaire's disease (HCC)      Past Surgical History  Past Surgical History:   Procedure Laterality Date    IR EMBOLIZATION (SPECIFY VESSEL OR SITE)  12/12/2022    LUNG LOBECTOMY               05/17/24 0820   OT Last Visit   OT Visit Date 05/17/24   Note Type   Note type Evaluation   Pain Assessment   Pain Assessment Tool 0-10   Pain Score No Pain   Restrictions/Precautions   Weight Bearing Precautions Per Order No   Other Precautions Telemetry;Fall Risk;Hard of hearing   Home Living   Type of Home House   Home Layout Two level;1/2 bath on main level;Bed/bath upstairs  (2 [place], laundry on first floor)   Bathroom Shower/Tub Walk-in shower   Bathroom Toilet Standard   Bathroom Equipment   (none)   Bathroom Accessibility Accessible   Home Equipment Walker;Cane   Additional Comments no use of AD at baseline   Prior Function   Level of [place] Independent with ADLs   Lives With Daughter   Receives Help From Family  (daughter is home during the day)   IADLs Independent with driving;Independent with meal prep;Independent with medication management   Falls in the last 6 months 0   Vocational Retired   Lifestyle   Autonomy PTA pt living with daugther in [place], pt (I) with ADLs and IADLs, (-)falls, (+)drives, no use of AD at baseline   Reciprocal Relationships supportive daughter   Service to Others retired   Intrinsic Gratification enjoys gardening and cleaning   General   Additional Pertinent History Admit due to abnormal EKG + chest pain. Pt found to have HTN. PMH: diabetes, a fib   Family/Caregiver  "Present Yes  (granddaughter at bedside)   Subjective   Subjective \"I like to clean and do laundry\"   ADL   Eating Assistance 7  Independent   Grooming Assistance 7  Independent   Grooming Deficit Wash/dry hands   UB Bathing Assistance 7  Independent   LB Bathing Assistance 7  Independent   UB Dressing Assistance 7  Independent   LB Dressing Assistance 7  Independent   Toileting Assistance  7  Independent   Toileting Deficit Clothing management down;Perineal hygiene;Clothing management up   Bed Mobility   Supine to Sit 6  Modified independent   Additional items Increased time required;HOB elevated   Transfers   Sit to Stand 7  Independent   Stand to Sit 7  Independent   Stand pivot 7  Independent   Toilet transfer 7  Independent   Functional Mobility   Functional Mobility 7  Independent   Additional Comments functional household distance, no use of AD   Balance   Static Sitting Normal   Dynamic Sitting Good   Static Standing Good   Dynamic Standing Good   Ambulatory Good   Activity Tolerance   Activity Tolerance Patient tolerated treatment well   Medical Staff Made Aware PT FARA Martines   RUE Assessment   RUE Assessment WFL   LUE Assessment   LUE Assessment WFL   Hand Function   Gross Motor Coordination Functional   Fine Motor Coordination Functional   Cognition   Overall Cognitive Status WFL   Arousal/Participation Alert;Cooperative   Attention Attends with cues to redirect   Orientation Level Oriented X4   Memory Within functional limits   Following Commands Follows all commands and directions without difficulty   Comments pleasant and cooperative, english is pt's 2nd language requiring repetition of cuing at times due to language   Assessment   Limitation Decreased endurance   Assessment Pt is a 83 y.o. female seen for OT evaluation s/p admission to [facility] on 5/16/2024 due to Chest pain. Personal and env factors supporting pt at time of IE include (I) PLOF and supportive family . Personal and env factors " inhibiting engagement in occupations include advanced age and 2SH . Performance deficits that affect the pt’s occupational performance can be seen above. Despite pt's current functional limitations and medical complications pt is functioning near her baseline. No further acute OT needs identified at this time. Recommend continued active ADL participation and mobilization with hospital staff while in the hospital to increase pt’s endurance and strength upon D/C. From OT standpoint, recommend D/C to home with family support when medically cleared. D/C pt from OT caseload at this time.   Goals   Patient Goals to go home   Plan   OT Frequency Eval only   Discharge Recommendation   Rehab Resource Intensity Level, OT No post-acute rehabilitation needs   AM-PAC Daily Activity Inpatient   Lower Body Dressing 4   Bathing 4   Toileting 4   Upper Body Dressing 4   Grooming 4   Eating 4   Daily Activity Raw Score 24   Daily Activity Standardized Score (Calc for Raw Score >=11) 57.54   AM-PAC Applied Cognition Inpatient   Following a Speech/Presentation 4   Understanding Ordinary Conversation 4   Taking Medications 4   Remembering Where Things Are Placed or Put Away 4   Remembering List of 4-5 Errands 4   Taking Care of Complicated Tasks 4   Applied Cognition Raw Score 24   Applied Cognition Standardized Score 62.21   End of Consult   Patient Position at End of Consult All needs within reach  (sitting in bathroom, daughter present in room)        Pt with no further acute OT needs, will d/c from OT services at this time.       The patient's raw score on the -PAC Daily Activity Inpatient Short Form is 24. A raw score of greater than or equal to 19 suggests the patient may benefit from discharge to home. Please refer to the recommendation of the Occupational Therapist for safe discharge planning.      Karla Ayers MS, OTR/L

## 2024-05-17 NOTE — ASSESSMENT & PLAN NOTE
"Lab Results   Component Value Date    HGBA1C 6.4 (H) 02/19/2024       No results for input(s): \"POCGLU\" in the last 72 hours.    Blood Sugar Average: Last 72 hrs:  ?  ISS  Hypoglycemic protocol    "

## 2024-05-19 LAB
MAX DIASTOLIC BP: 94 MMHG
MAX PREDICTED HEART RATE: 139 BPM
PROTOCOL NAME: NORMAL
STRESS POST EXERCISE DUR MIN: 3 MIN
STRESS POST EXERCISE DUR SEC: 0 SEC
STRESS POST PEAK HR: 123 BPM
STRESS POST PEAK SYSTOLIC BP: 164 MMHG
TARGET HR FORMULA: NORMAL
TEST INDICATION: NORMAL

## 2024-10-29 ENCOUNTER — ANESTHESIA EVENT (INPATIENT)
Dept: PERIOP | Facility: HOSPITAL | Age: 84
End: 2024-10-29
Payer: COMMERCIAL

## 2024-10-29 ENCOUNTER — APPOINTMENT (EMERGENCY)
Dept: RADIOLOGY | Facility: HOSPITAL | Age: 84
DRG: 853 | End: 2024-10-29
Payer: COMMERCIAL

## 2024-10-29 ENCOUNTER — APPOINTMENT (EMERGENCY)
Dept: CT IMAGING | Facility: HOSPITAL | Age: 84
DRG: 853 | End: 2024-10-29
Payer: COMMERCIAL

## 2024-10-29 ENCOUNTER — ANESTHESIA (INPATIENT)
Dept: PERIOP | Facility: HOSPITAL | Age: 84
End: 2024-10-29
Payer: COMMERCIAL

## 2024-10-29 ENCOUNTER — APPOINTMENT (INPATIENT)
Dept: CT IMAGING | Facility: HOSPITAL | Age: 84
DRG: 853 | End: 2024-10-29
Payer: COMMERCIAL

## 2024-10-29 ENCOUNTER — HOSPITAL ENCOUNTER (INPATIENT)
Facility: HOSPITAL | Age: 84
LOS: 13 days | Discharge: NURSING, REHAB OR LONG-TERM CARE | DRG: 853 | End: 2024-11-11
Attending: STUDENT IN AN ORGANIZED HEALTH CARE EDUCATION/TRAINING PROGRAM | Admitting: STUDENT IN AN ORGANIZED HEALTH CARE EDUCATION/TRAINING PROGRAM
Payer: COMMERCIAL

## 2024-10-29 DIAGNOSIS — R10.84 GENERALIZED ABDOMINAL PAIN: ICD-10-CM

## 2024-10-29 DIAGNOSIS — B37.9 CANDIDA INFECTION: ICD-10-CM

## 2024-10-29 DIAGNOSIS — K55.9 ISCHEMIC COLITIS (HCC): ICD-10-CM

## 2024-10-29 DIAGNOSIS — G47.33 OBSTRUCTIVE SLEEP APNEA ON CPAP: ICD-10-CM

## 2024-10-29 DIAGNOSIS — K59.04 CHRONIC IDIOPATHIC CONSTIPATION: ICD-10-CM

## 2024-10-29 DIAGNOSIS — I48.91 ATRIAL FIBRILLATION (HCC): ICD-10-CM

## 2024-10-29 DIAGNOSIS — K92.0 COFFEE GROUND EMESIS: Primary | ICD-10-CM

## 2024-10-29 DIAGNOSIS — E87.70 VOLUME OVERLOAD: ICD-10-CM

## 2024-10-29 DIAGNOSIS — E03.9 ACQUIRED HYPOTHYROIDISM: ICD-10-CM

## 2024-10-29 DIAGNOSIS — R10.9 ABDOMINAL PAIN: ICD-10-CM

## 2024-10-29 DIAGNOSIS — J96.01 ACUTE RESPIRATORY FAILURE WITH HYPOXIA (HCC): ICD-10-CM

## 2024-10-29 DIAGNOSIS — N17.9 AKI (ACUTE KIDNEY INJURY) (HCC): ICD-10-CM

## 2024-10-29 PROBLEM — D64.9 ANEMIA: Status: ACTIVE | Noted: 2024-10-29

## 2024-10-29 PROBLEM — R65.10 SIRS (SYSTEMIC INFLAMMATORY RESPONSE SYNDROME) (HCC): Status: ACTIVE | Noted: 2024-10-29

## 2024-10-29 LAB
2HR DELTA HS TROPONIN: 4 NG/L
4HR DELTA HS TROPONIN: 8 NG/L
ABO GROUP BLD: NORMAL
ACANTHOCYTES BLD QL SMEAR: PRESENT
ALBUMIN SERPL BCG-MCNC: 3.7 G/DL (ref 3.5–5)
ALBUMIN SERPL BCG-MCNC: 4.7 G/DL (ref 3.5–5)
ALP SERPL-CCNC: 100 U/L (ref 34–104)
ALP SERPL-CCNC: 70 U/L (ref 34–104)
ALT SERPL W P-5'-P-CCNC: 17 U/L (ref 7–52)
ALT SERPL W P-5'-P-CCNC: 18 U/L (ref 7–52)
ANION GAP SERPL CALCULATED.3IONS-SCNC: 10 MMOL/L (ref 4–13)
ANION GAP SERPL CALCULATED.3IONS-SCNC: 13 MMOL/L (ref 4–13)
APTT PPP: 23 SECONDS (ref 23–34)
AST SERPL W P-5'-P-CCNC: 25 U/L (ref 13–39)
AST SERPL W P-5'-P-CCNC: 25 U/L (ref 13–39)
ATRIAL RATE: 119 BPM
BASOPHILS # BLD AUTO: 0.03 THOUSANDS/ÂΜL (ref 0–0.1)
BASOPHILS # BLD MANUAL: 0 THOUSAND/UL (ref 0–0.1)
BASOPHILS NFR BLD AUTO: 0 % (ref 0–1)
BASOPHILS NFR MAR MANUAL: 0 % (ref 0–1)
BILIRUB SERPL-MCNC: 0.7 MG/DL (ref 0.2–1)
BILIRUB SERPL-MCNC: 0.77 MG/DL (ref 0.2–1)
BILIRUB UR QL STRIP: NEGATIVE
BLD GP AB SCN SERPL QL: NEGATIVE
BUN SERPL-MCNC: 28 MG/DL (ref 5–25)
BUN SERPL-MCNC: 35 MG/DL (ref 5–25)
CALCIUM SERPL-MCNC: 10.6 MG/DL (ref 8.4–10.2)
CALCIUM SERPL-MCNC: 8.6 MG/DL (ref 8.4–10.2)
CARDIAC TROPONIN I PNL SERPL HS: 13 NG/L
CARDIAC TROPONIN I PNL SERPL HS: 17 NG/L
CARDIAC TROPONIN I PNL SERPL HS: 9 NG/L
CHLORIDE SERPL-SCNC: 104 MMOL/L (ref 96–108)
CHLORIDE SERPL-SCNC: 109 MMOL/L (ref 96–108)
CLARITY UR: CLEAR
CO2 SERPL-SCNC: 21 MMOL/L (ref 21–32)
CO2 SERPL-SCNC: 21 MMOL/L (ref 21–32)
COLOR UR: YELLOW
CREAT SERPL-MCNC: 1.63 MG/DL (ref 0.6–1.3)
CREAT SERPL-MCNC: 1.7 MG/DL (ref 0.6–1.3)
EOSINOPHIL # BLD AUTO: 0.08 THOUSAND/ÂΜL (ref 0–0.61)
EOSINOPHIL # BLD MANUAL: 0 THOUSAND/UL (ref 0–0.4)
EOSINOPHIL NFR BLD AUTO: 1 % (ref 0–6)
EOSINOPHIL NFR BLD MANUAL: 0 % (ref 0–6)
ERYTHROCYTE [DISTWIDTH] IN BLOOD BY AUTOMATED COUNT: 13.5 % (ref 11.6–15.1)
ERYTHROCYTE [DISTWIDTH] IN BLOOD BY AUTOMATED COUNT: 14 % (ref 11.6–15.1)
FLUAV AG UPPER RESP QL IA.RAPID: NEGATIVE
FLUBV AG UPPER RESP QL IA.RAPID: NEGATIVE
GFR SERPL CREATININE-BSD FRML MDRD: 27 ML/MIN/1.73SQ M
GFR SERPL CREATININE-BSD FRML MDRD: 29 ML/MIN/1.73SQ M
GLUCOSE SERPL-MCNC: 150 MG/DL (ref 65–140)
GLUCOSE SERPL-MCNC: 164 MG/DL (ref 65–140)
GLUCOSE SERPL-MCNC: 179 MG/DL (ref 65–140)
GLUCOSE UR STRIP-MCNC: NEGATIVE MG/DL
HCT VFR BLD AUTO: 40.7 % (ref 34.8–46.1)
HCT VFR BLD AUTO: 41.9 % (ref 34.8–46.1)
HCT VFR BLD AUTO: 42.1 % (ref 34.8–46.1)
HGB BLD-MCNC: 12.8 G/DL (ref 11.5–15.4)
HGB BLD-MCNC: 12.9 G/DL (ref 11.5–15.4)
HGB BLD-MCNC: 13.2 G/DL (ref 11.5–15.4)
HGB UR QL STRIP.AUTO: NEGATIVE
IMM GRANULOCYTES # BLD AUTO: 0.02 THOUSAND/UL (ref 0–0.2)
IMM GRANULOCYTES NFR BLD AUTO: 0 % (ref 0–2)
INR PPP: 1.18 (ref 0.85–1.19)
INR PPP: 2.04 (ref 0.85–1.19)
KETONES UR STRIP-MCNC: NEGATIVE MG/DL
LACTATE SERPL-SCNC: 3.1 MMOL/L (ref 0.5–2)
LACTATE SERPL-SCNC: 3.2 MMOL/L (ref 0.5–2)
LACTATE SERPL-SCNC: 3.5 MMOL/L (ref 0.5–2)
LACTATE SERPL-SCNC: 4.1 MMOL/L (ref 0.5–2)
LACTATE SERPL-SCNC: 4.5 MMOL/L (ref 0.5–2)
LACTATE SERPL-SCNC: 4.6 MMOL/L (ref 0.5–2)
LEUKOCYTE ESTERASE UR QL STRIP: NEGATIVE
LIPASE SERPL-CCNC: 20 U/L (ref 11–82)
LYMPHOCYTES # BLD AUTO: 0 % (ref 14–44)
LYMPHOCYTES # BLD AUTO: 0 THOUSAND/UL (ref 0.6–4.47)
LYMPHOCYTES # BLD AUTO: 2.28 THOUSANDS/ÂΜL (ref 0.6–4.47)
LYMPHOCYTES NFR BLD AUTO: 19 % (ref 14–44)
MCH RBC QN AUTO: 30.5 PG (ref 26.8–34.3)
MCH RBC QN AUTO: 30.6 PG (ref 26.8–34.3)
MCHC RBC AUTO-ENTMCNC: 30.6 G/DL (ref 31.4–37.4)
MCHC RBC AUTO-ENTMCNC: 31.5 G/DL (ref 31.4–37.4)
MCV RBC AUTO: 100 FL (ref 82–98)
MCV RBC AUTO: 97 FL (ref 82–98)
MONOCYTES # BLD AUTO: 0.3 THOUSAND/ÂΜL (ref 0.17–1.22)
MONOCYTES # BLD AUTO: 1.93 THOUSAND/UL (ref 0–1.22)
MONOCYTES NFR BLD AUTO: 2 % (ref 4–12)
MONOCYTES NFR BLD: 10 % (ref 4–12)
MYELOCYTE ABSOLUTE CT: 0.39 THOUSAND/UL (ref 0–0.1)
MYELOCYTES NFR BLD MANUAL: 2 % (ref 0–1)
NEUTROPHILS # BLD AUTO: 9.54 THOUSANDS/ÂΜL (ref 1.85–7.62)
NEUTROPHILS # BLD MANUAL: 16.98 THOUSAND/UL (ref 1.85–7.62)
NEUTS SEG NFR BLD AUTO: 78 % (ref 43–75)
NEUTS SEG NFR BLD AUTO: 88 % (ref 43–75)
NITRITE UR QL STRIP: NEGATIVE
NRBC BLD AUTO-RTO: 0 /100 WBCS
P AXIS: 50 DEGREES
PH UR STRIP.AUTO: 5 [PH]
PLATELET # BLD AUTO: 171 THOUSANDS/UL (ref 149–390)
PLATELET # BLD AUTO: 204 THOUSANDS/UL (ref 149–390)
PLATELET BLD QL SMEAR: ADEQUATE
PMV BLD AUTO: 11.2 FL (ref 8.9–12.7)
PMV BLD AUTO: 11.3 FL (ref 8.9–12.7)
POTASSIUM SERPL-SCNC: 4 MMOL/L (ref 3.5–5.3)
POTASSIUM SERPL-SCNC: 4.7 MMOL/L (ref 3.5–5.3)
PR INTERVAL: 184 MS
PROCALCITONIN SERPL-MCNC: 0.06 NG/ML
PROT SERPL-MCNC: 6.6 G/DL (ref 6.4–8.4)
PROT SERPL-MCNC: 8.3 G/DL (ref 6.4–8.4)
PROT UR STRIP-MCNC: NEGATIVE MG/DL
PROTHROMBIN TIME: 15.7 SECONDS (ref 12.3–15)
PROTHROMBIN TIME: 23.8 SECONDS (ref 12.3–15)
QRS AXIS: 65 DEGREES
QRSD INTERVAL: 72 MS
QT INTERVAL: 306 MS
QTC INTERVAL: 428 MS
RBC # BLD AUTO: 4.21 MILLION/UL (ref 3.81–5.12)
RBC # BLD AUTO: 4.33 MILLION/UL (ref 3.81–5.12)
RBC MORPH BLD: PRESENT
RH BLD: POSITIVE
SARS-COV+SARS-COV-2 AG RESP QL IA.RAPID: NEGATIVE
SODIUM SERPL-SCNC: 138 MMOL/L (ref 135–147)
SODIUM SERPL-SCNC: 140 MMOL/L (ref 135–147)
SP GR UR STRIP.AUTO: 1.03 (ref 1–1.03)
SPECIMEN EXPIRATION DATE: NORMAL
T WAVE AXIS: -20 DEGREES
UROBILINOGEN UR STRIP-ACNC: <2 MG/DL
VENTRICULAR RATE: 118 BPM
WBC # BLD AUTO: 12.25 THOUSAND/UL (ref 4.31–10.16)
WBC # BLD AUTO: 19.29 THOUSAND/UL (ref 4.31–10.16)

## 2024-10-29 PROCEDURE — NC001 PR NO CHARGE: Performed by: STUDENT IN AN ORGANIZED HEALTH CARE EDUCATION/TRAINING PROGRAM

## 2024-10-29 PROCEDURE — 85018 HEMOGLOBIN: CPT | Performed by: STUDENT IN AN ORGANIZED HEALTH CARE EDUCATION/TRAINING PROGRAM

## 2024-10-29 PROCEDURE — 82948 REAGENT STRIP/BLOOD GLUCOSE: CPT

## 2024-10-29 PROCEDURE — 80053 COMPREHEN METABOLIC PANEL: CPT

## 2024-10-29 PROCEDURE — 88307 TISSUE EXAM BY PATHOLOGIST: CPT | Performed by: PATHOLOGY

## 2024-10-29 PROCEDURE — 87205 SMEAR GRAM STAIN: CPT | Performed by: SURGERY

## 2024-10-29 PROCEDURE — 85027 COMPLETE CBC AUTOMATED: CPT | Performed by: SURGERY

## 2024-10-29 PROCEDURE — 87040 BLOOD CULTURE FOR BACTERIA: CPT | Performed by: STUDENT IN AN ORGANIZED HEALTH CARE EDUCATION/TRAINING PROGRAM

## 2024-10-29 PROCEDURE — 81003 URINALYSIS AUTO W/O SCOPE: CPT

## 2024-10-29 PROCEDURE — 87804 INFLUENZA ASSAY W/OPTIC: CPT

## 2024-10-29 PROCEDURE — 82330 ASSAY OF CALCIUM: CPT

## 2024-10-29 PROCEDURE — 84484 ASSAY OF TROPONIN QUANT: CPT

## 2024-10-29 PROCEDURE — 97605 NEG PRS WND THER DME<=50SQCM: CPT | Performed by: SURGERY

## 2024-10-29 PROCEDURE — 85610 PROTHROMBIN TIME: CPT

## 2024-10-29 PROCEDURE — 80053 COMPREHEN METABOLIC PANEL: CPT | Performed by: SURGERY

## 2024-10-29 PROCEDURE — 87186 SC STD MICRODIL/AGAR DIL: CPT | Performed by: SURGERY

## 2024-10-29 PROCEDURE — 82803 BLOOD GASES ANY COMBINATION: CPT

## 2024-10-29 PROCEDURE — 5A1955Z RESPIRATORY VENTILATION, GREATER THAN 96 CONSECUTIVE HOURS: ICD-10-PCS | Performed by: FAMILY MEDICINE

## 2024-10-29 PROCEDURE — 84145 PROCALCITONIN (PCT): CPT

## 2024-10-29 PROCEDURE — 85610 PROTHROMBIN TIME: CPT | Performed by: SURGERY

## 2024-10-29 PROCEDURE — 99291 CRITICAL CARE FIRST HOUR: CPT

## 2024-10-29 PROCEDURE — 87077 CULTURE AEROBIC IDENTIFY: CPT | Performed by: SURGERY

## 2024-10-29 PROCEDURE — 96374 THER/PROPH/DIAG INJ IV PUSH: CPT

## 2024-10-29 PROCEDURE — 93010 ELECTROCARDIOGRAM REPORT: CPT | Performed by: INTERNAL MEDICINE

## 2024-10-29 PROCEDURE — 96375 TX/PRO/DX INJ NEW DRUG ADDON: CPT

## 2024-10-29 PROCEDURE — 71250 CT THORAX DX C-: CPT

## 2024-10-29 PROCEDURE — 85014 HEMATOCRIT: CPT

## 2024-10-29 PROCEDURE — 96361 HYDRATE IV INFUSION ADD-ON: CPT

## 2024-10-29 PROCEDURE — 99223 1ST HOSP IP/OBS HIGH 75: CPT | Performed by: SURGERY

## 2024-10-29 PROCEDURE — 44160 REMOVAL OF COLON: CPT | Performed by: SURGERY

## 2024-10-29 PROCEDURE — 85007 BL SMEAR W/DIFF WBC COUNT: CPT | Performed by: SURGERY

## 2024-10-29 PROCEDURE — 99223 1ST HOSP IP/OBS HIGH 75: CPT | Performed by: STUDENT IN AN ORGANIZED HEALTH CARE EDUCATION/TRAINING PROGRAM

## 2024-10-29 PROCEDURE — 3E043XZ INTRODUCTION OF VASOPRESSOR INTO CENTRAL VEIN, PERCUTANEOUS APPROACH: ICD-10-PCS | Performed by: FAMILY MEDICINE

## 2024-10-29 PROCEDURE — 83605 ASSAY OF LACTIC ACID: CPT | Performed by: STUDENT IN AN ORGANIZED HEALTH CARE EDUCATION/TRAINING PROGRAM

## 2024-10-29 PROCEDURE — 86901 BLOOD TYPING SEROLOGIC RH(D): CPT

## 2024-10-29 PROCEDURE — 83690 ASSAY OF LIPASE: CPT

## 2024-10-29 PROCEDURE — 87811 SARS-COV-2 COVID19 W/OPTIC: CPT

## 2024-10-29 PROCEDURE — 84484 ASSAY OF TROPONIN QUANT: CPT | Performed by: STUDENT IN AN ORGANIZED HEALTH CARE EDUCATION/TRAINING PROGRAM

## 2024-10-29 PROCEDURE — 71045 X-RAY EXAM CHEST 1 VIEW: CPT

## 2024-10-29 PROCEDURE — 0DJD4ZZ INSPECTION OF LOWER INTESTINAL TRACT, PERCUTANEOUS ENDOSCOPIC APPROACH: ICD-10-PCS | Performed by: SURGERY

## 2024-10-29 PROCEDURE — 87075 CULTR BACTERIA EXCEPT BLOOD: CPT | Performed by: SURGERY

## 2024-10-29 PROCEDURE — 85014 HEMATOCRIT: CPT | Performed by: STUDENT IN AN ORGANIZED HEALTH CARE EDUCATION/TRAINING PROGRAM

## 2024-10-29 PROCEDURE — 82947 ASSAY GLUCOSE BLOOD QUANT: CPT

## 2024-10-29 PROCEDURE — 96365 THER/PROPH/DIAG IV INF INIT: CPT

## 2024-10-29 PROCEDURE — 85025 COMPLETE CBC W/AUTO DIFF WBC: CPT

## 2024-10-29 PROCEDURE — 84132 ASSAY OF SERUM POTASSIUM: CPT

## 2024-10-29 PROCEDURE — 85730 THROMBOPLASTIN TIME PARTIAL: CPT

## 2024-10-29 PROCEDURE — 36415 COLL VENOUS BLD VENIPUNCTURE: CPT

## 2024-10-29 PROCEDURE — 94760 N-INVAS EAR/PLS OXIMETRY 1: CPT

## 2024-10-29 PROCEDURE — 87154 CUL TYP ID BLD PTHGN 6+ TRGT: CPT | Performed by: STUDENT IN AN ORGANIZED HEALTH CARE EDUCATION/TRAINING PROGRAM

## 2024-10-29 PROCEDURE — 0DTF0ZZ RESECTION OF RIGHT LARGE INTESTINE, OPEN APPROACH: ICD-10-PCS | Performed by: SURGERY

## 2024-10-29 PROCEDURE — 74178 CT ABD&PLV WO CNTR FLWD CNTR: CPT

## 2024-10-29 PROCEDURE — 84295 ASSAY OF SERUM SODIUM: CPT

## 2024-10-29 PROCEDURE — 0BH17EZ INSERTION OF ENDOTRACHEAL AIRWAY INTO TRACHEA, VIA NATURAL OR ARTIFICIAL OPENING: ICD-10-PCS | Performed by: FAMILY MEDICINE

## 2024-10-29 PROCEDURE — 87070 CULTURE OTHR SPECIMN AEROBIC: CPT | Performed by: SURGERY

## 2024-10-29 PROCEDURE — 83605 ASSAY OF LACTIC ACID: CPT

## 2024-10-29 PROCEDURE — 93005 ELECTROCARDIOGRAM TRACING: CPT

## 2024-10-29 PROCEDURE — 99285 EMERGENCY DEPT VISIT HI MDM: CPT

## 2024-10-29 PROCEDURE — 99223 1ST HOSP IP/OBS HIGH 75: CPT | Performed by: INTERNAL MEDICINE

## 2024-10-29 PROCEDURE — 86900 BLOOD TYPING SEROLOGIC ABO: CPT

## 2024-10-29 PROCEDURE — 86850 RBC ANTIBODY SCREEN: CPT

## 2024-10-29 PROCEDURE — 87186 SC STD MICRODIL/AGAR DIL: CPT | Performed by: STUDENT IN AN ORGANIZED HEALTH CARE EDUCATION/TRAINING PROGRAM

## 2024-10-29 PROCEDURE — 74176 CT ABD & PELVIS W/O CONTRAST: CPT

## 2024-10-29 RX ORDER — ONDANSETRON 2 MG/ML
4 INJECTION INTRAMUSCULAR; INTRAVENOUS EVERY 6 HOURS PRN
Status: DISCONTINUED | OUTPATIENT
Start: 2024-10-29 | End: 2024-10-30

## 2024-10-29 RX ORDER — HYDROMORPHONE HCL/PF 1 MG/ML
0.5 SYRINGE (ML) INJECTION EVERY 6 HOURS PRN
Status: DISCONTINUED | OUTPATIENT
Start: 2024-10-29 | End: 2024-10-30

## 2024-10-29 RX ORDER — TRAZODONE HYDROCHLORIDE 50 MG/1
TABLET, FILM COATED ORAL
COMMUNITY

## 2024-10-29 RX ORDER — METRONIDAZOLE 500 MG/100ML
500 INJECTION, SOLUTION INTRAVENOUS ONCE
Status: COMPLETED | OUTPATIENT
Start: 2024-10-29 | End: 2024-10-29

## 2024-10-29 RX ORDER — CEFAZOLIN SODIUM 2 G/50ML
2000 SOLUTION INTRAVENOUS ONCE
Status: COMPLETED | OUTPATIENT
Start: 2024-10-29 | End: 2024-10-30

## 2024-10-29 RX ORDER — LEVOTHYROXINE SODIUM 88 UG/1
88 TABLET ORAL
Status: DISCONTINUED | OUTPATIENT
Start: 2024-10-30 | End: 2024-10-30

## 2024-10-29 RX ORDER — PANTOPRAZOLE SODIUM 40 MG/10ML
40 INJECTION, POWDER, LYOPHILIZED, FOR SOLUTION INTRAVENOUS EVERY 12 HOURS
Status: DISCONTINUED | OUTPATIENT
Start: 2024-10-29 | End: 2024-10-29

## 2024-10-29 RX ORDER — FENTANYL CITRATE 50 UG/ML
INJECTION, SOLUTION INTRAMUSCULAR; INTRAVENOUS AS NEEDED
Status: DISCONTINUED | OUTPATIENT
Start: 2024-10-29 | End: 2024-10-30

## 2024-10-29 RX ORDER — SODIUM CHLORIDE 9 MG/ML
125 INJECTION, SOLUTION INTRAVENOUS CONTINUOUS
Status: DISCONTINUED | OUTPATIENT
Start: 2024-10-29 | End: 2024-10-30

## 2024-10-29 RX ORDER — POLYETHYLENE GLYCOL 3350 17 G/17G
238 POWDER, FOR SOLUTION ORAL ONCE
Status: COMPLETED | OUTPATIENT
Start: 2024-10-29 | End: 2024-10-29

## 2024-10-29 RX ORDER — ONDANSETRON 2 MG/ML
INJECTION INTRAMUSCULAR; INTRAVENOUS
Status: COMPLETED
Start: 2024-10-29 | End: 2024-10-29

## 2024-10-29 RX ORDER — BUSPIRONE HYDROCHLORIDE 10 MG/1
10 TABLET ORAL 2 TIMES DAILY
COMMUNITY
Start: 2024-10-23

## 2024-10-29 RX ORDER — SODIUM CHLORIDE 9 MG/ML
100 INJECTION, SOLUTION INTRAVENOUS CONTINUOUS
Status: DISCONTINUED | OUTPATIENT
Start: 2024-10-29 | End: 2024-10-29

## 2024-10-29 RX ORDER — HYDROMORPHONE HCL IN WATER/PF 6 MG/30 ML
0.2 PATIENT CONTROLLED ANALGESIA SYRINGE INTRAVENOUS EVERY 4 HOURS PRN
Status: DISCONTINUED | OUTPATIENT
Start: 2024-10-29 | End: 2024-10-29

## 2024-10-29 RX ORDER — ARIPIPRAZOLE 2 MG/1
TABLET ORAL
COMMUNITY

## 2024-10-29 RX ORDER — PROPOFOL 10 MG/ML
INJECTION, EMULSION INTRAVENOUS AS NEEDED
Status: DISCONTINUED | OUTPATIENT
Start: 2024-10-29 | End: 2024-10-30

## 2024-10-29 RX ORDER — SUCCINYLCHOLINE/SOD CL,ISO/PF 100 MG/5ML
SYRINGE (ML) INTRAVENOUS AS NEEDED
Status: DISCONTINUED | OUTPATIENT
Start: 2024-10-29 | End: 2024-10-30

## 2024-10-29 RX ORDER — HYDROMORPHONE HCL IN WATER/PF 6 MG/30 ML
0.2 PATIENT CONTROLLED ANALGESIA SYRINGE INTRAVENOUS EVERY 4 HOURS PRN
Status: DISCONTINUED | OUTPATIENT
Start: 2024-10-29 | End: 2024-10-30

## 2024-10-29 RX ORDER — PANTOPRAZOLE SODIUM 20 MG/1
20 TABLET, DELAYED RELEASE ORAL DAILY
COMMUNITY
Start: 2024-07-15

## 2024-10-29 RX ORDER — PHENYLEPHRINE HCL IN 0.9% NACL 1 MG/10 ML
SYRINGE (ML) INTRAVENOUS AS NEEDED
Status: DISCONTINUED | OUTPATIENT
Start: 2024-10-29 | End: 2024-10-30

## 2024-10-29 RX ORDER — MAGNESIUM HYDROXIDE 1200 MG/15ML
LIQUID ORAL AS NEEDED
Status: DISCONTINUED | OUTPATIENT
Start: 2024-10-29 | End: 2024-10-30 | Stop reason: HOSPADM

## 2024-10-29 RX ORDER — SODIUM CHLORIDE, SODIUM LACTATE, POTASSIUM CHLORIDE, CALCIUM CHLORIDE 600; 310; 30; 20 MG/100ML; MG/100ML; MG/100ML; MG/100ML
INJECTION, SOLUTION INTRAVENOUS CONTINUOUS PRN
Status: DISCONTINUED | OUTPATIENT
Start: 2024-10-29 | End: 2024-10-30

## 2024-10-29 RX ORDER — HYDROCHLOROTHIAZIDE 25 MG/1
25 TABLET ORAL DAILY
COMMUNITY
Start: 2024-06-18 | End: 2024-11-11

## 2024-10-29 RX ORDER — ROCURONIUM BROMIDE 10 MG/ML
INJECTION, SOLUTION INTRAVENOUS AS NEEDED
Status: DISCONTINUED | OUTPATIENT
Start: 2024-10-29 | End: 2024-10-30

## 2024-10-29 RX ORDER — VASOPRESSIN 20 U/ML
INJECTION PARENTERAL AS NEEDED
Status: DISCONTINUED | OUTPATIENT
Start: 2024-10-29 | End: 2024-10-30

## 2024-10-29 RX ORDER — ALBUMIN, HUMAN INJ 5% 5 %
SOLUTION INTRAVENOUS CONTINUOUS PRN
Status: DISCONTINUED | OUTPATIENT
Start: 2024-10-29 | End: 2024-10-30

## 2024-10-29 RX ORDER — FENTANYL CITRATE 50 UG/ML
50 INJECTION, SOLUTION INTRAMUSCULAR; INTRAVENOUS ONCE
Status: COMPLETED | OUTPATIENT
Start: 2024-10-29 | End: 2024-10-29

## 2024-10-29 RX ORDER — ALENDRONATE SODIUM 70 MG/1
TABLET ORAL
COMMUNITY
Start: 2024-08-10

## 2024-10-29 RX ORDER — ONDANSETRON 2 MG/ML
4 INJECTION INTRAMUSCULAR; INTRAVENOUS ONCE
Status: COMPLETED | OUTPATIENT
Start: 2024-10-29 | End: 2024-10-29

## 2024-10-29 RX ORDER — METOCLOPRAMIDE HYDROCHLORIDE 5 MG/ML
5 INJECTION INTRAMUSCULAR; INTRAVENOUS EVERY 6 HOURS
Status: DISCONTINUED | OUTPATIENT
Start: 2024-10-29 | End: 2024-10-30

## 2024-10-29 RX ORDER — BUPIVACAINE HYDROCHLORIDE 2.5 MG/ML
INJECTION, SOLUTION EPIDURAL; INFILTRATION; INTRACAUDAL AS NEEDED
Status: DISCONTINUED | OUTPATIENT
Start: 2024-10-29 | End: 2024-10-30 | Stop reason: HOSPADM

## 2024-10-29 RX ADMIN — NOREPINEPHRINE BITARTRATE 8 MCG: 1 INJECTION, SOLUTION, CONCENTRATE INTRAVENOUS at 22:14

## 2024-10-29 RX ADMIN — NOREPINEPHRINE BITARTRATE 5 MCG/MIN: 1 INJECTION, SOLUTION, CONCENTRATE INTRAVENOUS at 22:25

## 2024-10-29 RX ADMIN — METOCLOPRAMIDE 5 MG: 5 INJECTION, SOLUTION INTRAMUSCULAR; INTRAVENOUS at 20:51

## 2024-10-29 RX ADMIN — NOREPINEPHRINE BITARTRATE 16 MCG: 1 INJECTION, SOLUTION, CONCENTRATE INTRAVENOUS at 23:03

## 2024-10-29 RX ADMIN — POLYETHYLENE GLYCOL 3350 238 G: 17 POWDER, FOR SOLUTION ORAL at 13:59

## 2024-10-29 RX ADMIN — METRONIDAZOLE: 500 INJECTION, SOLUTION INTRAVENOUS at 22:25

## 2024-10-29 RX ADMIN — PHYTONADIONE 10 MG: 10 INJECTION, EMULSION INTRAMUSCULAR; INTRAVENOUS; SUBCUTANEOUS at 21:15

## 2024-10-29 RX ADMIN — FENTANYL CITRATE 100 MCG: 50 INJECTION INTRAMUSCULAR; INTRAVENOUS at 22:13

## 2024-10-29 RX ADMIN — FENTANYL CITRATE 50 MCG: 50 INJECTION INTRAMUSCULAR; INTRAVENOUS at 10:47

## 2024-10-29 RX ADMIN — NOREPINEPHRINE BITARTRATE 8 MCG: 1 INJECTION, SOLUTION, CONCENTRATE INTRAVENOUS at 22:15

## 2024-10-29 RX ADMIN — IOHEXOL 100 ML: 350 INJECTION, SOLUTION INTRAVENOUS at 09:38

## 2024-10-29 RX ADMIN — ONDANSETRON 4 MG: 2 INJECTION INTRAMUSCULAR; INTRAVENOUS at 08:25

## 2024-10-29 RX ADMIN — SODIUM CHLORIDE 8 MG/HR: 9 INJECTION, SOLUTION INTRAVENOUS at 19:20

## 2024-10-29 RX ADMIN — Medication 200 MCG: at 22:19

## 2024-10-29 RX ADMIN — METOCLOPRAMIDE 5 MG: 5 INJECTION, SOLUTION INTRAMUSCULAR; INTRAVENOUS at 16:18

## 2024-10-29 RX ADMIN — SODIUM CHLORIDE, SODIUM LACTATE, POTASSIUM CHLORIDE, AND CALCIUM CHLORIDE: .6; .31; .03; .02 INJECTION, SOLUTION INTRAVENOUS at 22:30

## 2024-10-29 RX ADMIN — SODIUM CHLORIDE 125 ML/HR: 0.9 INJECTION, SOLUTION INTRAVENOUS at 13:02

## 2024-10-29 RX ADMIN — SODIUM CHLORIDE 1000 ML: 0.9 INJECTION, SOLUTION INTRAVENOUS at 08:24

## 2024-10-29 RX ADMIN — ALBUMIN (HUMAN): 12.5 INJECTION, SOLUTION INTRAVENOUS at 22:57

## 2024-10-29 RX ADMIN — Medication 100 MCG: at 22:15

## 2024-10-29 RX ADMIN — Medication 2000 UNITS: at 20:58

## 2024-10-29 RX ADMIN — VASOPRESSIN 1 UNITS: 20 INJECTION INTRAVENOUS at 22:25

## 2024-10-29 RX ADMIN — Medication 100 MCG: at 23:03

## 2024-10-29 RX ADMIN — SODIUM CHLORIDE 80 MG: 9 INJECTION, SOLUTION INTRAVENOUS at 08:49

## 2024-10-29 RX ADMIN — SODIUM CHLORIDE 1000 ML: 0.9 INJECTION, SOLUTION INTRAVENOUS at 13:57

## 2024-10-29 RX ADMIN — Medication 200 MCG: at 22:23

## 2024-10-29 RX ADMIN — AMPICILLIN SODIUM AND SULBACTAM SODIUM 3 G: 100; 50 INJECTION, POWDER, FOR SOLUTION INTRAVENOUS at 18:54

## 2024-10-29 RX ADMIN — ONDANSETRON 4 MG: 2 INJECTION INTRAMUSCULAR; INTRAVENOUS at 09:11

## 2024-10-29 RX ADMIN — PROPOFOL 60 MG: 10 INJECTION, EMULSION INTRAVENOUS at 22:14

## 2024-10-29 RX ADMIN — Medication 200 MG: at 22:15

## 2024-10-29 RX ADMIN — CEFAZOLIN SODIUM 2000 MG: 2 SOLUTION INTRAVENOUS at 22:20

## 2024-10-29 RX ADMIN — NOREPINEPHRINE BITARTRATE 16 MCG: 1 INJECTION, SOLUTION, CONCENTRATE INTRAVENOUS at 22:23

## 2024-10-29 RX ADMIN — POLYETHYLENE GLYCOL 3350, SODIUM SULFATE ANHYDROUS, SODIUM BICARBONATE, SODIUM CHLORIDE, POTASSIUM CHLORIDE 4000 ML: 236; 22.74; 6.74; 5.86; 2.97 POWDER, FOR SOLUTION ORAL at 16:19

## 2024-10-29 RX ADMIN — NOREPINEPHRINE BITARTRATE 16 MCG: 1 INJECTION, SOLUTION, CONCENTRATE INTRAVENOUS at 22:19

## 2024-10-29 RX ADMIN — ALBUMIN (HUMAN): 12.5 INJECTION, SOLUTION INTRAVENOUS at 23:08

## 2024-10-29 RX ADMIN — FENTANYL CITRATE 50 MCG: 50 INJECTION INTRAMUSCULAR; INTRAVENOUS at 10:03

## 2024-10-29 RX ADMIN — HYDROMORPHONE HYDROCHLORIDE 0.2 MG: 0.2 INJECTION, SOLUTION INTRAMUSCULAR; INTRAVENOUS; SUBCUTANEOUS at 13:03

## 2024-10-29 RX ADMIN — ROCURONIUM BROMIDE 30 MG: 10 INJECTION, SOLUTION INTRAVENOUS at 22:31

## 2024-10-29 RX ADMIN — Medication 100 MCG: at 22:14

## 2024-10-29 RX ADMIN — ONDANSETRON 4 MG: 2 INJECTION INTRAMUSCULAR; INTRAVENOUS at 18:34

## 2024-10-29 RX ADMIN — SODIUM CHLORIDE 1000 ML: 0.9 INJECTION, SOLUTION INTRAVENOUS at 10:07

## 2024-10-29 RX ADMIN — PANTOPRAZOLE SODIUM 40 MG: 40 INJECTION, POWDER, FOR SOLUTION INTRAVENOUS at 16:17

## 2024-10-29 RX ADMIN — Medication 12.5 MG: at 13:58

## 2024-10-29 RX ADMIN — ROCURONIUM BROMIDE 20 MG: 10 INJECTION, SOLUTION INTRAVENOUS at 23:02

## 2024-10-29 NOTE — ASSESSMENT & PLAN NOTE
Present on admission with past medical history of hypothyroidism.  Resume home dose of levothyroxine

## 2024-10-29 NOTE — ED PROVIDER NOTES
Time reflects when diagnosis was documented in both MDM as applicable and the Disposition within this note       Time User Action Codes Description Comment    10/29/2024 10:43 AM Alma Gonzalez Add [K92.0] Coffee ground emesis     10/29/2024 10:44 AM Alma Gonzaelz Add [N17.9] RUKHSANA (acute kidney injury) (HCC)     10/29/2024 10:44 AM Alma Gonzalez Add [J96.01] Acute respiratory failure with hypoxia (HCC)     10/29/2024  3:17 PM Celestine Cash III Add [R10.84] Generalized abdominal pain     10/29/2024  3:17 PM Celestine Cash III Add [K59.04] Chronic idiopathic constipation           ED Disposition       ED Disposition   Admit    Condition   Stable    Date/Time   Tue Oct 29, 2024 10:41 AM    Comment                  Assessment & Plan       Medical Decision Making  84-year-old female patient presents to the ER for evaluation of vomiting.  Patient presents with her daughter who is able to provide history.  Patient called her daughter this morning stating that she started vomiting coffee ground emesis.  Patient has been having severe nausea and abdominal distention.  The pain woke her up out of her sleep.  She takes Coumadin for A-fib.  No noted trauma or injury to abdomen.  She is pale, diaphoretic, tachycardic, hypotensive and hypoxic.  Patient has coffee-ground emesis on her mouth and clothing.  CBC shows a stable hemoglobin.  Creatinine 1.63 which is elevated from her baseline.  She does have an elevated BUN of 28.  Patient's GFR is 29.  Discussed with family of the risk and benefits of IV contrast and family agreed due to patient's toxic appearance the benefit of seeing GI bleed outweighs risk at this time.  Patient given 2 L normal saline.  Spoke with Dr. Woodall about best imaging for this patient.  She added that she did not have a bowel movement for 2 weeks.  Concern for obstruction for initial injury and GI bleed as secondary insult.  Patient was scanned as a CT chest without contrast and a CT high-volume to  rule out GI bleed.  Patient's imaging did not show a GI bleed at this time.  Although due to coffee-ground emesis spoke with gastroenterologist and recommended admitting patient and possible procedure tomorrow.  Patient and family agreed with plan of care.    Amount and/or Complexity of Data Reviewed  Labs: ordered. Decision-making details documented in ED Course.  Radiology: ordered. Decision-making details documented in ED Course.    Risk  Prescription drug management.  Decision regarding hospitalization.        ED Course as of 10/29/24 1544   Tue Oct 29, 2024   0832 Arrives pale, manic, hypotensive and hypoxic.  Patient has coffee ground emesis on her face.  She is alert and oriented at this time.  Toxic and ill-appearing.   0849 Spoke with Dr. Woodall-radiologist recommendations on patient's presentation and best imaging for patient's critical state.  Dr. Woodall recommended a CT chest with CT high-volume for abdomen pelvis.   0850 XR chest 1 view portable  No acute cardiopulmonary disease.   0858 Comprehensive metabolic panel(!)  Creatinine 1.63- noted RUKHSANA  BUN 28   0920 Spoke with Dr. Woodall about GFR.  Family at bedside and patient agreed with contrast and verbalized understanding the risk of worsening kidney function and potential need for dialysis.   1018 CT chest without contrast  1. New mild distention of the esophagus with fluid, question related to reflux or esophageal dysfunction.       Medications   sodium chloride 0.9 % bolus 1,000 mL (0 mL Intravenous Stopped 10/29/24 0959)   ondansetron (ZOFRAN) injection 4 mg (4 mg Intravenous Given 10/29/24 0825)   pantoprazole (PROTONIX) 80 mg in sodium chloride 0.9 % 100 mL IVPB (0 mg Intravenous Stopped 10/29/24 0958)   ondansetron (ZOFRAN) 4 mg/2 mL injection **ADS Override Pull** (4 mg  Given 10/29/24 0911)   iohexol (OMNIPAQUE) 350 MG/ML injection (MULTI-DOSE) 100 mL (100 mL Intravenous Given 10/29/24 0938)   fentaNYL injection 50 mcg (50 mcg Intravenous  Given 10/29/24 1003)   sodium chloride 0.9 % bolus 1,000 mL (0 mL Intravenous Stopped 10/29/24 1124)   fentaNYL injection 50 mcg (50 mcg Intravenous Given 10/29/24 1047)       ED Risk Strat Scores                                               History of Present Illness       Chief Complaint   Patient presents with    Abdominal Pain    Vomiting     Abd pain and multiple episodes of vomiting that began around 0500 today. Dark brown emesis per daughter. +SOB       Past Medical History:   Diagnosis Date    Asthma     Atrial fibrillation (HCC)     Diabetes (HCC)     Hyperlipidemia     Hypertension     Legionnaire's disease (HCC)       Past Surgical History:   Procedure Laterality Date    FL LUMBAR PUNCTURE DIAGNOSTIC  7/13/2014    IR EMBOLIZATION (SPECIFY VESSEL OR SITE)  12/12/2022    LUNG LOBECTOMY        History reviewed. No pertinent family history.   Social History     Tobacco Use    Smoking status: Never    Smokeless tobacco: Never   Vaping Use    Vaping status: Never Used   Substance Use Topics    Alcohol use: Never    Drug use: Never      E-Cigarette/Vaping    E-Cigarette Use Never User       E-Cigarette/Vaping Substances    Nicotine No     THC No     CBD No     Flavoring No     Other No     Unknown No       I have reviewed and agree with the history as documented.     85 y/o female patient presents to the ER for evaluation of vomiting.       History limited by:  Acuity of condition and unstable vital signs  Abdominal Pain  Associated symptoms: nausea and vomiting    Associated symptoms: no chest pain, no chills, no cough, no dysuria, no fever, no hematuria, no shortness of breath and no sore throat    Vomiting  Associated symptoms: abdominal pain    Associated symptoms: no arthralgias, no chills, no cough, no fever and no sore throat        Review of Systems   Constitutional:  Negative for chills and fever.   HENT:  Negative for ear pain and sore throat.    Eyes:  Negative for pain and visual disturbance.    Respiratory:  Negative for cough and shortness of breath.    Cardiovascular:  Negative for chest pain and palpitations.   Gastrointestinal:  Positive for abdominal distention, abdominal pain, nausea and vomiting.   Genitourinary:  Negative for dysuria and hematuria.   Musculoskeletal:  Negative for arthralgias and back pain.   Skin:  Negative for color change and rash.   Neurological:  Negative for seizures and syncope.   All other systems reviewed and are negative.          Objective       ED Triage Vitals   Temperature Pulse Blood Pressure Respirations SpO2 Patient Position - Orthostatic VS   10/29/24 0809 10/29/24 0809 10/29/24 0809 10/29/24 0809 10/29/24 0809 10/29/24 0809   (!) 97.4 °F (36.3 °C) (!) 122 (!) 82/50 22 (!) 87 % Sitting      Temp Source Heart Rate Source BP Location FiO2 (%) Pain Score    10/29/24 0809 10/29/24 0809 10/29/24 0809 -- 10/29/24 1003    Temporal Monitor Left arm  8      Vitals      Date and Time Temp Pulse SpO2 Resp BP Pain Score FACES Pain Rating User   10/29/24 1317 -- 120 89 % -- -- -- -- TM   10/29/24 1303 -- 122 90 % -- -- 7 -- TM   10/29/24 1234 -- 123 90 % -- 114/66 -- -- DII   10/29/24 1200 -- 121 92 % -- 117/66 -- -- TM   10/29/24 1150 97.4 °F (36.3 °C) 122 91 % -- -- 8 -- TM   10/29/24 1145 -- 122 -- 24 -- 8 -- TM   10/29/24 1145 -- -- 91 % -- 115/67 -- -- DII   10/29/24 1115 -- 122 95 % 24 141/68 -- -- CANDICE   10/29/24 1100 -- 120 93 % 22 133/69 -- -- CANDICE   10/29/24 1047 -- -- -- -- -- 8 -- CANDICE   10/29/24 1045 -- 121 95 % 20 139/81 -- -- CANDICE   10/29/24 1030 -- 119 96 % 20 135/78 -- -- CANDICE   10/29/24 1015 -- 117 95 % 20 128/75 -- -- CANDICE   10/29/24 1003 -- -- -- -- -- 8 -- CANDICE   10/29/24 1000 -- 116 95 % 20 158/81 -- -- CANDICE   10/29/24 0945 -- 118 97 % 20 132/67 -- -- CANDICE   10/29/24 0856 -- 117 96 % 20 130/61 -- -- CANDICE   10/29/24 0830 -- 118 93 % 20 111/52 -- -- LF   10/29/24 0821 -- 119 93 % 22 114/57 -- -- LF   10/29/24 0809 97.4 °F (36.3 °C) 122 87 % 22 82/50 -- -- GP             Physical Exam  Vitals and nursing note reviewed.   Constitutional:       General: She is not in acute distress.     Appearance: She is well-developed. She is obese. She is ill-appearing and diaphoretic.   HENT:      Head: Normocephalic and atraumatic.   Eyes:      Conjunctiva/sclera: Conjunctivae normal.   Cardiovascular:      Rate and Rhythm: Regular rhythm. Tachycardia present.      Heart sounds: Normal heart sounds. No murmur heard.  Pulmonary:      Effort: Pulmonary effort is normal. No respiratory distress.      Breath sounds: Normal breath sounds.   Abdominal:      General: Abdomen is protuberant. Bowel sounds are decreased. There is distension.      Palpations: Abdomen is soft.      Tenderness: There is generalized abdominal tenderness.   Musculoskeletal:         General: No swelling.      Cervical back: Neck supple.   Skin:     General: Skin is warm.      Capillary Refill: Capillary refill takes less than 2 seconds.      Coloration: Skin is pale.   Neurological:      Mental Status: She is alert and oriented to person, place, and time.   Psychiatric:         Mood and Affect: Mood normal.         Results Reviewed       Procedure Component Value Units Date/Time    HS Troponin I 4hr [[id number]] Collected: 10/29/24 1517    Lab Status: In process Specimen: Blood from Arm, Left Updated: 10/29/24 1520    HS Troponin I 2hr [[id number]]  (Normal) Collected: 10/29/24 1300    Lab Status: Final result Specimen: Blood from Arm, Left Updated: 10/29/24 1404     hs TnI 2hr 13 ng/L      Delta 2hr hsTnI 4 ng/L     UA w Reflex to Microscopic w Reflex to Culture [[id number]] Collected: 10/29/24 1117    Lab Status: Final result Specimen: Urine, Clean Catch Updated: 10/29/24 1139     Color, UA Yellow     Clarity, UA Clear     Specific Gravity, UA 1.029     pH, UA 5.0     Leukocytes, UA Negative     Nitrite, UA Negative     Protein, UA Negative mg/dl      Glucose, UA Negative mg/dl      Ketones, UA Negative mg/dl       Urobilinogen, UA <2.0 mg/dl      Bilirubin, UA Negative     Occult Blood, UA Negative    HS Troponin 0hr (reflex protocol) [[id number]]  (Normal) Collected: 10/29/24 1050    Lab Status: Final result Specimen: Blood from Arm, Left Updated: 10/29/24 1116     hs TnI 0hr 9 ng/L     Lactic acid 2 Hours [[id number]]  (Abnormal) Collected: 10/29/24 1050    Lab Status: Final result Specimen: Blood from Arm, Left Updated: 10/29/24 1107     LACTIC ACID 3.1 mmol/L     Narrative:      Result may be elevated if tourniquet was used during collection.    Procalcitonin [[id number]]  (Normal) Collected: 10/29/24 0824    Lab Status: Final result Specimen: Blood from Arm, Right Updated: 10/29/24 0904     Procalcitonin 0.06 ng/ml     Comprehensive metabolic panel [[id number]]  (Abnormal) Collected: 10/29/24 0824    Lab Status: Final result Specimen: Blood from Arm, Right Updated: 10/29/24 0857     Sodium 138 mmol/L      Potassium 4.0 mmol/L      Chloride 104 mmol/L      CO2 21 mmol/L      ANION GAP 13 mmol/L      BUN 28 mg/dL      Creatinine 1.63 mg/dL      Glucose 179 mg/dL      Calcium 10.6 mg/dL      AST 25 U/L      ALT 17 U/L      Alkaline Phosphatase 100 U/L      Total Protein 8.3 g/dL      Albumin 4.7 g/dL      Total Bilirubin 0.77 mg/dL      eGFR 29 ml/min/1.73sq m     Narrative:      [employer] Kid[employer] guidelines for Chronic Kidney Disease (CKD):     Stage 1 with normal or high GFR (GFR > 90 mL/min/1.73 square meters)    Stage 2 Mild CKD (GFR = 60-89 mL/min/1.73 square meters)    Stage 3A Moderate CKD (GFR = 45-59 mL/min/1.73 square meters)    Stage 3B Moderate CKD (GFR = 30-44 mL/min/1.73 square meters)    Stage 4 Severe CKD (GFR = 15-29 mL/min/1.73 square meters)    Stage 5 End Stage CKD (GFR <15 mL/min/1.73 square meters)  Note: GFR calculation is accurate only with a steady state creatinine    Lactic acid [[id number]]  (Abnormal) Collected: 10/29/24 0824    Lab Status: Final result Specimen: Blood from Arm, Right  Updated: 10/29/24 0857     LACTIC ACID 3.5 mmol/L     Narrative:      Result may be elevated if tourniquet was used during collection.    Lipase [[id number]]  (Normal) Collected: 10/29/24 0824    Lab Status: Final result Specimen: Blood from Arm, Right Updated: 10/29/24 0857     Lipase 20 u/L     Protime-INR [[id number]]  (Abnormal) Collected: 10/29/24 0824    Lab Status: Final result Specimen: Blood from Arm, Right Updated: 10/29/24 0850     Protime 23.8 seconds      INR 2.04    Narrative:      INR Therapeutic Range    Indication                                             INR Range      Atrial Fibrillation                                               2.0-3.0  Hypercoagulable State                                    2.0.2.3  Left Ventricular Asist Device                            2.0-3.0  Mechanical Heart Valve                                  -    Aortic(with afib, MI, embolism, HF, LA enlargement,    and/or coagulopathy)                                     2.0-3.0 (2.5-3.5)     Mitral                                                             2.5-3.5  Prosthetic/Bioprosthetic Heart Valve               2.0-3.0  Venous thromboembolism (VTE: VT, PE        2.0-3.0    APTT [[id number]]  (Normal) Collected: 10/29/24 0824    Lab Status: Final result Specimen: Blood from Arm, Right Updated: 10/29/24 0850     PTT 23 seconds     FLU/COVID Rapid Antigen (30 min. TAT) - Preferred screening test in ED [[id number]]  (Normal) Collected: 10/29/24 0824    Lab Status: Final result Specimen: Nares from Nose Updated: 10/29/24 0847     SARS COV Rapid Antigen Negative     Influenza A Rapid Antigen Negative     Influenza B Rapid Antigen Negative    Narrative:      This test has been performed using the [employer] Margarita 2 FLU+SARS Antigen test under the Emergency Use Authorization (EUA). This test has been validated by the  and verified by the performing laboratory. The Margarita uses lateral flow immunofluorescent sandwich assay to  detect SARS-COV, Influenza A and Influenza B Antigen.     The [employer] Margarita 2 SARS Antigen test does not differentiate between SARS-CoV and SARS-CoV-2.     Negative results are presumptive and may be confirmed with a molecular assay, if necessary, for patient management. Negative results do not rule out SARS-CoV-2 or influenza infection and should not be used as the sole basis for treatment or patient management decisions. A negative test result may occur if the level of antigen in a sample is below the limit of detection of this test.     Positive results are indicative of the presence of viral antigens, but do not rule out bacterial infection or co-infection with other viruses.     All test results should be used as an adjunct to clinical observations and other information available to the provider.    FOR PEDIATRIC PATIENTS - copy/paste COVID Guidelines URL to browser: [email or web address]    CBC and differential [[id number]]  (Abnormal) Collected: 10/29/24 0824    Lab Status: Final result Specimen: Blood from Arm, Right Updated: 10/29/24 0830     WBC 12.25 Thousand/uL      RBC 4.33 Million/uL      Hemoglobin 13.2 g/dL      Hematocrit 41.9 %      MCV 97 fL      MCH 30.5 pg      MCHC 31.5 g/dL      RDW 13.5 %      MPV 11.3 fL      Platelets 171 Thousands/uL      nRBC 0 /100 WBCs      Segmented % 78 %      Immature Grans % 0 %      Lymphocytes % 19 %      Monocytes % 2 %      Eosinophils Relative 1 %      Basophils Relative 0 %      Absolute Neutrophils 9.54 Thousands/µL      Absolute Immature Grans 0.02 Thousand/uL      Absolute Lymphocytes 2.28 Thousands/µL      Absolute Monocytes 0.30 Thousand/µL      Eosinophils Absolute 0.08 Thousand/µL      Basophils Absolute 0.03 Thousands/µL     Fingerstick Glucose (POCT) [[id number]]  (Abnormal) Collected: 10/29/24 0822    Lab Status: Final result Specimen: Blood Updated: 10/29/24 0822     POC Glucose 150 mg/dl              CT high volume bleeding scan abdomen pelvis   Final Interpretation by Mike Rodgers MD (10/29 1028)      1. No CT evidence of active high-volume gastrointestinal hemorrhage.   2.  Moderate diffuse colonic distention with mild wall thickening suggested at the distal transverse and descending colon with air-fluid levels proximally. Findings may be related to colitis.   3. Mild distention of the esophagus with moderate gaseous distention. This is nonspecific and could be related to gastritis/reflux.   4. Low-attenuation in the uterus centrally, more prominent than expected for the patient's age. Recommend follow-up evaluation with nonemergent pelvic ultrasound to assess for abnormal endometrial thickening.      The study was marked in EPIC for immediate notification.      Workstation performed: [id number]         CT chest without contrast   Final Interpretation by Mike Rodgers MD (10/29 1013)      1. New mild distention of the esophagus with fluid, question related to reflux or esophageal dysfunction.                  Workstation performed: [id number]         XR chest 1 view portable   Final Interpretation by Alo Pastrana MD (10/29 0283)      No acute cardiopulmonary disease.            Workstation performed: [id number]             ECG 12 Lead Documentation Only    Date/Time: 10/29/2024 8:34 AM    Performed by: JUAN Barksdale  Authorized by: JUAN Barksdale    Indications / Diagnosis:  VOMITING  ECG reviewed by me, the ED Provider: yes    Patient location:  ED  Previous ECG:     Previous ECG:  Compared to current    Comparison ECG info:  5/16/24    Similarity:  Changes noted    Comparison to cardiac monitor: Yes    Interpretation:     Interpretation: abnormal    Rate:     ECG rate:  118    ECG rate assessment: tachycardic    Rhythm:     Rhythm: sinus tachycardia    Ectopy:     Ectopy: PVCs      PVCs:  Frequent  QRS:     QRS axis:  Normal    QRS intervals:  Normal  Conduction:     Conduction:  normal    ST segments:     ST segments:  Non-specific  T waves:     T waves: normal    CriticalCare Time    Date/Time: 10/29/2024 8:34 AM    Performed by: JUAN Barksdale  Authorized by: JUAN Barksdale    Critical care provider statement:     Critical care time (minutes):  30    Critical care start time:  10/29/2024 8:30 AM    Critical care end time:  10/29/2024 9:00 AM    Critical care time was exclusive of:  Separately billable procedures and treating other patients    Critical care was necessary to treat or prevent imminent or life-threatening deterioration of the following conditions:  Shock    Critical care was time spent personally by me on the following activities:  Blood draw for specimens, examination of patient, re-evaluation of patient's condition, discussions with consultants, ordering and performing treatments and interventions, ordering and review of laboratory studies and ordering and review of radiographic studies    I assumed direction of critical care for this patient from another provider in my specialty: no        ED Medication and Procedure Management   Prior to Admission Medications   Prescriptions Last Dose Informant Patient Reported? Taking?   ARIPiprazole (ABILIFY) 2 mg tablet Past Week  Yes Yes   Sig: TAKE 1 & 1/2 (ONE & ONE-HALF) TABLETS BY MOUTH ONCE DAILY AS DIRECTED   albuterol (2.5 mg/3 mL) 0.083 % nebulizer solution Past Month  No Yes   Sig: Take 6 mL (5 mg total) by nebulization every 6 (six) hours as needed for wheezing   alendronate (FOSAMAX) 70 mg tablet Past Week  Yes Yes   Sig: TAKE 1 TABLET BY MOUTH ONCE A WEEK WITH 8 OUNCES OF WATER THIRTY MINUTES BEFORE THE FIRST MEAL OF THE DAY . REMAIN UPRIGHT FOR THIRTY MINUTES AFTER TAKING   atorvastatin (LIPITOR) 40 mg tablet 10/28/2024  Yes Yes   Sig: atorvastatin 40 mg tablet   busPIRone (BUSPAR) 10 mg tablet Past Week  Yes Yes   Sig: Take 10 mg by mouth 2 (two) times a day As directed   hydroCHLOROthiazide 25 mg tablet Past Week   Yes Yes   Sig: Take 25 mg by mouth daily   levothyroxine 100 mcg tablet 10/28/2024  Yes Yes   Sig: Take 88 mcg by mouth     lisinopril (ZESTRIL) 10 mg tablet Not Taking  No No   Sig: Take 1 tablet (10 mg total) by mouth daily   Patient not taking: Reported on 10/29/2024   metoprolol tartrate (LOPRESSOR) 25 mg tablet Not Taking  No No   Sig: Take 0.5 tablets (12.5 mg total) by mouth every 12 (twelve) hours   Patient not taking: Reported on 10/29/2024   pantoprazole (PROTONIX) 20 mg tablet Past Week  Yes Yes   Sig: Take 20 mg by mouth daily   traZODone (DESYREL) 50 mg tablet Past Week  Yes Yes   Sig: TAKE 1 1/2 TO 2 TABLETS BY MOUTH AT NIGHT AS NEEDED   venlafaxine (EFFEXOR-XR) 150 mg 24 hr capsule 10/28/2024  Yes Yes   Sig: venlafaxine  mg capsule,extended release 24 hr   warfarin (COUMADIN) 7.5 mg tablet 10/28/2024  Yes Yes   Sig: Take 7.5 mg by mouth daily      Facility-Administered Medications: None     Current Discharge Medication List        CONTINUE these medications which have NOT CHANGED    Details   albuterol (2.5 mg/3 mL) 0.083 % nebulizer solution Take 6 mL (5 mg total) by nebulization every 6 (six) hours as needed for wheezing  Qty: 75 mL, Refills: 0    Associated Diagnoses: Acute bronchitis with bronchospasm      alendronate (FOSAMAX) 70 mg tablet TAKE 1 TABLET BY MOUTH ONCE A WEEK WITH 8 OUNCES OF WATER THIRTY MINUTES BEFORE THE FIRST MEAL OF THE DAY . REMAIN UPRIGHT FOR THIRTY MINUTES AFTER TAKING      ARIPiprazole (ABILIFY) 2 mg tablet TAKE 1 & 1/2 (ONE & ONE-HALF) TABLETS BY MOUTH ONCE DAILY AS DIRECTED      atorvastatin (LIPITOR) 40 mg tablet atorvastatin 40 mg tablet      busPIRone (BUSPAR) 10 mg tablet Take 10 mg by mouth 2 (two) times a day As directed      hydroCHLOROthiazide 25 mg tablet Take 25 mg by mouth daily      levothyroxine 100 mcg tablet Take 88 mcg by mouth        pantoprazole (PROTONIX) 20 mg tablet Take 20 mg by mouth daily      traZODone (DESYREL) 50 mg tablet TAKE 1 1/2 TO 2  TABLETS BY MOUTH AT NIGHT AS NEEDED      venlafaxine (EFFEXOR-XR) 150 mg 24 hr capsule venlafaxine  mg capsule,extended release 24 hr      warfarin (COUMADIN) 7.5 mg tablet Take 7.5 mg by mouth daily      lisinopril (ZESTRIL) 10 mg tablet Take 1 tablet (10 mg total) by mouth daily  Qty: 30 tablet, Refills: 0    Associated Diagnoses: Primary hypertension      metoprolol tartrate (LOPRESSOR) 25 mg tablet Take 0.5 tablets (12.5 mg total) by mouth every 12 (twelve) hours  Qty: 30 tablet, Refills: 0    Associated Diagnoses: Primary hypertension           No discharge procedures on file.  ED SEPSIS DOCUMENTATION   Time reflects when diagnosis was documented in both MDM as applicable and the Disposition within this note       Time User Action Codes Description Comment    10/29/2024 10:43 AM Alma Gonzalez [K92.0] Coffee ground emesis     10/29/2024 10:44 AM Alma Gonzalez [N17.9] RUKHSANA (acute kidney injury) (HCC)     10/29/2024 10:44 AM Alma Gonzalez [J96.01] Acute respiratory failure with hypoxia (HCC)     10/29/2024  3:17 PM Celestine Cash III Add [R10.84] Generalized abdominal pain     10/29/2024  3:17 PM Celestine Cash III Add [K59.04] Chronic idiopathic constipation                  JUAN Barksdale  10/29/24 1544

## 2024-10-29 NOTE — ASSESSMENT & PLAN NOTE
-3 episodes today  -Hgb on admission 13.2  -CT AP with IV contrast notes no evidence of active high-volume GI hemorrhage.  Moderate diffuse colonic distension with mild wall thickening suggestive of the distal transverse and descending colon noted.  Mild distention esophagus with moderate gaseous distention  -PPI BID  -Will plan EGD tomorrow  -Continue to trend Hgb/Hct and transfuse as necessary  -Will give CLD today and keep NPO after midnight  -Pain control, antiemetics PRN

## 2024-10-29 NOTE — ASSESSMENT & PLAN NOTE
84-year-old female patient with past medical history of rectus sheath hematoma causing hemorrhagic shock in 2022, A-fib currently on Coumadin presents to [facility] emergency room with complaining of coffee-ground emesis for last since 5 AM this morning associate with lower abdominal pain that started suddenly early this morning.      CBC, BMP reviewed current hemoglobin 13, creatinine 1.63.  WBC 12,000.  INR 2.05.  Lactic acid elevated 3.5 -- > 3.1  Patient had received IV Protonix, IV analgesics, IV fluids in the emergency room  CT high-volume GI hemorrhage report noted with no signs of high-volume GI bleed, moderate diffuse colonic distention and thickening, finding related to colitis, finding of gastritis and reflux,  CT chest report noted with mildly distended esophagus concerning of reflux/esophageal dysfunction    Unclear etiology.  Currently patient appears comfortable nondistressed.  Acutely nontoxic appearing.  Reports feeling better compared to earlier after getting analgesics, antiemetics  Currently on clear liquids per GI.  N.p.o. after midnight tonight.  Started on IV fluids normal saline 125 cc an hour.  Continue with IV Protonix drip.  Continue to trend hemoglobin.  Continue to trend lactic acid until normal.  Currently on IV Dilaudid for moderate to severe pain as well as breakthrough pain.  Leukocytosis likely reactive and monitor off antibiotics  Repeat CBC and BMP ordered for tomorrow.  Low threshold to start on IV antibiotics if develops fever, worsening lactic acid or worsening signs of infection.  GI consult pending.

## 2024-10-29 NOTE — ASSESSMENT & PLAN NOTE
Lab Results   Component Value Date    HGBA1C 7.0 (H) 08/22/2024       Recent Labs     10/29/24  0822   POCGLU 150*       Blood Sugar Average: Last 72 hrs:  (P) 150    Present on admission with history of diabetes.  Most recent A1c 7.0.  Reasonably controlled.  N.p.o. for now.  Continue with IV fluids and sensitive coverage.

## 2024-10-29 NOTE — ASSESSMENT & PLAN NOTE
Patient reports 2 weeks of no BM  CT AP with moderate diffuse colonic distension with moderate fecal retention  -Fleets enema  -Aggressive bowel regimen - Miralax bowel prep  -Colonoscopy 2009 with polyps and diverticulosis  -No immediate plans for colonoscopy but will discuss further if ongoing pain despite bowel movements  -Lactic acid on admission 3.5 with improvement to 3.1  -Serial abdominal exams  -Doubt mesenteric ischemia at present    -She will need a plan for better outpatient bowel regimen

## 2024-10-29 NOTE — H&P
H&P - [facility]   Name: Bertha Brown 84 y.o. female I MRN: [id number]  Unit/Bed#: -01 I Date of Admission: 10/29/2024   Date of Service: 10/29/2024 I Hospital Day: 0     Assessment & Plan  Coffee ground emesis  84-year-old female patient with past medical history of rectus sheath hematoma causing hemorrhagic shock in 2022, A-fib currently on Coumadin presents to [facility] emergency room with complaining of coffee-ground emesis for last since 5 AM this morning associate with lower abdominal pain that started suddenly early this morning.      CBC, BMP reviewed current hemoglobin 13, creatinine 1.63.  WBC 12,000.  INR 2.05.  Lactic acid elevated 3.5 -- > 3.1  Patient had received IV Protonix, IV analgesics, IV fluids in the emergency room  CT high-volume GI hemorrhage report noted with no signs of high-volume GI bleed, moderate diffuse colonic distention and thickening, finding related to colitis, finding of gastritis and reflux,  CT chest report noted with mildly distended esophagus concerning of reflux/esophageal dysfunction    Unclear etiology.  Currently patient appears comfortable nondistressed.  Acutely nontoxic appearing.  Reports feeling better compared to earlier after getting analgesics, antiemetics  Currently on clear liquids per GI.  N.p.o. after midnight tonight.  Started on IV fluids normal saline 125 cc an hour.  Continue with IV Protonix drip.  Continue to trend hemoglobin.  Continue to trend lactic acid until normal.  Currently on IV Dilaudid for moderate to severe pain as well as breakthrough pain.  Leukocytosis likely reactive and monitor off antibiotics  Repeat CBC and BMP ordered for tomorrow.  Low threshold to start on IV antibiotics if develops fever, worsening lactic acid or worsening signs of infection.  GI consult pending.    Paroxysmal atrial fibrillation (HCC)  Present on admission history of paroxysmal A-fib.  Currently in sinus tachycardia with PVCs.  Resume home dose of  Lopressor 12.5 milligram twice daily.  INR 2.04.  Hold Coumadin since patient is presenting to the hospital with coffee-ground emesis.    Hypothyroidism  Present on admission with past medical history of hypothyroidism.  Resume home dose of levothyroxine  Acute respiratory failure with hypoxia (HCC)  Present on acute respiratory failure with hypoxia requiring supplemental oxygen .  Currently presurgery 93 to 94% on 2 to 3 L nasal cannula  Suspected secondary aspiration event due to coffee-ground emesis versus atelectasis.  Encourage incentive spirometry use  Continue to wean off oxygen as able.      RUKHSANA (acute kidney injury) (Formerly [facility])  Present on admission with creatinine of 1.63  Baseline creatinine of 0.9.  Likely prerenal.  Continue with IV fluids and monitor BMP.  SIRS (systemic inflammatory response syndrome) ([facility])  Present on admission with tachycardia, tachypnea, leukocytosis, RUKHSANA thought to be due to volume loss due to coffee-ground emesis, elevated lactic acid, no clear source of infection however SIRS thought to be due to coffee-ground emesis.  Currently getting IV fluids resuscitation.  Low threshold to initiate infectious workup if develops worsening signs of infection and or not improving or worsening lactic acidosis and initiation of antibiotics.  Hypertension  Present on admission with hypotension.  Blood pressure currently controlled.  Resume Lopressor,   continue to hold lisinopril and HCTZ for now.    Hyperlipidemia  Continue home dose of Lipitor 40 mg nightly  Diabetes ([facility])  Lab Results   Component Value Date    HGBA1C 7.0 (H) 08/22/2024       Recent Labs     10/29/24  0822   POCGLU 150*       Blood Sugar Average: Last 72 hrs:  (P) 150    Present on admission with history of diabetes.  Most recent A1c 7.0.  Reasonably controlled.  N.p.o. for now.  Continue with IV fluids and sensitive coverage.  Obesity  Present on admission with obesity with BMI 37.  Counseled on weight loss, lifestyle  modification.      VTE Pharmacologic Prophylaxis:   Moderate Risk (Score 3-4) - Pharmacological DVT Prophylaxis Contraindicated. Sequential Compression Devices Ordered.  Code Status: Level 1 - Full Code   Discussion with family: Updated  (daughter) at bedside.  Spoke with both daughters at the bedside.    Anticipated Length of Stay: Patient will be admitted on an inpatient basis with an anticipated length of stay of greater than 2 midnights secondary to coffee-ground emesis.    History of Present Illness   Chief Complaint: Coffee-ground emesis    Bertha Brown is a 84 y.o. female with a PMH of  rectus sheath hematoma causing hemorrhagic shock 2022, paroxysmal A-fib currently on Coumadin, diabetes, obesity, hypothyroidism, hypertension, depression, who presents with complaining of coffee-ground emesis for few hours.  Patient reportedly was at normal state of health until last night and woke up at 5 AM this morning with vomiting of coffee-ground emesis.  Patient reportedly having multiple episodes of coffee-ground emesis since early morning associate with 2 weeks of constipation, lower abdominal pain.  Currently patient is complaining of lower abdominal pain 9-10 out of 10, nonradiating, no clear elevating or exacerbating factors identified.  Denies fever, chills, chest pain, cough, hematuria, melena, hematochezia, any other new complaints.  Remote history of colonoscopy 2009 noted with diverticulosis.  No other events reported.      Review of Systems   Constitutional:  Negative for chills, diaphoresis, fatigue and fever.   HENT:  Negative for congestion.    Eyes:  Negative for visual disturbance.   Respiratory:  Negative for cough and shortness of breath.    Cardiovascular:  Negative for chest pain, palpitations and leg swelling.   Gastrointestinal:  Positive for abdominal pain, constipation, nausea and vomiting. Negative for blood in stool and rectal pain.   Endocrine: Negative for polyuria.    Genitourinary:  Negative for dysuria and hematuria.   Neurological:  Positive for weakness.   Psychiatric/Behavioral:  Negative for agitation.        Historical Information   Past Medical History:   Diagnosis Date    Asthma     Atrial fibrillation (HCC)     Diabetes (HCC)     Hyperlipidemia     Hypertension     Legionnaire's disease (HCC)      Past Surgical History:   Procedure Laterality Date    FL LUMBAR PUNCTURE DIAGNOSTIC  7/13/2014    IR EMBOLIZATION (SPECIFY VESSEL OR SITE)  12/12/2022    LUNG LOBECTOMY       Social History     Tobacco Use    Smoking status: Never    Smokeless tobacco: Never   Vaping Use    Vaping status: Never Used   Substance and Sexual Activity    Alcohol use: Never    Drug use: Never    Sexual activity: Not on file     E-Cigarette/Vaping    E-Cigarette Use Never User      E-Cigarette/Vaping Substances    Nicotine No     THC No     CBD No     Flavoring No     Other No     Unknown No      Family history non-contributory  Social History:  Marital Status: /Civil Union       Meds/Allergies   I have reviewed home medications with a medical source (PCP, Pharmacy, other).  Prior to Admission medications    Medication Sig Start Date End Date Taking? Authorizing Provider   albuterol (2.5 mg/3 mL) 0.083 % nebulizer solution Take 6 mL (5 mg total) by nebulization every 6 (six) hours as needed for wheezing 4/18/22  Yes Cl Espinal PA-C   alendronate (FOSAMAX) 70 mg tablet TAKE 1 TABLET BY MOUTH ONCE A WEEK WITH 8 OUNCES OF WATER THIRTY MINUTES BEFORE THE FIRST MEAL OF THE DAY . REMAIN UPRIGHT FOR THIRTY MINUTES AFTER TAKING 8/10/24  Yes Historical Provider, MD   ARIPiprazole (ABILIFY) 2 mg tablet TAKE 1 & 1/2 (ONE & ONE-HALF) TABLETS BY MOUTH ONCE DAILY AS DIRECTED   Yes Historical Provider, MD   atorvastatin (LIPITOR) 40 mg tablet atorvastatin 40 mg tablet 11/14/19  Yes Historical Provider, MD   busPIRone (BUSPAR) 10 mg tablet Take 10 mg by mouth 2 (two) times a day As directed  10/23/24  Yes Historical Provider, MD   hydroCHLOROthiazide 25 mg tablet Take 25 mg by mouth daily 6/18/24  Yes Historical Provider, MD   levothyroxine 100 mcg tablet Take 88 mcg by mouth     Yes Historical Provider, MD   pantoprazole (PROTONIX) 20 mg tablet Take 20 mg by mouth daily 7/15/24  Yes Historical Provider, MD   traZODone (DESYREL) 50 mg tablet TAKE 1 1/2 TO 2 TABLETS BY MOUTH AT NIGHT AS NEEDED   Yes Historical Provider, MD   venlafaxine (EFFEXOR-XR) 150 mg 24 hr capsule venlafaxine  mg capsule,extended release 24 hr 3/7/19  Yes Historical Provider, MD   warfarin (COUMADIN) 7.5 mg tablet Take 7.5 mg by mouth daily   Yes Historical Provider, MD   lisinopril (ZESTRIL) 10 mg tablet Take 1 tablet (10 mg total) by mouth daily  Patient not taking: Reported on 10/29/2024 5/17/24 6/16/24  Gurinder Llanos MD   metoprolol tartrate (LOPRESSOR) 25 mg tablet Take 0.5 tablets (12.5 mg total) by mouth every 12 (twelve) hours  Patient not taking: Reported on 10/29/2024 5/17/24 6/16/24  Gurinder Llanos MD     No Known Allergies    Objective :  Temp:  [97.4 °F (36.3 °C)] 97.4 °F (36.3 °C)  HR:  [116-123] 123  BP: ()/(50-81) 114/66  Resp:  [20-24] 24  SpO2:  [87 %-97 %] 90 %  O2 Device: Nasal cannula  Nasal Cannula O2 Flow Rate (L/min):  [2 L/min-4 L/min] 2 L/min    Physical Exam  Constitutional:       General: She is not in acute distress.     Appearance: She is obese. She is ill-appearing. She is not toxic-appearing.   Cardiovascular:      Rate and Rhythm: Regular rhythm. Tachycardia present.      Pulses: Normal pulses.   Pulmonary:      Effort: Pulmonary effort is normal. No respiratory distress.      Breath sounds: Rhonchi present. No wheezing or rales.      Comments: O2 saturation 93 to 94% on 2 to 3 L nasal cannula.  Abdominal:      General: Bowel sounds are normal. There is no distension.      Palpations: Abdomen is soft.      Tenderness: There is abdominal tenderness (Mild lower abdominal tenderness noted  on exam without rigidity). There is no right CVA tenderness, left CVA tenderness or guarding.   Musculoskeletal:      Right lower leg: No edema.      Left lower leg: No edema.   Neurological:      Mental Status: She is alert and oriented to person, place, and time. Mental status is at baseline.   Psychiatric:         Mood and Affect: Mood normal.         Behavior: Behavior normal.          Lines/Drains:            Lab Results: I have reviewed the following results:  Results from last 7 days   Lab Units 10/29/24  0824   WBC Thousand/uL 12.25*   HEMOGLOBIN g/dL 13.2   HEMATOCRIT % 41.9   PLATELETS Thousands/uL 171   SEGS PCT % 78*   LYMPHO PCT % 19   MONO PCT % 2*   EOS PCT % 1     Results from last 7 days   Lab Units 10/29/24  0824   SODIUM mmol/L 138   POTASSIUM mmol/L 4.0   CHLORIDE mmol/L 104   CO2 mmol/L 21   BUN mg/dL 28*   CREATININE mg/dL 1.63*   ANION GAP mmol/L 13   CALCIUM mg/dL 10.6*   ALBUMIN g/dL 4.7   TOTAL BILIRUBIN mg/dL 0.77   ALK PHOS U/L 100   ALT U/L 17   AST U/L 25   GLUCOSE RANDOM mg/dL 179*     Results from last 7 days   Lab Units 10/29/24  0824   INR  2.04*     Results from last 7 days   Lab Units 10/29/24  0822   POC GLUCOSE mg/dl 150*     Lab Results   Component Value Date    HGBA1C 7.0 (H) 08/22/2024    HGBA1C 6.4 (H) 02/19/2024    HGBA1C 6.5 (H) 08/18/2023     Results from last 7 days   Lab Units 10/29/24  1050 10/29/24  0824   LACTIC ACID mmol/L 3.1* 3.5*   PROCALCITONIN ng/ml  --  0.06       Imaging Results Review: I reviewed radiology reports from this admission including: CT chest and CT abdomen/pelvis.  Other Study Results Review: EKG was personally reviewed and my interpretation is: Sinus tachycardia with PVC..    Administrative Statements   I have spent a total time of 75 minutes in caring for this patient on the day of the visit/encounter including Patient and family education, Counseling / Coordination of care, Documenting in the medical record, Reviewing / ordering tests,  medicine, procedures  , and Obtaining or reviewing history  .    ** Please Note: This note has been constructed using a voice recognition system. **

## 2024-10-29 NOTE — ASSESSMENT & PLAN NOTE
Present on admission with tachycardia, tachypnea, leukocytosis, RUKHSANA thought to be due to volume loss due to coffee-ground emesis, elevated lactic acid, no clear source of infection however SIRS thought to be due to coffee-ground emesis.  Currently getting IV fluids resuscitation.  Low threshold to initiate infectious workup if develops worsening signs of infection and or not improving or worsening lactic acidosis and initiation of antibiotics.

## 2024-10-29 NOTE — CONSULTS
Consultation - Gastroenterology   Name: Bertha Brown 84 y.o. female I MRN: [id number]  Unit/Bed#: -01 I Date of Admission: 10/29/2024   Date of Service: 10/29/2024 I Hospital Day: 0   Inpatient consult to gastroenterology  Consult performed by: Meera Ching PA-C  Consult ordered by: JUAN Barksdale        Physician Requesting Evaluation: Royer LINK MD   Reason for Evaluation / Principal Problem: coffee ground emesis    Assessment & Plan  Coffee ground emesis  -3 episodes today  -Hgb on admission 13.2  -CT AP with IV contrast notes no evidence of active high-volume GI hemorrhage.  Moderate diffuse colonic distension with mild wall thickening suggestive of the distal transverse and descending colon noted.  Mild distention esophagus with moderate gaseous distention  -PPI BID  -Will plan EGD tomorrow  -Continue to trend Hgb/Hct and transfuse as necessary  -Will give CLD today and keep NPO after midnight  -Pain control, antiemetics PRN  Chronic idiopathic constipation    Generalized abdominal pain  Patient reports 2 weeks of no BM  CT AP with moderate diffuse colonic distension with moderate fecal retention  -Fleets enema  -Aggressive bowel regimen - Miralax bowel prep  -Colonoscopy 2009 with polyps and diverticulosis  -No immediate plans for colonoscopy but will discuss further if ongoing pain despite bowel movements  -Lactic acid on admission 3.5 with improvement to 3.1  -Serial abdominal exams  -Doubt mesenteric ischemia at present    -She will need a plan for better outpatient bowel regimen  Paroxysmal atrial fibrillation (HCC)  On coumadin which is currently on hold  INR on admission 2.04      Patient will be seen and examined by Dr. Cash.  All sexton medical decisions were made by Dr. Cash.       History of Present Illness   HPI:  Bertha Brown is a 84 y.o. female with atrial fibrillation, hypothyroidism, hypertension, hyperlipidemia, diabetes mellitus who presented to [facility]  today with complaints of vomiting and abdominal pain.  The patient reports her symptoms actually began approximately a week ago.  She reports that although she struggles with chronic constipation she was unable to pass any bowel movement over the course of the week.  She reports that she developed abdominal pain and nausea.  She reports that she started with a very poor appetite and was unable to eat much of anything.  She reports that over the following week leading up to today she continued to not have a bowel movement.  Her abdominal pain worsened.  This morning she developed vomiting.  She reports that it appeared very dark brown in color and had did have the appearance of coffee grounds.  There was no silvia hematemesis.  She had no melena.  She reports that her abdominal pain is severe.  Although she struggles with chronic constipation she does not take anything on a routine basis for this.  She does not believe she has ever had an upper endoscopy.  Her last colonoscopy was in 2009 at [facility].  Unfortunately, I cannot view the report but it seems that she did have polyps and diverticulosis.  Hemoglobin on admission was normal.  CT with IV contrast did not show any evidence of active GI bleeding.  Lactic acid was elevated but improved since admission.  Patient denies excessive NSAIDs or alcohol use.  There is no history of liver disease.    Review of Systems   Constitutional:  Positive for appetite change and fatigue. Negative for activity change, fever and unexpected weight change.   Respiratory:  Negative for cough, shortness of breath and wheezing.    Gastrointestinal:  Positive for abdominal distention, abdominal pain, constipation, nausea and vomiting. Negative for blood in stool and diarrhea.   Endocrine: Negative for cold intolerance and heat intolerance.   Genitourinary:  Negative for dysuria, flank pain and hematuria.   Neurological:  Negative for dizziness, numbness and headaches.     I have reviewed  the patient's PMH, PSH, Social History, Family History, Meds, and Allergies  Historical Information   Past Medical History:   Diagnosis Date    Asthma     Atrial fibrillation (HCC)     Diabetes (HCC)     Hyperlipidemia     Hypertension     Legionnaire's disease (HCC)      Past Surgical History:   Procedure Laterality Date    FL LUMBAR PUNCTURE DIAGNOSTIC  7/13/2014    IR EMBOLIZATION (SPECIFY VESSEL OR SITE)  12/12/2022    LUNG LOBECTOMY       Social History     Tobacco Use    Smoking status: Never    Smokeless tobacco: Never   Vaping Use    Vaping status: Never Used   Substance and Sexual Activity    Alcohol use: Never    Drug use: Never    Sexual activity: Not on file     E-Cigarette/Vaping    E-Cigarette Use Never User      E-Cigarette/Vaping Substances    Nicotine No     THC No     CBD No     Flavoring No     Other No     Unknown No        Prior to Admission Medications   Prescriptions Last Dose Informant Patient Reported? Taking?   ARIPiprazole (ABILIFY) 2 mg tablet Past Week  Yes Yes   Sig: TAKE 1 & 1/2 (ONE & ONE-HALF) TABLETS BY MOUTH ONCE DAILY AS DIRECTED   albuterol (2.5 mg/3 mL) 0.083 % nebulizer solution Past Month  No Yes   Sig: Take 6 mL (5 mg total) by nebulization every 6 (six) hours as needed for wheezing   alendronate (FOSAMAX) 70 mg tablet Past Week  Yes Yes   Sig: TAKE 1 TABLET BY MOUTH ONCE A WEEK WITH 8 OUNCES OF WATER THIRTY MINUTES BEFORE THE FIRST MEAL OF THE DAY . REMAIN UPRIGHT FOR THIRTY MINUTES AFTER TAKING   atorvastatin (LIPITOR) 40 mg tablet 10/28/2024  Yes Yes   Sig: atorvastatin 40 mg tablet   busPIRone (BUSPAR) 10 mg tablet Past Week  Yes Yes   Sig: Take 10 mg by mouth 2 (two) times a day As directed   hydroCHLOROthiazide 25 mg tablet Past Week  Yes Yes   Sig: Take 25 mg by mouth daily   levothyroxine 100 mcg tablet 10/28/2024  Yes Yes   Sig: Take 88 mcg by mouth     lisinopril (ZESTRIL) 10 mg tablet Not Taking  No No   Sig: Take 1 tablet (10 mg total) by mouth daily   Patient not  taking: Reported on 10/29/2024   metoprolol tartrate (LOPRESSOR) 25 mg tablet Not Taking  No No   Sig: Take 0.5 tablets (12.5 mg total) by mouth every 12 (twelve) hours   Patient not taking: Reported on 10/29/2024   pantoprazole (PROTONIX) 20 mg tablet Past Week  Yes Yes   Sig: Take 20 mg by mouth daily   traZODone (DESYREL) 50 mg tablet Past Week  Yes Yes   Sig: TAKE 1 1/2 TO 2 TABLETS BY MOUTH AT NIGHT AS NEEDED   venlafaxine (EFFEXOR-XR) 150 mg 24 hr capsule 10/28/2024  Yes Yes   Sig: venlafaxine  mg capsule,extended release 24 hr   warfarin (COUMADIN) 7.5 mg tablet 10/28/2024  Yes Yes   Sig: Take 7.5 mg by mouth daily      Facility-Administered Medications: None       Objective :  Temp:  [97.4 °F (36.3 °C)] 97.4 °F (36.3 °C)  HR:  [116-123] 123  BP: ()/(50-81) 114/66  Resp:  [20-24] 24  SpO2:  [87 %-97 %] 90 %  O2 Device: Nasal cannula  Nasal Cannula O2 Flow Rate (L/min):  [2 L/min-4 L/min] 2 L/min    Physical Exam  Vitals reviewed.   Constitutional:       Appearance: Normal appearance. She is ill-appearing.   HENT:      Head: Normocephalic and atraumatic.   Eyes:      Extraocular Movements: Extraocular movements intact.      Pupils: Pupils are equal, round, and reactive to light.   Cardiovascular:      Rate and Rhythm: Tachycardia present. Rhythm irregular.   Pulmonary:      Breath sounds: Normal breath sounds.   Abdominal:      General: Bowel sounds are normal. There is distension.      Palpations: Abdomen is soft. There is no mass.      Tenderness: There is abdominal tenderness.   Skin:     General: Skin is warm and dry.      Coloration: Skin is not jaundiced.   Neurological:      General: No focal deficit present.      Mental Status: She is alert and oriented to person, place, and time.           Lab Results: I have reviewed the following results:CBC/BMP:   .     10/29/24  0824   WBC 12.25*   HGB 13.2   HCT 41.9      SODIUM 138   K 4.0      CO2 21   BUN 28*   CREATININE 1.63*   GLUC  179*    , LFTs:   .     10/29/24  0824   AST 25   ALT 17   ALB 4.7   TBILI 0.77   ALKPHOS 100    , PTT/INR:  .     10/29/24  0824   PTT 23   INR 2.04*    , Lactic Acid:   .     10/29/24  1050   LACTICACID 3.1*        Imaging Results Review: I reviewed radiology reports from this admission including: CT abdomen/pelvis.

## 2024-10-29 NOTE — ASSESSMENT & PLAN NOTE
Present on admission with creatinine of 1.63  Baseline creatinine of 0.9.  Likely prerenal.  Continue with IV fluids and monitor BMP.

## 2024-10-29 NOTE — ASSESSMENT & PLAN NOTE
On coumadin which is currently on hold  INR on admission 2.04      Patient will be seen and examined by Dr. Cash.  All sexton medical decisions were made by Dr. Cash.

## 2024-10-29 NOTE — QUICK NOTE
F/u:    Repeat labs noted with worsening lactic acid 4.1 despite of getting 3 L fluid resuscitation.  Clinically she is still tachycardic, ill-appearing, multiple episodes of copious vomiting despite of IV Reglan, Zofran, complaining of low abdominal pain.  Abdominal exam noted with soft abdomen, mild tenderness without rigidity which does not appear to be worsened compared to prior exam.  Ordered 2 sets of blood cultures to be drawn.  Empirically started on IV Unasyn.  Being admitted to stepdown level of 2.  Unable to tolerate bowel prep.  Care discussed with critical care since patient is being escalated to SD 2.  Care has been also discussed with on-call surgery team currently recommendation is to follow-up with repeat CT abdomen pelvis, repeat CBC and CMP and patient will be evaluated by surgery team today. continue with scheduled iv reglan 5mg q6hr,  continue iv zofran as needed added Tigan as 2nd line after zofran. Not placing NGT due to concern of coffee-ground emesis.  Spoke with daughter Cameron at 7:17pm and gave her an update about above. Night team to follow up on above labs, ct and surgery recommendations.

## 2024-10-29 NOTE — ASSESSMENT & PLAN NOTE
Present on acute respiratory failure with hypoxia requiring supplemental oxygen .  Currently presurgery 93 to 94% on 2 to 3 L nasal cannula  Suspected secondary aspiration event due to coffee-ground emesis versus atelectasis.  Encourage incentive spirometry use  Continue to wean off oxygen as able.

## 2024-10-29 NOTE — ASSESSMENT & PLAN NOTE
Present on admission with hypotension.  Blood pressure currently controlled.  Resume Lopressor,   continue to hold lisinopril and HCTZ for now.

## 2024-10-30 ENCOUNTER — APPOINTMENT (INPATIENT)
Dept: RADIOLOGY | Facility: HOSPITAL | Age: 84
DRG: 853 | End: 2024-10-30
Payer: COMMERCIAL

## 2024-10-30 ENCOUNTER — ANESTHESIA EVENT (INPATIENT)
Dept: PERIOP | Facility: HOSPITAL | Age: 84
End: 2024-10-30
Payer: COMMERCIAL

## 2024-10-30 PROBLEM — K55.9 ISCHEMIC COLITIS (HCC): Status: ACTIVE | Noted: 2024-10-30

## 2024-10-30 PROBLEM — G93.41 ACUTE METABOLIC ENCEPHALOPATHY: Status: ACTIVE | Noted: 2024-10-30

## 2024-10-30 PROBLEM — R65.21 SEPTIC SHOCK (HCC): Status: ACTIVE | Noted: 2024-10-30

## 2024-10-30 PROBLEM — A41.9 SEPTIC SHOCK (HCC): Status: ACTIVE | Noted: 2024-10-30

## 2024-10-30 LAB
ALBUMIN SERPL BCG-MCNC: 3.2 G/DL (ref 3.5–5)
ALBUMIN SERPL BCG-MCNC: 3.2 G/DL (ref 3.5–5)
ALP SERPL-CCNC: 40 U/L (ref 34–104)
ALP SERPL-CCNC: 44 U/L (ref 34–104)
ALT SERPL W P-5'-P-CCNC: 11 U/L (ref 7–52)
ALT SERPL W P-5'-P-CCNC: 12 U/L (ref 7–52)
ANION GAP SERPL CALCULATED.3IONS-SCNC: 5 MMOL/L (ref 4–13)
ANION GAP SERPL CALCULATED.3IONS-SCNC: 6 MMOL/L (ref 4–13)
ANION GAP SERPL CALCULATED.3IONS-SCNC: 6 MMOL/L (ref 4–13)
ANION GAP SERPL CALCULATED.3IONS-SCNC: 7 MMOL/L (ref 4–13)
ANION GAP SERPL CALCULATED.3IONS-SCNC: 8 MMOL/L (ref 4–13)
AST SERPL W P-5'-P-CCNC: 17 U/L (ref 13–39)
AST SERPL W P-5'-P-CCNC: 18 U/L (ref 13–39)
BASE EX.OXY STD BLDV CALC-SCNC: 82.6 % (ref 60–80)
BASE EXCESS BLDA CALC-SCNC: -10.4 MMOL/L
BASE EXCESS BLDA CALC-SCNC: -11.3 MMOL/L
BASE EXCESS BLDA CALC-SCNC: -12 MMOL/L (ref -2–3)
BASE EXCESS BLDA CALC-SCNC: -7 MMOL/L
BASE EXCESS BLDA CALC-SCNC: -9.3 MMOL/L
BASE EXCESS BLDA CALC-SCNC: -9.6 MMOL/L
BASE EXCESS BLDV CALC-SCNC: -11.3 MMOL/L
BILIRUB SERPL-MCNC: 0.69 MG/DL (ref 0.2–1)
BILIRUB SERPL-MCNC: 0.86 MG/DL (ref 0.2–1)
BODY TEMPERATURE: 98.8 DEGREES FEHRENHEIT
BODY TEMPERATURE: 98.8 DEGREES FEHRENHEIT
BUN SERPL-MCNC: 35 MG/DL (ref 5–25)
BUN SERPL-MCNC: 35 MG/DL (ref 5–25)
BUN SERPL-MCNC: 36 MG/DL (ref 5–25)
BUN SERPL-MCNC: 37 MG/DL (ref 5–25)
BUN SERPL-MCNC: 37 MG/DL (ref 5–25)
CA-I BLD-SCNC: 1.07 MMOL/L (ref 1.12–1.32)
CA-I BLD-SCNC: 1.09 MMOL/L (ref 1.12–1.32)
CA-I BLD-SCNC: 1.09 MMOL/L (ref 1.12–1.32)
CA-I BLD-SCNC: 1.17 MMOL/L (ref 1.12–1.32)
CA-I BLD-SCNC: 1.18 MMOL/L (ref 1.12–1.32)
CALCIUM ALBUM COR SERPL-MCNC: 8 MG/DL (ref 8.3–10.1)
CALCIUM ALBUM COR SERPL-MCNC: 8.6 MG/DL (ref 8.3–10.1)
CALCIUM SERPL-MCNC: 7.4 MG/DL (ref 8.4–10.2)
CALCIUM SERPL-MCNC: 7.9 MG/DL (ref 8.4–10.2)
CALCIUM SERPL-MCNC: 8 MG/DL (ref 8.4–10.2)
CALCIUM SERPL-MCNC: 8.3 MG/DL (ref 8.4–10.2)
CALCIUM SERPL-MCNC: 8.3 MG/DL (ref 8.4–10.2)
CHLORIDE SERPL-SCNC: 113 MMOL/L (ref 96–108)
CHLORIDE SERPL-SCNC: 114 MMOL/L (ref 96–108)
CO2 SERPL-SCNC: 18 MMOL/L (ref 21–32)
CO2 SERPL-SCNC: 20 MMOL/L (ref 21–32)
CO2 SERPL-SCNC: 21 MMOL/L (ref 21–32)
CREAT SERPL-MCNC: 1.52 MG/DL (ref 0.6–1.3)
CREAT SERPL-MCNC: 1.56 MG/DL (ref 0.6–1.3)
CREAT SERPL-MCNC: 1.6 MG/DL (ref 0.6–1.3)
CREAT SERPL-MCNC: 1.62 MG/DL (ref 0.6–1.3)
CREAT SERPL-MCNC: 1.66 MG/DL (ref 0.6–1.3)
ERYTHROCYTE [DISTWIDTH] IN BLOOD BY AUTOMATED COUNT: 14.1 % (ref 11.6–15.1)
GFR SERPL CREATININE-BSD FRML MDRD: 28 ML/MIN/1.73SQ M
GFR SERPL CREATININE-BSD FRML MDRD: 29 ML/MIN/1.73SQ M
GFR SERPL CREATININE-BSD FRML MDRD: 29 ML/MIN/1.73SQ M
GFR SERPL CREATININE-BSD FRML MDRD: 30 ML/MIN/1.73SQ M
GFR SERPL CREATININE-BSD FRML MDRD: 31 ML/MIN/1.73SQ M
GLUCOSE SERPL-MCNC: 108 MG/DL (ref 65–140)
GLUCOSE SERPL-MCNC: 127 MG/DL (ref 65–140)
GLUCOSE SERPL-MCNC: 135 MG/DL (ref 65–140)
GLUCOSE SERPL-MCNC: 141 MG/DL (ref 65–140)
GLUCOSE SERPL-MCNC: 142 MG/DL (ref 65–140)
GLUCOSE SERPL-MCNC: 143 MG/DL (ref 65–140)
GLUCOSE SERPL-MCNC: 159 MG/DL (ref 65–140)
GLUCOSE SERPL-MCNC: 165 MG/DL (ref 65–140)
GLUCOSE SERPL-MCNC: 167 MG/DL (ref 65–140)
HCO3 BLDA-SCNC: 15.3 MMOL/L (ref 22–28)
HCO3 BLDA-SCNC: 16.4 MMOL/L (ref 22–28)
HCO3 BLDA-SCNC: 16.4 MMOL/L (ref 22–28)
HCO3 BLDA-SCNC: 17.3 MMOL/L (ref 22–28)
HCO3 BLDA-SCNC: 17.8 MMOL/L (ref 22–28)
HCO3 BLDA-SCNC: 18.6 MMOL/L (ref 22–28)
HCO3 BLDV-SCNC: 17 MMOL/L (ref 24–30)
HCT VFR BLD AUTO: 34.3 % (ref 34.8–46.1)
HCT VFR BLD CALC: 32 % (ref 34.8–46.1)
HGB BLD-MCNC: 10.6 G/DL (ref 11.5–15.4)
HGB BLD-MCNC: 10.7 G/DL (ref 11.5–15.4)
HGB BLD-MCNC: 10.9 G/DL (ref 11.5–15.4)
HGB BLDA-MCNC: 10.9 G/DL (ref 11.5–15.4)
HOROWITZ INDEX BLDA+IHG-RTO: 10 MM[HG]
HOROWITZ INDEX BLDA+IHG-RTO: 100 MM[HG]
HOROWITZ INDEX BLDA+IHG-RTO: 50 MM[HG]
HOROWITZ INDEX BLDA+IHG-RTO: 80 MM[HG]
HOROWITZ INDEX BLDA+IHG-RTO: 80 MM[HG]
INR PPP: 1.55 (ref 0.85–1.19)
LACTATE SERPL-SCNC: 1.5 MMOL/L (ref 0.5–2)
LACTATE SERPL-SCNC: 2 MMOL/L (ref 0.5–2)
LACTATE SERPL-SCNC: 2.6 MMOL/L (ref 0.5–2)
LACTATE SERPL-SCNC: 3.8 MMOL/L (ref 0.5–2)
MAGNESIUM SERPL-MCNC: 2.2 MG/DL (ref 1.9–2.7)
MCH RBC QN AUTO: 30.7 PG (ref 26.8–34.3)
MCHC RBC AUTO-ENTMCNC: 31.2 G/DL (ref 31.4–37.4)
MCV RBC AUTO: 99 FL (ref 82–98)
O2 CT BLDA-SCNC: 14.8 ML/DL (ref 16–23)
O2 CT BLDA-SCNC: 15.2 ML/DL (ref 16–23)
O2 CT BLDA-SCNC: 15.8 ML/DL (ref 16–23)
O2 CT BLDA-SCNC: 16.1 ML/DL (ref 16–23)
O2 CT BLDA-SCNC: 16.6 ML/DL (ref 16–23)
O2 CT BLDV-SCNC: 13.9 ML/DL
OXYHGB MFR BLDA: 91.3 % (ref 94–97)
OXYHGB MFR BLDA: 92.6 % (ref 94–97)
OXYHGB MFR BLDA: 95.9 % (ref 94–97)
OXYHGB MFR BLDA: 96.1 % (ref 94–97)
OXYHGB MFR BLDA: 96.8 % (ref 94–97)
PCO2 BLD: 16 MMOL/L (ref 21–32)
PCO2 BLD: 37.9 MM HG (ref 36–44)
PCO2 BLDA: 37.3 MM HG (ref 36–44)
PCO2 BLDA: 39.7 MM HG (ref 36–44)
PCO2 BLDA: 41.4 MM HG (ref 36–44)
PCO2 BLDA: 43.5 MM HG (ref 36–44)
PCO2 BLDA: 43.7 MM HG (ref 36–44)
PCO2 BLDV: 48.1 MM HG (ref 42–50)
PEEP RESPIRATORY: 10 CM[H2O]
PEEP RESPIRATORY: 80 CM[H2O]
PH BLD: 7.21 [PH] (ref 7.35–7.45)
PH BLDA: 7.19 [PH] (ref 7.35–7.45)
PH BLDA: 7.23 [PH] (ref 7.35–7.45)
PH BLDA: 7.23 [PH] (ref 7.35–7.45)
PH BLDA: 7.24 [PH] (ref 7.35–7.45)
PH BLDA: 7.32 [PH] (ref 7.35–7.45)
PH BLDV: 7.17 [PH] (ref 7.3–7.4)
PHOSPHATE SERPL-MCNC: 4.2 MG/DL (ref 2.3–4.1)
PLATELET # BLD AUTO: 167 THOUSANDS/UL (ref 149–390)
PMV BLD AUTO: 11.2 FL (ref 8.9–12.7)
PO2 BLD: 60 MM HG (ref 75–129)
PO2 BLDA: 109 MM HG (ref 75–129)
PO2 BLDA: 118.2 MM HG (ref 75–129)
PO2 BLDA: 66.5 MM HG (ref 75–129)
PO2 BLDA: 72.7 MM HG (ref 75–129)
PO2 BLDA: 99.7 MM HG (ref 75–129)
PO2 BLDV: 53.1 MM HG (ref 35–45)
POTASSIUM BLD-SCNC: 5.2 MMOL/L (ref 3.5–5.3)
POTASSIUM SERPL-SCNC: 4.5 MMOL/L (ref 3.5–5.3)
POTASSIUM SERPL-SCNC: 4.8 MMOL/L (ref 3.5–5.3)
POTASSIUM SERPL-SCNC: 5 MMOL/L (ref 3.5–5.3)
POTASSIUM SERPL-SCNC: 5 MMOL/L (ref 3.5–5.3)
POTASSIUM SERPL-SCNC: 5.2 MMOL/L (ref 3.5–5.3)
PROCALCITONIN SERPL-MCNC: 44.94 NG/ML
PROT SERPL-MCNC: 5.2 G/DL (ref 6.4–8.4)
PROT SERPL-MCNC: 5.2 G/DL (ref 6.4–8.4)
PROTHROMBIN TIME: 19.3 SECONDS (ref 12.3–15)
RBC # BLD AUTO: 3.48 MILLION/UL (ref 3.81–5.12)
SAO2 % BLD FROM PO2: 85 % (ref 60–85)
SODIUM BLD-SCNC: 142 MMOL/L (ref 136–145)
SODIUM SERPL-SCNC: 139 MMOL/L (ref 135–147)
SODIUM SERPL-SCNC: 140 MMOL/L (ref 135–147)
SODIUM SERPL-SCNC: 140 MMOL/L (ref 135–147)
SPECIMEN SOURCE: ABNORMAL
VENT AC: 16
VENT AC: 18
VENT- AC: AC
VT SETTING VENT: 360 ML
WBC # BLD AUTO: 8.31 THOUSAND/UL (ref 4.31–10.16)

## 2024-10-30 PROCEDURE — 84145 PROCALCITONIN (PCT): CPT

## 2024-10-30 PROCEDURE — 80048 BASIC METABOLIC PNL TOTAL CA: CPT

## 2024-10-30 PROCEDURE — 80053 COMPREHEN METABOLIC PANEL: CPT

## 2024-10-30 PROCEDURE — 71045 X-RAY EXAM CHEST 1 VIEW: CPT

## 2024-10-30 PROCEDURE — 94002 VENT MGMT INPAT INIT DAY: CPT

## 2024-10-30 PROCEDURE — 82805 BLOOD GASES W/O2 SATURATION: CPT

## 2024-10-30 PROCEDURE — 99024 POSTOP FOLLOW-UP VISIT: CPT | Performed by: PHYSICIAN ASSISTANT

## 2024-10-30 PROCEDURE — 99233 SBSQ HOSP IP/OBS HIGH 50: CPT | Performed by: INTERNAL MEDICINE

## 2024-10-30 PROCEDURE — 99291 CRITICAL CARE FIRST HOUR: CPT | Performed by: STUDENT IN AN ORGANIZED HEALTH CARE EDUCATION/TRAINING PROGRAM

## 2024-10-30 PROCEDURE — NC001 PR NO CHARGE: Performed by: SURGERY

## 2024-10-30 PROCEDURE — 83605 ASSAY OF LACTIC ACID: CPT

## 2024-10-30 PROCEDURE — 83735 ASSAY OF MAGNESIUM: CPT

## 2024-10-30 PROCEDURE — 85018 HEMOGLOBIN: CPT

## 2024-10-30 PROCEDURE — 85027 COMPLETE CBC AUTOMATED: CPT

## 2024-10-30 PROCEDURE — 82948 REAGENT STRIP/BLOOD GLUCOSE: CPT

## 2024-10-30 PROCEDURE — 82330 ASSAY OF CALCIUM: CPT

## 2024-10-30 PROCEDURE — 94760 N-INVAS EAR/PLS OXIMETRY 1: CPT

## 2024-10-30 PROCEDURE — 84100 ASSAY OF PHOSPHORUS: CPT

## 2024-10-30 PROCEDURE — 85610 PROTHROMBIN TIME: CPT

## 2024-10-30 RX ORDER — HEPARIN SODIUM 5000 [USP'U]/ML
5000 INJECTION, SOLUTION INTRAVENOUS; SUBCUTANEOUS EVERY 8 HOURS SCHEDULED
Status: DISCONTINUED | OUTPATIENT
Start: 2024-10-30 | End: 2024-11-03

## 2024-10-30 RX ORDER — PROPOFOL 10 MG/ML
INJECTION, EMULSION INTRAVENOUS
Status: DISPENSED
Start: 2024-10-30 | End: 2024-10-30

## 2024-10-30 RX ORDER — ALBUMIN HUMAN 50 G/1000ML
12.5 SOLUTION INTRAVENOUS ONCE
Status: COMPLETED | OUTPATIENT
Start: 2024-10-30 | End: 2024-10-30

## 2024-10-30 RX ORDER — INSULIN LISPRO 100 [IU]/ML
1-6 INJECTION, SOLUTION INTRAVENOUS; SUBCUTANEOUS EVERY 6 HOURS SCHEDULED
Status: DISCONTINUED | OUTPATIENT
Start: 2024-10-30 | End: 2024-11-10

## 2024-10-30 RX ORDER — CALCIUM GLUCONATE 20 MG/ML
2 INJECTION, SOLUTION INTRAVENOUS ONCE
Status: COMPLETED | OUTPATIENT
Start: 2024-10-30 | End: 2024-10-30

## 2024-10-30 RX ORDER — FENTANYL CITRATE-0.9 % NACL/PF 10 MCG/ML
50 PLASTIC BAG, INJECTION (ML) INTRAVENOUS CONTINUOUS
Status: DISCONTINUED | OUTPATIENT
Start: 2024-10-30 | End: 2024-11-02

## 2024-10-30 RX ORDER — FENTANYL CITRATE-0.9 % NACL/PF 10 MCG/ML
PLASTIC BAG, INJECTION (ML) INTRAVENOUS
Status: DISPENSED
Start: 2024-10-30 | End: 2024-10-30

## 2024-10-30 RX ORDER — SODIUM CHLORIDE, SODIUM LACTATE, POTASSIUM CHLORIDE, CALCIUM CHLORIDE 600; 310; 30; 20 MG/100ML; MG/100ML; MG/100ML; MG/100ML
100 INJECTION, SOLUTION INTRAVENOUS CONTINUOUS
Status: DISCONTINUED | OUTPATIENT
Start: 2024-10-30 | End: 2024-10-31

## 2024-10-30 RX ORDER — ALBUMIN HUMAN 50 G/1000ML
25 SOLUTION INTRAVENOUS ONCE
Status: COMPLETED | OUTPATIENT
Start: 2024-10-30 | End: 2024-10-30

## 2024-10-30 RX ORDER — PROPOFOL 10 MG/ML
5-50 INJECTION, EMULSION INTRAVENOUS
Status: DISCONTINUED | OUTPATIENT
Start: 2024-10-30 | End: 2024-11-01

## 2024-10-30 RX ORDER — CHLORHEXIDINE GLUCONATE ORAL RINSE 1.2 MG/ML
15 SOLUTION DENTAL EVERY 12 HOURS SCHEDULED
Status: DISCONTINUED | OUTPATIENT
Start: 2024-10-30 | End: 2024-11-04

## 2024-10-30 RX ORDER — ACETAMINOPHEN 10 MG/ML
1000 INJECTION, SOLUTION INTRAVENOUS EVERY 6 HOURS PRN
Status: DISCONTINUED | OUTPATIENT
Start: 2024-10-30 | End: 2024-11-03

## 2024-10-30 RX ORDER — FENTANYL CITRATE 50 UG/ML
50 INJECTION, SOLUTION INTRAMUSCULAR; INTRAVENOUS
Status: DISCONTINUED | OUTPATIENT
Start: 2024-10-30 | End: 2024-11-03

## 2024-10-30 RX ORDER — CALCIUM GLUCONATE 20 MG/ML
INJECTION, SOLUTION INTRAVENOUS
Status: DISPENSED
Start: 2024-10-30 | End: 2024-10-30

## 2024-10-30 RX ORDER — PROPOFOL 10 MG/ML
INJECTION, EMULSION INTRAVENOUS CONTINUOUS PRN
Status: DISCONTINUED | OUTPATIENT
Start: 2024-10-30 | End: 2024-10-30

## 2024-10-30 RX ADMIN — HEPARIN SODIUM 5000 UNITS: 5000 INJECTION INTRAVENOUS; SUBCUTANEOUS at 22:06

## 2024-10-30 RX ADMIN — CHLORHEXIDINE GLUCONATE 0.12% ORAL RINSE 15 ML: 1.2 LIQUID ORAL at 20:58

## 2024-10-30 RX ADMIN — PROPOFOL 15 MCG/KG/MIN: 10 INJECTION, EMULSION INTRAVENOUS at 20:50

## 2024-10-30 RX ADMIN — CALCIUM GLUCONATE 2 G: 20 INJECTION, SOLUTION INTRAVENOUS at 02:55

## 2024-10-30 RX ADMIN — CHLORHEXIDINE GLUCONATE 0.12% ORAL RINSE 15 ML: 1.2 LIQUID ORAL at 11:20

## 2024-10-30 RX ADMIN — SODIUM CHLORIDE, SODIUM LACTATE, POTASSIUM CHLORIDE, AND CALCIUM CHLORIDE 500 ML: .6; .31; .03; .02 INJECTION, SOLUTION INTRAVENOUS at 13:36

## 2024-10-30 RX ADMIN — SODIUM CHLORIDE 8 MG/HR: 9 INJECTION, SOLUTION INTRAVENOUS at 03:52

## 2024-10-30 RX ADMIN — FENTANYL CITRATE 50 MCG: 50 INJECTION INTRAMUSCULAR; INTRAVENOUS at 11:09

## 2024-10-30 RX ADMIN — PROPOFOL 30 MCG/KG/MIN: 10 INJECTION, EMULSION INTRAVENOUS at 00:28

## 2024-10-30 RX ADMIN — ALBUMIN (HUMAN) 25 G: 12.5 INJECTION, SOLUTION INTRAVENOUS at 19:52

## 2024-10-30 RX ADMIN — PIPERACILLIN AND TAZOBACTAM 4.5 G: 36; 4.5 INJECTION, POWDER, FOR SOLUTION INTRAVENOUS at 20:56

## 2024-10-30 RX ADMIN — PIPERACILLIN AND TAZOBACTAM 4.5 G: 36; 4.5 INJECTION, POWDER, FOR SOLUTION INTRAVENOUS at 05:07

## 2024-10-30 RX ADMIN — SODIUM CHLORIDE, SODIUM LACTATE, POTASSIUM CHLORIDE, AND CALCIUM CHLORIDE 100 ML/HR: .6; .31; .03; .02 INJECTION, SOLUTION INTRAVENOUS at 12:14

## 2024-10-30 RX ADMIN — CALCIUM GLUCONATE 2 G: 20 INJECTION, SOLUTION INTRAVENOUS at 19:47

## 2024-10-30 RX ADMIN — DEXTROSE 150 MG: 50 INJECTION, SOLUTION INTRAVENOUS at 23:47

## 2024-10-30 RX ADMIN — INSULIN LISPRO 1 UNITS: 100 INJECTION, SOLUTION INTRAVENOUS; SUBCUTANEOUS at 13:36

## 2024-10-30 RX ADMIN — VASOPRESSIN 0.04 UNITS/MIN: 20 INJECTION INTRAVENOUS at 09:30

## 2024-10-30 RX ADMIN — PIPERACILLIN AND TAZOBACTAM 4.5 G: 36; 4.5 INJECTION, POWDER, FOR SOLUTION INTRAVENOUS at 02:17

## 2024-10-30 RX ADMIN — SODIUM CHLORIDE, SODIUM LACTATE, POTASSIUM CHLORIDE, AND CALCIUM CHLORIDE 100 ML/HR: .6; .31; .03; .02 INJECTION, SOLUTION INTRAVENOUS at 02:01

## 2024-10-30 RX ADMIN — Medication 75 MCG/HR: at 23:27

## 2024-10-30 RX ADMIN — VASOPRESSIN 0.04 UNITS/MIN: 20 INJECTION INTRAVENOUS at 02:02

## 2024-10-30 RX ADMIN — SODIUM CHLORIDE, SODIUM LACTATE, POTASSIUM CHLORIDE, AND CALCIUM CHLORIDE 100 ML/HR: .6; .31; .03; .02 INJECTION, SOLUTION INTRAVENOUS at 01:30

## 2024-10-30 RX ADMIN — ALBUMIN (HUMAN) 12.5 G: 12.5 INJECTION, SOLUTION INTRAVENOUS at 20:18

## 2024-10-30 RX ADMIN — PIPERACILLIN AND TAZOBACTAM 4.5 G: 36; 4.5 INJECTION, POWDER, FOR SOLUTION INTRAVENOUS at 15:12

## 2024-10-30 RX ADMIN — PROPOFOL 50 MCG/KG/MIN: 10 INJECTION, EMULSION INTRAVENOUS at 01:26

## 2024-10-30 RX ADMIN — ROCURONIUM BROMIDE 30 MG: 10 INJECTION, SOLUTION INTRAVENOUS at 00:08

## 2024-10-30 RX ADMIN — SODIUM CHLORIDE 8 MG/HR: 9 INJECTION, SOLUTION INTRAVENOUS at 23:51

## 2024-10-30 RX ADMIN — CALCIUM GLUCONATE 2 G: 20 INJECTION, SOLUTION INTRAVENOUS at 11:19

## 2024-10-30 RX ADMIN — Medication 5 MCG/MIN: at 23:21

## 2024-10-30 RX ADMIN — Medication 6 MCG/MIN: at 14:30

## 2024-10-30 RX ADMIN — VASOPRESSIN 0.04 UNITS/MIN: 20 INJECTION INTRAVENOUS at 23:50

## 2024-10-30 RX ADMIN — ACETAMINOPHEN 1000 MG: 10 INJECTION INTRAVENOUS at 20:25

## 2024-10-30 RX ADMIN — SODIUM CHLORIDE, SODIUM LACTATE, POTASSIUM CHLORIDE, AND CALCIUM CHLORIDE 500 ML: .6; .31; .03; .02 INJECTION, SOLUTION INTRAVENOUS at 17:48

## 2024-10-30 RX ADMIN — PROPOFOL 20 MCG/KG/MIN: 10 INJECTION, EMULSION INTRAVENOUS at 03:50

## 2024-10-30 RX ADMIN — Medication 8 MCG/MIN: at 01:03

## 2024-10-30 RX ADMIN — NOREPINEPHRINE BITARTRATE 16 MCG: 1 INJECTION, SOLUTION, CONCENTRATE INTRAVENOUS at 00:06

## 2024-10-30 RX ADMIN — Medication 75 MCG/HR: at 13:36

## 2024-10-30 RX ADMIN — VASOPRESSIN 0.04 UNITS/MIN: 20 INJECTION INTRAVENOUS at 17:38

## 2024-10-30 RX ADMIN — SODIUM CHLORIDE 8 MG/HR: 9 INJECTION, SOLUTION INTRAVENOUS at 15:44

## 2024-10-30 RX ADMIN — Medication 50 MCG/HR: at 01:26

## 2024-10-30 RX ADMIN — SODIUM CHLORIDE, SODIUM LACTATE, POTASSIUM CHLORIDE, AND CALCIUM CHLORIDE 100 ML/HR: .6; .31; .03; .02 INJECTION, SOLUTION INTRAVENOUS at 23:20

## 2024-10-30 RX ADMIN — PROPOFOL 20 MCG/KG/MIN: 10 INJECTION, EMULSION INTRAVENOUS at 13:08

## 2024-10-30 RX ADMIN — SODIUM CHLORIDE, SODIUM LACTATE, POTASSIUM CHLORIDE, AND CALCIUM CHLORIDE: .6; .31; .03; .02 INJECTION, SOLUTION INTRAVENOUS at 00:00

## 2024-10-30 NOTE — ASSESSMENT & PLAN NOTE
-3 episodes yesterday prior to ER presentation  -Hgb on admission 13.2  -CT AP with IV contrast notes no evidence of active high-volume GI hemorrhage.  Moderate diffuse colonic distension with mild wall thickening suggestive of the distal transverse and descending colon noted.  Mild distention esophagus with moderate gaseous distention  -PPI BID  -Initial plan was for EGD today however this will be postponed due to acute events overnight  -Continue to trend Hgb/Hct and transfuse as necessary

## 2024-10-30 NOTE — ASSESSMENT & PLAN NOTE
Patient reported 2 weeks of no BM on admission  CT AP on admission with moderate diffuse colonic distension with moderate fecal retention  -Lactic acid was elevated on admission at 3.5 which initially improved to 3.1 but worsened in the later afternoon to 4.5  -Abdominal pain worsened and patient became septic  -Repeat CT AP noted diffuse thickening of the colon with increasing mesenteric fat stranding in the left lower quadrant suggesting pneumatosis and hepatic flexure and proximal transverse colon concerning for ischemic colitis  -Given worsening presentation the patient was taken emergently to the OR last evening with extended right hemicolectomy.  Abdomen was left open.  Plan to take the patient back to the OR in the next 24 hours.  -Colonoscopy 2009 with polyps and diverticulosis  -Will continue to follow peripherally  -She will need a plan for better outpatient bowel regimen  -Ongoing treatment recommendations deferred to ICU and surgery teams

## 2024-10-30 NOTE — OP NOTE
OPERATIVE REPORT  PATIENT NAME: Bertha Brown    :  1940  MRN: [id number]  Pt Location: [facility] OR ROOM 04    SURGERY DATE: 10/29/2024    Surgeons and Role:     * Luke Medina DO - Primary     * Estuardo Cardoza PA-C - Assisting     * Glory Hernandez MD - Assisting    Preop Diagnosis:  Abdominal pain [R10.9]    Post-Op Diagnosis Codes:     * Abdominal pain [R10.9]    Procedure(s):  LAPAROSCOPY DIAGNOSTIC CONVERTED TO OPEN  LAPAROTOMY EXPLORATORY. EXTENDED RIGHT HEMICOLECTOMY. WITH abthera VAC placement    Specimen(s):  ID Type Source Tests Collected by Time Destination   1 : RIGHT COLON AND TRANSVERSE COLON Tissue Large Intestine, Right/Ascending Colon TISSUE EXAM Luke Medina DO 10/29/2024 2353    A : Peritoneal Fluid Body Fluid Peritoneal Fluid ANAEROBIC CULTURE AND GRAM STAIN, BODY FLUID CULTURE AND GRAM STAIN Luke Medina DO 10/29/2024 2334        Estimated Blood Loss:   Minimal    Drains:  NG/OG/Enteral Tube Nasogastric 18 Fr Left nare (Active)   Placement Reverification Auscultation 10/30/24 0200   Site Assessment Clean;Dry;Intact 10/30/24 0200   Marking at Nare (cm) - For ongoing assessment of tube placement 55 cm 10/30/24 0301   Status Suction-low continuous 10/30/24 040   Drainage Appearance Bile;Brown;Clear 10/30/24 0400   Output (mL) 0 mL 10/30/24 0400   Number of days: 1       Urethral Catheter Asept;Coude;Non-latex;Straight-tip 16 Fr. (Active)   Reasons to continue Urinary Catheter  Accurate I&O assessment in critically ill patients (48 hr. max) 10/30/24 0200   Goal for Removal Voiding trial when ambulation improves 10/30/24 0200   Site Assessment Clean;Skin intact 10/30/24 020   Collection Container Standard drainage bag 10/30/24 0200   Securement Method Securing device (Describe) 10/30/24 0200   Output (mL) 185 mL 10/30/24 0801   Number of days: 1       Anesthesia Type:   General    Operative Indications:  Abdominal pain [R10.9]      Operative Findings:  Intra-abdominal findings to include  significant amount of murky ascites.  No silvia perforation noted.  Significantly distended and thin-walled ascending, hepatic flexure, and proximal transverse colon.  Early ischemia changes.  Extended right hemicolectomy undertaken given the ischemic changes and impending perforation in the setting of noted pneumatosis on the CT scan.  Distal transverse colon appeared healthy and viable, however sigmoid colon was very edematous with mild inflammation but appeared viable.     Extended right hemicolectomy completed and left in discontinuity as patient was unstable and on vasopressors.  ABThera VAC placed.      Complications:   None    Procedure and Technique:  The patient was brought to the operating arena and placed in supine position on the operating table.  All the regular monitor vices were then connected.  Prior to induction patient had a nasogastric tube placed with nearly a liter of bilious material removed.  The patient underwent general anesthesia with endotracheal intubation without complication.  She received perioperative antibiotics.  She received bilateral lower sequential compression devices for DVT prophylaxis.  A Parks catheter was then placed using aseptic technique.  The patient was then prepped and draped in usual sterile fashion.  Timeout was performed to verify correct patient, procedure, position, site.  A supraumbilical 12 mm incision was made using a 11 blade scalpel.  This was carried down through the subcutaneous tissue using Bovie electrocautery and further dissection with S retractors.  Once the fascia was identified and was grasped with 2 Cuong clamps elevated and incised using Bovie electrocautery.  A finger was then inserted using finger fracture technique the peritoneum was incised.  2 stay sutures of 0 Vicryl were then placed on either side of the fascia.  Woods trocar was then entered under direct vision without any injury.  The abdomen was then insufflated to 12 to 14 mmHg.  The  patient Toller insufflation well.  The 2 anchoring sutures were then attached to the assigned trocar along with insufflation of the balloon of the trocar.  Laparoscope was then inserted and the bowel was evaluated.  An additional 5 mm trocar was then placed in the left lower quadrant in the suprapubic region.  This was all done under direct vision and care was made not to injure the bladder and the inferior epigastric vessels.  Initially the liver was evaluated and gallbladder which appeared healthy.  The stomach appeared healthy.  The small bowel appeared mildly dilated but did appear viable.  Significant amount of murky fluid throughout the abdomen.  The majority of the small bowel appeared to be in the right lower quadrant and adhered to the underlying colon.  The patient was then placed in Trendelenburg position and right side up.  Carefully the bowel was then retracted into the left upper quadrant.  At this point time more murky fluid was noted.  The colon appeared to be vastly distended with a very thin wall and evidence of some purplish pigmentation indicative of likely ongoing or evolving ischemic changes.  This was further evaluated along the ascending colon to the Paddock flexure all of which were extremely dilated.  The sigmoid colon at this time also appeared to be very edematous and inflamed but did not appear to be ischemic as did the right colon.  Given the adhesions of the small bowel down to the right lower quadrant in addition to the very floppy and distended right colon it was felt that the most efficient and safest approach would be to do an open right hemicolectomy.  In the interim patient had already been placed on vasopressors and therefore the thought was to get the patient in and out of the OR as quickly as possible.  The trocars were then removed and the procedure was converted to an open laparotomy.    Starting approximately 3 cm above the umbilicus incisions was made continuing from the  initial supraumbilical incision carried around the umbilicus down through the inferior midline and involving the suprapubic incision.  The incision was then carried through subcutaneous fat using the electrocautery to the Vollaro until the fascia was visualized.  Javier in the fascia was made in the fascia was opened up the length of the incision care taken to not injure any underlying viscera.  Once the incision was completely open along with the peritoneum, an Rupert wound protector was then placed.  The small bowel was eviscerated and evaluated.  At this point time there noted to be a small serosal tear possibly from one of the laparoscopic graspers.  The bowel appeared healthy and viable.  The serosal tear was oversewn with 2 Lembert sutures of 3-0 Vicryl.  There were some adhesions in the right lower quadrant of the small bowel and once to these were completely lysed the small bowel was completely eviscerated and the right colon was evaluated.  As noted before the right colon was extremely dilated and thin-walled and was starting to exhibit early ischemic changes with discoloration.  This was followed throughout the hepatic flexure and transverse colon which completely made sense given the findings of pneumatosis on the CT scan.  In the distal transverse colon there was a transition point where the colon appeared more healthy and viable.  This was then carried around to the splenic flexure and the sigmoid colon was then [place] identified.  Sigmoid colon bleeder not was thick and edematous but did appear viable and did not appear like the right colon.  There is significant mount of murky fluid within the abdomen.  Cultures were taken.  The bladder appeared healthy at the liver and gallbladder again.  Healthy.  The stomach also appeared healthy.  Given these findings it was decided to go forward with a right hemicolectomy rather an extended right hemicolectomy.  The white line of Toldt along the right paracolic  gutter was taken down.  This was carried around to the hepatic flexure.  The duodenum was visualized and protected and no injury was noted.  The gastrocolic ligament was then also taken down which allowed access to the lesser sac lesser sac.  Again any additional attachments along the hepatic flexure were then taken down with a combination of Bovie electrocautery and some blunt dissection.  Our initial transection point was determined at the TI.  A hole was made in the mesentery and using a SELMA 100 with a blue load the TI was transected.  Additional adhesions of the appendix were noted these were freed up which then allowed for the evisceration of the colon and complete visualization of the mesentery.  At this time using the Enseal device the mesentery was then taken down along the right colon and hepatic flexure.  Once this was completed a an additional transection point at the distal transverse colon at the transition point was then undertaken again with a SELMA blue load stapler.  The remaining mesentery was then taken down using the Enseal device.  The colon was then passed off the field.  Staple line of the remaining distal end of the transverse colon appeared intact and healthy.  At this point the patient was on Levophed and it was felt at this time given the uncertain cause of this ischemia that would be best to refrain from placing the patient in continuity and it was decided to leave the belly open and leave for discontinuity and allow her to be resuscitated and then potentially taken back within the next 24 to 48 hours for hopeful anastomosis.    The abdominal cavity was then irrigated with 3 L of warm saline.  No active bleeding was noted.  The left lower quadrant trocar site was closed with 4-0 Monocryl.  Dry sterile dressing was then placed.  An ABThera VAC was then placed without complication.     The patient was then taken in critical condition to the ICU.  All lap, needle, instrument counts were  correct.   I was present for the entire procedure. and A physician assistant was required during the procedure for retraction, tissue handling, dissection and suturing.    Patient Disposition:  Critical Care Unit    This procedure was not performed to treat colon cancer through resection           SIGNATURE: Luke Medina DO  DATE: October 30, 2024  TIME: 10:28 AM

## 2024-10-30 NOTE — ANESTHESIA POSTPROCEDURE EVALUATION
Post-Op Assessment Note    CV Status:  Stable (On Nor-epi)    Pain management: adequate       Post-procedure mental status: Sedated on propofol.   Hydration Status:  Euvolemic   PONV Controlled:  Controlled   Airway patency: ETT in place.  Airway: intubated     Post Op Vitals Reviewed: Yes    No anethesia notable event occurred.    Staff: CRNA           Last Filed PACU Vitals:  Vitals Value Taken Time   Temp 99 1235   Pulse 105 1235   /78 1235   Resp 16 1235   SpO2 96 1235       Modified Tomer:  No data recorded

## 2024-10-30 NOTE — ASSESSMENT & PLAN NOTE
Patient meets sepsis criteria as evidenced by tachycardia, tachypnea prior to intubation, and leukocytosis with RUKHSANA and lactic acidosis  Suspect secondary to ischemic colitis +/- aspiration pneumonia  Continue vasopressors titrated to maintain MAP >65  No evidence of cardiogenic component to shock, SVO2 82.6  Antibiotics broadened to Zosyn  Intraoperative cultures and BC pending, follow up results  Continue fluid resuscitation  Lactate now cleared  Trend procal and WBC/temperature curve

## 2024-10-30 NOTE — PLAN OF CARE
Problem: GASTROINTESTINAL - ADULT  Goal: Minimal or absence of nausea and/or vomiting  Description: INTERVENTIONS:  - Administer IV fluids if ordered to ensure adequate hydration  - Maintain NPO status until nausea and vomiting are resolved  - Nasogastric tube if ordered  - Administer ordered antiemetic medications as needed  - Provide nonpharmacologic comfort measures as appropriate  - Advance diet as tolerated, if ordered  - Consider nutrition services referral to assist patient with adequate nutrition and appropriate food choices  Reactivated  Goal: Maintains or returns to baseline bowel function  Description: INTERVENTIONS:  - Assess bowel function  - Encourage oral fluids to ensure adequate hydration  - Administer IV fluids if ordered to ensure adequate hydration  - Administer ordered medications as needed  - Encourage mobilization and activity  - Consider nutritional services referral to assist patient with adequate nutrition and appropriate food choices  Reactivated  Goal: Maintains adequate nutritional intake  Description: INTERVENTIONS:  - Monitor percentage of each meal consumed  - Identify factors contributing to decreased intake, treat as appropriate  - Assist with meals as needed  - Monitor I&O, weight, and lab values if indicated  - Obtain nutrition services referral as needed  Reactivated  Goal: Establish and maintain optimal ostomy function  Description: INTERVENTIONS:  - Assess bowel function  - Encourage oral fluids to ensure adequate hydration  - Administer IV fluids if ordered to ensure adequate hydration   - Administer ordered medications as needed  - Encourage mobilization and activity  - Nutrition services referral to assist patient with appropriate food choices  - Assess stoma site  - Consider wound care consult   Reactivated  Goal: Oral mucous membranes remain intact  Description: INTERVENTIONS  - Assess oral mucosa and hygiene practices  - Implement preventative oral hygiene regimen  -  Implement oral medicated treatments as ordered  - Initiate Nutrition services referral as needed  Reactivated     Problem: RESPIRATORY - ADULT  Goal: Achieves optimal ventilation and oxygenation  Description: INTERVENTIONS:  - Assess for changes in respiratory status  - Assess for changes in mentation and behavior  - Position to facilitate oxygenation and minimize respiratory effort  - Oxygen administered by appropriate delivery if ordered  - Initiate smoking cessation education as indicated  - Encourage broncho-pulmonary hygiene including cough, deep breathe, Incentive Spirometry  - Assess the need for suctioning and aspirate as needed  - Assess and instruct to report SOB or any respiratory difficulty  - Respiratory Therapy support as indicated  Reactivated     Problem: METABOLIC, FLUID AND ELECTROLYTES - ADULT  Goal: Electrolytes maintained within normal limits  Description: INTERVENTIONS:  - Monitor labs and assess patient for signs and symptoms of electrolyte imbalances  - Administer electrolyte replacement as ordered  - Monitor response to electrolyte replacements, including repeat lab results as appropriate  - Instruct patient on fluid and nutrition as appropriate  Reactivated  Goal: Fluid balance maintained  Description: INTERVENTIONS:  - Monitor labs   - Monitor I/O and WT  - Instruct patient on fluid and nutrition as appropriate  - Assess for signs & symptoms of volume excess or deficit  Reactivated  Goal: Glucose maintained within target range  Description: INTERVENTIONS:  - Monitor Blood Glucose as ordered  - Assess for signs and symptoms of hyperglycemia and hypoglycemia  - Administer ordered medications to maintain glucose within target range  - Assess nutritional intake and initiate nutrition service referral as needed  Reactivated     Problem: GENITOURINARY - ADULT  Goal: Maintains or returns to baseline urinary function  Description: INTERVENTIONS:  - Assess urinary function  - Encourage oral fluids  to ensure adequate hydration if ordered  - Administer IV fluids as ordered to ensure adequate hydration  - Administer ordered medications as needed  - Offer frequent toileting  - Follow urinary retention protocol if ordered  Reactivated  Goal: Absence of urinary retention  Description: INTERVENTIONS:  - Assess patient’s ability to void and empty bladder  - Monitor I/O  - Bladder scan as needed  - Discuss with physician/AP medications to alleviate retention as needed  - Discuss catheterization for long term situations as appropriate  Reactivated  Goal: Urinary catheter remains patent  Description: INTERVENTIONS:  - Assess patency of urinary catheter  - If patient has a chronic james, consider changing catheter if non-functioning  - Follow guidelines for intermittent irrigation of non-functioning urinary catheter  Reactivated     Problem: SKIN/TISSUE INTEGRITY - ADULT  Goal: Skin Integrity remains intact(Skin Breakdown Prevention)  Description: Assess:  -Perform Jese assessment every   -Clean and moisturize skin every   -Inspect skin when repositioning, toileting, and assisting with ADLS  -Assess under medical devices such as  every   -Assess extremities for adequate circulation and sensation     Bed Management:  -Have minimal linens on bed & keep smooth, unwrinkled  -Change linens as needed when moist or perspiring  -Avoid sitting or lying in one position for more than  hours while in bed  -Keep HOB at degrees     Toileting:  -Offer bedside commode  -Assess for incontinence every   -Use incontinent care products after each incontinent episode such as     Activity:  -Mobilize patient  times a day  -Encourage activity and walks on unit  -Encourage or provide ROM exercises   -Turn and reposition patient every  Hours  -Use appropriate equipment to lift or move patient in bed  -Instruct/ Assist with weight shifting every  when out of bed in chair  -Consider limitation of chair time  hour intervals    Skin Care:  -Avoid  use of baby powder, tape, friction and shearing, hot water or constrictive clothing  -Relieve pressure over bony prominences using   -Do not massage red bony areas    Next Steps:  -Teach patient strategies to minimize risks such as    -Consider consults to  interdisciplinary teams such as   Reactivated  Goal: Incision(s), wounds(s) or drain site(s) healing without S/S of infection  Description: INTERVENTIONS  - Assess and document dressing, incision, wound bed, drain sites and surrounding tissue  - Provide patient and family education  - Perform skin care/dressing changes every   Reactivated  Goal: Pressure injury heals and does not worsen  Description: Interventions:  - Implement low air loss mattress or specialty surface (Criteria met)  - Apply silicone foam dressing  - Instruct/assist with weight shifting every  minutes when in chair   - Limit chair time to  hour intervals  - Use special pressure reducing interventions such as  when in chair   - Apply fecal or urinary incontinence containment device   - Perform passive or active ROM every   - Turn and reposition patient & offload bony prominences every  hours   - Utilize friction reducing device or surface for transfers   - Consider consults to  interdisciplinary teams such as   - Use incontinent care products after each incontinent episode such as - Consider nutrition services referral as needed  Reactivated     Problem: HEMATOLOGIC - ADULT  Goal: Maintains hematologic stability  Description: INTERVENTIONS  - Assess for signs and symptoms of bleeding or hemorrhage  - Monitor labs  - Administer supportive blood products/factors as ordered and appropriate  Reactivated     Problem: SAFETY,RESTRAINT: NV/NON-SELF DESTRUCTIVE BEHAVIOR  Goal: Remains free of harm/injury (restraint for non violent/non self-detsructive behavior)  Description: INTERVENTIONS:  - Instruct patient/family regarding restraint use   - Assess and monitor physiologic and psychological status   -  Provide interventions and comfort measures to meet assessed patient needs   - Identify and implement measures to help patient regain control  - Assess readiness for release of restraint   Reactivated     Problem: Nutrition/Hydration-ADULT  Goal: Nutrient/Hydration intake appropriate for improving, restoring or maintaining nutritional needs  Description: Monitor and assess patient's nutrition/hydration status for malnutrition. Collaborate with interdisciplinary team and initiate plan and interventions as ordered.  Monitor patient's weight and dietary intake as ordered or per policy. Utilize nutrition screening tool and intervene as necessary. Determine patient's food preferences and provide high-protein, high-caloric foods as appropriate.     INTERVENTIONS:  - Monitor oral intake, urinary output, labs, and treatment plans  - Assess nutrition and hydration status and recommend course of action  - Evaluate amount of meals eaten  - Assist patient with eating if necessary   - Allow adequate time for meals  - Recommend/ encourage appropriate diets, oral nutritional supplements, and vitamin/mineral supplements  - Order, calculate, and assess calorie counts as needed  - Recommend, monitor, and adjust tube feedings and TPN/PPN based on assessed needs  - Assess need for intravenous fluids  - Provide specific nutrition/hydration education as appropriate  - Include patient/family/caregiver in decisions related to nutrition  Reactivated

## 2024-10-30 NOTE — CONSULTS
Consultation - Surgery-General   Name: Bertha Brown 84 y.o. female I MRN: [id number]  Unit/Bed#: 2 E 283-01 I Date of Admission: 10/29/2024   Date of Service: 10/29/2024 I Hospital Day: 0   Consults  Physician Requesting Evaluation: Royer LINK MD   Reason for Evaluation / Principal Problem: Abdominal pain    Assessment & Plan  Coffee ground emesis  -Hemoglobin stable  -Continue PPI    Paroxysmal atrial fibrillation (HCC)    -Hold warfarin for now as patient will be going to the OR  -Continue rate control  -Remainder of cares per primary medical team    Acute respiratory failure with hypoxia (HCC)    -Continue nonrebreather to oxygen saturation of greater than 90%  -Remainder of care per prior medical team  Obesity    RUKHSANA (acute kidney injury) (HCC)    -Continue IV fluid hydration  -Avoid nephrotoxic drugs  -Daily labs to trend BUN and creatinine    Generalized abdominal pain    -Unclear etiology at this point time.  Patient with diffuse abdominal pain, worsening in nature, with positive rebound in the setting of worsening hypoxia and tachycardia.  In addition lactic acid continues to rise.  Initial CT scan this morning did not reveal any acute pathology that would explain this abdominal discomfort which is recently started in addition did not reveal any obvious bleeding.  Repeat CT scan ordered.  In addition repeat CBC showing elevation of white blood cell count from 12-19.  Lactic acid repeat showing 4.5.  Concern for possible ischemia given abdominal exam.  Based on patient's clinical worsening I do feel she needs exploration in the OR at least for diagnostic laparoscopy which could be converted to exploratory laparotomy if needed.  Will obtain CT scan to see if this offers any additional guidance as to the source of the problem.    Discussed at length with the patient who agreed to surgery and would like her daughter to sign consent.  Unfortunately her daughter is not present.  Did speak with daughter  Linda by phone.  She understands the current worsening of her mother's clinical situation.  Furthermore she is in agreement for surgery as well.  Did explain that we will be taking her for diagnostic laparoscopy, possible exploratory laparotomy, possible bowel resection, possible ostomy, possible open abdomen with ABThera VAC.  We did explain the risk which include bleeding, infection, injury to surrounding structures in addition to worsening sepsis and potential for death.    SIRS (systemic inflammatory response syndrome) (HCC)    -Empiric antibiotics  -N.p.o.  -IV fluid resuscitation  -    I have discussed the above management plan in detail with the primary service.     History of Present Illness   Bertha Brown is a 84 y.o. female with history of diabetes, hypertension, hyperlipidemia, A-fib on warfarin, admitted with coffee-ground emesis and suspected of potential underlying GI bleed.  She underwent a high-volume GI bleeding CT scan which did not show any obvious bleeding.  GI did see her and plans for potential scope tomorrow.  Throughout the day the patient developed worsening abdominal discomfort.  In addition worsening leukocytosis with worsening lactic acid.  General surgery was subsequent consulted for further evaluation.  Patient does endorse severe abdominal discomfort.  Nausea.  No vomiting currently.  No chest pain.  Does endorse worsening shortness of breath.    Review of Systems  I have reviewed the patient's PMH, PSH, Social History, Family History, Meds, and Allergies  Historical Information   Past Medical History:   Diagnosis Date    Asthma     Atrial fibrillation (HCC)     Diabetes (HCC)     Hyperlipidemia     Hypertension     Legionnaire's disease (HCC)      Past Surgical History:   Procedure Laterality Date    FL LUMBAR PUNCTURE DIAGNOSTIC  7/13/2014    IR EMBOLIZATION (SPECIFY VESSEL OR SITE)  12/12/2022    LUNG LOBECTOMY       Social History     Tobacco Use    Smoking status: Never     Smokeless tobacco: Never   Vaping Use    Vaping status: Never Used   Substance and Sexual Activity    Alcohol use: Never    Drug use: Never    Sexual activity: Not on file     E-Cigarette/Vaping    E-Cigarette Use Never User      E-Cigarette/Vaping Substances    Nicotine No     THC No     CBD No     Flavoring No     Other No     Unknown No      Family history non-contributory  Social History     Tobacco Use    Smoking status: Never    Smokeless tobacco: Never   Vaping Use    Vaping status: Never Used   Substance and Sexual Activity    Alcohol use: Never    Drug use: Never    Sexual activity: Not on file       Current Facility-Administered Medications:     ampicillin-sulbactam (UNASYN) 3 g in sodium chloride 0.9 % 100 mL IVPB, Q6H, Last Rate: 3 g (10/29/24 1854)    ceFAZolin (ANCEF) IVPB (premix in dextrose) 2,000 mg 50 mL, Once    HYDROmorphone (DILAUDID) injection 0.5 mg, Q6H PRN    HYDROmorphone HCl (DILAUDID) injection 0.2 mg, Q4H PRN    [START ON 10/30/2024] levothyroxine tablet 88 mcg, Early Morning    metoclopramide (REGLAN) injection 5 mg, Q6H    metoprolol tartrate (LOPRESSOR) partial tablet 12.5 mg, Q12H JUAN PABLO    metroNIDAZOLE (FLAGYL) IVPB (premix) 500 mg 100 mL, Once    ondansetron (ZOFRAN) injection 4 mg, Q6H PRN    pantoprazole (PROTONIX) 80 mg in sodium chloride 0.9 % 100 mL infusion, Continuous, Last Rate: 8 mg/hr (10/29/24 1920)    phytonadione (AQUA-MEPHYTON) 10 mg/mL 10 mg in sodium chloride 0.9 % 50 mL IVPB, Once    prothrombin complex concentrate (human) (Kcentra) 2,000 Units, Once    sodium chloride 0.9 % infusion, Continuous, Last Rate: 125 mL/hr (10/29/24 1302)    trimethobenzamide (TIGAN) IM injection 200 mg, Q6H PRN  Prior to Admission Medications   Prescriptions Last Dose Informant Patient Reported? Taking?   ARIPiprazole (ABILIFY) 2 mg tablet Past Week  Yes Yes   Sig: TAKE 1 & 1/2 (ONE & ONE-HALF) TABLETS BY MOUTH ONCE DAILY AS DIRECTED   albuterol (2.5 mg/3 mL) 0.083 % nebulizer solution  Past Month  No Yes   Sig: Take 6 mL (5 mg total) by nebulization every 6 (six) hours as needed for wheezing   alendronate (FOSAMAX) 70 mg tablet Past Week  Yes Yes   Sig: TAKE 1 TABLET BY MOUTH ONCE A WEEK WITH 8 OUNCES OF WATER THIRTY MINUTES BEFORE THE FIRST MEAL OF THE DAY . REMAIN UPRIGHT FOR THIRTY MINUTES AFTER TAKING   atorvastatin (LIPITOR) 40 mg tablet 10/28/2024  Yes Yes   Sig: atorvastatin 40 mg tablet   busPIRone (BUSPAR) 10 mg tablet Past Week  Yes Yes   Sig: Take 10 mg by mouth 2 (two) times a day As directed   hydroCHLOROthiazide 25 mg tablet Past Week  Yes Yes   Sig: Take 25 mg by mouth daily   levothyroxine 100 mcg tablet 10/28/2024  Yes Yes   Sig: Take 88 mcg by mouth     lisinopril (ZESTRIL) 10 mg tablet Not Taking  No No   Sig: Take 1 tablet (10 mg total) by mouth daily   Patient not taking: Reported on 10/29/2024   metoprolol tartrate (LOPRESSOR) 25 mg tablet Not Taking  No No   Sig: Take 0.5 tablets (12.5 mg total) by mouth every 12 (twelve) hours   Patient not taking: Reported on 10/29/2024   pantoprazole (PROTONIX) 20 mg tablet Past Week  Yes Yes   Sig: Take 20 mg by mouth daily   traZODone (DESYREL) 50 mg tablet Past Week  Yes Yes   Sig: TAKE 1 1/2 TO 2 TABLETS BY MOUTH AT NIGHT AS NEEDED   venlafaxine (EFFEXOR-XR) 150 mg 24 hr capsule 10/28/2024  Yes Yes   Sig: venlafaxine  mg capsule,extended release 24 hr   warfarin (COUMADIN) 7.5 mg tablet 10/28/2024  Yes Yes   Sig: Take 7.5 mg by mouth daily      Facility-Administered Medications: None     Patient has no known allergies.    Objective :  Temp:  [97.4 °F (36.3 °C)-98.1 °F (36.7 °C)] 97.7 °F (36.5 °C)  HR:  [108-123] 114  BP: ()/(50-81) 108/62  Resp:  [20-29] 29  SpO2:  [87 %-97 %] 94 %  O2 Device: Nasal cannula  Nasal Cannula O2 Flow Rate (L/min):  [2 L/min-4 L/min] 4 L/min      Physical Exam    Lab Results: I have reviewed the following results:  Recent Labs     10/29/24  0824 10/29/24  1050 10/29/24  1300 10/29/24  1324  10/29/24  1806 10/29/24  1853   WBC 12.25*  --   --   --   --  19.29*   HGB 13.2  --   --    < >  --  12.9   HCT 41.9  --   --    < >  --  42.1     --   --   --   --  204   SODIUM 138  --   --   --   --  140   K 4.0  --   --   --   --  4.7     --   --   --   --  109*   CO2 21  --   --   --   --  21   BUN 28*  --   --   --   --  35*   CREATININE 1.63*  --   --   --   --  1.70*   GLUC 179*  --   --   --   --  164*   AST 25  --   --   --   --  25   ALT 17  --   --   --   --  18   ALB 4.7  --   --   --   --  3.7   TBILI 0.77  --   --   --   --  0.70   ALKPHOS 100  --   --   --   --  70   PTT 23  --   --   --   --   --    INR 2.04*  --   --   --   --   --    HSTNI0  --  9  --   --   --   --    HSTNI2  --   --  13  --   --   --    LACTICACID 3.5* 3.1*  --    < > 4.5*  --     < > = values in this interval not displayed.       Imaging Results Review: I personally reviewed the following image studies in PACS and associated radiology reports: CT chest and CT abdomen/pelvis. My interpretation of the radiology images/reports is: CT chest with right lower lobe possible infiltrates.  CT abdomen pelvis no free air, vessels patent with no extravasation of contrast..  Other Study Results Review: No additional pertinent studies reviewed.    VTE Pharmacologic Prophylaxis: Sequential compression device (Venodyne)   VTE Mechanical Prophylaxis: sequential compression device

## 2024-10-30 NOTE — RESPIRATORY THERAPY NOTE
10/30/24 1440   Respiratory Assessment   Resp Comments lowered the O2 to 50%,, keeping peep at 10 per critical care   Vent Information   SpO2 95 %   AC/VC+ Settings   FIO2 (%) 50 %

## 2024-10-30 NOTE — ASSESSMENT & PLAN NOTE
-Continue IV fluid hydration  -Avoid nephrotoxic drugs  -Daily labs to trend BUN and creatinine

## 2024-10-30 NOTE — UTILIZATION REVIEW
Initial Clinical Review    Admission: Date/Time/Statement:   Admission Orders (From admission, onward)       Ordered        10/29/24 1044  INPATIENT ADMISSION  Once                          Orders Placed This Encounter   Procedures    INPATIENT ADMISSION     Standing Status:   Standing     Number of Occurrences:   1     Order Specific Question:   Level of Care     Answer:   Med Surg [16]     Order Specific Question:   Estimated length of stay     Answer:   More than 2 Midnights     Order Specific Question:   Certification     Answer:   I certify that inpatient services are medically necessary for this patient for a duration of greater than two midnights. See H&P and MD Progress Notes for additional information about the patient's course of treatment.     ED Arrival Information       Expected   -    Arrival   10/29/2024 08:06    Acuity   Emergent              Means of arrival   Wheelchair    Escorted by   Family Member    Service   Critical Care/ICU    Admission type   Emergency              Arrival complaint   VOMITNG/CONSTIPATION             Chief Complaint   Patient presents with    Abdominal Pain    Vomiting     Abd pain and multiple episodes of vomiting that began around 0500 today. Dark brown emesis per daughter. +SOB       Initial Presentation: 84 y.o. female to the ED from home with complaints of vomiting coffee ground emesis starting morning of arrival. Admitted to inpatient for coffee ground emesis.  H/O ectus sheath hematoma causing hemorrhagic shock 2022, paroxysmal A-fib currently on Coumadin, diabetes, obesity, hypothyroidism, hypertension, depression. Arrives pale, diaphoretic, tachycardic, hypotensive, hypoxic.  Creat 1.63, BUN 28, lactic acid 3.5.  IN the ED given 2 LIter IV fluids. GI consult. GIven IV zofran in the Ed. IV fentanyl. HGb stable. Star PRotonix drip.  Trend hg, lactic acid. Hold Coumadin.     GI: 3 episode coffee ground emesis yesterday, large liquid brown bm today. WBC was 12.25  hemoglobin 13.2 hematocrit of 41.9 platelets 171 with BUN 28 creatinine 1.63 glucose of 179 and calcium 10.6 lactic acid of 3.5 with INR of 2.04.  Ongoing pain despite bms. Abdomen distended, remains tachycardic, appearing ill.    10/29 UPdate:  Despite getting 3 Liter fluids, lactic acid worsening and now 4.1.  Still tachycardic, appearing ill, vomiting, abdominal pain.  Cultures drawn, started on IV abx.  Transfer to CRITICAL CARE step down.   Surgery  consulted  repeat CT abdomen pelvis was performed which showed evidence of possible ischemic colitis.  Plan for OR for exploratory lap.  Worsening hypoxia now requiring 15 LMFNC. As per nursing, concern for aspiration during vomiting episode.     OPERATIVE NOTE:  SURGERY DATE: 10/29/2024   Procedure(s):  LAPAROSCOPY DIAGNOSTIC CONVERTED TO OPEN  LAPAROTOMY EXPLORATORY. EXTENDED RIGHT HEMICOLECTOMY. WITH abthera VAC placement  Anesthesia Type:   General  Operative Findings:  Intra-abdominal findings to include significant amount of murky ascites.  No silvia perforation noted.  Significantly distended and thin-walled ascending, hepatic flexure, and proximal transverse colon.  Early ischemia changes.  Extended right hemicolectomy undertaken given the ischemic changes and impending perforation in the setting of noted pneumatosis on the CT scan.  Distal transverse colon appeared healthy and viable, however sigmoid colon was very edematous with mild inflammation but appeared viable.     Extended right hemicolectomy completed and left in discontinuity as patient was unstable and on vasopressors.  ABThera VAC placed.  Anticipated Length of Stay/Certification Statement:  Patient will be admitted on an inpatient basis with an anticipated length of stay of greater than 2 midnights secondary to coffee-ground emesis.     Date: 10/30   Day 2: Continue iv abx.  S/p 4 liters iv fluids.  Continue ngtube at low intermittent wall suction. NPO.  Remains intubated. Open abdomen.  Vasopressors added in the OR.  Titrate as needed . No bowel sounds, +1 bilateral lower extremity edema.  Rhonchi heard in lungs. Central line in place. Pale.     ED Treatment-Medication Administration from 10/29/2024 0806 to 10/29/2024 1134         Date/Time Order Dose Route Action     10/29/2024 0824 sodium chloride 0.9 % bolus 1,000 mL 1,000 mL Intravenous New Bag     10/29/2024 0825 ondansetron (ZOFRAN) injection 4 mg 4 mg Intravenous Given     10/29/2024 0849 pantoprazole (PROTONIX) 80 mg in sodium chloride 0.9 % 100 mL IVPB 80 mg Intravenous New Bag     10/29/2024 0911 ondansetron (ZOFRAN) 4 mg/2 mL injection **ADS Override Pull** 4 mg  Given     10/29/2024 0938 iohexol (OMNIPAQUE) 350 MG/ML injection (MULTI-DOSE) 100 mL 100 mL Intravenous Given     10/29/2024 1003 fentaNYL injection 50 mcg 50 mcg Intravenous Given     10/29/2024 1007 sodium chloride 0.9 % bolus 1,000 mL 1,000 mL Intravenous New Bag     10/29/2024 1047 fentaNYL injection 50 mcg 50 mcg Intravenous Given            Scheduled Medications:  calcium gluconate, 2 g, Intravenous, Once  Calcium Gluconate, , ,   chlorhexidine, 15 mL, Mouth/Throat, Q12H JUAN PABLO  fentaNYL, , ,   insulin lispro, 1-6 Units, Subcutaneous, Q6H JUAN PABLO  piperacillin-tazobactam, 4.5 g, Intravenous, Q8H  propofol, , ,       Continuous IV Infusions:  fentaNYL, 50 mcg/hr, Intravenous, Continuous  lactated ringers, 100 mL/hr, Intravenous, Continuous  norepinephrine, 1-30 mcg/min, Intravenous, Titrated  pantoprazole (PROTONIX) 80 mg in sodium chloride 0.9 % 100 mL infusion, 8 mg/hr, Intravenous, Continuous  propofol, 5-50 mcg/kg/min, Intravenous, Titrated  vasopressin, 0.04 Units/min, Intravenous, Continuous      PRN Meds:  Calcium Gluconate, , ,   fentaNYL, , ,   fentaNYL, 50 mcg, Intravenous, Q1H PRN  propofol, , ,       ED Triage Vitals   Temperature Pulse Respirations Blood Pressure SpO2 Pain Score   10/29/24 0809 10/29/24 0809 10/29/24 0809 10/29/24 0809 10/29/24 0809 10/29/24 1003    (!) 97.4 °F (36.3 °C) (!) 122 22 (!) 82/50 (!) 87 % 8     Weight (last 2 days)       Date/Time Weight    10/30/24 0200 103 (227.29)    10/29/24 11:45:19 102 (224.87)            Vital Signs (last 3 days)       Date/Time Temp Pulse Resp BP MAP (mmHg) Arterial Line BP MAP SpO2 FiO2 (%) Calculated FIO2 (%) - Nasal Cannula Nasal Cannula O2 Flow Rate (L/min) O2 Device O2 Interface Device Patient Position - Orthostatic VS [place] Coma Scale Score Pain    10/30/24 1000 99 °F (37.2 °C) 100 19 104/55 78 100/48 67 mmHg 95 % -- -- -- -- -- -- -- --    10/30/24 0900 99 °F (37.2 °C) 100 19 103/53 74 100/49 67 mmHg 94 % -- -- -- -- -- -- -- --    10/30/24 0800 98.8 °F (37.1 °C) 99 18 97/52 72 97/48 65 mmHg 93 % -- -- -- -- -- -- 8 --    10/30/24 0730 -- -- -- -- -- -- -- 93 % -- -- -- -- -- -- -- --    10/30/24 0700 98.8 °F (37.1 °C) 99 17 105/52 75 104/50 69 mmHg 93 % 80 -- -- Ventilator -- -- -- --    10/30/24 0600 98.8 °F (37.1 °C) 99 18 108/59 81 106/51 70 mmHg 93 % 80 -- -- Ventilator -- Lying -- --    10/30/24 0517 98.8 °F (37.1 °C) 97 16 101/58 75 96/49 64 mmHg 92 % 80 -- -- Ventilator -- Lying -- --    10/30/24 0500 98.8 °F (37.1 °C) 98 14 98/54 73 96/48 64 mmHg 92 % 80 -- -- -- -- -- -- --    10/30/24 0400 98.6 °F (37 °C) 97 11 101/59 78 106/49 67 mmHg 93 % 80 -- -- Ventilator -- Lying 8 --    10/30/24 0300 98.6 °F (37 °C) 95 12 105/59 79 119/50 72 mmHg 96 % 100 -- -- Ventilator -- -- -- --    10/30/24 0230 98.2 °F (36.8 °C) 97 16 108/55 75 -- -- 98 % -- -- -- -- -- -- -- --    10/30/24 0200 97.9 °F (36.6 °C) 96 17 97/55 72 117/46 65 mmHg 98 % 100 -- -- Ventilator -- Lying -- --    10/30/24 0126 -- -- -- -- -- -- -- -- -- -- -- -- -- -- -- Med Not Given for Pain - for MAR use only    10/30/24 0100 97.9 °F (36.6 °C) 99 17 96/54 72 110/49 66 mmHg 96 % 100 -- -- Ventilator -- Lying 8 --    10/30/24 0035 -- -- -- -- -- -- -- 96 % -- -- -- -- -- -- -- --    10/29/24 2130 -- 115 -- 103/62 76 -- -- -- -- -- -- -- -- -- -- --     10/29/24 2100 -- 113 -- 97/58 71 -- -- 91 % -- -- -- -- -- -- -- --    10/29/24 2057 -- 117 -- 97/58 71 -- -- 91 % -- 80 15 L/min Mid flow nasal cannula MFNC prongs -- -- --    10/29/24 20:45:55 -- 114 -- 108/62 77 -- -- 94 % -- -- -- -- -- -- -- --    10/29/24 2000 -- 110 -- 111/62 78 -- -- 94 % -- -- -- -- -- -- -- --    10/29/24 19:52:06 97.7 °F (36.5 °C) 108 29 103/66 78 -- -- 93 % -- -- -- -- -- -- 15 5    10/29/24 18:27:26 97.8 °F (36.6 °C) 112 22 113/65 81 -- -- 93 % -- 36 4 L/min Nasal cannula -- Lying -- --    10/29/24 1825 -- 113 22 113/65 81 -- -- 92 % -- 36 4 L/min Nasal cannula -- -- -- --    10/29/24 1700 98.1 °F (36.7 °C) 113 22 110/64 -- -- -- 90 % -- 32 3 L/min Nasal cannula -- Lying -- --    10/29/24 1550 98.1 °F (36.7 °C) 111 22 114/67 82 -- -- 96 % -- 32 3 L/min Nasal cannula -- Lying -- --    10/29/24 1317 -- 120 -- -- -- -- -- 89 % -- 36 4 L/min Nasal cannula -- -- -- --    10/29/24 1303 -- 122 -- -- -- -- -- 90 % -- 32 3 L/min Nasal cannula -- -- -- 7    10/29/24 12:34:46 -- 123 -- 114/66 82 -- -- 90 % -- -- -- -- -- -- -- --    10/29/24 1200 -- 121 -- 117/66 83 -- -- 92 % -- -- -- -- -- -- -- --    10/29/24 1150 97.4 °F (36.3 °C) 122 -- -- -- -- -- 91 % -- 28 2 L/min Nasal cannula -- -- 15 8    10/29/24 11:45:19 -- 122 24 115/67 83 -- -- 91 % -- 28 2 L/min Nasal cannula -- Lying -- 8    10/29/24 1115 -- 122 24 141/68 93 -- -- 95 % -- 28 2 L/min Nasal cannula -- -- -- --    10/29/24 1100 -- 120 22 133/69 90 -- -- 93 % -- 28 2 L/min Nasal cannula -- -- -- --    10/29/24 1047 -- -- -- -- -- -- -- -- -- -- -- -- -- -- -- 8    10/29/24 1045 -- 121 20 139/81 105 -- -- 95 % -- 28 2 L/min None (Room air) -- -- -- --    10/29/24 1030 -- 119 20 135/78 102 -- -- 96 % -- 28 2 L/min Nasal cannula -- -- -- --    10/29/24 1015 -- 117 20 128/75 98 -- -- 95 % -- 28 2 L/min Nasal cannula -- -- -- --    10/29/24 1003 -- -- -- -- -- -- -- -- -- -- -- -- -- -- -- 8    10/29/24 1000 -- 116 20 158/81 111 -- --  95 % -- 28 2 L/min Nasal cannula -- -- -- --    10/29/24 0945 -- 118 20 132/67 94 -- -- 97 % -- 28 2 L/min Nasal cannula -- -- -- --    10/29/24 0856 -- 117 20 130/61 86 -- -- 96 % -- 36 4 L/min Nasal cannula -- -- -- --    10/29/24 0836 -- -- -- -- -- -- -- -- -- -- -- -- -- -- 15 --    10/29/24 0830 -- 118 20 111/52 74 -- -- 93 % -- 36 4 L/min Nasal cannula -- Lying -- --    10/29/24 0821 -- 119 22 114/57 -- -- -- 93 % -- 36 4 L/min Nasal cannula -- Lying -- --    10/29/24 0809 97.4 °F (36.3 °C) 122 22 82/50 -- -- -- 87 % -- -- -- None (Room air) -- Sitting -- --              Pertinent Labs/Diagnostic Test Results:   Radiology:  X-ray chest 1 view   Final Interpretation by Deepak Smith MD (10/30 0648)      1. Linear atelectasis at the right lung base.   2. Retrocardiac density may represent infiltrate and/or atelectasis.            Workstation performed: [id number]         CT abdomen pelvis wo contrast   Final Interpretation by Zoltan Sierra MD (10/29 2125)      1.  Redemonstrated diffuse thickening of the colon with increase in mesenteric fat stranding in the left lower quadrant and suggestion of pneumatosis in the hepatic flexure and proximal transverse colon concerning for ischemic colitis.   2.  The lower esophagus is patulous and fluid-filled which may be due to gastroesophageal reflux or esophageal dysmotility. Small to moderate hiatal hernia. The stomach is distended with fluid. Aspiration precautions is recommended.      I personally discussed this study with LEE WASSERMAN on 10/29/2024 9:12 PM.            Workstation performed: [id number]         CT high volume bleeding scan abdomen pelvis   Final Interpretation by Mike Rodgers MD (10/29 4303)      1. No CT evidence of active high-volume gastrointestinal hemorrhage.   2.  Moderate diffuse colonic distention with mild wall thickening suggested at the distal transverse and descending colon with air-fluid levels proximally. Findings may be related  to colitis.   3. Mild distention of the esophagus with moderate gaseous distention. This is nonspecific and could be related to gastritis/reflux.   4. Low-attenuation in the uterus centrally, more prominent than expected for the patient's age. Recommend follow-up evaluation with nonemergent pelvic ultrasound to assess for abnormal endometrial thickening.      The study was marked in EPIC for immediate notification.      Workstation performed: [id number]         CT chest without contrast   Final Interpretation by Mike Rodgers MD (10/29 1013)      1. New mild distention of the esophagus with fluid, question related to reflux or esophageal dysfunction.                  Workstation performed: [id number]         XR chest 1 view portable   Final Interpretation by Alo Pastrana MD (10/29 0843)      No acute cardiopulmonary disease.            Workstation performed: [id number]           Cardiology:  ECG 12 lead   Final Result by Lara Lewis MD (10/29 9657)   Sinus tachycardia with frequent and consecutive Premature ventricular    complexes   ST & T wave abnormality, consider inferior ischemia   ST & T wave abnormality, consider anterolateral ischemia   Abnormal ECG      Confirmed by Lara Lewis ([id number]) on 10/29/2024 2:27:20 PM        Results from last 7 days   Lab Units 10/30/24  0130 10/29/24  2317 10/29/24  1853 10/29/24  1724 10/29/24  0824   WBC Thousand/uL 8.31  --  19.29*  --  12.25*   HEMOGLOBIN g/dL 10.7*  --  12.9 12.8 13.2   I STAT HEMOGLOBIN g/dl  --  10.9*  --   --   --    HEMATOCRIT % 34.3*  --  42.1 40.7 41.9   HEMATOCRIT, ISTAT %  --  32*  --   --   --    PLATELETS Thousands/uL 167  --  204  --  171   TOTAL NEUT ABS Thousands/µL  --   --   --   --  9.54*         Results from last 7 days   Lab Units 10/30/24  0809 10/30/24  0130 10/29/24  2317 10/29/24  1853 10/29/24  0824   SODIUM mmol/L 139 140  --  140 138   POTASSIUM mmol/L 5.2 4.8  --  4.7 4.0   CHLORIDE mmol/L 113* 114*  --  109* 104    CO2 mmol/L 21 18*  --  21 21   CO2, I-STAT mmol/L  --   --  16*  --   --    ANION GAP mmol/L 5 8  --  10 13   BUN mg/dL 35* 37*  --  35* 28*   CREATININE mg/dL 1.52* 1.60*  --  1.70* 1.63*   EGFR ml/min/1.73sq m 31 29  --  27 29   CALCIUM mg/dL 8.0* 7.4*  --  8.6 10.6*   CALCIUM, IONIZED mmol/L 1.07* 1.09*  --   --   --    CALCIUM, IONIZED, ISTAT mmol/L  --   --  1.09*  --   --    MAGNESIUM mg/dL  --  2.2  --   --   --    PHOSPHORUS mg/dL  --  4.2*  --   --   --      Results from last 7 days   Lab Units 10/30/24  0809 10/30/24  0130 10/29/24  1853 10/29/24  0824   AST U/L 17 18 25 25   ALT U/L 11 12 18 17   ALK PHOS U/L 40 44 70 100   TOTAL PROTEIN g/dL 5.2* 5.2* 6.6 8.3   ALBUMIN g/dL 3.2* 3.2* 3.7 4.7   TOTAL BILIRUBIN mg/dL 0.86 0.69 0.70 0.77     Results from last 7 days   Lab Units 10/30/24  0610 10/29/24  0822   POC GLUCOSE mg/dl 142* 150*     Results from last 7 days   Lab Units 10/30/24  0809 10/30/24  0130 10/29/24  1853 10/29/24  0824   GLUCOSE RANDOM mg/dL 165* 143* 164* 179*     Results from last 7 days   Lab Units 10/30/24  0809 10/30/24  0501 10/30/24  0133   PH ART  7.234* 7.231* 7.191*   PCO2 ART mm Hg 39.7 43.5 43.7   PO2 ART mm Hg 72.7* 66.5* 118.2   HCO3 ART mmol/L 16.4* 17.8* 16.4*   BASE EXC ART mmol/L -10.4 -9.3 -11.3   O2 CONTENT ART mL/dL 16.1 15.2* 15.8*   O2 HGB, ARTERIAL % 92.6* 91.3* 96.8   ABG SOURCE  Line, Arterial Line, Arterial Line, Arterial     Results from last 7 days   Lab Units 10/30/24  0133   PH NORBERT  7.167*   PCO2 NORBERT mm Hg 48.1   PO2 NORBERT mm Hg 53.1*   HCO3 NORBERT mmol/L 17.0*   BASE EXC NORBERT mmol/L -11.3   O2 CONTENT NORBERT ml/dL 13.9   O2 HGB, VENOUS % 82.6*     Results from last 7 days   Lab Units 10/29/24  2317   I STAT BASE EXC mmol/L -12*   I STAT O2 SAT % 85   ISTAT PH ART  7.214*   I STAT ART PCO2 mm HG 37.9   I STAT ART PO2 mm HG 60.0*   I STAT ART HCO3 mmol/L 15.3*         Results from last 7 days   Lab Units 10/29/24  1517 10/29/24  1300 10/29/24  1050   HS TNI 0HR ng/L   --   --  9   HS TNI 2HR ng/L  --  13  --    HSTNI D2 ng/L  --  4  --    HS TNI 4HR ng/L 17  --   --    HSTNI D4 ng/L 8  --   --          Results from last 7 days   Lab Units 10/30/24  0455 10/29/24  2142 10/29/24  0824   PROTIME seconds 19.3* 15.7* 23.8*   INR  1.55* 1.18 2.04*   PTT seconds  --   --  23         Results from last 7 days   Lab Units 10/30/24  0455 10/29/24  0824   PROCALCITONIN ng/ml 44.94* 0.06     Results from last 7 days   Lab Units 10/30/24  0455 10/30/24  0130 10/29/24  2142 10/29/24  1806 10/29/24  1614 10/29/24  1324 10/29/24  1050   LACTIC ACID mmol/L 2.6* 3.8* 4.6* 4.5* 4.1* 3.2* 3.1*         Results from last 7 days   Lab Units 10/29/24  0824   LIPASE u/L 20       Results from last 7 days   Lab Units 10/29/24  1117   CLARITY UA  Clear   COLOR UA  Yellow   SPEC GRAV UA  1.029   PH UA  5.0   GLUCOSE UA mg/dl Negative   KETONES UA mg/dl Negative   BLOOD UA  Negative   PROTEIN UA mg/dl Negative   NITRITE UA  Negative   BILIRUBIN UA  Negative   UROBILINOGEN UA (BE) mg/dl <2.0   LEUKOCYTES UA  Negative     Results from last 7 days   Lab Units 10/29/24  1836 10/29/24  1803   BLOOD CULTURE  Received in Microbiology Lab. Culture in Progress.  --    GRAM STAIN RESULT   --  Gram negative rods*       Past Medical History:   Diagnosis Date    Asthma     Atrial fibrillation (HCC)     Diabetes (HCC)     Hyperlipidemia     Hypertension     Legionnaire's disease (HCC)          Admitting Diagnosis: Vomiting [R11.10]  Constipation [K59.00]  Coffee ground emesis [K92.0]  RUKHSANA (acute kidney injury) (HCC) [N17.9]  Acute respiratory failure with hypoxia (HCC) [J96.01]  Age/Sex: 84 y.o. female    Network Utilization Review Department  ATTENTION: Please call with any questions or concerns to [phone] and carefully listen to the prompts so that you are directed to the right person. All voicemails are confidential.   For Discharge needs, contact Care Management DC Support Team at [phone] opt. [phone]  Send all  requests for admission clinical reviews, approved or denied determinations and any other requests to dedicated fax number below belonging to the campus where the patient is receiving treatment. List of dedicated fax numbers for the Facilities:  FACILITY NAME UR FAX NUMBER   ADMISSION DENIALS (Administrative/Medical Necessity) [phone]   DISCHARGE SUPPORT TEAM (NETWORK) [phone]   PARENT CHILD HEALTH (Maternity/NICU/Pediatrics) [phone]   [facility] [phone]   [facility] [phone]   [facility] [phone]   [facility] [phone]   [facility] [phone]   [facility] [phone]   [facility] [phone]   [facility] [phone]   [facility] [phone]   Critical access hospital [phone]   [facility] [phone]   [facility] [phone]

## 2024-10-30 NOTE — RESPIRATORY THERAPY NOTE
RT Ventilator Management Note  Bertha Brown 84 y.o. female MRN: [id number]  Unit/Bed#: ICU 05 Encounter: [id number]      Daily Screen         10/30/2024  0035             Patient safety screen outcome:: Failed (P)     Not Ready for Weaning due to:: Underline problem not resolved (P)               Physical Exam:   Assessment Type: (P) Assess only  General Appearance: (P) Sedated  Respiratory Pattern: (P) Assisted  Chest Assessment: (P) Chest expansion symmetrical  Bilateral Breath Sounds: (P) Diminished  O2 Device: (P) Vent      Resp Comments: (P) pt received from the OR intubated, pt placed on the vent, commercial tube irwin was applied. pt to remain intubated for return to the OR     10/30/24 0035   Respiratory Assessment   Assessment Type Assess only   General Appearance Sedated   Respiratory Pattern Assisted   Chest Assessment Chest expansion symmetrical   Bilateral Breath Sounds Diminished   Resp Comments pt received from the OR intubated, pt placed on the vent, commercial tube irwin was applied. pt to remain intubated for return to the OR   O2 Device Vent   Vent Information   Vent ID [id number]   Vent type Drager   Drager Vent Mode AC/VC+   $ Vent Charge-INITIAL Yes   $ Pulse Oximetry Spot Check Charge Completed   SpO2 96 %   AC/VC+ Settings   Resp Rate (BPM) 16 BPM   VT (mL) 360 mL   Insp Time (S) 0.9 S   FIO2 (%) 100 %   PEEP (cmH2O) 10 cmH2O   Rise Time (%) 20 %   Trigger Sensitivity Flow (LPM) 2 LPM   Humidification Heater   Heater Temp 98.6 °F (37 °C)   AC/VC+ Actuals   Resp Rate (BPM) 16 BPM   VT (mL) 359 mL   MV (Obs) 5   MAP (cmH2O) 13 cmH2O   Peak Pressure (cmH2O) 22 cmH2O   I:E Ratio (Obs) 1:3.2   Static Compliance (mL/cmH20) 33.4 mL/cmH2O   Plateau Pressure (cm H2O) 21.3 cm H2O   Heater Temperature (Obs) 98.6 °F (37 °C)   AC/VC+ ALARMS   High Peak Pressure (cmH2O) 45 cmH2O   High Resp Rate (BPM) 30 BPM   High MV (L/min) 11 L/min   Low MV (L/min) 3 L/min   High VT (mL) 700 mL   Maintenance   Alarm  (pink) cable attached Yes   Resuscitation bag with peep valve at bedside Yes   Water bag changed No   Circuit changed No   Daily Screen   Patient safety screen outcome: Failed   Not Ready for Weaning due to: Underline problem not resolved   ETT  Oral;Hi-Lo;Inflated 7 mm   Placement Date/Time: 10/29/24 5252   Mask Ventilation: Mask ventilation not attempted (0)  Preoxygenated: Yes  Technique: Rapid sequence;Stylet;Video laryngoscopy  Type: Oral;Hi-Lo;Inflated  Tube Size: 7 mm  Laryngoscope: Tripsidea  Blade Size: 3  Locat...   Secured at (cm) 21   Measured from Lips   Secured Location Right   Secured by Commercial tube irwin   Site Condition Dry   Cuff Pressure (color) Green   HI-LO Suction  Continuous low suction   HI-LO Secretions Small;Thin   HI-LO Intervention Patent

## 2024-10-30 NOTE — ASSESSMENT & PLAN NOTE
Initially requiring 2-3 L NC oxygen  Escalating oxygen requirements with patient on 15 L MFNC prior to OR  Concern for aspiration event during episode of emesis +/- atelectasis  Intubated for the OR  Intra-op ABG with pO2 60 on FiO2 50% and PEEP 10  Continue mechanical ventilation  VAP prophylaxis  Monitor ABG  Patient currently with open abdomen, defer daily SBT

## 2024-10-30 NOTE — ASSESSMENT & PLAN NOTE
Patient with worsening abdominal pain and rising lactate concerning for acute abdomen  CT abdomen pelvis with evidence of mesenteric fat stranding in the LLQ and suggestion of pneumatosis in the hepatic flexure and proximal transverse colon concerning for ischemic colitis  Surgery consulted  POD #1 s/p exploratory laparotomy with extended right hemicolectomy, left in discontinuity, abthera VAC in place  Plan to return to the OR today  Continue NG tube to low intermittent wall suction  Strict NPO  Antibiotics broadened to zosyn, D2  Follow up intraoperative culture results  See plan under severe sepsis above

## 2024-10-30 NOTE — INTERIM OP NOTE
LAPAROSCOPY DIAGNOSTIC CONVERTED TO OPEN, LAPAROTOMY EXPLORATORY, EXTENDED RIGHT HEMICOLECTOMY, WITH abthera VAC placement  Postoperative Note  PATIENT NAME: Bertha Brown  : 1940  MRN: [id number]  [facility] ROOM 04    Surgery Date: 10/29/2024    Preop Diagnosis:  Abdominal pain [R10.9]    Post-Op Diagnosis Codes:     * Abdominal pain [R10.9]    Procedure(s) (LRB):  LAPAROSCOPY DIAGNOSTIC CONVERTED TO OPEN (N/A)  LAPAROTOMY EXPLORATORY, EXTENDED RIGHT HEMICOLECTOMY, WITH abthera VAC placement (N/A)    Surgeons and Role:     * Luke Medina DO - Primary     * Estuardo Cardoza PA-C - Assisting     * Glory Hernandez MD - Assisting    Specimens:  ID Type Source Tests Collected by Time Destination   1 : RIGHT COLON AND TRANSVERSE COLON Tissue Large Intestine, Right/Ascending Colon TISSUE EXAM Luke Medina DO 10/29/2024 2353    A : Peritoneal Fluid Body Fluid Peritoneal Fluid ANAEROBIC CULTURE AND GRAM STAIN, BODY FLUID CULTURE AND GRAM STAIN Luke Medina DO 10/29/2024 2334        Estimated Blood Loss:   Minimal    Anesthesia Type:   General     Findings:   Intra-abdominal findings to include significant amount of murky ascites.  No silvia perforation noted.  Significantly distended and thin-walled ascending, hepatic flexure, and proximal transverse colon.  Early ischemia changes.  Extended right hemicolectomy undertaken given the ischemic changes and impending perforation in the setting of noted pneumatosis on the CT scan.  Distal transverse colon appeared healthy and viable, however sigmoid colon was very edematous with mild inflammation but appeared viable.    Extended right hemicolectomy completed and left in discontinuity as patient was unstable and on vasopressors.  ABThera VAC placed.    Complications:   None      SIGNATURE: Luke Medina DO   DATE: 2024   TIME: 1:04 AM

## 2024-10-30 NOTE — PLAN OF CARE
Problem: PAIN - ADULT  Goal: Verbalizes/displays adequate comfort level or baseline comfort level  Description: Interventions:  - Encourage patient to monitor pain and request assistance  - Assess pain using appropriate pain scale  - Administer analgesics based on type and severity of pain and evaluate response  - Implement non-pharmacological measures as appropriate and evaluate response  - Consider cultural and social influences on pain and pain management  - Notify physician/advanced practitioner if interventions unsuccessful or patient reports new pain  Outcome: Progressing     Problem: INFECTION - ADULT  Goal: Absence or prevention of progression during hospitalization  Description: INTERVENTIONS:  - Assess and monitor for signs and symptoms of infection  - Monitor lab/diagnostic results  - Monitor all insertion sites, i.e. indwelling lines, tubes, and drains  - Monitor endotracheal if appropriate and nasal secretions for changes in amount and color  - [place] appropriate cooling/warming therapies per order  - Administer medications as ordered  - Instruct and encourage patient and family to use good hand hygiene technique  - Identify and instruct in appropriate isolation precautions for identified infection/condition  Outcome: Progressing  Goal: Absence of fever/infection during neutropenic period  Description: INTERVENTIONS:  - Monitor WBC    Outcome: Progressing     Problem: SAFETY ADULT  Goal: Patient will remain free of falls  Description: INTERVENTIONS:  - Educate patient/family on patient safety including physical limitations  - Instruct patient to call for assistance with activity   - Consult OT/PT to assist with strengthening/mobility   - Keep Call bell within reach  - Keep bed low and locked with side rails adjusted as appropriate  - Keep care items and personal belongings within reach  - Initiate and maintain comfort rounds  - Make Fall Risk Sign visible to staff  - Apply yellow socks and bracelet  for high fall risk patients  - Consider moving patient to room near nurses station  Outcome: Progressing  Goal: Maintain or return to baseline ADL function  Description: INTERVENTIONS:  -  Assess patient's ability to carry out ADLs; assess patient's baseline for ADL function and identify physical deficits which impact ability to perform ADLs (bathing, care of mouth/teeth, toileting, grooming, dressing, etc.)  - Assess/evaluate cause of self-care deficits   - Assess range of motion  - Assess patient's mobility; develop plan if impaired  - Assess patient's need for assistive devices and provide as appropriate  - Encourage maximum independence but intervene and supervise when necessary  - Involve family in performance of ADLs  - Assess for home care needs following discharge   - Consider OT consult to assist with ADL evaluation and planning for discharge  - Provide patient education as appropriate  Outcome: Progressing  Goal: Maintains/Returns to pre admission functional level  Description: INTERVENTIONS:  - Perform AM-PAC 6 Click Basic Mobility/ Daily Activity assessment daily.  - Set and communicate daily mobility goal to care team and patient/family/caregiver.   - Collaborate with rehabilitation services on mobility goals if consulted  - Out of bed for toileting  - Record patient progress and toleration of activity level   Outcome: Progressing     Problem: DISCHARGE PLANNING  Goal: Discharge to home or other facility with appropriate resources  Description: INTERVENTIONS:  - Identify barriers to discharge w/patient and caregiver  - Arrange for needed discharge resources and transportation as appropriate  - Identify discharge learning needs (meds, wound care, etc.)  - Arrange for interpretive services to assist at discharge as needed  - Refer to Case Management Department for coordinating discharge planning if the patient needs post-hospital services based on physician/advanced practitioner order or complex needs  related to functional status, cognitive ability, or social support system  Outcome: Progressing     Problem: Knowledge Deficit  Goal: Patient/family/caregiver demonstrates understanding of disease process, treatment plan, medications, and discharge instructions  Description: Complete learning assessment and assess knowledge base.  Interventions:  - Provide teaching at level of understanding  - Provide teaching via preferred learning methods  Outcome: Progressing

## 2024-10-30 NOTE — CONSULTS
"Consultation - Critical Care/ICU   Name: Bertha Brown 84 y.o. female I MRN: [id number]  Unit/Bed#: ICU 05 I Date of Admission: 10/29/2024   Date of Service: 10/30/2024 I Hospital Day: 1   Consults  Physician Requesting Evaluation: Liz Sanchez MD   Reason for Evaluation / Principal Problem: Transferred to ICU post operatively intubated with open abdomen and on vasopressors    Please contact the [employer] role,\"[facility]\", with any questions/concerns.    Assessment & Plan  Septic shock (HCC)  Patient meets sepsis criteria as evidenced by tachycardia, tachypnea prior to intubation, and leukocytosis with RUKHSANA and lactic acidosis  Suspect secondary to ischemic colitis +/- aspiration pneumonia  Continue vasopressors titrated to maintain MAP >65  No evidence of cardiogenic component to shock, SVO2 82.6  Antibiotics broadened to Zosyn  Patient has received 3.5 L crystalloids and 500 ml of colloids  Continue fluid resuscitation  Trend lactate until cleared  Trend procal and WBC/temperature curve  See plan under ischemic colitis  Ischemic colitis (HCC)  Patient with worsening abdominal pain and rising lactate concerning for acute abdomen  CT abdomen pelvis with evidence of mesenteric fat stranding in the LLQ and suggestion of pneumatosis in the hepatic flexure and proximal transverse colon concerning for ischemic colitis  Surgery consulted  POD #0 s/p exploratory laparotomy with extended right hemicolectomy, left in discontinuity, abthera VAC in place  Plan to return to the OR tomorrow  Continue NG tube to low intermittent wall suction  Strict NPO  Antibiotics broadened to zosyn  See plan under severe sepsis above  Acute respiratory failure with hypoxia (HCC)  Initially requiring 2-3 L NC oxygen  Escalating oxygen requirements with patient on 15 L MFNC prior to OR  Concern for aspiration event during episode of emesis +/- atelectasis  Intubated for the OR  Intra-op ABG with pO2 60 on FiO2 50% and " PEEP 10  Continue mechanical ventilation  VAP prophylaxis  Monitor ABG  Patient currently with open abdomen, defer daily SBT  Coffee ground emesis  Patient reporting several episodes of coffee ground emesis at home the day PTA  CT high volume bleeding abdomen pelvis without evidence of extravasation  Hgb 13.2  Continue protonix infusion  NGT in place to low intermittent wall suction  Strict NPO  Trend hemoglobin  GI consulted  Originally planned for EGD/colonoscopy today, however will need to be rescheduled  Paroxysmal atrial fibrillation (HCC)  Coumadin on hold due to concern for GIB  Holding beta blocker secondary to vasopressor requirements  Hypothyroidism  Levothyroxine on hold due to NPO  Hypertension  Antihypertensives on hold due to hypotension on vasopressors  Hyperlipidemia  Statin on hold due to NPO  Diabetes (HCC)  Lab Results   Component Value Date    HGBA1C 7.0 (H) 08/22/2024       Recent Labs     10/29/24  0822   POCGLU 150*       Blood Sugar Average: Last 72 hrs:  (P) 150  Accuchecks and SSI Q6h  Obesity  Encourage lifestyle changes  RUKHSANA (acute kidney injury) (HCC)  RUKHSANA POA with initial creatinine 1.63 and baseline 0.8-0.9  Suspect prerenal etiology  Creatinine trending up  Continue fluid resuscitation  Monitor I&O  Avoid nephrotoxins  Trend renal indices  Chronic idiopathic constipation    Generalized abdominal pain  Patient presented with abdominal pain  See plan under ischemic colitis  SIRS (systemic inflammatory response syndrome) (HCC)    Acute metabolic encephalopathy  Patient with worsening lethargy prior to OR  Suspect secondary to significant metabolic derangements  Monitor neuro exam  Disposition: Critical care    History of Present Illness   Bertha Brown is a 84 y.o. with PMH pAfib on coumadin, typr 2 DM, HTN, HLD and hypothyroid who presented to the hospital yesterday with chief complaint of coffee ground emesis and abdominal pain. CT high volume bleeding scan was without evidence of  high volume GI hemorrhage. Patient had a hemoglobin of 13.2 and was hemodynamically stable, so there was no indication for emergent EGD. GI was consulted and patient was scheduled for EGD and colonoscopy tomorrow. Since that time patient developed worsening nausea, vomiting and abdominal pain. Patient had a lactate of 3.5 on arrival and received 3 L of crystalloids, but despite this her lactate has trended up and peaked at 4.6. Surgery was consulted and a repeat CT abdomen pelvis was performed which showed evidence of possible ischemic colitis. Patient was emergently taken to the OR and is POD #0 s/p exploratory laparotomy with extended right hemicolectomy, left in discontinuity with open abdomen and abthera VAC in place. Prior to the OR, the patient has had worsening hypoxia with increasing oxygen requirements and was requiring 15 L MFNC. Concern for possible aspiration event during episode of emesis. Patient currently mechanically ventilated pending return to the OR. Vasopressors initiated in the OR and are being titrated to maintain MAP >65. She is being transferred to the critical care unit post operatively.    History obtained from chart review.  Review of Systems: Review of Systems not obtainable due to Clinical Condition    Historical Information   Past Medical History:  No date: Asthma  No date: Atrial fibrillation (HCC)  No date: Diabetes (HCC)  No date: Hyperlipidemia  No date: Hypertension  No date: Legionnaire's disease (HCC) Past Surgical History:  7/13/2014: [place] LUMBAR PUNCTURE DIAGNOSTIC  12/12/2022: IR EMBOLIZATION (SPECIFY VESSEL OR SITE)  No date: LUNG LOBECTOMY   Current Outpatient Medications   Medication Instructions    albuterol 5 mg, Nebulization, Every 6 hours PRN    alendronate (FOSAMAX) 70 mg tablet TAKE 1 TABLET BY MOUTH ONCE A WEEK WITH 8 OUNCES OF WATER THIRTY MINUTES BEFORE THE FIRST MEAL OF THE DAY . REMAIN UPRIGHT FOR THIRTY MINUTES AFTER TAKING    ARIPiprazole (ABILIFY) 2 mg tablet  TAKE 1 & 1/2 (ONE & ONE-HALF) TABLETS BY MOUTH ONCE DAILY AS DIRECTED    atorvastatin (LIPITOR) 40 mg tablet atorvastatin 40 mg tablet    busPIRone (BUSPAR) 10 mg, Oral, 2 times daily, As directed    hydroCHLOROthiazide 25 mg, Oral, Daily    levothyroxine 100 mcg tablet Take 88 mcg by mouth      lisinopril (ZESTRIL) 10 mg, Oral, Daily    metoprolol tartrate (LOPRESSOR) 12.5 mg, Oral, Every 12 hours scheduled    pantoprazole (PROTONIX) 20 mg, Oral, Daily    traZODone (DESYREL) 50 mg tablet TAKE 1 1/2 TO 2 TABLETS BY MOUTH AT NIGHT AS NEEDED    venlafaxine (EFFEXOR-XR) 150 mg 24 hr capsule venlafaxine  mg capsule,extended release 24 hr    warfarin (COUMADIN) 7.5 mg, Oral, Daily (warfarin)    No Known Allergies   Social History     Tobacco Use    Smoking status: Never    Smokeless tobacco: Never   Vaping Use    Vaping status: Never Used   Substance Use Topics    Alcohol use: Never    Drug use: Never    History reviewed. No pertinent family history.       Objective :                   Vitals I/O      Most Recent Min/Max in 24hrs   Temp 98.6 °F (37 °C) Temp  Min: 97.4 °F (36.3 °C)  Max: 98.6 °F (37 °C)   Pulse 95 Pulse  Min: 95  Max: 123   Resp 12 Resp  Min: 12  Max: 29   /59 BP  Min: 82/50  Max: 158/81   O2 Sat 96 % SpO2  Min: 87 %  Max: 98 %      Intake/Output Summary (Last 24 hours) at 10/30/2024 0331  Last data filed at 10/30/2024 0300  Gross per 24 hour   Intake 5413.7 ml   Output 2237 ml   Net 3176.7 ml       Diet NPO    Invasive Monitoring   Arterial Line  Mattie /50  Arterial Line BP  Min: 110/49  Max: 119/50   MAP 72 mmHg  Arterial Line MAP (mmHg)  Min: 65 mmHg  Max: 72 mmHg           Physical Exam   Physical Exam  Vitals reviewed.   Eyes:      Pupils: Pupils are equal, round, and reactive to light.   Skin:     General: Skin is cool and dry.      Coloration: Skin is pale.   HENT:      Head: Normocephalic and atraumatic.      Mouth/Throat:      Mouth: Mucous membranes are moist.   Neck:       Vascular: Central line present.   Cardiovascular:      Rate and Rhythm: Regular rhythm. Tachycardia present.      Pulses: Normal pulses.      Heart sounds: Normal heart sounds.   Musculoskeletal:      Right lower le+ Edema present.      Left lower le+ Edema present.   Abdominal: General: Bowel sounds are absent.      Comments: Open abdomen with abthera vac in place   Constitutional:       Appearance: She is obese. She is ill-appearing.      Interventions: She is sedated, intubated and restrained.   Pulmonary:      Effort: She is intubated.      Breath sounds: Examination of the right-middle field reveals rhonchi. Examination of the right-lower field reveals rhonchi. Rhonchi present.   Neurological:      Comments: Exam limited by intubation/sedation and recent NMB given in the OR          Diagnostic Studies        Lab Results: I have reviewed the following results:     Medications:  Scheduled PRN   calcium gluconate, 2 g, Once  chlorhexidine, 15 mL, Q12H JUAN PABLO  insulin lispro, 1-6 Units, Q6H JUAN PABLO  piperacillin-tazobactam, 4.5 g, Q8H      fentaNYL, 50 mcg, Q1H PRN       Continuous    fentaNYL, 50 mcg/hr, Last Rate: 50 mcg/hr (10/30/24 0300)  lactated ringers, 100 mL/hr, Last Rate: 100 mL/hr (10/30/24 0300)  norepinephrine, 1-30 mcg/min, Last Rate: 2 mcg/min (10/30/24 0300)  pantoprazole (PROTONIX) 80 mg in sodium chloride 0.9 % 100 mL infusion, 8 mg/hr, Last Rate: Stopped (10/30/24 0300)  propofol, 5-50 mcg/kg/min, Last Rate: 20 mcg/kg/min (10/30/24 0300)  vasopressin, 0.04 Units/min, Last Rate: 0.04 Units/min (10/30/24 0300)         Labs:   CBC    Recent Labs     10/29/24  1853 10/29/24  2317 10/30/24  0130   WBC 19.29*  --  8.31   HGB 12.9 10.9* 10.7*   HCT 42.1 32* 34.3*     --  167     BMP    Recent Labs     10/29/24  1853 10/29/24  2317 10/30/24  0130   SODIUM 140  --  140   K 4.7  --  4.8   *  --  114*   CO2 21 16* 18*   AGAP 10  --  8   BUN 35*  --  37*   CREATININE 1.70*  --  1.60*   CALCIUM  8.6  --  7.4*       Coags    Recent Labs     10/29/24  0824 10/29/24  2142   INR 2.04* 1.18   PTT 23  --         Additional Electrolytes  Recent Labs     10/29/24  2317 10/30/24  0130   MG  --  2.2   PHOS  --  4.2*   CAIONIZED 1.09* 1.09*          Blood Gas    Recent Labs     10/30/24  0133   PHART 7.191*   [id number] 43.7   PO2ART 118.2   MPY8CJA 16.4*   BEART -11.3   SOURCE Line, Arterial     Recent Labs     10/30/24  0133   PHVEN 7.167*   FIJ3ETW 48.1   PO2VEN 53.1*   YKZ9FVC 17.0*   BEVEN -11.3   I2OZYQR 82.6*   SOURCE Line, Arterial    LFTs  Recent Labs     10/29/24  1853 10/30/24  0130   ALT 18 12   AST 25 18   ALKPHOS 70 44   ALB 3.7 3.2*   TBILI 0.70 0.69       Infectious  Recent Labs     10/29/24  0824   PROCALCITONI 0.06     Glucose  Recent Labs     10/29/24  0824 10/29/24  1853 10/30/24  0130   GLUC 179* 164* 143*

## 2024-10-30 NOTE — ASSESSMENT & PLAN NOTE
RUKHSAAN POA with initial creatinine 1.63 and baseline 0.8-0.9  Suspect prerenal etiology  Creatinine trending up  Continue fluid resuscitation  Monitor I&O  Avoid nephrotoxins  Trend renal indices

## 2024-10-30 NOTE — ASSESSMENT & PLAN NOTE
-Unclear etiology at this point time.  Patient with diffuse abdominal pain, worsening in nature, with positive rebound in the setting of worsening hypoxia and tachycardia.  In addition lactic acid continues to rise.  Initial CT scan this morning did not reveal any acute pathology that would explain this abdominal discomfort which is recently started in addition did not reveal any obvious bleeding.  Repeat CT scan ordered.  In addition repeat CBC showing elevation of white blood cell count from 12-19.  Lactic acid repeat showing 4.5.  Concern for possible ischemia given abdominal exam.  Based on patient's clinical worsening I do feel she needs exploration in the OR at least for diagnostic laparoscopy which could be converted to exploratory laparotomy if needed.  Will obtain CT scan to see if this offers any additional guidance as to the source of the problem.    Discussed at length with the patient who agreed to surgery and would like her daughter to sign consent.  Unfortunately her daughter is not present.  Did speak with daughter Linda by phone.  She understands the current worsening of her mother's clinical situation.  Furthermore she is in agreement for surgery as well.  Did explain that we will be taking her for diagnostic laparoscopy, possible exploratory laparotomy, possible bowel resection, possible ostomy, possible open abdomen with ABThera VAC.  We did explain the risk which include bleeding, infection, injury to surrounding structures in addition to worsening sepsis and potential for death.

## 2024-10-30 NOTE — ASSESSMENT & PLAN NOTE
Patient with worsening lethargy prior to OR  Suspect secondary to significant metabolic derangements  Monitor neuro exam

## 2024-10-30 NOTE — ASSESSMENT & PLAN NOTE
RUKHSANA POA with initial creatinine 1.63 and baseline 0.8-0.9  Suspect prerenal etiology  Continue fluid resuscitation  Monitor I&O  Avoid nephrotoxins  Maintain MAP >65  Trend renal indices

## 2024-10-30 NOTE — SEPSIS NOTE
"  Sepsis Note   Bertha Brown 84 y.o. female MRN: [id number]  Unit/Bed#: ICU 05 Encounter: [id number]       Initial Sepsis Screening       Row Name 10/30/24 0214                Is the patient's history suggestive of a new or worsening infection? Yes (Proceed)  -EN        Suspected source of infection acute abdominal infection  -EN        Indicate SIRS criteria Tachycardia > 90 bpm;Tachypnea > 20 resp per min;Leukocytosis (WBC > 98078 IJL) OR Leukopenia (WBC <4000 IJL) OR Bandemia (WBC >10% bands)  -EN        Are two or more of the above signs & symptoms of infection both present and new to the patient? Yes (Proceed)  -EN        Assess for evidence of organ dysfunction: Are any of the below criteria present within 6 hours of suspected infection and SIRS criteria that are NOT considered to be chronic conditions? Lactate >/equal 4.0  -EN        Date of presentation of septic shock 10/29/24  -EN        Time of presentation of septic shock 1806  -EN        Fluid Resuscitation: A lesser volume than 30 ml/kg IV fluid will be given  patient received 3 L crystalloid bolus on arrival, patient continued on crystalloid infusion due to concern for volume overload with further boluses  -EN        The 30 mL/kg fluid bolus was not given to the patient despite having hypotension, a lactate of >= 4 mmol/L, or documentation of septic shock secondary to: Concern for fluid/volume overload  -EN        Instead of the 30 ml/kg fluid bolus, the following volume of crystalloid fluid will be ordered: Other (document in comments)  100 cc/hr  -EN        Of the following fluid type: NSS  -EN        Is the patient is persistently hypotensive in the hour after fluid bolus administration? If yes, patient meets criteria for vasopressor use. NO  No hypotensive at that time, however became hypotensive around 2200  -EN        Sepsis Note: Click \"NEXT\" below (NOT \"close\") to generate sepsis note based on above information. YES (proceed by clicking " "\"NEXT\")  -EN                  User Key  (r) = Recorded By, (t) = Taken By, (c) = Cosigned By      Initials Name Provider Type    JUAN Marrero Nurse Practitioner                    Default Flowsheet Data (Last 720 Hours)       Sepsis Reassess       Row Name 10/30/24 0220                   Repeat Volume Status and Tissue Perfusion Assessment Performed    Date of Reassessment: 10/29/24  -EN        Time of Reassessment: 2100  -EN        Sepsis Reassessment Note: Click \"NEXT\" below (NOT \"close\") to generate sepsis reassessment note. YES (proceed by clicking \"NEXT\")  -EN        Repeat Volume Status and Tissue Perfusion Assessment Performed --                  User Key  (r) = Recorded By, (t) = Taken By, (c) = Cosigned By      Initials Name Provider Type    UJAN Marrero Nurse Practitioner                  Body mass index is 37.42 kg/m².  Wt Readings from Last 1 Encounters:   10/29/24 102 kg (224 lb 13.9 oz)     IBW (Ideal Body Weight): 57 kg    Ideal body weight: 57 kg (125 lb 10.6 oz)  Adjusted ideal body weight: 75 kg (165 lb 5.5 oz)    "

## 2024-10-30 NOTE — ASSESSMENT & PLAN NOTE
[facility] [facility] Internal Medicine Progress Note            Assessment/Plan:    Principal Problem:    Acute on chronic congestive heart failure (CMS/HCC)  Active Problems:    KALPESH (acute kidney injury) (CMS/Formerly [facility])    Stage 4 chronic kidney disease (CMS/Formerly [facility])    COVID-19 virus infection         Chief complaint: Shortness of breath     This is a 89-year-old female history of diabetes, hypertension, diastolic heart failure, CKD with recent KALPESH, CAD status post PCI, recent diagnosis of COVID presents with chief complaint of shortness of breath.  Patient is oriented x1 so most of the history is taken from chart review and speaking with her daughter over the phone.  Patient was recently admitted to [facility] from May 31 to June 7.  Patient admitted with chest pain nausea and vomiting.  Diagnosed with CHF exacerbation, acute kidney injury on CKD stage IV family had declined hemodialysis at that time.  Troponin was elevated thought to be due to supply demand ischemia cardiac cath was not done.  On day of discharge patient was diagnosed with COVID.  Daughter states patient was doing okay for 1 day at home, on room air.  Yesterday patient noted to have shortness of breath.  Per daughter pulse ox at home in the 70s so brought to the emergency department.  Upon my evaluation the patient she is sitting up she is on high flow nasal cannula.  I used .  Patient is oriented to name.  Denies any shortness of breath or pain at this time.     1.  Acute kidney injury on CKD stage IV  Discussed with nephrology and daughter in detail  POA declines hemodialysis     2.  Acute on chronic diastolic heart failure exacerbation  Ideally would need hemodialysis for volume removal  Conservative medical management per cardiology     3.  COVID-19  Not a candidate for remdesivir given severe renal insufficiency  Continue Decadron     4.  DVT prophylaxis: Heparin subcu     5.  CAD  Status post PCI 2010 left circumflex  Troponin elevation supply  Initially requiring 2-3 L NC oxygen  Escalating oxygen requirements with patient on 15 L MFNC prior to OR  Concern for aspiration event during episode of emesis +/- atelectasis  Intubated for the OR  Intra-op ABG with pO2 60 on FiO2 50% and PEEP 10  Continue mechanical ventilation  VAP prophylaxis  Monitor ABG  Patient currently with open abdomen, defer daily SBT   Correction Dose   Subcutaneous TID  Lucas Mesa MD   2 Units at 06/12/22 1233   • insulin lispro (ADMELOG,HumaLOG) - Correction Dose    Subcutaneous Nightly Lucas Mesa Imaging  XR CHEST PA OR AP 1 VIEW   Final Result   Findings are suspicious for a low-grade congestive heart failure, but    also identified is a region of right perihilar prominence, as described    above.  See above for discussion.         Electronically signed by Popeye Davis D.O. on 06 09 22 at 00:36          Cultures  Microbiology Results     None                 Primary Care Physician  Sherman Neumann MD    Code Status    Code Status: Do Not Resuscitate    Lucas Mesa MD  [facility] [place] [facility]

## 2024-10-30 NOTE — ASSESSMENT & PLAN NOTE
Patient reporting several episodes of coffee ground emesis at home the day PTA  CT high volume bleeding abdomen pelvis without evidence of extravasation  Hgb 13.2 on arrival  Continue protonix infusion  NGT in place to low intermittent wall suction  Strict NPO  Trend hemoglobin  Coumadin on hold  GI consulted  Eventual EGD once more stable

## 2024-10-30 NOTE — RESPIRATORY THERAPY NOTE
10/30/24 1425   Respiratory Assessment   Assessment Type Assess only   General Appearance Sedated   Respiratory Pattern Assisted   Chest Assessment Chest expansion symmetrical   Bilateral Breath Sounds Diminished   Resp Comments titrated O2 to 60%, no other changes at this time   O2 Device vent   Vent Information   Drager Vent Mode AC/VC+   $ Pulse Oximetry Spot Check Charge Completed   SpO2 97 %   AC/VC+ Settings   Resp Rate (BPM) 16 BPM   VT (mL) 360 mL   Insp Time (S) 0.9 S   FIO2 (%) 60 %   PEEP (cmH2O) 10 cmH2O   Rise Time (%) 20 %   Trigger Sensitivity Flow (LPM) 2 LPM   Humidification Heater   Heater Temp 98.6 °F (37 °C)   AC/VC+ Actuals   Resp Rate (BPM) 18 BPM   VT (mL) 370 mL   MV (Obs) 5.37   MAP (cmH2O) 13 cmH2O   Peak Pressure (cmH2O) 20 cmH2O   I:E Ratio (Obs) 1:3   Static Compliance (mL/cmH20) 62 mL/cmH2O   Plateau Pressure (cm H2O) 19 cm H2O   Heater Temperature (Obs) 98.6 °F (37 °C)   AC/VC+ ALARMS   High Peak Pressure (cmH2O) 45 cmH2O   High Resp Rate (BPM) 30 BPM   High MV (L/min) 11 L/min   Low MV (L/min) 3 L/min   High VT (mL) 700 mL   Maintenance   Alarm (pink) cable attached Yes   Resuscitation bag with peep valve at bedside Yes   Water bag changed No   Circuit changed No   ETT  Oral;Hi-Lo;Inflated 7 mm   Placement Date/Time: 10/29/24 2216   Mask Ventilation: Mask ventilation not attempted (0)  Preoxygenated: Yes  Technique: Rapid sequence;Stylet;Video laryngoscopy  Type: Oral;Hi-Lo;Inflated  Tube Size: 7 mm  Laryngoscope: Hurtado  Blade Size: 3  Locat...   Secured at (cm) 21   Measured from Lips   Secured Location Center   Repositioned Left to Center   Secured by Commercial tube irwin   Site Condition Dry   Cuff Pressure (color) Green   HI-LO Suction  Continuous low suction   HI-LO Secretions Scant   HI-LO Intervention Patent     RT Ventilator Management Note  Bertha Brown 84 y.o. female MRN: [id number]  Unit/Bed#: ICU 05 Encounter: [id number]      Daily Screen         10/30/2024  1290  10/30/2024  0717          Patient safety screen outcome:: Failed Failed      Not Ready for Weaning due to:: Underline problem not resolved;PEEP > 8cmH2O FiO2 >60%;PEEP > 8cmH2O                Physical Exam:   Assessment Type: Assess only  General Appearance: Sedated  Respiratory Pattern: Assisted  Chest Assessment: Chest expansion symmetrical  Bilateral Breath Sounds: Diminished  O2 Device: vent      Resp Comments: titrated O2 to 60%, no other changes at this time

## 2024-10-30 NOTE — ANESTHESIA PREPROCEDURE EVALUATION
Procedure:  LAPAROSCOPY DIAGNOSTIC (Abdomen)  LAPAROTOMY EXPLORATORY, possible bowel resection, possible ostomy, possible abthera VAC placement (Abdomen)    Relevant Problems   ANESTHESIA (within normal limits)  No prior issues with anesthesia as per patient and daughter      CARDIO   (+) Chest pain   (+) Hyperlipidemia   (+) Hypertension   (+) Paroxysmal atrial fibrillation (HCC)      ENDO   (+) Hypothyroidism      GI/HEPATIC  Vomiting earlier this afternoon   (+) Coffee ground emesis      /RENAL   (+) RUKHSANA (acute kidney injury) (HCC)      MUSCULOSKELETAL   (+) Bronchospasm with bronchitis, acute   (+) Osteoarthritis of knee      NEURO/PSYCH   (+) Major depressive disorder, single episode, mild (HCC)      PULMONARY  Requiring midflow nc to maintain saturations in low 90s at time of evaluation   (+) Acute respiratory failure with hypoxia (HCC)      Endocrine   (+) Diabetes (HCC)      Other   (+) Obesity        Physical Exam    Airway    Mallampati score: III         Dental    upper dentures and lower dentures    Cardiovascular  Rate: abnormal    Pulmonary   Decreased breath sounds, No wheezes    Other Findings  post-pubertal.      Anesthesia Plan  ASA Score- 3 Emergent    Anesthesia Type- general with ASA Monitors.         Additional Monitors:     Airway Plan: ETT.    Comment: Possible arterial line, possible central line, possible need for post-operative mechanical ventilation and ICU admission..       Plan Factors-    Chart reviewed. EKG reviewed. Imaging results reviewed. Existing labs reviewed.     Patient is not a current smoker.              Induction- intravenous and rapid sequence induction.    Postoperative Plan- Plan for postoperative opioid use.     Perioperative Resuscitation Plan - Level 1 - Full Code.       Informed Consent- Anesthetic plan and risks discussed with patient and daughter.  I personally reviewed this patient with the CRNA. Discussed and agreed on the Anesthesia Plan with the  CRNA..      TTE 5/17/24:  Interpretation Summary    Left Ventricle: Left ventricular cavity size is normal. Wall thickness is normal. The left ventricular ejection fraction is 70%. Systolic function is vigorous. Wall motion is normal. Diastolic function is mildly abnormal, consistent with grade I (abnormal) relaxation.    Right Ventricle: Right ventricular cavity size is normal. Systolic function is normal.    Left Atrium: The atrium is mildly dilated.      NM Stress 5/17/24:  Interpretation Summary    Stress ECG: No ST deviation is noted. The ECG was negative for ischemia. The stress ECG is negative for ischemia after pharmacologic vasodilation, without reproduction of symptoms.    Stress Function: Left ventricular function post-stress is normal. Stress ejection fraction is over 70%.    Perfusion: There are no perfusion defects.  No evidence of ischemia or infarction by pharmacologic vasodilatory nuclear stress testing.     Hs troponin negative x3 this admission    Lab Results   Component Value Date    WBC 19.29 (H) 10/29/2024    HGB 12.9 10/29/2024    HCT 42.1 10/29/2024     (H) 10/29/2024     10/29/2024     Lab Results   Component Value Date    SODIUM 140 10/29/2024    K 4.7 10/29/2024     (H) 10/29/2024    CO2 21 10/29/2024    BUN 35 (H) 10/29/2024    CREATININE 1.70 (H) 10/29/2024    GLUC 164 (H) 10/29/2024    CALCIUM 8.6 10/29/2024     Lab Results   Component Value Date    ALT 18 10/29/2024    AST 25 10/29/2024    ALKPHOS 70 10/29/2024     Lab Results   Component Value Date    INR 2.04 (H) 10/29/2024    INR 2.35 (H) 05/17/2024    INR 2.83 (H) 05/16/2024    PROTIME 23.8 (H) 10/29/2024    PROTIME 26.7 (H) 05/17/2024    PROTIME 30.7 (H) 05/16/2024     Lab Results   Component Value Date    HGBA1C 7.0 (H) 08/22/2024

## 2024-10-30 NOTE — CONSULTS
Consult completed but did not link to order. Please see consult note written by Luke Medina DO for full consult.     Delaney Vera PA-C

## 2024-10-30 NOTE — ANESTHESIA PROCEDURE NOTES
Central Line Insertion    Performed by: Liz Sanchez MD  Authorized by: Liz Sanchez MD    Date/Time: 10/29/2024 11:30 PM  Catheter Type:  triple lumen  Consent: Verbal consent obtained.  Consent given by: and daughter.  Indications: vascular access and central pressure monitoring  Catheter size: 7 Fr  Patient position: flat    Sedation:  Patient sedated: procedure performed while patient under GETA.    Assessment: blood return through all ports (CXR pending)  Preparation: skin prepped with 2% chlorhexidine  Skin prep agent dried: skin prep agent completely dried prior to procedure  Sterile barriers: all five maximum sterile barriers used - cap, mask, sterile gown, sterile gloves, and large sterile sheet  Hand hygiene: hand hygiene performed prior to central venous catheter insertion  sterile gel and probe cover used in ultrasound-guided central venous catheter insertionNumber of attempts: 1  Successful placement: yes  Post-procedure: chlorhexidine patch applied, dressing applied and line sutured  Patient tolerance: patient tolerated the procedure well with no immediate complications

## 2024-10-30 NOTE — RESPIRATORY THERAPY NOTE
10/30/24 0730   Respiratory Assessment   Assessment Type Assess only   General Appearance Sedated   Respiratory Pattern Assisted   Chest Assessment Chest expansion symmetrical   Bilateral Breath Sounds Diminished   Resp Comments remains on full support, no changes at this time, spo2 93%   O2 Device vent   Vent Information   Drager Vent Mode AC/VC+   $ Pulse Oximetry Spot Check Charge Completed   SpO2 93 %   AC/VC+ Settings   Resp Rate (BPM) 16 BPM   VT (mL) 360 mL   Insp Time (S) 0.9 S   FIO2 (%) 80 %   PEEP (cmH2O) 10 cmH2O   Rise Time (%) 20 %   Trigger Sensitivity Flow (LPM) 2 LPM   Humidification Heater   Heater Temp 98.6 °F (37 °C)   AC/VC+ Actuals   Resp Rate (BPM) 16 BPM   VT (mL) 405 mL   MV (Obs) 4.87   MAP (cmH2O) 13 cmH2O   Peak Pressure (cmH2O) 22 cmH2O   I:E Ratio (Obs) 1:3   Static Compliance (mL/cmH20) 45 mL/cmH2O   Plateau Pressure (cm H2O) 19 cm H2O   Heater Temperature (Obs) 98.6 °F (37 °C)   AC/VC+ ALARMS   High Peak Pressure (cmH2O) 45 cmH2O   High Resp Rate (BPM) 30 BPM   High MV (L/min) 11 L/min   Low MV (L/min) 3 L/min   High VT (mL) 700 mL   Maintenance   Alarm (pink) cable attached Yes   Resuscitation bag with peep valve at bedside Yes   Water bag changed No   Circuit changed No   Daily Screen   Patient safety screen outcome: Failed   Not Ready for Weaning due to: FiO2 >60%;PEEP > 8cmH2O   IHI Ventilator Associated Pneumonia Bundle   Daily Assessment of Readiness to Extubate Not applicable (Comment)  (returning to OR)   Head of Bed Elevated HOB 30   ETT  Oral;Hi-Lo;Inflated 7 mm   Placement Date/Time: 10/29/24 0244   Mask Ventilation: Mask ventilation not attempted (0)  Preoxygenated: Yes  Technique: Rapid sequence;Stylet;Video laryngoscopy  Type: Oral;Hi-Lo;Inflated  Tube Size: 7 mm  Laryngoscope: Hurtado  Blade Size: 3  Locat...   Secured at (cm) 21   Measured from Lips   Secured Location Left   Repositioned Center to Right   Secured by Commercial tube irwin   Site Condition Dry   Cuff  Pressure (color) Green   HI-LO Suction  Continuous low suction   HI-LO Secretions Scant   HI-LO Intervention Patent     RT Ventilator Management Note  Bertha Brown 84 y.o. female MRN: [id number]  Unit/Bed#: ICU 05 Encounter: [id number]      Daily Screen         10/30/2024  0422 10/30/2024  0730          Patient safety screen outcome:: Failed Failed      Not Ready for Weaning due to:: Underline problem not resolved;PEEP > 8cmH2O FiO2 >60%;PEEP > 8cmH2O                Physical Exam:   Assessment Type: Assess only  General Appearance: Sedated  Respiratory Pattern: Assisted  Chest Assessment: Chest expansion symmetrical  Bilateral Breath Sounds: Diminished  Cough: Non-productive  O2 Device: vent      Resp Comments: remains on full support, no changes at this time, spo2 93%

## 2024-10-30 NOTE — ASSESSMENT & PLAN NOTE
Lab Results   Component Value Date    HGBA1C 7.0 (H) 08/22/2024       Recent Labs     10/29/24  0822   POCGLU 150*       Blood Sugar Average: Last 72 hrs:  (P) 150  Accuchecks and SSI Q6h

## 2024-10-30 NOTE — ASSESSMENT & PLAN NOTE
Patient with worsening abdominal pain and rising lactate concerning for acute abdomen  CT abdomen pelvis with evidence of mesenteric fat stranding in the LLQ and suggestion of pneumatosis in the hepatic flexure and proximal transverse colon concerning for ischemic colitis  Surgery consulted  POD #0 s/p exploratory laparotomy with extended right hemicolectomy, left in discontinuity, abthera VAC in place  Plan to return to the OR tomorrow  Continue NG tube to low intermittent wall suction  Strict NPO  Antibiotics broadened to zosyn  See plan under severe sepsis above

## 2024-10-30 NOTE — ASSESSMENT & PLAN NOTE
Coumadin on hold due to concern for GIB  Holding beta blocker secondary to vasopressor requirements

## 2024-10-30 NOTE — PROGRESS NOTES
Deterioration Index Critical Care Recommendations  Room #: [facility]  Deterioration index score: 64.28%    Critical Care recommends reevaluation of patient post operatively for possible transfer to ICU    Brief summary:   Critical care was brought to the patient's bedside via deterioration index alert. The alert was concerning for tachypnea and increased oxygen requirements. The patient was admitted earlier today due to coffee ground emesis and abdominal pain. CT high volume bleeding scan was without evidence of high volume GI hemorrhage. Patient had a hemoglobin of 13.2 and was hemodynamically stable, so there was no indication for emergent EGD. GI was consulted and patient was scheduled for EGD and colonoscopy tomorrow. Since that time patient has developed worsening nausea, vomiting and abdominal pain. Patient had a lactate of 3.5 on arrival and received 3 L of crystalloids, but despite this her lactate has trended up and is most recently 4.5. Surgery was consulted and a repeat CT abdomen pelvis was performed which showed evidence of possible ischemic colitis. Patient is going to the OR for diagnostic laparoscopy which may be converted to exploratory laparotomy depending on the findings. In addition to abdominal findings, patient has had worsening hypoxia with increasing oxygen requirements. She is currently 90-93% on 15 L MFNC. Concern for possible aspiration event during episode of emesis per nursing. As patient is going for emergency surgery, will defer transfer to [facility]'s service pending operative findings. Anticipate possible transfer to ICU post operatively.    Please contact critical care via [facility] with any questions or concerns.

## 2024-10-30 NOTE — RESPIRATORY THERAPY NOTE
10/30/24 1126   Respiratory Assessment   Assessment Type Assess only   General Appearance Sedated   Respiratory Pattern Assisted   Chest Assessment Chest expansion symmetrical   Bilateral Breath Sounds Diminished   Resp Comments no changes at this time   O2 Device vent   Vent Information   Drager Vent Mode AC/VC+   $ Pulse Oximetry Spot Check Charge Completed   SpO2 94 %   AC/VC+ Settings   Resp Rate (BPM) 16 BPM   VT (mL) 360 mL   Insp Time (S) 0.9 S   FIO2 (%) 80 %   PEEP (cmH2O) 10 cmH2O   Rise Time (%) 20 %   Trigger Sensitivity Flow (LPM) 2 LPM   Humidification Heater   Heater Temp 98.6 °F (37 °C)   AC/VC+ Actuals   Resp Rate (BPM) 16 BPM   VT (mL) 369 mL   MV (Obs) 5.17   MAP (cmH2O) 13 cmH2O   Peak Pressure (cmH2O) 22 cmH2O   I:E Ratio (Obs) 1:3   Static Compliance (mL/cmH20) 40 mL/cmH2O   Plateau Pressure (cm H2O) 20 cm H2O   Heater Temperature (Obs) 98.6 °F (37 °C)   AC/VC+ ALARMS   High Peak Pressure (cmH2O) 45 cmH2O   High Resp Rate (BPM) 30 BPM   High MV (L/min) 11 L/min   Low MV (L/min) 3 L/min   High VT (mL) 700 mL   Maintenance   Alarm (pink) cable attached Yes   Resuscitation bag with peep valve at bedside Yes   Water bag changed No   Circuit changed No   ETT  Oral;Hi-Lo;Inflated 7 mm   Placement Date/Time: 10/29/24 2216   Mask Ventilation: Mask ventilation not attempted (0)  Preoxygenated: Yes  Technique: Rapid sequence;Stylet;Video laryngoscopy  Type: Oral;Hi-Lo;Inflated  Tube Size: 7 mm  Laryngoscope: Hurtado  Blade Size: 3  Locat...   Secured at (cm) 21   Measured from Lips   Secured Location Right   Repositioned Left to Right   Secured by Commercial tube irwin   Site Condition Dry   Cuff Pressure (color) Green   HI-LO Suction  Continuous low suction   HI-LO Secretions Scant   HI-LO Intervention Patent     RT Ventilator Management Note  Bertha Brown 84 y.o. female MRN: [id number]  Unit/Bed#: ICU 05 Encounter: [id number]      Daily Screen         10/30/2024  0422 10/30/2024  0771           Patient safety screen outcome:: Failed Failed      Not Ready for Weaning due to:: Underline problem not resolved;PEEP > 8cmH2O FiO2 >60%;PEEP > 8cmH2O                Physical Exam:   Assessment Type: Assess only  General Appearance: Sedated  Respiratory Pattern: Assisted  Chest Assessment: Chest expansion symmetrical  Bilateral Breath Sounds: Diminished  Cough: Non-productive  O2 Device: vent      Resp Comments: no changes at this time

## 2024-10-30 NOTE — ANESTHESIA PROCEDURE NOTES
Arterial Line Insertion    Performed by: Liz Sanchez MD  Authorized by: Liz Sanchez MD  Consent: Verbal consent obtained.  Risks and benefits: risks, benefits and alternatives were discussed  Consent given by: patient (and daughter)  Indications: respiratory failure and hemodynamic monitoring  Orientation:  Left  Location: radial artery  Procedure Details:  Needle gauge: 20  Seldinger technique: Seldinger technique used  Number of attempts: 1    Post-procedure:  Post-procedure: dressing applied  Waveform: good waveform  Post-procedure CNS: normal  Patient tolerance: patient tolerated the procedure well with no immediate complications

## 2024-10-30 NOTE — PROGRESS NOTES
"Progress Note - General Surgery   Bertha Brown 84 y.o. female MRN: [id number]  Unit/Bed#: ICU 05 Encounter: [id number]    Assessment/Plan  POD 0 LAPAROSCOPY DIAGNOSTIC CONVERTED TO OPEN  LAPAROTOMY EXPLORATORY, EXTENDED RIGHT HEMICOLECTOMY, WITH abthera VAC placement    Operative findings   Intra-abdominal findings to include significant amount of murky ascites.  No silvia perforation noted.  Significantly distended and thin-walled ascending, hepatic flexure, and proximal transverse colon.  Early ischemia changes.  Extended right hemicolectomy undertaken given the ischemic changes and impending perforation in the setting of noted pneumatosis on the CT scan.  Distal transverse colon appeared healthy and viable, however sigmoid colon was very edematous with mild inflammation but appeared viable.     Extended right hemicolectomy completed and left in discontinuity as patient was unstable and on vasopressors.  ABThera VAC placed.    Coffee Ground Emesis, Hbg stable  Left in discontinuity, Abthera VAC dressing  RUKHSANA continue IVF hydration current creatinine 1.52, 1.60    Pt remains intubated and sedated, 2 pressures to maintain blood pressures. Blood pressures /46-50, HR 99 overnight, currently 117/60   Abthera intact,   on Zosyn, Protonix,   Lactic acid 2.6, 3.8   WBC 9 from 19, 12   Intra op cultures pending    UO 1540, 185 this am  Abthera 400  Stool x 32 yesterday. Zero today    Discussion with family at bedside, pt will return to OR tomorrow for 2nd look, she will likely remain in discontinuity due to pressure requirements. Pt aware and agree      Chief Complaint: intubated and sedated        Objective/Exam: Blood pressure 97/52, pulse 99, temperature 98.8 °F (37.1 °C), resp. rate 18, height 5' 5\" (1.651 m), weight 103 kg (227 lb 4.7 oz), SpO2 93%.    Wound Culure: No results found for: \"WOUNDCULT\"      General Appearance:    Intubated and sedated,    Lungs:     Mechanical     Heart:   Apical rate "  regular   Abdomen:     Soft, Abthera intact, 250 s/s drainage  Parks to gravity,          Extremities:   Extremities normal,  no cyanosis or edema   Pulses:   2+ and symmetric all extremities, no calf tenderness   Skin:   Skin color, texture, turgor normal, no rashes or lesions   Neurologic:   Intubated and sedated        ,      Intake/Output Summary (Last 24 hours) at 10/30/2024 0939  Last data filed at 10/30/2024 0801  Gross per 24 hour   Intake 5949.06 ml   Output 2887 ml   Net 3062.06 ml       Invasive Devices       Central Venous Catheter Line  Duration             CVC Central Lines 10/29/24 <1 day              Peripheral Intravenous Line  Duration             Peripheral IV 10/29/24 Left Antecubital 1 day    Peripheral IV 10/29/24 Proximal;Right;Ventral (anterior) Forearm 1 day              Arterial Line  Duration             Arterial Line 10/29/24 Left Radial <1 day              Drain  Duration             NG/OG/Enteral Tube Nasogastric 18 Fr Left nare <1 day    Urethral Catheter Asept;Coude;Non-latex;Straight-tip 16 Fr. <1 day              Airway  Duration             ETT  Oral;Hi-Lo;Inflated 7 mm <1 day                                            Labs: CBC with diff: @RESUFAST(WBC,HGB,HCT,MCV,PLT,ADJUSTEDWBC,   RBC,MCH,MCHC,RDW,MPV,NRBC,TOTALCELLSCOUNTED,SEGS%,GRANS%,LYMPHS%,EOS%,BASO%,ABNEUT,ABGRANS,ABLYMPHS,ABMOMOS,ABEOS,ABBASO)@,   BMP/CMP:  Lab Results   Component Value Date    K 5.2 10/30/2024    K 5.1 08/22/2024     (H) 10/30/2024     08/22/2024    CO2 21 10/30/2024    CO2 16 (L) 10/29/2024    CO2 26 08/22/2024    BUN 35 (H) 10/30/2024    BUN 25 (H) 08/22/2024    CREATININE 1.52 (H) 10/30/2024    CREATININE 1.1 (H) 08/22/2024    GLUCOSE 135 10/29/2024    CALCIUM 8.0 (L) 10/30/2024    CALCIUM 8.7 08/22/2024    AST 17 10/30/2024    AST 24 02/19/2024    ALT 11 10/30/2024    ALT 12 02/19/2024    ALKPHOS 40 10/30/2024    ALKPHOS 101 02/19/2024    EGFR 31 10/30/2024    EGFR 49 (L)  "08/22/2024    EGFR >60.0 10/13/2020   ,   Lipid Panel: No results found for: \"CHOL\",   Coags:   Lab Results   Component Value Date    PT 21.2 (H) 10/13/2020    PTT 23 10/29/2024    INR 1.55 (H) 10/30/2024    INR 1.82 (H) 10/13/2020   ,     Blood Culture:   Lab Results   Component Value Date    BLOODCX Received in Microbiology Lab. Culture in Progress. 10/29/2024   ,   Urinalysis:   Lab Results   Component Value Date    COLORU Yellow 10/29/2024    CLARITYU Clear 10/29/2024    SPECGRAV 1.029 10/29/2024    PHUR 5.0 10/29/2024    LEUKOCYTESUR Negative 10/29/2024    NITRITE Negative 10/29/2024    GLUCOSEU Negative 10/29/2024    KETONESU Negative 10/29/2024    BILIRUBINUR Negative 10/29/2024    BLOODU Negative 10/29/2024   ,   Urine Culture:   Lab Results   Component Value Date    URINECX 30,000-39,000 cfu/ml 02/12/2022   ,         Imaging: X-ray chest 1 view    Result Date: 10/30/2024  Impression: 1. Linear atelectasis at the right lung base. 2. Retrocardiac density may represent infiltrate and/or atelectasis. Workstation performed: [id number]     CT abdomen pelvis wo contrast    Result Date: 10/29/2024  Impression: 1.  Redemonstrated diffuse thickening of the colon with increase in mesenteric fat stranding in the left lower quadrant and suggestion of pneumatosis in the hepatic flexure and proximal transverse colon concerning for ischemic colitis. 2.  The lower esophagus is patulous and fluid-filled which may be due to gastroesophageal reflux or esophageal dysmotility. Small to moderate hiatal hernia. The stomach is distended with fluid. Aspiration precautions is recommended. I personally discussed this study with LEE WASSERMAN on 10/29/2024 9:12 PM. Workstation performed: [id number]     CT high volume bleeding scan abdomen pelvis    Result Date: 10/29/2024  Impression: 1. No CT evidence of active high-volume gastrointestinal hemorrhage. 2.  Moderate diffuse colonic distention with mild wall thickening suggested at the " distal transverse and descending colon with air-fluid levels proximally. Findings may be related to colitis. 3. Mild distention of the esophagus with moderate gaseous distention. This is nonspecific and could be related to gastritis/reflux. 4. Low-attenuation in the uterus centrally, more prominent than expected for the patient's age. Recommend follow-up evaluation with nonemergent pelvic ultrasound to assess for abnormal endometrial thickening. The study was marked in EPIC for immediate notification. Workstation performed: [id number]     CT chest without contrast    Result Date: 10/29/2024  Impression: 1. New mild distention of the esophagus with fluid, question related to reflux or esophageal dysfunction. Workstation performed: [id number]     XR chest 1 view portable    Result Date: 10/29/2024  Impression: No acute cardiopulmonary disease. Workstation performed: [id number]         Tamar Orellaan PA-C  10/30/2024

## 2024-10-30 NOTE — ASSESSMENT & PLAN NOTE
Patient reporting several episodes of coffee ground emesis at home the day PTA  CT high volume bleeding abdomen pelvis without evidence of extravasation  Hgb 13.2  Continue protonix infusion  NGT in place to low intermittent wall suction  Strict NPO  Trend hemoglobin  GI consulted  Originally planned for EGD/colonoscopy today, however will need to be rescheduled

## 2024-10-30 NOTE — ASSESSMENT & PLAN NOTE
Patient meets sepsis criteria as evidenced by tachycardia, tachypnea prior to intubation, and leukocytosis with RUKHSANA and lactic acidosis  Suspect secondary to ischemic colitis +/- aspiration pneumonia  Continue vasopressors titrated to maintain MAP >65  No evidence of cardiogenic component to shock, SVO2 82.6  Antibiotics broadened to Zosyn  Patient has received 3.5 L crystalloids and 500 ml of colloids  Continue fluid resuscitation  Trend lactate until cleared  Trend procal and WBC/temperature curve  See plan under ischemic colitis

## 2024-10-30 NOTE — ASSESSMENT & PLAN NOTE
Lab Results   Component Value Date    HGBA1C 7.0 (H) 08/22/2024       Recent Labs     10/30/24  0610 10/30/24  1243 10/30/24  2316 10/31/24  0013   POCGLU 142* 159* 108 139       Blood Sugar Average: Last 72 hrs:  (P) 139.6  Accuchecks and SSI Q6h

## 2024-10-30 NOTE — UTILIZATION REVIEW
NOTIFICATION OF INPATIENT ADMISSION   AUTHORIZATION REQUEST   SERVICING FACILITY:   [place] [address]  Tax ID: [id number]  NPI: [id number] ATTENDING PROVIDER:  Attending Name and NPI#: Liz Sanchez Md [[id number]]  Address: [address] [place] [address]  Phone: [phone]     ADMISSION INFORMATION:  Place of Service: Inpatient [facility]  Place of Service Code: 21  Inpatient Admission Date/Time: 10/29/24 10:44 AM  Discharge Date/Time: No discharge date for patient encounter.  Admitting Diagnosis Code/Description:  Vomiting [R11.10]  Constipation [K59.00]  Coffee ground emesis [K92.0]  RUKHSANA (acute kidney injury) (HCC) [N17.9]  Acute respiratory failure with hypoxia (HCC) [J96.01]     UTILIZATION REVIEW CONTACT:  Kandi Valencia, Utilization   Network Utilization Review Department  Phone: [phone]  Fax [phone]  Email: [email or web address]  Contact for approvals/pending authorizations, clinical reviews, and discharge.     PHYSICIAN ADVISORY SERVICES:  Medical Necessity Denial & Dgkh-ho-Uznz Review  Phone: [phone]  Fax: [phone]  Email: [email or web address]     DISCHARGE SUPPORT TEAM:  For Patients Discharge Needs & Updates  Phone: [phone] opt. 2 Fax: [phone]  Email: [email or web address]

## 2024-10-30 NOTE — ASSESSMENT & PLAN NOTE
-Hold warfarin for now as patient will be going to the OR  -Continue rate control  -Remainder of cares per primary medical team

## 2024-10-30 NOTE — PROGRESS NOTES
Progress Note - Gastroenterology   Name: Bertha Brown 84 y.o. female I MRN: [id number]  Unit/Bed#: ICU 05 I Date of Admission: 10/29/2024   Date of Service: 10/30/2024 I Hospital Day: 1    Assessment & Plan  Coffee ground emesis  -3 episodes yesterday prior to ER presentation  -Hgb on admission 13.2  -CT AP with IV contrast notes no evidence of active high-volume GI hemorrhage.  Moderate diffuse colonic distension with mild wall thickening suggestive of the distal transverse and descending colon noted.  Mild distention esophagus with moderate gaseous distention  -PPI BID  -Initial plan was for EGD today however this will be postponed due to acute events overnight  -Continue to trend Hgb/Hct and transfuse as necessary  Chronic idiopathic constipation  Patient passed several non-bloody stools yesterday    Generalized abdominal pain    Septic shock (HCC)    Ischemic colitis (HCC)  Patient reported 2 weeks of no BM on admission  CT AP on admission with moderate diffuse colonic distension with moderate fecal retention  -Lactic acid was elevated on admission at 3.5 which initially improved to 3.1 but worsened in the later afternoon to 4.5  -Abdominal pain worsened and patient became septic  -Repeat CT AP noted diffuse thickening of the colon with increasing mesenteric fat stranding in the left lower quadrant suggesting pneumatosis and hepatic flexure and proximal transverse colon concerning for ischemic colitis  -Given worsening presentation the patient was taken emergently to the OR last evening with extended right hemicolectomy.  Abdomen was left open.  Plan to take the patient back to the OR in the next 24 hours.  -Colonoscopy 2009 with polyps and diverticulosis  -Will continue to follow peripherally  -She will need a plan for better outpatient bowel regimen  -Ongoing treatment recommendations deferred to ICU and surgery teams  Paroxysmal atrial fibrillation (HCC)  On coumadin which is currently on hold  INR on  admission 2.04      Patient will be seen and examined by Dr. Cash.  All sexton medical decisions were made by Dr. Cash.         Subjective   Patient is seen in the ICU intubated, sedated.  Acute events overnight noted.  Patient is s/p open laparotomy with right hemicolectomy    Objective :  Temp:  [97.4 °F (36.3 °C)-98.8 °F (37.1 °C)] 98.8 °F (37.1 °C)  HR:  [] 99  BP: ()/(52-81) 97/52  Resp:  [11-29] 18  SpO2:  [89 %-98 %] 93 %  O2 Device: Ventilator  Nasal Cannula O2 Flow Rate (L/min):  [2 L/min-15 L/min] 15 L/min  FiO2 (%):  [] 80    Physical Exam  Vitals reviewed.   Constitutional:       Appearance: She is ill-appearing.      Interventions: She is sedated and intubated.   HENT:      Head: Normocephalic and atraumatic.   Eyes:      Extraocular Movements: Extraocular movements intact.      Pupils: Pupils are equal, round, and reactive to light.   Cardiovascular:      Rate and Rhythm: Tachycardia present. Rhythm irregular.   Pulmonary:      Effort: She is intubated.   Abdominal:      General: Bowel sounds are decreased. There is distension.      Comments: Bandage covering abdominal wall   Skin:     General: Skin is warm and dry.      Coloration: Skin is not jaundiced.           Lab Results: I have reviewed the following results:CBC/BMP:   .     10/30/24  0130 10/30/24  0809   WBC 8.31  --    HGB 10.7*  --    HCT 34.3*  --      --    SODIUM 140 139   K 4.8 5.2   * 113*   CO2 18* 21   BUN 37* 35*   CREATININE 1.60* 1.52*   GLUC 143* 165*   CAIONIZED 1.09* 1.07*   MG 2.2  --    PHOS 4.2*  --     , LFTs:   .     10/30/24  0809   AST 17   ALT 11   ALB 3.2*   TBILI 0.86   ALKPHOS 40    , PTT/INR:  .     10/30/24  0455   INR 1.55*    , Lactic Acid:   .     10/30/24  0455   LACTICACID 2.6*    , Procalcitonin:   Lab Results   Component Value Date    PROCALCITONI 44.94 (H) 10/30/2024   , ABG:   .     10/30/24  0133 10/30/24  0501 10/30/24  0809   PHART 7.191* 7.231* 7.234*   SRD9GUH 43.7  43.5 39.7   PO2ART 118.2 66.5* 72.7*   [id number] 16.4* 17.8* 16.4*   BEART -11.3 -9.3 -10.4    , Blood Culture:   Lab Results   Component Value Date    BLOODCX Received in Microbiology Lab. Culture in Progress. 10/29/2024

## 2024-10-30 NOTE — ASSESSMENT & PLAN NOTE
-Continue nonrebreather to oxygen saturation of greater than 90%  -Remainder of care per prior medical team

## 2024-10-30 NOTE — RESPIRATORY THERAPY NOTE
RT Ventilator Management Note  Bertha Brown 84 y.o. female MRN: [id number]  Unit/Bed#: ICU 05 Encounter: [id number]      Daily Screen         10/30/2024  0035 10/30/2024  0422          Patient safety screen outcome:: Failed Failed (P)       Not Ready for Weaning due to:: Underline problem not resolved Underline problem not resolved;PEEP > 8cmH2O (P)                 Physical Exam:   Assessment Type: (P) Assess only  General Appearance: (P) Sedated  Respiratory Pattern: (P) Assisted, Symmetrical  Chest Assessment: (P) Chest expansion symmetrical  Bilateral Breath Sounds: (P) Clear, Diminished  Cough: Non-productive  O2 Device: (P) vent      Resp Comments: (P) pt remains intubated on full mechanical support, FIO2 titrated to 80.     10/30/24 0422   Respiratory Assessment   Assessment Type Assess only   General Appearance Sedated   Respiratory Pattern Assisted;Symmetrical   Chest Assessment Chest expansion symmetrical   Bilateral Breath Sounds Clear;Diminished   Resp Comments pt remains intubated on full mechanical support, FIO2 titrated to 80.   O2 Device vent   Vent Information   Vent ID Groot   Vent type Drager   Drager Vent Mode AC/VC+   $ Pulse Oximetry Spot Check Charge Completed   AC/VC+ Settings   Resp Rate (BPM) 16 BPM   VT (mL) 360 mL   Insp Time (S) 0.9 S   FIO2 (%) 80 %   PEEP (cmH2O) (S)  10 cmH2O   Rise Time (%) 20 %   Trigger Sensitivity Flow (LPM) 2 LPM   Humidification Heater   Heater Temp 98.6 °F (37 °C)   AC/VC+ Actuals   Resp Rate (BPM) 16 BPM   VT (mL) 411 mL   MV (Obs) 5   MAP (cmH2O) 13 cmH2O   Peak Pressure (cmH2O) 23 cmH2O   I:E Ratio (Obs) 1:3.2   Static Compliance (mL/cmH20) 54 mL/cmH2O   Plateau Pressure (cm H2O) 16.7 cm H2O   Heater Temperature (Obs) 98.8 °F (37.1 °C)   AC/VC+ ALARMS   High Peak Pressure (cmH2O) 45 cmH2O   High Resp Rate (BPM) 30 BPM   High MV (L/min) 11 L/min   Low MV (L/min) 3 L/min   High VT (mL) 700 mL   Maintenance   Alarm (pink) cable attached Yes   Resuscitation  bag with peep valve at bedside Yes   Water bag changed No   Circuit changed No   Daily Screen   Patient safety screen outcome: Failed   Not Ready for Weaning due to: Underline problem not resolved;PEEP > 8cmH2O   IHI Ventilator Associated Pneumonia Bundle   Daily Assessment of Readiness to Extubate Yes   Head of Bed Elevated HOB 30   ETT  Oral;Hi-Lo;Inflated 7 mm   Placement Date/Time: 10/29/24 4798   Mask Ventilation: Mask ventilation not attempted (0)  Preoxygenated: Yes  Technique: Rapid sequence;Stylet;Video laryngoscopy  Type: Oral;Hi-Lo;Inflated  Tube Size: 7 mm  Laryngoscope: [employer]  Blade Size: 3  Locat...   Secured at (cm) 21   Measured from Lips   Secured Location Center   Repositioned Right to Center   Secured by Commercial tube irwin   Site Condition Dry   Cuff Pressure (color) Green   HI-LO Suction  Continuous low suction   HI-LO Secretions Scant   HI-LO Intervention Patent

## 2024-10-31 ENCOUNTER — ANESTHESIA (INPATIENT)
Dept: PERIOP | Facility: HOSPITAL | Age: 84
End: 2024-10-31
Payer: COMMERCIAL

## 2024-10-31 LAB
ALBUMIN SERPL BCG-MCNC: 2.9 G/DL (ref 3.5–5)
ALP SERPL-CCNC: 32 U/L (ref 34–104)
ALT SERPL W P-5'-P-CCNC: 7 U/L (ref 7–52)
ANION GAP SERPL CALCULATED.3IONS-SCNC: 6 MMOL/L (ref 4–13)
ANION GAP SERPL CALCULATED.3IONS-SCNC: 8 MMOL/L (ref 4–13)
APTT PPP: 36 SECONDS (ref 23–34)
AST SERPL W P-5'-P-CCNC: 14 U/L (ref 13–39)
BASE EXCESS BLDA CALC-SCNC: -5.7 MMOL/L
BASE EXCESS BLDA CALC-SCNC: -5.8 MMOL/L
BASOPHILS # BLD AUTO: 0.01 THOUSANDS/ÂΜL (ref 0–0.1)
BASOPHILS NFR BLD AUTO: 0 % (ref 0–1)
BILIRUB SERPL-MCNC: 0.88 MG/DL (ref 0.2–1)
BODY TEMPERATURE: 98.6 DEGREES FEHRENHEIT
BODY TEMPERATURE: 99.1 DEGREES FEHRENHEIT
BUN SERPL-MCNC: 32 MG/DL (ref 5–25)
BUN SERPL-MCNC: 36 MG/DL (ref 5–25)
CA-I BLD-SCNC: 1.17 MMOL/L (ref 1.12–1.32)
CALCIUM ALBUM COR SERPL-MCNC: 9.1 MG/DL (ref 8.3–10.1)
CALCIUM SERPL-MCNC: 7.3 MG/DL (ref 8.4–10.2)
CALCIUM SERPL-MCNC: 8.2 MG/DL (ref 8.4–10.2)
CHLORIDE SERPL-SCNC: 111 MMOL/L (ref 96–108)
CHLORIDE SERPL-SCNC: 113 MMOL/L (ref 96–108)
CO2 SERPL-SCNC: 19 MMOL/L (ref 21–32)
CO2 SERPL-SCNC: 20 MMOL/L (ref 21–32)
CREAT SERPL-MCNC: 1.45 MG/DL (ref 0.6–1.3)
CREAT SERPL-MCNC: 1.96 MG/DL (ref 0.6–1.3)
EOSINOPHIL # BLD AUTO: 0.02 THOUSAND/ÂΜL (ref 0–0.61)
EOSINOPHIL NFR BLD AUTO: 0 % (ref 0–6)
ERYTHROCYTE [DISTWIDTH] IN BLOOD BY AUTOMATED COUNT: 14.5 % (ref 11.6–15.1)
ERYTHROCYTE [DISTWIDTH] IN BLOOD BY AUTOMATED COUNT: 14.6 % (ref 11.6–15.1)
GFR SERPL CREATININE-BSD FRML MDRD: 23 ML/MIN/1.73SQ M
GFR SERPL CREATININE-BSD FRML MDRD: 33 ML/MIN/1.73SQ M
GLUCOSE SERPL-MCNC: 115 MG/DL (ref 65–140)
GLUCOSE SERPL-MCNC: 118 MG/DL (ref 65–140)
GLUCOSE SERPL-MCNC: 119 MG/DL (ref 65–140)
GLUCOSE SERPL-MCNC: 126 MG/DL (ref 65–140)
GLUCOSE SERPL-MCNC: 139 MG/DL (ref 65–140)
GLUCOSE SERPL-MCNC: 141 MG/DL (ref 65–140)
HCO3 BLDA-SCNC: 19.1 MMOL/L (ref 22–28)
HCO3 BLDA-SCNC: 19.7 MMOL/L (ref 22–28)
HCT VFR BLD AUTO: 27.5 % (ref 34.8–46.1)
HCT VFR BLD AUTO: 30.3 % (ref 34.8–46.1)
HGB BLD-MCNC: 8.9 G/DL (ref 11.5–15.4)
HGB BLD-MCNC: 9.6 G/DL (ref 11.5–15.4)
HOROWITZ INDEX BLDA+IHG-RTO: 40 MM[HG]
HOROWITZ INDEX BLDA+IHG-RTO: 40 MM[HG]
IMM GRANULOCYTES # BLD AUTO: 0.08 THOUSAND/UL (ref 0–0.2)
IMM GRANULOCYTES NFR BLD AUTO: 1 % (ref 0–2)
INR PPP: 1.68 (ref 0.85–1.19)
LACTATE SERPL-SCNC: 1.2 MMOL/L (ref 0.5–2)
LYMPHOCYTES # BLD AUTO: 0.51 THOUSANDS/ÂΜL (ref 0.6–4.47)
LYMPHOCYTES NFR BLD AUTO: 9 % (ref 14–44)
MAGNESIUM SERPL-MCNC: 2.2 MG/DL (ref 1.9–2.7)
MCH RBC QN AUTO: 31 PG (ref 26.8–34.3)
MCH RBC QN AUTO: 31.6 PG (ref 26.8–34.3)
MCHC RBC AUTO-ENTMCNC: 31.7 G/DL (ref 31.4–37.4)
MCHC RBC AUTO-ENTMCNC: 32.4 G/DL (ref 31.4–37.4)
MCV RBC AUTO: 98 FL (ref 82–98)
MCV RBC AUTO: 98 FL (ref 82–98)
MONOCYTES # BLD AUTO: 0.26 THOUSAND/ÂΜL (ref 0.17–1.22)
MONOCYTES NFR BLD AUTO: 5 % (ref 4–12)
NEUTROPHILS # BLD AUTO: 4.89 THOUSANDS/ÂΜL (ref 1.85–7.62)
NEUTS SEG NFR BLD AUTO: 85 % (ref 43–75)
NRBC BLD AUTO-RTO: 0 /100 WBCS
O2 CT BLDA-SCNC: 13.2 ML/DL (ref 16–23)
O2 CT BLDA-SCNC: 13.8 ML/DL (ref 16–23)
OXYHGB MFR BLDA: 94.3 % (ref 94–97)
OXYHGB MFR BLDA: 97 % (ref 94–97)
PCO2 BLDA: 34.6 MM HG (ref 36–44)
PCO2 BLDA: 39 MM HG (ref 36–44)
PEEP RESPIRATORY: 10 CM[H2O]
PEEP RESPIRATORY: 10 CM[H2O]
PH BLDA: 7.32 [PH] (ref 7.35–7.45)
PH BLDA: 7.36 [PH] (ref 7.35–7.45)
PHOSPHATE SERPL-MCNC: 3.7 MG/DL (ref 2.3–4.1)
PLATELET # BLD AUTO: 134 THOUSANDS/UL (ref 149–390)
PLATELET # BLD AUTO: 135 THOUSANDS/UL (ref 149–390)
PMV BLD AUTO: 10.8 FL (ref 8.9–12.7)
PMV BLD AUTO: 11.6 FL (ref 8.9–12.7)
PO2 BLDA: 120.4 MM HG (ref 75–129)
PO2 BLDA: 79.8 MM HG (ref 75–129)
POTASSIUM SERPL-SCNC: 4.2 MMOL/L (ref 3.5–5.3)
POTASSIUM SERPL-SCNC: 4.2 MMOL/L (ref 3.5–5.3)
PROT SERPL-MCNC: 4.9 G/DL (ref 6.4–8.4)
PROTHROMBIN TIME: 20.5 SECONDS (ref 12.3–15)
RBC # BLD AUTO: 2.82 MILLION/UL (ref 3.81–5.12)
RBC # BLD AUTO: 3.1 MILLION/UL (ref 3.81–5.12)
SODIUM SERPL-SCNC: 138 MMOL/L (ref 135–147)
SODIUM SERPL-SCNC: 139 MMOL/L (ref 135–147)
SPECIMEN SOURCE: ABNORMAL
SPECIMEN SOURCE: ABNORMAL
VENT AC: 18
VENT AC: 18
VENT- AC: AC
VENT- AC: AC
VT SETTING VENT: 360 ML
VT SETTING VENT: 360 ML
WBC # BLD AUTO: 5.77 THOUSAND/UL (ref 4.31–10.16)
WBC # BLD AUTO: 7.2 THOUSAND/UL (ref 4.31–10.16)

## 2024-10-31 PROCEDURE — 84100 ASSAY OF PHOSPHORUS: CPT

## 2024-10-31 PROCEDURE — 44310 ILEOSTOMY/JEJUNOSTOMY: CPT | Performed by: PHYSICIAN ASSISTANT

## 2024-10-31 PROCEDURE — 82805 BLOOD GASES W/O2 SATURATION: CPT

## 2024-10-31 PROCEDURE — 97606 NEG PRS WND THER DME>50 SQCM: CPT | Performed by: SURGERY

## 2024-10-31 PROCEDURE — NC001 PR NO CHARGE: Performed by: PHYSICIAN ASSISTANT

## 2024-10-31 PROCEDURE — 94003 VENT MGMT INPAT SUBQ DAY: CPT

## 2024-10-31 PROCEDURE — 99233 SBSQ HOSP IP/OBS HIGH 50: CPT | Performed by: INTERNAL MEDICINE

## 2024-10-31 PROCEDURE — 44310 ILEOSTOMY/JEJUNOSTOMY: CPT | Performed by: SURGERY

## 2024-10-31 PROCEDURE — 85610 PROTHROMBIN TIME: CPT | Performed by: NURSE PRACTITIONER

## 2024-10-31 PROCEDURE — 99024 POSTOP FOLLOW-UP VISIT: CPT

## 2024-10-31 PROCEDURE — 85027 COMPLETE CBC AUTOMATED: CPT

## 2024-10-31 PROCEDURE — 80048 BASIC METABOLIC PNL TOTAL CA: CPT | Performed by: PHYSICIAN ASSISTANT

## 2024-10-31 PROCEDURE — 82948 REAGENT STRIP/BLOOD GLUCOSE: CPT

## 2024-10-31 PROCEDURE — 82330 ASSAY OF CALCIUM: CPT

## 2024-10-31 PROCEDURE — 0DJD0ZZ INSPECTION OF LOWER INTESTINAL TRACT, OPEN APPROACH: ICD-10-PCS | Performed by: SURGERY

## 2024-10-31 PROCEDURE — 85027 COMPLETE CBC AUTOMATED: CPT | Performed by: PHYSICIAN ASSISTANT

## 2024-10-31 PROCEDURE — 94760 N-INVAS EAR/PLS OXIMETRY 1: CPT

## 2024-10-31 PROCEDURE — 99291 CRITICAL CARE FIRST HOUR: CPT | Performed by: STUDENT IN AN ORGANIZED HEALTH CARE EDUCATION/TRAINING PROGRAM

## 2024-10-31 PROCEDURE — 0D1B0Z4 BYPASS ILEUM TO CUTANEOUS, OPEN APPROACH: ICD-10-PCS | Performed by: SURGERY

## 2024-10-31 PROCEDURE — 83735 ASSAY OF MAGNESIUM: CPT

## 2024-10-31 PROCEDURE — 97606 NEG PRS WND THER DME>50 SQCM: CPT | Performed by: PHYSICIAN ASSISTANT

## 2024-10-31 PROCEDURE — 83605 ASSAY OF LACTIC ACID: CPT | Performed by: PHYSICIAN ASSISTANT

## 2024-10-31 PROCEDURE — 80053 COMPREHEN METABOLIC PANEL: CPT

## 2024-10-31 PROCEDURE — 82805 BLOOD GASES W/O2 SATURATION: CPT | Performed by: PHYSICIAN ASSISTANT

## 2024-10-31 PROCEDURE — 85730 THROMBOPLASTIN TIME PARTIAL: CPT | Performed by: NURSE PRACTITIONER

## 2024-10-31 RX ORDER — PANTOPRAZOLE SODIUM 40 MG/10ML
40 INJECTION, POWDER, LYOPHILIZED, FOR SOLUTION INTRAVENOUS EVERY 12 HOURS SCHEDULED
Status: DISCONTINUED | OUTPATIENT
Start: 2024-10-31 | End: 2024-11-11 | Stop reason: HOSPADM

## 2024-10-31 RX ORDER — ALBUMIN HUMAN 50 G/1000ML
12.5 SOLUTION INTRAVENOUS ONCE
Status: COMPLETED | OUTPATIENT
Start: 2024-11-01 | End: 2024-11-01

## 2024-10-31 RX ORDER — MAGNESIUM HYDROXIDE 1200 MG/15ML
LIQUID ORAL AS NEEDED
Status: DISCONTINUED | OUTPATIENT
Start: 2024-10-31 | End: 2024-10-31 | Stop reason: HOSPADM

## 2024-10-31 RX ORDER — ROCURONIUM BROMIDE 10 MG/ML
INJECTION, SOLUTION INTRAVENOUS AS NEEDED
Status: DISCONTINUED | OUTPATIENT
Start: 2024-10-31 | End: 2024-10-31

## 2024-10-31 RX ORDER — CEFAZOLIN SODIUM 1 G/3ML
INJECTION, POWDER, FOR SOLUTION INTRAMUSCULAR; INTRAVENOUS AS NEEDED
Status: DISCONTINUED | OUTPATIENT
Start: 2024-10-31 | End: 2024-10-31

## 2024-10-31 RX ORDER — FUROSEMIDE 10 MG/ML
20 INJECTION INTRAMUSCULAR; INTRAVENOUS ONCE
Status: COMPLETED | OUTPATIENT
Start: 2024-10-31 | End: 2024-10-31

## 2024-10-31 RX ORDER — DEXMEDETOMIDINE HYDROCHLORIDE 4 UG/ML
.1-.7 INJECTION, SOLUTION INTRAVENOUS
Status: DISCONTINUED | OUTPATIENT
Start: 2024-10-31 | End: 2024-11-02

## 2024-10-31 RX ORDER — SODIUM CHLORIDE, SODIUM GLUCONATE, SODIUM ACETATE, POTASSIUM CHLORIDE, MAGNESIUM CHLORIDE, SODIUM PHOSPHATE, DIBASIC, AND POTASSIUM PHOSPHATE .53; .5; .37; .037; .03; .012; .00082 G/100ML; G/100ML; G/100ML; G/100ML; G/100ML; G/100ML; G/100ML
1000 INJECTION, SOLUTION INTRAVENOUS ONCE
Status: COMPLETED | OUTPATIENT
Start: 2024-10-31 | End: 2024-10-31

## 2024-10-31 RX ORDER — SODIUM CHLORIDE, SODIUM LACTATE, POTASSIUM CHLORIDE, CALCIUM CHLORIDE 600; 310; 30; 20 MG/100ML; MG/100ML; MG/100ML; MG/100ML
INJECTION, SOLUTION INTRAVENOUS CONTINUOUS PRN
Status: DISCONTINUED | OUTPATIENT
Start: 2024-10-31 | End: 2024-10-31

## 2024-10-31 RX ORDER — ALBUMIN HUMAN 50 G/1000ML
12.5 SOLUTION INTRAVENOUS ONCE
Status: COMPLETED | OUTPATIENT
Start: 2024-10-31 | End: 2024-10-31

## 2024-10-31 RX ORDER — ONDANSETRON 2 MG/ML
INJECTION INTRAMUSCULAR; INTRAVENOUS AS NEEDED
Status: DISCONTINUED | OUTPATIENT
Start: 2024-10-31 | End: 2024-10-31

## 2024-10-31 RX ADMIN — HEPARIN SODIUM 5000 UNITS: 5000 INJECTION INTRAVENOUS; SUBCUTANEOUS at 21:52

## 2024-10-31 RX ADMIN — ALBUMIN (HUMAN) 12.5 G: 12.5 INJECTION, SOLUTION INTRAVENOUS at 21:44

## 2024-10-31 RX ADMIN — AMIODARONE HYDROCHLORIDE 0.5 MG/MIN: 50 INJECTION, SOLUTION INTRAVENOUS at 06:11

## 2024-10-31 RX ADMIN — HEPARIN SODIUM 5000 UNITS: 5000 INJECTION INTRAVENOUS; SUBCUTANEOUS at 05:31

## 2024-10-31 RX ADMIN — PIPERACILLIN AND TAZOBACTAM 4.5 G: 36; 4.5 INJECTION, POWDER, FOR SOLUTION INTRAVENOUS at 20:38

## 2024-10-31 RX ADMIN — AMIODARONE HYDROCHLORIDE 1 MG/MIN: 50 INJECTION, SOLUTION INTRAVENOUS at 00:37

## 2024-10-31 RX ADMIN — NOREPINEPHRINE BITARTRATE 16 MCG: 1 INJECTION, SOLUTION, CONCENTRATE INTRAVENOUS at 13:25

## 2024-10-31 RX ADMIN — CHLORHEXIDINE GLUCONATE 0.12% ORAL RINSE 15 ML: 1.2 LIQUID ORAL at 20:23

## 2024-10-31 RX ADMIN — ROCURONIUM BROMIDE 20 MG: 10 INJECTION, SOLUTION INTRAVENOUS at 13:49

## 2024-10-31 RX ADMIN — PROPOFOL 15 MCG/KG/MIN: 10 INJECTION, EMULSION INTRAVENOUS at 06:23

## 2024-10-31 RX ADMIN — FUROSEMIDE 20 MG: 10 INJECTION, SOLUTION INTRAMUSCULAR; INTRAVENOUS at 15:38

## 2024-10-31 RX ADMIN — AMIODARONE HYDROCHLORIDE 0.5 MG/MIN: 50 INJECTION, SOLUTION INTRAVENOUS at 20:29

## 2024-10-31 RX ADMIN — ONDANSETRON 4 MG: 2 INJECTION INTRAMUSCULAR; INTRAVENOUS at 14:46

## 2024-10-31 RX ADMIN — FENTANYL CITRATE 50 MCG: 50 INJECTION INTRAMUSCULAR; INTRAVENOUS at 13:40

## 2024-10-31 RX ADMIN — PIPERACILLIN AND TAZOBACTAM 4.5 G: 36; 4.5 INJECTION, POWDER, FOR SOLUTION INTRAVENOUS at 12:27

## 2024-10-31 RX ADMIN — NOREPINEPHRINE BITARTRATE 2 MCG/MIN: 1 SOLUTION INTRAVENOUS at 12:01

## 2024-10-31 RX ADMIN — VASOPRESSIN 0.04 UNITS/MIN: 20 INJECTION INTRAVENOUS at 08:28

## 2024-10-31 RX ADMIN — DEXMEDETOMIDINE HYDROCHLORIDE 0.2 MCG/KG/HR: 400 INJECTION INTRAVENOUS at 19:47

## 2024-10-31 RX ADMIN — PANTOPRAZOLE SODIUM 40 MG: 40 INJECTION, POWDER, FOR SOLUTION INTRAVENOUS at 20:23

## 2024-10-31 RX ADMIN — FENTANYL CITRATE 50 MCG: 50 INJECTION INTRAMUSCULAR; INTRAVENOUS at 13:42

## 2024-10-31 RX ADMIN — FENTANYL CITRATE 50 MCG: 50 INJECTION INTRAMUSCULAR; INTRAVENOUS at 04:01

## 2024-10-31 RX ADMIN — Medication 75 MCG/HR: at 12:15

## 2024-10-31 RX ADMIN — CEFAZOLIN 2000 MG: 1 INJECTION, POWDER, FOR SOLUTION INTRAMUSCULAR; INTRAVENOUS at 13:41

## 2024-10-31 RX ADMIN — NOREPINEPHRINE BITARTRATE 16 MCG: 1 INJECTION, SOLUTION, CONCENTRATE INTRAVENOUS at 13:30

## 2024-10-31 RX ADMIN — ALBUMIN (HUMAN) 12.5 G: 12.5 INJECTION, SOLUTION INTRAVENOUS at 23:53

## 2024-10-31 RX ADMIN — ROCURONIUM BROMIDE 30 MG: 10 INJECTION, SOLUTION INTRAVENOUS at 13:23

## 2024-10-31 RX ADMIN — CHLORHEXIDINE GLUCONATE 0.12% ORAL RINSE 15 ML: 1.2 LIQUID ORAL at 08:25

## 2024-10-31 RX ADMIN — SODIUM CHLORIDE, SODIUM LACTATE, POTASSIUM CHLORIDE, AND CALCIUM CHLORIDE: .6; .31; .03; .02 INJECTION, SOLUTION INTRAVENOUS at 13:14

## 2024-10-31 RX ADMIN — ROCURONIUM BROMIDE 20 MG: 10 INJECTION, SOLUTION INTRAVENOUS at 14:02

## 2024-10-31 RX ADMIN — SODIUM CHLORIDE, SODIUM GLUCONATE, SODIUM ACETATE, POTASSIUM CHLORIDE, MAGNESIUM CHLORIDE, SODIUM PHOSPHATE, DIBASIC, AND POTASSIUM PHOSPHATE 1000 ML: .53; .5; .37; .037; .03; .012; .00082 INJECTION, SOLUTION INTRAVENOUS at 22:47

## 2024-10-31 RX ADMIN — PIPERACILLIN AND TAZOBACTAM 4.5 G: 36; 4.5 INJECTION, POWDER, FOR SOLUTION INTRAVENOUS at 04:50

## 2024-10-31 NOTE — ANESTHESIA POSTPROCEDURE EVALUATION
Post-Op Assessment Note    CV Status:  Stable  Pain Score: 0    Pain management: adequate       Mental Status:  Unresponsive (sedated & intubated)   Hydration Status:  Euvolemic   PONV Controlled:  None   Airway Patency:  Patent  Airway: intubated     Post Op Vitals Reviewed: Yes    No anethesia notable event occurred.    Staff: CRNA   Comments: Patient transferred to ICU by CRNA & RN.  Pt on portable monitor- VSS.  See MAR for continuos medication infusions. Pt intubated and ventilated via AMBU bag SPO2 97%.  Report given to ICU RN and MD.        Last Filed PACU Vitals:  Vitals Value Taken Time   Temp     Pulse 97 10/31/24 1505   /46 10/31/24 1505   Resp 14 10/31/24 1505   SpO2 92 % 10/31/24 1505       Modified Tomer:  No data recorded

## 2024-10-31 NOTE — PROGRESS NOTES
Progress Note - Gastroenterology   Name: Bertha Brown 84 y.o. female I MRN: [id number]  Unit/Bed#: ICU 05 I Date of Admission: 10/29/2024   Date of Service: 10/31/2024 I Hospital Day: 2    Assessment & Plan  Coffee ground emesis  -3 episodes prior to ER presentation  -Hgb on admission 13.2 with drop to 9.6 today  -CT AP with IV contrast notes no evidence of active high-volume GI hemorrhage.  Moderate diffuse colonic distension with mild wall thickening suggestive of the distal transverse and descending colon noted.  Mild distention esophagus with moderate gaseous distention  -PPI BID  -Initial plan was for EGD however this has been postponed due to ischemic bowel and need for emergent surgical intervention  -Continue to trend Hgb/Hct and transfuse as necessary  -Monitor for signs of recurrent GIB  Chronic idiopathic constipation  Patient passed several non-bloody stools since admission    Septic shock (HCC)    Ischemic colitis (HCC)  Patient reported 2 weeks of no BM on admission  CT AP on admission with moderate diffuse colonic distension with moderate fecal retention  -Lactic acid was elevated on admission at 3.5 which initially improved to 3.1 but worsened in the later afternoon to 4.5  -Abdominal pain worsened and patient became septic  -Repeat CT AP noted diffuse thickening of the colon with increasing mesenteric fat stranding in the left lower quadrant suggesting pneumatosis and hepatic flexure and proximal transverse colon concerning for ischemic colitis  -Given worsening presentation the patient was taken emergently to the OR 10/29 with extended right hemicolectomy.  Abdomen was left open.  Plan to take the patient back to the OR today for washout, possible ileostomy, possible closure  -Colonoscopy 2009 with polyps and diverticulosis  -Will continue to follow peripherally  -She will need a plan for better outpatient bowel regimen  -Ongoing treatment recommendations deferred to ICU and surgery  teams  Paroxysmal atrial fibrillation (HCC)  On coumadin which is currently on hold  INR on admission 2.04      Patient will be seen and examined by Dr. Cash.  All sexton medical decisions were made by Dr. Cash.         Subjective   Intubated and sedated in the ICU.  OGT without any blood suction.  Confirmed with ICU attending there has been no further evidence of GI bleeding at present.    Objective :  Temp:  [98.4 °F (36.9 °C)-100.4 °F (38 °C)] 98.6 °F (37 °C)  HR:  [] 97  BP: ()/(50-72) 109/53  Resp:  [11-25] 18  SpO2:  [93 %-100 %] 95 %  O2 Device: Ventilator  FiO2 (%):  [40-50] 40    Physical Exam  Vitals reviewed.   Constitutional:       Interventions: She is sedated and intubated.   HENT:      Head: Normocephalic and atraumatic.   Cardiovascular:      Rate and Rhythm: Tachycardia present. Rhythm irregular.   Pulmonary:      Effort: She is intubated.   Abdominal:      General: Bowel sounds are decreased. There is distension.   Skin:     Coloration: Skin is not jaundiced.           Lab Results: I have reviewed the following results:CBC/BMP:   .     10/31/24  0506   WBC 5.77   HGB 9.6*   HCT 30.3*   *   SODIUM 139   K 4.2   *   CO2 20*   BUN 32*   CREATININE 1.45*   GLUC 141*   CAIONIZED 1.17   MG 2.2   PHOS 3.7    , LFTs:   .     10/31/24  0506   AST 14   ALT 7   ALB 2.9*   TBILI 0.88   ALKPHOS 32*

## 2024-10-31 NOTE — PLAN OF CARE
Problem: PAIN - ADULT  Goal: Verbalizes/displays adequate comfort level or baseline comfort level  Description: Interventions:  - Encourage patient to monitor pain and request assistance  - Assess pain using appropriate pain scale  - Administer analgesics based on type and severity of pain and evaluate response  - Implement non-pharmacological measures as appropriate and evaluate response  - Consider cultural and social influences on pain and pain management  - Notify physician/advanced practitioner if interventions unsuccessful or patient reports new pain  Outcome: Progressing     Problem: INFECTION - ADULT  Goal: Absence or prevention of progression during hospitalization  Description: INTERVENTIONS:  - Assess and monitor for signs and symptoms of infection  - Monitor lab/diagnostic results  - Monitor all insertion sites, i.e. indwelling lines, tubes, and drains  - Monitor endotracheal if appropriate and nasal secretions for changes in amount and color  - [place] appropriate cooling/warming therapies per order  - Administer medications as ordered  - Instruct and encourage patient and family to use good hand hygiene technique  - Identify and instruct in appropriate isolation precautions for identified infection/condition  Outcome: Progressing  Goal: Absence of fever/infection during neutropenic period  Description: INTERVENTIONS:  - Monitor WBC    Outcome: Progressing     Problem: SAFETY ADULT  Goal: Patient will remain free of falls  Description: INTERVENTIONS:  - Educate patient/family on patient safety including physical limitations  - Instruct patient to call for assistance with activity   - Consult OT/PT to assist with strengthening/mobility   - Keep Call bell within reach  - Keep bed low and locked with side rails adjusted as appropriate  - Keep care items and personal belongings within reach  - Initiate and maintain comfort rounds  - Make Fall Risk Sign visible to staff  - Offer Toileting every  Hours,  in advance of need  - Initiate/Maintain alarm  - Obtain necessary fall risk management equipment:   - Apply yellow socks and bracelet for high fall risk patients  - Consider moving patient to room near nurses station  Outcome: Progressing  Goal: Maintain or return to baseline ADL function  Description: INTERVENTIONS:  -  Assess patient's ability to carry out ADLs; assess patient's baseline for ADL function and identify physical deficits which impact ability to perform ADLs (bathing, care of mouth/teeth, toileting, grooming, dressing, etc.)  - Assess/evaluate cause of self-care deficits   - Assess range of motion  - Assess patient's mobility; develop plan if impaired  - Assess patient's need for assistive devices and provide as appropriate  - Encourage maximum independence but intervene and supervise when necessary  - Involve family in performance of ADLs  - Assess for home care needs following discharge   - Consider OT consult to assist with ADL evaluation and planning for discharge  - Provide patient education as appropriate  Outcome: Progressing  Goal: Maintains/Returns to pre admission functional level  Description: INTERVENTIONS:  - Perform AM-PAC 6 Click Basic Mobility/ Daily Activity assessment daily.  - Set and communicate daily mobility goal to care team and patient/family/caregiver.   - Collaborate with rehabilitation services on mobility goals if consulted  - Perform Range of Motion  times a day.  - Reposition patient every  hours.  - Dangle patient  times a day  - Stand patient  times a day  - Ambulate patient  times a day  - Out of bed to chair  times a day   - Out of bed for meals  times a day  - Out of bed for toileting  - Record patient progress and toleration of activity level   Outcome: Progressing     Problem: DISCHARGE PLANNING  Goal: Discharge to home or other facility with appropriate resources  Description: INTERVENTIONS:  - Identify barriers to discharge w/patient and caregiver  - Arrange for  needed discharge resources and transportation as appropriate  - Identify discharge learning needs (meds, wound care, etc.)  - Arrange for interpretive services to assist at discharge as needed  - Refer to Case Management Department for coordinating discharge planning if the patient needs post-hospital services based on physician/advanced practitioner order or complex needs related to functional status, cognitive ability, or social support system  Outcome: Progressing     Problem: Knowledge Deficit  Goal: Patient/family/caregiver demonstrates understanding of disease process, treatment plan, medications, and discharge instructions  Description: Complete learning assessment and assess knowledge base.  Interventions:  - Provide teaching at level of understanding  - Provide teaching via preferred learning methods  Outcome: Progressing     Problem: NEUROSENSORY - ADULT  Goal: Achieves stable or improved neurological status  Description: INTERVENTIONS  - Monitor and report changes in neurological status  - Monitor vital signs such as temperature, blood pressure, glucose, and any other labs ordered   - Initiate measures to prevent increased intracranial pressure  - Monitor for seizure activity and implement precautions if appropriate      Outcome: Progressing  Goal: Remains free of injury related to seizures activity  Description: INTERVENTIONS  - Maintain airway, patient safety  and administer oxygen as ordered  - Monitor patient for seizure activity, document and report duration and description of seizure to physician/advanced practitioner  - If seizure occurs,  ensure patient safety during seizure  - Reorient patient post seizure  - Seizure pads on all 4 side rails  - Instruct patient/family to notify RN of any seizure activity including if an aura is experienced  - Instruct patient/family to call for assistance with activity based on nursing assessment  - Administer anti-seizure medications if ordered    Outcome:  Progressing  Goal: Achieves maximal functionality and self care  Description: INTERVENTIONS  - Monitor swallowing and airway patency with patient fatigue and changes in neurological status  - Encourage and assist patient to increase activity and self care.   - Encourage visually impaired, hearing impaired and aphasic patients to use assistive/communication devices  Outcome: Progressing     Problem: RESPIRATORY - ADULT  Goal: Achieves optimal ventilation and oxygenation  Description: INTERVENTIONS:  - Assess for changes in respiratory status  - Assess for changes in mentation and behavior  - Position to facilitate oxygenation and minimize respiratory effort  - Oxygen administered by appropriate delivery if ordered  - Initiate smoking cessation education as indicated  - Encourage broncho-pulmonary hygiene including cough, deep breathe, Incentive Spirometry  - Assess the need for suctioning and aspirate as needed  - Assess and instruct to report SOB or any respiratory difficulty  - Respiratory Therapy support as indicated  Outcome: Progressing     Problem: GASTROINTESTINAL - ADULT  Goal: Minimal or absence of nausea and/or vomiting  Description: INTERVENTIONS:  - Administer IV fluids if ordered to ensure adequate hydration  - Maintain NPO status until nausea and vomiting are resolved  - Nasogastric tube if ordered  - Administer ordered antiemetic medications as needed  - Provide nonpharmacologic comfort measures as appropriate  - Advance diet as tolerated, if ordered  - Consider nutrition services referral to assist patient with adequate nutrition and appropriate food choices  Outcome: Progressing  Goal: Maintains or returns to baseline bowel function  Description: INTERVENTIONS:  - Assess bowel function  - Encourage oral fluids to ensure adequate hydration  - Administer IV fluids if ordered to ensure adequate hydration  - Administer ordered medications as needed  - Encourage mobilization and activity  - Consider  nutritional services referral to assist patient with adequate nutrition and appropriate food choices  Outcome: Progressing  Goal: Maintains adequate nutritional intake  Description: INTERVENTIONS:  - Monitor percentage of each meal consumed  - Identify factors contributing to decreased intake, treat as appropriate  - Assist with meals as needed  - Monitor I&O, weight, and lab values if indicated  - Obtain nutrition services referral as needed  Outcome: Progressing  Goal: Establish and maintain optimal ostomy function  Description: INTERVENTIONS:  - Assess bowel function  - Encourage oral fluids to ensure adequate hydration  - Administer IV fluids if ordered to ensure adequate hydration   - Administer ordered medications as needed  - Encourage mobilization and activity  - Nutrition services referral to assist patient with appropriate food choices  - Assess stoma site  - Consider wound care consult   Outcome: Progressing  Goal: Oral mucous membranes remain intact  Description: INTERVENTIONS  - Assess oral mucosa and hygiene practices  - Implement preventative oral hygiene regimen  - Implement oral medicated treatments as ordered  - Initiate Nutrition services referral as needed  Outcome: Progressing     Problem: GENITOURINARY - ADULT  Goal: Maintains or returns to baseline urinary function  Description: INTERVENTIONS:  - Assess urinary function  - Encourage oral fluids to ensure adequate hydration if ordered  - Administer IV fluids as ordered to ensure adequate hydration  - Administer ordered medications as needed  - Offer frequent toileting  - Follow urinary retention protocol if ordered  Outcome: Progressing  Goal: Absence of urinary retention  Description: INTERVENTIONS:  - Assess patient’s ability to void and empty bladder  - Monitor I/O  - Bladder scan as needed  - Discuss with physician/AP medications to alleviate retention as needed  - Discuss catheterization for long term situations as appropriate  Outcome:  Progressing  Goal: Urinary catheter remains patent  Description: INTERVENTIONS:  - Assess patency of urinary catheter  - If patient has a chronic james, consider changing catheter if non-functioning  - Follow guidelines for intermittent irrigation of non-functioning urinary catheter  Outcome: Progressing     Problem: METABOLIC, FLUID AND ELECTROLYTES - ADULT  Goal: Electrolytes maintained within normal limits  Description: INTERVENTIONS:  - Monitor labs and assess patient for signs and symptoms of electrolyte imbalances  - Administer electrolyte replacement as ordered  - Monitor response to electrolyte replacements, including repeat lab results as appropriate  - Instruct patient on fluid and nutrition as appropriate  Outcome: Progressing  Goal: Fluid balance maintained  Description: INTERVENTIONS:  - Monitor labs   - Monitor I/O and WT  - Instruct patient on fluid and nutrition as appropriate  - Assess for signs & symptoms of volume excess or deficit  Outcome: Progressing  Goal: Glucose maintained within target range  Description: INTERVENTIONS:  - Monitor Blood Glucose as ordered  - Assess for signs and symptoms of hyperglycemia and hypoglycemia  - Administer ordered medications to maintain glucose within target range  - Assess nutritional intake and initiate nutrition service referral as needed  Outcome: Progressing     Problem: SKIN/TISSUE INTEGRITY - ADULT  Goal: Skin Integrity remains intact(Skin Breakdown Prevention)  Description: Assess:  -Perform Jese assessment every   -Clean and moisturize skin every   -Inspect skin when repositioning, toileting, and assisting with ADLS  -Assess under medical devices such as  every   -Assess extremities for adequate circulation and sensation     Bed Management:  -Have minimal linens on bed & keep smooth, unwrinkled  -Change linens as needed when moist or perspiring  -Avoid sitting or lying in one position for more than  hours while in bed  -Keep HOB at degrees      Toileting:  -Offer bedside commode  -Assess for incontinence every   -Use incontinent care products after each incontinent episode such as     Activity:  -Mobilize patient  times a day  -Encourage activity and walks on unit  -Encourage or provide ROM exercises   -Turn and reposition patient every  Hours  -Use appropriate equipment to lift or move patient in bed  -Instruct/ Assist with weight shifting every  when out of bed in chair  -Consider limitation of chair time  hour intervals    Skin Care:  -Avoid use of baby powder, tape, friction and shearing, hot water or constrictive clothing  -Relieve pressure over bony prominences using   -Do not massage red bony areas    Next Steps:  -Teach patient strategies to minimize risks such as    -Consider consults to  interdisciplinary teams such as   Outcome: Progressing  Goal: Incision(s), wounds(s) or drain site(s) healing without S/S of infection  Description: INTERVENTIONS  - Assess and document dressing, incision, wound bed, drain sites and surrounding tissue  - Provide patient and family education  - Perform skin care/dressing changes every   Outcome: Progressing  Goal: Pressure injury heals and does not worsen  Description: Interventions:  - Implement low air loss mattress or specialty surface (Criteria met)  - Apply silicone foam dressing  - Instruct/assist with weight shifting every  minutes when in chair   - Limit chair time to  hour intervals  - Use special pressure reducing interventions such as  when in chair   - Apply fecal or urinary incontinence containment device   - Perform passive or active ROM every   - Turn and reposition patient & offload bony prominences every  hours   - Utilize friction reducing device or surface for transfers   - Consider consults to  interdisciplinary teams such as   - Use incontinent care products after each incontinent episode such as  Consider nutrition services referral as needed  Outcome: Progressing     Problem: HEMATOLOGIC  - ADULT  Goal: Maintains hematologic stability  Description: INTERVENTIONS  - Assess for signs and symptoms of bleeding or hemorrhage  - Monitor labs  - Administer supportive blood products/factors as ordered and appropriate  Outcome: Progressing     Problem: MUSCULOSKELETAL - ADULT  Goal: Maintain or return mobility to safest level of function  Description: INTERVENTIONS:  - Assess patient's ability to carry out ADLs; assess patient's baseline for ADL function and identify physical deficits which impact ability to perform ADLs (bathing, care of mouth/teeth, toileting, grooming, dressing, etc.)  - Assess/evaluate cause of self-care deficits   - Assess range of motion  - Assess patient's mobility  - Assess patient's need for assistive devices and provide as appropriate  - Encourage maximum independence but intervene and supervise when necessary  - Involve family in performance of ADLs  - Assess for home care needs following discharge   - Consider OT consult to assist with ADL evaluation and planning for discharge  - Provide patient education as appropriate  Outcome: Progressing  Goal: Maintain proper alignment of affected body part  Description: INTERVENTIONS:  - Support, maintain and protect limb and body alignment  - Provide patient/ family with appropriate education  Outcome: Progressing     Problem: SAFETY,RESTRAINT: NV/NON-SELF DESTRUCTIVE BEHAVIOR  Goal: Remains free of harm/injury (restraint for non violent/non self-detsructive behavior)  Description: INTERVENTIONS:  - Instruct patient/family regarding restraint use   - Assess and monitor physiologic and psychological status   - Provide interventions and comfort measures to meet assessed patient needs   - Identify and implement measures to help patient regain control  - Assess readiness for release of restraint   Outcome: Progressing  Goal: Returns to optimal restraint-free functioning  Description: INTERVENTIONS:  - Assess the patient's behavior and symptoms  that indicate continued need for restraint  - Identify and implement measures to help patient regain control  - Assess readiness for release of restraint   Outcome: Progressing     Problem: Nutrition/Hydration-ADULT  Goal: Nutrient/Hydration intake appropriate for improving, restoring or maintaining nutritional needs  Description: Monitor and assess patient's nutrition/hydration status for malnutrition. Collaborate with interdisciplinary team and initiate plan and interventions as ordered.  Monitor patient's weight and dietary intake as ordered or per policy. Utilize nutrition screening tool and intervene as necessary. Determine patient's food preferences and provide high-protein, high-caloric foods as appropriate.     INTERVENTIONS:  - Monitor oral intake, urinary output, labs, and treatment plans  - Assess nutrition and hydration status and recommend course of action  - Evaluate amount of meals eaten  - Assist patient with eating if necessary   - Allow adequate time for meals  - Recommend/ encourage appropriate diets, oral nutritional supplements, and vitamin/mineral supplements  - Order, calculate, and assess calorie counts as needed  - Recommend, monitor, and adjust tube feedings and TPN/PPN based on assessed needs  - Assess need for intravenous fluids  - Provide specific nutrition/hydration education as appropriate  - Include patient/family/caregiver in decisions related to nutrition  Outcome: Progressing

## 2024-10-31 NOTE — PROGRESS NOTES
"Progress Note - General Surgery   Bertha Brown 84 y.o. female MRN: [id number]  Unit/Bed#: ICU 05 Encounter: [id number]    Assessment:  Bertha Brown is a 84 y.o. female POD1 diagnostic laparoscopy converted to laparotomy, extended right hemicolectomy, abthera vac placement.    AVSS, no leukocytosis, Hgb stable, lactate normal  Abthera VAC 1.4L output SS      Vaso 0.04, PEEP 10, FiO2 40%    Plan:  OR today for exploratory laparotomy, washout, possible ileostomy, possible closure  Monitor abdominal exam, labs, vitals  Continue IV antibiotics  Remainder of care per primary team-CCU    Subjective/Objective    Subjective: No acute events overnight.     Objective:     Blood pressure 112/56, pulse 93, temperature 98.6 °F (37 °C), resp. rate 18, height 5' 5\" (1.651 m), weight 105 kg (232 lb 5.8 oz), SpO2 95%.,Body mass index is 38.67 kg/m².      Intake/Output Summary (Last 24 hours) at 10/31/2024 0811  Last data filed at 10/31/2024 0600  Gross per 24 hour   Intake 5457.48 ml   Output 3090 ml   Net 2367.48 ml       Invasive Devices       Central Venous Catheter Line  Duration             CVC Central Lines 10/29/24 1 day              Peripheral Intravenous Line  Duration             Peripheral IV 10/29/24 Left Antecubital 1 day    Peripheral IV 10/29/24 Proximal;Right;Ventral (anterior) Forearm 1 day              Arterial Line  Duration             Arterial Line 10/29/24 Left Radial 1 day              Drain  Duration             NG/OG/Enteral Tube Nasogastric 18 Fr Left nare 1 day    Urethral Catheter Asept;Coude;Non-latex;Straight-tip 16 Fr. 1 day              Airway  Duration             ETT  Oral;Hi-Lo;Inflated 7 mm 1 day                    Physical Exam:   GEN: Intubated  HEENT: NCAT, MMM, intubated  CV: RRR, no m/r/g  Lung: Ventilated  Ab: Soft, distended, ABThera VAC in place and functioning with serosanguineous output  Extrem: No CCE   Neuro: Intubated    Lab, Imaging and other studies:I have personally " reviewed pertinent lab results.     VTE Pharmacologic Prophylaxis: Heparin  VTE Mechanical Prophylaxis: sequential compression device    Recent Results (from the past 36 hour(s))   Lactic acid 2 Hours    Collection Time: 10/29/24  9:42 PM   Result Value Ref Range    LACTIC ACID 4.6 (HH) 0.5 - 2.0 mmol/L   Protime-INR    Collection Time: 10/29/24  9:42 PM   Result Value Ref Range    Protime 15.7 (H) 12.3 - 15.0 seconds    INR 1.18 0.85 - 1.19   POCT Blood Gas (CG8+)    Collection Time: 10/29/24 11:17 PM   Result Value Ref Range    pH, Art i-STAT 7.214 (L) 7.350 - 7.450    pCO2, Art i-STAT 37.9 36.0 - 44.0 mm HG    pO2, ART i-STAT 60.0 (L) 75.0 - 129.0 mm HG    BE, i-STAT -12 (L) -2 - 3 mmol/L    HCO3, Art i-STAT 15.3 (LL) 22.0 - 28.0 mmol/L    CO2, i-STAT 16 (L) 21 - 32 mmol/L    O2 Sat, i-STAT 85 60 - 85 %    SODIUM, I-STAT 142 136 - 145 mmol/l    Potassium, i-STAT 5.2 3.5 - 5.3 mmol/L    Calcium, Ionized i-STAT 1.09 (L) 1.12 - 1.32 mmol/L    Hct, i-STAT 32 (L) 34.8 - 46.1 %    Hgb, i-STAT 10.9 (L) 11.5 - 15.4 g/dl    Glucose, i-STAT 135 65 - 140 mg/dl    Specimen Type ARTERIAL    Body fluid culture and Gram stain    Collection Time: 10/29/24 11:34 PM    Specimen: Peritoneal Fluid; Body Fluid   Result Value Ref Range    Gram Stain Result No Polys or Bacteria seen    CBC and differential    Collection Time: 10/30/24  1:30 AM   Result Value Ref Range    WBC 8.31 4.31 - 10.16 Thousand/uL    RBC 3.48 (L) 3.81 - 5.12 Million/uL    Hemoglobin 10.7 (L) 11.5 - 15.4 g/dL    Hematocrit 34.3 (L) 34.8 - 46.1 %    MCV 99 (H) 82 - 98 fL    MCH 30.7 26.8 - 34.3 pg    MCHC 31.2 (L) 31.4 - 37.4 g/dL    RDW 14.1 11.6 - 15.1 %    MPV 11.2 8.9 - 12.7 fL    Platelets 167 149 - 390 Thousands/uL   Lactic acid, plasma (w/reflex if result > 2.0)    Collection Time: 10/30/24  1:30 AM   Result Value Ref Range    LACTIC ACID 3.8 (H) 0.5 - 2.0 mmol/L   Comprehensive metabolic panel    Collection Time: 10/30/24  1:30 AM   Result Value Ref Range     Sodium 140 135 - 147 mmol/L    Potassium 4.8 3.5 - 5.3 mmol/L    Chloride 114 (H) 96 - 108 mmol/L    CO2 18 (L) 21 - 32 mmol/L    ANION GAP 8 4 - 13 mmol/L    BUN 37 (H) 5 - 25 mg/dL    Creatinine 1.60 (H) 0.60 - 1.30 mg/dL    Glucose 143 (H) 65 - 140 mg/dL    Calcium 7.4 (L) 8.4 - 10.2 mg/dL    Corrected Calcium 8.0 (L) 8.3 - 10.1 mg/dL    AST 18 13 - 39 U/L    ALT 12 7 - 52 U/L    Alkaline Phosphatase 44 34 - 104 U/L    Total Protein 5.2 (L) 6.4 - 8.4 g/dL    Albumin 3.2 (L) 3.5 - 5.0 g/dL    Total Bilirubin 0.69 0.20 - 1.00 mg/dL    eGFR 29 ml/min/1.73sq m   Magnesium    Collection Time: 10/30/24  1:30 AM   Result Value Ref Range    Magnesium 2.2 1.9 - 2.7 mg/dL   Phosphorus    Collection Time: 10/30/24  1:30 AM   Result Value Ref Range    Phosphorus 4.2 (H) 2.3 - 4.1 mg/dL   Calcium, ionized    Collection Time: 10/30/24  1:30 AM   Result Value Ref Range    Calcium, Ionized 1.09 (L) 1.12 - 1.32 mmol/L   Blood gas, arterial    Collection Time: 10/30/24  1:33 AM   Result Value Ref Range    pH, Arterial 7.191 (LL) 7.350 - 7.450    pCO2, Arterial 43.7 36.0 - 44.0 mm Hg    pO2, Arterial 118.2 75.0 - 129.0 mm Hg    HCO3, Arterial 16.4 (L) 22.0 - 28.0 mmol/L    Base Excess, Arterial -11.3 mmol/L    O2 Content, Arterial 15.8 (L) 16.0 - 23.0 mL/dL    O2 HGB,Arterial  96.8 94.0 - 97.0 %    SOURCE Line, Arterial     Vent Type- AC AC     AC Rate 16     Tidal Volume 360 ml    Inspired Air (FIO2) 100     PEEP 10    Blood gas, venous    Collection Time: 10/30/24  1:33 AM   Result Value Ref Range    pH, Joe 7.167 (LL) 7.300 - 7.400    pCO2, Joe 48.1 42.0 - 50.0 mm Hg    pO2, Joe 53.1 (H) 35.0 - 45.0 mm Hg    HCO3, Joe 17.0 (L) 24 - 30 mmol/L    Base Excess, Joe -11.3 mmol/L    O2 Content, Joe 13.9 ml/dL    O2 HGB, VENOUS 82.6 (H) 60.0 - 80.0 %   Lactic acid 2 Hours    Collection Time: 10/30/24  4:55 AM   Result Value Ref Range    LACTIC ACID 2.6 (H) 0.5 - 2.0 mmol/L   Procalcitonin    Collection Time: 10/30/24  4:55 AM    Result Value Ref Range    Procalcitonin 44.94 (H) <=0.25 ng/ml   Protime-INR    Collection Time: 10/30/24  4:55 AM   Result Value Ref Range    Protime 19.3 (H) 12.3 - 15.0 seconds    INR 1.55 (H) 0.85 - 1.19   Blood gas, arterial    Collection Time: 10/30/24  5:01 AM   Result Value Ref Range    pH, Arterial 7.231 (L) 7.350 - 7.450    pCO2, Arterial 43.5 36.0 - 44.0 mm Hg    pO2, Arterial 66.5 (L) 75.0 - 129.0 mm Hg    HCO3, Arterial 17.8 (L) 22.0 - 28.0 mmol/L    Base Excess, Arterial -9.3 mmol/L    O2 Content, Arterial 15.2 (L) 16.0 - 23.0 mL/dL    O2 HGB,Arterial  91.3 (L) 94.0 - 97.0 %    SOURCE Line, Arterial     Temperature 98.8 Degrees Fehrenheit    Vent Type- AC AC     AC Rate 16     Tidal Volume 360 ml    Inspired Air (FIO2) 80     PEEP 10    Fingerstick Glucose (POCT)    Collection Time: 10/30/24  6:10 AM   Result Value Ref Range    POC Glucose 142 (H) 65 - 140 mg/dl   Comprehensive metabolic panel    Collection Time: 10/30/24  8:09 AM   Result Value Ref Range    Sodium 139 135 - 147 mmol/L    Potassium 5.2 3.5 - 5.3 mmol/L    Chloride 113 (H) 96 - 108 mmol/L    CO2 21 21 - 32 mmol/L    ANION GAP 5 4 - 13 mmol/L    BUN 35 (H) 5 - 25 mg/dL    Creatinine 1.52 (H) 0.60 - 1.30 mg/dL    Glucose 165 (H) 65 - 140 mg/dL    Calcium 8.0 (L) 8.4 - 10.2 mg/dL    Corrected Calcium 8.6 8.3 - 10.1 mg/dL    AST 17 13 - 39 U/L    ALT 11 7 - 52 U/L    Alkaline Phosphatase 40 34 - 104 U/L    Total Protein 5.2 (L) 6.4 - 8.4 g/dL    Albumin 3.2 (L) 3.5 - 5.0 g/dL    Total Bilirubin 0.86 0.20 - 1.00 mg/dL    eGFR 31 ml/min/1.73sq m   Blood gas, arterial    Collection Time: 10/30/24  8:09 AM   Result Value Ref Range    pH, Arterial 7.234 (L) 7.350 - 7.450    pCO2, Arterial 39.7 36.0 - 44.0 mm Hg    pO2, Arterial 72.7 (L) 75.0 - 129.0 mm Hg    HCO3, Arterial 16.4 (L) 22.0 - 28.0 mmol/L    Base Excess, Arterial -10.4 mmol/L    O2 Content, Arterial 16.1 16.0 - 23.0 mL/dL    O2 HGB,Arterial  92.6 (L) 94.0 - 97.0 %    SOURCE Line,  Arterial     Vent Type- AC AC     AC Rate 16     Tidal Volume 360 ml    Inspired Air (FIO2) 10     PEEP 80    Calcium, ionized    Collection Time: 10/30/24  8:09 AM   Result Value Ref Range    Calcium, Ionized 1.07 (L) 1.12 - 1.32 mmol/L   Basic metabolic panel    Collection Time: 10/30/24 12:09 PM   Result Value Ref Range    Sodium 140 135 - 147 mmol/L    Potassium 5.0 3.5 - 5.3 mmol/L    Chloride 113 (H) 96 - 108 mmol/L    CO2 20 (L) 21 - 32 mmol/L    ANION GAP 7 4 - 13 mmol/L    BUN 35 (H) 5 - 25 mg/dL    Creatinine 1.56 (H) 0.60 - 1.30 mg/dL    Glucose 167 (H) 65 - 140 mg/dL    Calcium 8.3 (L) 8.4 - 10.2 mg/dL    eGFR 30 ml/min/1.73sq m   Calcium, ionized    Collection Time: 10/30/24 12:09 PM   Result Value Ref Range    Calcium, Ionized 1.18 1.12 - 1.32 mmol/L   Blood gas, arterial    Collection Time: 10/30/24 12:09 PM   Result Value Ref Range    pH, Arterial 7.238 (L) 7.350 - 7.450    pCO2, Arterial 41.4 36.0 - 44.0 mm Hg    pO2, Arterial 109.0 75.0 - 129.0 mm Hg    HCO3, Arterial 17.3 (L) 22.0 - 28.0 mmol/L    Base Excess, Arterial -9.6 mmol/L    O2 Content, Arterial 16.6 16.0 - 23.0 mL/dL    O2 HGB,Arterial  96.1 94.0 - 97.0 %    SOURCE Line, Arterial     Vent Type- AC AC     AC Rate 16     Tidal Volume 360 ml    Inspired Air (FIO2) 80     PEEP 10    Hemoglobin    Collection Time: 10/30/24 12:09 PM   Result Value Ref Range    Hemoglobin 10.9 (L) 11.5 - 15.4 g/dL   Fingerstick Glucose (POCT)    Collection Time: 10/30/24 12:43 PM   Result Value Ref Range    POC Glucose 159 (H) 65 - 140 mg/dl   Basic metabolic panel    Collection Time: 10/30/24  5:24 PM   Result Value Ref Range    Sodium 139 135 - 147 mmol/L    Potassium 5.0 3.5 - 5.3 mmol/L    Chloride 113 (H) 96 - 108 mmol/L    CO2 20 (L) 21 - 32 mmol/L    ANION GAP 6 4 - 13 mmol/L    BUN 37 (H) 5 - 25 mg/dL    Creatinine 1.66 (H) 0.60 - 1.30 mg/dL    Glucose 141 (H) 65 - 140 mg/dL    Calcium 7.9 (L) 8.4 - 10.2 mg/dL    eGFR 28 ml/min/1.73sq m   Lactic acid,  plasma (w/reflex if result > 2.0)    Collection Time: 10/30/24  5:24 PM   Result Value Ref Range    LACTIC ACID 2.0 0.5 - 2.0 mmol/L   Hemoglobin    Collection Time: 10/30/24  5:24 PM   Result Value Ref Range    Hemoglobin 10.6 (L) 11.5 - 15.4 g/dL   Lactic acid, plasma (w/reflex if result > 2.0)    Collection Time: 10/30/24 10:10 PM   Result Value Ref Range    LACTIC ACID 1.5 0.5 - 2.0 mmol/L   Basic metabolic panel    Collection Time: 10/30/24 10:10 PM   Result Value Ref Range    Sodium 139 135 - 147 mmol/L    Potassium 4.5 3.5 - 5.3 mmol/L    Chloride 113 (H) 96 - 108 mmol/L    CO2 20 (L) 21 - 32 mmol/L    ANION GAP 6 4 - 13 mmol/L    BUN 36 (H) 5 - 25 mg/dL    Creatinine 1.62 (H) 0.60 - 1.30 mg/dL    Glucose 127 65 - 140 mg/dL    Calcium 8.3 (L) 8.4 - 10.2 mg/dL    eGFR 29 ml/min/1.73sq m   Calcium, ionized    Collection Time: 10/30/24 10:10 PM   Result Value Ref Range    Calcium, Ionized 1.17 1.12 - 1.32 mmol/L   Blood gas, arterial    Collection Time: 10/30/24 10:15 PM   Result Value Ref Range    pH, Arterial 7.315 (L) 7.350 - 7.450    pCO2, Arterial 37.3 36.0 - 44.0 mm Hg    pO2, Arterial 99.7 75.0 - 129.0 mm Hg    HCO3, Arterial 18.6 (L) 22.0 - 28.0 mmol/L    Base Excess, Arterial -7.0 mmol/L    O2 Content, Arterial 14.8 (L) 16.0 - 23.0 mL/dL    O2 HGB,Arterial  95.9 94.0 - 97.0 %    SOURCE Line, Arterial     Temperature 98.8 Degrees Fehrenheit    Vent Type- AC AC     AC Rate 18     Tidal Volume 360 ml    Inspired Air (FIO2) 50     PEEP 10    Fingerstick Glucose (POCT)    Collection Time: 10/30/24 11:16 PM   Result Value Ref Range    POC Glucose 108 65 - 140 mg/dl   Fingerstick Glucose (POCT)    Collection Time: 10/31/24 12:13 AM   Result Value Ref Range    POC Glucose 139 65 - 140 mg/dl   Blood gas, arterial    Collection Time: 10/31/24  5:05 AM   Result Value Ref Range    pH, Arterial 7.322 (L) 7.350 - 7.450    pCO2, Arterial 39.0 36.0 - 44.0 mm Hg    pO2, Arterial 79.8 75.0 - 129.0 mm Hg    HCO3, Arterial  19.7 (L) 22.0 - 28.0 mmol/L    Base Excess, Arterial -5.8 mmol/L    O2 Content, Arterial 13.8 (L) 16.0 - 23.0 mL/dL    O2 HGB,Arterial  94.3 94.0 - 97.0 %    SOURCE Line, Arterial     Temperature 98.6 Degrees Fehrenheit    Vent Type- AC AC     AC Rate 18     Tidal Volume 360 ml    Inspired Air (FIO2) 40     PEEP 10    Phosphorus    Collection Time: 10/31/24  5:06 AM   Result Value Ref Range    Phosphorus 3.7 2.3 - 4.1 mg/dL   Magnesium    Collection Time: 10/31/24  5:06 AM   Result Value Ref Range    Magnesium 2.2 1.9 - 2.7 mg/dL   Calcium, ionized    Collection Time: 10/31/24  5:06 AM   Result Value Ref Range    Calcium, Ionized 1.17 1.12 - 1.32 mmol/L   Comprehensive metabolic panel    Collection Time: 10/31/24  5:06 AM   Result Value Ref Range    Sodium 139 135 - 147 mmol/L    Potassium 4.2 3.5 - 5.3 mmol/L    Chloride 113 (H) 96 - 108 mmol/L    CO2 20 (L) 21 - 32 mmol/L    ANION GAP 6 4 - 13 mmol/L    BUN 32 (H) 5 - 25 mg/dL    Creatinine 1.45 (H) 0.60 - 1.30 mg/dL    Glucose 141 (H) 65 - 140 mg/dL    Calcium 8.2 (L) 8.4 - 10.2 mg/dL    Corrected Calcium 9.1 8.3 - 10.1 mg/dL    AST 14 13 - 39 U/L    ALT 7 7 - 52 U/L    Alkaline Phosphatase 32 (L) 34 - 104 U/L    Total Protein 4.9 (L) 6.4 - 8.4 g/dL    Albumin 2.9 (L) 3.5 - 5.0 g/dL    Total Bilirubin 0.88 0.20 - 1.00 mg/dL    eGFR 33 ml/min/1.73sq m   CBC and differential    Collection Time: 10/31/24  5:06 AM   Result Value Ref Range    WBC 5.77 4.31 - 10.16 Thousand/uL    RBC 3.10 (L) 3.81 - 5.12 Million/uL    Hemoglobin 9.6 (L) 11.5 - 15.4 g/dL    Hematocrit 30.3 (L) 34.8 - 46.1 %    MCV 98 82 - 98 fL    MCH 31.0 26.8 - 34.3 pg    MCHC 31.7 31.4 - 37.4 g/dL    RDW 14.5 11.6 - 15.1 %    MPV 10.8 8.9 - 12.7 fL    Platelets 134 (L) 149 - 390 Thousands/uL    nRBC 0 /100 WBCs    Segmented % 85 (H) 43 - 75 %    Immature Grans % 1 0 - 2 %    Lymphocytes % 9 (L) 14 - 44 %    Monocytes % 5 4 - 12 %    Eosinophils Relative 0 0 - 6 %    Basophils Relative 0 0 - 1 %     Absolute Neutrophils 4.89 1.85 - 7.62 Thousands/µL    Absolute Immature Grans 0.08 0.00 - 0.20 Thousand/uL    Absolute Lymphocytes 0.51 (L) 0.60 - 4.47 Thousands/µL    Absolute Monocytes 0.26 0.17 - 1.22 Thousand/µL    Eosinophils Absolute 0.02 0.00 - 0.61 Thousand/µL    Basophils Absolute 0.01 0.00 - 0.10 Thousands/µL   '

## 2024-10-31 NOTE — ANESTHESIA PREPROCEDURE EVALUATION
Procedure:  LAPAROTOMY EXPLORATORY, 2nd look, washout, possible odtomy, bowel resection, possible Abthera closure, possible abdominal closure (Abdomen)    Relevant Problems   ANESTHESIA (within normal limits)  No prior issues with anesthesia as per patient and daughter      CARDIO   (+) Chest pain   (+) Hyperlipidemia   (+) Hypertension   (+) Paroxysmal atrial fibrillation (HCC)      ENDO   (+) Hypothyroidism      GI/HEPATIC  Vomiting earlier this afternoon   (+) Coffee ground emesis      /RENAL   (+) RUKHSANA (acute kidney injury) (HCC)      MUSCULOSKELETAL   (+) Bronchospasm with bronchitis, acute   (+) Osteoarthritis of knee      NEURO/PSYCH   (+) Major depressive disorder, single episode, mild (HCC)      PULMONARY  Requiring midflow nc to maintain saturations in low 90s at time of evaluation   (+) Acute respiratory failure with hypoxia (HCC)        Physical Exam    Airway  Comment: Intubated  Mallampati score: III         Dental    upper dentures and lower dentures    Cardiovascular  Rate: abnormal    Pulmonary   Decreased breath sounds, No wheezes    Other Findings  post-pubertal.      Anesthesia Plan  ASA Score- 3     Anesthesia Type- general with ASA Monitors.         Additional Monitors:     Airway Plan: ETT.           Plan Factors-    Chart reviewed. EKG reviewed. Imaging results reviewed. Existing labs reviewed. Patient summary reviewed.    Patient is not a current smoker.              Induction- intravenous and rapid sequence induction.    Postoperative Plan- Plan for postoperative opioid use.     Perioperative Resuscitation Plan - Level 1 - Full Code.       Informed Consent- Anesthetic plan and risks discussed with daughter and healthcare power of .  I personally reviewed this patient with the CRNA. Discussed and agreed on the Anesthesia Plan with the CRNA..      TTE 5/17/24:  Interpretation Summary    Left Ventricle: Left ventricular cavity size is normal. Wall thickness is normal. The left  ventricular ejection fraction is 70%. Systolic function is vigorous. Wall motion is normal. Diastolic function is mildly abnormal, consistent with grade I (abnormal) relaxation.    Right Ventricle: Right ventricular cavity size is normal. Systolic function is normal.    Left Atrium: The atrium is mildly dilated.      NM Stress 5/17/24:  Interpretation Summary    Stress ECG: No ST deviation is noted. The ECG was negative for ischemia. The stress ECG is negative for ischemia after pharmacologic vasodilation, without reproduction of symptoms.    Stress Function: Left ventricular function post-stress is normal. Stress ejection fraction is over 70%.    Perfusion: There are no perfusion defects.  No evidence of ischemia or infarction by pharmacologic vasodilatory nuclear stress testing.     Hs troponin negative x3 this admission    Lab Results   Component Value Date    WBC 5.77 10/31/2024    HGB 9.6 (L) 10/31/2024    HCT 30.3 (L) 10/31/2024    MCV 98 10/31/2024     (L) 10/31/2024     Lab Results   Component Value Date    SODIUM 139 10/31/2024    K 4.2 10/31/2024     (H) 10/31/2024    CO2 20 (L) 10/31/2024    BUN 32 (H) 10/31/2024    CREATININE 1.45 (H) 10/31/2024    GLUC 141 (H) 10/31/2024    CALCIUM 8.2 (L) 10/31/2024     Lab Results   Component Value Date    ALT 7 10/31/2024    AST 14 10/31/2024    ALKPHOS 32 (L) 10/31/2024     Lab Results   Component Value Date    INR 1.68 (H) 10/31/2024    INR 1.55 (H) 10/30/2024    INR 1.18 10/29/2024    PROTIME 20.5 (H) 10/31/2024    PROTIME 19.3 (H) 10/30/2024    PROTIME 15.7 (H) 10/29/2024     Lab Results   Component Value Date    HGBA1C 7.0 (H) 08/22/2024

## 2024-10-31 NOTE — RESPIRATORY THERAPY NOTE
RT Ventilator Management Note  Bertha Brown 84 y.o. female MRN: [id number]  Unit/Bed#: OR POOL Encounter: [id number]      Daily Screen         10/31/2024  0755 10/31/2024  1505          Patient safety screen outcome:: Failed Failed (P)       Not Ready for Weaning due to:: PEEP > 8cmH2O;Going on Transport intubated PEEP > 8cmH2O (P)                 Physical Exam:   Assessment Type: (P) Assess only  General Appearance: (P) Sedated  Respiratory Pattern: (P) Assisted  Chest Assessment: (P) Chest expansion symmetrical  Bilateral Breath Sounds: (P) Diminished, Clear  Suction: (P) ET Tube  O2 Device: (P) vent      Resp Comments: (P) Pt back from OR.  No changes made at this time.  Will wean when able and appropriate.     10/31/24 1505   Respiratory Assessment   Assessment Type Assess only   General Appearance Sedated   Respiratory Pattern Assisted   Chest Assessment Chest expansion symmetrical   Bilateral Breath Sounds Diminished;Clear   Suction ET Tube   Resp Comments Pt back from OR.  No changes made at this time.  Will wean when able and appropriate.   O2 Device vent   Vent Information   Vent ID groot   Vent type Drager   Drager Vent Mode AC/VC+   $ Pulse Oximetry Spot Check Charge Completed   SpO2 92 %   AC/VC+ Settings   Resp Rate (BPM) 18 BPM   VT (mL) 360 mL   Insp Time (S) 0.9 S   FIO2 (%) 40 %   PEEP (cmH2O) 10 cmH2O   Rise Time (%) 20 %   Trigger Sensitivity Flow (LPM) 2 LPM   Humidification Heater   Heater Temp 98.6 °F (37 °C)   AC/VC+ Actuals   Resp Rate (BPM) 18 BPM   VT (mL) 361 mL   MV (Obs) 5.7   MAP (cmH2O) 14 cmH2O   Peak Pressure (cmH2O) 24 cmH2O   I:E Ratio (Obs) 1:2.7   Static Compliance (mL/cmH20) 31 mL/cmH2O   Plateau Pressure (cm H2O) 21.8 cm H2O   Heater Temperature (Obs)   (warming)   AC/VC+ ALARMS   High Peak Pressure (cmH2O) 45 cmH2O   High Resp Rate (BPM) 30 BPM   High MV (L/min) 11 L/min   Low MV (L/min) 3 L/min   High VT (mL) 700 mL   Maintenance   Alarm (pink) cable attached Yes    Resuscitation bag with peep valve at bedside Yes   Water bag changed No   Circuit changed No   Daily Screen   Patient safety screen outcome: Failed   Not Ready for Weaning due to: PEEP > 8cmH2O   ETT  Oral;Hi-Lo;Inflated 7 mm   Placement Date/Time: 10/29/24 9632   Mask Ventilation: Mask ventilation not attempted (0)  Preoxygenated: Yes  Technique: Rapid sequence;Stylet;Video laryngoscopy  Type: Oral;Hi-Lo;Inflated  Tube Size: 7 mm  Laryngoscope: Servis1st Bank  Blade Size: 3  Locat...   Secured at (cm) 21   Measured from Lips   Secured Location (S)  Center   Repositioned (S)  Left to Center   Secured by Commercial tube irwin   Site Condition Dry   Cuff Pressure (color) Green   HI-LO Suction  Continuous low suction   HI-LO Secretions Scant   HI-LO Intervention Patent

## 2024-10-31 NOTE — ASSESSMENT & PLAN NOTE
-3 episodes prior to ER presentation  -Hgb on admission 13.2 with drop to 9.6 today  -CT AP with IV contrast notes no evidence of active high-volume GI hemorrhage.  Moderate diffuse colonic distension with mild wall thickening suggestive of the distal transverse and descending colon noted.  Mild distention esophagus with moderate gaseous distention  -PPI BID  -Initial plan was for EGD however this has been postponed due to ischemic bowel and need for emergent surgical intervention  -Continue to trend Hgb/Hct and transfuse as necessary  -Monitor for signs of recurrent GIB

## 2024-10-31 NOTE — ASSESSMENT & PLAN NOTE
Patient reported 2 weeks of no BM on admission  CT AP on admission with moderate diffuse colonic distension with moderate fecal retention  -Lactic acid was elevated on admission at 3.5 which initially improved to 3.1 but worsened in the later afternoon to 4.5  -Abdominal pain worsened and patient became septic  -Repeat CT AP noted diffuse thickening of the colon with increasing mesenteric fat stranding in the left lower quadrant suggesting pneumatosis and hepatic flexure and proximal transverse colon concerning for ischemic colitis  -Given worsening presentation the patient was taken emergently to the OR 10/29 with extended right hemicolectomy.  Abdomen was left open.  Plan to take the patient back to the OR today for washout, possible ileostomy, possible closure  -Colonoscopy 2009 with polyps and diverticulosis  -Will continue to follow peripherally  -She will need a plan for better outpatient bowel regimen  -Ongoing treatment recommendations deferred to ICU and surgery teams

## 2024-10-31 NOTE — PLAN OF CARE
Problem: PAIN - ADULT  Goal: Verbalizes/displays adequate comfort level or baseline comfort level  Description: Interventions:  - Encourage patient to monitor pain and request assistance  - Assess pain using appropriate pain scale  - Administer analgesics based on type and severity of pain and evaluate response  - Implement non-pharmacological measures as appropriate and evaluate response  - Consider cultural and social influences on pain and pain management  - Notify physician/advanced practitioner if interventions unsuccessful or patient reports new pain  Outcome: Progressing     Problem: INFECTION - ADULT  Goal: Absence or prevention of progression during hospitalization  Description: INTERVENTIONS:  - Assess and monitor for signs and symptoms of infection  - Monitor lab/diagnostic results  - Monitor all insertion sites, i.e. indwelling lines, tubes, and drains  - Monitor endotracheal if appropriate and nasal secretions for changes in amount and color  - Pace appropriate cooling/warming therapies per order  - Administer medications as ordered  - Instruct and encourage patient and family to use good hand hygiene technique  - Identify and instruct in appropriate isolation precautions for identified infection/condition  Outcome: Progressing  Goal: Absence of fever/infection during neutropenic period  Description: INTERVENTIONS:  - Monitor WBC    Outcome: Progressing     Problem: SAFETY ADULT  Goal: Patient will remain free of falls  Description: INTERVENTIONS:  - Educate patient/family on patient safety including physical limitations  - Instruct patient to call for assistance with activity   - Consult OT/PT to assist with strengthening/mobility   - Keep Call bell within reach  - Keep bed low and locked with side rails adjusted as appropriate  - Keep care items and personal belongings within reach  - Initiate and maintain comfort rounds  - Make Fall Risk Sign visible to staff  - Apply yellow socks and bracelet  for high fall risk patients  - Consider moving patient to room near nurses station  Outcome: Progressing  Goal: Maintain or return to baseline ADL function  Description: INTERVENTIONS:  -  Assess patient's ability to carry out ADLs; assess patient's baseline for ADL function and identify physical deficits which impact ability to perform ADLs (bathing, care of mouth/teeth, toileting, grooming, dressing, etc.)  - Assess/evaluate cause of self-care deficits   - Assess range of motion  - Assess patient's mobility; develop plan if impaired  - Assess patient's need for assistive devices and provide as appropriate  - Encourage maximum independence but intervene and supervise when necessary  - Involve family in performance of ADLs  - Assess for home care needs following discharge   - Consider OT consult to assist with ADL evaluation and planning for discharge  - Provide patient education as appropriate  Outcome: Progressing  Goal: Maintains/Returns to pre admission functional level  Description: INTERVENTIONS:  - Perform AM-PAC 6 Click Basic Mobility/ Daily Activity assessment daily.  - Set and communicate daily mobility goal to care team and patient/family/caregiver.   - Collaborate with rehabilitation services on mobility goals if consulted  - Out of bed for toileting  - Record patient progress and toleration of activity level   Outcome: Progressing     Problem: DISCHARGE PLANNING  Goal: Discharge to home or other facility with appropriate resources  Description: INTERVENTIONS:  - Identify barriers to discharge w/patient and caregiver  - Arrange for needed discharge resources and transportation as appropriate  - Identify discharge learning needs (meds, wound care, etc.)  - Arrange for interpretive services to assist at discharge as needed  - Refer to Case Management Department for coordinating discharge planning if the patient needs post-hospital services based on physician/advanced practitioner order or complex needs  related to functional status, cognitive ability, or social support system  Outcome: Progressing     Problem: Knowledge Deficit  Goal: Patient/family/caregiver demonstrates understanding of disease process, treatment plan, medications, and discharge instructions  Description: Complete learning assessment and assess knowledge base.  Interventions:  - Provide teaching at level of understanding  - Provide teaching via preferred learning methods  Outcome: Progressing     Problem: NEUROSENSORY - ADULT  Goal: Achieves stable or improved neurological status  Description: INTERVENTIONS  - Monitor and report changes in neurological status  - Monitor vital signs such as temperature, blood pressure, glucose, and any other labs ordered   - Initiate measures to prevent increased intracranial pressure  - Monitor for seizure activity and implement precautions if appropriate      Outcome: Progressing  Goal: Remains free of injury related to seizures activity  Description: INTERVENTIONS  - Maintain airway, patient safety  and administer oxygen as ordered  - Monitor patient for seizure activity, document and report duration and description of seizure to physician/advanced practitioner  - If seizure occurs,  ensure patient safety during seizure  - Reorient patient post seizure  - Seizure pads on all 4 side rails  - Instruct patient/family to notify RN of any seizure activity including if an aura is experienced  - Instruct patient/family to call for assistance with activity based on nursing assessment  - Administer anti-seizure medications if ordered    Outcome: Progressing  Goal: Achieves maximal functionality and self care  Description: INTERVENTIONS  - Monitor swallowing and airway patency with patient fatigue and changes in neurological status  - Encourage and assist patient to increase activity and self care.   - Encourage visually impaired, hearing impaired and aphasic patients to use assistive/communication devices  Outcome:  Progressing     Problem: RESPIRATORY - ADULT  Goal: Achieves optimal ventilation and oxygenation  Description: INTERVENTIONS:  - Assess for changes in respiratory status  - Assess for changes in mentation and behavior  - Position to facilitate oxygenation and minimize respiratory effort  - Oxygen administered by appropriate delivery if ordered  - Initiate smoking cessation education as indicated  - Encourage broncho-pulmonary hygiene including cough, deep breathe, Incentive Spirometry  - Assess the need for suctioning and aspirate as needed  - Assess and instruct to report SOB or any respiratory difficulty  - Respiratory Therapy support as indicated  Outcome: Progressing     Problem: GASTROINTESTINAL - ADULT  Goal: Minimal or absence of nausea and/or vomiting  Description: INTERVENTIONS:  - Administer IV fluids if ordered to ensure adequate hydration  - Maintain NPO status until nausea and vomiting are resolved  - Nasogastric tube if ordered  - Administer ordered antiemetic medications as needed  - Provide nonpharmacologic comfort measures as appropriate  - Advance diet as tolerated, if ordered  - Consider nutrition services referral to assist patient with adequate nutrition and appropriate food choices  Outcome: Progressing  Goal: Maintains or returns to baseline bowel function  Description: INTERVENTIONS:  - Assess bowel function  - Encourage oral fluids to ensure adequate hydration  - Administer IV fluids if ordered to ensure adequate hydration  - Administer ordered medications as needed  - Encourage mobilization and activity  - Consider nutritional services referral to assist patient with adequate nutrition and appropriate food choices  Outcome: Progressing  Goal: Maintains adequate nutritional intake  Description: INTERVENTIONS:  - Monitor percentage of each meal consumed  - Identify factors contributing to decreased intake, treat as appropriate  - Assist with meals as needed  - Monitor I&O, weight, and lab  values if indicated  - Obtain nutrition services referral as needed  Outcome: Progressing  Goal: Establish and maintain optimal ostomy function  Description: INTERVENTIONS:  - Assess bowel function  - Encourage oral fluids to ensure adequate hydration  - Administer IV fluids if ordered to ensure adequate hydration   - Administer ordered medications as needed  - Encourage mobilization and activity  - Nutrition services referral to assist patient with appropriate food choices  - Assess stoma site  - Consider wound care consult   Outcome: Progressing  Goal: Oral mucous membranes remain intact  Description: INTERVENTIONS  - Assess oral mucosa and hygiene practices  - Implement preventative oral hygiene regimen  - Implement oral medicated treatments as ordered  - Initiate Nutrition services referral as needed  Outcome: Progressing     Problem: GENITOURINARY - ADULT  Goal: Maintains or returns to baseline urinary function  Description: INTERVENTIONS:  - Assess urinary function  - Encourage oral fluids to ensure adequate hydration if ordered  - Administer IV fluids as ordered to ensure adequate hydration  - Administer ordered medications as needed  - Offer frequent toileting  - Follow urinary retention protocol if ordered  Outcome: Progressing  Goal: Absence of urinary retention  Description: INTERVENTIONS:  - Assess patient’s ability to void and empty bladder  - Monitor I/O  - Bladder scan as needed  - Discuss with physician/AP medications to alleviate retention as needed  - Discuss catheterization for long term situations as appropriate  Outcome: Progressing  Goal: Urinary catheter remains patent  Description: INTERVENTIONS:  - Assess patency of urinary catheter  - If patient has a chronic james, consider changing catheter if non-functioning  - Follow guidelines for intermittent irrigation of non-functioning urinary catheter  Outcome: Progressing     Problem: METABOLIC, FLUID AND ELECTROLYTES - ADULT  Goal: Electrolytes  maintained within normal limits  Description: INTERVENTIONS:  - Monitor labs and assess patient for signs and symptoms of electrolyte imbalances  - Administer electrolyte replacement as ordered  - Monitor response to electrolyte replacements, including repeat lab results as appropriate  - Instruct patient on fluid and nutrition as appropriate  Outcome: Progressing  Goal: Fluid balance maintained  Description: INTERVENTIONS:  - Monitor labs   - Monitor I/O and WT  - Instruct patient on fluid and nutrition as appropriate  - Assess for signs & symptoms of volume excess or deficit  Outcome: Progressing  Goal: Glucose maintained within target range  Description: INTERVENTIONS:  - Monitor Blood Glucose as ordered  - Assess for signs and symptoms of hyperglycemia and hypoglycemia  - Administer ordered medications to maintain glucose within target range  - Assess nutritional intake and initiate nutrition service referral as needed  Outcome: Progressing     Problem: SKIN/TISSUE INTEGRITY - ADULT  Goal: Skin Integrity remains intact(Skin Breakdown Prevention)  Description: Assess:  -Inspect skin when repositioning, toileting, and assisting with ADLS  -Assess extremities for adequate circulation and sensation     Bed Management:  -Have minimal linens on bed & keep smooth, unwrinkled  -Change linens as needed when moist or perspiring    Toileting:  -Offer bedside commode    Activity:  -Encourage activity and walks on unit  -Encourage or provide ROM exercises   -Use appropriate equipment to lift or move patient in bed    Skin Care:  -Avoid use of baby powder, tape, friction and shearing, hot water or constrictive clothing  -Do not massage red bony areas    Outcome: Progressing  Goal: Incision(s), wounds(s) or drain site(s) healing without S/S of infection  Description: INTERVENTIONS  - Assess and document dressing, incision, wound bed, drain sites and surrounding tissue  - Provide patient and family education  Outcome:  Progressing  Goal: Pressure injury heals and does not worsen  Description: Interventions:  - Implement low air loss mattress or specialty surface (Criteria met)  - Apply silicone foam dressing  - Apply fecal or urinary incontinence containment device   - Utilize friction reducing device or surface for transfers   - Consider nutrition services referral as needed  Outcome: Progressing     Problem: HEMATOLOGIC - ADULT  Goal: Maintains hematologic stability  Description: INTERVENTIONS  - Assess for signs and symptoms of bleeding or hemorrhage  - Monitor labs  - Administer supportive blood products/factors as ordered and appropriate  Outcome: Progressing     Problem: MUSCULOSKELETAL - ADULT  Goal: Maintain or return mobility to safest level of function  Description: INTERVENTIONS:  - Assess patient's ability to carry out ADLs; assess patient's baseline for ADL function and identify physical deficits which impact ability to perform ADLs (bathing, care of mouth/teeth, toileting, grooming, dressing, etc.)  - Assess/evaluate cause of self-care deficits   - Assess range of motion  - Assess patient's mobility  - Assess patient's need for assistive devices and provide as appropriate  - Encourage maximum independence but intervene and supervise when necessary  - Involve family in performance of ADLs  - Assess for home care needs following discharge   - Consider OT consult to assist with ADL evaluation and planning for discharge  - Provide patient education as appropriate  Outcome: Progressing  Goal: Maintain proper alignment of affected body part  Description: INTERVENTIONS:  - Support, maintain and protect limb and body alignment  - Provide patient/ family with appropriate education  Outcome: Progressing     Problem: SAFETY,RESTRAINT: NV/NON-SELF DESTRUCTIVE BEHAVIOR  Goal: Remains free of harm/injury (restraint for non violent/non self-detsructive behavior)  Description: INTERVENTIONS:  - Instruct patient/family regarding  restraint use   - Assess and monitor physiologic and psychological status   - Provide interventions and comfort measures to meet assessed patient needs   - Identify and implement measures to help patient regain control  - Assess readiness for release of restraint   Outcome: Progressing  Goal: Returns to optimal restraint-free functioning  Description: INTERVENTIONS:  - Assess the patient's behavior and symptoms that indicate continued need for restraint  - Identify and implement measures to help patient regain control  - Assess readiness for release of restraint   Outcome: Progressing     Problem: Nutrition/Hydration-ADULT  Goal: Nutrient/Hydration intake appropriate for improving, restoring or maintaining nutritional needs  Description: Monitor and assess patient's nutrition/hydration status for malnutrition. Collaborate with interdisciplinary team and initiate plan and interventions as ordered.  Monitor patient's weight and dietary intake as ordered or per policy. Utilize nutrition screening tool and intervene as necessary. Determine patient's food preferences and provide high-protein, high-caloric foods as appropriate.     INTERVENTIONS:  - Monitor oral intake, urinary output, labs, and treatment plans  - Assess nutrition and hydration status and recommend course of action  - Evaluate amount of meals eaten  - Assist patient with eating if necessary   - Allow adequate time for meals  - Recommend/ encourage appropriate diets, oral nutritional supplements, and vitamin/mineral supplements  - Order, calculate, and assess calorie counts as needed  - Recommend, monitor, and adjust tube feedings and TPN/PPN based on assessed needs  - Assess need for intravenous fluids  - Provide specific nutrition/hydration education as appropriate  - Include patient/family/caregiver in decisions related to nutrition  Outcome: Progressing     Problem: Prexisting or High Potential for Compromised Skin Integrity  Goal: Skin integrity is  maintained or improved  Description: INTERVENTIONS:  - Identify patients at risk for skin breakdown  - Assess and monitor skin integrity  - Assess and monitor nutrition and hydration status  - Monitor labs   - Assess for incontinence   - Turn and reposition patient  - Assist with mobility/ambulation  - Relieve pressure over bony prominences  - Avoid friction and shearing  - Provide appropriate hygiene as needed including keeping skin clean and dry  - Evaluate need for skin moisturizer/barrier cream  - Collaborate with interdisciplinary team   - Patient/family teaching  - Consider wound care consult   Outcome: Progressing

## 2024-10-31 NOTE — CASE MANAGEMENT
Case Management Assessment & Discharge Planning Note    Patient name Bertha Brown  Location ICU 05/ICU 05 MRN [id number]  : 1940 Date 10/31/2024       Current Admission Date: 10/29/2024  Current Admission Diagnosis:Septic shock (HCC)   Patient Active Problem List    Diagnosis Date Noted Date Diagnosed    Ischemic colitis (HCC) 10/30/2024     Septic shock (HCC) 10/30/2024     Acute metabolic encephalopathy 10/30/2024     Coffee ground emesis 10/29/2024     RUKHSANA (acute kidney injury) (HCC) 10/29/2024     Chronic idiopathic constipation 10/29/2024     Generalized abdominal pain 10/29/2024     SIRS (systemic inflammatory response syndrome) (HCC) 10/29/2024     Constipation 2022     Hypoxia 12/15/2022     Rectus sheath hematoma 2022     Bronchospasm with bronchitis, acute 2022     Chest pain 2022     Leukocytosis 2022     Chest tightness 2022     Diabetes (HCC) 2022     Obesity 2022     Bilateral flank pain 2022     Hemarthrosis involving knee joint, right 10/01/2020     Ambulatory dysfunction 10/01/2020     Paroxysmal atrial fibrillation (HCC) 2020     Acute respiratory failure with hypoxia (HCC) 2020     Hypothyroidism 2020     Hypertension 2020     Hyperlipidemia 2020     Major depressive disorder, single episode, mild (HCC) 2019     Osteoarthritis of knee 2017       LOS (days): 2  Geometric Mean LOS (GMLOS) (days): 2.9  Days to GMLOS:0.9     OBJECTIVE:    Risk of Unplanned Readmission Score: 27.84         Current admission status: Inpatient       Preferred Pharmacy:   [id number] - [facility] [address] - [address]  [address]  [facility] [place] [address]  Phone: [phone] Fax: [phone]    Primary Care Provider: Jarred Armstrong DO    Primary Insurance: [employer] REP  Secondary Insurance:     ASSESSMENT:  Active Health Care Proxies       mavis sharp [facility] Care Representative -  Daughter   Primary Phone: [phone] (Home)                 Advance Directives  Primary Contact: mavis sharp (Daughter)  [phone] (Home Phone)         Readmission Root Cause  30 Day Readmission: No    Patient Information  Admitted from:: Home  Mental Status: Sedated, Intubated  During Assessment patient was accompanied by: Other-Comment (granddaughter Yamile)  Assessment information provided by:: Other - please comment (granddaughter Yamile)  Primary Caregiver: Self  Support Systems: Daughter, Family members  County of Residence: [place]  What [place] do you [place]  Home entry access options. Select all that apply.: Stairs  Number of steps to enter home.: 2  Do the steps have railings?: No  Type of Current Residence: 2 story home  Upon entering residence, is there a bedroom on the main floor (no further steps)?: No  A bedroom is located on the following floor levels of residence (select all that apply):: 2nd Floor  Upon entering residence, is there a bathroom on the main floor (no further steps)?: Yes  Number of steps to 2nd floor from main floor: One Flight  Living Arrangements: Lives w/ Family members, Other (Comment) (lives w nephew who works overnight)  Is patient a ?: No    Activities of Daily Living Prior to Admission  Completes ADLs independently?: Yes  Ambulates independently?: Yes  Does patient use assisted devices?: No  Does patient currently own DME?: Yes  What DME does the patient currently own?: Straight Cane, Walker, Wheelchair  Does patient have a history of Outpatient Therapy (PT/OT)?: No  Does the patient have a history of Short-Term Rehab?: No  Does patient have a history of HHC?: No  Does patient currently have HHC?: No         Patient Information Continued  Income Source: Other (Comment) (SSI and pension)  Does patient have prescription coverage?:  (grddaughter not aware)  Does patient receive dialysis treatments?: No  Does patient have a history of substance  abuse?: No  Does patient have a history of Mental Health Diagnosis?: No         Means of Transportation  [facility] ([facility] (zurdo not aware)      Social Determinants of Health (SDOH)      Flowsheet Row Most Recent Value   Housing Stability    In the last 12 months, was there a time when you were not able to pay the mortgage or rent on time? N   In the past 12 months, how many times have you moved where you were living? 0   At any time in the past 12 months, were you homeless or living in a shelter (including now)? N   Transportation Needs    In the past 12 months, has lack of transportation kept you from medical appointments or from getting medications? no   In the past 12 months, has lack of transportation kept you from meetings, work, or from getting things needed for daily living? No   Food Insecurity    Within the past 12 months, you worried that your food would run out before you got the money to buy more. Never true   Within the past 12 months, the food you bought just didn't last and you didn't have money to get more. Never true   Utilities    In the past 12 months has the electric, gas, oil, or water company threatened to shut off services in your home? No            DISCHARGE DETAILS:    Discharge planning discussed with:: granddaughter Yamile at bedside  Freedom of Choice: Yes  Comments - Freedom of Choice: Met w family at bedside; introduced self and explained role of CM, including arranging DME, STR, home health, out pt tx.  Advised family that CM will make appropriate referrals based on treatment team recommendations and MD orders, and she can consider recommended plan of care and choose from available vendors.  CM contacted family/caregiver?: Yes  Were Treatment Team discharge recommendations reviewed with patient/caregiver?: No (none at present)          Contacts  Patient Contacts: mavis Leahy (Daughter)  [phone] (Home Phone)  Relationship to Patient::  Family         DME Referral Provided  Referral made for DME?: No    Other Referral/Resources/Interventions Provided:  Referral Comments: Met w zurdo Ovalle at bedside to complete CMA;  several questions she could not answer and directed CM to her mother, Linda, who will be visiting later.  Reviewed w granddaughter all d/c options, including LTACH, acute rehab, subacute rehab, home health and outpt services.  Zurdo receptive to information.  Pt vented and sedated at this time;  CM will continue to follow.         Treatment Team Recommendation: Other (TBD)  Discharge Destination Plan:: Other (TBD)  Transport at Discharge : Other (Comment) (TBD)

## 2024-10-31 NOTE — RESPIRATORY THERAPY NOTE
RT Ventilator Management Note  Bertha Brown 84 y.o. female MRN: [id number]  Unit/Bed#: ICU 05 Encounter: [id number]      Daily Screen         10/30/2024  2339 10/31/2024  0250          Patient safety screen outcome:: Failed Failed (P)       Not Ready for Weaning due to:: PEEP > 8cmH2O PEEP > 8cmH2O (P)                 Physical Exam:   Assessment Type: (P) Assess only  General Appearance: (P) Sedated  Respiratory Pattern: (P) Assisted  Chest Assessment: (P) Chest expansion symmetrical  Bilateral Breath Sounds: (P) Clear, Diminished  O2 Device: (P) vent      Resp Comments: (P) patient remains intubated on full mechanical support, fio2 titrated to 40%, patient to return to the or today.     10/31/24 0250   Respiratory Assessment   Assessment Type Assess only   General Appearance Sedated   Respiratory Pattern Assisted   Chest Assessment Chest expansion symmetrical   Bilateral Breath Sounds Clear;Diminished   Resp Comments patient remains intubated on full mechanical support, fio2 titrated to 40%, patient to return to the or today.   O2 Device vent   Vent Information   Vent ID Groot   Vent type Drager   Drager Vent Mode AC/VC+   $ Pulse Oximetry Spot Check Charge Completed   AC/VC+ Settings   Resp Rate (BPM) 18 BPM   VT (mL) 360 mL   Insp Time (S) 0.9 S   FIO2 (%) (S)  40 %   PEEP (cmH2O) 10 cmH2O   Rise Time (%) 20 %   Trigger Sensitivity Flow (LPM) 2 LPM   Humidification Heater   Heater Temp 98.6 °F (37 °C)   AC/VC+ Actuals   Resp Rate (BPM) 18 BPM   VT (mL) 361 mL   MV (Obs) 5.26   MAP (cmH2O) 13 cmH2O   Peak Pressure (cmH2O) 23 cmH2O   I:E Ratio (Obs) 1:2.7   Static Compliance (mL/cmH20) 33.9 mL/cmH2O   Plateau Pressure (cm H2O) 19.5 cm H2O   Heater Temperature (Obs) 98.6 °F (37 °C)   AC/VC+ ALARMS   High Peak Pressure (cmH2O) 45 cmH2O   High Resp Rate (BPM) 30 BPM   High MV (L/min) 11 L/min   Low MV (L/min) 3 L/min   High VT (mL) 700 mL   Maintenance   Alarm (pink) cable attached Yes   Resuscitation bag with peep  valve at bedside Yes   Water bag changed No   Circuit changed No   Daily Screen   Patient safety screen outcome: Failed   Not Ready for Weaning due to: PEEP > 8cmH2O   IHI Ventilator Associated Pneumonia Bundle   Daily Assessment of Readiness to Extubate Not applicable (Comment)   Head of Bed Elevated HOB 30   ETT  Oral;Hi-Lo;Inflated 7 mm   Placement Date/Time: 10/29/24 4922   Mask Ventilation: Mask ventilation not attempted (0)  Preoxygenated: Yes  Technique: Rapid sequence;Stylet;Video laryngoscopy  Type: Oral;Hi-Lo;Inflated  Tube Size: 7 mm  Laryngoscope: MileIQ  Blade Size: 3  Locat...   Secured at (cm) 21   Measured from Lips   Secured Location Center   Repositioned Left to Center   Secured by Commercial tube irwin   Site Condition Dry   Cuff Pressure (color) Green   HI-LO Suction  Continuous low suction   HI-LO Secretions Scant   HI-LO Intervention Patent

## 2024-10-31 NOTE — RESPIRATORY THERAPY NOTE
RT Ventilator Management Note  Bertha Brown 84 y.o. female MRN: [id number]  Unit/Bed#: ICU 05 Encounter: [id number]      Daily Screen         10/31/2024  0250 10/31/2024  0755          Patient safety screen outcome:: Failed Failed (P)       Not Ready for Weaning due to:: PEEP > 8cmH2O PEEP > 8cmH2O;Going on Transport intubated (P)                 Physical Exam:   Assessment Type: Assess only  General Appearance: Sedated  Respiratory Pattern: Assisted  Chest Assessment: Chest expansion symmetrical  Bilateral Breath Sounds: Diminished, Clear  Suction: ET Tube  O2 Device: vent      Resp Comments: Pt remains on full vent support.  Pt to return to OR today.  Will wean when able and appropriate.     10/31/24 0755   Respiratory Assessment   Assessment Type Assess only   General Appearance Sedated   Respiratory Pattern Assisted   Chest Assessment Chest expansion symmetrical   Bilateral Breath Sounds Diminished;Clear   Suction ET Tube   Resp Comments Pt remains on full vent support.  Pt to return to OR today.  Will wean when able and appropriate.   O2 Device vent   Vent Information   Vent ID Groot   Vent type Drager   Drager Vent Mode AC/VC+   $ Vent Daily Charge-Subsequent Yes   $ Pulse Oximetry Spot Check Charge Completed   SpO2 95 %   AC/VC+ Settings   Resp Rate (BPM) 18 BPM   VT (mL) 360 mL   Insp Time (S) 0.9 S   FIO2 (%) 40 %   PEEP (cmH2O) 10 cmH2O   Rise Time (%) 20 %   Trigger Sensitivity Flow (LPM) 2 LPM   Humidification Heater   Heater Temp 98.6 °F (37 °C)   AC/VC+ Actuals   Resp Rate (BPM) 18 BPM   VT (mL) 359 mL   MV (Obs) 5.75   MAP (cmH2O) 13 cmH2O   Peak Pressure (cmH2O) 23 cmH2O   I:E Ratio (Obs) 1:2.7   Static Compliance (mL/cmH20) 33.3 mL/cmH2O   Plateau Pressure (cm H2O) 20.7 cm H2O   Heater Temperature (Obs) 98.6 °F (37 °C)   AC/VC+ ALARMS   High Peak Pressure (cmH2O) 45 cmH2O   High Resp Rate (BPM) 30 BPM   High MV (L/min) 11 L/min   Low MV (L/min) 3 L/min   High VT (mL) 700 mL   Maintenance   Alarm  (pink) cable attached Yes   Resuscitation bag with peep valve at bedside Yes   Water bag changed No   Circuit changed No   Daily Screen   Patient safety screen outcome: Failed   Not Ready for Weaning due to: PEEP > 8cmH2O;Going on Transport intubated   IHI Ventilator Associated Pneumonia Bundle   Daily Assessment of Readiness to Extubate Yes   Head of Bed Elevated HOB 30   ETT  Oral;Hi-Lo;Inflated 7 mm   Placement Date/Time: 10/29/24 7557   Mask Ventilation: Mask ventilation not attempted (0)  Preoxygenated: Yes  Technique: Rapid sequence;Stylet;Video laryngoscopy  Type: Oral;Hi-Lo;Inflated  Tube Size: 7 mm  Laryngoscope: Xtellus  Blade Size: 3  Locat...   Secured at (cm) 21   Measured from Lips   Secured Location (S)  Right   Repositioned (S)  Center to Right   Secured by Commercial tube irwin   Site Condition Dry   Cuff Pressure (color) Green   HI-LO Suction  Continuous low suction   HI-LO Secretions Scant   HI-LO Intervention Patent

## 2024-10-31 NOTE — RESPIRATORY THERAPY NOTE
RT Ventilator Management Note  Bertha Brown 84 y.o. female MRN: [id number]  Unit/Bed#: ICU 05 Encounter: [id number]      Daily Screen         10/30/2024  1923 10/30/2024  2339          Patient safety screen outcome:: Failed Failed (P)       Not Ready for Weaning due to:: PEEP > 8cmH2O PEEP > 8cmH2O (P)                 Physical Exam:   Assessment Type: (P) Assess only  General Appearance: (P) Sedated  Respiratory Pattern: (P) Assisted  Chest Assessment: (P) Chest expansion symmetrical  Bilateral Breath Sounds: (P) Clear, Diminished  O2 Device: (P) Vent      Resp Comments: (P) no changes made to the patinet's vent settings at this time, continue with current orders     10/30/24 2339   Respiratory Assessment   Assessment Type Assess only   General Appearance Sedated   Respiratory Pattern Assisted   Chest Assessment Chest expansion symmetrical   Bilateral Breath Sounds Clear;Diminished   Resp Comments no changes made to the patinet's vent settings at this time, continue with current orders   O2 Device Vent   Vent Information   Vent ID Groot   Vent type Drager   Drager Vent Mode AC/VC+   $ Pulse Oximetry Spot Check Charge Completed   AC/VC+ Settings   Resp Rate (BPM) 18 BPM   VT (mL) 360 mL   Insp Time (S) 0.9 S   FIO2 (%) 50 %   PEEP (cmH2O) 10 cmH2O   Rise Time (%) 20 %   Trigger Sensitivity Flow (LPM) 2 LPM   Humidification Heater   Heater Temp 98.6 °F (37 °C)   AC/VC+ Actuals   Resp Rate (BPM) 18 BPM   VT (mL) 360 mL   MV (Obs) 5.7   MAP (cmH2O) 13 cmH2O   Peak Pressure (cmH2O) 23 cmH2O   I:E Ratio (Obs) 1:2.7   Static Compliance (mL/cmH20) 35.6 mL/cmH2O   Plateau Pressure (cm H2O) 20.7 cm H2O   Heater Temperature (Obs) 98.8 °F (37.1 °C)   AC/VC+ ALARMS   High Peak Pressure (cmH2O) 45 cmH2O   High Resp Rate (BPM) 30 BPM   High MV (L/min) 11 L/min   Low MV (L/min) 3 L/min   High VT (mL) 700 mL   Maintenance   Alarm (pink) cable attached Yes   Resuscitation bag with peep valve at bedside Yes   Water bag changed No    Circuit changed No   Daily Screen   Patient safety screen outcome: Failed   Not Ready for Weaning due to: PEEP > 8cmH2O   IHI Ventilator Associated Pneumonia Bundle   Head of Bed Elevated HOB 30   ETT  Oral;Hi-Lo;Inflated 7 mm   Placement Date/Time: 10/29/24 4813   Mask Ventilation: Mask ventilation not attempted (0)  Preoxygenated: Yes  Technique: Rapid sequence;Stylet;Video laryngoscopy  Type: Oral;Hi-Lo;Inflated  Tube Size: 7 mm  Laryngoscope: ShedWorx  Blade Size: 3  Locat...   Secured at (cm) 21   Measured from Lips   Secured Location Left   Repositioned Right to Left   Secured by Commercial tube irwin   Site Condition Dry   Cuff Pressure (color) Green   HI-LO Suction  Continuous low suction   HI-LO Secretions Scant   HI-LO Intervention Patent

## 2024-10-31 NOTE — OP NOTE
OPERATIVE REPORT  PATIENT NAME: Bertha Brown    :  1940  MRN: [id number]  Pt Location: [facility] OR ROOM 03    SURGERY DATE: 10/31/2024    Surgeons and Role:     * Hector Pinon MD - Primary     * Delaney Vera PA-C - Assisting    Preop Diagnosis:  Ischemic colitis (HCC) [K55.9]    Post-Op Diagnosis Codes:     * Ischemic colitis (HCC) [K55.9]    Procedure(s):  LAPAROTOMY EXPLORATORY. 2nd look. washout. ileostomy. abdominal closure. placement of wound vac    Specimen(s):  None    Estimated Blood Loss:   Minimal    Drains:  NG/OG/Enteral Tube Nasogastric 18 Fr Left nare (Active)   Placement Reverification Auscultation 10/31/24 1000   Site Assessment Clean;Dry;Intact 10/31/24 1000   Marking at Nare (cm) - For ongoing assessment of tube placement 55 cm 10/30/24 0301   Status Suction-low continuous 10/31/24 1000   Drainage Appearance Bile;Brown;Clear 10/31/24 1000   Output (mL) 50 mL 10/31/24 1000   Number of days: 2       Urethral Catheter Asept;Coude;Non-latex;Straight-tip 16 Fr. (Active)   Reasons to continue Urinary Catheter  Accurate I&O assessment in critically ill patients (48 hr. max) 10/30/24 2000   Goal for Removal Remove after 48 hrs of I/O monitoring 10/31/24 0800   Site Assessment Clean;Skin intact 10/31/24 0800   Parks Care Done 10/31/24 0800   Collection Container Standard drainage bag 10/31/24 0800   Securement Method Securing device (Describe) 10/31/24 0800   Securing Device Change Date 11/07/24 10/31/24 0800   Output (mL) 45 mL 10/31/24 1201   Number of days: 2       Anesthesia Type:   General    Operative Indications:  Ischemic colitis (HCC) [K55.9]    Operative Findings:  The remaining of the small and large bowel were viable, no evidence of ischemia.  Since the patient still required pressors I decided to do a mucous fistula and loop ileostomy.  The rest of the abdominal cavity showed no evidence of inflammatory or neoplastic process.    Complications:   None    Procedure and Technique:  The  patient was identified and the patient was placed in the operating table in a spine position.  After adequate anesthesia induction via the endotracheal tube, ABThera dressing was removed then the abdomen was prepped and draped in a sterile usual fashion with Betadine solution.  Timeout was called the patient was identified as was surgical site.    The abdominal cavity was entered, the entire small and large bowel were inspected without evidence of ischemia.  There was some patchy areas of bruise on the bowel and mesentery but otherwise appear normal.  At this point I proceeded to irrigate the entire abdominal cavity with copious amount of sterile water.    Circular incision was made on the right lower quadrant with cautery, taken down through the subcutaneous tissue with cautery.  Rectus muscle fascia was opened in a cruciate fashion, rectus muscle was split and subsequently the posterior rectus sheath and peritoneum were opened in a cruciate fashion.  Terminal ileum was delivered in a loop ileostomy was created, a window was created in the mesentery of the terminal ileum and colostomy hosea was placed and secured to the skin with 2-0 nylon in an interrupted fashion.    Mucous fistula was created by placing the proximal staple line and the colon at the top of the incision and secured to the skin with 3-0 Vicryl in an interrupted fashion.  At this point Interceed was placed in abdominal cavity.  Fascia was closed with #1 strata fix in a continuous fashion.  Skin was left open and wound VAC was placed.    Ileostomy was matured by making a transverse incision using cautery and secured to the skin with 3-0 Vicryl in an interrupted fashion.  Mucous fistula was also mature using cautery and secured to the skin with 3-0 Vicryl in an interrupted fashion.  Appliance were placed to the mucous fistula and ileostomy.  At the end of the case instrument, needles, and sponge counts were correct.  Patient tolerated the procedure  well.   I was present for the entire procedure., A qualified resident physician was not available., and A physician assistant was required during the procedure for retraction, tissue handling, dissection and suturing.    Patient Disposition:  Critical Care Unit and intubated and hemodynamically stable.    This procedure was not performed to treat colon cancer through resection           SIGNATURE: Hector Pinon MD  DATE: October 31, 2024  TIME: 2:35 PM

## 2024-10-31 NOTE — RESPIRATORY THERAPY NOTE
RT Ventilator Management Note  Bertha Brown 84 y.o. female MRN: [id number]  Unit/Bed#: ICU 05 Encounter: [id number]      Daily Screen         10/31/2024  0250 10/31/2024  0755          Patient safety screen outcome:: Failed Failed      Not Ready for Weaning due to:: PEEP > 8cmH2O PEEP > 8cmH2O;Going on Transport intubated                Physical Exam:   Assessment Type: Assess only  General Appearance: Sedated  Respiratory Pattern: Assisted  Chest Assessment: Chest expansion symmetrical  Bilateral Breath Sounds: Diminished  Suction: ET Tube  O2 Device: vent      Resp Comments: 1 cc air added to cuff to ensure adequate pressure, no other changes made       10/31/24 1021   Respiratory Assessment   Assessment Type Assess only   General Appearance Sedated   Respiratory Pattern Assisted   Chest Assessment Chest expansion symmetrical   Bilateral Breath Sounds Diminished   Suction ET Tube   Resp Comments 1 cc air added to cuff to ensure adequate pressure, no other changes made   O2 Device vent   Vent Information   Vent ID groot   Vent type Drager   Drager Vent Mode AC/VC+   $ Pulse Oximetry Spot Check Charge Completed   SpO2 96 %   AC/VC+ Settings   Resp Rate (BPM) 18 BPM   VT (mL) 360 mL   Insp Time (S) 0.9 S   FIO2 (%) 40 %   PEEP (cmH2O) 10 cmH2O   Rise Time (%) 20 %   Trigger Sensitivity Flow (LPM) 2 LPM   Humidification Heater   Heater Temp 98.6 °F (37 °C)   AC/VC+ Actuals   Resp Rate (BPM) 18 BPM   VT (mL) 358 mL   MV (Obs) 5.7   MAP (cmH2O) 13 cmH2O   Peak Pressure (cmH2O) 23 cmH2O   I:E Ratio (Obs) 1:2.7   Static Compliance (mL/cmH20) 33.7 mL/cmH2O   Plateau Pressure (cm H2O) 20.3 cm H2O   Heater Temperature (Obs) 98.6 °F (37 °C)   AC/VC+ ALARMS   High Peak Pressure (cmH2O) 45 cmH2O   High Resp Rate (BPM) 30 BPM   High MV (L/min) 11 L/min   Low MV (L/min) 3 L/min   High VT (mL) 700 mL   Maintenance   Alarm (pink) cable attached Yes   Resuscitation bag with peep valve at bedside Yes   Water bag changed No    Circuit changed No   ETT  Oral;Hi-Lo;Inflated 7 mm   Placement Date/Time: 10/29/24 2749   Mask Ventilation: Mask ventilation not attempted (0)  Preoxygenated: Yes  Technique: Rapid sequence;Stylet;Video laryngoscopy  Type: Oral;Hi-Lo;Inflated  Tube Size: 7 mm  Laryngoscope: Erenis  Blade Size: 3  Locat...   Secured at (cm) 21   Measured from Lips   Secured Location (S)  Left   Repositioned (S)  Right to Left   Secured by Commercial tube irwin   Site Condition Dry   Cuff Pressure (color) Green   HI-LO Suction  Continuous low suction   HI-LO Secretions Scant   HI-LO Intervention Patent

## 2024-10-31 NOTE — RESPIRATORY THERAPY NOTE
10/31/24 1935   Respiratory Assessment   Assessment Type Assess only   General Appearance Sedated   Respiratory Pattern Assisted   Chest Assessment Chest expansion symmetrical   Bilateral Breath Sounds Diminished   Cough None   Suction ET Tube;Oral   Resp Comments Pt remains on full vent support   O2 Device Vent   Vent Information   Vent ID Groot   Vent type Drager   Drager Vent Mode AC/VC+   Is the patient reintubated? No   $ Vent Daily Charge-Subsequent Yes   $ Pulse Oximetry Spot Check Charge Completed   SpO2 92 %   AC/VC+ Settings   Resp Rate (BPM) 18 BPM   VT (mL) 360 mL   Insp Time (S) 0.9 S   FIO2 (%) 40 %   PEEP (cmH2O) 10 cmH2O   Rise Time (%) 20 %   Trigger Sensitivity Flow (LPM) 2 LPM   Humidification Heater   Heater Temp 98.6 °F (37 °C)   AC/VC+ Actuals   Resp Rate (BPM) 18 BPM   VT (mL) 360 mL   MV (Obs) 5.79   MAP (cmH2O) 14 cmH2O   Peak Pressure (cmH2O) 23 cmH2O   I:E Ratio (Obs) 1.0.2.7   Static Compliance (mL/cmH20) 30 mL/cmH2O   Plateau Pressure (cm H2O) 20 cm H2O   AC/VC+ ALARMS   High Peak Pressure (cmH2O) 45 cmH2O   High Resp Rate (BPM) 30 BPM   High MV (L/min) 11 L/min   Low MV (L/min) 3 L/min   High VT (mL) 700 mL     RT Ventilator Management Note  Bertha Brown 84 y.o. female MRN: [id number]  Unit/Bed#: ICU 05 Encounter: [id number]      Daily Screen         10/31/2024  0755 10/31/2024  1505          Patient safety screen outcome:: Failed Failed      Not Ready for Weaning due to:: PEEP > 8cmH2O;Going on Transport intubated PEEP > 8cmH2O                Physical Exam:   Assessment Type: Assess only  General Appearance: Sedated  Respiratory Pattern: Assisted  Chest Assessment: Chest expansion symmetrical  Bilateral Breath Sounds: Diminished  Cough: None  Suction: ET Tube, Oral  O2 Device: Vent      Resp Comments: Pt remains on full vent support

## 2024-10-31 NOTE — PROGRESS NOTES
Progress Note - Critical Care/ICU   Name: Bertha Brown 84 y.o. female I MRN: [id number]  Unit/Bed#: ICU 05 I Date of Admission: 10/29/2024   Date of Service: 10/31/2024 I Hospital Day: 2      Assessment & Plan  Septic shock (HCC)  Patient meets sepsis criteria as evidenced by tachycardia, tachypnea prior to intubation, and leukocytosis with RUKHSANA and lactic acidosis  Suspect secondary to ischemic colitis +/- aspiration pneumonia  Continue vasopressors titrated to maintain MAP >65  No evidence of cardiogenic component to shock, SVO2 82.6  Antibiotics broadened to Zosyn  Intraoperative cultures and BC pending, follow up results  Continue fluid resuscitation  Lactate now cleared  Trend procal and WBC/temperature curve  Ischemic colitis (HCC)  Patient with worsening abdominal pain and rising lactate concerning for acute abdomen  CT abdomen pelvis with evidence of mesenteric fat stranding in the LLQ and suggestion of pneumatosis in the hepatic flexure and proximal transverse colon concerning for ischemic colitis  Surgery consulted  POD #1 s/p exploratory laparotomy with extended right hemicolectomy, left in discontinuity, abthera VAC in place  Plan to return to the OR today  Continue NG tube to low intermittent wall suction  Strict NPO  Antibiotics broadened to zosyn, D2  Follow up intraoperative culture results  See plan under severe sepsis above  Acute respiratory failure with hypoxia (HCC)  Initially requiring 2-3 L NC oxygen  Escalating oxygen requirements with patient on 15 L MFNC prior to OR  Concern for aspiration event during episode of emesis +/- atelectasis  Intubated for the OR  Intra-op ABG with pO2 60 on FiO2 50% and PEEP 10  Continue mechanical ventilation  VAP prophylaxis  Monitor ABG  Patient currently with open abdomen, defer daily SBT  Coffee ground emesis  Patient reporting several episodes of coffee ground emesis at home the day PTA  CT high volume bleeding abdomen pelvis without evidence of  extravasation  Hgb 13.2 on arrival  Continue protonix infusion  NGT in place to low intermittent wall suction  Strict NPO  Trend hemoglobin  Coumadin on hold  GI consulted  Eventual EGD once more stable  Paroxysmal atrial fibrillation (HCC)  Coumadin on hold due to concern for GIB  Holding beta blocker secondary to vasopressor requirements  Hypothyroidism  Levothyroxine on hold due to NPO  Hypertension  Antihypertensives on hold due to hypotension on vasopressors  Hyperlipidemia  Statin on hold due to NPO  Diabetes (HCC)  Lab Results   Component Value Date    HGBA1C 7.0 (H) 08/22/2024       Recent Labs     10/30/24  0610 10/30/24  1243 10/30/24  2316 10/31/24  0013   POCGLU 142* 159* 108 139       Blood Sugar Average: Last 72 hrs:  (P) 139.6  Accuchecks and SSI Q6h  Obesity  Encourage lifestyle changes  RUKHSANA (acute kidney injury) ([facility])  RUKHSANA POA with initial creatinine 1.63 and baseline 0.8-0.9  Suspect prerenal etiology  Continue fluid resuscitation  Monitor I&O  Avoid nephrotoxins  Maintain MAP >65  Trend renal indices  Chronic idiopathic constipation    Generalized abdominal pain  Patient presented with abdominal pain  See plan under ischemic colitis  SIRS (systemic inflammatory response syndrome) ([facility])    Acute metabolic encephalopathy  Patient with worsening lethargy prior to OR  Suspect secondary to significant metabolic derangements  Monitor neuro exam  Disposition: Critical care    ICU Core Measures     Vented Patient  VAP Bundle  VAP bundle ordered     A: Assess, Prevent, and Manage Pain Has pain been assessed? Yes  Need for changes to pain regimen? No   B: Both Spontaneous Awakening Trials (SATs) and Spontaneous Breathing Trials (SBTs) Plan to perform spontaneous awakening trial today? No secondary to open chest/abdomen  Plan to perform spontaneous breathing trial today? No secondary to open chest/abdomen  Obvious barriers to extubation? Yes   C: Choice of Sedation RASS Goal: -3 Moderate Sedation  Need for  changes to sedation or analgesia regimen? No   D: Delirium CAM-ICU: Unable to perform secondary to Acute cognitive dysfunction   E: Early Mobility  Plan for early mobility? No   F: Family Engagement Plan for family engagement today? Yes       Antibiotic Review: Awaiting culture results.  and Continue broad spectrum secondary to severity of illness.     Review of Invasive Devices:    Parks Plan: Continue for accurate I/O monitoring for 48 hours  Central access plan: Medications requiring central line  Mattie Plan: Keep arterial line for hemodynamic monitoring, frequent ABGs, and frequent labs    Prophylaxis:  VTE VTE covered by:  heparin (porcine), Subcutaneous, 5,000 Units at 10/31/24 0531       Stress Ulcer  covered bypantoprazole (PROTONIX) 20 mg tablet [[id number]] (Long-Term Med), pantoprazole (PROTONIX) 80 mg in sodium chloride 0.9 % 100 mL infusion [[id number]]         24 Hour Events : Patient remains intubated with an open abdomen. Plan to return to the OR today.    Subjective   Review of Systems: Review of Systems not obtainable due to Clinical Condition    Objective :                   Vitals I/O      Most Recent Min/Max in 24hrs   Temp 98.8 °F (37.1 °C) Temp  Min: 98.4 °F (36.9 °C)  Max: 100.4 °F (38 °C)   Pulse 99 Pulse  Min: 99  Max: 161   Resp 18 Resp  Min: 11  Max: 25   /54 BP  Min: 76/50  Max: 129/52   O2 Sat 95 % SpO2  Min: 93 %  Max: 100 %      Intake/Output Summary (Last 24 hours) at 10/31/2024 0542  Last data filed at 10/31/2024 0510  Gross per 24 hour   Intake 5529.56 ml   Output 3405 ml   Net 2124.56 ml       Diet NPO    Invasive Monitoring   Arterial Line  Mattie BP 96/42  Arterial Line BP  Min: 79/40  Max: 127/50   MAP (!) 62 mmHg  Arterial Line MAP (mmHg)  Min: 54 mmHg  Max: 76 mmHg           Physical Exam   Physical Exam  Vitals reviewed.   Eyes:      Pupils: Pupils are equal, round, and reactive to light.   Skin:     General: Skin is warm and dry.      Coloration: Skin is pale.   HENT:       Head: Normocephalic and atraumatic.   Musculoskeletal:      Right lower le+ Edema present.      Left lower le+ Edema present.   Abdominal: General: Bowel sounds are absent.       Constitutional:       Appearance: She is obese. She is ill-appearing.      Interventions: She is sedated, intubated and restrained.   Pulmonary:      Effort: She is intubated.      Breath sounds: Normal breath sounds.   Neurological:      Comments: Exam limited by intubation/sedation   Genitourinary/Anorectal:  Parks present.        Diagnostic Studies        Lab Results: I have reviewed the following results:     Medications:  Scheduled PRN   chlorhexidine, 15 mL, Q12H JUAN PABLO  heparin (porcine), 5,000 Units, Q8H JUAN PABLO  insulin lispro, 1-6 Units, Q6H JUAN PABLO  piperacillin-tazobactam, 4.5 g, Q8H      acetaminophen, 1,000 mg, Q6H PRN  fentaNYL, 50 mcg, Q1H PRN       Continuous    amiodarone (CORDARONE) 900 mg in dextrose 5 % 500 mL infusion, 1 mg/min, Last Rate: 1 mg/min (10/31/24 0400)   Followed by  amiodarone (CORDARONE) 900 mg in dextrose 5 % 500 mL infusion, 0.5 mg/min  fentaNYL, 75 mcg/hr, Last Rate: 75 mcg/hr (10/31/24 040)  norepinephrine, 1-30 mcg/min, Last Rate: 5 mcg/min (10/31/24 0510)  pantoprazole (PROTONIX) 80 mg in sodium chloride 0.9 % 100 mL infusion, 8 mg/hr, Last Rate: 8 mg/hr (10/30/24 2351)  propofol, 5-50 mcg/kg/min, Last Rate: 15 mcg/kg/min (10/31/24 040)  vasopressin, 0.04 Units/min, Last Rate: 0.04 Units/min (10/31/24 040)         Labs:   CBC    Recent Labs     10/30/24  0130 10/30/24  1209 10/30/24  1724 10/31/24  0506   WBC 8.31  --   --  5.77   HGB 10.7*   < > 10.6* 9.6*   HCT 34.3*  --   --  30.3*     --   --  134*    < > = values in this interval not displayed.     BMP    Recent Labs     10/30/24  2210 10/31/24  0506   SODIUM 139 139   K 4.5 4.2   * 113*   CO2 20* 20*   AGAP 6 6   BUN 36* 32*   CREATININE 1.62* 1.45*   CALCIUM 8.3* 8.2*       Coags    Recent Labs     10/29/24  0824 10/29/24  2148  10/30/24  0455   INR 2.04* 1.18 1.55*   PTT 23  --   --         Additional Electrolytes  Recent Labs     10/30/24  0130 10/30/24  0809 10/30/24  2210 10/31/24  0506   MG 2.2  --   --  2.2   PHOS 4.2*  --   --  3.7   CAIONIZED 1.09*   < > 1.17 1.17    < > = values in this interval not displayed.          Blood Gas    Recent Labs     10/31/24  0505   PHART 7.322*   IKR4CXT 39.0   PO2ART 79.8   HWI5WCC 19.7*   BEART -5.8   SOURCE Line, Arterial     Recent Labs     10/30/24  0133 10/30/24  0501 10/31/24  0505   PHVEN 7.167*  --   --    KMB1VDD 48.1  --   --    PO2VEN 53.1*  --   --    WPS5LLP 17.0*  --   --    BEVEN -11.3  --   --    [id number] 82.6*  --   --    SOURCE Line, Arterial   < > Line, Arterial    < > = values in this interval not displayed.    LFTs  Recent Labs     10/30/24  0809 10/31/24  0506   ALT 11 7   AST 17 14   ALKPHOS 40 32*   ALB 3.2* 2.9*   TBILI 0.86 0.88       Infectious  Recent Labs     10/29/24  0824 10/30/24  0455   PROCALCITONI 0.06 44.94*     Glucose  Recent Labs     10/30/24  1209 10/30/24  1724 10/30/24  2210 10/31/24  0506   GLUC 167* 141* 127 141*

## 2024-11-01 PROBLEM — Z98.890 S/P EXPLORATORY LAPAROTOMY: Status: ACTIVE | Noted: 2024-11-01

## 2024-11-01 LAB
ANION GAP SERPL CALCULATED.3IONS-SCNC: 8 MMOL/L (ref 4–13)
ANION GAP SERPL CALCULATED.3IONS-SCNC: 8 MMOL/L (ref 4–13)
BACTERIA BLD CULT: ABNORMAL
BASE EXCESS BLDA CALC-SCNC: -6.8 MMOL/L
BUN SERPL-MCNC: 37 MG/DL (ref 5–25)
BUN SERPL-MCNC: 39 MG/DL (ref 5–25)
CA-I BLD-SCNC: 1 MMOL/L (ref 1.12–1.32)
CALCIUM SERPL-MCNC: 7.3 MG/DL (ref 8.4–10.2)
CALCIUM SERPL-MCNC: 7.4 MG/DL (ref 8.4–10.2)
CHLORIDE SERPL-SCNC: 110 MMOL/L (ref 96–108)
CHLORIDE SERPL-SCNC: 111 MMOL/L (ref 96–108)
CO2 SERPL-SCNC: 20 MMOL/L (ref 21–32)
CO2 SERPL-SCNC: 21 MMOL/L (ref 21–32)
CREAT SERPL-MCNC: 1.62 MG/DL (ref 0.6–1.3)
CREAT SERPL-MCNC: 1.84 MG/DL (ref 0.6–1.3)
E COLI DNA BLD POS QL NAA+NON-PROBE: DETECTED
ERYTHROCYTE [DISTWIDTH] IN BLOOD BY AUTOMATED COUNT: 14.6 % (ref 11.6–15.1)
GFR SERPL CREATININE-BSD FRML MDRD: 25 ML/MIN/1.73SQ M
GFR SERPL CREATININE-BSD FRML MDRD: 29 ML/MIN/1.73SQ M
GLUCOSE SERPL-MCNC: 112 MG/DL (ref 65–140)
GLUCOSE SERPL-MCNC: 127 MG/DL (ref 65–140)
GLUCOSE SERPL-MCNC: 129 MG/DL (ref 65–140)
GLUCOSE SERPL-MCNC: 137 MG/DL (ref 65–140)
GLUCOSE SERPL-MCNC: 139 MG/DL (ref 65–140)
GRAM STN SPEC: ABNORMAL
HCO3 BLDA-SCNC: 17.9 MMOL/L (ref 22–28)
HCT VFR BLD AUTO: 27.5 % (ref 34.8–46.1)
HGB BLD-MCNC: 8.6 G/DL (ref 11.5–15.4)
MAGNESIUM SERPL-MCNC: 2.2 MG/DL (ref 1.9–2.7)
MAGNESIUM SERPL-MCNC: 2.3 MG/DL (ref 1.9–2.7)
MCH RBC QN AUTO: 30.5 PG (ref 26.8–34.3)
MCHC RBC AUTO-ENTMCNC: 31.3 G/DL (ref 31.4–37.4)
MCV RBC AUTO: 98 FL (ref 82–98)
NRBC BLD AUTO-RTO: 0 /100 WBCS
O2 CT BLDA-SCNC: 13.3 ML/DL (ref 16–23)
OXYHGB MFR BLDA: 93.3 % (ref 94–97)
PCO2 BLDA: 32.8 MM HG (ref 36–44)
PH BLDA: 7.35 [PH] (ref 7.35–7.45)
PHOSPHATE SERPL-MCNC: 2.7 MG/DL (ref 2.3–4.1)
PHOSPHATE SERPL-MCNC: 3.3 MG/DL (ref 2.3–4.1)
PLATELET # BLD AUTO: 137 THOUSANDS/UL (ref 149–390)
PMV BLD AUTO: 11.6 FL (ref 8.9–12.7)
PO2 BLDA: 77.1 MM HG (ref 75–129)
POTASSIUM SERPL-SCNC: 3.2 MMOL/L (ref 3.5–5.3)
POTASSIUM SERPL-SCNC: 4 MMOL/L (ref 3.5–5.3)
RBC # BLD AUTO: 2.82 MILLION/UL (ref 3.81–5.12)
SODIUM SERPL-SCNC: 139 MMOL/L (ref 135–147)
SODIUM SERPL-SCNC: 139 MMOL/L (ref 135–147)
SPECIMEN SOURCE: ABNORMAL
WBC # BLD AUTO: 7.21 THOUSAND/UL (ref 4.31–10.16)

## 2024-11-01 PROCEDURE — 94150 VITAL CAPACITY TEST: CPT

## 2024-11-01 PROCEDURE — 82330 ASSAY OF CALCIUM: CPT | Performed by: PHYSICIAN ASSISTANT

## 2024-11-01 PROCEDURE — 99024 POSTOP FOLLOW-UP VISIT: CPT | Performed by: CLINICAL NURSE SPECIALIST

## 2024-11-01 PROCEDURE — 83735 ASSAY OF MAGNESIUM: CPT

## 2024-11-01 PROCEDURE — 94760 N-INVAS EAR/PLS OXIMETRY 1: CPT

## 2024-11-01 PROCEDURE — 99232 SBSQ HOSP IP/OBS MODERATE 35: CPT | Performed by: INTERNAL MEDICINE

## 2024-11-01 PROCEDURE — 82948 REAGENT STRIP/BLOOD GLUCOSE: CPT

## 2024-11-01 PROCEDURE — 83735 ASSAY OF MAGNESIUM: CPT | Performed by: PHYSICIAN ASSISTANT

## 2024-11-01 PROCEDURE — 82805 BLOOD GASES W/O2 SATURATION: CPT | Performed by: PHYSICIAN ASSISTANT

## 2024-11-01 PROCEDURE — 84100 ASSAY OF PHOSPHORUS: CPT | Performed by: PHYSICIAN ASSISTANT

## 2024-11-01 PROCEDURE — 99291 CRITICAL CARE FIRST HOUR: CPT | Performed by: STUDENT IN AN ORGANIZED HEALTH CARE EDUCATION/TRAINING PROGRAM

## 2024-11-01 PROCEDURE — 87040 BLOOD CULTURE FOR BACTERIA: CPT | Performed by: PHYSICIAN ASSISTANT

## 2024-11-01 PROCEDURE — 84100 ASSAY OF PHOSPHORUS: CPT

## 2024-11-01 PROCEDURE — 85027 COMPLETE CBC AUTOMATED: CPT | Performed by: PHYSICIAN ASSISTANT

## 2024-11-01 PROCEDURE — 80048 BASIC METABOLIC PNL TOTAL CA: CPT | Performed by: PHYSICIAN ASSISTANT

## 2024-11-01 PROCEDURE — 94003 VENT MGMT INPAT SUBQ DAY: CPT

## 2024-11-01 PROCEDURE — 80048 BASIC METABOLIC PNL TOTAL CA: CPT

## 2024-11-01 RX ORDER — FUROSEMIDE 10 MG/ML
40 INJECTION INTRAMUSCULAR; INTRAVENOUS
Status: DISCONTINUED | OUTPATIENT
Start: 2024-11-01 | End: 2024-11-01

## 2024-11-01 RX ORDER — BUMETANIDE 0.25 MG/ML
2 INJECTION, SOLUTION INTRAMUSCULAR; INTRAVENOUS ONCE
Status: COMPLETED | OUTPATIENT
Start: 2024-11-01 | End: 2024-11-01

## 2024-11-01 RX ORDER — POTASSIUM CHLORIDE 29.8 MG/ML
40 INJECTION INTRAVENOUS
Status: COMPLETED | OUTPATIENT
Start: 2024-11-01 | End: 2024-11-02

## 2024-11-01 RX ORDER — ALBUMIN (HUMAN) 12.5 G/50ML
25 SOLUTION INTRAVENOUS EVERY 6 HOURS
Status: COMPLETED | OUTPATIENT
Start: 2024-11-01 | End: 2024-11-02

## 2024-11-01 RX ADMIN — VASOPRESSIN 0.04 UNITS/MIN: 20 INJECTION INTRAVENOUS at 05:48

## 2024-11-01 RX ADMIN — POTASSIUM CHLORIDE 40 MEQ: 29.8 INJECTION, SOLUTION INTRAVENOUS at 22:33

## 2024-11-01 RX ADMIN — PIPERACILLIN AND TAZOBACTAM 4.5 G: 36; 4.5 INJECTION, POWDER, FOR SOLUTION INTRAVENOUS at 20:05

## 2024-11-01 RX ADMIN — DEXMEDETOMIDINE HYDROCHLORIDE 0.4 MCG/KG/HR: 400 INJECTION INTRAVENOUS at 19:05

## 2024-11-01 RX ADMIN — ALBUMIN (HUMAN) 25 G: 0.25 INJECTION, SOLUTION INTRAVENOUS at 13:59

## 2024-11-01 RX ADMIN — ALBUMIN (HUMAN) 25 G: 0.25 INJECTION, SOLUTION INTRAVENOUS at 09:51

## 2024-11-01 RX ADMIN — HEPARIN SODIUM 5000 UNITS: 5000 INJECTION INTRAVENOUS; SUBCUTANEOUS at 21:03

## 2024-11-01 RX ADMIN — POTASSIUM CHLORIDE 40 MEQ: 29.8 INJECTION, SOLUTION INTRAVENOUS at 21:03

## 2024-11-01 RX ADMIN — HEPARIN SODIUM 5000 UNITS: 5000 INJECTION INTRAVENOUS; SUBCUTANEOUS at 13:59

## 2024-11-01 RX ADMIN — Medication 75 MCG/HR: at 01:11

## 2024-11-01 RX ADMIN — BUMETANIDE 2 MG: 0.25 INJECTION INTRAMUSCULAR; INTRAVENOUS at 15:24

## 2024-11-01 RX ADMIN — CHLORHEXIDINE GLUCONATE 0.12% ORAL RINSE 15 ML: 1.2 LIQUID ORAL at 20:04

## 2024-11-01 RX ADMIN — PIPERACILLIN AND TAZOBACTAM 4.5 G: 36; 4.5 INJECTION, POWDER, FOR SOLUTION INTRAVENOUS at 13:59

## 2024-11-01 RX ADMIN — VASOPRESSIN 0.04 UNITS/MIN: 20 INJECTION INTRAVENOUS at 15:21

## 2024-11-01 RX ADMIN — PANTOPRAZOLE SODIUM 40 MG: 40 INJECTION, POWDER, FOR SOLUTION INTRAVENOUS at 20:04

## 2024-11-01 RX ADMIN — CHLORHEXIDINE GLUCONATE 0.12% ORAL RINSE 15 ML: 1.2 LIQUID ORAL at 09:51

## 2024-11-01 RX ADMIN — HEPARIN SODIUM 5000 UNITS: 5000 INJECTION INTRAVENOUS; SUBCUTANEOUS at 05:49

## 2024-11-01 RX ADMIN — PANTOPRAZOLE SODIUM 40 MG: 40 INJECTION, POWDER, FOR SOLUTION INTRAVENOUS at 09:51

## 2024-11-01 RX ADMIN — ALBUMIN (HUMAN) 25 G: 0.25 INJECTION, SOLUTION INTRAVENOUS at 19:52

## 2024-11-01 RX ADMIN — DEXMEDETOMIDINE HYDROCHLORIDE 0.2 MCG/KG/HR: 400 INJECTION INTRAVENOUS at 05:47

## 2024-11-01 RX ADMIN — VASOPRESSIN 0.04 UNITS/MIN: 20 INJECTION INTRAVENOUS at 22:33

## 2024-11-01 RX ADMIN — PIPERACILLIN AND TAZOBACTAM 4.5 G: 36; 4.5 INJECTION, POWDER, FOR SOLUTION INTRAVENOUS at 06:30

## 2024-11-01 NOTE — PLAN OF CARE
Problem: PAIN - ADULT  Goal: Verbalizes/displays adequate comfort level or baseline comfort level  Description: Interventions:  - Encourage patient to monitor pain and request assistance  - Assess pain using appropriate pain scale  - Administer analgesics based on type and severity of pain and evaluate response  - Implement non-pharmacological measures as appropriate and evaluate response  - Consider cultural and social influences on pain and pain management  - Notify physician/advanced practitioner if interventions unsuccessful or patient reports new pain  Outcome: Progressing     Problem: INFECTION - ADULT  Goal: Absence or prevention of progression during hospitalization  Description: INTERVENTIONS:  - Assess and monitor for signs and symptoms of infection  - Monitor lab/diagnostic results  - Monitor all insertion sites, i.e. indwelling lines, tubes, and drains  - Monitor endotracheal if appropriate and nasal secretions for changes in amount and color  - [place] appropriate cooling/warming therapies per order  - Administer medications as ordered  - Instruct and encourage patient and family to use good hand hygiene technique  - Identify and instruct in appropriate isolation precautions for identified infection/condition  Outcome: Progressing  Goal: Absence of fever/infection during neutropenic period  Description: INTERVENTIONS:  - Monitor WBC    Outcome: Progressing     Problem: SAFETY ADULT  Goal: Patient will remain free of falls  Description: INTERVENTIONS:  - Educate patient/family on patient safety including physical limitations  - Instruct patient to call for assistance with activity   - Consult OT/PT to assist with strengthening/mobility   - Keep Call bell within reach  - Keep bed low and locked with side rails adjusted as appropriate  - Keep care items and personal belongings within reach  - Initiate and maintain comfort rounds  - Make Fall Risk Sign visible to staff  - Offer Toileting every  Hours,  in advance of need  - Initiate/Maintain alarm  - Obtain necessary fall risk management equipment:   - Apply yellow socks and bracelet for high fall risk patients  - Consider moving patient to room near nurses station  Outcome: Progressing  Goal: Maintain or return to baseline ADL function  Description: INTERVENTIONS:  -  Assess patient's ability to carry out ADLs; assess patient's baseline for ADL function and identify physical deficits which impact ability to perform ADLs (bathing, care of mouth/teeth, toileting, grooming, dressing, etc.)  - Assess/evaluate cause of self-care deficits   - Assess range of motion  - Assess patient's mobility; develop plan if impaired  - Assess patient's need for assistive devices and provide as appropriate  - Encourage maximum independence but intervene and supervise when necessary  - Involve family in performance of ADLs  - Assess for home care needs following discharge   - Consider OT consult to assist with ADL evaluation and planning for discharge  - Provide patient education as appropriate  Outcome: Progressing  Goal: Maintains/Returns to pre admission functional level  Description: INTERVENTIONS:  - Perform AM-PAC 6 Click Basic Mobility/ Daily Activity assessment daily.  - Set and communicate daily mobility goal to care team and patient/family/caregiver.   - Collaborate with rehabilitation services on mobility goals if consulted  - Perform Range of Motion  times a day.  - Reposition patient every  hours.  - Dangle patient  times a day  - Stand patient  times a day  - Ambulate patient  times a day  - Out of bed to chair  times a day   - Out of bed for meals  times a day  - Out of bed for toileting  - Record patient progress and toleration of activity level   Outcome: Progressing     Problem: DISCHARGE PLANNING  Goal: Discharge to home or other facility with appropriate resources  Description: INTERVENTIONS:  - Identify barriers to discharge w/patient and caregiver  - Arrange for  needed discharge resources and transportation as appropriate  - Identify discharge learning needs (meds, wound care, etc.)  - Arrange for interpretive services to assist at discharge as needed  - Refer to Case Management Department for coordinating discharge planning if the patient needs post-hospital services based on physician/advanced practitioner order or complex needs related to functional status, cognitive ability, or social support system  Outcome: Progressing     Problem: Knowledge Deficit  Goal: Patient/family/caregiver demonstrates understanding of disease process, treatment plan, medications, and discharge instructions  Description: Complete learning assessment and assess knowledge base.  Interventions:  - Provide teaching at level of understanding  - Provide teaching via preferred learning methods  Outcome: Progressing     Problem: NEUROSENSORY - ADULT  Goal: Achieves stable or improved neurological status  Description: INTERVENTIONS  - Monitor and report changes in neurological status  - Monitor vital signs such as temperature, blood pressure, glucose, and any other labs ordered   - Initiate measures to prevent increased intracranial pressure  - Monitor for seizure activity and implement precautions if appropriate      Outcome: Progressing  Goal: Remains free of injury related to seizures activity  Description: INTERVENTIONS  - Maintain airway, patient safety  and administer oxygen as ordered  - Monitor patient for seizure activity, document and report duration and description of seizure to physician/advanced practitioner  - If seizure occurs,  ensure patient safety during seizure  - Reorient patient post seizure  - Seizure pads on all 4 side rails  - Instruct patient/family to notify RN of any seizure activity including if an aura is experienced  - Instruct patient/family to call for assistance with activity based on nursing assessment  - Administer anti-seizure medications if ordered    Outcome:  Progressing  Goal: Achieves maximal functionality and self care  Description: INTERVENTIONS  - Monitor swallowing and airway patency with patient fatigue and changes in neurological status  - Encourage and assist patient to increase activity and self care.   - Encourage visually impaired, hearing impaired and aphasic patients to use assistive/communication devices  Outcome: Progressing     Problem: RESPIRATORY - ADULT  Goal: Achieves optimal ventilation and oxygenation  Description: INTERVENTIONS:  - Assess for changes in respiratory status  - Assess for changes in mentation and behavior  - Position to facilitate oxygenation and minimize respiratory effort  - Oxygen administered by appropriate delivery if ordered  - Initiate smoking cessation education as indicated  - Encourage broncho-pulmonary hygiene including cough, deep breathe, Incentive Spirometry  - Assess the need for suctioning and aspirate as needed  - Assess and instruct to report SOB or any respiratory difficulty  - Respiratory Therapy support as indicated  Outcome: Progressing     Problem: GASTROINTESTINAL - ADULT  Goal: Minimal or absence of nausea and/or vomiting  Description: INTERVENTIONS:  - Administer IV fluids if ordered to ensure adequate hydration  - Maintain NPO status until nausea and vomiting are resolved  - Nasogastric tube if ordered  - Administer ordered antiemetic medications as needed  - Provide nonpharmacologic comfort measures as appropriate  - Advance diet as tolerated, if ordered  - Consider nutrition services referral to assist patient with adequate nutrition and appropriate food choices  Outcome: Progressing  Goal: Maintains or returns to baseline bowel function  Description: INTERVENTIONS:  - Assess bowel function  - Encourage oral fluids to ensure adequate hydration  - Administer IV fluids if ordered to ensure adequate hydration  - Administer ordered medications as needed  - Encourage mobilization and activity  - Consider  nutritional services referral to assist patient with adequate nutrition and appropriate food choices  Outcome: Progressing  Goal: Maintains adequate nutritional intake  Description: INTERVENTIONS:  - Monitor percentage of each meal consumed  - Identify factors contributing to decreased intake, treat as appropriate  - Assist with meals as needed  - Monitor I&O, weight, and lab values if indicated  - Obtain nutrition services referral as needed  Outcome: Progressing  Goal: Establish and maintain optimal ostomy function  Description: INTERVENTIONS:  - Assess bowel function  - Encourage oral fluids to ensure adequate hydration  - Administer IV fluids if ordered to ensure adequate hydration   - Administer ordered medications as needed  - Encourage mobilization and activity  - Nutrition services referral to assist patient with appropriate food choices  - Assess stoma site  - Consider wound care consult   Outcome: Progressing  Goal: Oral mucous membranes remain intact  Description: INTERVENTIONS  - Assess oral mucosa and hygiene practices  - Implement preventative oral hygiene regimen  - Implement oral medicated treatments as ordered  - Initiate Nutrition services referral as needed  Outcome: Progressing     Problem: GENITOURINARY - ADULT  Goal: Maintains or returns to baseline urinary function  Description: INTERVENTIONS:  - Assess urinary function  - Encourage oral fluids to ensure adequate hydration if ordered  - Administer IV fluids as ordered to ensure adequate hydration  - Administer ordered medications as needed  - Offer frequent toileting  - Follow urinary retention protocol if ordered  Outcome: Progressing  Goal: Absence of urinary retention  Description: INTERVENTIONS:  - Assess patient’s ability to void and empty bladder  - Monitor I/O  - Bladder scan as needed  - Discuss with physician/AP medications to alleviate retention as needed  - Discuss catheterization for long term situations as appropriate  Outcome:  Progressing  Goal: Urinary catheter remains patent  Description: INTERVENTIONS:  - Assess patency of urinary catheter  - If patient has a chronic james, consider changing catheter if non-functioning  - Follow guidelines for intermittent irrigation of non-functioning urinary catheter  Outcome: Progressing     Problem: METABOLIC, FLUID AND ELECTROLYTES - ADULT  Goal: Electrolytes maintained within normal limits  Description: INTERVENTIONS:  - Monitor labs and assess patient for signs and symptoms of electrolyte imbalances  - Administer electrolyte replacement as ordered  - Monitor response to electrolyte replacements, including repeat lab results as appropriate  - Instruct patient on fluid and nutrition as appropriate  Outcome: Progressing  Goal: Fluid balance maintained  Description: INTERVENTIONS:  - Monitor labs   - Monitor I/O and WT  - Instruct patient on fluid and nutrition as appropriate  - Assess for signs & symptoms of volume excess or deficit  Outcome: Progressing  Goal: Glucose maintained within target range  Description: INTERVENTIONS:  - Monitor Blood Glucose as ordered  - Assess for signs and symptoms of hyperglycemia and hypoglycemia  - Administer ordered medications to maintain glucose within target range  - Assess nutritional intake and initiate nutrition service referral as needed  Outcome: Progressing     Problem: SKIN/TISSUE INTEGRITY - ADULT  Goal: Skin Integrity remains intact(Skin Breakdown Prevention)  Description: Assess:  -Perform Jese assessment every   -Clean and moisturize skin every   -Inspect skin when repositioning, toileting, and assisting with ADLS  -Assess under medical devices such as  every   -Assess extremities for adequate circulation and sensation     Bed Management:  -Have minimal linens on bed & keep smooth, unwrinkled  -Change linens as needed when moist or perspiring  -Avoid sitting or lying in one position for more than  hours while in bed  -Keep HOB at degrees      Toileting:  -Offer bedside commode  -Assess for incontinence every   -Use incontinent care products after each incontinent episode such as     Activity:  -Mobilize patient  times a day  -Encourage activity and walks on unit  -Encourage or provide ROM exercises   -Turn and reposition patient every  Hours  -Use appropriate equipment to lift or move patient in bed  -Instruct/ Assist with weight shifting every  when out of bed in chair  -Consider limitation of chair time  hour intervals    Skin Care:  -Avoid use of baby powder, tape, friction and shearing, hot water or constrictive clothing  -Relieve pressure over bony prominences using   -Do not massage red bony areas    Next Steps:  -Teach patient strategies to minimize risks such as    -Consider consults to  interdisciplinary teams such as   Outcome: Progressing  Goal: Incision(s), wounds(s) or drain site(s) healing without S/S of infection  Description: INTERVENTIONS  - Assess and document dressing, incision, wound bed, drain sites and surrounding tissue  - Provide patient and family education  - Perform skin care/dressing changes every   Outcome: Progressing  Goal: Pressure injury heals and does not worsen  Description: Interventions:  - Implement low air loss mattress or specialty surface (Criteria met)  - Apply silicone foam dressing  - Instruct/assist with weight shifting every  minutes when in chair   - Limit chair time to  hour intervals  - Use special pressure reducing interventions such as  when in chair   - Apply fecal or urinary incontinence containment device   - Perform passive or active ROM every   - Turn and reposition patient & offload bony prominences every  hours   - Utilize friction reducing device or surface for transfers   - Consider consults to  interdisciplinary teams such as   - Use incontinent care products after each incontinent episode such as  Consider nutrition services referral as needed  Outcome: Progressing     Problem:  SAFETY,RESTRAINT: NV/NON-SELF DESTRUCTIVE BEHAVIOR  Goal: Remains free of harm/injury (restraint for non violent/non self-detsructive behavior)  Description: INTERVENTIONS:  - Instruct patient/family regarding restraint use   - Assess and monitor physiologic and psychological status   - Provide interventions and comfort measures to meet assessed patient needs   - Identify and implement measures to help patient regain control  - Assess readiness for release of restraint   Outcome: Progressing  Goal: Returns to optimal restraint-free functioning  Description: INTERVENTIONS:  - Assess the patient's behavior and symptoms that indicate continued need for restraint  - Identify and implement measures to help patient regain control  - Assess readiness for release of restraint   Outcome: Progressing     Problem: Nutrition/Hydration-ADULT  Goal: Nutrient/Hydration intake appropriate for improving, restoring or maintaining nutritional needs  Description: Monitor and assess patient's nutrition/hydration status for malnutrition. Collaborate with interdisciplinary team and initiate plan and interventions as ordered.  Monitor patient's weight and dietary intake as ordered or per policy. Utilize nutrition screening tool and intervene as necessary. Determine patient's food preferences and provide high-protein, high-caloric foods as appropriate.     INTERVENTIONS:  - Monitor oral intake, urinary output, labs, and treatment plans  - Assess nutrition and hydration status and recommend course of action  - Evaluate amount of meals eaten  - Assist patient with eating if necessary   - Allow adequate time for meals  - Recommend/ encourage appropriate diets, oral nutritional supplements, and vitamin/mineral supplements  - Order, calculate, and assess calorie counts as needed  - Recommend, monitor, and adjust tube feedings and TPN/PPN based on assessed needs  - Assess need for intravenous fluids  - Provide specific nutrition/hydration  education as appropriate  - Include patient/family/caregiver in decisions related to nutrition  Outcome: Progressing     Problem: Prexisting or High Potential for Compromised Skin Integrity  Goal: Skin integrity is maintained or improved  Description: INTERVENTIONS:  - Identify patients at risk for skin breakdown  - Assess and monitor skin integrity  - Assess and monitor nutrition and hydration status  - Monitor labs   - Assess for incontinence   - Turn and reposition patient  - Assist with mobility/ambulation  - Relieve pressure over bony prominences  - Avoid friction and shearing  - Provide appropriate hygiene as needed including keeping skin clean and dry  - Evaluate need for skin moisturizer/barrier cream  - Collaborate with interdisciplinary team   - Patient/family teaching  - Consider wound care consult   Outcome: Progressing

## 2024-11-01 NOTE — ASSESSMENT & PLAN NOTE
RUKHSANA POA with initial creatinine 1.63 and baseline 0.8-0.9  Suspect prerenal etiology  Slightly worsened overnight    Plan:  Continue fluid resuscitation  Monitor I&O  Avoid nephrotoxins  Maintain MAP >65  Trend renal indices

## 2024-11-01 NOTE — RESPIRATORY THERAPY NOTE
11/01/24 0418   Respiratory Assessment   Assessment Type Assess only   General Appearance Sedated   Respiratory Pattern Assisted   Chest Assessment Chest expansion symmetrical   Bilateral Breath Sounds Diminished   Cough None   Resp Comments Pt remains on full vent support   O2 Device Vent   Vent Information   Vent ID Groot   Vent type Drager   Drager Vent Mode AC/VC+   Is the patient reintubated? No   $ Pulse Oximetry Spot Check Charge Completed   SpO2 93 %   AC/VC+ Settings   Resp Rate (BPM) 18 BPM   VT (mL) 360 mL   Insp Time (S) 0.9 S   FIO2 (%) 40 %   PEEP (cmH2O) 10 cmH2O   Rise Time (%) 20 %   Humidification Heater   Heater Temp 98.6 °F (37 °C)   AC/VC+ Actuals   Resp Rate (BPM) 18 BPM   VT (mL) 360 mL   MV (Obs) 5.8   MAP (cmH2O) 14 cmH2O   Peak Pressure (cmH2O) 24 cmH2O   I:E Ratio (Obs) 1.0:2.7   AC/VC+ ALARMS   High Peak Pressure (cmH2O) 45 cmH2O   High Resp Rate (BPM) 30 BPM   High MV (L/min) 11 L/min   Low MV (L/min) 3 L/min   High VT (mL) 700 mL   Maintenance   Alarm (pink) cable attached Yes   Resuscitation bag with peep valve at bedside Yes   Water bag changed No   Circuit changed No   Daily Screen   Patient safety screen outcome: Failed   Not Ready for Weaning due to: Underline problem not resolved;PEEP > 8cmH2O   ETT  Oral;Hi-Lo;Inflated 7 mm   Placement Date/Time: 10/29/24 2216   Mask Ventilation: Mask ventilation not attempted (0)  Preoxygenated: Yes  Technique: Rapid sequence;Stylet;Video laryngoscopy  Type: Oral;Hi-Lo;Inflated  Tube Size: 7 mm  Laryngoscope: Hurtado  Blade Size: 3  Locat...   Secured at (cm) 21   Measured from Lips   Secured Location Center   Secured by Commercial tube irwin   Site Condition Dry   Cuff Pressure (color) Green   HI-LO Suction  Continuous low suction   HI-LO Secretions Scant   HI-LO Intervention Patent     RT Ventilator Management Note  Bertha Brown 84 y.o. female MRN: [id number]  Unit/Bed#: ICU 05 Encounter: [id number]      Daily Screen          10/31/2024  2343 11/1/2024  0418          Patient safety screen outcome:: Failed Failed      Not Ready for Weaning due to:: Underline problem not resolved Underline problem not resolved;PEEP > 8cmH2O                Physical Exam:   Assessment Type: Assess only  General Appearance: Sedated  Respiratory Pattern: Assisted  Chest Assessment: Chest expansion symmetrical  Bilateral Breath Sounds: Diminished  Cough: None  Suction: Oral, ET Tube  O2 Device: Vent      Resp Comments: Pt remains on full vent support

## 2024-11-01 NOTE — ASSESSMENT & PLAN NOTE
Coumadin on hold due to concern for GIB  Holding beta blocker secondary to vasopressor requirements  Continue amiodarone for rapid AFib

## 2024-11-01 NOTE — RESPIRATORY THERAPY NOTE
10/31/24 2343   Respiratory Assessment   Assessment Type Assess only   General Appearance Sedated   Respiratory Pattern Assisted   Chest Assessment Chest expansion symmetrical   Bilateral Breath Sounds Diminished   Cough None   Suction Oral;ET Tube   Resp Comments Pt remains on full vent support   O2 Device Vent   Vent Information   Vent ID Groot   Vent type Drager   Drager Vent Mode AC/VC+   Is the patient reintubated? No   $ Pulse Oximetry Spot Check Charge Completed   SpO2 95 %   AC/VC+ Settings   Resp Rate (BPM) 18 BPM   VT (mL) 360 mL   Insp Time (S) 0.9 S   FIO2 (%) 40 %   PEEP (cmH2O) 10 cmH2O   Rise Time (%) 20 %   Trigger Sensitivity Flow (LPM) 2 LPM   Humidification Heater   Heater Temp 98.6 °F (37 °C)   AC/VC+ Actuals   Resp Rate (BPM) 18 BPM   VT (mL) 356 mL   MV (Obs) 5.82   Peak Pressure (cmH2O) 24 cmH2O   I:E Ratio (Obs) 1.0   Static Compliance (mL/cmH20) 32 mL/cmH2O   Plateau Pressure (cm H2O) 19 cm H2O   AC/VC+ ALARMS   High Peak Pressure (cmH2O) 45 cmH2O   High Resp Rate (BPM) 30 BPM   High MV (L/min) 11 L/min   Low MV (L/min) 3 L/min   High VT (mL) 700 mL   Maintenance   Alarm (pink) cable attached Yes   Resuscitation bag with peep valve at bedside Yes   Water bag changed Yes   Circuit changed No   Daily Screen   Patient safety screen outcome: Failed   Not Ready for Weaning due to: Underline problem not resolved   ETT  Oral;Hi-Lo;Inflated 7 mm   Placement Date/Time: 10/29/24 7836   Mask Ventilation: Mask ventilation not attempted (0)  Preoxygenated: Yes  Technique: Rapid sequence;Stylet;Video laryngoscopy  Type: Oral;Hi-Lo;Inflated  Tube Size: 7 mm  Laryngoscope: Hurtado  Blade Size: 3  Locat...   Secured at (cm) 21   Measured from Lips   Secured Location Center   Secured by Commercial tube irwin   Site Condition Dry   Cuff Pressure (color) Green   HI-LO Suction  Continuous low suction   HI-LO Secretions Scant   HI-LO Intervention Patent     RT Ventilator Management Note  Bertha Holley  y.o. female MRN: [id number]  Unit/Bed#: ICU 05 Encounter: [id number]      Daily Screen         10/31/2024  1505 10/31/2024  2343          Patient safety screen outcome:: Failed Failed      Not Ready for Weaning due to:: PEEP > 8cmH2O Underline problem not resolved                Physical Exam:   Assessment Type: Assess only  General Appearance: Sedated  Respiratory Pattern: Assisted  Chest Assessment: Chest expansion symmetrical  Bilateral Breath Sounds: Diminished  Cough: None  Suction: Oral, ET Tube  O2 Device: Vent      Resp Comments: Pt remains on full vent support

## 2024-11-01 NOTE — ASSESSMENT & PLAN NOTE
-Postop day 2, status post exploratory laparotomy with right hemicolectomy on 10/29/2024     -Second look and washout on 10/31/2024 with abdominal wall closure, ileostomy creation, mucous fistula creation, and wound VAC placement and abdominal wall subcutaneous tissue     -Stable

## 2024-11-01 NOTE — ASSESSMENT & PLAN NOTE
-3 episodes prior to ER presentation  -Hgb on admission 13.2 with drop to 9.6 today  -CT AP with IV contrast notes no evidence of active high-volume GI hemorrhage.  Moderate diffuse colonic distension with mild wall thickening suggestive of the distal transverse and descending colon noted.  Mild distention esophagus with moderate gaseous distention  -PPI BID  -Initial plan was for EGD however this has been postponed due to ischemic bowel and need for emergent surgical intervention  -As there has been no ongoing signs of UGI bleeding this can be done in the outpatient setting  -Continue to trend Hgb/Hct and transfuse as necessary  -Monitor for signs of recurrent GIB

## 2024-11-01 NOTE — PROGRESS NOTES
Progress Note - Surgery-General   Name: Bertha Brown 84 y.o. female I MRN: [id number]  Unit/Bed#: ICU 05 I Date of Admission: 10/29/2024   Date of Service: 11/1/2024 I Hospital Day: 3     Assessment & Plan  S/P exploratory laparotomy  -Postop day 2, status post exploratory laparotomy with right hemicolectomy on 10/29/2024     -Second look and washout on 10/31/2024 with abdominal wall closure, ileostomy creation, mucous fistula creation, and wound VAC placement and abdominal wall subcutaneous tissue     -Stable  Coffee ground emesis  -Hemoglobin stable  -Continue PPI    Paroxysmal atrial fibrillation (HCC)    -Hold warfarin for now as patient will be going to the OR  -Continue rate control  -Remainder of cares per primary medical team    Acute respiratory failure with hypoxia (HCC)    -Continue nonrebreather to oxygen saturation of greater than 90%  -Remainder of care per prior medical team  Obesity    RUKHSANA (acute kidney injury) (HCC)    -Continue IV fluid hydration  -Avoid nephrotoxic drugs  -Daily labs to trend BUN and creatinine    Generalized abdominal pain    -Unclear etiology at this point time.  Patient with diffuse abdominal pain, worsening in nature, with positive rebound in the setting of worsening hypoxia and tachycardia.  In addition lactic acid continues to rise.  Initial CT scan this morning did not reveal any acute pathology that would explain this abdominal discomfort which is recently started in addition did not reveal any obvious bleeding.  Repeat CT scan ordered.  In addition repeat CBC showing elevation of white blood cell count from 12-19.  Lactic acid repeat showing 4.5.  Concern for possible ischemia given abdominal exam.  Based on patient's clinical worsening I do feel she needs exploration in the OR at least for diagnostic laparoscopy which could be converted to exploratory laparotomy if needed.  Will obtain CT scan to see if this offers any additional guidance as to the source of the  "problem.    Discussed at length with the patient who agreed to surgery and would like her daughter to sign consent.  Unfortunately her daughter is not present.  Did speak with daughter Linda by phone.  She understands the current worsening of her mother's clinical situation.  Furthermore she is in agreement for surgery as well.  Did explain that we will be taking her for diagnostic laparoscopy, possible exploratory laparotomy, possible bowel resection, possible ostomy, possible open abdomen with ABThera VAC.  We did explain the risk which include bleeding, infection, injury to surrounding structures in addition to worsening sepsis and potential for death.    SIRS (systemic inflammatory response syndrome) (HCC)    -Empiric antibiotics  -N.p.o.  -IV fluid resuscitation  -    Hypothyroidism    Hypertension    Hyperlipidemia    Diabetes (HCC)  Lab Results   Component Value Date    HGBA1C 7.0 (H) 08/22/2024       Recent Labs     10/31/24  1207 10/31/24  1755 10/31/24  1815 11/01/24  1200   POCGLU 126 119 118 127       Blood Sugar Average: Last 72 hrs:  (P) 132    Chronic idiopathic constipation    Ischemic colitis (HCC)    Septic shock (HCC)    Acute metabolic encephalopathy        Plan:  -Continue medical management as per primary team  -Continue to monitor ileostomy output  -Continue to monitor wound VAC  -Wound VAC dressing changes Monday Wednesday Fridays  -Serial abdominal exams    Subjective/Objective   Chief Complaint:     Subjective: No acute events overnight    Objective:     Blood pressure 101/55, pulse 78, temperature 98.6 °F (37 °C), resp. rate 17, height 5' 5\" (1.651 m), weight 105 kg (232 lb 5.8 oz), SpO2 93%.  Body mass index is 38.67 kg/m².    I/O         10/30 0701  10/31 0700 10/31 0701 11/01 0700 11/01 0701 11/02 0700    P.O. 0 0     I.V. (mL/kg) 3279.6 (31.2) 2963.3 (28.2) 255 (2.4)    NG/GT   0    IV Piggyback 2177.9 850 200    Total Intake(mL/kg) 5457.5 (52) 3813.3 (36.3) 455 (4.3)    Urine " "(mL/kg/hr) 1645 (0.7) 1667 (0.7) 220 (0.3)    Emesis/NG output 280 220 150    Drains 1350 0     Stool 0 55 0    Blood  25     Total Output 3275 1967 370    Net +2182.5 +1846.3 +85           Unmeasured Stool Occurrence 1 x              Invasive Devices       Central Venous Catheter Line  Duration             CVC Central Lines 10/29/24 2 days              Arterial Line  Duration             Arterial Line 10/29/24 Left Radial 2 days              Drain  Duration             NG/OG/Enteral Tube Nasogastric 18 Fr Left nare 2 days    Urethral Catheter Asept;Coude;Non-latex;Straight-tip 16 Fr. 2 days    Colostomy RLQ <1 day              Airway  Duration             ETT  Oral;Hi-Lo;Inflated 7 mm 2 days                    Physical Exam: BP 98/57   Pulse 80   Temp 99 °F (37.2 °C)   Resp 14   Ht 5' 5\" (1.651 m)   Wt 105 kg (232 lb 5.8 oz)   SpO2 92%   BMI 38.67 kg/m²   General appearance: alert  Lungs: diminished breath sounds and    Heart: S1, S2 normal  Abdomen:  Obese, soft, nondistended, absent bowel sounds  Extremities: extremities normal, warm and well-perfused; no cyanosis, clubbing, or edema    Labs   Recent Results (from the past 24 hour(s))   Fingerstick Glucose (POCT)    Collection Time: 10/31/24  5:55 PM   Result Value Ref Range    POC Glucose 119 65 - 140 mg/dl   Fingerstick Glucose (POCT)    Collection Time: 10/31/24  6:15 PM   Result Value Ref Range    POC Glucose 118 65 - 140 mg/dl   Lactic acid, plasma (w/reflex if result > 2.0)    Collection Time: 10/31/24 11:09 PM   Result Value Ref Range    LACTIC ACID 1.2 0.5 - 2.0 mmol/L   CBC and Platelet    Collection Time: 10/31/24 11:09 PM   Result Value Ref Range    WBC 7.20 4.31 - 10.16 Thousand/uL    RBC 2.82 (L) 3.81 - 5.12 Million/uL    Hemoglobin 8.9 (L) 11.5 - 15.4 g/dL    Hematocrit 27.5 (L) 34.8 - 46.1 %    MCV 98 82 - 98 fL    MCH 31.6 26.8 - 34.3 pg    MCHC 32.4 31.4 - 37.4 g/dL    RDW 14.6 11.6 - 15.1 %    Platelets 135 (L) 149 - 390 Thousands/uL    " MPV 11.6 8.9 - 12.7 fL   Basic metabolic panel    Collection Time: 10/31/24 11:09 PM   Result Value Ref Range    Sodium 138 135 - 147 mmol/L    Potassium 4.2 3.5 - 5.3 mmol/L    Chloride 111 (H) 96 - 108 mmol/L    CO2 19 (L) 21 - 32 mmol/L    ANION GAP 8 4 - 13 mmol/L    BUN 36 (H) 5 - 25 mg/dL    Creatinine 1.96 (H) 0.60 - 1.30 mg/dL    Glucose 115 65 - 140 mg/dL    Calcium 7.3 (L) 8.4 - 10.2 mg/dL    eGFR 23 ml/min/1.73sq m   Blood gas, arterial    Collection Time: 10/31/24 11:11 PM   Result Value Ref Range    pH, Arterial 7.360 7.350 - 7.450    pCO2, Arterial 34.6 (L) 36.0 - 44.0 mm Hg    pO2, Arterial 120.4 75.0 - 129.0 mm Hg    HCO3, Arterial 19.1 (L) 22.0 - 28.0 mmol/L    Base Excess, Arterial -5.7 mmol/L    O2 Content, Arterial 13.2 (L) 16.0 - 23.0 mL/dL    O2 HGB,Arterial  97.0 94.0 - 97.0 %    SOURCE Line, Arterial     Temperature 99.1 Degrees Fehrenheit    Vent Type- AC AC     AC Rate 18     Tidal Volume 360 ml    Inspired Air (FIO2) 40     PEEP 10    CBC and differential    Collection Time: 11/01/24  5:56 AM   Result Value Ref Range    WBC 7.21 4.31 - 10.16 Thousand/uL    RBC 2.82 (L) 3.81 - 5.12 Million/uL    Hemoglobin 8.6 (L) 11.5 - 15.4 g/dL    Hematocrit 27.5 (L) 34.8 - 46.1 %    MCV 98 82 - 98 fL    MCH 30.5 26.8 - 34.3 pg    MCHC 31.3 (L) 31.4 - 37.4 g/dL    RDW 14.6 11.6 - 15.1 %    MPV 11.6 8.9 - 12.7 fL    Platelets 137 (L) 149 - 390 Thousands/uL    nRBC 0 /100 WBCs   Basic metabolic panel    Collection Time: 11/01/24  5:56 AM   Result Value Ref Range    Sodium 139 135 - 147 mmol/L    Potassium 4.0 3.5 - 5.3 mmol/L    Chloride 111 (H) 96 - 108 mmol/L    CO2 20 (L) 21 - 32 mmol/L    ANION GAP 8 4 - 13 mmol/L    BUN 37 (H) 5 - 25 mg/dL    Creatinine 1.84 (H) 0.60 - 1.30 mg/dL    Glucose 129 65 - 140 mg/dL    Calcium 7.3 (L) 8.4 - 10.2 mg/dL    eGFR 25 ml/min/1.73sq m   Magnesium    Collection Time: 11/01/24  5:56 AM   Result Value Ref Range    Magnesium 2.3 1.9 - 2.7 mg/dL   Phosphorus     Collection Time: 11/01/24  5:56 AM   Result Value Ref Range    Phosphorus 3.3 2.3 - 4.1 mg/dL   Calcium, ionized    Collection Time: 11/01/24  5:56 AM   Result Value Ref Range    Calcium, Ionized 1.00 (L) 1.12 - 1.32 mmol/L   Blood gas, arterial    Collection Time: 11/01/24  5:59 AM   Result Value Ref Range    pH, Arterial 7.354 7.350 - 7.450    pCO2, Arterial 32.8 (L) 36.0 - 44.0 mm Hg    pO2, Arterial 77.1 75.0 - 129.0 mm Hg    HCO3, Arterial 17.9 (L) 22.0 - 28.0 mmol/L    Base Excess, Arterial -6.8 mmol/L    O2 Content, Arterial 13.3 (L) 16.0 - 23.0 mL/dL    O2 HGB,Arterial  93.3 (L) 94.0 - 97.0 %    SOURCE Line, Arterial    Blood culture    Collection Time: 11/01/24  6:47 AM    Specimen: Arm, Right; Blood   Result Value Ref Range    Blood Culture Received in Microbiology Lab. Culture in Progress.    Blood culture    Collection Time: 11/01/24  6:53 AM    Specimen: Arm, Left; Blood   Result Value Ref Range    Blood Culture Received in Microbiology Lab. Culture in Progress.    Fingerstick Glucose (POCT)    Collection Time: 11/01/24 12:00 PM   Result Value Ref Range    POC Glucose 127 65 - 140 mg/dl        Imaging and other studies:  X-ray chest 1 view    Result Date: 10/30/2024  Impression: 1. Linear atelectasis at the right lung base. 2. Retrocardiac density may represent infiltrate and/or atelectasis. Workstation performed: [id number]     CT abdomen pelvis wo contrast    Result Date: 10/29/2024  Impression: 1.  Redemonstrated diffuse thickening of the colon with increase in mesenteric fat stranding in the left lower quadrant and suggestion of pneumatosis in the hepatic flexure and proximal transverse colon concerning for ischemic colitis. 2.  The lower esophagus is patulous and fluid-filled which may be due to gastroesophageal reflux or esophageal dysmotility. Small to moderate hiatal hernia. The stomach is distended with fluid. Aspiration precautions is recommended. I personally discussed this study with LEE  LUX on 10/29/2024 9:12 PM. Workstation performed: [id number]     CT high volume bleeding scan abdomen pelvis      Result Date: 10/29/2024  Impression: 1. No CT evidence of active high-volume gastrointestinal hemorrhage. 2.  Moderate diffuse colonic distention with mild wall thickening suggested at the distal transverse and descending colon with air-fluid levels proximally. Findings may be related to colitis. 3. Mild distention of the esophagus with moderate gaseous distention. This is nonspecific and could be related to gastritis/reflux. 4. Low-attenuation in the uterus centrally, more prominent than expected for the patient's age. Recommend follow-up evaluation with nonemergent pelvic ultrasound to assess for abnormal endometrial thickening. The study was marked in EPIC for immediate notification. Workstation performed: [id number]     CT chest without contrast    Result Date: 10/29/2024  Impression: 1. New mild distention of the esophagus with fluid, question related to reflux or esophageal dysfunction. Workstation performed: [id number]     XR chest 1 view portable    Result Date: 10/29/2024  Impression: No acute cardiopulmonary disease. Workstation performed: [id number]       VTE Pharmacologic Prophylaxis: Heparin  VTE Mechanical Prophylaxis: sequential compression device    Estuardo Cardoza PA-C  11/1/2024

## 2024-11-01 NOTE — RESPIRATORY THERAPY NOTE
RT Ventilator Management Note  Bertha Brown 84 y.o. female MRN: [id number]  Unit/Bed#: ICU 05 Encounter: [id number]      Daily Screen         11/1/2024  1000 11/1/2024  1457          Patient safety screen outcome:: -- Passed (P)       RSBI: 25 42 (P)                 Physical Exam:   Assessment Type: (P) Assess only  General Appearance: (P) Eyes open/responds to voice  Respiratory Pattern: (P) Spontaneous  Chest Assessment: (P) Chest expansion symmetrical  Bilateral Breath Sounds: (P) Diminished  Cough: None  Suction: (P) ET Tube  O2 Device: (P) vent      Resp Comments: (P) Pt remains on CPAP/PS.  Tolerating well.  Will cont on current setting as long as tolerated.     11/01/24 1457   Respiratory Assessment   Assessment Type Assess only   General Appearance Eyes open/responds to voice   Respiratory Pattern Spontaneous   Chest Assessment Chest expansion symmetrical   Bilateral Breath Sounds Diminished   Suction ET Tube   Resp Comments Pt remains on CPAP/PS.  Tolerating well.  Will cont on current setting as long as tolerated.   O2 Device vent   Vent Information   Vent ID Groot   Vent type Drager   Drager Vent Mode CPAP/PS Spont   $ Pulse Oximetry Spot Check Charge Completed   SpO2 93 %   CPAP/PS Spont Settings   FIO2 (%) 50 %   PEEP (cmH2O) 6 cmH2O   Pressure Support (cmH2O) 6 cmH20   Trigger Sensitivity Flow (lpm) 2 LPM   Rise Time (%) 20 %   Humidification Heater   Heater Temp 98.6 °F (37 °C)   CPAP/PS Spont Actuals   Resp Rate (BPM) 16 BPM   VT (mL) 382 mL   MV (Obs) 5.09   MAP (cmH2O) 7.7 cmH2O   Peak Pressure (cmH2O) 13 cmH2O   RSBI 42   Heater Temperature (Obs) 98.8 °F (37.1 °C)   CPAP/PS Spont Alarms   High Peak Pressure (cmH20) 45 cmH2O   High Resp Rate (BPM) 30 BPM   High MV (L/min) 11 L/min   Low MV (L/min) 2.5 L/min   High SPONT VTE (mL) 700 mL   Low Spont VTE (mL) 220 mL   CPAP/PS Spont Apnea Settings   Resp Rate (BPM) 18 BPM   VT (mL) 350 mL   FIO2 (%) 50 %   Apnea Time (s) 20 S   Apnea Flow (LPM) 2  LPM   Maintenance   Alarm (pink) cable attached Yes   Resuscitation bag with peep valve at bedside Yes   Water bag changed No   Circuit changed No   Daily Screen   Patient safety screen outcome: Passed   RSBI 42   ETT  Oral;Hi-Lo;Inflated 7 mm   Placement Date/Time: 10/29/24 4786   Mask Ventilation: Mask ventilation not attempted (0)  Preoxygenated: Yes  Technique: Rapid sequence;Stylet;Video laryngoscopy  Type: Oral;Hi-Lo;Inflated  Tube Size: 7 mm  Laryngoscope: [facility]  Blade Size: 3  Locat...   Secured at (cm) 21   Measured from Lips   Secured Location (S)  Left   Repositioned (S)  Center to Left   Secured by Commercial tube irwin   Site Condition Dry   Cuff Pressure (color) Green   HI-LO Suction  Continuous low suction   HI-LO Secretions Scant   HI-LO Intervention Patent

## 2024-11-01 NOTE — ASSESSMENT & PLAN NOTE
Patient reported 2 weeks of no BM on admission  CT AP on admission with moderate diffuse colonic distension with moderate fecal retention  -Lactic acid was elevated on admission at 3.5 which initially improved to 3.1 but worsened in the later afternoon to 4.5  -Abdominal pain worsened and patient became septic  -Repeat CT AP noted diffuse thickening of the colon with increasing mesenteric fat stranding in the left lower quadrant suggesting pneumatosis and hepatic flexure and proximal transverse colon concerning for ischemic colitis  -Given worsening presentation the patient was taken emergently to the OR 10/29 with extended right hemicolectomy.  Abdomen was left open.    -Patient was taken back to the OR yesterday with abdominal washout, ileostomy creation and abdominal closure  -Colonoscopy 2009 with polyps and diverticulosis  -Will continue to follow peripherally  -She will need a plan for better outpatient bowel regimen  -Ongoing treatment recommendations deferred to ICU and surgery teams

## 2024-11-01 NOTE — ASSESSMENT & PLAN NOTE
On coumadin which is currently on hold  INR on admission 2.04    At present time GI will sign off. Please reconsult with any questions or concerns.       Patient will be seen and examined by Dr. Cash.  All sexton medical decisions were made by Dr. Cash.

## 2024-11-01 NOTE — RESPIRATORY THERAPY NOTE
RT Ventilator Management Note  Bertha Brown 84 y.o. female MRN: [id number]  Unit/Bed#: ICU 05 Encounter: [id number]      Daily Screen         11/1/2024  0713 11/1/2024  0907          Patient safety screen outcome:: Failed Passed (P)       Not Ready for Weaning due to:: Underline problem not resolved --      RSBI: -- 42 (P)                 Physical Exam:   Assessment Type: (P) Assess only  General Appearance: (P) Eyes open/responds to voice  Respiratory Pattern: (P) Spontaneous  Chest Assessment: (P) Chest expansion symmetrical  Bilateral Breath Sounds: (P) Diminished, Clear  Cough: None  Suction: ET Tube  O2 Device: vent      Resp Comments: Pt placed on SBT at this time.  Will monitor on same.     11/01/24 0907   Respiratory Assessment   Assessment Type Assess only   General Appearance Eyes open/responds to voice   Respiratory Pattern Spontaneous   Chest Assessment Chest expansion symmetrical   Bilateral Breath Sounds Diminished;Clear   Resp Comments Pt placed on SBT at this time.  Will monitor on same.   O2 Device vent   Vent Information   Vent ID Groot   Vent type Drager   Drager Vent Mode (S)  CPAP/PS Spont   $ Vital Capacity Mech/Peak Flow Yes   $ Pulse Oximetry Spot Check Charge Completed   SpO2 92 %   CPAP/PS Spont Settings   FIO2 (%) 40 %   PEEP (cmH2O) 6 cmH2O   Pressure Support (cmH2O) 6 cmH20   Trigger Sensitivity Flow (lpm) 2 LPM   Rise Time (%) 20 %   Humidification Heater   Heater Temp 98.6 °F (37 °C)   Auto Flow  Off   CPAP/PS Spont Actuals   Resp Rate (BPM) 14 BPM   VT (mL) 368 mL   MV (Obs) 4.08   MAP (cmH2O) 7.4 cmH2O   Peak Pressure (cmH2O) 13 cmH2O   RSBI 42   Heater Temperature (Obs) 98.6 °F (37 °C)   CPAP/PS Spont Alarms   High Peak Pressure (cmH20) 45 cmH2O   High Resp Rate (BPM) 30 BPM   High MV (L/min) 11 L/min   Low MV (L/min) 2.5 L/min   High SPONT VTE (mL) 700 mL   Low Spont VTE (mL) 220 mL   CPAP/PS Spont Apnea Settings   Resp Rate (BPM) 18 BPM   VT (mL) 350 mL   FIO2 (%) 40 %   Apnea  Time (s) 20 S   Apnea Flow (LPM) 2 LPM   Maintenance   Alarm (pink) cable attached Yes   Resuscitation bag with peep valve at bedside Yes   Water bag changed No   Circuit changed No   Daily Screen   Patient safety screen outcome: Passed   RSBI 42   ETT  Oral;Hi-Lo;Inflated 7 mm   Placement Date/Time: 10/29/24 0495   Mask Ventilation: Mask ventilation not attempted (0)  Preoxygenated: Yes  Technique: Rapid sequence;Stylet;Video laryngoscopy  Type: Oral;Hi-Lo;Inflated  Tube Size: 7 mm  Laryngoscope: [place] [employer]  Blade Size: 3  Locat...   Secured at (cm) 21   Measured from Lips   Secured Location Center   Cuff Pressure (color) Green

## 2024-11-01 NOTE — PLAN OF CARE
Problem: PAIN - ADULT  Goal: Verbalizes/displays adequate comfort level or baseline comfort level  Description: Interventions:  - Encourage patient to monitor pain and request assistance  - Assess pain using appropriate pain scale  - Administer analgesics based on type and severity of pain and evaluate response  - Implement non-pharmacological measures as appropriate and evaluate response  - Consider cultural and social influences on pain and pain management  - Notify physician/advanced practitioner if interventions unsuccessful or patient reports new pain  Outcome: Progressing     Problem: INFECTION - ADULT  Goal: Absence or prevention of progression during hospitalization  Description: INTERVENTIONS:  - Assess and monitor for signs and symptoms of infection  - Monitor lab/diagnostic results  - Monitor all insertion sites, i.e. indwelling lines, tubes, and drains  - Monitor endotracheal if appropriate and nasal secretions for changes in amount and color  - [place] appropriate cooling/warming therapies per order  - Administer medications as ordered  - Instruct and encourage patient and family to use good hand hygiene technique  - Identify and instruct in appropriate isolation precautions for identified infection/condition  Outcome: Progressing  Goal: Absence of fever/infection during neutropenic period  Description: INTERVENTIONS:  - Monitor WBC    Outcome: Progressing     Problem: SAFETY ADULT  Goal: Patient will remain free of falls  Description: INTERVENTIONS:  - Educate patient/family on patient safety including physical limitations  - Instruct patient to call for assistance with activity   - Consult OT/PT to assist with strengthening/mobility   - Keep Call bell within reach  - Keep bed low and locked with side rails adjusted as appropriate  - Keep care items and personal belongings within reach  - Initiate and maintain comfort rounds  - Make Fall Risk Sign visible to staff  - Apply yellow socks and bracelet  for high fall risk patients  - Consider moving patient to room near nurses station  Outcome: Progressing  Goal: Maintain or return to baseline ADL function  Description: INTERVENTIONS:  -  Assess patient's ability to carry out ADLs; assess patient's baseline for ADL function and identify physical deficits which impact ability to perform ADLs (bathing, care of mouth/teeth, toileting, grooming, dressing, etc.)  - Assess/evaluate cause of self-care deficits   - Assess range of motion  - Assess patient's mobility; develop plan if impaired  - Assess patient's need for assistive devices and provide as appropriate  - Encourage maximum independence but intervene and supervise when necessary  - Involve family in performance of ADLs  - Assess for home care needs following discharge   - Consider OT consult to assist with ADL evaluation and planning for discharge  - Provide patient education as appropriate  Outcome: Progressing  Goal: Maintains/Returns to pre admission functional level  Description: INTERVENTIONS:  - Perform AM-PAC 6 Click Basic Mobility/ Daily Activity assessment daily.  - Set and communicate daily mobility goal to care team and patient/family/caregiver.   - Collaborate with rehabilitation services on mobility goals if consulted  - Out of bed for toileting  - Record patient progress and toleration of activity level   Outcome: Progressing     Problem: DISCHARGE PLANNING  Goal: Discharge to home or other facility with appropriate resources  Description: INTERVENTIONS:  - Identify barriers to discharge w/patient and caregiver  - Arrange for needed discharge resources and transportation as appropriate  - Identify discharge learning needs (meds, wound care, etc.)  - Arrange for interpretive services to assist at discharge as needed  - Refer to Case Management Department for coordinating discharge planning if the patient needs post-hospital services based on physician/advanced practitioner order or complex needs  related to functional status, cognitive ability, or social support system  Outcome: Progressing     Problem: Knowledge Deficit  Goal: Patient/family/caregiver demonstrates understanding of disease process, treatment plan, medications, and discharge instructions  Description: Complete learning assessment and assess knowledge base.  Interventions:  - Provide teaching at level of understanding  - Provide teaching via preferred learning methods  Outcome: Progressing     Problem: NEUROSENSORY - ADULT  Goal: Achieves stable or improved neurological status  Description: INTERVENTIONS  - Monitor and report changes in neurological status  - Monitor vital signs such as temperature, blood pressure, glucose, and any other labs ordered   - Initiate measures to prevent increased intracranial pressure  - Monitor for seizure activity and implement precautions if appropriate      Outcome: Progressing  Goal: Remains free of injury related to seizures activity  Description: INTERVENTIONS  - Maintain airway, patient safety  and administer oxygen as ordered  - Monitor patient for seizure activity, document and report duration and description of seizure to physician/advanced practitioner  - If seizure occurs,  ensure patient safety during seizure  - Reorient patient post seizure  - Seizure pads on all 4 side rails  - Instruct patient/family to notify RN of any seizure activity including if an aura is experienced  - Instruct patient/family to call for assistance with activity based on nursing assessment  - Administer anti-seizure medications if ordered    Outcome: Progressing  Goal: Achieves maximal functionality and self care  Description: INTERVENTIONS  - Monitor swallowing and airway patency with patient fatigue and changes in neurological status  - Encourage and assist patient to increase activity and self care.   - Encourage visually impaired, hearing impaired and aphasic patients to use assistive/communication devices  Outcome:  Progressing     Problem: RESPIRATORY - ADULT  Goal: Achieves optimal ventilation and oxygenation  Description: INTERVENTIONS:  - Assess for changes in respiratory status  - Assess for changes in mentation and behavior  - Position to facilitate oxygenation and minimize respiratory effort  - Oxygen administered by appropriate delivery if ordered  - Initiate smoking cessation education as indicated  - Encourage broncho-pulmonary hygiene including cough, deep breathe, Incentive Spirometry  - Assess the need for suctioning and aspirate as needed  - Assess and instruct to report SOB or any respiratory difficulty  - Respiratory Therapy support as indicated  Outcome: Progressing     Problem: GASTROINTESTINAL - ADULT  Goal: Minimal or absence of nausea and/or vomiting  Description: INTERVENTIONS:  - Administer IV fluids if ordered to ensure adequate hydration  - Maintain NPO status until nausea and vomiting are resolved  - Nasogastric tube if ordered  - Administer ordered antiemetic medications as needed  - Provide nonpharmacologic comfort measures as appropriate  - Advance diet as tolerated, if ordered  - Consider nutrition services referral to assist patient with adequate nutrition and appropriate food choices  Outcome: Progressing  Goal: Maintains or returns to baseline bowel function  Description: INTERVENTIONS:  - Assess bowel function  - Encourage oral fluids to ensure adequate hydration  - Administer IV fluids if ordered to ensure adequate hydration  - Administer ordered medications as needed  - Encourage mobilization and activity  - Consider nutritional services referral to assist patient with adequate nutrition and appropriate food choices  Outcome: Progressing  Goal: Maintains adequate nutritional intake  Description: INTERVENTIONS:  - Monitor percentage of each meal consumed  - Identify factors contributing to decreased intake, treat as appropriate  - Assist with meals as needed  - Monitor I&O, weight, and lab  values if indicated  - Obtain nutrition services referral as needed  Outcome: Progressing  Goal: Establish and maintain optimal ostomy function  Description: INTERVENTIONS:  - Assess bowel function  - Encourage oral fluids to ensure adequate hydration  - Administer IV fluids if ordered to ensure adequate hydration   - Administer ordered medications as needed  - Encourage mobilization and activity  - Nutrition services referral to assist patient with appropriate food choices  - Assess stoma site  - Consider wound care consult   Outcome: Progressing  Goal: Oral mucous membranes remain intact  Description: INTERVENTIONS  - Assess oral mucosa and hygiene practices  - Implement preventative oral hygiene regimen  - Implement oral medicated treatments as ordered  - Initiate Nutrition services referral as needed  Outcome: Progressing     Problem: GENITOURINARY - ADULT  Goal: Maintains or returns to baseline urinary function  Description: INTERVENTIONS:  - Assess urinary function  - Encourage oral fluids to ensure adequate hydration if ordered  - Administer IV fluids as ordered to ensure adequate hydration  - Administer ordered medications as needed  - Offer frequent toileting  - Follow urinary retention protocol if ordered  Outcome: Progressing  Goal: Absence of urinary retention  Description: INTERVENTIONS:  - Assess patient’s ability to void and empty bladder  - Monitor I/O  - Bladder scan as needed  - Discuss with physician/AP medications to alleviate retention as needed  - Discuss catheterization for long term situations as appropriate  Outcome: Progressing  Goal: Urinary catheter remains patent  Description: INTERVENTIONS:  - Assess patency of urinary catheter  - If patient has a chronic james, consider changing catheter if non-functioning  - Follow guidelines for intermittent irrigation of non-functioning urinary catheter  Outcome: Progressing     Problem: METABOLIC, FLUID AND ELECTROLYTES - ADULT  Goal: Electrolytes  maintained within normal limits  Description: INTERVENTIONS:  - Monitor labs and assess patient for signs and symptoms of electrolyte imbalances  - Administer electrolyte replacement as ordered  - Monitor response to electrolyte replacements, including repeat lab results as appropriate  - Instruct patient on fluid and nutrition as appropriate  Outcome: Progressing  Goal: Fluid balance maintained  Description: INTERVENTIONS:  - Monitor labs   - Monitor I/O and WT  - Instruct patient on fluid and nutrition as appropriate  - Assess for signs & symptoms of volume excess or deficit  Outcome: Progressing  Goal: Glucose maintained within target range  Description: INTERVENTIONS:  - Monitor Blood Glucose as ordered  - Assess for signs and symptoms of hyperglycemia and hypoglycemia  - Administer ordered medications to maintain glucose within target range  - Assess nutritional intake and initiate nutrition service referral as needed  Outcome: Progressing     Problem: SKIN/TISSUE INTEGRITY - ADULT  Goal: Skin Integrity remains intact(Skin Breakdown Prevention)  Description: Assess:  -Inspect skin when repositioning, toileting, and assisting with ADLS  -Assess extremities for adequate circulation and sensation     Bed Management:  -Have minimal linens on bed & keep smooth, unwrinkled  -Change linens as needed when moist or perspiring    Toileting:  -Offer bedside commode    Activity:  -Encourage activity and walks on unit  -Encourage or provide ROM exercises   -Use appropriate equipment to lift or move patient in bed    Skin Care:  -Avoid use of baby powder, tape, friction and shearing, hot water or constrictive clothing  -Do not massage red bony areas    Outcome: Progressing  Goal: Incision(s), wounds(s) or drain site(s) healing without S/S of infection  Description: INTERVENTIONS  - Assess and document dressing, incision, wound bed, drain sites and surrounding tissue  - Provide patient and family education  Outcome:  Progressing  Goal: Pressure injury heals and does not worsen  Description: Interventions:  - Implement low air loss mattress or specialty surface (Criteria met)  - Apply silicone foam dressing  - Apply fecal or urinary incontinence containment device   - Utilize friction reducing device or surface for transfers   - Consider nutrition services referral as needed  Outcome: Progressing     Problem: HEMATOLOGIC - ADULT  Goal: Maintains hematologic stability  Description: INTERVENTIONS  - Assess for signs and symptoms of bleeding or hemorrhage  - Monitor labs  - Administer supportive blood products/factors as ordered and appropriate  Outcome: Progressing     Problem: MUSCULOSKELETAL - ADULT  Goal: Maintain or return mobility to safest level of function  Description: INTERVENTIONS:  - Assess patient's ability to carry out ADLs; assess patient's baseline for ADL function and identify physical deficits which impact ability to perform ADLs (bathing, care of mouth/teeth, toileting, grooming, dressing, etc.)  - Assess/evaluate cause of self-care deficits   - Assess range of motion  - Assess patient's mobility  - Assess patient's need for assistive devices and provide as appropriate  - Encourage maximum independence but intervene and supervise when necessary  - Involve family in performance of ADLs  - Assess for home care needs following discharge   - Consider OT consult to assist with ADL evaluation and planning for discharge  - Provide patient education as appropriate  Outcome: Progressing  Goal: Maintain proper alignment of affected body part  Description: INTERVENTIONS:  - Support, maintain and protect limb and body alignment  - Provide patient/ family with appropriate education  Outcome: Progressing     Problem: SAFETY,RESTRAINT: NV/NON-SELF DESTRUCTIVE BEHAVIOR  Goal: Remains free of harm/injury (restraint for non violent/non self-detsructive behavior)  Description: INTERVENTIONS:  - Instruct patient/family regarding  restraint use   - Assess and monitor physiologic and psychological status   - Provide interventions and comfort measures to meet assessed patient needs   - Identify and implement measures to help patient regain control  - Assess readiness for release of restraint   Outcome: Progressing  Goal: Returns to optimal restraint-free functioning  Description: INTERVENTIONS:  - Assess the patient's behavior and symptoms that indicate continued need for restraint  - Identify and implement measures to help patient regain control  - Assess readiness for release of restraint   Outcome: Progressing     Problem: Nutrition/Hydration-ADULT  Goal: Nutrient/Hydration intake appropriate for improving, restoring or maintaining nutritional needs  Description: Monitor and assess patient's nutrition/hydration status for malnutrition. Collaborate with interdisciplinary team and initiate plan and interventions as ordered.  Monitor patient's weight and dietary intake as ordered or per policy. Utilize nutrition screening tool and intervene as necessary. Determine patient's food preferences and provide high-protein, high-caloric foods as appropriate.     INTERVENTIONS:  - Monitor oral intake, urinary output, labs, and treatment plans  - Assess nutrition and hydration status and recommend course of action  - Evaluate amount of meals eaten  - Assist patient with eating if necessary   - Allow adequate time for meals  - Recommend/ encourage appropriate diets, oral nutritional supplements, and vitamin/mineral supplements  - Order, calculate, and assess calorie counts as needed  - Recommend, monitor, and adjust tube feedings and TPN/PPN based on assessed needs  - Assess need for intravenous fluids  - Provide specific nutrition/hydration education as appropriate  - Include patient/family/caregiver in decisions related to nutrition  Outcome: Progressing     Problem: Prexisting or High Potential for Compromised Skin Integrity  Goal: Skin integrity is  maintained or improved  Description: INTERVENTIONS:  - Identify patients at risk for skin breakdown  - Assess and monitor skin integrity  - Assess and monitor nutrition and hydration status  - Monitor labs   - Assess for incontinence   - Turn and reposition patient  - Assist with mobility/ambulation  - Relieve pressure over bony prominences  - Avoid friction and shearing  - Provide appropriate hygiene as needed including keeping skin clean and dry  - Evaluate need for skin moisturizer/barrier cream  - Collaborate with interdisciplinary team   - Patient/family teaching  - Consider wound care consult   Outcome: Progressing

## 2024-11-01 NOTE — PROGRESS NOTES
Progress Note - Critical Care/ICU   Name: Bertha Brown 84 y.o. female I MRN: [id number]  Unit/Bed#: ICU 05 I Date of Admission: 10/29/2024   Date of Service: 11/1/2024 I Hospital Day: 3       Assessment & Plan  Septic shock (HCC)  Patient meets sepsis criteria as evidenced by tachycardia, tachypnea prior to intubation, and leukocytosis with RUKHSANA and lactic acidosis  Suspect secondary to ischemic colitis +/- aspiration pneumonia  No evidence of cardiogenic component to shock, SVO2 82.6  Antibiotics broadened to Zosyn  Intraoperative cultures and BC pending, follow up results  O/N increase in pressors, responsive to fluids    Plan:  Continue vasopressors titrated to maintain MAP >65  Continue fluid resuscitation  Lactate now cleared  Trend procal and WBC/temperature curve  Ischemic colitis (HCC)  Patient with worsening abdominal pain and rising lactate concerning for acute abdomen  CT abdomen pelvis with evidence of mesenteric fat stranding in the LLQ and suggestion of pneumatosis in the hepatic flexure and proximal transverse colon concerning for ischemic colitis  Surgery consulted  POD #2 s/p exploratory laparotomy with extended right hemicolectomy, left in discontinuity, abthera VAC in place  POD #1 second look ex lap, washout, ileostomy, abdominal closure, placement of wound VAC    Plan:  Continue NG tube to low intermittent wall suction  Strict NPO  Antibiotics broadened to zosyn, D3  Follow up intraoperative culture results  See plan under severe sepsis above  Acute respiratory failure with hypoxia (HCC)  Initially requiring 2-3 L NC oxygen  Escalating oxygen requirements with patient on 15 L MFNC prior to OR  Concern for aspiration event during episode of emesis +/- atelectasis  Intubated for the OR    Plan  Continue mechanical ventilation  VAP prophylaxis  Monitor ABG  Patient currently with open abdomen, defer daily SBT  Coffee ground emesis  Patient reporting several episodes of coffee ground emesis at  home the day PTA  CT high volume bleeding abdomen pelvis without evidence of extravasation  Hgb 13.2 on arrival\    Plan  Continue protonix BID  NGT in place to low intermittent wall suction  Strict NPO  Trend hemoglobin  Coumadin on hold  GI consulted  Eventual EGD once more stable  Paroxysmal atrial fibrillation (HCC)  Coumadin on hold due to concern for GIB  Holding beta blocker secondary to vasopressor requirements  Continue amiodarone for rapid AFib  Hypothyroidism  Levothyroxine on hold due to NPO  Hypertension  Antihypertensives on hold due to hypotension on vasopressors  Hyperlipidemia  Statin on hold due to NPO  Diabetes (HCC)  Lab Results   Component Value Date    HGBA1C 7.0 (H) 08/22/2024       Recent Labs     10/31/24  0013 10/31/24  1207 10/31/24  1755 10/31/24  1815   POCGLU 139 126 119 118       Blood Sugar Average: Last 72 hrs:  (P) 132.625  Accuchecks and SSI Q6h  Obesity  Encourage lifestyle changes  RUKHSANA (acute kidney injury) ([facility])  RUKHSANA POA with initial creatinine 1.63 and baseline 0.8-0.9  Suspect prerenal etiology  Slightly worsened overnight    Plan:  Continue fluid resuscitation  Monitor I&O  Avoid nephrotoxins  Maintain MAP >65  Trend renal indices  Chronic idiopathic constipation    Generalized abdominal pain  Patient presented with abdominal pain  See plan under ischemic colitis  SIRS (systemic inflammatory response syndrome) ([facility])    Acute metabolic encephalopathy  Patient with worsening lethargy prior to OR  Suspect secondary to significant metabolic derangements  Monitor neuro exam  Disposition: Critical care    ICU Core Measures     Vented Patient  VAP Bundle  VAP bundle ordered     A: Assess, Prevent, and Manage Pain Has pain been assessed? Yes  Need for changes to pain regimen? No   B: Both Spontaneous Awakening Trials (SATs) and Spontaneous Breathing Trials (SBTs) Plan to perform spontaneous awakening trial today? Yes   Plan to perform spontaneous breathing trial today? Yes   Obvious  barriers to extubation? No   C: Choice of Sedation RASS Goal: -1 Drowsy  Need for changes to sedation or analgesia regimen? No   D: Delirium CAM-ICU: Negative   E: Early Mobility  Plan for early mobility? Yes   F: Family Engagement Plan for family engagement today? Yes       Antibiotic Review: Awaiting culture results.     Review of Invasive Devices:    Parks Plan: Continue for accurate I/O monitoring for 48 hours  Central access plan: Medications requiring central line  [place] Plan: Keep arterial line for frequent ABGs    Prophylaxis:  VTE VTE covered by:  heparin (porcine), Subcutaneous, 5,000 Units at 10/31/24 2152       Stress Ulcer  covered bypantoprazole (PROTONIX) 20 mg tablet [[id number]] (Long-Term Med), pantoprazole (PROTONIX) injection 40 mg [[id number]]         HPI:  84-year-old female with past medical history of hypertension, diabetes type 2, paroxysmal A-fib who was admitted on 10-29 and ultimately required an ex lap with extended hemicolectomy for ischemic colitis    24 Hour Events :   Taken back to the operating room.  Patient had ileostomy placed, mucous fistula placed, and had fascial closure with VAC placed  Patient with oliguria, trialed Lasix  This evening, worsening hypotension requiring reinitiation of vasopressors.  Worsening RUKHSANA, given 1 L of crystalloid and 500 cc of albumin  UOP improved  Subjective   Review of Systems: Review of Systems not obtainable due to intubation    Objective :                   Vitals I/O      Most Recent Min/Max in 24hrs   Temp 99 °F (37.2 °C) Temp  Min: 97 °F (36.1 °C)  Max: 99.3 °F (37.4 °C)   Pulse 80 Pulse  Min: 80  Max: 141   Resp 18 Resp  Min: 14  Max: 18   /60 BP  Min: 73/42  Max: 124/60   O2 Sat 94 % SpO2  Min: 91 %  Max: 96 %      Intake/Output Summary (Last 24 hours) at 11/1/2024 0125  Last data filed at 11/1/2024 0000  Gross per 24 hour   Intake 4019.33 ml   Output 2557 ml   Net 1462.33 ml       Diet NPO    Invasive Monitoring   Arterial Line  [place]  BP 98/42  Arterial Line BP  Min: 95/41  Max: 100/43   MAP (!) 62 mmHg  Arterial Line MAP (mmHg)  Min: 60 mmHg  Max: 66 mmHg           Physical Exam   Physical Exam  Vitals and nursing note reviewed.   Eyes:      Conjunctiva/sclera: Conjunctivae normal.   Skin:     General: Skin is warm and dry.      Capillary Refill: Capillary refill takes less than 2 seconds.   HENT:      Head: Normocephalic and atraumatic.      Mouth/Throat:      Mouth: Mucous membranes are dry.   Neck:      Vascular: Central line present.   Cardiovascular:      Rate and Rhythm: Normal rate and regular rhythm.      Pulses: Normal pulses.   Musculoskeletal:         General: Normal range of motion.   Abdominal:      Tenderness: There is abdominal tenderness.      Comments: Right lower quadrant ileostomy pink and patent; left upper quadrant mucous fistula pink and patent    Hypoactive bowel sounds    Wound vac serosanguinous    Constitutional:       Appearance: She is obese.      Interventions: She is sedated, intubated and restrained.   Pulmonary:      Effort: Tachypnea present. She is intubated.      Breath sounds: Normal breath sounds.   Neurological:      Comments: GCS 10T   Genitourinary/Anorectal:  Parks present.        Diagnostic Studies        Lab Results: I have reviewed the following results:     Medications:  Scheduled PRN   chlorhexidine, 15 mL, Q12H JUAN PABLO  heparin (porcine), 5,000 Units, Q8H JUAN PABLO  insulin lispro, 1-6 Units, Q6H JUAN PABLO  pantoprazole, 40 mg, Q12H JUAN PABLO  piperacillin-tazobactam, 4.5 g, Q8H      acetaminophen, 1,000 mg, Q6H PRN  fentaNYL, 50 mcg, Q1H PRN       Continuous    amiodarone (CORDARONE) 900 mg in dextrose 5 % 500 mL infusion, 0.5 mg/min, Last Rate: 0.5 mg/min (11/01/24 0000)  dexmedetomidine, 0.1-0.7 mcg/kg/hr, Last Rate: 0.202 mcg/kg/hr (11/01/24 0000)  fentaNYL, 75 mcg/hr, Last Rate: 75 mcg/hr (11/01/24 0111)  norepinephrine, 1-30 mcg/min, Last Rate: 8 mcg/min (11/01/24 0055)  propofol, 5-50 mcg/kg/min, Last Rate:  Stopped (10/31/24 1541)  vasopressin, 0.04 Units/min, Last Rate: 0.04 Units/min (11/01/24 0012)         Labs:   CBC    Recent Labs     10/31/24  0506 10/31/24  2309   WBC 5.77 7.20   HGB 9.6* 8.9*   HCT 30.3* 27.5*   * 135*     BMP    Recent Labs     10/31/24  0506 10/31/24  2309   SODIUM 139 138   K 4.2 4.2   * 111*   CO2 20* 19*   AGAP 6 8   BUN 32* 36*   CREATININE 1.45* 1.96*   CALCIUM 8.2* 7.3*       Coags    Recent Labs     10/30/24  0455 10/31/24  0833   INR 1.55* 1.68*   PTT  --  36*        Additional Electrolytes  Recent Labs     10/30/24  0130 10/30/24  0809 10/30/24  2210 10/31/24  0506   MG 2.2  --   --  2.2   PHOS 4.2*  --   --  3.7   CAIONIZED 1.09*   < > 1.17 1.17    < > = values in this interval not displayed.          Blood Gas    Recent Labs     10/31/24  2311   PHART 7.360   QQN4MRP 34.6*   PO2ART 120.4   JDT2AKS 19.1*   BEART -5.7   SOURCE Line, Arterial     Recent Labs     10/30/24  0133 10/30/24  0501 10/31/24  2311   PHVEN 7.167*  --   --    XPW9KHB 48.1  --   --    PO2VEN 53.1*  --   --    FBU8ZTL 17.0*  --   --    BEVEN -11.3  --   --    E5HKJHG 82.6*  --   --    SOURCE Line, Arterial   < > Line, Arterial    < > = values in this interval not displayed.    LFTs  Recent Labs     10/30/24  0809 10/31/24  0506   ALT 11 7   AST 17 14   ALKPHOS 40 32*   ALB 3.2* 2.9*   TBILI 0.86 0.88       Infectious  Recent Labs     10/30/24  0455   PROCALCITONI 44.94*     Glucose  Recent Labs     10/30/24  1724 10/30/24  2210 10/31/24  0506 10/31/24  2309   GLUC 141* 127 141* 115

## 2024-11-01 NOTE — ASSESSMENT & PLAN NOTE
Patient with worsening abdominal pain and rising lactate concerning for acute abdomen  CT abdomen pelvis with evidence of mesenteric fat stranding in the LLQ and suggestion of pneumatosis in the hepatic flexure and proximal transverse colon concerning for ischemic colitis  Surgery consulted  POD #2 s/p exploratory laparotomy with extended right hemicolectomy, left in discontinuity, abthera VAC in place  POD #1 second look ex lap, washout, ileostomy, abdominal closure, placement of wound VAC    Plan:  Continue NG tube to low intermittent wall suction  Strict NPO  Antibiotics broadened to zosyn, D3  Follow up intraoperative culture results  See plan under severe sepsis above

## 2024-11-01 NOTE — RESPIRATORY THERAPY NOTE
11/01/24 1000   Respiratory Assessment   Resp Comments Pt remains on CPAP/PS per AP request.  Pt tolerating well.  Will cont on same settings as tolerated.  FiO2 increased for maintained sats of 90.   O2 Device vent   Vent Information   Vent ID Groot   Vent type Drager   Drager Vent Mode CPAP/PS Spont   $ Pulse Oximetry Spot Check Charge Completed   SpO2 91 %   CPAP/PS Spont Settings   FIO2 (%) (S)  50 %   PEEP (cmH2O) 6 cmH2O   Pressure Support (cmH2O) 6 cmH20   Trigger Sensitivity Flow (lpm) 2 LPM   Rise Time (%) 20 %   Humidification Heater   Heater Temp 98.6 °F (37 °C)   CPAP/PS Spont Actuals   Resp Rate (BPM) 12 BPM   VT (mL) 492 mL   MV (Obs) 4.8   MAP (cmH2O) 7.4 cmH2O   Peak Pressure (cmH2O) 13 cmH2O   RSBI 25   Heater Temperature (Obs) 99 °F (37.2 °C)   CPAP/PS Spont Alarms   High Peak Pressure (cmH20) 45 cmH2O   High Resp Rate (BPM) 30 BPM   High MV (L/min) 11 L/min   Low MV (L/min) 2.5 L/min   High SPONT VTE (mL) 700 mL   Low Spont VTE (mL) 220 mL   CPAP/PS Spont Apnea Settings   Resp Rate (BPM) 18 BPM   VT (mL) 350 mL   FIO2 (%) 40 %   Apnea Time (s) 20 S   Apnea Flow (LPM) 2 LPM   Maintenance   Alarm (pink) cable attached Yes   Resuscitation bag with peep valve at bedside Yes   Water bag changed No   Circuit changed No   Daily Screen   RSBI 25   ETT  Oral;Hi-Lo;Inflated 7 mm   Placement Date/Time: 10/29/24 5891   Mask Ventilation: Mask ventilation not attempted (0)  Preoxygenated: Yes  Technique: Rapid sequence;Stylet;Video laryngoscopy  Type: Oral;Hi-Lo;Inflated  Tube Size: 7 mm  Laryngoscope: [place]  Blade Size: 3  Locat...   Secured at (cm) 21   Measured from Lips   Cuff Pressure (color) Green

## 2024-11-01 NOTE — PROGRESS NOTES
Progress Note - Critical Care/ICU   Name: Bertha Brown 84 y.o. female I MRN: [id number]  Unit/Bed#: ICU 05 I Date of Admission: 10/29/2024   Date of Service: 10/31/2024 I Hospital Day: 2       Interval Events:       2145: Patient with decreasing MAP throughout evening now persistently in the mid 50s. Albumin 5% ordered.    22:30 Patient with minimal response to albumin. Also with low UOP. Levophed restarted and immediately increased to 12 mcg/min. Vasopressin restarted. Labs obtained. Mild metabolic acidosis (non-gappe) with based deficit of 5.7, worsening RUKHSANA. No lactic acidemia.    Pertinent New Data:   blood pressure, pulse, temperature, respirations, and pulse oximetry  Arterial Line  Mattie BP 98/42  Arterial Line BP  Min: 88/49  Max: 100/43   MAP (!) 62 mmHg  Arterial Line MAP (mmHg)  Min: 60 mmHg  Max: 66 mmHg     Abdomen soft, NT, mildly distended. Peak airway pressures on vent 24  CBC:   Lab Results   Component Value Date    WBC 7.20 10/31/2024    HGB 8.9 (L) 10/31/2024    HCT 27.5 (L) 10/31/2024    MCV 98 10/31/2024     (L) 10/31/2024    RBC 2.82 (L) 10/31/2024    MCH 31.6 10/31/2024    MCHC 32.4 10/31/2024    RDW 14.6 10/31/2024    MPV 11.6 10/31/2024    NRBC 0 10/31/2024   , CMP:   Lab Results   Component Value Date    SODIUM 138 10/31/2024    K 4.2 10/31/2024     (H) 10/31/2024    CO2 19 (L) 10/31/2024    BUN 36 (H) 10/31/2024    CREATININE 1.96 (H) 10/31/2024    CALCIUM 7.3 (L) 10/31/2024    AST 14 10/31/2024    ALT 7 10/31/2024    ALKPHOS 32 (L) 10/31/2024    EGFR 23 10/31/2024   , ABG:   Lab Results   Component Value Date    PHART 7.360 10/31/2024    WOJ4SMO 34.6 (L) 10/31/2024    PO2ART 120.4 10/31/2024    YPJ8CPO 19.1 (L) 10/31/2024    BEART -5.7 10/31/2024    SOURCE Line, Arterial 10/31/2024   , lactic acid 1.2  NoneResults Review Statement: No pertinent imaging studies reviewed.    Assessment and Plan  Diagnosis: Worsening hypotension  Patient likely with large insensible losses  and third spacing  Plan:   Continue IVF  Given additional albumin  Monitor UOP closely  Abdominal exam reassuring, not firm, no peak airway pressures to point to intraabdominal HTN      Billing Level:  Critical Care Time Statement: Upon my evaluation, this patient had a high probability of imminent or life-threatening deterioration due to hypotension, RUKHSANA, which required my direct attention, intervention, and personal management.  I spent a total of 45 minutes directly providing critical care services, including interpretation of complex medical databases, evaluating for the presence of life-threatening injuries or illnesses, management of organ system failure(s) , complex medical decision making (to support/prevent further life-threatening deterioration)., interpretation of hemodynamic data, titration of vasoactive medications, and titration of continuous IV medications (drips). This time is exclusive of procedures, teaching, family meetings, and any prior time recorded by providers other than myself.      Simeon Ibarra PA-C

## 2024-11-01 NOTE — ASSESSMENT & PLAN NOTE
Initially requiring 2-3 L NC oxygen  Escalating oxygen requirements with patient on 15 L MFNC prior to OR  Concern for aspiration event during episode of emesis +/- atelectasis  Intubated for the OR    Plan  Continue mechanical ventilation  VAP prophylaxis  Monitor ABG  Patient currently with open abdomen, defer daily SBT

## 2024-11-01 NOTE — ASSESSMENT & PLAN NOTE
Lab Results   Component Value Date    HGBA1C 7.0 (H) 08/22/2024       Recent Labs     10/31/24  1207 10/31/24  1755 10/31/24  1815 11/01/24  1200   POCGLU 126 119 118 127       Blood Sugar Average: Last 72 hrs:  (P) 132     Left message that new RX sent. If not improving, will have her see ENT.

## 2024-11-01 NOTE — ASSESSMENT & PLAN NOTE
Patient meets sepsis criteria as evidenced by tachycardia, tachypnea prior to intubation, and leukocytosis with RUKHSANA and lactic acidosis  Suspect secondary to ischemic colitis +/- aspiration pneumonia  No evidence of cardiogenic component to shock, SVO2 82.6  Antibiotics broadened to Zosyn  Intraoperative cultures and BC pending, follow up results  O/N increase in pressors, responsive to fluids    Plan:  Continue vasopressors titrated to maintain MAP >65  Continue fluid resuscitation  Lactate now cleared  Trend procal and WBC/temperature curve

## 2024-11-01 NOTE — PROGRESS NOTES
Progress Note - Gastroenterology   Name: Bertha Brown 84 y.o. female I MRN: [id number]  Unit/Bed#: ICU 05 I Date of Admission: 10/29/2024   Date of Service: 11/1/2024 I Hospital Day: 3    Assessment & Plan  Coffee ground emesis  -3 episodes prior to ER presentation  -Hgb on admission 13.2 with drop to 9.6 today  -CT AP with IV contrast notes no evidence of active high-volume GI hemorrhage.  Moderate diffuse colonic distension with mild wall thickening suggestive of the distal transverse and descending colon noted.  Mild distention esophagus with moderate gaseous distention  -PPI BID  -Initial plan was for EGD however this has been postponed due to ischemic bowel and need for emergent surgical intervention  -As there has been no ongoing signs of UGI bleeding this can be done in the outpatient setting  -Continue to trend Hgb/Hct and transfuse as necessary  -Monitor for signs of recurrent GIB  Chronic idiopathic constipation  Patient passed several non-bloody stools since admission    Septic shock (HCC)    Ischemic colitis (HCC)  Patient reported 2 weeks of no BM on admission  CT AP on admission with moderate diffuse colonic distension with moderate fecal retention  -Lactic acid was elevated on admission at 3.5 which initially improved to 3.1 but worsened in the later afternoon to 4.5  -Abdominal pain worsened and patient became septic  -Repeat CT AP noted diffuse thickening of the colon with increasing mesenteric fat stranding in the left lower quadrant suggesting pneumatosis and hepatic flexure and proximal transverse colon concerning for ischemic colitis  -Given worsening presentation the patient was taken emergently to the OR 10/29 with extended right hemicolectomy.  Abdomen was left open.    -Patient was taken back to the OR yesterday with abdominal washout, ileostomy creation and abdominal closure  -Colonoscopy 2009 with polyps and diverticulosis  -Will continue to follow peripherally  -She will need a plan  for better outpatient bowel regimen  -Ongoing treatment recommendations deferred to ICU and surgery teams  Paroxysmal atrial fibrillation (HCC)  On coumadin which is currently on hold  INR on admission 2.04    At present time GI will sign off. Please reconsult with any questions or concerns.       Patient will be seen and examined by Dr. Cash.  All sexton medical decisions were made by Dr. Cash.         Subjective   Patient remains intubated and sedated in the ICU. Per ICU team no evidence of ongoing GI bleeding.    Objective :  Temp:  [97 °F (36.1 °C)-99.3 °F (37.4 °C)] 99 °F (37.2 °C)  HR:  [] 76  BP: ()/(38-60) 106/56  Resp:  [14-18] 18  SpO2:  [91 %-95 %] 91 %  O2 Device: Ventilator  FiO2 (%):  [40] 40    Physical Exam  Vitals reviewed.   Constitutional:       Interventions: She is sedated and intubated.   Cardiovascular:      Rate and Rhythm: Normal rate and regular rhythm.   Pulmonary:      Effort: She is intubated.   Abdominal:      General: Bowel sounds are normal. There is distension.      Palpations: Abdomen is soft. There is no mass.      Comments: +ileostomy, midline incision with bandage   Skin:     General: Skin is warm and dry.      Coloration: Skin is not jaundiced.           Lab Results: I have reviewed the following results:CBC/BMP:   .     11/01/24  0556   WBC 7.21   HGB 8.6*   HCT 27.5*   *   SODIUM 139   K 4.0   *   CO2 20*   BUN 37*   CREATININE 1.84*   GLUC 129   CAIONIZED 1.00*   MG 2.3   PHOS 3.3

## 2024-11-01 NOTE — RESPIRATORY THERAPY NOTE
RT Ventilator Management Note  Betrha Brown 84 y.o. female MRN: [id number]  Unit/Bed#: ICU 05 Encounter: [id number]      Daily Screen         11/1/2024 0418 11/1/2024 0713          Patient safety screen outcome:: Failed Failed (P)       Not Ready for Weaning due to:: Underline problem not resolved;PEEP > 8cmH2O Underline problem not resolved (P)                 Physical Exam:   Assessment Type: (P) Assess only  General Appearance: (P) Sedated  Respiratory Pattern: (P) Assisted  Chest Assessment: (P) Chest expansion symmetrical  Bilateral Breath Sounds: (P) Diminished, Clear  Cough: None  Suction: (P) ET Tube  O2 Device: (P) vent      Resp Comments: (P) Pt remains on full vent support.  PEEP decreased in an attempt to wean.  Will cont to wean as able.  Skin integrity remains intact.     11/01/24 0713   Respiratory Assessment   Assessment Type Assess only   General Appearance Sedated   Respiratory Pattern Assisted   Chest Assessment Chest expansion symmetrical   Bilateral Breath Sounds Diminished;Clear   Suction ET Tube   Resp Comments Pt remains on full vent support.  PEEP decreased in an attempt to wean.  Will cont to wean as able.  Skin integrity remains intact.   O2 Device vent   Vent Information   Vent ID Groot   Vent type Drager   Drager Vent Mode AC/VC+   $ Vent Daily Charge-Subsequent Yes   $ Pulse Oximetry Spot Check Charge Completed   SpO2 94 %   AC/VC+ Settings   Resp Rate (BPM) 18 BPM   VT (mL) 350 mL   Insp Time (S) 0.9 S   FIO2 (%) 40 %   PEEP (cmH2O) (S)  8 cmH2O   Rise Time (%) 20 %   Trigger Sensitivity Flow (LPM) 2 LPM   Humidification Heater   Heater Temp 98.6 °F (37 °C)   AC/VC+ Actuals   Resp Rate (BPM) 18 BPM   VT (mL) 362 mL   MV (Obs) 5.84   MAP (cmH2O) 12 cmH2O   Peak Pressure (cmH2O) 24 cmH2O   I:E Ratio (Obs) 1:2.7   Static Compliance (mL/cmH20) 31.3 mL/cmH2O   Plateau Pressure (cm H2O) 20.2 cm H2O   Heater Temperature (Obs) 98.2 °F (36.8 °C)   AC/VC+ ALARMS   High Peak Pressure (cmH2O)  45 cmH2O   High Resp Rate (BPM) 30 BPM   High MV (L/min) 11 L/min   Low MV (L/min) 3 L/min   High VT (mL) 700 mL   Maintenance   Alarm (pink) cable attached Yes   Resuscitation bag with peep valve at bedside Yes   Water bag changed No   Circuit changed No   Daily Screen   Patient safety screen outcome: Failed   Not Ready for Weaning due to: Underline problem not resolved   IHI Ventilator Associated Pneumonia Bundle   Daily Awakening Trials Performed Yes   Daily Assessment of Readiness to Extubate Yes   Head of Bed Elevated HOB 30   ETT  Oral;Hi-Lo;Inflated 7 mm   Placement Date/Time: 10/29/24 2216   Mask Ventilation: Mask ventilation not attempted (0)  Preoxygenated: Yes  Technique: Rapid sequence;Stylet;Video laryngoscopy  Type: Oral;Hi-Lo;Inflated  Tube Size: 7 mm  Laryngoscope: [employer]  Blade Size: 3  Locat...   Secured at (cm) 21   Measured from Lips   Secured Location (S)  Center   Repositioned (S)  Right to Center   Secured by Commercial tube irwin   Site Condition Dry   Cuff Pressure (color) Green   HI-LO Suction  Continuous low suction   HI-LO Secretions Thick;Small   HI-LO Intervention Patent

## 2024-11-01 NOTE — ASSESSMENT & PLAN NOTE
Patient reporting several episodes of coffee ground emesis at home the day PTA  CT high volume bleeding abdomen pelvis without evidence of extravasation  Hgb 13.2 on arrival\    Plan  Continue protonix BID  NGT in place to low intermittent wall suction  Strict NPO  Trend hemoglobin  Coumadin on hold  GI consulted  Eventual EGD once more stable

## 2024-11-01 NOTE — ASSESSMENT & PLAN NOTE
Lab Results   Component Value Date    HGBA1C 7.0 (H) 08/22/2024       Recent Labs     10/31/24  0013 10/31/24  1207 10/31/24  1755 10/31/24  1815   POCGLU 139 126 119 118       Blood Sugar Average: Last 72 hrs:  (P) 132.625  Accuchecks and SSI Q6h

## 2024-11-01 NOTE — CASE MANAGEMENT
Case Management Discharge Planning Note    Patient name Bertha Brown  Location ICU 05/ICU 05 MRN [id number]  : 1940 Date 2024       Current Admission Date: 10/29/2024  Current Admission Diagnosis:Septic shock (HCC)   Patient Active Problem List    Diagnosis Date Noted Date Diagnosed    S/P exploratory laparotomy 2024     Ischemic colitis (HCC) 10/30/2024     Septic shock (HCC) 10/30/2024     Acute metabolic encephalopathy 10/30/2024     Coffee ground emesis 10/29/2024     RUKHSANA (acute kidney injury) (HCC) 10/29/2024     Chronic idiopathic constipation 10/29/2024     Generalized abdominal pain 10/29/2024     SIRS (systemic inflammatory response syndrome) (formerly [facility]) 10/29/2024     Constipation 2022     Hypoxia 12/15/2022     Rectus sheath hematoma 2022     Bronchospasm with bronchitis, acute 2022     Chest pain 2022     Leukocytosis 2022     Chest tightness 2022     Diabetes (HCC) 2022     Obesity 2022     Bilateral flank pain 2022     Hemarthrosis involving knee joint, right 10/01/2020     Ambulatory dysfunction 10/01/2020     Paroxysmal atrial fibrillation (HCC) 2020     Acute respiratory failure with hypoxia (HCC) 2020     Hypothyroidism 2020     Hypertension 2020     Hyperlipidemia 2020     Major depressive disorder, single episode, mild (HCC) 2019     Osteoarthritis of knee 2017       LOS (days): 3  Geometric Mean LOS (GMLOS) (days): 9.6  Days to GMLOS:6.3     OBJECTIVE:  Risk of Unplanned Readmission Score: 27.06         Current admission status: Inpatient   Preferred Pharmacy:   [id number] - [place] - [address]  [address]  [facility] [place] [address]  Phone: [phone] Fax: [phone]    Primary Care Provider: Jarred Armstrong DO    Primary Insurance: [facility] RE[facility]  Secondary Insurance:     DISCHARGE DETAILS:                                          Other  Referral/Resources/Interventions Provided:  Referral Comments: Pt to OR yesterday for LAPAROTOMY EXPLORATORY. 2nd look. washout. ileostomy. abdominal closure. placement of wound vac.  Parks, A line, CVC line, NGT.  Pt remains on vent; SBT in progress this AM.  IV alb, IV lasix, IV abx, IV amioderone, IV vasopressin, IV levophed, IV precedex, IV fentanyl.  Possible extubation attempt later today per ICU AM rounds.         Treatment Team Recommendation: Other (TBD)  Discharge Destination Plan:: Other (TBD)  Transport at Discharge : Other (Comment) (TBD)

## 2024-11-02 ENCOUNTER — APPOINTMENT (INPATIENT)
Dept: RADIOLOGY | Facility: HOSPITAL | Age: 84
DRG: 853 | End: 2024-11-02
Payer: COMMERCIAL

## 2024-11-02 LAB
ABO GROUP BLD: NORMAL
ALBUMIN SERPL BCG-MCNC: 3.8 G/DL (ref 3.5–5)
ALBUMIN SERPL BCG-MCNC: 4.1 G/DL (ref 3.5–5)
ALP SERPL-CCNC: 35 U/L (ref 34–104)
ALT SERPL W P-5'-P-CCNC: 4 U/L (ref 7–52)
ANION GAP SERPL CALCULATED.3IONS-SCNC: 10 MMOL/L (ref 4–13)
ANION GAP SERPL CALCULATED.3IONS-SCNC: 11 MMOL/L (ref 4–13)
AST SERPL W P-5'-P-CCNC: 25 U/L (ref 13–39)
BACTERIA SPEC ANAEROBE CULT: NO GROWTH
BACTERIA SPEC BFLD CULT: ABNORMAL
BASOPHILS # BLD MANUAL: 0.04 THOUSAND/UL (ref 0–0.1)
BASOPHILS NFR MAR MANUAL: 1 % (ref 0–1)
BILIRUB SERPL-MCNC: 1.73 MG/DL (ref 0.2–1)
BLD GP AB SCN SERPL QL: NEGATIVE
BUN SERPL-MCNC: 35 MG/DL (ref 5–25)
BUN SERPL-MCNC: 37 MG/DL (ref 5–25)
CA-I BLD-SCNC: 0.96 MMOL/L (ref 1.12–1.32)
CALCIUM SERPL-MCNC: 7.2 MG/DL (ref 8.4–10.2)
CALCIUM SERPL-MCNC: 7.3 MG/DL (ref 8.4–10.2)
CHLORIDE SERPL-SCNC: 112 MMOL/L (ref 96–108)
CHLORIDE SERPL-SCNC: 113 MMOL/L (ref 96–108)
CO2 SERPL-SCNC: 19 MMOL/L (ref 21–32)
CO2 SERPL-SCNC: 22 MMOL/L (ref 21–32)
CREAT SERPL-MCNC: 1.52 MG/DL (ref 0.6–1.3)
CREAT SERPL-MCNC: 1.61 MG/DL (ref 0.6–1.3)
EOSINOPHIL # BLD MANUAL: 0 THOUSAND/UL (ref 0–0.4)
EOSINOPHIL NFR BLD MANUAL: 0 % (ref 0–6)
ERYTHROCYTE [DISTWIDTH] IN BLOOD BY AUTOMATED COUNT: 14.7 % (ref 11.6–15.1)
ERYTHROCYTE [DISTWIDTH] IN BLOOD BY AUTOMATED COUNT: 14.9 % (ref 11.6–15.1)
GFR SERPL CREATININE-BSD FRML MDRD: 29 ML/MIN/1.73SQ M
GFR SERPL CREATININE-BSD FRML MDRD: 31 ML/MIN/1.73SQ M
GLUCOSE SERPL-MCNC: 103 MG/DL (ref 65–140)
GLUCOSE SERPL-MCNC: 103 MG/DL (ref 65–140)
GLUCOSE SERPL-MCNC: 108 MG/DL (ref 65–140)
GLUCOSE SERPL-MCNC: 137 MG/DL (ref 65–140)
GLUCOSE SERPL-MCNC: 137 MG/DL (ref 65–140)
GLUCOSE SERPL-MCNC: 78 MG/DL (ref 65–140)
GRAM STN SPEC: ABNORMAL
HCT VFR BLD AUTO: 22.4 % (ref 34.8–46.1)
HCT VFR BLD AUTO: 22.6 % (ref 34.8–46.1)
HCT VFR BLD AUTO: 22.8 % (ref 34.8–46.1)
HGB BLD-MCNC: 6.9 G/DL (ref 11.5–15.4)
HGB BLD-MCNC: 7.2 G/DL (ref 11.5–15.4)
HGB BLD-MCNC: 7.2 G/DL (ref 11.5–15.4)
LYMPHOCYTES # BLD AUTO: 0.63 THOUSAND/UL (ref 0.6–4.47)
LYMPHOCYTES # BLD AUTO: 17 % (ref 14–44)
MAGNESIUM SERPL-MCNC: 2.1 MG/DL (ref 1.9–2.7)
MAGNESIUM SERPL-MCNC: 2.6 MG/DL (ref 1.9–2.7)
MCH RBC QN AUTO: 30.1 PG (ref 26.8–34.3)
MCH RBC QN AUTO: 30.9 PG (ref 26.8–34.3)
MCHC RBC AUTO-ENTMCNC: 30.8 G/DL (ref 31.4–37.4)
MCHC RBC AUTO-ENTMCNC: 31.6 G/DL (ref 31.4–37.4)
MCV RBC AUTO: 98 FL (ref 82–98)
MCV RBC AUTO: 98 FL (ref 82–98)
METAMYELOCYTE ABSOLUTE CT: 0.04 THOUSAND/UL (ref 0–0.1)
METAMYELOCYTES NFR BLD MANUAL: 1 % (ref 0–1)
MONOCYTES # BLD AUTO: 0.44 THOUSAND/UL (ref 0–1.22)
MONOCYTES NFR BLD: 12 % (ref 4–12)
MYELOCYTE ABSOLUTE CT: 0.04 THOUSAND/UL (ref 0–0.1)
MYELOCYTES NFR BLD MANUAL: 1 % (ref 0–1)
NEUTROPHILS # BLD MANUAL: 2.51 THOUSAND/UL (ref 1.85–7.62)
NEUTS BAND NFR BLD MANUAL: 10 % (ref 0–8)
NEUTS SEG NFR BLD AUTO: 58 % (ref 43–75)
PHOSPHATE SERPL-MCNC: 2 MG/DL (ref 2.3–4.1)
PHOSPHATE SERPL-MCNC: 2.1 MG/DL (ref 2.3–4.1)
PLATELET # BLD AUTO: 111 THOUSANDS/UL (ref 149–390)
PLATELET # BLD AUTO: 123 THOUSANDS/UL (ref 149–390)
PLATELET BLD QL SMEAR: ABNORMAL
PMV BLD AUTO: 11.5 FL (ref 8.9–12.7)
PMV BLD AUTO: 12 FL (ref 8.9–12.7)
POTASSIUM SERPL-SCNC: 3 MMOL/L (ref 3.5–5.3)
POTASSIUM SERPL-SCNC: 3.6 MMOL/L (ref 3.5–5.3)
PROCALCITONIN SERPL-MCNC: 20.81 NG/ML
PROT SERPL-MCNC: 5.5 G/DL (ref 6.4–8.4)
RBC # BLD AUTO: 2.29 MILLION/UL (ref 3.81–5.12)
RBC # BLD AUTO: 2.33 MILLION/UL (ref 3.81–5.12)
RH BLD: POSITIVE
SODIUM SERPL-SCNC: 141 MMOL/L (ref 135–147)
SODIUM SERPL-SCNC: 146 MMOL/L (ref 135–147)
SPECIMEN EXPIRATION DATE: NORMAL
WBC # BLD AUTO: 3.69 THOUSAND/UL (ref 4.31–10.16)
WBC # BLD AUTO: 4.33 THOUSAND/UL (ref 4.31–10.16)

## 2024-11-02 PROCEDURE — 85027 COMPLETE CBC AUTOMATED: CPT

## 2024-11-02 PROCEDURE — 85014 HEMATOCRIT: CPT

## 2024-11-02 PROCEDURE — 82948 REAGENT STRIP/BLOOD GLUCOSE: CPT

## 2024-11-02 PROCEDURE — 2W03X6Z CHANGE PRESSURE DRESSING ON ABDOMINAL WALL: ICD-10-PCS | Performed by: SURGERY

## 2024-11-02 PROCEDURE — 99024 POSTOP FOLLOW-UP VISIT: CPT | Performed by: PHYSICIAN ASSISTANT

## 2024-11-02 PROCEDURE — 86850 RBC ANTIBODY SCREEN: CPT | Performed by: PHYSICIAN ASSISTANT

## 2024-11-02 PROCEDURE — 84145 PROCALCITONIN (PCT): CPT

## 2024-11-02 PROCEDURE — 97606 NEG PRS WND THER DME>50 SQCM: CPT | Performed by: PHYSICIAN ASSISTANT

## 2024-11-02 PROCEDURE — 93005 ELECTROCARDIOGRAM TRACING: CPT

## 2024-11-02 PROCEDURE — 85018 HEMOGLOBIN: CPT

## 2024-11-02 PROCEDURE — 94150 VITAL CAPACITY TEST: CPT

## 2024-11-02 PROCEDURE — 94760 N-INVAS EAR/PLS OXIMETRY 1: CPT

## 2024-11-02 PROCEDURE — 82330 ASSAY OF CALCIUM: CPT

## 2024-11-02 PROCEDURE — 83735 ASSAY OF MAGNESIUM: CPT

## 2024-11-02 PROCEDURE — 94003 VENT MGMT INPAT SUBQ DAY: CPT

## 2024-11-02 PROCEDURE — 80069 RENAL FUNCTION PANEL: CPT | Performed by: PHYSICIAN ASSISTANT

## 2024-11-02 PROCEDURE — 85007 BL SMEAR W/DIFF WBC COUNT: CPT

## 2024-11-02 PROCEDURE — 86900 BLOOD TYPING SEROLOGIC ABO: CPT | Performed by: PHYSICIAN ASSISTANT

## 2024-11-02 PROCEDURE — 71045 X-RAY EXAM CHEST 1 VIEW: CPT

## 2024-11-02 PROCEDURE — 99233 SBSQ HOSP IP/OBS HIGH 50: CPT | Performed by: STUDENT IN AN ORGANIZED HEALTH CARE EDUCATION/TRAINING PROGRAM

## 2024-11-02 PROCEDURE — 83735 ASSAY OF MAGNESIUM: CPT | Performed by: PHYSICIAN ASSISTANT

## 2024-11-02 PROCEDURE — 80053 COMPREHEN METABOLIC PANEL: CPT

## 2024-11-02 PROCEDURE — 86901 BLOOD TYPING SEROLOGIC RH(D): CPT | Performed by: PHYSICIAN ASSISTANT

## 2024-11-02 PROCEDURE — 84100 ASSAY OF PHOSPHORUS: CPT

## 2024-11-02 RX ORDER — POTASSIUM CHLORIDE 29.8 MG/ML
40 INJECTION INTRAVENOUS ONCE
Status: COMPLETED | OUTPATIENT
Start: 2024-11-02 | End: 2024-11-03

## 2024-11-02 RX ORDER — BUMETANIDE 0.25 MG/ML
2 INJECTION, SOLUTION INTRAMUSCULAR; INTRAVENOUS ONCE
Status: COMPLETED | OUTPATIENT
Start: 2024-11-02 | End: 2024-11-02

## 2024-11-02 RX ORDER — METOPROLOL TARTRATE 1 MG/ML
5 INJECTION, SOLUTION INTRAVENOUS ONCE
Status: COMPLETED | OUTPATIENT
Start: 2024-11-02 | End: 2024-11-02

## 2024-11-02 RX ORDER — ALBUMIN (HUMAN) 12.5 G/50ML
25 SOLUTION INTRAVENOUS EVERY 6 HOURS
Status: COMPLETED | OUTPATIENT
Start: 2024-11-02 | End: 2024-11-03

## 2024-11-02 RX ADMIN — ALBUMIN (HUMAN) 25 G: 0.25 INJECTION, SOLUTION INTRAVENOUS at 01:50

## 2024-11-02 RX ADMIN — PIPERACILLIN AND TAZOBACTAM 4.5 G: 36; 4.5 INJECTION, POWDER, FOR SOLUTION INTRAVENOUS at 20:06

## 2024-11-02 RX ADMIN — ACETAMINOPHEN 1000 MG: 10 INJECTION INTRAVENOUS at 03:59

## 2024-11-02 RX ADMIN — AMIODARONE HYDROCHLORIDE 0.5 MG/MIN: 50 INJECTION, SOLUTION INTRAVENOUS at 01:52

## 2024-11-02 RX ADMIN — ACETAMINOPHEN 1000 MG: 10 INJECTION INTRAVENOUS at 13:47

## 2024-11-02 RX ADMIN — HEPARIN SODIUM 5000 UNITS: 5000 INJECTION INTRAVENOUS; SUBCUTANEOUS at 05:14

## 2024-11-02 RX ADMIN — ALBUMIN (HUMAN) 25 G: 0.25 INJECTION, SOLUTION INTRAVENOUS at 19:15

## 2024-11-02 RX ADMIN — PANTOPRAZOLE SODIUM 40 MG: 40 INJECTION, POWDER, FOR SOLUTION INTRAVENOUS at 08:27

## 2024-11-02 RX ADMIN — POTASSIUM CHLORIDE 40 MEQ: 29.8 INJECTION, SOLUTION INTRAVENOUS at 22:17

## 2024-11-02 RX ADMIN — ALBUMIN (HUMAN) 25 G: 0.25 INJECTION, SOLUTION INTRAVENOUS at 16:10

## 2024-11-02 RX ADMIN — PIPERACILLIN AND TAZOBACTAM 4.5 G: 36; 4.5 INJECTION, POWDER, FOR SOLUTION INTRAVENOUS at 13:37

## 2024-11-02 RX ADMIN — PIPERACILLIN AND TAZOBACTAM 4.5 G: 36; 4.5 INJECTION, POWDER, FOR SOLUTION INTRAVENOUS at 04:01

## 2024-11-02 RX ADMIN — METOROPROLOL TARTRATE 5 MG: 5 INJECTION, SOLUTION INTRAVENOUS at 21:35

## 2024-11-02 RX ADMIN — POTASSIUM PHOSPHATE, MONOBASIC AND POTASSIUM PHOSPHATE, DIBASIC 21 MMOL: 224; 236 INJECTION, SOLUTION, CONCENTRATE INTRAVENOUS at 08:28

## 2024-11-02 RX ADMIN — PANTOPRAZOLE SODIUM 40 MG: 40 INJECTION, POWDER, FOR SOLUTION INTRAVENOUS at 20:06

## 2024-11-02 RX ADMIN — DEXMEDETOMIDINE HYDROCHLORIDE 0.4 MCG/KG/HR: 400 INJECTION INTRAVENOUS at 04:26

## 2024-11-02 RX ADMIN — VASOPRESSIN 0.04 UNITS/MIN: 20 INJECTION INTRAVENOUS at 06:27

## 2024-11-02 RX ADMIN — HEPARIN SODIUM 5000 UNITS: 5000 INJECTION INTRAVENOUS; SUBCUTANEOUS at 13:37

## 2024-11-02 RX ADMIN — BUMETANIDE 2 MG: 0.25 INJECTION INTRAMUSCULAR; INTRAVENOUS at 16:10

## 2024-11-02 RX ADMIN — CHLORHEXIDINE GLUCONATE 0.12% ORAL RINSE 15 ML: 1.2 LIQUID ORAL at 20:06

## 2024-11-02 RX ADMIN — HEPARIN SODIUM 5000 UNITS: 5000 INJECTION INTRAVENOUS; SUBCUTANEOUS at 21:10

## 2024-11-02 RX ADMIN — FENTANYL CITRATE 50 MCG: 50 INJECTION INTRAMUSCULAR; INTRAVENOUS at 11:41

## 2024-11-02 RX ADMIN — CHLORHEXIDINE GLUCONATE 0.12% ORAL RINSE 15 ML: 1.2 LIQUID ORAL at 08:27

## 2024-11-02 NOTE — RESPIRATORY THERAPY NOTE
RT Ventilator Management Note  Bertha Brown 84 y.o. female MRN: [id number]  Unit/Bed#: ICU 05 Encounter: [id number]      Daily Screen         11/1/2024  1457 11/2/2024 0425          Patient safety screen outcome:: Passed Passed (P)       Not Ready for Weaning due to:: -- Underline problem not resolved (P)       Spont breathing trial % for 30 min: -- No (P)       RSBI: 42 --               11/02/24 0425   Respiratory Assessment   Assessment Type Assess only   General Appearance Sedated   Respiratory Pattern Assisted   Chest Assessment Chest expansion symmetrical   Resp Comments pt remains intubated on full vent support.   O2 Device vent   Vent Information   Vent ID groot   Vent type Drager   Drager Vent Mode AC/VC+   $ Pulse Oximetry Spot Check Charge Completed   SpO2 93 %   AC/VC+ Settings   Resp Rate (BPM) 18 BPM   VT (mL) 350 mL   Insp Time (S) 0.85 S   FIO2 (%) 50 %   PEEP (cmH2O) 8 cmH2O   Rise Time (%) 20 %   Trigger Sensitivity Flow (LPM) 2 LPM   Humidification Heater   Heater Temp 98.6 °F (37 °C)   AC/VC+ Actuals   Resp Rate (BPM) 19 BPM   VT (mL) 365 mL   MV (Obs) 6.935   MAP (cmH2O) 11 cmH2O   Peak Pressure (cmH2O) 21 cmH2O   I:E Ratio (Obs) 1:2.7   Static Compliance (mL/cmH20) 37.3 mL/cmH2O   Plateau Pressure (cm H2O) 20 cm H2O   Heater Temperature (Obs) 98.6 °F (37 °C)   AC/VC+ ALARMS   High Peak Pressure (cmH2O) 45 cmH2O   High Resp Rate (BPM) 30 BPM   High MV (L/min) 11 L/min   Low MV (L/min) 3 L/min   High VT (mL) 700 mL   Maintenance   Alarm (pink) cable attached Yes   Resuscitation bag with peep valve at bedside Yes   Water bag changed No   Circuit changed No   Daily Screen   Patient safety screen outcome: Passed   Not Ready for Weaning due to: Underline problem not resolved   Spont breathing trial % for 30 min No   ETT  Oral;Hi-Lo;Inflated 7 mm   Placement Date/Time: 10/29/24 2216   Mask Ventilation: Mask ventilation not attempted (0)  Preoxygenated: Yes  Technique: Rapid  sequence;Stylet;Video laryngoscopy  Type: Oral;Hi-Lo;Inflated  Tube Size: 7 mm  Laryngoscope: Adeze  Blade Size: 3  Locat...   Secured at (cm) 21   Measured from Lips   Secured Location Left   Repositioned Center to Left   Secured by Commercial tube irwin   Site Condition Dry   Cuff Pressure (color) Green   HI-LO Suction  Continuous low suction   HI-LO Secretions Moderate;Thick   HI-LO Intervention Flushed         Physical Exam:   Assessment Type: Assess only  General Appearance: Sedated  Respiratory Pattern: Assisted  Chest Assessment: Chest expansion symmetrical  Bilateral Breath Sounds: Diminished  O2 Device: vent      Resp Comments: pt remains intubated on full vent support.

## 2024-11-02 NOTE — ASSESSMENT & PLAN NOTE
- SSI  - close monitoring and control of serum glucose for optimized healing    Lab Results   Component Value Date    HGBA1C 7.0 (H) 08/22/2024       Recent Labs     11/01/24  1200 11/01/24  1733 11/01/24  2319 11/02/24  1222   POCGLU 127 112 139 137       Blood Sugar Average: Last 72 hrs:  (P) [id number]

## 2024-11-02 NOTE — RESPIRATORY THERAPY NOTE
RT Ventilator Management Note  Bertha Brown 84 y.o. female MRN: [id number]  Unit/Bed#: ICU 05 Encounter: [id number]      Daily Screen         11/2/2024 0425 11/2/2024  0806          Patient safety screen outcome:: Passed Passed (P)       Not Ready for Weaning due to:: Underline problem not resolved --      Spont breathing trial % for 30 min: No --      RSBI: -- 77 (P)                 Physical Exam:   Assessment Type: (P) Assess only  General Appearance: (P) Sleeping  Respiratory Pattern: (P) Spontaneous  Chest Assessment: (P) Chest expansion symmetrical  Bilateral Breath Sounds: (P) Diminished, Clear  Suction: (P) ET Tube  O2 Device: (P) vent      Resp Comments: (P) Pt placed on SBT at this time.  Will cont to monitor on same.  Skin integrity remains intact.     11/02/24 0806   Respiratory Assessment   Assessment Type Assess only   General Appearance Sleeping   Respiratory Pattern Spontaneous   Chest Assessment Chest expansion symmetrical   Bilateral Breath Sounds Diminished;Clear   Suction ET Tube   Resp Comments Pt placed on SBT at this time.  Will cont to monitor on same.  Skin integrity remains intact.   O2 Device vent   Vent Information   Vent ID Groot   Vent type Drager   Drager Vent Mode (S)  CPAP/PS Spont   $ Vent Daily Charge-Subsequent Yes   $ Vital Capacity Mech/Peak Flow Yes   $ Pulse Oximetry Spot Check Charge Completed   SpO2 93 %   CPAP/PS Spont Settings   FIO2 (%) 50 %   PEEP (cmH2O) 6 cmH2O   Pressure Support (cmH2O) 6 cmH20   Trigger Sensitivity Flow (lpm) 2 LPM   Rise Time (%) 20 %   Humidification Heater   Heater Temp 98.6 °F (37 °C)   Auto Flow  Off   CPAP/PS Spont Actuals   Resp Rate (BPM) 24 BPM   VT (mL) 305 mL   MV (Obs) 6.18   MAP (cmH2O) 7.8 cmH2O   Peak Pressure (cmH2O) 13 cmH2O   RSBI 77   Heater Temperature (Obs) 98.6 °F (37 °C)   CPAP/PS Spont Alarms   High Peak Pressure (cmH20) 45 cmH2O   High Resp Rate (BPM) 30 BPM   High MV (L/min) 11 L/min   Low MV (L/min) 3 L/min   High  SPONT VTE (mL) 700 mL   Low Spont VTE (mL) 220 mL   CPAP/PS Spont Apnea Settings   Resp Rate (BPM) 18 BPM   VT (mL) 350 mL   FIO2 (%) 50 %   Apnea Time (s) 20 S   Apnea Flow (LPM) 2 LPM   Maintenance   Alarm (pink) cable attached Yes   Resuscitation bag with peep valve at bedside Yes   Water bag changed No   Circuit changed No   Daily Screen   Patient safety screen outcome: Passed   RSBI 77   IHI Ventilator Associated Pneumonia Bundle   Daily Awakening Trials Performed Yes   Daily Assessment of Readiness to Extubate Yes   Head of Bed Elevated HOB 30   ETT  Oral;Hi-Lo;Inflated 7 mm   Placement Date/Time: 10/29/24 1966   Mask Ventilation: Mask ventilation not attempted (0)  Preoxygenated: Yes  Technique: Rapid sequence;Stylet;Video laryngoscopy  Type: Oral;Hi-Lo;Inflated  Tube Size: 7 mm  Laryngoscope: Hurtado  Blade Size: 3  Locat...   Secured at (cm) 21   Measured from Lips   Secured Location (S)  Center   Repositioned (S)  Left to Center   Secured by Commercial tube irwin   Site Condition Dry   Cuff Pressure (color) Green   HI-LO Suction  Continuous low suction   HI-LO Secretions Scant   HI-LO Intervention Patent

## 2024-11-02 NOTE — PROGRESS NOTES
Progress Note - Surgery-General   Name: Bertha Brown 84 y.o. female I MRN: [id number]  Unit/Bed#: ICU 05 I Date of Admission: 10/29/2024   Date of Service: 11/2/2024 I Hospital Day: 4    Assessment & Plan  Paroxysmal atrial fibrillation (HCC)  - Monitor Hgb  - restart AC when stable    Acute respiratory failure with hypoxia (HCC)  - Monitor abdominal exams  - Remainder of care per prior medical team  Diabetes (HCC)  - SSI  - close monitoring and control of serum glucose for optimized healing    Lab Results   Component Value Date    HGBA1C 7.0 (H) 08/22/2024       Recent Labs     11/01/24  1200 11/01/24  1733 11/01/24  2319 11/02/24  1222   POCGLU 127 112 139 137       Blood Sugar Average: Last 72 hrs:  (P) 129.2291741639809616    Ischemic colitis (HCC)  POD2, s/p second look with wash out, abdominal wall closure, and loop ileostomy and mucous fistula creation, VAC placement to midline abdomen  POD4 s/p exploratory laparotomy with right hemicolectomy, abdominal left open w abtherra placement on 10/29/2024  - intubated and sedated  - Febrile today to 101.5F, WBC 4.3, Hgb 7.2, procal 20.81  - Cr 1.61, UOP 2750     Plan:  Continue with care per primary team  Monitor labs and vitals for improvement  Monitor abdominal exams  VAC and ostomy bag changes today, next change Monday 11/4/24, then MWF VAC changes  Replete lytes as needed  Recommend TPN for nutritional supplementation if no signs of return of bowel function. If patient have flatus into ileostomy bag may consider starting trickle tube feeds.    I have discussed the above management plan in detail with the primary service.   Please contact the SecureChat role for the Surgery-General service with any questions/concerns.    Subjective   Intubated and sedated    Objective :  Temp:  [96.3 °F (35.7 °C)-101.5 °F (38.6 °C)] 100.6 °F (38.1 °C)  HR:  [] 90  BP: ()/(52-62) 123/58  Resp:  [14-21] 21  SpO2:  [91 %-95 %] 95 %  O2 Device: Ventilator  FiO2 (%):   [50] 50    I/O         10/31 0701  11/01 0700 11/01 0701  11/02 0700 11/02 0701 11/03 0700    P.O. 0      I.V. (mL/kg) 2963.3 (28.2) 895.7 (8.5)     NG/GT  0     IV Piggyback 850 650     Total Intake(mL/kg) 3813.3 (36.3) 1545.7 (14.7)     Urine (mL/kg/hr) 1667 (0.7) 2750 (1.1) 500 (0.5)    Emesis/NG output 220 600     Drains 0      Stool 55 125     Blood 25      Total Output 1967 3475 500    Net +1846.3 -1929.3 -500                 Lines/Drains/Airways       Active Status       Name Placement date Placement time Site Days    CVC Central Lines 10/29/24 10/29/24  2330  --  3    Arterial Line 10/29/24 Left Radial 10/29/24  2220  Radial  3    ETT  Oral;Hi-Lo;Inflated 7 mm 10/29/24  2216  -- 3    NG/OG/Enteral Tube Nasogastric 18 Fr Left nare 10/29/24  2205  Left nare  3    Colostomy RLQ 10/31/24  1422  RLQ  2    Urethral Catheter Asept;Coude;Non-latex;Straight-tip 16 Fr. 10/29/24  2225  Asept;Coude;Non-latex;Straight-tip  3                  Physical Exam  Vitals reviewed.   Constitutional:       Appearance: She is ill-appearing.      Comments: Intubated and sedated   HENT:      Head: Normocephalic and atraumatic.      Nose: Nose normal. No congestion.      Mouth/Throat:      Mouth: Mucous membranes are dry.   Eyes:      General: No scleral icterus.     Conjunctiva/sclera: Conjunctivae normal.   Cardiovascular:      Rate and Rhythm: Normal rate.      Pulses: Normal pulses.   Pulmonary:      Comments: CTA, vent sounds on auscultation  Abdominal:      General: There is no distension.      Palpations: Abdomen is soft.      Tenderness: There is no abdominal tenderness. There is no guarding.      Comments: No grimacing on palpation  VAC removed, midline incision intact, subcutaneous fat clean, no exudate, staples intact at superior portion of incision  Loop ileostomy R abdomen pink, with s/s effluent in bag, no stool or gas  Mucous fistula and caudal midline pink, no output   Musculoskeletal:      Right lower leg: Edema  present.      Left lower leg: Edema present.   Skin:     General: Skin is warm.      Capillary Refill: Capillary refill takes less than 2 seconds.      Coloration: Skin is not jaundiced.   Neurological:      Comments: Intubated and sedated           Lab Results: I have reviewed the following results:  Recent Labs     10/31/24  0833 10/31/24  2309 11/01/24  0556 11/02/24  0425 11/02/24  0910   WBC  --  7.20   < > 3.69* 4.33   HGB  --  8.9*   < > 6.9* 7.2*   HCT  --  27.5*   < > 22.4* 22.8*   PLT  --  135*   < > 111* 123*   BANDSPCT  --   --   --  10*  --    SODIUM  --  138   < > 141  --    K  --  4.2   < > 3.6  --    CL  --  111*   < > 112*  --    CO2  --  19*   < > 19*  --    BUN  --  36*   < > 37*  --    CREATININE  --  1.96*   < > 1.61*  --    GLUC  --  115   < > 137  --    CAIONIZED  --   --    < > 0.96*  --    MG  --   --    < > 2.6  --    PHOS  --   --    < > 2.0*  --    AST  --   --   --  25  --    ALT  --   --   --  4*  --    ALB  --   --   --  3.8  --    TBILI  --   --   --  1.73*  --    ALKPHOS  --   --   --  35  --    PTT 36*  --   --   --   --    INR 1.68*  --   --   --   --    LACTICACID  --  1.2  --   --   --     < > = values in this interval not displayed.       Imaging Results Review: No pertinent imaging studies reviewed.  Other Study Results Review: No additional pertinent studies reviewed.    VTE Pharmacologic Prophylaxis: VTE covered by:  heparin (porcine), Subcutaneous, 5,000 Units at 11/02/24 1337     VTE Mechanical Prophylaxis: sequential compression device

## 2024-11-02 NOTE — ASSESSMENT & PLAN NOTE
POD2, s/p second look with wash out, abdominal wall closure, and loop ileostomy and mucous fistula creation, VAC placement to midline abdomen  POD4 s/p exploratory laparotomy with right hemicolectomy, abdominal left open w abtherra placement on 10/29/2024  - intubated and sedated  - Febrile today to 101.5F, WBC 4.3, Hgb 7.2, procal 20.81  - Cr 1.61, UOP 2750     Plan:  Continue with care per primary team  Monitor labs and vitals for improvement  Monitor abdominal exams  VAC and ostomy bag changes today, next change Monday 11/4/24, then [facility] VAC changes  Replete lytes as needed  Recommend TPN for nutritional supplementation if no signs of return of bowel function. If patient have flatus into ileostomy bag may consider starting trickle tube feeds.

## 2024-11-02 NOTE — RESPIRATORY THERAPY NOTE
RT Ventilator Management Note  Bertha Brown 84 y.o. female MRN: [id number]  Unit/Bed#: ICU 05 Encounter: [id number]      Daily Screen         11/2/2024  1101 11/2/2024  1430          RSBI: 58 45 (P)                 Physical Exam:   Assessment Type: (P) Assess only  General Appearance: (P) Sleeping, Eyes open/responds to voice  Respiratory Pattern: (P) Spontaneous  Chest Assessment: (P) Chest expansion symmetrical  Bilateral Breath Sounds: (P) Diminished, Clear  Suction: (P) ET Tube  O2 Device: (P) vent      Resp Comments: (P) Pt remains on CPAP/PS and tolerating well.  Will open eyes when name is called.  Resting comfortably at this time.     11/02/24 1430   Respiratory Assessment   Assessment Type Assess only   General Appearance Sleeping;Eyes open/responds to voice   Respiratory Pattern Spontaneous   Chest Assessment Chest expansion symmetrical   Bilateral Breath Sounds Diminished;Clear   Suction ET Tube   Resp Comments Pt remains on CPAP/PS and tolerating well.  Will open eyes when name is called.  Resting comfortably at this time.   O2 Device vent   Vent Information   Vent ID Groot   Vent type Drager   Drager Vent Mode CPAP/PS Spont   $ Pulse Oximetry Spot Check Charge Completed   SpO2 94 %   CPAP/PS Spont Settings   FIO2 (%) 50 %   PEEP (cmH2O) 6 cmH2O   Pressure Support (cmH2O) 6 cmH20   Trigger Sensitivity Flow (lpm) 2 LPM   Rise Time (%) 20 %   Humidification Heater   Heater Temp 98.6 °F (37 °C)   Auto Flow  Off   CPAP/PS Spont Actuals   Resp Rate (BPM) 24 BPM   VT (mL) 364 mL   MV (Obs) 7.09   MAP (cmH2O) 8.1 cmH2O   Peak Pressure (cmH2O) 12 cmH2O   RSBI 45   Heater Temperature (Obs) 98.4 °F (36.9 °C)   CPAP/PS Spont Alarms   High Peak Pressure (cmH20) 45 cmH2O   High Resp Rate (BPM) 30 BPM   High MV (L/min) 11 L/min   Low MV (L/min) 3 L/min   High SPONT VTE (mL) 700 mL   Low Spont VTE (mL) 220 mL   CPAP/PS Spont Apnea Settings   Resp Rate (BPM) 18 BPM   VT (mL) 350 mL   FIO2 (%) 50 %   Apnea Time (s) 20  S   Apnea Flow (LPM) 2 LPM   Maintenance   Alarm (pink) cable attached Yes   Resuscitation bag with peep valve at bedside Yes   Water bag changed No   Circuit changed No   Daily Screen   RSBI 45   ETT  Oral;Hi-Lo;Inflated 7 mm   Placement Date/Time: 10/29/24 2216   Mask Ventilation: Mask ventilation not attempted (0)  Preoxygenated: Yes  Technique: Rapid sequence;Stylet;Video laryngoscopy  Type: Oral;Hi-Lo;Inflated  Tube Size: 7 mm  Laryngoscope: [employer]  Blade Size: 3  Locat...   Secured at (cm) 21   Measured from Lips   Secured Location (S)  Right   Repositioned (S)  Center to Right   Secured by Commercial tube irwin   Site Condition Dry   Cuff Pressure (color) Green   HI-LO Suction  Continuous low suction   HI-LO Secretions Scant   HI-LO Intervention Patent

## 2024-11-02 NOTE — ASSESSMENT & PLAN NOTE
Patient meets sepsis criteria as evidenced by tachycardia, tachypnea prior to intubation, and leukocytosis with RUKHSANA and lactic acidosis  Suspect secondary to ischemic colitis +/- aspiration pneumonia  No evidence of cardiogenic component to shock, SVO2 82.6  Antibiotics broadened to Zosyn  Intraoperative cultures and BC pending, follow up results  Now off levo but remains on vasopressin    Plan:  Continue vasopressors titrated to maintain MAP >65  Continue fluid resuscitation  Lactate now cleared  Trend procal and WBC/temperature curve

## 2024-11-02 NOTE — RESPIRATORY THERAPY NOTE
11/02/24 1101   Respiratory Assessment   Resp Comments Pt remains on CPAP/PS and tolerating well.  Will cont to monitor on same.   O2 Device vent   Vent Information   Vent ID Groot   Vent type Drager   Drager Vent Mode CPAP/PS Spont   $ Pulse Oximetry Spot Check Charge Completed   SpO2 92 %   CPAP/PS Spont Settings   FIO2 (%) 50 %   PEEP (cmH2O) 6 cmH2O   Pressure Support (cmH2O) 6 cmH20   Trigger Sensitivity Flow (lpm) 2 LPM   Rise Time (%) 20 %   Humidification Heater   Heater Temp 98.6 °F (37 °C)   Auto Flow  Off   CPAP/PS Spont Actuals   Resp Rate (BPM) 24 BPM   VT (mL) 374 mL   MV (Obs) 7.86   MAP (cmH2O) 8.1 cmH2O   Peak Pressure (cmH2O) 13 cmH2O   RSBI 58   Heater Temperature (Obs) 98.1 °F (36.7 °C)   CPAP/PS Spont Alarms   High Peak Pressure (cmH20) 45 cmH2O   High Resp Rate (BPM) 30 BPM   High MV (L/min) 11 L/min   Low MV (L/min) 3 L/min   High SPONT VTE (mL) 700 mL   Low Spont VTE (mL) 220 mL   CPAP/PS Spont Apnea Settings   Resp Rate (BPM) 18 BPM   VT (mL) 350 mL   FIO2 (%) 50 %   Apnea Time (s) 20 S   Apnea Flow (LPM) 2 LPM   Maintenance   Alarm (pink) cable attached Yes   Resuscitation bag with peep valve at bedside Yes   Water bag changed No   Circuit changed No   Daily Screen   RSBI 58   IHI Ventilator Associated Pneumonia Bundle   Daily Awakening Trials Performed Yes   Daily Assessment of Readiness to Extubate Yes   Head of Bed Elevated HOB 30   ETT  Oral;Hi-Lo;Inflated 7 mm   Placement Date/Time: 10/29/24 2216   Mask Ventilation: Mask ventilation not attempted (0)  Preoxygenated: Yes  Technique: Rapid sequence;Stylet;Video laryngoscopy  Type: Oral;Hi-Lo;Inflated  Tube Size: 7 mm  Laryngoscope: Hurtado  Blade Size: 3  Locat...   Secured at (cm) 21   Measured from Lips   Cuff Pressure (color) Green

## 2024-11-02 NOTE — PROGRESS NOTES
Progress Note - Critical Care/ICU   Name: Bertha Brown 84 y.o. female I MRN: [id number]  Unit/Bed#: ICU 05 I Date of Admission: 10/29/2024   Date of Service: 11/2/2024 I Hospital Day: 4       Assessment & Plan  Septic shock (HCC)  Patient meets sepsis criteria as evidenced by tachycardia, tachypnea prior to intubation, and leukocytosis with RUKHSANA and lactic acidosis  Suspect secondary to ischemic colitis +/- aspiration pneumonia  No evidence of cardiogenic component to shock, SVO2 82.6  Antibiotics broadened to Zosyn  Intraoperative cultures and BC pending, follow up results  Now off levo but remains on vasopressin    Plan:  Continue vasopressors titrated to maintain MAP >65  Continue fluid resuscitation  Lactate now cleared  Trend procal and WBC/temperature curve  Ischemic colitis (HCC)  Patient with worsening abdominal pain and rising lactate concerning for acute abdomen  CT abdomen pelvis with evidence of mesenteric fat stranding in the LLQ and suggestion of pneumatosis in the hepatic flexure and proximal transverse colon concerning for ischemic colitis  Surgery consulted  POD #3 s/p exploratory laparotomy with extended right hemicolectomy, left in discontinuity, abthera VAC in place  POD #2 second look ex lap, washout, ileostomy, abdominal closure, placement of wound VAC    Plan:  Continue NG tube to low intermittent wall suction  Strict NPO  Antibiotics broadened to zosyn, D4  Follow up intraoperative culture results  See plan under severe sepsis above  Acute respiratory failure with hypoxia (HCC)  Initially requiring 2-3 L NC oxygen  Escalating oxygen requirements with patient on 15 L MFNC prior to OR  Concern for aspiration event during episode of emesis +/- atelectasis  Intubated for the OR    Plan  Continue mechanical ventilation  VAP prophylaxis  Consider extubation today  Coffee ground emesis  Patient reporting several episodes of coffee ground emesis at home the day PTA  CT high volume bleeding abdomen  pelvis without evidence of extravasation  Hgb 13.2 on arrival\    Plan  Continue protonix BID  NGT in place to low intermittent wall suction  Strict NPO  Trend hemoglobin  Coumadin on hold  GI consulted  Eventual EGD once more stable  Paroxysmal atrial fibrillation (HCC)  Coumadin on hold due to concern for GIB  Holding beta blocker secondary to vasopressor requirements  Continue amiodarone for rapid AFib  Hypothyroidism  Levothyroxine on hold due to NPO  Hypertension  Antihypertensives on hold due to hypotension on vasopressors  Hyperlipidemia  Statin on hold due to NPO  Diabetes (HCC)  Lab Results   Component Value Date    HGBA1C 7.0 (H) 08/22/2024       Recent Labs     10/31/24  1815 11/01/24  1200 11/01/24  1733 11/01/24  2319   POCGLU 118 127 112 139       Blood Sugar Average: Last 72 hrs:  (P) 128.9  Accuchecks and SSI Q6h  Obesity  Encourage lifestyle changes  RUKHSANA (acute kidney injury) ([facility])  RUKHSANA POA with initial creatinine 1.63 and baseline 0.8-0.9  Suspect prerenal etiology  Slightly worsened overnight    Plan:  Continue fluid resuscitation  Monitor I&O  Avoid nephrotoxins  Maintain MAP >65  Trend renal indices  Chronic idiopathic constipation    Generalized abdominal pain  Patient presented with abdominal pain  See plan under ischemic colitis  SIRS (systemic inflammatory response syndrome) ([facility])    Acute metabolic encephalopathy  Patient with worsening lethargy prior to OR  Suspect secondary to significant metabolic derangements  Monitor neuro exam  S/P exploratory laparotomy    Disposition: Critical care    ICU Core Measures     Vented Patient  VAP Bundle  VAP bundle ordered     A: Assess, Prevent, and Manage Pain Has pain been assessed? Yes  Need for changes to pain regimen? No   B: Both Spontaneous Awakening Trials (SATs) and Spontaneous Breathing Trials (SBTs) Plan to perform spontaneous awakening trial today? Yes   Plan to perform spontaneous breathing trial today? Yes   Obvious barriers to  "extubation? No   C: Choice of Sedation RASS Goal: -1 Drowsy  Need for changes to sedation or analgesia regimen? No   D: Delirium CAM-ICU: Negative   E: Early Mobility  Plan for early mobility? Yes   F: Family Engagement Plan for family engagement today? Yes       Antibiotic Review: Awaiting culture results.     Review of Invasive Devices:    Parks Plan: Continue for accurate I/O monitoring for 48 hours  Central access plan: Medications requiring central line  Mattie Plan: Keep arterial line for hemodynamic monitoring    Prophylaxis:  VTE VTE covered by:  heparin (porcine), Subcutaneous, 5,000 Units at 11/01/24 2103       Stress Ulcer  covered bypantoprazole (PROTONIX) 20 mg tablet [[id number]] (Long-Term Med), pantoprazole (PROTONIX) injection 40 mg [[id number]]         HPI:  Per my previous progress note:  \"84-year-old female with past medical history of hypertension, diabetes type 2, paroxysmal A-fib who was admitted on 10-29 and ultimately required an ex lap with extended hemicolectomy for ischemic colitis\"    24 Hour Events :   Off of levophed  Kept intubated due to lethargy as well as fluid overload  Given Bumex 2 mg with brisk response  Subjective   Review of Systems: Review of Systems not obtainable due to Intubation    Objective :                   Vitals I/O      Most Recent Min/Max in 24hrs   Temp (!) 97 °F (36.1 °C) Temp  Min: 95.2 °F (35.1 °C)  Max: 99.5 °F (37.5 °C)   Pulse (!) 113 Pulse  Min: 76  Max: 116   Resp 14 Resp  Min: 12  Max: 21   BP 96/53 BP  Min: 91/50  Max: 116/60   O2 Sat 94 % SpO2  Min: 91 %  Max: 95 %      Intake/Output Summary (Last 24 hours) at 11/2/2024 0108  Last data filed at 11/2/2024 0101  Gross per 24 hour   Intake 1298.63 ml   Output 3415 ml   Net -2116.37 ml       Diet NPO    Invasive Monitoring           Physical Exam   Physical Exam  Vitals and nursing note reviewed.   Eyes:      Conjunctiva/sclera: Conjunctivae normal.   Skin:     General: Skin is warm and dry.      Capillary " Refill: Capillary refill takes less than 2 seconds.   HENT:      Mouth/Throat:      Mouth: Mucous membranes are dry.   Neck:      Vascular: No JVD.   Cardiovascular:      Rate and Rhythm: Normal rate and regular rhythm.      Pulses: Normal pulses.   Musculoskeletal:         General: Normal range of motion.   Abdominal: General: An ostomy site is present. There is ostomy site.     Palpations: Abdomen is soft.   Constitutional:       Interventions: She is sedated and intubated.   Pulmonary:      Effort: She is intubated.      Breath sounds: No wheezing or rhonchi.   Neurological:      Comments: GCS 7T (2, 1T, 4)   Genitourinary/Anorectal:  Parks present.        Diagnostic Studies        Lab Results: I have reviewed the following results:     Medications:  Scheduled PRN   Albumin 25%, 25 g, Q6H  chlorhexidine, 15 mL, Q12H JUAN PABLO  heparin (porcine), 5,000 Units, Q8H JUAN PABLO  insulin lispro, 1-6 Units, Q6H JUAN PABLO  pantoprazole, 40 mg, Q12H JUAN PABLO  piperacillin-tazobactam, 4.5 g, Q8H      acetaminophen, 1,000 mg, Q6H PRN  fentaNYL, 50 mcg, Q1H PRN       Continuous    amiodarone (CORDARONE) 900 mg in dextrose 5 % 500 mL infusion, 0.5 mg/min, Last Rate: 0.5 mg/min (11/01/24 0600)  dexmedetomidine, 0.1-0.7 mcg/kg/hr, Last Rate: 0.4 mcg/kg/hr (11/01/24 1905)  fentaNYL, 50 mcg/hr, Last Rate: Stopped (11/01/24 0900)  norepinephrine, 1-30 mcg/min, Last Rate: Stopped (11/01/24 1120)  vasopressin, 0.04 Units/min, Last Rate: 0.04 Units/min (11/01/24 2233)         Labs:   CBC    Recent Labs     10/31/24  2309 11/01/24  0556   WBC 7.20 7.21   HGB 8.9* 8.6*   HCT 27.5* 27.5*   * 137*     BMP    Recent Labs     11/01/24  0556 11/01/24  1920   SODIUM 139 139   K 4.0 3.2*   * 110*   CO2 20* 21   AGAP 8 8   BUN 37* 39*   CREATININE 1.84* 1.62*   CALCIUM 7.3* 7.4*       Coags    Recent Labs     10/31/24  0833   INR 1.68*   PTT 36*        Additional Electrolytes  Recent Labs     10/31/24  0506 11/01/24  0556 11/01/24  1920   MG 2.2 2.3 2.2    PHOS 3.7 3.3 2.7   CAIONIZED 1.17 1.00*  --           Blood Gas    Recent Labs     11/01/24  0559   PHART 7.354   DCH9ISU 32.8*   PO2ART 77.1   JHX9RTB 17.9*   BEART -6.8   SOURCE Line, Arterial     Recent Labs     11/01/24  0559   SOURCE Line, Arterial    LFTs  Recent Labs     10/31/24  0506   ALT 7   AST 14   ALKPHOS 32*   ALB 2.9*   TBILI 0.88       Infectious  No recent results  Glucose  Recent Labs     10/31/24  0506 10/31/24  2309 11/01/24  0556 11/01/24  1920   GLUC 141* 115 129 137

## 2024-11-02 NOTE — ASSESSMENT & PLAN NOTE
Patient with worsening abdominal pain and rising lactate concerning for acute abdomen  CT abdomen pelvis with evidence of mesenteric fat stranding in the LLQ and suggestion of pneumatosis in the hepatic flexure and proximal transverse colon concerning for ischemic colitis  Surgery consulted  POD #3 s/p exploratory laparotomy with extended right hemicolectomy, left in discontinuity, abthera VAC in place  POD #2 second look ex lap, washout, ileostomy, abdominal closure, placement of wound VAC    Plan:  Continue NG tube to low intermittent wall suction  Strict NPO  Antibiotics broadened to zosyn, D4  Follow up intraoperative culture results  See plan under severe sepsis above

## 2024-11-02 NOTE — ASSESSMENT & PLAN NOTE
Lab Results   Component Value Date    HGBA1C 7.0 (H) 08/22/2024       Recent Labs     10/31/24  1815 11/01/24  1200 11/01/24  1733 11/01/24  2319   POCGLU 118 127 112 139       Blood Sugar Average: Last 72 hrs:  (P) 128.9  Accuchecks and SSI Q6h

## 2024-11-02 NOTE — ANESTHESIA POSTPROCEDURE EVALUATION
Post-Op Assessment Note             Post Op Vitals Reviewed: Yes    No anethesia notable event occurred.    Staff: Anesthesiologist     Reason for prolonged intubation > 24 hours:  Open fascia with plan to return to OR for closureReason for prolonged intubation > 48 hours:  Patient with ongoing hypoxia and pulmonary edema requiring elevated PEEP while diuresis ongoing.  ICU staff proceeding with PS trials to assess ability to extubate      Last Filed PACU Vitals:  Vitals Value Taken Time   Temp 98.24 °F (36.8 °C) 11/01/24 2233   Pulse 105 11/01/24 2233   BP 96/51 11/01/24 2230   Resp 8 11/01/24 1914   SpO2 94 % 11/01/24 2233   Vitals shown include unfiled device data.    Modified Tomer:  No data recorded

## 2024-11-02 NOTE — ASSESSMENT & PLAN NOTE
Initially requiring 2-3 L NC oxygen  Escalating oxygen requirements with patient on 15 L MFNC prior to OR  Concern for aspiration event during episode of emesis +/- atelectasis  Intubated for the OR    Plan  Continue mechanical ventilation  VAP prophylaxis  Consider extubation today

## 2024-11-03 LAB
ALBUMIN SERPL BCG-MCNC: 4.2 G/DL (ref 3.5–5)
ALP SERPL-CCNC: 41 U/L (ref 34–104)
ALT SERPL W P-5'-P-CCNC: 7 U/L (ref 7–52)
ANION GAP SERPL CALCULATED.3IONS-SCNC: 14 MMOL/L (ref 4–13)
ANISOCYTOSIS BLD QL SMEAR: PRESENT
APTT PPP: 26 SECONDS (ref 23–34)
APTT PPP: 29 SECONDS (ref 23–34)
AST SERPL W P-5'-P-CCNC: 46 U/L (ref 13–39)
ATRIAL RATE: 134 BPM
ATRIAL RATE: 271 BPM
BACTERIA BLD CULT: NORMAL
BILIRUB SERPL-MCNC: 1.71 MG/DL (ref 0.2–1)
BUN SERPL-MCNC: 31 MG/DL (ref 5–25)
CA-I BLD-SCNC: 0.87 MMOL/L (ref 1.12–1.32)
CALCIUM SERPL-MCNC: 7.2 MG/DL (ref 8.4–10.2)
CHLORIDE SERPL-SCNC: 114 MMOL/L (ref 96–108)
CO2 SERPL-SCNC: 20 MMOL/L (ref 21–32)
CREAT SERPL-MCNC: 1.41 MG/DL (ref 0.6–1.3)
EOSINOPHIL # BLD AUTO: 0.09 THOUSAND/UL (ref 0–0.61)
EOSINOPHIL NFR BLD MANUAL: 1 % (ref 0–6)
ERYTHROCYTE [DISTWIDTH] IN BLOOD BY AUTOMATED COUNT: 15.1 % (ref 11.6–15.1)
GFR SERPL CREATININE-BSD FRML MDRD: 34 ML/MIN/1.73SQ M
GLUCOSE SERPL-MCNC: 104 MG/DL (ref 65–140)
GLUCOSE SERPL-MCNC: 119 MG/DL (ref 65–140)
GLUCOSE SERPL-MCNC: 88 MG/DL (ref 65–140)
GLUCOSE SERPL-MCNC: 91 MG/DL (ref 65–140)
HCT VFR BLD AUTO: 24.3 % (ref 34.8–46.1)
HCT VFR BLD AUTO: 25.8 % (ref 34.8–46.1)
HGB BLD-MCNC: 7.7 G/DL (ref 11.5–15.4)
HGB BLD-MCNC: 8.2 G/DL (ref 11.5–15.4)
INR PPP: 1.22 (ref 0.85–1.19)
LYMPHOCYTES # BLD AUTO: 0.97 THOUSAND/UL (ref 0.6–4.47)
LYMPHOCYTES # BLD AUTO: 11 %
MAGNESIUM SERPL-MCNC: 1.9 MG/DL (ref 1.9–2.7)
MCH RBC QN AUTO: 30.4 PG (ref 26.8–34.3)
MCHC RBC AUTO-ENTMCNC: 31.7 G/DL (ref 31.4–37.4)
MCV RBC AUTO: 96 FL (ref 82–98)
METAMYELOCYTES # BLD MANUAL: 0.35 THOUSAND/UL (ref 0–0.1)
METAMYELOCYTES NFR BLD MANUAL: 4 % (ref 0–1)
MONOCYTES # BLD AUTO: 0.8 THOUSAND/UL (ref 0–1.22)
MONOCYTES NFR BLD AUTO: 9 % (ref 4–12)
MYELOCYTES # BLD MANUAL: 0.27 THOUSAND/UL (ref 0–0.1)
MYELOCYTES NFR BLD MANUAL: 3 % (ref 0–1)
NEUTS BAND NFR BLD MANUAL: 16 % (ref 0–8)
NEUTS SEG # BLD: 6.38 THOUSAND/UL (ref 1.81–6.82)
NEUTS SEG NFR BLD AUTO: 56 %
NRBC BLD AUTO-RTO: 1 /100 WBC (ref 0–2)
P AXIS: 93 DEGREES
PHOSPHATE SERPL-MCNC: 3.7 MG/DL (ref 2.3–4.1)
PLATELET # BLD AUTO: 141 THOUSANDS/UL (ref 149–390)
PLATELET BLD QL SMEAR: ABNORMAL
PMV BLD AUTO: 11.1 FL (ref 8.9–12.7)
POTASSIUM SERPL-SCNC: 3.7 MMOL/L (ref 3.5–5.3)
PROCALCITONIN SERPL-MCNC: 12.57 NG/ML
PROT SERPL-MCNC: 6.2 G/DL (ref 6.4–8.4)
PROTHROMBIN TIME: 16.2 SECONDS (ref 12.3–15)
QRS AXIS: 50 DEGREES
QRS AXIS: 55 DEGREES
QRSD INTERVAL: 76 MS
QRSD INTERVAL: 78 MS
QT INTERVAL: 322 MS
QT INTERVAL: 414 MS
QTC INTERVAL: 480 MS
QTC INTERVAL: 599 MS
RBC # BLD AUTO: 2.53 MILLION/UL (ref 3.81–5.12)
RBC MORPH BLD: PRESENT
SODIUM SERPL-SCNC: 148 MMOL/L (ref 135–147)
T WAVE AXIS: -9 DEGREES
T WAVE AXIS: 12 DEGREES
TOTAL CELLS COUNTED SPEC: 100
VENTRICULAR RATE: 126 BPM
VENTRICULAR RATE: 134 BPM
WBC # BLD AUTO: 8.86 THOUSAND/UL (ref 4.31–10.16)

## 2024-11-03 PROCEDURE — 85027 COMPLETE CBC AUTOMATED: CPT | Performed by: STUDENT IN AN ORGANIZED HEALTH CARE EDUCATION/TRAINING PROGRAM

## 2024-11-03 PROCEDURE — 99291 CRITICAL CARE FIRST HOUR: CPT | Performed by: STUDENT IN AN ORGANIZED HEALTH CARE EDUCATION/TRAINING PROGRAM

## 2024-11-03 PROCEDURE — 84100 ASSAY OF PHOSPHORUS: CPT | Performed by: STUDENT IN AN ORGANIZED HEALTH CARE EDUCATION/TRAINING PROGRAM

## 2024-11-03 PROCEDURE — 85018 HEMOGLOBIN: CPT | Performed by: STUDENT IN AN ORGANIZED HEALTH CARE EDUCATION/TRAINING PROGRAM

## 2024-11-03 PROCEDURE — 94760 N-INVAS EAR/PLS OXIMETRY 1: CPT

## 2024-11-03 PROCEDURE — 94664 DEMO&/EVAL PT USE INHALER: CPT

## 2024-11-03 PROCEDURE — 85014 HEMATOCRIT: CPT | Performed by: STUDENT IN AN ORGANIZED HEALTH CARE EDUCATION/TRAINING PROGRAM

## 2024-11-03 PROCEDURE — 93010 ELECTROCARDIOGRAM REPORT: CPT | Performed by: STUDENT IN AN ORGANIZED HEALTH CARE EDUCATION/TRAINING PROGRAM

## 2024-11-03 PROCEDURE — 85007 BL SMEAR W/DIFF WBC COUNT: CPT | Performed by: PHYSICIAN ASSISTANT

## 2024-11-03 PROCEDURE — 99024 POSTOP FOLLOW-UP VISIT: CPT | Performed by: PHYSICIAN ASSISTANT

## 2024-11-03 PROCEDURE — 80053 COMPREHEN METABOLIC PANEL: CPT | Performed by: STUDENT IN AN ORGANIZED HEALTH CARE EDUCATION/TRAINING PROGRAM

## 2024-11-03 PROCEDURE — 94003 VENT MGMT INPAT SUBQ DAY: CPT

## 2024-11-03 PROCEDURE — 94150 VITAL CAPACITY TEST: CPT

## 2024-11-03 PROCEDURE — 82330 ASSAY OF CALCIUM: CPT | Performed by: STUDENT IN AN ORGANIZED HEALTH CARE EDUCATION/TRAINING PROGRAM

## 2024-11-03 PROCEDURE — 82948 REAGENT STRIP/BLOOD GLUCOSE: CPT

## 2024-11-03 PROCEDURE — 94640 AIRWAY INHALATION TREATMENT: CPT

## 2024-11-03 PROCEDURE — 83735 ASSAY OF MAGNESIUM: CPT | Performed by: STUDENT IN AN ORGANIZED HEALTH CARE EDUCATION/TRAINING PROGRAM

## 2024-11-03 PROCEDURE — 93005 ELECTROCARDIOGRAM TRACING: CPT

## 2024-11-03 PROCEDURE — 85610 PROTHROMBIN TIME: CPT

## 2024-11-03 PROCEDURE — 84145 PROCALCITONIN (PCT): CPT | Performed by: STUDENT IN AN ORGANIZED HEALTH CARE EDUCATION/TRAINING PROGRAM

## 2024-11-03 PROCEDURE — 94002 VENT MGMT INPAT INIT DAY: CPT

## 2024-11-03 PROCEDURE — 85730 THROMBOPLASTIN TIME PARTIAL: CPT

## 2024-11-03 RX ORDER — ACETAMINOPHEN 10 MG/ML
1000 INJECTION, SOLUTION INTRAVENOUS EVERY 6 HOURS SCHEDULED
Status: DISCONTINUED | OUTPATIENT
Start: 2024-11-03 | End: 2024-11-09

## 2024-11-03 RX ORDER — POTASSIUM CHLORIDE 29.8 MG/ML
40 INJECTION INTRAVENOUS ONCE
Status: COMPLETED | OUTPATIENT
Start: 2024-11-03 | End: 2024-11-03

## 2024-11-03 RX ORDER — ACETAMINOPHEN 10 MG/ML
1000 INJECTION, SOLUTION INTRAVENOUS EVERY 6 HOURS PRN
Status: DISCONTINUED | OUTPATIENT
Start: 2024-11-03 | End: 2024-11-03

## 2024-11-03 RX ORDER — SODIUM CHLORIDE, SODIUM GLUCONATE, SODIUM ACETATE, POTASSIUM CHLORIDE, MAGNESIUM CHLORIDE, SODIUM PHOSPHATE, DIBASIC, AND POTASSIUM PHOSPHATE .53; .5; .37; .037; .03; .012; .00082 G/100ML; G/100ML; G/100ML; G/100ML; G/100ML; G/100ML; G/100ML
500 INJECTION, SOLUTION INTRAVENOUS ONCE
Status: COMPLETED | OUTPATIENT
Start: 2024-11-03 | End: 2024-11-03

## 2024-11-03 RX ORDER — BUMETANIDE 0.25 MG/ML
1 INJECTION, SOLUTION INTRAMUSCULAR; INTRAVENOUS ONCE
Status: COMPLETED | OUTPATIENT
Start: 2024-11-03 | End: 2024-11-03

## 2024-11-03 RX ORDER — METOPROLOL TARTRATE 1 MG/ML
5 INJECTION, SOLUTION INTRAVENOUS EVERY 6 HOURS
Status: DISCONTINUED | OUTPATIENT
Start: 2024-11-03 | End: 2024-11-07

## 2024-11-03 RX ORDER — MAGNESIUM SULFATE HEPTAHYDRATE 40 MG/ML
2 INJECTION, SOLUTION INTRAVENOUS ONCE
Status: COMPLETED | OUTPATIENT
Start: 2024-11-03 | End: 2024-11-03

## 2024-11-03 RX ORDER — HEPARIN SODIUM 10000 [USP'U]/100ML
3-20 INJECTION, SOLUTION INTRAVENOUS
Status: DISCONTINUED | OUTPATIENT
Start: 2024-11-03 | End: 2024-11-06

## 2024-11-03 RX ORDER — METOPROLOL TARTRATE 1 MG/ML
5 INJECTION, SOLUTION INTRAVENOUS EVERY 6 HOURS PRN
Status: DISCONTINUED | OUTPATIENT
Start: 2024-11-03 | End: 2024-11-03

## 2024-11-03 RX ORDER — LEVALBUTEROL INHALATION SOLUTION 1.25 MG/3ML
1.25 SOLUTION RESPIRATORY (INHALATION) EVERY 6 HOURS PRN
Status: DISCONTINUED | OUTPATIENT
Start: 2024-11-03 | End: 2024-11-11 | Stop reason: HOSPADM

## 2024-11-03 RX ORDER — HYDROMORPHONE HCL IN WATER/PF 6 MG/30 ML
0.2 PATIENT CONTROLLED ANALGESIA SYRINGE INTRAVENOUS
Status: DISCONTINUED | OUTPATIENT
Start: 2024-11-03 | End: 2024-11-11 | Stop reason: HOSPADM

## 2024-11-03 RX ORDER — HYDROMORPHONE HCL/PF 1 MG/ML
0.5 SYRINGE (ML) INJECTION
Status: DISCONTINUED | OUTPATIENT
Start: 2024-11-03 | End: 2024-11-11 | Stop reason: HOSPADM

## 2024-11-03 RX ADMIN — ALBUMIN (HUMAN) 25 G: 0.25 INJECTION, SOLUTION INTRAVENOUS at 08:14

## 2024-11-03 RX ADMIN — BUMETANIDE 1 MG: 0.25 INJECTION INTRAMUSCULAR; INTRAVENOUS at 15:09

## 2024-11-03 RX ADMIN — ACETAMINOPHEN 1000 MG: 10 INJECTION INTRAVENOUS at 15:10

## 2024-11-03 RX ADMIN — PIPERACILLIN AND TAZOBACTAM 4.5 G: 36; 4.5 INJECTION, POWDER, FOR SOLUTION INTRAVENOUS at 04:01

## 2024-11-03 RX ADMIN — FENTANYL CITRATE 50 MCG: 50 INJECTION INTRAMUSCULAR; INTRAVENOUS at 03:51

## 2024-11-03 RX ADMIN — LEVALBUTEROL HYDROCHLORIDE 1.25 MG: 1.25 SOLUTION RESPIRATORY (INHALATION) at 16:02

## 2024-11-03 RX ADMIN — DEXTROSE 150 MG: 50 INJECTION, SOLUTION INTRAVENOUS at 08:02

## 2024-11-03 RX ADMIN — PIPERACILLIN AND TAZOBACTAM 4.5 G: 36; 4.5 INJECTION, POWDER, FOR SOLUTION INTRAVENOUS at 16:11

## 2024-11-03 RX ADMIN — CHLORHEXIDINE GLUCONATE 0.12% ORAL RINSE 15 ML: 1.2 LIQUID ORAL at 08:14

## 2024-11-03 RX ADMIN — SODIUM CHLORIDE, SODIUM GLUCONATE, SODIUM ACETATE, POTASSIUM CHLORIDE, MAGNESIUM CHLORIDE, SODIUM PHOSPHATE, DIBASIC, AND POTASSIUM PHOSPHATE 500 ML: .53; .5; .37; .037; .03; .012; .00082 INJECTION, SOLUTION INTRAVENOUS at 05:33

## 2024-11-03 RX ADMIN — METOROPROLOL TARTRATE 5 MG: 5 INJECTION, SOLUTION INTRAVENOUS at 10:42

## 2024-11-03 RX ADMIN — CALCIUM GLUCONATE 3 G: 98 INJECTION, SOLUTION INTRAVENOUS at 04:48

## 2024-11-03 RX ADMIN — MAGNESIUM SULFATE HEPTAHYDRATE 2 G: 40 INJECTION, SOLUTION INTRAVENOUS at 05:38

## 2024-11-03 RX ADMIN — ACETAMINOPHEN 1000 MG: 10 INJECTION INTRAVENOUS at 23:22

## 2024-11-03 RX ADMIN — HEPARIN SODIUM 5000 UNITS: 5000 INJECTION INTRAVENOUS; SUBCUTANEOUS at 05:03

## 2024-11-03 RX ADMIN — PANTOPRAZOLE SODIUM 40 MG: 40 INJECTION, POWDER, FOR SOLUTION INTRAVENOUS at 08:14

## 2024-11-03 RX ADMIN — PANTOPRAZOLE SODIUM 40 MG: 40 INJECTION, POWDER, FOR SOLUTION INTRAVENOUS at 20:17

## 2024-11-03 RX ADMIN — METOROPROLOL TARTRATE 5 MG: 5 INJECTION, SOLUTION INTRAVENOUS at 06:21

## 2024-11-03 RX ADMIN — AMIODARONE HYDROCHLORIDE 1 MG/MIN: 50 INJECTION, SOLUTION INTRAVENOUS at 10:42

## 2024-11-03 RX ADMIN — ACETAMINOPHEN 1000 MG: 10 INJECTION INTRAVENOUS at 01:03

## 2024-11-03 RX ADMIN — ACETAMINOPHEN 1000 MG: 10 INJECTION INTRAVENOUS at 19:02

## 2024-11-03 RX ADMIN — PIPERACILLIN AND TAZOBACTAM 4.5 G: 36; 4.5 INJECTION, POWDER, FOR SOLUTION INTRAVENOUS at 22:00

## 2024-11-03 RX ADMIN — METOROPROLOL TARTRATE 5 MG: 5 INJECTION, SOLUTION INTRAVENOUS at 15:09

## 2024-11-03 RX ADMIN — HEPARIN SODIUM 5000 UNITS: 5000 INJECTION INTRAVENOUS; SUBCUTANEOUS at 16:11

## 2024-11-03 RX ADMIN — HEPARIN SODIUM 11.1 UNITS/KG/HR: 10000 INJECTION, SOLUTION INTRAVENOUS at 17:56

## 2024-11-03 RX ADMIN — FENTANYL CITRATE 50 MCG: 50 INJECTION INTRAMUSCULAR; INTRAVENOUS at 00:21

## 2024-11-03 RX ADMIN — CHLORHEXIDINE GLUCONATE 0.12% ORAL RINSE 15 ML: 1.2 LIQUID ORAL at 20:17

## 2024-11-03 RX ADMIN — FENTANYL CITRATE 50 MCG: 50 INJECTION INTRAMUSCULAR; INTRAVENOUS at 05:41

## 2024-11-03 RX ADMIN — METOROPROLOL TARTRATE 5 MG: 5 INJECTION, SOLUTION INTRAVENOUS at 01:42

## 2024-11-03 RX ADMIN — AMIODARONE HYDROCHLORIDE 1 MG/MIN: 50 INJECTION, SOLUTION INTRAVENOUS at 17:07

## 2024-11-03 RX ADMIN — POTASSIUM CHLORIDE 40 MEQ: 29.8 INJECTION, SOLUTION INTRAVENOUS at 05:57

## 2024-11-03 RX ADMIN — ALBUMIN (HUMAN) 25 G: 0.25 INJECTION, SOLUTION INTRAVENOUS at 01:17

## 2024-11-03 RX ADMIN — POTASSIUM PHOSPHATE, MONOBASIC AND POTASSIUM PHOSPHATE, DIBASIC 30 MMOL: 224; 236 INJECTION, SOLUTION, CONCENTRATE INTRAVENOUS at 00:14

## 2024-11-03 RX ADMIN — METOROPROLOL TARTRATE 5 MG: 5 INJECTION, SOLUTION INTRAVENOUS at 19:19

## 2024-11-03 RX ADMIN — FENTANYL CITRATE 50 MCG: 50 INJECTION INTRAMUSCULAR; INTRAVENOUS at 02:15

## 2024-11-03 NOTE — ASSESSMENT & PLAN NOTE
Patient meets sepsis criteria as evidenced by tachycardia, tachypnea prior to intubation, and leukocytosis with RUKHSANA and lactic acidosis  Suspect secondary to ischemic colitis +/- aspiration pneumonia  No evidence of cardiogenic component to shock, SVO2 82.6  Antibiotics broadened to Zosyn  Intraoperative cultures and BC pending, follow up results  Now off vasopressors  Increased bandema and WBC yesterday on AM labs; low grade fever today    Plan:  Continue zosyn  Lactate now cleared  Trend procal and WBC/temperature curve

## 2024-11-03 NOTE — RESPIRATORY THERAPY NOTE
11/03/24 0924   Respiratory Assessment   Resp Comments Pt extubated at this time to nasal cannula.  Pt tolerated well.  Will wean O2 as able.   O2 Device NC   Vent Information   Ventilator End Yes   $ Pulse Oximetry Spot Check Charge Completed   SpO2 91 %

## 2024-11-03 NOTE — ASSESSMENT & PLAN NOTE
POD3, s/p second look with wash out, abdominal wall closure, and loop ileostomy and mucous fistula creation, VAC placement to midline abdomen  POD4 s/p exploratory laparotomy with right hemicolectomy, abdominal left open w abtherra placement on 10/29/2024  - intubated, alert and responds to commands  - Febrile overnight 101.1F now improved and afebrile  - WBC 8.86, Hgb 7.7, procal 12.57 from 20.81  - Cr 1.41, UOP 4150  - Ng tube 200cc  - ostomy beefy red, no gas but small amount stool at os  - patient denies abdominal pain and denies tenderness to palpation, abdomen soft     Plan:  Continue with care per primary team  Monitor labs and vitals for improvement  Monitor abdominal exams  Monitor loop ileostomy for function and consider trickle feeds  Next VAC change Monday 11/4/24, then MWF VAC changes  Replete lytes as needed  Recommend TPN for nutritional supplementation if no signs of return of bowel function. If patient have flatus into ileostomy bag may consider starting trickle tube feeds.

## 2024-11-03 NOTE — ASSESSMENT & PLAN NOTE
RUKHSANA POA with initial creatinine 1.63 and baseline 0.8-0.9  Suspect prerenal etiology  Improving    Plan:  Continue fluid resuscitation  Monitor I&O  Avoid nephrotoxins  Maintain MAP >65  Trend renal indices

## 2024-11-03 NOTE — RESPIRATORY THERAPY NOTE
11/03/24 1228   Respiratory Assessment   Assessment Type Assess only   General Appearance Awake;Alert   Respiratory Pattern Labored;Tachypneic   Chest Assessment Chest expansion symmetrical   Bilateral Breath Sounds Diminished   Cough None   Resp Comments Pt placed on MFNC at this time d/t consistantly low sats.  Will wean as able.   O2 Device MFNC   Oxygen Therapy/Pulse Ox   O2 Device (S)  Mid flow nasal cannula   O2 Therapy Oxygen humidified   Nasal Cannula O2 Flow Rate (L/min) 12 L/min   Calculated FIO2 (%) - Nasal Cannula 68   SpO2 92 %   SpO2 Activity At Rest   $ Pulse Oximetry Spot Check Charge Completed

## 2024-11-03 NOTE — PROGRESS NOTES
Progress Note - Critical Care/ICU   Name: Bertha Brown 84 y.o. female I MRN: [id number]  Unit/Bed#: ICU 05 I Date of Admission: 10/29/2024   Date of Service: 11/3/2024 I Hospital Day: 5       Assessment & Plan  Septic shock (HCC)  Patient meets sepsis criteria as evidenced by tachycardia, tachypnea prior to intubation, and leukocytosis with RUKHSANA and lactic acidosis  Suspect secondary to ischemic colitis +/- aspiration pneumonia  No evidence of cardiogenic component to shock, SVO2 82.6  Antibiotics broadened to Zosyn  Intraoperative cultures and BC pending, follow up results  Now off vasopressors  Increased bandema and WBC yesterday on AM labs; low grade fever today    Plan:  Continue zosyn  Lactate now cleared  Trend procal and WBC/temperature curve  Ischemic colitis (HCC)  Patient with worsening abdominal pain and rising lactate concerning for acute abdomen  CT abdomen pelvis with evidence of mesenteric fat stranding in the LLQ and suggestion of pneumatosis in the hepatic flexure and proximal transverse colon concerning for ischemic colitis  Surgery consulted  POD #4 s/p exploratory laparotomy with extended right hemicolectomy, left in discontinuity, abthera VAC in place  POD #3 second look ex lap, washout, ileostomy, abdominal closure, placement of wound VAC    Plan:  Continue NG tube to low intermittent wall suction  Strict NPO  Antibiotics broadened to zosyn, D5  Follow up intraoperative culture results  May need to consider TPN if no return of bowel function in next 24 hours  See plan under severe sepsis above  Acute respiratory failure with hypoxia (HCC)  Initially requiring 2-3 L NC oxygen  Escalating oxygen requirements with patient on 15 L MFNC prior to OR  Concern for aspiration event during episode of emesis +/- atelectasis  Intubated for the OR    Plan  Continue mechanical ventilation  VAP prophylaxis  Consider extubation today  Coffee ground emesis  Patient reporting several episodes of coffee ground  emesis at home the day PTA  CT high volume bleeding abdomen pelvis without evidence of extravasation  Hgb 13.2 on arrival\    Plan  Continue protonix BID  NGT in place to low intermittent wall suction  Strict NPO  Trend hemoglobin  Coumadin on hold  GI consulted  Eventual EGD once more stable  Paroxysmal atrial fibrillation (HCC)  Coumadin on hold due to concern for GIB  Holding beta blocker secondary to vasopressor requirements  Amio d/c today  Afib once again overnight; given IV lopressor. She was also hypokalemic at that time    Plan  Consider starting standing lopressor  Continue to replace electrolytes  Hypothyroidism  Levothyroxine on hold due to NPO  Hypertension  Antihypertensives on hold due to hypotension on vasopressors  Hyperlipidemia  Statin on hold due to NPO  Diabetes (HCC)  Lab Results   Component Value Date    HGBA1C 7.0 (H) 08/22/2024       Recent Labs     11/02/24  1222 11/02/24  1752 11/02/24  1802 11/02/24  2329   POCGLU 137 78 108 103       Blood Sugar Average: Last 72 hrs:  (P) 118.1532558335915780  Accuchecks and SSI Q6h  Obesity  Encourage lifestyle changes  RUKHSANA (acute kidney injury) ([facility])  RUKHSANA POA with initial creatinine 1.63 and baseline 0.8-0.9  Suspect prerenal etiology  Improving    Plan:  Continue fluid resuscitation  Monitor I&O  Avoid nephrotoxins  Maintain MAP >65  Trend renal indices  Chronic idiopathic constipation    Generalized abdominal pain  Patient presented with abdominal pain  See plan under ischemic colitis  SIRS (systemic inflammatory response syndrome) ([facility])    Acute metabolic encephalopathy  Patient with worsening lethargy prior to OR  Suspect secondary to significant metabolic derangements  Monitor neuro exam  S/P exploratory laparotomy    Disposition: Critical care    ICU Core Measures     Vented Patient  VAP Bundle  VAP bundle ordered     A: Assess, Prevent, and Manage Pain Has pain been assessed? Yes  Need for changes to pain regimen? No   B: Both Spontaneous  "Awakening Trials (SATs) and Spontaneous Breathing Trials (SBTs) Plan to perform spontaneous awakening trial today? Yes   Plan to perform spontaneous breathing trial today? Yes   Obvious barriers to extubation? No   C: Choice of Sedation RASS Goal: N/A patient not on sedation  Need for changes to sedation or analgesia regimen? NA   D: Delirium CAM-ICU: Negative   E: Early Mobility  Plan for early mobility? Yes   F: Family Engagement Plan for family engagement today? Yes       Antibiotic Review: Post op requirements     Review of Invasive Devices:    Charly Plan: Continue for accurate I/O monitoring for 48 hours  Central access plan: Medications requiring central line  Mattie Plan: Keep arterial line for hemodynamic monitoring and frequent ABGs    Prophylaxis:  VTE VTE covered by:  heparin (porcine), Subcutaneous, 5,000 Units at 11/02/24 2110       Stress Ulcer  covered bypantoprazole (PROTONIX) 20 mg tablet [[id number]] (Long-Term Med), pantoprazole (PROTONIX) injection 40 mg [[id number]]         HPI:  Per my prior progress note:  \"84-year-old female with past medical history of hypertension, diabetes type 2, paroxysmal A-fib who was admitted on 10-29 and ultimately required an ex lap with extended hemicolectomy for ischemic colitis \"    24 Hour Events :   Increased bandemia, low grade fever  Given diuresis  AFib RVR overnight, refractory to metoprolol. K and phos repleted  Low grade fever overnight    Subjective   Review of Systems: Review of Systems not obtainable due to intubation    Objective :                   Vitals I/O      Most Recent Min/Max in 24hrs   Temp (!) 100.8 °F (38.2 °C) Temp  Min: 99 °F (37.2 °C)  Max: 101.5 °F (38.6 °C)   Pulse (!) 134 Pulse  Min: 74  Max: 134   Resp 21 Resp  Min: 21  Max: 21   /59 BP  Min: 104/56  Max: 132/62   O2 Sat 96 % SpO2  Min: 91 %  Max: 97 %      Intake/Output Summary (Last 24 hours) at 11/3/2024 0107  Last data filed at 11/2/2024 2331  Gross per 24 hour   Intake " 1191.26 ml   Output 3600 ml   Net -2408.74 ml       Diet NPO    Invasive Monitoring   Arterial Line  Mattie BP 98/42  No data recorded   MAP (!) 62 mmHg  No data recorded           Physical Exam   Physical Exam  Vitals and nursing note reviewed.   Eyes:      Conjunctiva/sclera: Conjunctivae normal.   Skin:     General: Skin is dry.   HENT:      Head: Normocephalic and atraumatic.      Mouth/Throat:      Mouth: Mucous membranes are dry.   Neck:      Vascular: Central line present. No JVD.   Cardiovascular:      Rate and Rhythm: Tachycardia present. Rhythm irregular.      Pulses: Normal pulses.   Musculoskeletal:         General: Normal range of motion.      Right lower le+ Edema present.      Left lower le+ Edema present.   Abdominal: General: An ostomy site is present. Bowel sounds are normal. There is ostomy site.     Palpations: Abdomen is soft.      Comments: Ostomy pink and patent without significant output    Mucous fistula pink and patent with scant serous discharge   Constitutional:       Interventions: She is sedated, intubated and restrained.   Pulmonary:      Effort: She is intubated.      Breath sounds: No wheezing, rhonchi or rales.   Neurological:      Comments: GCS 10T; RASS -1   Genitourinary/Anorectal:  Parks present.        Diagnostic Studies        Lab Results: I have reviewed the following results:     Medications:  Scheduled PRN   Albumin 25%, 25 g, Q6H  chlorhexidine, 15 mL, Q12H JUAN PABLO  heparin (porcine), 5,000 Units, Q8H JUAN PABLO  insulin lispro, 1-6 Units, Q6H JUAN PABLO  pantoprazole, 40 mg, Q12H JUAN PABLO  piperacillin-tazobactam, 4.5 g, Q8H  potassium phosphate, 30 mmol, Once      acetaminophen, 1,000 mg, Q6H PRN  fentaNYL, 50 mcg, Q1H PRN       Continuous    norepinephrine, 1-30 mcg/min, Last Rate: Stopped (24 1120)         Labs:   CBC    Recent Labs     24  0425 24  0910 24  1752   WBC 3.69* 4.33  --    HGB 6.9* 7.2* 7.2*   HCT 22.4* 22.8* 22.6*   * 123*  --    BANDSPCT  10*  --   --      BMP    Recent Labs     11/02/24 0425 11/02/24 2141   SODIUM 141 146   K 3.6 3.0*   * 113*   CO2 19* 22   AGAP 10 11   BUN 37* 35*   CREATININE 1.61* 1.52*   CALCIUM 7.2* 7.3*       Coags    No recent results     Additional Electrolytes  Recent Labs     11/01/24  0556 11/01/24 1920 11/02/24 0425 11/02/24 2141   MG 2.3   < > 2.6 2.1   PHOS 3.3   < > 2.0* 2.1*   CAIONIZED 1.00*  --  0.96*  --     < > = values in this interval not displayed.          Blood Gas    Recent Labs     11/01/24  0559   PHART 7.354   [id number] 32.8*   PO2ART 77.1   [id number] 17.9*   BEART -6.8   SOURCE Line, Arterial     Recent Labs     11/01/24  0559   SOURCE Line, Arterial    LFTs  Recent Labs     11/02/24 0425 11/02/24 2141   ALT 4*  --    AST 25  --    ALKPHOS 35  --    ALB 3.8 4.1   TBILI 1.73*  --        Infectious  Recent Labs     11/02/24 0425   PROCALCITONI 20.81*     Glucose  Recent Labs     11/01/24  0556 11/01/24 1920 11/02/24 0425 11/02/24 2141   GLUC 129 137 137 103

## 2024-11-03 NOTE — RESPIRATORY THERAPY NOTE
RT Protocol Note  Bertha Brown 84 y.o. female MRN: [id number]  Unit/Bed#: ICU 05 Encounter: [id number]    Assessment    Principal Problem:    Septic shock (HCC)  Active Problems:    Paroxysmal atrial fibrillation (HCC)    Acute respiratory failure with hypoxia (HCC)    Hypothyroidism    Hypertension    Hyperlipidemia    Diabetes (HCC)    Obesity    Coffee ground emesis    RUKHSANA (acute kidney injury) (HCC)    Chronic idiopathic constipation    Generalized abdominal pain    SIRS (systemic inflammatory response syndrome) (HCC)    Ischemic colitis (HCC)    Acute metabolic encephalopathy    S/P exploratory laparotomy      Home Pulmonary Medications:         Past Medical History:   Diagnosis Date    Asthma     Atrial fibrillation (HCC)     Diabetes (HCC)     Hyperlipidemia     Hypertension     Legionnaire's disease (HCC)      Social History     Socioeconomic History    Marital status: /Civil Union     Spouse name: None    Number of children: None    Years of education: None    Highest education level: None   Occupational History    None   Tobacco Use    Smoking status: Never    Smokeless tobacco: Never   Vaping Use    Vaping status: Never Used   Substance and Sexual Activity    Alcohol use: Never    Drug use: Never    Sexual activity: None   Other Topics Concern    None   Social History Narrative    None     Social Determinants of Health     Financial Resource Strain: Not on file   Food Insecurity: No Food Insecurity (10/31/2024)    Nursing - Inadequate Food Risk Classification     Worried About Running Out of Food in the Last Year: Never true     Ran Out of Food in the Last Year: Never true     Ran Out of Food in the Last Year: 1   Transportation Needs: No Transportation Needs (10/31/2024)    Nursing - Transportation Risk Classification     Lack of Transportation: Not on file     Lack of Transportation: 2   Physical Activity: Not on file   Stress: Not on file   Social Connections: Unknown (6/18/2024)    Received  "from [employer]    Social Connections     How often do you feel lonely or isolated from those around you? (Adult - for ages 18 years and over): Not on file   Intimate Partner Violence: Unknown (10/29/2024)    Nursing IPS     Feels Physically and Emotionally Safe: Not on file     Physically Hurt by Someone: Not on file     Humiliated or Emotionally Abused by Someone: Not on file     Physically Hurt by Someone: 2     Hurt or Threatened by Someone: 2   Housing Stability: Unknown (10/31/2024)    Nursing: Inadequate Housing Risk Classification     Has Housing: Not on file     Worried About Losing Housing: Not on file     Unable to Get Utilities: Not on file     Unable to Pay for Housing in the Last Year: 2     Has Housin       Subjective         Objective    Physical Exam:   Assessment Type: Assess only  General Appearance: Awake, Alert  Respiratory Pattern: Spontaneous  Chest Assessment: Chest expansion symmetrical  Bilateral Breath Sounds: Diminished  Cough: None  Suction: ET Tube  O2 Device: vent    Vitals:  Blood pressure (!) 149/102, pulse (!) 125, temperature 100.4 °F (38 °C), temperature source Esophageal, resp. rate 14, height 5' 5\" (1.651 m), weight 105 kg (232 lb 5.8 oz), SpO2 93%.    Results from last 7 days   Lab Units 24  0559   PH ART  7.354   PCO2 ART mm Hg 32.8*   PO2 ART mm Hg 77.1   HCO3 ART mmol/L 17.9*   BASE EXC ART mmol/L -6.8   O2 CONTENT ART mL/dL 13.3*   O2 HGB, ARTERIAL % 93.3*   ABG SOURCE  Line, Arterial       Imaging and other studies: Results Review Statement: No pertinent imaging studies reviewed.    O2 Device: vent     Plan    Respiratory Plan: Vent/NIV/HFNC        Resp Comments: Pt placed on SBT at this time.  Pt awake and alert.  SKin intergrity remains intact.   "

## 2024-11-03 NOTE — RESPIRATORY THERAPY NOTE
11/03/24 0007   Respiratory Protocol   Protocol Initiated? Yes   Protocol Selection Respiratory   Language Barrier? Yes  (intubated)   Medical & Social History Reviewed? Yes   Diagnostic Studies Reviewed? Yes   Physical Assessment Performed? Yes   Respiratory Plan Vent/NIV/HFNC   Respiratory Assessment   General Appearance Eyes open/responds to voice   Respiratory Pattern Spontaneous;Assisted   Chest Assessment Chest expansion symmetrical   Bilateral Breath Sounds Diminished   Cough None   Resp Comments no acute changes   O2 Device ventilator   ETT  Oral;Hi-Lo;Inflated 7 mm   Placement Date/Time: 10/29/24 2216   Mask Ventilation: Mask ventilation not attempted (0)  Preoxygenated: Yes  Technique: Rapid sequence;Stylet;Video laryngoscopy  Type: Oral;Hi-Lo;Inflated  Tube Size: 7 mm  Laryngoscope: [employer]  Blade Size: 3  Locat...   Secured at (cm) 21   Measured from Lips   Secured Location (S)  Left   Repositioned Center to Left   Secured by Commercial tube irwin   Site Condition Dry   Cuff Pressure (cm H2O)   (MLT)   Cuff Pressure (color) Green   HI-LO Suction  Continuous low suction   HI-LO Secretions Scant   HI-LO Intervention Patent   Additional Assessments   SpO2 96 %     Pot op, patient remains intubated and mechanically ventilated   Form 142 for NH admission received from [facility] and [facility]

## 2024-11-03 NOTE — ASSESSMENT & PLAN NOTE
Coumadin on hold due to concern for GIB  Holding beta blocker secondary to vasopressor requirements  Amio d/c today  Afib once again overnight; given IV lopressor. She was also hypokalemic at that time    Plan  Consider starting standing lopressor  Continue to replace electrolytes

## 2024-11-03 NOTE — RESPIRATORY THERAPY NOTE
11/03/24 0346   Respiratory Assessment   General Appearance Awake   Respiratory Pattern Assisted;Spontaneous   Chest Assessment Chest expansion symmetrical   Bilateral Breath Sounds Diminished   Resp Comments continue to monitor   O2 Device ventilator   Vent Information   Vent ID Groot   Vent type Drager   Drager Vent Mode AC/VC+   $ Pulse Oximetry Spot Check Charge Completed   SpO2 96 %   AC/VC+ Settings   Resp Rate (BPM) 18 BPM   VT (mL) 350 mL   Insp Time (S) 0.85 S   FIO2 (%) 50 %   PEEP (cmH2O) 6 cmH2O   Rise Time (%) 20 %   Trigger Sensitivity Flow (LPM) 2 LPM   Humidification Heater   Heater Temp 98.6 °F (37 °C)   AC/VC+ Actuals   Resp Rate (BPM) 24 BPM   VT (mL) 364 mL   MV (Obs) 7.38   MAP (cmH2O) 11 cmH2O   Peak Pressure (cmH2O) 13 cmH2O   I:E Ratio (Obs) 1:2.4   Static Compliance (mL/cmH20) 26 mL/cmH2O   Plateau Pressure (cm H2O) 21.1 cm H2O   Heater Temperature (Obs) 98.1 °F (36.7 °C)   AC/VC+ ALARMS   High Peak Pressure (cmH2O) 40 cmH2O   High Resp Rate (BPM) 35 BPM   High MV (L/min) 15 L/min   Low MV (L/min) 4 L/min   High VT (mL) 700 mL   Maintenance   Alarm (pink) cable attached Yes   Resuscitation bag with peep valve at bedside Yes   Water bag changed No   Circuit changed No   IHI Ventilator Associated Pneumonia Bundle   Head of Bed Elevated HOB 30   ETT  Oral;Hi-Lo;Inflated 7 mm   Placement Date/Time: 10/29/24 2216   Mask Ventilation: Mask ventilation not attempted (0)  Preoxygenated: Yes  Technique: Rapid sequence;Stylet;Video laryngoscopy  Type: Oral;Hi-Lo;Inflated  Tube Size: 7 mm  Laryngoscope: [employer]  Blade Size: 3  Locat...   Secured at (cm) 21   Measured from Lips   Secured Location (S)  Right   Repositioned Left to Right   Secured by Commercial tube irwin   Site Condition Dry   Cuff Pressure (color) Green   HI-LO Secretions Scant   HI-LO Intervention Patent

## 2024-11-03 NOTE — RESPIRATORY THERAPY NOTE
RT Ventilator Management Note  Bertha Brown 84 y.o. female MRN: [id number]  Unit/Bed#: ICU 05 Encounter: [id number]      Daily Screen         11/2/2024  1430 11/3/2024  0800          Patient safety screen outcome:: -- Passed (P)       RSBI: 45 55 (P)                 Physical Exam:   Assessment Type: (P) Assess only  General Appearance: (P) Awake, Alert  Respiratory Pattern: (P) Spontaneous  Chest Assessment: (P) Chest expansion symmetrical  Bilateral Breath Sounds: (P) Diminished  Cough: None  Suction: (P) ET Tube  O2 Device: (P) vent      Resp Comments: (P) Pt placed on SBT at this time.  Pt awake and alert.  SKin intergrity remains intact.     11/03/24 0800   Respiratory Assessment   Assessment Type Assess only   General Appearance Awake;Alert   Respiratory Pattern Spontaneous   Chest Assessment Chest expansion symmetrical   Bilateral Breath Sounds Diminished   Suction ET Tube   Resp Comments Pt placed on SBT at this time.  Pt awake and alert.  SKin intergrity remains intact.   O2 Device vent   Vent Information   Vent ID Groot   Vent type Drager   Drager Vent Mode CPAP/PS Spont   $ Vent Daily Charge-Subsequent Yes   $ Vital Capacity Mech/Peak Flow Yes   $ Pulse Oximetry Spot Check Charge Completed   SpO2 93 %   CPAP/PS Spont Settings   FIO2 (%) (S)  40 %   PEEP (cmH2O) 6 cmH2O   Pressure Support (cmH2O) 6 cmH20   Trigger Sensitivity Flow (lpm) 2 LPM   Rise Time (%) 20 %   Humidification Heater   Heater Temp 98.6 °F (37 °C)   Auto Flow  Off   CPAP/PS Spont Actuals   Resp Rate (BPM) 23 BPM   VT (mL) 426 mL   MV (Obs) 11.7   MAP (cmH2O) 5.6 cmH2O   Peak Pressure (cmH2O) 13 cmH2O   RSBI 55   Heater Temperature (Obs) 98.1 °F (36.7 °C)   CPAP/PS Spont Alarms   High Peak Pressure (cmH20) 45 cmH2O   High Resp Rate (BPM) 35 BPM   High MV (L/min) 15 L/min   Low MV (L/min) 4 L/min   High SPONT VTE (mL) 700 mL   Low Spont VTE (mL) 220 mL   CPAP/PS Spont Apnea Settings   Resp Rate (BPM) 18 BPM   VT (mL) 350 mL   FIO2 (%) 40  %   Apnea Time (s) 20 S   Apnea Flow (LPM) 2 LPM   Daily Screen   Patient safety screen outcome: Passed   RSBI 55   IHI Ventilator Associated Pneumonia Bundle   Daily Awakening Trials Performed Yes   Daily Assessment of Readiness to Extubate Yes   Head of Bed Elevated HOB 30   ETT  Oral;Hi-Lo;Inflated 7 mm   Placement Date/Time: 10/29/24 1595   Mask Ventilation: Mask ventilation not attempted (0)  Preoxygenated: Yes  Technique: Rapid sequence;Stylet;Video laryngoscopy  Type: Oral;Hi-Lo;Inflated  Tube Size: 7 mm  Laryngoscope: [employer]  Blade Size: 3  Locat...   Secured at (cm) 21   Measured from Lips   Secured Location (S)  Center   Repositioned (S)  Right to Center   Secured by Commercial tube irwin   Site Condition Dry   Cuff Pressure (color) Green   HI-LO Suction  Continuous low suction   HI-LO Secretions Scant   HI-LO Intervention Patent

## 2024-11-03 NOTE — ASSESSMENT & PLAN NOTE
Patient with worsening abdominal pain and rising lactate concerning for acute abdomen  CT abdomen pelvis with evidence of mesenteric fat stranding in the LLQ and suggestion of pneumatosis in the hepatic flexure and proximal transverse colon concerning for ischemic colitis  Surgery consulted  POD #4 s/p exploratory laparotomy with extended right hemicolectomy, left in discontinuity, abthera VAC in place  POD #3 second look ex lap, washout, ileostomy, abdominal closure, placement of wound VAC    Plan:  Continue NG tube to low intermittent wall suction  Strict NPO  Antibiotics broadened to zosyn, D5  Follow up intraoperative culture results  May need to consider TPN if no return of bowel function in next 24 hours  See plan under severe sepsis above

## 2024-11-03 NOTE — RESPIRATORY THERAPY NOTE
RT Ventilator Management Note  Bertha Brown 84 y.o. female MRN: [id number]  Unit/Bed#: ICU 05 Encounter: [id number]      Daily Screen         11/2/2024  1430 11/3/2024  0800          Patient safety screen outcome:: -- Passed      RSBI: 45 55                Physical Exam:   Assessment Type: Assess only  General Appearance: Eyes open/responds to voice  Respiratory Pattern: Assisted, Spontaneous, Tachypneic  Chest Assessment: Chest expansion symmetrical  Bilateral Breath Sounds: Diminished  Cough: None  Suction: ET Tube  O2 Device: v60      Resp Comments: Pt placed on BiPAP d/t increased WOB.  Will monitor pt on same and wean as able.     11/03/24 1602   Respiratory Assessment   Assessment Type Assess only   General Appearance Eyes open/responds to voice   Respiratory Pattern Assisted;Spontaneous;Tachypneic   Chest Assessment Chest expansion symmetrical   Bilateral Breath Sounds Diminished   Cough None   Resp Comments Pt placed on BiPAP d/t increased WOB.  Will monitor pt on same and wean as able.   O2 Device v60   Non-Invasive Information   O2 Interface Device Face mask   Non-Invasive Ventilation Mode BiPAP   $ Continous NIV Initial   SpO2 94 %   $ Pulse Oximetry Spot Check Charge Completed   Non-Invasive Settings   FiO2 (%) 50   Temperature (Set) 31   IPAP (cm) 14 cm   EPAP (cm) 6 cm   Rate (Set) 8   Pressure Support (cm H2O) 8   Rise Time 2   Inspiratory Time (Set) 1   Non-Invasive Readings   Skin Intervention Skin intact   Heater Temperature (Obs)   (warming)   Total Rate 33   MV (Mech) 20   Peak Pressure (Obs) 15   Spontaneous Vt (mL) 668   Leak (lpm) 7   Non-Invasive Alarms   Insp Pressure High (cm H20) 20   Insp Pressure Low (cm H20) 5   Low Insp Pressure Time (sec) 20 sec   MV Low (L/min) 3   Vt High (mL) 1200   Vt Low (mL) 200   High Resp Rate (BPM) 50 BPM   Low Resp Rate (BPM) 8 BPM   Maintenance   Water bag changed No

## 2024-11-03 NOTE — PROGRESS NOTES
Progress Note - Surgery-General   Name: Bertha Brown 84 y.o. female I MRN: [id number]  Unit/Bed#: ICU 05 I Date of Admission: 10/29/2024   Date of Service: 11/3/2024 I Hospital Day: 5    Assessment & Plan  Paroxysmal atrial fibrillation (HCC)  - Monitor Hgb  - restart AC when stable    Acute respiratory failure with hypoxia (HCC)  - Monitor abdominal exams  - Remainder of care per prior medical team  Diabetes (HCC)  - SSI  - close monitoring and control of serum glucose for optimized healing    Lab Results   Component Value Date    HGBA1C 7.0 (H) 08/22/2024       Recent Labs     11/02/24  1752 11/02/24  1802 11/02/24  2329 11/03/24  1149   POCGLU 78 108 103 91       Blood Sugar Average: Last 72 hrs:  (P) 116.3766354645141185    Ischemic colitis (HCC)  POD3, s/p second look with wash out, abdominal wall closure, and loop ileostomy and mucous fistula creation, VAC placement to midline abdomen  POD4 s/p exploratory laparotomy with right hemicolectomy, abdominal left open w abtherra placement on 10/29/2024  - intubated, alert and responds to commands  - Febrile overnight 101.1F now improved and afebrile  - WBC 8.86, Hgb 7.7, procal 12.57 from 20.81  - Cr 1.41, UOP 4150  - Ng tube 200cc  - ostomy beefy red, no gas but small amount stool at os  - patient denies abdominal pain and denies tenderness to palpation, abdomen soft     Plan:  Continue with care per primary team  Monitor labs and vitals for improvement  Monitor abdominal exams  Monitor loop ileostomy for function and consider trickle feeds  Next VAC change Monday 11/4/24, then MWF VAC changes  Replete lytes as needed  Recommend TPN for nutritional supplementation if no signs of return of bowel function. If patient have flatus into ileostomy bag may consider starting trickle tube feeds.    Please contact the SecureChat role for the Surgery-General service with any questions/concerns.    Subjective   Intubated but alert and responds to questions.  States she has  some pain but denies abdominal pain.    Objective :  Temp:  [98.3 °F (36.8 °C)-101.5 °F (38.6 °C)] 98.3 °F (36.8 °C)  HR:  [] 131  BP: (114-167)/() 155/91  Resp:  [14-24] 16  SpO2:  [90 %-97 %] 92 %  O2 Device: Mid flow nasal cannula  Nasal Cannula O2 Flow Rate (L/min):  [6 L/min-12 L/min] 12 L/min    I/O         11/01 0701 11/02 0700 11/02 0701 11/03 0700 11/03 0701 11/04 0700    P.O.       I.V. (mL/kg) 895.7 (8.5) 857.1 (8.2)     NG/GT 0      IV Piggyback 650 750     Total Intake(mL/kg) 1545.7 (14.7) 1607.1 (15.3)     Urine (mL/kg/hr) 2750 (1.1) 4150 (1.6)     Emesis/NG output 600 200     Drains       Stool 125 0     Blood       Total Output 3475 4350     Net -1929.3 -2742.9                  Lines/Drains/Airways       Active Status       Name Placement date Placement time Site Days    CVC Central Lines 10/29/24 10/29/24  2330  --  4    NG/OG/Enteral Tube Nasogastric 18 Fr Left nare 10/29/24  2205  Left nare  4    Colostomy RLQ 10/31/24  1422  RLQ  2                  Physical Exam  Vitals reviewed.   Constitutional:       General: She is awake. She is not in acute distress.     Appearance: She is obese. She is ill-appearing. She is not toxic-appearing.      Interventions: She is intubated.   HENT:      Head: Normocephalic and atraumatic.      Comments: ET tube in place     Nose:      Comments: NG tube in place  Eyes:      General: No scleral icterus.     Extraocular Movements: Extraocular movements intact.      Conjunctiva/sclera: Conjunctivae normal.   Cardiovascular:      Rate and Rhythm: Normal rate and regular rhythm.      Pulses: Normal pulses.      Heart sounds: Normal heart sounds.   Pulmonary:      Effort: No tachypnea or bradypnea. She is intubated.      Comments: CTA, vent sounds  Abdominal:      General: The ostomy site is clean. Bowel sounds are normal. There is no distension.      Palpations: Abdomen is soft.      Tenderness: There is no abdominal tenderness. There is no guarding.        Musculoskeletal:      Cervical back: Normal range of motion and neck supple.      Right lower leg: No edema.      Left lower leg: No edema.   Skin:     General: Skin is warm.      Capillary Refill: Capillary refill takes less than 2 seconds.      Coloration: Skin is not jaundiced.   Neurological:      Mental Status: She is alert.           Lab Results: I have reviewed the following results:  Recent Labs     10/31/24  2309 11/01/24  0556 11/03/24  0357   WBC 7.20   < > 8.86   HGB 8.9*   < > 7.7*   HCT 27.5*   < > 24.3*   *   < > 141*   BANDSPCT  --    < > 16*   SODIUM 138   < > 148*   K 4.2   < > 3.7   *   < > 114*   CO2 19*   < > 20*   BUN 36*   < > 31*   CREATININE 1.96*   < > 1.41*   GLUC 115   < > 88   CAIONIZED  --    < > 0.87*   MG  --    < > 1.9   PHOS  --    < > 3.7   AST  --    < > 46*   ALT  --    < > 7   ALB  --    < > 4.2   TBILI  --    < > 1.71*   ALKPHOS  --    < > 41   LACTICACID 1.2  --   --     < > = values in this interval not displayed.       Imaging Results Review: No pertinent imaging studies reviewed.  Other Study Results Review: No additional pertinent studies reviewed.    VTE Pharmacologic Prophylaxis: VTE covered by:  heparin (porcine), Subcutaneous, 5,000 Units at 11/03/24 0503     VTE Mechanical Prophylaxis: sequential compression device

## 2024-11-03 NOTE — ASSESSMENT & PLAN NOTE
- SSI  - close monitoring and control of serum glucose for optimized healing    Lab Results   Component Value Date    HGBA1C 7.0 (H) 08/22/2024       Recent Labs     11/02/24  1752 11/02/24  1802 11/02/24  2329 11/03/24  1149   POCGLU 78 108 103 91       Blood Sugar Average: Last 72 hrs:  (P) 116.5684760127005306

## 2024-11-03 NOTE — ASSESSMENT & PLAN NOTE
Lab Results   Component Value Date    HGBA1C 7.0 (H) 08/22/2024       Recent Labs     11/02/24  1222 11/02/24  1752 11/02/24  1802 11/02/24  2329   POCGLU 137 78 108 103       Blood Sugar Average: Last 72 hrs:  (P) 118.5200006616069935  Accuchecks and SSI Q6h

## 2024-11-04 ENCOUNTER — APPOINTMENT (INPATIENT)
Dept: CT IMAGING | Facility: HOSPITAL | Age: 84
DRG: 853 | End: 2024-11-04
Payer: COMMERCIAL

## 2024-11-04 LAB
ALBUMIN SERPL BCG-MCNC: 3.8 G/DL (ref 3.5–5)
ALP SERPL-CCNC: 49 U/L (ref 34–104)
ALT SERPL W P-5'-P-CCNC: 9 U/L (ref 7–52)
ANION GAP SERPL CALCULATED.3IONS-SCNC: 12 MMOL/L (ref 4–13)
APTT PPP: 66 SECONDS (ref 23–34)
APTT PPP: 75 SECONDS (ref 23–34)
AST SERPL W P-5'-P-CCNC: 41 U/L (ref 13–39)
BASOPHILS # BLD AUTO: 0.04 THOUSANDS/ÂΜL (ref 0–0.1)
BASOPHILS NFR BLD AUTO: 0 % (ref 0–1)
BILIRUB SERPL-MCNC: 1.56 MG/DL (ref 0.2–1)
BUN SERPL-MCNC: 27 MG/DL (ref 5–25)
CA-I BLD-SCNC: 0.92 MMOL/L (ref 1.12–1.32)
CALCIUM SERPL-MCNC: 7.1 MG/DL (ref 8.4–10.2)
CHLORIDE SERPL-SCNC: 114 MMOL/L (ref 96–108)
CO2 SERPL-SCNC: 22 MMOL/L (ref 21–32)
CREAT SERPL-MCNC: 1.13 MG/DL (ref 0.6–1.3)
EOSINOPHIL # BLD AUTO: 0.02 THOUSAND/ÂΜL (ref 0–0.61)
EOSINOPHIL NFR BLD AUTO: 0 % (ref 0–6)
ERYTHROCYTE [DISTWIDTH] IN BLOOD BY AUTOMATED COUNT: 15.5 % (ref 11.6–15.1)
GFR SERPL CREATININE-BSD FRML MDRD: 45 ML/MIN/1.73SQ M
GLUCOSE SERPL-MCNC: 120 MG/DL (ref 65–140)
GLUCOSE SERPL-MCNC: 127 MG/DL (ref 65–140)
GLUCOSE SERPL-MCNC: 131 MG/DL (ref 65–140)
GLUCOSE SERPL-MCNC: 133 MG/DL (ref 65–140)
HCT VFR BLD AUTO: 25.3 % (ref 34.8–46.1)
HGB BLD-MCNC: 8 G/DL (ref 11.5–15.4)
IMM GRANULOCYTES # BLD AUTO: >0.5 THOUSAND/UL (ref 0–0.2)
IMM GRANULOCYTES NFR BLD AUTO: 9 % (ref 0–2)
LYMPHOCYTES # BLD AUTO: 1.62 THOUSANDS/ÂΜL (ref 0.6–4.47)
LYMPHOCYTES NFR BLD AUTO: 11 % (ref 14–44)
MAGNESIUM SERPL-MCNC: 2.4 MG/DL (ref 1.9–2.7)
MCH RBC QN AUTO: 30.7 PG (ref 26.8–34.3)
MCHC RBC AUTO-ENTMCNC: 31.6 G/DL (ref 31.4–37.4)
MCV RBC AUTO: 97 FL (ref 82–98)
MONOCYTES # BLD AUTO: 0.7 THOUSAND/ÂΜL (ref 0.17–1.22)
MONOCYTES NFR BLD AUTO: 5 % (ref 4–12)
NEUTROPHILS # BLD AUTO: 10.72 THOUSANDS/ÂΜL (ref 1.85–7.62)
NEUTS SEG NFR BLD AUTO: 75 % (ref 43–75)
NRBC BLD AUTO-RTO: 2 /100 WBCS
PHOSPHATE SERPL-MCNC: 2 MG/DL (ref 2.3–4.1)
PLATELET # BLD AUTO: 150 THOUSANDS/UL (ref 149–390)
PMV BLD AUTO: 11.5 FL (ref 8.9–12.7)
POTASSIUM SERPL-SCNC: 3.4 MMOL/L (ref 3.5–5.3)
PROCALCITONIN SERPL-MCNC: 6.89 NG/ML
PROT SERPL-MCNC: 6 G/DL (ref 6.4–8.4)
RBC # BLD AUTO: 2.61 MILLION/UL (ref 3.81–5.12)
SODIUM SERPL-SCNC: 148 MMOL/L (ref 135–147)
WBC # BLD AUTO: 14.35 THOUSAND/UL (ref 4.31–10.16)

## 2024-11-04 PROCEDURE — 84145 PROCALCITONIN (PCT): CPT

## 2024-11-04 PROCEDURE — 99291 CRITICAL CARE FIRST HOUR: CPT | Performed by: ANESTHESIOLOGY

## 2024-11-04 PROCEDURE — 94640 AIRWAY INHALATION TREATMENT: CPT

## 2024-11-04 PROCEDURE — 82330 ASSAY OF CALCIUM: CPT

## 2024-11-04 PROCEDURE — 80053 COMPREHEN METABOLIC PANEL: CPT

## 2024-11-04 PROCEDURE — 94760 N-INVAS EAR/PLS OXIMETRY 1: CPT

## 2024-11-04 PROCEDURE — 82948 REAGENT STRIP/BLOOD GLUCOSE: CPT

## 2024-11-04 PROCEDURE — 71260 CT THORAX DX C+: CPT

## 2024-11-04 PROCEDURE — 84100 ASSAY OF PHOSPHORUS: CPT

## 2024-11-04 PROCEDURE — 97167 OT EVAL HIGH COMPLEX 60 MIN: CPT

## 2024-11-04 PROCEDURE — 83735 ASSAY OF MAGNESIUM: CPT

## 2024-11-04 PROCEDURE — 74177 CT ABD & PELVIS W/CONTRAST: CPT

## 2024-11-04 PROCEDURE — 94660 CPAP INITIATION&MGMT: CPT

## 2024-11-04 PROCEDURE — 85730 THROMBOPLASTIN TIME PARTIAL: CPT | Performed by: STUDENT IN AN ORGANIZED HEALTH CARE EDUCATION/TRAINING PROGRAM

## 2024-11-04 PROCEDURE — 97163 PT EVAL HIGH COMPLEX 45 MIN: CPT

## 2024-11-04 PROCEDURE — 85027 COMPLETE CBC AUTOMATED: CPT

## 2024-11-04 PROCEDURE — 88307 TISSUE EXAM BY PATHOLOGIST: CPT | Performed by: PATHOLOGY

## 2024-11-04 PROCEDURE — 99024 POSTOP FOLLOW-UP VISIT: CPT | Performed by: STUDENT IN AN ORGANIZED HEALTH CARE EDUCATION/TRAINING PROGRAM

## 2024-11-04 RX ORDER — POTASSIUM CHLORIDE 14.9 MG/ML
20 INJECTION INTRAVENOUS
Status: COMPLETED | OUTPATIENT
Start: 2024-11-04 | End: 2024-11-04

## 2024-11-04 RX ADMIN — PANTOPRAZOLE SODIUM 40 MG: 40 INJECTION, POWDER, FOR SOLUTION INTRAVENOUS at 20:25

## 2024-11-04 RX ADMIN — IOHEXOL 100 ML: 350 INJECTION, SOLUTION INTRAVENOUS at 14:37

## 2024-11-04 RX ADMIN — POTASSIUM CHLORIDE 20 MEQ: 14.9 INJECTION, SOLUTION INTRAVENOUS at 06:39

## 2024-11-04 RX ADMIN — METOROPROLOL TARTRATE 5 MG: 5 INJECTION, SOLUTION INTRAVENOUS at 13:49

## 2024-11-04 RX ADMIN — ACETAMINOPHEN 1000 MG: 10 INJECTION INTRAVENOUS at 17:49

## 2024-11-04 RX ADMIN — METOROPROLOL TARTRATE 5 MG: 5 INJECTION, SOLUTION INTRAVENOUS at 20:25

## 2024-11-04 RX ADMIN — POTASSIUM PHOSPHATE, MONOBASIC AND POTASSIUM PHOSPHATE, DIBASIC 21 MMOL: 224; 236 INJECTION, SOLUTION, CONCENTRATE INTRAVENOUS at 07:58

## 2024-11-04 RX ADMIN — IOHEXOL 50 ML: 240 INJECTION, SOLUTION INTRATHECAL; INTRAVASCULAR; INTRAVENOUS; ORAL at 14:37

## 2024-11-04 RX ADMIN — PIPERACILLIN AND TAZOBACTAM 4.5 G: 36; 4.5 INJECTION, POWDER, FOR SOLUTION INTRAVENOUS at 04:04

## 2024-11-04 RX ADMIN — AMIODARONE HYDROCHLORIDE 0.5 MG/MIN: 50 INJECTION, SOLUTION INTRAVENOUS at 19:29

## 2024-11-04 RX ADMIN — PIPERACILLIN AND TAZOBACTAM 4.5 G: 36; 4.5 INJECTION, POWDER, FOR SOLUTION INTRAVENOUS at 20:36

## 2024-11-04 RX ADMIN — CHLORHEXIDINE GLUCONATE 0.12% ORAL RINSE 15 ML: 1.2 LIQUID ORAL at 08:03

## 2024-11-04 RX ADMIN — PANTOPRAZOLE SODIUM 40 MG: 40 INJECTION, POWDER, FOR SOLUTION INTRAVENOUS at 08:03

## 2024-11-04 RX ADMIN — PIPERACILLIN AND TAZOBACTAM 4.5 G: 36; 4.5 INJECTION, POWDER, FOR SOLUTION INTRAVENOUS at 13:49

## 2024-11-04 RX ADMIN — ACETAMINOPHEN 1000 MG: 10 INJECTION INTRAVENOUS at 12:25

## 2024-11-04 RX ADMIN — LEVALBUTEROL HYDROCHLORIDE 1.25 MG: 1.25 SOLUTION RESPIRATORY (INHALATION) at 11:04

## 2024-11-04 RX ADMIN — METOROPROLOL TARTRATE 5 MG: 5 INJECTION, SOLUTION INTRAVENOUS at 01:11

## 2024-11-04 RX ADMIN — POTASSIUM CHLORIDE 20 MEQ: 14.9 INJECTION, SOLUTION INTRAVENOUS at 09:13

## 2024-11-04 RX ADMIN — HEPARIN SODIUM 15.1 UNITS/KG/HR: 10000 INJECTION, SOLUTION INTRAVENOUS at 12:32

## 2024-11-04 RX ADMIN — ACETAMINOPHEN 1000 MG: 10 INJECTION INTRAVENOUS at 05:02

## 2024-11-04 RX ADMIN — AMIODARONE HYDROCHLORIDE 1 MG/MIN: 50 INJECTION, SOLUTION INTRAVENOUS at 01:49

## 2024-11-04 RX ADMIN — HYDROMORPHONE HYDROCHLORIDE 0.2 MG: 0.2 INJECTION, SOLUTION INTRAMUSCULAR; INTRAVENOUS; SUBCUTANEOUS at 03:39

## 2024-11-04 RX ADMIN — METOROPROLOL TARTRATE 5 MG: 5 INJECTION, SOLUTION INTRAVENOUS at 07:54

## 2024-11-04 NOTE — ASSESSMENT & PLAN NOTE
Patient meets sepsis criteria as evidenced by tachycardia, tachypnea prior to intubation, and leukocytosis with RUKHSANA and lactic acidosis  Suspect secondary to ischemic colitis +/- aspiration pneumonia  No evidence of cardiogenic component to shock, SVO2 82.6  Antibiotics broadened to Zosyn  Intraoperative cultures and BC pending, follow up results  Now off vasopressors  Increased bandema and low grade temp  Concern for stooling from vagina increasing concern for a CV fistula    Plan:  Continue zosyn  Lactate now cleared  Trend procal and WBC/temperature curve  Consider further imaging today if worsening bandemia/fever curver

## 2024-11-04 NOTE — RESPIRATORY THERAPY NOTE
11/04/24 0753   Respiratory Assessment   Assessment Type Assess only   General Appearance Awake;Alert   Respiratory Pattern Normal   Chest Assessment Chest expansion symmetrical   Bilateral Breath Sounds Diminished;Clear   Cough None   Resp Comments Pt resting comfortably and in no apparent distress.  Pt remains on MFNC; liter flow decreased in an attempt to wean.  Will cont to wean as able.   O2 Device MFNC   Oxygen Therapy/Pulse Ox   O2 Device Mid flow nasal cannula   O2 Therapy Oxygen humidified   Nasal Cannula O2 Flow Rate (L/min) (S)  8 L/min   Calculated FIO2 (%) - Nasal Cannula 52   SpO2 94 %   SpO2 Activity At Rest   $ Pulse Oximetry Spot Check Charge Completed

## 2024-11-04 NOTE — ASSESSMENT & PLAN NOTE
Patient reporting several episodes of coffee ground emesis at home the day PTA  CT high volume bleeding abdomen pelvis without evidence of extravasation  Hgb 13.2 on arrival    Plan  Continue protonix BID  NGT in place to low intermittent wall suction  Strict NPO  Trend hemoglobin  Coumadin on hold; started on heparin infusion  GI consulted  Eventual EGD once more stable

## 2024-11-04 NOTE — ASSESSMENT & PLAN NOTE
Initially requiring 2-3 L NC oxygen  Escalating oxygen requirements with patient on 15 L MFNC prior to OR  Concern for aspiration event during episode of emesis +/- atelectasis  Intubated for the OR  Slow to liberate from ventilator but able to be extubated 11/3; Placed on BiPAP     Plan  Negative fluid balance  Trial off BiPAP this AM  Aggressive pulmonary toilet and IS

## 2024-11-04 NOTE — ASSESSMENT & PLAN NOTE
Coumadin on hold due to concern for GIB  Holding beta blocker secondary to vasopressor requirements  AFib on POD #1, placed on amio infusion with improvement  AFib once again on 11/2, trialed metoprolol  Converted to NSR at 333    Plan  Continue standing lopressor  Continue heparin gtt  Continue to replace electrolytes

## 2024-11-04 NOTE — PLAN OF CARE
Problem: OCCUPATIONAL THERAPY ADULT  Goal: Performs self-care activities at highest level of function for planned discharge setting.  See evaluation for individualized goals.  Description: Treatment Interventions: ADL retraining, Functional transfer training, UE strengthening/ROM, Endurance training, Patient/family training, Equipment evaluation/education, Compensatory technique education, Continued evaluation, Energy conservation, Activityengagement, Cardiac education          See flowsheet documentation for full assessment, interventions and recommendations.   Note: Limitation: Decreased ADL status, Decreased Safe judgement during ADL, Decreased UE strength, Decreased endurance, Decreased self-care trans, Decreased high-level ADLs  Prognosis: Good  Assessment: Pt is a 84 y.o. female seen for OT evaluation s/p admit to [facility] on 10/29/2024 w/ Septic shock (HCC). Comorbidities affecting pt's functional performance at time of assessment include:RUKHSANA, Afib, HTN, obesity, ARF, hypothyroidism, diabetes, coffee ground emesis, ischemic colitis, acute metabolic encephalopathy, and gen. abdominal pain . Orders placed for OT evaluation and treatment.  Performed at least two patient identifiers during session including name and wristband. Personal factors affecting pt at time of IE include:steps to enter environment, limited home support, difficulty performing ADLS, difficulty performing IADLS , decreased initiation and engagement , financial barriers, health management , and environment. Prior to admission, pt reports Ind with ADLs, Ind with IADLs, and (+) driving.  Pt reports more recently she has been needing more A with ADL performance. Upon evaluation: Pt requires mod A with UB ADLs, max A with LB ADLs, and min A of 2 with xfers 2* the following deficits impacting occupational performance: weakness, decreased strength, decreased dynamic sit/ stand balance, decreased activity tolerance, decreased standing  tolerance time for self care and functional mobility, environmental deficits, decreased mobilty, and requiring external assistance to complete transitional movements. Pt to benefit from continued skilled OT tx while in the hospital to address deficits as defined above and maximize level of functional independence w ADL's and functional mobility. Occupational Performance areas to address include: bathing/shower, toilet hygiene, dressing, medication management, health maintenance, functional mobility, community mobility, clothing management, cleaning, meal prep, money management, and household maintenance. From OT standpoint, recommendation at time of d/c would be Level 2 (Mod Resource Intensity).     Rehab Resource Intensity Level, OT: II (Moderate Resource Intensity)

## 2024-11-04 NOTE — PROGRESS NOTES
Progress Note - Surgery-General   Name: Bertha Brown 84 y.o. female I MRN: [id number]  Unit/Bed#: ICU 05 I Date of Admission: 10/29/2024   Date of Service: 11/4/2024 I Hospital Day: 6     Assessment & Plan  Paroxysmal atrial fibrillation (HCC)  - Monitor Hgb  - restart AC when stable    Acute respiratory failure with hypoxia (HCC)  - Monitor abdominal exams  - Remainder of care per prior medical team  Diabetes (HCC)  - SSI  - close monitoring and control of serum glucose for optimized healing    Lab Results   Component Value Date    HGBA1C 7.0 (H) 08/22/2024       Recent Labs     11/02/24  2329 11/03/24  1149 11/03/24  1706 11/03/24  2316   POCGLU 103 91 104 119       Blood Sugar Average: Last 72 hrs:  (P) 111.8    Ischemic colitis (HCC)  POD5 s/p exploratory laparotomy with right hemicolectomy, abdominal left open w abtherra placement on 10/29/2024  POD4, s/p second look with wash out, abdominal wall closure, and loop ileostomy and mucous fistula creation, VAC placement to midline abdomen  Febrile overnight Tm 100.7, WBC 14.35, Hb 8.0  UOP 2.75L/24hr, Cr 1.13  VAC with serosanguinous output holding adequate seal   100cc recorded from ileostomy  Extubated yesterday, on midflow NC, awake and alert, answering questions appropriately   Small amount of stool and gas in colostomy bag, abdomen soft and nondistended, normoactive bowel sounds, appropriate incisional tenderness to palpation, midline VAC in place holding adequate suction, ileostomy beefy red and patent, stool in bag, mucus fistula pink and patent with stool in bag       Plan:  Discussed patient with ICU team, given persistent fevers and leukocytosis will plan for repeat CT of the chest, abdomen, and pelvis today. Ok for PO contrast from surgical standpoint. Follow up with results  Next VAC change tomorrow with plan for concurrent ileostomy bar removal. Goal to transition to MWF VAC changes   Replete lytes as needed  Consider initiation of trickle tube  "feeds as patient does have evidence of stool output from ileostomy bag      Subjective/Objective    Subjective: No acute events overnight     Objective:     Blood pressure 159/77, pulse 76, temperature 98.7 °F (37.1 °C), temperature source Oral, resp. rate 20, height 5' 5\" (1.651 m), weight 105 kg (232 lb 5.8 oz), SpO2 95%.,Body mass index is 38.67 kg/m².      Intake/Output Summary (Last 24 hours) at 11/4/2024 1108  Last data filed at 11/4/2024 1000  Gross per 24 hour   Intake 1468.22 ml   Output 3200 ml   Net -1731.78 ml       Invasive Devices       Peripheral Intravenous Line  Duration             Peripheral IV 11/03/24 Distal;Left;Ventral (anterior) Forearm <1 day    Peripheral IV 11/03/24 Distal;Right;Ventral (anterior) Forearm <1 day    Peripheral IV 11/03/24 Left;Ventral (anterior) Forearm <1 day              Drain  Duration             NG/OG/Enteral Tube Nasogastric 18 Fr Left nare 5 days    Colostomy RLQ 3 days    External Urinary Catheter <1 day                    Physical Exam: /77 (BP Location: Right arm)   Pulse 76   Temp 98.7 °F (37.1 °C) (Oral)   Resp 20   Ht 5' 5\" (1.651 m)   Wt 105 kg (232 lb 5.8 oz)   SpO2 95%   BMI 38.67 kg/m²   General appearance: alert and oriented, in no acute distress  Lungs: clear to auscultation bilaterally  Heart: regular rate and rhythm, S1, S2 normal, no murmur, click, rub or gallop  Abdomen:  abdomen soft and nondistended, normoactive bowel sounds, appropriate incisional tenderness to palpation, midline VAC in place holding adequate suction, ileostomy beefy red and patent, stool in bag, mucus fistula pink and patent with stool in bag  Extremities: extremities normal, warm and well-perfused; no cyanosis, clubbing, or edema    Lab, Imaging and other studies:I have personally reviewed pertinent lab results.     VTE Pharmacologic Prophylaxis: Heparin  VTE Mechanical Prophylaxis: sequential compression device    "

## 2024-11-04 NOTE — SPEECH THERAPY NOTE
Speech Language/Pathology Missed Visit Note    Dysphagia evaluation orders received and appreciated.  Evaluation attempted, chart reviewed.  Patient presently NPO w/ NG tube for suction.  Will defer dysphagia evaluation/PO trials at this time.   Will re-attempt as patient status and scheduling permits.  D/w JUAN.         Michelle Cruz MS, CCC-SLP  Speech-Language Pathologist  [place] #[id number]  [place] #[id number]

## 2024-11-04 NOTE — PLAN OF CARE
Problem: PHYSICAL THERAPY ADULT  Goal: Performs mobility at highest level of function for planned discharge setting.  See evaluation for individualized goals.  Description: Treatment/Interventions: Functional transfer training, LE strengthening/ROM, Elevations, Therapeutic exercise, Endurance training, Patient/family training, Equipment eval/education, Bed mobility, Gait training, Continued evaluation, OT  Equipment Recommended: Walker       See flowsheet documentation for full assessment, interventions and recommendations.  Note: Prognosis: Good  Problem List: Decreased strength, Decreased endurance, Impaired balance, Decreased mobility  Assessment: Bertha Brown is a 84 y.o. female admitted to [facility] on 10/29/2024 for Septic shock (HCC). Pt  has a past medical history of Asthma, Atrial fibrillation (HCC), Diabetes (HCC), Hyperlipidemia, Hypertension, and Legionnaire's disease (HCC).. Order placed for PT eval and tx. PT was consulted and pt was seen on 11/4/2024 for mobility assessment and d/c planning. Chart review and two person identifiers were completed.   Currently pt presents with decreased strength , decreased dynamic sitting balance , decreased static standing balance, decreased dynamic standing balance , and decreased muscular endurance . Due to these impairments, they will require assistance to perform bed mobility, sit to stand , ambulation, stair negotiation, and transfers. Pt is currently functioning at a  minimum assistance x2 level for transfers. These activity limitations significantly impact their ability to participate in previous home and community roles and responsibilities  and ambulation in home. The patient's -[facility] Basic Mobility Inpatient Short Form Raw Score is 10. PT recommends level II moderate resource intensity. They will benefit from skilled therapy to to reduce the risk of falls and to maximize functional potential.  Barriers to Discharge: Other (Comment) (decline in functional  mobility)     Rehab Resource Intensity Level, PT: II (Moderate Resource Intensity)    See flowsheet documentation for full assessment.

## 2024-11-04 NOTE — PROGRESS NOTES
Progress Note - Critical Care/ICU   Name: Bertha Brown 84 y.o. female I MRN: [id number]  Unit/Bed#: ICU 05 I Date of Admission: 10/29/2024   Date of Service: 11/4/2024 I Hospital Day: 6       Assessment & Plan  Septic shock (HCC)  Patient meets sepsis criteria as evidenced by tachycardia, tachypnea prior to intubation, and leukocytosis with RUKHSANA and lactic acidosis  Suspect secondary to ischemic colitis +/- aspiration pneumonia  No evidence of cardiogenic component to shock, SVO2 82.6  Antibiotics broadened to Zosyn  Intraoperative cultures and BC pending, follow up results  Now off vasopressors  Increased bandema and low grade temp  Concern for stooling from vagina increasing concern for a CV fistula    Plan:  Continue zosyn  Lactate now cleared  Trend procal and WBC/temperature curve  Consider further imaging today if worsening bandemia/fever curver  Ischemic colitis (HCC)  Patient with worsening abdominal pain and rising lactate concerning for acute abdomen  CT abdomen pelvis with evidence of mesenteric fat stranding in the LLQ and suggestion of pneumatosis in the hepatic flexure and proximal transverse colon concerning for ischemic colitis  Surgery consulted  POD #5 s/p exploratory laparotomy with extended right hemicolectomy, left in discontinuity, abthera VAC in place  POD #4 second look ex lap, washout, ileostomy, abdominal closure, placement of wound VAC    Plan:  Consider sips of clears if patient comes off BiPAP  Antibiotics broadened to zosyn, D6  Follow up intraoperative culture results  May need to consider TPN if no return of bowel function in next 24 hours  See plan under severe sepsis above  Acute respiratory failure with hypoxia (HCC)  Initially requiring 2-3 L NC oxygen  Escalating oxygen requirements with patient on 15 L MFNC prior to OR  Concern for aspiration event during episode of emesis +/- atelectasis  Intubated for the OR  Slow to liberate from ventilator but able to be extubated 11/3;  Placed on BiPAP     Plan  Negative fluid balance  Trial off BiPAP this AM  Aggressive pulmonary toilet and IS  Coffee ground emesis  Patient reporting several episodes of coffee ground emesis at home the day PTA  CT high volume bleeding abdomen pelvis without evidence of extravasation  Hgb 13.2 on arrival    Plan  Continue protonix BID  NGT in place to low intermittent wall suction  Strict NPO  Trend hemoglobin  Coumadin on hold; started on heparin infusion  GI consulted  Eventual EGD once more stable  Paroxysmal atrial fibrillation (HCC)  Coumadin on hold due to concern for GIB  Holding beta blocker secondary to vasopressor requirements  AFib on POD #1, placed on amio infusion with improvement  AFib once again on 11/2, trialed metoprolol  Converted to NSR at 333    Plan  Continue standing lopressor  Continue heparin gtt  Continue to replace electrolytes  Hypothyroidism  Levothyroxine on hold due to NPO  Hypertension  Antihypertensives on hold due to hypotension on vasopressors  Hyperlipidemia  Statin on hold due to NPO  Diabetes (HCC)  Lab Results   Component Value Date    HGBA1C 7.0 (H) 08/22/2024       Recent Labs     11/02/24  2329 11/03/24  1149 11/03/24  1706 11/03/24  2316   POCGLU 103 91 104 119       Blood Sugar Average: Last 72 hrs:  (P) 111.8  Accuchecks and SSI Q6h  Obesity  Encourage lifestyle changes  RUKHSANA (acute kidney injury) ([facility])  RUKHSANA POA with initial creatinine 1.63 and baseline 0.8-0.9  Suspect prerenal etiology  Improving    Plan:  Continue fluid resuscitation  Monitor I&O  Avoid nephrotoxins  Maintain MAP >65  Trend renal indices  Chronic idiopathic constipation    Generalized abdominal pain  Patient presented with abdominal pain  See plan under ischemic colitis  SIRS (systemic inflammatory response syndrome) ([facility])    Acute metabolic encephalopathy  Patient with worsening lethargy prior to OR  Suspect secondary to significant metabolic derangements  Monitor neuro exam  S/P exploratory  "laparotomy    Disposition: Stepdown Level 1    ICU Core Measures     A: Assess, Prevent, and Manage Pain Has pain been assessed? Yes  Need for changes to pain regimen? No   B: Both SAT/SAT  N/A   C: Choice of Sedation RASS Goal: N/A patient not on sedation  Need for changes to sedation or analgesia regimen? NA   D: Delirium CAM-ICU: Negative   E: Early Mobility  Plan for early mobility? Yes   F: Family Engagement Plan for family engagement today? Yes       Antibiotic Review: Post op requirements     Review of Invasive Devices:            Prophylaxis:  VTE VTE covered by:  heparin (porcine), Intravenous, 15.1 Units/kg/hr at 11/03/24 2342       Stress Ulcer  covered bypantoprazole (PROTONIX) 20 mg tablet [[id number]] (Long-Term Med), pantoprazole (PROTONIX) injection 40 mg [[id number]]         HPI:  Per my previous progress note:  \"\"84-year-old female with past medical history of hypertension, diabetes type 2, paroxysmal A-fib who was admitted on 10-29 and ultimately required an ex lap with extended hemicolectomy for ischemic colitis \"       24 Hour Events :   Extubated; increasing O2 requirements requiring BiPAP  Concern for stooling out of her vagina  Converted to NSR  Subjective   Review of Systems: Review of Systems   Gastrointestinal:  Positive for abdominal pain.   All other systems reviewed and are negative.      Objective :                   Vitals I/O      Most Recent Min/Max in 24hrs   Temp 100.4 °F (38 °C) Temp  Min: 98.3 °F (36.8 °C)  Max: 100.9 °F (38.3 °C)   Pulse (!) 131 Pulse  Min: 119  Max: 140   Resp 22 Resp  Min: 14  Max: 24   /92 BP  Min: 137/87  Max: 167/92   O2 Sat 98 % SpO2  Min: 88 %  Max: 98 %      Intake/Output Summary (Last 24 hours) at 11/4/2024 0133  Last data filed at 11/3/2024 2101  Gross per 24 hour   Intake 1411.6 ml   Output 2250 ml   Net -838.4 ml       Diet Enteral/Parenteral; Tube Feeding No Oral Diet; Jevity 1.2 Mark; Continuous; 10    Invasive Monitoring           Physical " Exam   Physical Exam  Vitals and nursing note reviewed.   Eyes:      Conjunctiva/sclera: Conjunctivae normal.   Skin:     General: Skin is warm and dry.   HENT:      Head: Normocephalic and atraumatic.      Nose: Nasogastric tube present.      Mouth/Throat:      Mouth: Mucous membranes are dry.   Neck:      Vascular: No JVD.   Cardiovascular:      Rate and Rhythm: Normal rate and regular rhythm.      Pulses: Normal pulses.   Musculoskeletal:         General: No tenderness or signs of injury.   Abdominal:      Comments: Ostomy with stool; mucus fistula with scant drainage;    Apthera over incision; mildy tender, NABS x4   Constitutional:       General: She is awake.      Appearance: She is not ill-appearing.      Comments: BPAP in place   Pulmonary:      Breath sounds: Decreased air movement present. Decreased breath sounds present. No wheezing or rales.   Neurological:      General: No focal deficit present.      Mental Status: She is alert. Mental status is at baseline.      Motor: gross motor function is at baseline for patient. Strength full and intact in all extremities.   Genitourinary/Anorectal:  external catheter present.        Diagnostic Studies        Lab Results: I have reviewed the following results:     Medications:  Scheduled PRN   acetaminophen, 1,000 mg, Q6H JUAN PABLO  chlorhexidine, 15 mL, Q12H JUAN PABLO  insulin lispro, 1-6 Units, Q6H JUAN PABLO  metoprolol, 5 mg, Q6H  pantoprazole, 40 mg, Q12H JUAN PABLO  piperacillin-tazobactam, 4.5 g, Q8H      HYDROmorphone, 0.2 mg, Q3H PRN   And  HYDROmorphone, 0.5 mg, Q3H PRN  levalbuterol, 1.25 mg, Q6H PRN       Continuous    amiodarone (CORDARONE) 900 mg in dextrose 5 % 500 mL infusion, 1 mg/min, Last Rate: 1 mg/min (11/03/24 1707)  heparin (porcine), 3-20 Units/kg/hr (Order-Specific), Last Rate: 15.1 Units/kg/hr (11/03/24 2342)         Labs:   CBC    Recent Labs     11/02/24  0425 11/02/24  0910 11/02/24  1752 11/03/24  0357 11/03/24  1612   WBC 3.69* 4.33  --  8.86  --    HGB 6.9*  7.2*   < > 7.7* 8.2*   HCT 22.4* 22.8*   < > 24.3* 25.8*   * 123*  --  141*  --    BANDSPCT 10*  --   --  16*  --     < > = values in this interval not displayed.     BMP    Recent Labs     11/02/24 2141 11/03/24  0357   SODIUM 146 148*   K 3.0* 3.7   * 114*   CO2 22 20*   AGAP 11 14*   BUN 35* 31*   CREATININE 1.52* 1.41*   CALCIUM 7.3* 7.2*       Coags    Recent Labs     11/03/24  1707 11/03/24  2317   INR 1.22*  --    PTT 29 26        Additional Electrolytes  Recent Labs     11/02/24 0425 11/02/24 2141 11/03/24 0357   MG 2.6 2.1 1.9   PHOS 2.0* 2.1* 3.7   CAIONIZED 0.96*  --  0.87*          Blood Gas    No recent results  No recent results LFTs  Recent Labs     11/02/24 0425 11/02/24 2141 11/03/24 0357   ALT 4*  --  7   AST 25  --  46*   ALKPHOS 35  --  41   ALB 3.8 4.1 4.2   TBILI 1.73*  --  1.71*       Infectious  Recent Labs     11/02/24 0425 11/03/24 0357   PROCALCITONI 20.81* 12.57*     Glucose  Recent Labs     11/02/24 0425 11/02/24 2141 11/03/24  0357   GLUC 137 103 88

## 2024-11-04 NOTE — CASE MANAGEMENT
Case Management Discharge Planning Note    Patient name Bertha Brown  Location ICU 05/ICU 05 MRN [id number]  : 1940 Date 2024       Current Admission Date: 10/29/2024  Current Admission Diagnosis:Septic shock (HCC)   Patient Active Problem List    Diagnosis Date Noted Date Diagnosed    S/P exploratory laparotomy 2024     Ischemic colitis (HCC) 10/30/2024     Septic shock (HCC) 10/30/2024     Acute metabolic encephalopathy 10/30/2024     Coffee ground emesis 10/29/2024     RUKHSANA (acute kidney injury) (HCC) 10/29/2024     Chronic idiopathic constipation 10/29/2024     Generalized abdominal pain 10/29/2024     SIRS (systemic inflammatory response syndrome) ([facility]) 10/29/2024     Constipation 2022     Hypoxia 12/15/2022     Rectus sheath hematoma 2022     Bronchospasm with bronchitis, acute 2022     Chest pain 2022     Leukocytosis 2022     Chest tightness 2022     Diabetes (HCC) 2022     Obesity 2022     Bilateral flank pain 2022     Hemarthrosis involving knee joint, right 10/01/2020     Ambulatory dysfunction 10/01/2020     Paroxysmal atrial fibrillation (HCC) 2020     Acute respiratory failure with hypoxia (HCC) 2020     Hypothyroidism 2020     Hypertension 2020     Hyperlipidemia 2020     Major depressive disorder, single episode, mild (HCC) 2019     Osteoarthritis of knee 2017       LOS (days): 6  Geometric Mean LOS (GMLOS) (days): 9.6  Days to GMLOS:3.3     OBJECTIVE:  Risk of Unplanned Readmission Score: 25.83         Current admission status: Inpatient   Preferred Pharmacy:   [facility] - [place] - [phone]  [phone]  [facility] [place] [address]  Phone: [phone] Fax: [phone]    Primary Care Provider: Jarred Armstrong DO    Primary Insurance: [facility] REP  Secondary Insurance:     DISCHARGE DETAILS:                                          Other  Referral/Resources/Interventions Provided:  Referral Comments: Wallace referrals made to LTACH facilities;  awaiting replies.         Treatment Team Recommendation: Other (TBD)  Discharge Destination Plan:: Other (TBD)  Transport at Discharge : Other (Comment) (TBD)

## 2024-11-04 NOTE — ASSESSMENT & PLAN NOTE
POD5 s/p exploratory laparotomy with right hemicolectomy, abdominal left open w abtherra placement on 10/29/2024  POD4, s/p second look with wash out, abdominal wall closure, and loop ileostomy and mucous fistula creation, VAC placement to midline abdomen  Febrile overnight Tm 100.7, WBC 14.35, Hb 8.0  UOP 2.75L/24hr, Cr 1.13  VAC with serosanguinous output holding adequate seal   100cc recorded from ileostomy  Extubated yesterday, on midflow NC, awake and alert, answering questions appropriately   Small amount of stool and gas in colostomy bag, abdomen soft and nondistended, normoactive bowel sounds, appropriate incisional tenderness to palpation, midline VAC in place holding adequate suction, ileostomy beefy red and patent, stool in bag, mucus fistula pink and patent with stool in bag       Plan:  Discussed patient with ICU team, given persistent fevers and leukocytosis will plan for repeat CT of the chest, abdomen, and pelvis today. Ok for PO contrast from surgical standpoint. Follow up with results  Next VAC change tomorrow with plan for concurrent ileostomy bar removal. Goal to transition to F VAC changes   Replete lytes as needed  Consider initiation of trickle tube feeds as patient does have evidence of stool output from ileostomy bag

## 2024-11-04 NOTE — ASSESSMENT & PLAN NOTE
- SSI  - close monitoring and control of serum glucose for optimized healing    Lab Results   Component Value Date    HGBA1C 7.0 (H) 08/22/2024       Recent Labs     11/02/24  2329 11/03/24  1149 11/03/24  1706 11/03/24  2316   POCGLU 103 91 104 119       Blood Sugar Average: Last 72 hrs:  (P) 111.8

## 2024-11-04 NOTE — ASSESSMENT & PLAN NOTE
Patient with worsening abdominal pain and rising lactate concerning for acute abdomen  CT abdomen pelvis with evidence of mesenteric fat stranding in the LLQ and suggestion of pneumatosis in the hepatic flexure and proximal transverse colon concerning for ischemic colitis  Surgery consulted  POD #5 s/p exploratory laparotomy with extended right hemicolectomy, left in discontinuity, abthera VAC in place  POD #4 second look ex lap, washout, ileostomy, abdominal closure, placement of wound VAC    Plan:  Consider sips of clears if patient comes off BiPAP  Antibiotics broadened to zosyn, D6  Follow up intraoperative culture results  May need to consider TPN if no return of bowel function in next 24 hours  See plan under severe sepsis above

## 2024-11-04 NOTE — PLAN OF CARE
Problem: PAIN - ADULT  Goal: Verbalizes/displays adequate comfort level or baseline comfort level  Description: Interventions:  - Encourage patient to monitor pain and request assistance  - Assess pain using appropriate pain scale  - Administer analgesics based on type and severity of pain and evaluate response  - Implement non-pharmacological measures as appropriate and evaluate response  - Consider cultural and social influences on pain and pain management  - Notify physician/advanced practitioner if interventions unsuccessful or patient reports new pain  Outcome: Progressing     Problem: INFECTION - ADULT  Goal: Absence or prevention of progression during hospitalization  Description: INTERVENTIONS:  - Assess and monitor for signs and symptoms of infection  - Monitor lab/diagnostic results  - Monitor all insertion sites, i.e. indwelling lines, tubes, and drains  - Monitor endotracheal if appropriate and nasal secretions for changes in amount and color  - [place] appropriate cooling/warming therapies per order  - Administer medications as ordered  - Instruct and encourage patient and family to use good hand hygiene technique  - Identify and instruct in appropriate isolation precautions for identified infection/condition  Outcome: Progressing  Goal: Absence of fever/infection during neutropenic period  Description: INTERVENTIONS:  - Monitor WBC    Outcome: Progressing     Problem: Knowledge Deficit  Goal: Patient/family/caregiver demonstrates understanding of disease process, treatment plan, medications, and discharge instructions  Description: Complete learning assessment and assess knowledge base.  Interventions:  - Provide teaching at level of understanding  - Provide teaching via preferred learning methods  Outcome: Progressing     Problem: RESPIRATORY - ADULT  Goal: Achieves optimal ventilation and oxygenation  Description: INTERVENTIONS:  - Assess for changes in respiratory status  - Assess for changes in  mentation and behavior  - Position to facilitate oxygenation and minimize respiratory effort  - Oxygen administered by appropriate delivery if ordered  - Initiate smoking cessation education as indicated  - Encourage broncho-pulmonary hygiene including cough, deep breathe, Incentive Spirometry  - Assess the need for suctioning and aspirate as needed  - Assess and instruct to report SOB or any respiratory difficulty  - Respiratory Therapy support as indicated  Outcome: Progressing

## 2024-11-04 NOTE — RESPIRATORY THERAPY NOTE
RT Ventilator Management Note  eBrtha Brown 84 y.o. female MRN: [id number]  Unit/Bed#: ICU 05 Encounter: [id number]      Daily Screen         11/2/2024  1430 11/3/2024  0800          Patient safety screen outcome:: -- Passed      RSBI: 45 55                Physical Exam:   Assessment Type: Assess only  General Appearance: Awake, Alert  Respiratory Pattern: Normal  Chest Assessment: Chest expansion symmetrical  Bilateral Breath Sounds: Diminished (UAW)  Cough: None  O2 Device: MFNC      Resp Comments: Pt remains on MFNC.  Liter flow decreased in an attempt to wean.  Will cont to wean as able.     11/04/24 1405   Respiratory Assessment   Assessment Type Assess only   General Appearance Awake;Alert   Respiratory Pattern Normal   Chest Assessment Chest expansion symmetrical   Bilateral Breath Sounds Diminished  (UAW)   Cough None   Resp Comments Pt remains on MFNC.  Liter flow decreased in an attempt to wean.  Will cont to wean as able.   O2 Device MFNC   Oxygen Therapy/Pulse Ox   O2 Device Mid flow nasal cannula   O2 Therapy Oxygen humidified   Nasal Cannula O2 Flow Rate (L/min) (S)  6 L/min   Calculated FIO2 (%) - Nasal Cannula 44   SpO2 93 %   SpO2 Activity At Rest   $ Pulse Oximetry Spot Check Charge Completed

## 2024-11-04 NOTE — RESPIRATORY THERAPY NOTE
11/04/24 0327   Respiratory Assessment   General Appearance Awake   Respiratory Pattern Assisted;Spontaneous   Resp Comments no acute changes   O2 Device v60   Non-Invasive Information   O2 Interface Device Face mask   Non-Invasive Ventilation Mode BiPAP   $ Intermittent NIV Yes   SpO2 97 %   $ Pulse Oximetry Spot Check Charge Completed   Non-Invasive Settings   FiO2 (%) 40   Temperature (Set) 31   IPAP (cm) 14 cm   EPAP (cm) 6 cm   Rate (Set) 8   Rise Time 2   Inspiratory Time (Set) 1   Humidification   (heater)   Non-Invasive Readings   Skin Intervention Skin intact   Heater Temperature (Obs) 31.1   Total Rate 27   Vt (mL) (Mech) 439   MV (Mech) 11.8   Peak Pressure (Obs) 14   Leak (lpm) 10   Non-Invasive Alarms   Insp Pressure High (cm H20) 25   Insp Pressure Low (cm H20) 5   Low Insp Pressure Time (sec) 20 sec   MV Low (L/min) 3   Vt High (mL) 1200   Vt Low (mL) 200   High Resp Rate (BPM) 50 BPM   Low Resp Rate (BPM) 8 BPM   Maintenance   Water bag changed No

## 2024-11-04 NOTE — RESPIRATORY THERAPY NOTE
11/03/24 2029   Respiratory Assessment   General Appearance Awake   Respiratory Pattern Assisted;Spontaneous   Chest Assessment Chest expansion symmetrical   Bilateral Breath Sounds Diminished;Clear   Resp Comments pt remains on NIV, no acute changes, weaned Fio2   O2 Device v60   Non-Invasive Information   O2 Interface Device Face mask   Non-Invasive Ventilation Mode BiPAP   SpO2 97 %   $ Pulse Oximetry Spot Check Charge Completed   Non-Invasive Settings   FiO2 (%) (S)  40   Temperature (Set) 31   IPAP (cm) 14 cm   EPAP (cm) 6 cm   Rate (Set) 8   Rise Time 2   Inspiratory Time (Set) 1   Humidification   (heater)   Non-Invasive Readings   Skin Intervention Skin intact;Mask rotated   Heater Temperature (Obs) 30.8   Total Rate 24   Vt (mL) (Mech) 330   MV (Mech) 8   Peak Pressure (Obs) 15   Leak (lpm) 5   Non-Invasive Alarms   Insp Pressure High (cm H20) 25   Insp Pressure Low (cm H20) 5   Low Insp Pressure Time (sec) 20 sec   MV Low (L/min) 3   Vt High (mL) 1200   Vt Low (mL) 200   High Resp Rate (BPM) 50 BPM   Low Resp Rate (BPM) 8 BPM   Maintenance   Water bag changed No

## 2024-11-04 NOTE — PHYSICAL THERAPY NOTE
Physical Therapy Evaluation    Patient's Name: Bertha Brown    Admitting Diagnosis  Vomiting [R11.10]  Constipation [K59.00]  Coffee ground emesis [K92.0]  RUKHSANA (acute kidney injury) (HCC) [N17.9]  Acute respiratory failure with hypoxia (HCC) [J96.01]    Problem List  Patient Active Problem List   Diagnosis    Paroxysmal atrial fibrillation (HCC)    Acute respiratory failure with hypoxia (HCC)    Hypothyroidism    Hypertension    Hyperlipidemia    Major depressive disorder, single episode, mild (HCC)    Osteoarthritis of knee    Hemarthrosis involving knee joint, right    Ambulatory dysfunction    Chest tightness    Diabetes (HCC)    Obesity    Bilateral flank pain    Bronchospasm with bronchitis, acute    Chest pain    Leukocytosis    Rectus sheath hematoma    Hypoxia    Constipation    Coffee ground emesis    RUKHSANA (acute kidney injury) (HCC)    Chronic idiopathic constipation    Generalized abdominal pain    SIRS (systemic inflammatory response syndrome) (HCC)    Ischemic colitis (HCC)    Septic shock (HCC)    Acute metabolic encephalopathy    S/P exploratory laparotomy       Past Medical History  Past Medical History:   Diagnosis Date    Asthma     Atrial fibrillation (HCC)     Diabetes (HCC)     Hyperlipidemia     Hypertension     Legionnaire's disease (HCC)        Past Surgical History  Past Surgical History:   Procedure Laterality Date    FL LUMBAR PUNCTURE DIAGNOSTIC  7/13/2014    IR EMBOLIZATION (SPECIFY VESSEL OR SITE)  12/12/2022    LAPAROTOMY N/A 10/29/2024    Procedure: LAPAROTOMY EXPLORATORY, EXTENDED RIGHT HEMICOLECTOMY, WITH abthera VAC placement;  Surgeon: Luke Medina DO;  Location: [facility];  Service: General    LAPAROTOMY N/A 10/31/2024    Procedure: LAPAROTOMY EXPLORATORY, 2nd look, washout, ileostomy, abdominal closure, placement of wound vac;  Surgeon: Hector Pinon MD;  Location: [facility];  Service: General    LUNG LOBECTOMY      CT LAPS ABD PRTM&OMENTUM DX W/WO SPEC BR/WA SPX N/A  10/29/2024    Procedure: LAPAROSCOPY DIAGNOSTIC CONVERTED TO OPEN;  Surgeon: Luke Wasserman DO;  Location: [facility];  Service: General       Recent Imaging  XR chest portable ICU   Final Result by Rebeka Mendoza MD (11/03 0604)      Persistent bibasilar opacity, greater on the left, with loss of the diaphragm, which could be due to atelectasis and/or pneumonia. Left pleural effusion not excluded.            Workstation performed: [id number]         X-ray chest 1 view   Final Result by Deepak Smith MD (10/30 0648)      1. Linear atelectasis at the right lung base.   2. Retrocardiac density may represent infiltrate and/or atelectasis.            Workstation performed: [id number]         CT abdomen pelvis wo contrast   Final Result by Zoltan Sierra MD (10/29 2125)      1.  Redemonstrated diffuse thickening of the colon with increase in mesenteric fat stranding in the left lower quadrant and suggestion of pneumatosis in the hepatic flexure and proximal transverse colon concerning for ischemic colitis.   2.  The lower esophagus is patulous and fluid-filled which may be due to gastroesophageal reflux or esophageal dysmotility. Small to moderate hiatal hernia. The stomach is distended with fluid. Aspiration precautions is recommended.      I personally discussed this study with LUKE WASSERMAN on 10/29/2024 9:12 PM.            Workstation performed: [id number]         CT high volume bleeding scan abdomen pelvis   Final Result by Mike Rodgers MD (10/29 1028)      1. No CT evidence of active high-volume gastrointestinal hemorrhage.   2.  Moderate diffuse colonic distention with mild wall thickening suggested at the distal transverse and descending colon with air-fluid levels proximally. Findings may be related to colitis.   3. Mild distention of the esophagus with moderate gaseous distention. This is nonspecific and could be related to gastritis/reflux.   4. Low-attenuation in the uterus centrally, more  prominent than expected for the patient's age. Recommend follow-up evaluation with nonemergent pelvic ultrasound to assess for abnormal endometrial thickening.      The study was marked in EPIC for immediate notification.      Workstation performed: [id number]         CT chest without contrast   Final Result by Mike Rodgers MD (10/29 1013)      1. New mild distention of the esophagus with fluid, question related to reflux or esophageal dysfunction.                  Workstation performed: [id number]         XR chest 1 view portable   Final Result by Alo Pastrana MD (10/29 0843)      No acute cardiopulmonary disease.            Workstation performed: [id number]         CT chest abdomen pelvis w contrast    (Results Pending)       Recent Vital Signs  Vitals:    11/04/24 1300 11/04/24 1400 11/04/24 1405 11/04/24 1500   BP: (!) 171/77 162/78  146/69   BP Location: Right arm Right arm  Right arm   Pulse: 88 72  74   Resp: 20 18  18   Temp:    97.6 °F (36.4 °C)   TempSrc:    Oral   SpO2: 91% 95% 93% 94%   Weight:       Height:            11/04/24 1301   PT Last Visit   PT Visit Date 11/04/24   Note Type   Note type Evaluation   Pain Assessment   Pain Assessment Tool 0-10   Pain Score No Pain   Restrictions/Precautions   Weight Bearing Precautions Per Order No   Other Precautions Chair Alarm;Bed Alarm;Fall Risk;Multiple lines;Telemetry;O2   Home Living   Type of Home House   Home Layout Two level;Bed/bath upstairs;1/2 bath on main level;Stairs to enter with rails  (2 [place])   Bathroom Shower/Tub Walk-in shower   Bathroom Toilet Standard   Bathroom Equipment Hand-held shower   Bathroom Accessibility Accessible   Home Equipment Walker   Additional Comments Reports no AD for mobility at baseline   Prior Function   Level of [place] Independent with functional mobility;Independent with ADLs;Independent with IADLS  (Pt reports more recently she has been needing more A with ADL performance.)   Lives With Alone    Receives Help From Family  (2 dtrs, 4 grands, 7 greats)   IADLs Independent with driving;Independent with meal prep;Independent with medication management   Falls in the last 6 months 0   Vocational Retired   General   Family/Caregiver Present No   Cognition   Overall Cognitive Status WFL   Attention Within functional limits   Orientation Level Oriented X4   Memory Within functional limits   Following Commands Follows all commands and directions without difficulty   Comments Pt agreeable to PT evaluation   RLE Assessment   RLE Assessment WFL  (grossly 4-/5 observed throught functional mobility)   LLE Assessment   LLE Assessment WFL  (grossly 4-/5 observed throught functional mobility)   Vision-Basic Assessment   Current Vision No visual deficits   Bed Mobility   Additional Comments Pt OOB at start and end of session   Transfers   Sit to Stand 4  Minimal assistance   Additional items Assist x 2;Armrests;Increased time required;Verbal cues   Stand to Sit 4  Minimal assistance   Additional items Assist x 2;Armrests;Increased time required;Verbal cues   Additional Comments Pt remained standing for ~ 3 minutes with VC and min A x1 for bathroom hygeine   Balance   Static Sitting Good   Dynamic Sitting Fair +   Static Standing Fair -   Dynamic Standing Poor +   Activity Tolerance   Activity Tolerance Patient limited by fatigue   Nurse Made Aware FARA [place]   Assessment   Prognosis Good   Problem List Decreased strength;Decreased endurance;Impaired balance;Decreased mobility   Assessment Bertha Brown is a 84 y.o. female admitted to [facility] 10/[facility]/2024 for Septic shock (HCC). Pt  has a past medical history of Asthma, Atrial fibrillation (HCC), Diabetes (HCC), Hyperlipidemia, Hypertension, and Legionnaire's disease (HCC).. Order placed for PT eval and tx. PT was consulted and pt was seen on 11/4/2024 for mobility assessment and d/c planning. Chart review and two person identifiers were completed.   Currently pt presents  with decreased strength , decreased dynamic sitting balance , decreased static standing balance, decreased dynamic standing balance , and decreased muscular endurance . Due to these impairments, they will require assistance to perform bed mobility, sit to stand , ambulation, stair negotiation, and transfers. Pt is currently functioning at a  minimum assistance x2 level for transfers. These activity limitations significantly impact their ability to participate in previous home and community roles and responsibilities  and ambulation in home. The patient's AM-PAC Basic Mobility Inpatient Short Form Raw Score is 10. PT recommends level II moderate resource intensity. They will benefit from skilled therapy to to reduce the risk of falls and to maximize functional potential.   Barriers to Discharge Other (Comment)  (decline in functional mobility)   Goals   STG Expiration Date 11/14/24   Short Term Goal #1 Within 10 days patient will complete: 1) Bed mobility skills with modified independent assistance to facilitate safe return to previous living environment 2) Functional transfers with supervision assistance x1 to facilitate safe return to previous living environment  3) Ambulation with least restrictive AD supervision assistance x1 without LOB and stable vitals for safe ambulation home/ community distances. 4) Stair training up/down flight 2 step/s with appropriate rail/s and minimum assistance x1 for safe access to previous living environment. 5) Improve balance grades to fair + to reduce risk of falls. 6)Improve LE strength grades by 1 to increase independence w/ transfers and gait.  7) PT for ongoing pt and family education; DME needs and D/C planning to promote highest level of function in least restrictive environment.   Plan   Treatment/Interventions Functional transfer training;LE strengthening/ROM;Elevations;Therapeutic exercise;Endurance training;Patient/family training;Equipment eval/education;Bed  mobility;Gait training;Continued evaluation;OT   PT Frequency 3-5x/wk   Discharge Recommendation   Rehab Resource Intensity Level, PT II (Moderate Resource Intensity)   Equipment Recommended Walker   Walker Package Recommended Wheeled walker   AM-PAC Basic Mobility Inpatient   Turning in Flat Bed Without Bedrails 2   Lying on Back to Sitting on Edge of Flat Bed Without Bedrails 2   Moving Bed to Chair 2   Standing Up From Chair Using Arms 2   Walk in Room 1   Climb 3-5 Stairs With Railing 1   Basic Mobility Inpatient Raw Score 10   Turning Head Towards Sound 4   Follow Simple Instructions 4   Low Function Basic Mobility Raw Score  18   Low Function Basic Mobility Standardized Score  29.25   [facility] Highest Level Of Mobility   -[facility] Goal 4: Move to chair/commode   -[facility] Achieved 5: Stand (1 or more minutes)   End of Consult   Patient Position at End of Consult Bedside chair;Bed/Chair alarm activated;All needs within reach     Recommendations                                                                                                              Pt will benefit from continued skilled IP PT to address the above mentioned impairments in order to maximize recovery and increase functional independence when completing mobility and ADLs. See flow sheet for goals and POC.     PT Evaluation Time: 4720-3641    Matthew Prado, PT, DPT

## 2024-11-04 NOTE — CASE MANAGEMENT
Case Management Discharge Planning Note    Patient name Bertha Brown  Location ICU 05/ICU 05 MRN [id number]  : 1940 Date 2024       Current Admission Date: 10/29/2024  Current Admission Diagnosis:Septic shock (HCC)   Patient Active Problem List    Diagnosis Date Noted Date Diagnosed    S/P exploratory laparotomy 2024     Ischemic colitis (HCC) 10/30/2024     Septic shock (HCC) 10/30/2024     Acute metabolic encephalopathy 10/30/2024     Coffee ground emesis 10/29/2024     RUKHSANA (acute kidney injury) (HCC) 10/29/2024     Chronic idiopathic constipation 10/29/2024     Generalized abdominal pain 10/29/2024     SIRS (systemic inflammatory response syndrome) ([facility]) 10/29/2024     Constipation 2022     Hypoxia 12/15/2022     Rectus sheath hematoma 2022     Bronchospasm with bronchitis, acute 2022     Chest pain 2022     Leukocytosis 2022     Chest tightness 2022     Diabetes (HCC) 2022     Obesity 2022     Bilateral flank pain 2022     Hemarthrosis involving knee joint, right 10/01/2020     Ambulatory dysfunction 10/01/2020     Paroxysmal atrial fibrillation (HCC) 2020     Acute respiratory failure with hypoxia (HCC) 2020     Hypothyroidism 2020     Hypertension 2020     Hyperlipidemia 2020     Major depressive disorder, single episode, mild (HCC) 2019     Osteoarthritis of knee 2017       LOS (days): 6  Geometric Mean LOS (GMLOS) (days): 9.6  Days to GMLOS:3.3     OBJECTIVE:  Risk of Unplanned Readmission Score: 25.83         Current admission status: Inpatient   Preferred Pharmacy:   [id number] - [place] - [address]  [address]  [facility] [place] [address]  Phone: [phone] Fax: [phone]    Primary Care Provider: Jarred Armstrong DO    Primary Insurance: [facility] REP  Secondary Insurance:     DISCHARGE DETAILS:                                          Other  Referral/Resources/Interventions Provided:  Referral Comments: Pt now extubated;  on HFNC at 8 L.  IV ofirmev, IV lopressor, IV protonix, IV abx, james, colostomy, NGT, VAC, IV amioderone, IV hep gtt, IV KCl, IV K phos, IV dilaudid, CT C/A/P         Treatment Team Recommendation: Other (TBD)  Discharge Destination Plan:: Other (TBD)  Transport at Discharge : Other (Comment) (TBD)

## 2024-11-04 NOTE — OCCUPATIONAL THERAPY NOTE
Occupational Therapy Evaluation      Bertha Brown    11/4/2024    Principal Problem:    Septic shock (HCC)  Active Problems:    Paroxysmal atrial fibrillation (HCC)    Acute respiratory failure with hypoxia (HCC)    Hypothyroidism    Hypertension    Hyperlipidemia    Diabetes (HCC)    Obesity    Coffee ground emesis    RUKHSANA (acute kidney injury) (HCC)    Chronic idiopathic constipation    Generalized abdominal pain    SIRS (systemic inflammatory response syndrome) (HCC)    Ischemic colitis (HCC)    Acute metabolic encephalopathy    S/P exploratory laparotomy      Past Medical History:   Diagnosis Date    Asthma     Atrial fibrillation (HCC)     Diabetes (HCC)     Hyperlipidemia     Hypertension     Legionnaire's disease (HCC)        Past Surgical History:   Procedure Laterality Date    FL LUMBAR PUNCTURE DIAGNOSTIC  7/13/2014    IR EMBOLIZATION (SPECIFY VESSEL OR SITE)  12/12/2022    LAPAROTOMY N/A 10/29/2024    Procedure: LAPAROTOMY EXPLORATORY, EXTENDED RIGHT HEMICOLECTOMY, WITH abthera VAC placement;  Surgeon: Luke Medina DO;  Location: [facility];  Service: General    LAPAROTOMY N/A 10/31/2024    Procedure: LAPAROTOMY EXPLORATORY, 2nd look, washout, ileostomy, abdominal closure, placement of wound vac;  Surgeon: Hector Pinon MD;  Location: [facility];  Service: General    LUNG LOBECTOMY      MD LAPS ABD PRTM&OMENTUM DX W/WO SPEC BR/WA SPX N/A 10/29/2024    Procedure: LAPAROSCOPY DIAGNOSTIC CONVERTED TO OPEN;  Surgeon: Luke Medina DO;  Location: [facility];  Service: General       11/04/24 1032   OT Last Visit   OT Visit Date 11/04/24   Note Type   Note type Evaluation   Pain Assessment   Pain Assessment Tool 0-10   Pain Score No Pain   Restrictions/Precautions   Other Precautions Chair Alarm;Multiple lines;Telemetry;O2;Fall Risk   Home Living   Type of Home House   Home Layout Two level;Bed/bath upstairs;1/2 bath on main level;Stairs to enter with rails  (2STE)   Bathroom Shower/Tub Walk-in shower    Bathroom Toilet Standard   Bathroom Equipment Hand-held shower   Bathroom Accessibility Accessible   Home Equipment Walker   Additional Comments Reports no AD for mobility at baseline   Prior Function   Level of [place] Independent with ADLs;Independent with functional mobility;Independent with IADLS  (Pt reports more recently she has been needing more A with ADL performance.)   Lives With Alone   Receives Help From Family  (2 dtrs, 4 grands, 7 greats)   IADLs Independent with driving;Independent with meal prep;Independent with medication management  (cleaning and laundry with Ind)   Falls in the last 6 months 0   Vocational Retired   Lifestyle   Autonomy Pt reports PLOF was Ind with ADLs, IADLs, and (+) driving   Reciprocal Relationships 2 dtrs, 4 grands, 7 greats   ADL   Eating Assistance   (NPO)   Grooming Assistance 4  Minimal Assistance   Grooming Deficit Increased time to complete;Supervision/safety;Verbal cueing;Steadying;Setup   UB Bathing Assistance 3  Moderate Assistance   UB Bathing Deficit Increased time to complete;Supervision/safety;Verbal cueing;Steadying;Setup   LB Bathing Assistance 2  Maximal Assistance   LB Bathing Deficit Increased time to complete;Supervision/safety;Verbal cueing;Steadying;Setup   UB Dressing Assistance 3  Moderate Assistance   UB Dressing Deficit Increased time to complete;Supervision/safety;Verbal cueing;Steadying;Setup   LB Dressing Assistance 2  Maximal Assistance   LB Dressing Deficit Increased time to complete;Supervision/safety;Verbal cueing;Steadying;Setup   Toileting Assistance  2  Maximal Assistance   Toileting Deficit Increased time to complete;Supervison/safety;Verbal cueing;Steadying;Setup   Functional Assistance 3  Moderate Assistance   Functional Deficit Increased time to complete;Supervision/safety;Verbal cueing;Steadying;Setup   Bed Mobility   Additional Comments Pt was oob to chair when OT arrived   Transfers   Sit to Stand 4  Minimal assistance    Additional items Assist x 2;Increased time required;Verbal cues;Armrests   Stand to Sit 4  Minimal assistance   Additional items Assist x 2;Increased time required;Verbal cues;Armrests   Balance   Static Sitting Good   Dynamic Sitting Fair +   Static Standing Fair -   Dynamic Standing Poor +   Activity Tolerance   Activity Tolerance Patient limited by fatigue   Nurse Made Aware Hanna SORENSEN Assessment   RUE Assessment   (functionally assessed: ROM WFL/ +3/5)   LUE Assessment   LUE Assessment   (functionally assessed: ROM WFL/ +3/5)   Hand Function   Gross Motor Coordination Functional   Fine Motor Coordination Functional   Sensation   Light Touch No apparent deficits   Sharp/Dull No apparent deficits   Stereognosis No apparent deficits   Proprioception   Proprioception No apparent deficits   Vision-Basic Assessment   Current Vision No visual deficits   Vision - Complex Assessment   Ocular Range of Motion Intact   Psychosocial   Psychosocial (WDL) WDL   Perception   Inattention/Neglect Appears intact   Cognition   Overall Cognitive Status WFL   Arousal/Participation Alert;Cooperative   Attention Within functional limits   Orientation Level Oriented X4   Memory Within functional limits   Following Commands Follows all commands and directions without difficulty   Comments Pt was agreeable to OT eval   Assessment   Limitation Decreased ADL status;Decreased Safe judgement during ADL;Decreased UE strength;Decreased endurance;Decreased self-care trans;Decreased high-level ADLs   Prognosis Good   Assessment Pt is a 84 y.o. female seen for OT evaluation s/p admit to [facility] 10/29/[facility]24 w/ Septic shock (HCC). Comorbidities affecting pt's functional performance at time of assessment include:RUKHSANA, Afib, HTN, obesity, ARF, hypothyroidism, diabetes, coffee ground emesis, ischemic colitis, acute metabolic encephalopathy, and gen. abdominal pain . Orders placed for OT evaluation and treatment.  Performed at least two  patient identifiers during session including name and wristband. Personal factors affecting pt at time of IE include:steps to enter environment, limited home support, difficulty performing ADLS, difficulty performing IADLS , decreased initiation and engagement , financial barriers, health management , and environment. Prior to admission, pt reports Ind with ADLs, Ind with IADLs, and (+) driving.  Pt reports more recently she has been needing more A with ADL performance. Upon evaluation: Pt requires mod A with UB ADLs, max A with LB ADLs, and min A of 2 with xfers 2* the following deficits impacting occupational performance: weakness, decreased strength, decreased dynamic sit/ stand balance, decreased activity tolerance, decreased standing tolerance time for self care and functional mobility, environmental deficits, decreased mobilty, and requiring external assistance to complete transitional movements. Pt to benefit from continued skilled OT tx while in the hospital to address deficits as defined above and maximize level of functional independence w ADL's and functional mobility. Occupational Performance areas to address include: bathing/shower, toilet hygiene, dressing, medication management, health maintenance, functional mobility, community mobility, clothing management, cleaning, meal prep, money management, and household maintenance. From OT standpoint, recommendation at time of d/c would be Level 2 (Mod Resource Intensity).   Plan   Treatment Interventions ADL retraining;Functional transfer training;UE strengthening/ROM;Endurance training;Patient/family training;Equipment evaluation/education;Compensatory technique education;Continued evaluation;Energy conservation;Activityengagement;Cardiac education   Goal Expiration Date 11/17/24   OT Frequency 3-5x/wk   Discharge Recommendation   Rehab Resource Intensity Level, OT II (Moderate Resource Intensity)   AM-PAC Daily Activity Inpatient   Lower Body Dressing 2    Bathing 2   Toileting 2   Upper Body Dressing 2   Grooming 3   Eating 1   Daily Activity Raw Score 12   Daily Activity Standardized Score (Calc for Raw Score >=11) 30.6   AM-PAC Applied Cognition Inpatient   Following a Speech/Presentation 4   Understanding Ordinary Conversation 4   Taking Medications 4   Remembering Where Things Are Placed or Put Away 4   Remembering List of 4-5 Errands 4   Taking Care of Complicated Tasks 4   Applied Cognition Raw Score 24   Applied Cognition Standardized Score 62.21     Occupational Therapy goals: In 7-14 days:    Patient will verbalize and demonstrate use of energy conservation/ deep breathing technique and work simplification skills during functional activity with no verbal cues.    Patient will verbalize and demonstrate good body mechanics and joint protection techniques during  ADLs/ IADLs with no verbal cues.    Patient will increase OOB/ sitting tolerance to 2-4 hours per day for increased participation in self care and leisure tasks with no s/s of exertion.    Patient will increase standing tolerance time to 5  minutes with unilateral UE support to complete sink level ADLs@ mod I level.    Patient will increase sitting tolerance at edge of bed to 20 minutes to complete UB ADLs @ set up assist level.    Patient will transfer bed to Chair / toilet at Set up assist level with AD as indicated.    Patient will complete UB ADLs with set up assist.    Patient will complete LB ADLs with min assist with the use of adaptive equipment.    Patient will complete toileting hygiene with set up assist/ supervision for thoroughness.    Patient/ Family will demonstrate competency with UE Home Exercise Program.       Smita Kim MS OTR/L

## 2024-11-04 NOTE — RESPIRATORY THERAPY NOTE
RT Protocol Note  Bertha Brown 84 y.o. female MRN: [id number]  Unit/Bed#: ICU 05 Encounter: [id number]    Assessment    Principal Problem:    Septic shock (HCC)  Active Problems:    Paroxysmal atrial fibrillation (HCC)    Acute respiratory failure with hypoxia (HCC)    Hypothyroidism    Hypertension    Hyperlipidemia    Diabetes (HCC)    Obesity    Coffee ground emesis    RUKHSANA (acute kidney injury) (HCC)    Chronic idiopathic constipation    Generalized abdominal pain    SIRS (systemic inflammatory response syndrome) (HCC)    Ischemic colitis (HCC)    Acute metabolic encephalopathy    S/P exploratory laparotomy      Home Pulmonary Medications:         Past Medical History:   Diagnosis Date    Asthma     Atrial fibrillation (HCC)     Diabetes (HCC)     Hyperlipidemia     Hypertension     Legionnaire's disease (HCC)      Social History     Socioeconomic History    Marital status: /Civil Union     Spouse name: None    Number of children: None    Years of education: None    Highest education level: None   Occupational History    None   Tobacco Use    Smoking status: Never    Smokeless tobacco: Never   Vaping Use    Vaping status: Never Used   Substance and Sexual Activity    Alcohol use: Never    Drug use: Never    Sexual activity: None   Other Topics Concern    None   Social History Narrative    None     Social Determinants of Health     Financial Resource Strain: Not on file   Food Insecurity: No Food Insecurity (10/31/2024)    Nursing - Inadequate Food Risk Classification     Worried About Running Out of Food in the Last Year: Never true     Ran Out of Food in the Last Year: Never true     Ran Out of Food in the Last Year: 1   Transportation Needs: No Transportation Needs (10/31/2024)    Nursing - Transportation Risk Classification     Lack of Transportation: Not on file     Lack of Transportation: 2   Physical Activity: Not on file   Stress: Not on file   Social Connections: Unknown (6/18/2024)    Received  "from [employer]    Social Connections     How often do you feel lonely or isolated from those around you? (Adult - for ages 18 years and over): Not on file   Intimate Partner Violence: Unknown (10/29/2024)    Nursing IPS     Feels Physically and Emotionally Safe: Not on file     Physically Hurt by Someone: Not on file     Humiliated or Emotionally Abused by Someone: Not on file     Physically Hurt by Someone: 2     Hurt or Threatened by Someone: 2   Housing Stability: Unknown (10/31/2024)    Nursing: Inadequate Housing Risk Classification     Has Housing: Not on file     Worried About Losing Housing: Not on file     Unable to Get Utilities: Not on file     Unable to Pay for Housing in the Last Year: 2     Has Housin       Subjective         Objective    Physical Exam:   Assessment Type: (P) Assess only  General Appearance: (P) Awake, Alert  Respiratory Pattern: (P) Normal  Chest Assessment: (P) Chest expansion symmetrical  Bilateral Breath Sounds: (P) Diminished, Clear  Cough: (P) None  O2 Device: (P) MFNC    Vitals:  Blood pressure (!) 173/81, pulse 77, temperature 98.8 °F (37.1 °C), temperature source Axillary, resp. rate 21, height 5' 5\" (1.651 m), weight 105 kg (232 lb 5.8 oz), SpO2 (P) 94%.    Results from last 7 days   Lab Units 24  0559   PH ART  7.354   PCO2 ART mm Hg 32.8*   PO2 ART mm Hg 77.1   HCO3 ART mmol/L 17.9*   BASE EXC ART mmol/L -6.8   O2 CONTENT ART mL/dL 13.3*   O2 HGB, ARTERIAL % 93.3*   ABG SOURCE  Line, Arterial       Imaging and other studies: Results Review Statement: No pertinent imaging studies reviewed.    O2 Device: (P) MFNC     Plan    Respiratory Plan: Vent/NIV/HFNC        Resp Comments: (P) Pt resting comfortably and in no apparent distress.  Pt remains on MFNC; liter flow decreased in an attempt to wean.  Will cont to wean as able.   "

## 2024-11-04 NOTE — ASSESSMENT & PLAN NOTE
Lab Results   Component Value Date    HGBA1C 7.0 (H) 08/22/2024       Recent Labs     11/02/24  2329 11/03/24  1149 11/03/24  1706 11/03/24  2316   POCGLU 103 91 104 119       Blood Sugar Average: Last 72 hrs:  (P) 111.8  Accuchecks and SSI Q6h

## 2024-11-05 PROBLEM — E87.0 HYPERNATREMIA: Status: ACTIVE | Noted: 2024-11-05

## 2024-11-05 PROBLEM — D64.9 ANEMIA: Status: ACTIVE | Noted: 2024-11-05

## 2024-11-05 LAB
ANION GAP SERPL CALCULATED.3IONS-SCNC: 8 MMOL/L (ref 4–13)
APTT PPP: 100 SECONDS (ref 23–34)
APTT PPP: 65 SECONDS (ref 23–34)
BASOPHILS # BLD MANUAL: 0 THOUSAND/UL (ref 0–0.1)
BASOPHILS NFR MAR MANUAL: 0 % (ref 0–1)
BUN SERPL-MCNC: 19 MG/DL (ref 5–25)
CA-I BLD-SCNC: 0.96 MMOL/L (ref 1.12–1.32)
CALCIUM SERPL-MCNC: 7.2 MG/DL (ref 8.4–10.2)
CHLORIDE SERPL-SCNC: 113 MMOL/L (ref 96–108)
CO2 SERPL-SCNC: 27 MMOL/L (ref 21–32)
CREAT SERPL-MCNC: 0.98 MG/DL (ref 0.6–1.3)
EOSINOPHIL # BLD MANUAL: 0 THOUSAND/UL (ref 0–0.4)
EOSINOPHIL NFR BLD MANUAL: 0 % (ref 0–6)
ERYTHROCYTE [DISTWIDTH] IN BLOOD BY AUTOMATED COUNT: 15.9 % (ref 11.6–15.1)
GFR SERPL CREATININE-BSD FRML MDRD: 53 ML/MIN/1.73SQ M
GLUCOSE SERPL-MCNC: 119 MG/DL (ref 65–140)
GLUCOSE SERPL-MCNC: 124 MG/DL (ref 65–140)
GLUCOSE SERPL-MCNC: 138 MG/DL (ref 65–140)
GLUCOSE SERPL-MCNC: 157 MG/DL (ref 65–140)
HCT VFR BLD AUTO: 27.2 % (ref 34.8–46.1)
HGB BLD-MCNC: 8.5 G/DL (ref 11.5–15.4)
LYMPHOCYTES # BLD AUTO: 0.78 THOUSAND/UL (ref 0.6–4.47)
LYMPHOCYTES # BLD AUTO: 5 % (ref 14–44)
MAGNESIUM SERPL-MCNC: 2.3 MG/DL (ref 1.9–2.7)
MCH RBC QN AUTO: 30.8 PG (ref 26.8–34.3)
MCHC RBC AUTO-ENTMCNC: 31.3 G/DL (ref 31.4–37.4)
MCV RBC AUTO: 99 FL (ref 82–98)
METAMYELOCYTE ABSOLUTE CT: 0.31 THOUSAND/UL (ref 0–0.1)
METAMYELOCYTES NFR BLD MANUAL: 2 % (ref 0–1)
MONOCYTES # BLD AUTO: 0.31 THOUSAND/UL (ref 0–1.22)
MONOCYTES NFR BLD: 2 % (ref 4–12)
MYELOCYTE ABSOLUTE CT: 0.16 THOUSAND/UL (ref 0–0.1)
MYELOCYTES NFR BLD MANUAL: 1 % (ref 0–1)
NEUTROPHILS # BLD MANUAL: 14.09 THOUSAND/UL (ref 1.85–7.62)
NEUTS BAND NFR BLD MANUAL: 6 % (ref 0–8)
NEUTS SEG NFR BLD AUTO: 84 % (ref 43–75)
NRBC BLD AUTO-RTO: 4 /100 WBC (ref 0–2)
PHOSPHATE SERPL-MCNC: 1.5 MG/DL (ref 2.3–4.1)
PLATELET # BLD AUTO: 179 THOUSANDS/UL (ref 149–390)
PLATELET BLD QL SMEAR: ADEQUATE
PMV BLD AUTO: 12 FL (ref 8.9–12.7)
POTASSIUM SERPL-SCNC: 3.6 MMOL/L (ref 3.5–5.3)
RBC # BLD AUTO: 2.76 MILLION/UL (ref 3.81–5.12)
RBC MORPH BLD: NORMAL
SODIUM SERPL-SCNC: 148 MMOL/L (ref 135–147)
WBC # BLD AUTO: 15.65 THOUSAND/UL (ref 4.31–10.16)

## 2024-11-05 PROCEDURE — 82330 ASSAY OF CALCIUM: CPT | Performed by: NURSE PRACTITIONER

## 2024-11-05 PROCEDURE — 92610 EVALUATE SWALLOWING FUNCTION: CPT

## 2024-11-05 PROCEDURE — 99024 POSTOP FOLLOW-UP VISIT: CPT | Performed by: PHYSICIAN ASSISTANT

## 2024-11-05 PROCEDURE — 97606 NEG PRS WND THER DME>50 SQCM: CPT | Performed by: PHYSICIAN ASSISTANT

## 2024-11-05 PROCEDURE — 94760 N-INVAS EAR/PLS OXIMETRY 1: CPT

## 2024-11-05 PROCEDURE — 85007 BL SMEAR W/DIFF WBC COUNT: CPT | Performed by: NURSE PRACTITIONER

## 2024-11-05 PROCEDURE — 83735 ASSAY OF MAGNESIUM: CPT | Performed by: NURSE PRACTITIONER

## 2024-11-05 PROCEDURE — 82948 REAGENT STRIP/BLOOD GLUCOSE: CPT

## 2024-11-05 PROCEDURE — 85027 COMPLETE CBC AUTOMATED: CPT | Performed by: NURSE PRACTITIONER

## 2024-11-05 PROCEDURE — 85730 THROMBOPLASTIN TIME PARTIAL: CPT | Performed by: NURSE PRACTITIONER

## 2024-11-05 PROCEDURE — 99223 1ST HOSP IP/OBS HIGH 75: CPT | Performed by: STUDENT IN AN ORGANIZED HEALTH CARE EDUCATION/TRAINING PROGRAM

## 2024-11-05 PROCEDURE — 84100 ASSAY OF PHOSPHORUS: CPT | Performed by: NURSE PRACTITIONER

## 2024-11-05 PROCEDURE — 80048 BASIC METABOLIC PNL TOTAL CA: CPT | Performed by: NURSE PRACTITIONER

## 2024-11-05 PROCEDURE — 99233 SBSQ HOSP IP/OBS HIGH 50: CPT | Performed by: FAMILY MEDICINE

## 2024-11-05 PROCEDURE — 94660 CPAP INITIATION&MGMT: CPT

## 2024-11-05 RX ORDER — METOPROLOL TARTRATE 1 MG/ML
5 INJECTION, SOLUTION INTRAVENOUS ONCE
Status: COMPLETED | OUTPATIENT
Start: 2024-11-05 | End: 2024-11-05

## 2024-11-05 RX ORDER — DILTIAZEM HYDROCHLORIDE 5 MG/ML
10 INJECTION INTRAVENOUS ONCE
Status: COMPLETED | OUTPATIENT
Start: 2024-11-05 | End: 2024-11-05

## 2024-11-05 RX ORDER — DEXTROSE MONOHYDRATE AND SODIUM CHLORIDE 5; .45 G/100ML; G/100ML
75 INJECTION, SOLUTION INTRAVENOUS CONTINUOUS
Status: DISCONTINUED | OUTPATIENT
Start: 2024-11-05 | End: 2024-11-07

## 2024-11-05 RX ORDER — ONDANSETRON 2 MG/ML
4 INJECTION INTRAMUSCULAR; INTRAVENOUS EVERY 6 HOURS PRN
Status: CANCELLED | OUTPATIENT
Start: 2024-11-05

## 2024-11-05 RX ORDER — DILTIAZEM HYDROCHLORIDE 5 MG/ML
15 INJECTION INTRAVENOUS ONCE
Status: COMPLETED | OUTPATIENT
Start: 2024-11-05 | End: 2024-11-05

## 2024-11-05 RX ORDER — ONDANSETRON 2 MG/ML
4 INJECTION INTRAMUSCULAR; INTRAVENOUS EVERY 4 HOURS PRN
Status: DISCONTINUED | OUTPATIENT
Start: 2024-11-05 | End: 2024-11-11 | Stop reason: HOSPADM

## 2024-11-05 RX ADMIN — DILTIAZEM HYDROCHLORIDE 10 MG: 5 INJECTION INTRAVENOUS at 08:42

## 2024-11-05 RX ADMIN — AMIODARONE HYDROCHLORIDE 1 MG/MIN: 50 INJECTION, SOLUTION INTRAVENOUS at 12:59

## 2024-11-05 RX ADMIN — PIPERACILLIN AND TAZOBACTAM 4.5 G: 36; 4.5 INJECTION, POWDER, FOR SOLUTION INTRAVENOUS at 15:18

## 2024-11-05 RX ADMIN — METOROPROLOL TARTRATE 5 MG: 5 INJECTION, SOLUTION INTRAVENOUS at 15:07

## 2024-11-05 RX ADMIN — DEXTROSE 150 MG: 50 INJECTION, SOLUTION INTRAVENOUS at 11:45

## 2024-11-05 RX ADMIN — ACETAMINOPHEN 1000 MG: 10 INJECTION INTRAVENOUS at 00:29

## 2024-11-05 RX ADMIN — PIPERACILLIN AND TAZOBACTAM 4.5 G: 36; 4.5 INJECTION, POWDER, FOR SOLUTION INTRAVENOUS at 04:57

## 2024-11-05 RX ADMIN — ACETAMINOPHEN 1000 MG: 10 INJECTION INTRAVENOUS at 05:05

## 2024-11-05 RX ADMIN — DEXTROSE AND SODIUM CHLORIDE 75 ML/HR: 5; .45 INJECTION, SOLUTION INTRAVENOUS at 14:59

## 2024-11-05 RX ADMIN — METOROPROLOL TARTRATE 5 MG: 5 INJECTION, SOLUTION INTRAVENOUS at 08:09

## 2024-11-05 RX ADMIN — ACETAMINOPHEN 1000 MG: 10 INJECTION INTRAVENOUS at 15:05

## 2024-11-05 RX ADMIN — HEPARIN SODIUM 15.1 UNITS/KG/HR: 10000 INJECTION, SOLUTION INTRAVENOUS at 05:33

## 2024-11-05 RX ADMIN — AMIODARONE HYDROCHLORIDE 0.5 MG/MIN: 50 INJECTION, SOLUTION INTRAVENOUS at 17:43

## 2024-11-05 RX ADMIN — METOROPROLOL TARTRATE 5 MG: 5 INJECTION, SOLUTION INTRAVENOUS at 02:42

## 2024-11-05 RX ADMIN — METOROPROLOL TARTRATE 5 MG: 5 INJECTION, SOLUTION INTRAVENOUS at 09:54

## 2024-11-05 RX ADMIN — PANTOPRAZOLE SODIUM 40 MG: 40 INJECTION, POWDER, FOR SOLUTION INTRAVENOUS at 08:09

## 2024-11-05 RX ADMIN — DILTIAZEM HYDROCHLORIDE 15 MG: 5 INJECTION INTRAVENOUS at 23:29

## 2024-11-05 RX ADMIN — AMIODARONE HYDROCHLORIDE 0.5 MG/MIN: 50 INJECTION, SOLUTION INTRAVENOUS at 12:29

## 2024-11-05 NOTE — WOUND OSTOMY CARE
Progress Note- Ostomy  Bertha Brown 84 y.o. female  [id number]  ICU 05-ICU 05 ([facility] SCAN)        Assessment:  Patient is POD # 5 loop ileostomy and mucus fistula creation. Seen for ostomy education and teaching. Introduced self and role to patient. Patient is agreeable to visit. Patient states she would like her daughter present for teaching/education and does not feel confident she will remember the information on her own. Pouch change was performed today during wound VAC change with General Surgery PA.     Topics reviewed: how and when to empty (1/3 full) to prevent pulling/lifting of the barrier, importance & how to clean the end of the pouch to prevent odor,  frequency of changing of the pouch (every 3-4 days on a schedule), burping, and how to obtain supplies.    Step-by-step pouch change done using 1 piece high out put [employer] pouch. Reviewed with patient how and when to measure the stoma (weekly). Demonstrated how to trace & cut one piece barrier, and how to apply it to the skin using gentle pressure / warmth of the hands. Danna ring was applied around the ileostomy due to divot in skin around the entire stoma. Good seal obtained.      -RLQ ileostomy: Stoma measures 1.75 inches side to side and 0.75 inches top to bottom, os is noted centrally, stoma is slightly budded, beefy red in color, and oval shape, mucocutaneous junction is intact. Yamileth-stomal skin is intact with mild redness/irritation, no wounds, there is a divot/creasing noted to skin around the stoma that was filled in with Danna ring. Effluent is moderate amount of soft brown stool.    -Mid upper quadrant mucus fistula (found at proximal end of surgical incision)- stoma is oval in shape, approx. 1 inch top to bottom and 0.5 inch side to side, is flushed with skin, and beefy red in color, mucocutaneous junction is intact. Yamileth-stomal skin is dry, intact, with mild redness/irritation. Effluent is scant amount of brown liquid stool.    Plan is  "for patient to have VNA at home.    Patient verbalized understanding of education and teaching. Patient is active learner. All questions and concerns answered. Encouraged patient to continue to read the educational materials provided.    Patient gave verbal permission to order sample kits from [employer], [employer], and [employer].    Ostomy Care:  1. Peel back pouch using push-pull method, may use non-alcohol adhesive remover(Purple and white package).  2. Use warm water only to cleanse skin around the stoma (cuca-stomal skin).  3. Make sure all adhesive residue is removed and skin is dry and not oily.  4. Measure stoma size using measuring guide and trace correct measurements onto back of pouch (Off set opening away from abdominal incision).   5. Then cut backing of pouch out to correct shape/size.  6. Apply Danna ring to the barrier around the entire cut-out.  7. Place pouch over stoma and onto skin.  8. Use warmth of hand to apply gentle pressure to help backing of pouch to adhere well to skin.    Ostomy Plan:  1. Encourage patient to read education material at bedside.  2. Encourage patient to ambulate.  3. Encourage patient to participate with emptying/burping pouch.  4. Empty pouch when 1/3 full.      Crusting as needed for moist, red, open skin around the stoma: (Done prior to pouch application)  -Apply stoma powder to skin breakdown, then dust away the excess powder.   -Then use skin barrier film to pat/dab over the powder and let dry to form \"crust\"  Repeat X2 before placing new ostomy pouch         Colostomy RLQ (Active)   Stomal Appliance 1 piece;Changed 11/05/24 1400   Stoma Assessment Red;Budded 11/05/24 1400   Stoma size (in) 1.75 in 11/05/24 1400   Stoma Shape Oval;Budded 11/05/24 1400   Peristomal Assessment Clean;Intact;Pink 11/05/24 1400   Treatment Bag change;Site care 11/05/24 1400   Output (mL) 100 mL 11/05/24 0533   Number of days: 5       Contact through [employer] Secure Chat with any " questions  Wound and Ostomy Care will continue to follow while inpatient    Gris Ibarra BSN RN CWON  Wound and Ostomy care

## 2024-11-05 NOTE — PROCEDURES
ODALIS Change Note    Date: 11/5/2024    Location of wound: midline abdomen    Dimensions of wound: 17 x 4 cm     Description of wound: wound bed with minimal fibrinous strands of slough, no purulent drainage, + granulation tissue in deep wound bed.      Sponges removed:  1 Black Sponges      Sponges placed:  1 Black Sponges      VAC settings:  125 mmHg  Continuous    aTmar Orellana PA-C   11/5/2024

## 2024-11-05 NOTE — ASSESSMENT & PLAN NOTE
-status post exploratory laparotomy with right hemicolectomy on 10/29/2024     -Second look and washout on 10/31/2024 with abdominal wall closure, ileostomy creation, mucous fistula creation, and wound VAC placement and abdominal wall subcutaneous tissue

## 2024-11-05 NOTE — CONSULTS
Consultation - Cardiology   Bertha Brown 84 y.o. female MRN: [id number]  Unit/Bed#: ICU 05 Encounter: [id number]  11/05/24  8:52 AM    Assessment/ Plan:   Assessment & Plan  Paroxysmal atrial fibrillation (HCC)  She is in atrial fibrillation with RVR on telemetry.  Suspect this is secondary to recent surgery and current septic shock/bacteremia and ischemic colitis.  In order to avoid soft BPs in the setting of septic shock/bacteremia, begin amiodarone 150 mg IV bolus followed by gtt.  Her anticoagulation was briefly discontinued, however she is currently on heparin gtt.  Potential thromboembolic risk on amiodarone in the setting of recent anticoagulation interruption was discussed with patient and daughter, who expressed understanding of risks and indication for this medication to avoid soft BPs, and agree with the plan of care.  Continue Lopressor 5 mg IV every 6 hours.  Continue anticoagulation with heparin gtt.  [id number] 5.  She has been deemed okay to resume anticoagulation once stable by general surgery, and was resumed on heparin gtt. 11/3. Continue to monitor hemoglobin closely and monitor closely for signs of bleeding.       Septic shock (HCC)  Septic shock/E. coli bacteremia  Management per primary team.  On IV antibiotics.    Acute respiratory failure with hypoxia (HCC)  Suspected secondary to atelectasis, pulmonary edema, potential aspiration (swallow eval pending)  Ischemic colitis (HCC)  S/p ex-lap with extended right hemicolectomy (10/29/2024), s/p second look washout with ileostomy creation and abdominal closure (10/31/2024)  Management per primary team/general surgery    RUKHSANA (acute kidney injury) (HCC)  Management per primary team    Coffee ground emesis  Management per primary team.  She was evaluated by GI earlier in hospital course, who recommended outpatient follow-up.    Acute metabolic encephalopathy  Management per primary team    Anemia  Management per primary  team    Hypertension  Continue Lopressor per above    Hyperlipidemia  She is on atorvastatin at home; currently NPO    Diabetes (HCC)  Lab Results   Component Value Date    HGBA1C 7.0 (H) 08/22/2024       Recent Labs     11/04/24  1225 11/04/24  1748 11/04/24  2325 11/05/24  0503   POCGLU 133 127 120 124       Blood Sugar Average: Last 72 hrs:  (P) [id number]  Management per primary team        History of Present Illness   Physician Requesting Consult: Anders Lockett MD    Reason for Consult / Principal Problem: Atrial fibrillation    HPI: Bertha Brown is a 84 y.o. year old female with PMHx of paroxysmal atrial fibrillation (diagnosed over 15 years ago in [place], no documented history of DCCV or ablation), hypertension, dyslipidemia, hypothyroidism, COPD, DM2 (A1c 7.0), CKD, history of rectus sheath hematoma causing hemorrhagic shock in 2022, who presents with coffee-ground emesis, nausea, abdominal distention.    She presented to  [facility] 10/29/2024 with coffee-ground emesis, nausea, abdominal distention, and was noted to have RUKHSANA.  She underwent diagnostic laparoscopy converted to open laparotomy and extended right hemicolectomy on 10/29/2024 for ischemic colitis.  She has additionally been diagnosed with septic shock, E. coli bacteremia, as well as acute respiratory failure with hypoxia suspected secondary to atelectasis, pulmonary edema, aspiration.  She was found to have anion gap metabolic acidosis earlier in her hospital course, as well as acute metabolic encephalopathy.  On 10/31, she underwent second look exploratory laparotomy, and underwent washout end ileostomy creation.  Troponins negative x 3 upon initial presentation to ED.  She has been noted to have episodes of atrial fibrillation with RVR on telemetry after her procedures.  Her Coumadin was on hold given concern for GI bleed, and she was initiated on heparin gtt on 11/3.  She was temporarily placed on amiodarone gtt 10/31 until 11/2,  resumed on 11/3 and d/c'd 11/4.  She was noted to have improvement in her heart rates on amiodarone.  She has been placed on Lopressor 5 mg IV every 6 hours, which was initiated on 11/3.  She was noted to be persistently tachycardic in atrial fibrillation this morning, and was given 1 dose of IV Cardizem 10 mg.    Upon discussion with patient and her daughter today, she offers no specific cardiac concerns or complaints. She denies chest pain, SOB, palpitations, lightheadedness.     She follows with [facility] cardiology.  Pharmacologic MPI 5/2024 with normal left ventricular perfusion.  Echocardiogram 5/2024 with EF 70%, mildly dilated left atrium    Inpatient consult to Cardiology  Consult performed by: Brian Rivers PA-C  Consult ordered by: Anders Lockett MD          EKG: SVT versus atrial flutter, nonspecific ST-T wave abnormalities  Tele: Telemetry reveals that she has been intermittently in atrial fibrillation with RVR and sinus rhythm, and she occasionally has brief paroxysms of atrial fibrillation with RVR.  During telemetry review, she was in atrial fibrillation with heart rate in the 150s.  Her heart rate over the past several hours has been averaging in the 140s-150s.  Her heart rate trend was also in the 140s-150s on 11/2-4, with subsequent improvement in heart rate prior to today.    Review of Systems   Constitutional:  Positive for fatigue and fever.   HENT:  Negative for ear pain and sore throat.    Eyes:  Negative for pain and redness.   Respiratory:  Negative for cough and shortness of breath.    Cardiovascular:  Negative for chest pain, palpitations and leg swelling.   Gastrointestinal:  Positive for abdominal distention, constipation, nausea and vomiting.   Genitourinary:  Negative for dysuria.   Skin:  Negative for color change and rash.   Neurological:  Positive for weakness. Negative for dizziness, syncope and light-headedness.   Hematological:  Does not bruise/bleed easily.  "  Psychiatric/Behavioral:  Negative for agitation and behavioral problems.    All other systems reviewed and are negative.      Historical Information   Past Medical History:   Diagnosis Date    Asthma     Atrial fibrillation (HCC)     Diabetes (HCC)     Hyperlipidemia     Hypertension     Legionnaire's disease (HCC)      Past Surgical History:   Procedure Laterality Date    FL LUMBAR PUNCTURE DIAGNOSTIC  2014    IR EMBOLIZATION (SPECIFY VESSEL OR SITE)  2022    LAPAROTOMY N/A 10/29/2024    Procedure: LAPAROTOMY EXPLORATORY, EXTENDED RIGHT HEMICOLECTOMY, WITH abthera VAC placement;  Surgeon: Luke Medina DO;  Location: [facility];  Service: General    LAPAROTOMY N/A 10/31/2024    Procedure: LAPAROTOMY EXPLORATORY, 2nd look, washout, ileostomy, abdominal closure, placement of wound vac;  Surgeon: Hector Pinon MD;  Location: [facility];  Service: General    LUNG LOBECTOMY      NM LAPS ABD PRTM&OMENTUM DX W/WO SPEC BR/WA SPX N/A 10/29/2024    Procedure: LAPAROSCOPY DIAGNOSTIC CONVERTED TO OPEN;  Surgeon: Luke Medina DO;  Location: [facility];  Service: General     Social History     Substance and Sexual Activity   Alcohol Use Never     Social History     Substance and Sexual Activity   Drug Use Never     Social History     Tobacco Use   Smoking Status Never   Smokeless Tobacco Never       Family History: History reviewed. No pertinent family history.    Meds/Allergies   all current active meds have been reviewed  No Known Allergies    Objective   Vitals: Blood pressure (!) 180/85, pulse 78, temperature 98.3 °F (36.8 °C), temperature source Oral, resp. rate 14, height 5' 5\" (1.651 m), weight 105 kg (232 lb 5.8 oz), SpO2 96%., Body mass index is 38.67 kg/m².,   Orthostatic Blood Pressures      Flowsheet Row Most Recent Value   Blood Pressure 180/85 filed at 2024 0700   Patient Position - Orthostatic VS Lying filed at 2024 0300            Systolic (24hrs), Av , Min:145 , Max:180 "     Diastolic (24hrs), Av, Min:67, Max:93        Intake/Output Summary (Last 24 hours) at 2024 0852  Last data filed at 2024 0537  Gross per 24 hour   Intake 1783.12 ml   Output 545 ml   Net 1238.12 ml       Invasive Devices       Peripheral Intravenous Line  Duration             Peripheral IV 24 Distal;Left;Ventral (anterior) Forearm 1 day    Peripheral IV 24 Distal;Right;Ventral (anterior) Forearm 1 day    Peripheral IV 24 Left;Ventral (anterior) Forearm 1 day              Drain  Duration             NG/OG/Enteral Tube Nasogastric 18 Fr Left nare 6 days    Colostomy RLQ 4 days                        Physical Exam:    GEN: Alert and oriented x 3, in no acute distress.  Well appearing and well nourished.   HEENT: Sclera anicteric, conjunctivae pink, mucous membranes moist. Oropharynx clear.   NECK: Supple, no significant JVD. Trachea midline.   HEART:Tachycardic, irregular rhythm, normal S1 and S2, no murmurs, clicks, gallops or rubs. PMI nondisplaced, no thrills.   LUNGS: Clear to auscultation bilaterally; no wheezes, rales, or rhonchi. No increased work of breathing or signs of respiratory distress.   ABDOMEN: Soft, nontender, non-distended.   EXTREMITIES: Trace edema to B/L LE. Skin warm and well perfused, no clubbing, cyanosis.  NEURO: No focal findings. Normal speech. Mood and affect normal.   SKIN: Normal without suspicious lesions on exposed skin.      Lab Results:     Troponins:   Results from last 7 days   Lab Units 10/29/24  1517 10/29/24  1300 10/29/24  1050   HS TNI 0HR ng/L  --   --  9   HS TNI 2HR ng/L  --  13  --    HS TNI 4HR ng/L 17  --   --    HSTNI D4 ng/L 8  --   --        CBC with diff:   Results from last 7 days   Lab Units 24  0506 24  0458 24  1612 24  0357 24  1752 24  0910 24  0425 24  0556 10/31/24  2309 10/31/24  0506   WBC Thousand/uL 15.65* 14.35*  --  8.86  --  4.33 3.69* 7.21 7.20 5.77   HEMOGLOBIN g/dL  8.5* 8.0* 8.2* 7.7* 7.2* 7.2* 6.9* 8.6* 8.9* 9.6*   HEMATOCRIT % 27.2* 25.3* 25.8* 24.3* 22.6* 22.8* 22.4* 27.5* 27.5* 30.3*   MCV fL 99* 97  --  96  --  98 98 98 98 98   PLATELETS Thousands/uL 179 150  --  141*  --  123* 111* 137* 135* 134*   RBC Million/uL 2.76* 2.61*  --  2.53*  --  2.33* 2.29* 2.82* 2.82* 3.10*   MCH pg 30.8 30.7  --  30.4  --  30.9 30.1 30.5 31.6 31.0   MCHC g/dL 31.3* 31.6  --  31.7  --  31.6 30.8* 31.3* 32.4 31.7   RDW % 15.9* 15.5*  --  15.1  --  14.9 14.7 14.6 14.6 14.5   MPV fL 12.0 11.5  --  11.1  --  12.0 11.5 11.6 11.6 10.8   NRBC AUTO /100 WBCs  --  2  --   --   --   --   --  0  --  0   NRBC /100 WBC 4*  --   --  1  --   --   --   --   --   --          CMP:   Results from last 7 days   Lab Units 11/05/24  0506 11/04/24  0458 11/03/24  0357 11/02/24  2141 11/02/24  0425 11/01/24  1920 11/01/24  0556 10/31/24  2309 10/31/24  0506 10/30/24  1209 10/30/24  0809 10/30/24  0130 10/29/24  2317 10/29/24  1853   POTASSIUM mmol/L 3.6 3.4* 3.7 3.0* 3.6 3.2* 4.0   < > 4.2   < > 5.2 4.8  --  4.7   CHLORIDE mmol/L 113* 114* 114* 113* 112* 110* 111*   < > 113*   < > 113* 114*  --  109*   CO2 mmol/L 27 22 20* 22 19* 21 20*   < > 20*   < > 21 18*  --  21   CO2, I-STAT mmol/L  --   --   --   --   --   --   --   --   --   --   --   --  16*  --    BUN mg/dL 19 27* 31* 35* 37* 39* 37*   < > 32*   < > 35* 37*  --  35*   CREATININE mg/dL 0.98 1.13 1.41* 1.52* 1.61* 1.62* 1.84*   < > 1.45*   < > 1.52* 1.60*  --  1.70*   GLUCOSE, ISTAT mg/dl  --   --   --   --   --   --   --   --   --   --   --   --  135  --    CALCIUM mg/dL 7.2* 7.1* 7.2* 7.3* 7.2* 7.4* 7.3*   < > 8.2*   < > 8.0* 7.4*  --  8.6   AST U/L  --  41* 46*  --  25  --   --   --  14  --  17 18  --  25   ALT U/L  --  9 7  --  4*  --   --   --  7  --  11 12  --  18   ALK PHOS U/L  --  49 41  --  35  --   --   --  32*  --  40 44  --  70   EGFR ml/min/1.73sq m 53 45 34 31 29 29 25   < > 33   < > 31 29  --  27    < > = values in this interval not  displayed.       ** Please Note: Fluency DirectDictation voice to text software may have been used in the creation of this document. **

## 2024-11-05 NOTE — PROGRESS NOTES
Progress Note - [facility]   Name: Bertha Brown 84 y.o. female I MRN: [id number]  Unit/Bed#: ICU 05 I Date of Admission: 10/29/2024   Date of Service: 11/5/2024 I Hospital Day: 7    Assessment & Plan  Coffee ground emesis  84-year-old female patient with past medical history of rectus sheath hematoma causing hemorrhagic shock in 2022, A-fib currently on Coumadin presents to [facility] emergency room with complaining of coffee-ground emesis for last since 5 AM this morning associate with lower abdominal pain that started suddenly early this morning.      CBC, BMP reviewed current hemoglobin 13, creatinine 1.63.  WBC 12,000.  INR 2.05.  Lactic acid elevated 3.5 -- > 3.1  Patient had received IV Protonix, IV analgesics, IV fluids in the emergency room  CT high-volume GI hemorrhage report noted with no signs of high-volume GI bleed, moderate diffuse colonic distention and thickening, finding related to colitis, finding of gastritis and reflux,  CT chest report noted with mildly distended esophagus concerning of reflux/esophageal dysfunction    Unclear etiology.  Currently patient appears comfortable nondistressed.  Acutely nontoxic appearing.  Reports feeling better compared to earlier after getting analgesics, antiemetics  Currently on clear liquids per GI.  N.p.o. after midnight tonight.  Started on IV fluids normal saline 125 cc an hour.  Continue with IV Protonix drip.  Continue to trend hemoglobin.  Continue to trend lactic acid until normal.  Currently on IV Dilaudid for moderate to severe pain as well as breakthrough pain.  Leukocytosis likely reactive and monitor off antibiotics  Repeat CBC and BMP ordered for tomorrow.  Low threshold to start on IV antibiotics if develops fever, worsening lactic acid or worsening signs of infection.  GI consult pending.    Paroxysmal atrial fibrillation (HCC)  Present on admission history of paroxysmal A-fib.  Currently in sinus tachycardia with PVCs.  Patient in A-fib with  RVR.  Anticoagulation is on hold given all of the patient's current issues.    Hypothyroidism  Present on admission with past medical history of hypothyroidism.  Resume home dose of levothyroxine  Acute respiratory failure with hypoxia ([facility])    Suspected secondary aspiration event due to coffee-ground emesis versus atelectasis.  Encourage incentive spirometry use  Continue to wean off oxygen as able.      RUKHSANA (acute kidney injury) ([facility])  RUKHSANA POA with initial creatinine 1.63 and baseline 0.8-0.9  Suspect prerenal etiology  Improving    Plan:  Continue fluid resuscitation  Monitor I&O  Avoid nephrotoxins  Maintain MAP >65  Trend renal indices  SIRS (systemic inflammatory response syndrome) ([facility])  Present on admission with tachycardia, tachypnea, leukocytosis, RUKHSANA thought to be due to volume loss due to coffee-ground emesis, elevated lactic acid, no clear source of infection however SIRS thought to be due to coffee-ground emesis.  Currently getting IV fluids resuscitation.  Low threshold to initiate infectious workup if develops worsening signs of infection and or not improving or worsening lactic acidosis and initiation of antibiotics.  Hypertension  Present on admission with hypotension.  Blood pressure currently controlled.  Patient given couple doses of Lopressor.  I did ask cardiology to see.  Continue with scheduled IV beta-blocker for now  Hyperlipidemia  Continue home dose of Lipitor 40 mg nightly  Diabetes ([facility])  Lab Results   Component Value Date    HGBA1C 7.0 (H) 08/22/2024       Recent Labs     11/04/24  1748 11/04/24  2325 11/05/24  0503 11/05/24  1130   POCGLU 127 120 124 138       Blood Sugar Average: Last 72 hrs:  (P) 115.2854780612096525    Present on admission with history of diabetes.  Most recent A1c 7.0.  Patient placed on a clear liquid diet  Obesity  Present on admission with obesity with BMI 37.  Counseled on weight loss, lifestyle modification.  Chronic idiopathic constipation  May need bowel  regimen once able to tolerate more p.o.  Generalized abdominal pain    -Unclear etiology at this point time.  Patient with diffuse abdominal pain, worsening in nature, with positive rebound in the setting of worsening hypoxia and tachycardia.  In addition lactic acid continues to rise.  Initial CT scan this morning did not reveal any acute pathology that would explain this abdominal discomfort which is recently started in addition did not reveal any obvious bleeding.  Repeat CT scan ordered.  In addition repeat CBC showing elevation of white blood cell count from 12-19.  Lactic acid repeat showing 4.5.  Concern for possible ischemia given abdominal exam.  Based on patient's clinical worsening I do feel she needs exploration in the OR at least for diagnostic laparoscopy which could be converted to exploratory laparotomy if needed.  Will obtain CT scan to see if this offers any additional guidance as to the source of the problem.    Discussed at length with the patient who agreed to surgery and would like her daughter to sign consent.  Unfortunately her daughter is not present.  Did speak with daughter Linda by phone.  She understands the current worsening of her mother's clinical situation.  Furthermore she is in agreement for surgery as well.  Did explain that we will be taking her for diagnostic laparoscopy, possible exploratory laparotomy, possible bowel resection, possible ostomy, possible open abdomen with ABThera VAC.  We did explain the risk which include bleeding, infection, injury to surrounding structures in addition to worsening sepsis and potential for death.  Ischemic colitis (HCC)  POD 6 s/p exploratory laparotomy with right hemicolectomy, abdominal left open w abtherra placement on 10/29/2024  POD 5 , s/p second look with wash out, abdominal wall closure, and loop ileostomy and mucous fistula creation, VAC placement to midline abdomen  Febrile overnight   WBC  15.65, 14.35, Hb 8.0   L/24hr,    Cr 0.98  VAC with 100 ml serosanguinous output holding adequate seal   200 ml of brown thin liquid stool  recorded from ileostomy  midline VAC in place holding adequate suction, ileostomy beefy red and patent, stool in bag, mucus fistula pink and patent with stool in bag       Plan:  Pt had speech bedside evaluation this am, recommendation is thin liquids with advance to solids to soft dysphagia 3 texture diet,   Start with thin clear liquid diet today   Discontinue NGT today   VAC change today.   Goal to transition to MWF VAC changes   Replete lytes as needed  Remove loop bar today    Septic shock (HCC)  Septic shock/E. coli bacteremia tenure with current antibiotics.  Leukocytosis worsens may need to consider ID evaluation  Acute metabolic encephalopathy  Patient with worsening lethargy prior to OR  Suspect secondary to significant metabolic derangements  Monitor neuro exam  S/P exploratory laparotomy  -status post exploratory laparotomy with right hemicolectomy on 10/29/2024     -Second look and washout on 10/31/2024 with abdominal wall closure, ileostomy creation, mucous fistula creation, and wound VAC placement and abdominal wall subcutaneous tissue  Repeat CT scan noted  Discussed with surgery earlier today.  Okay to advance diet as tolerated.  Discontinue NG tube  Anemia  Been stable postoperatively.  Monitor leukocytosis which is slightly worsening.  Repeat CT scan noted  Hypernatremia  D5 1/2 normal saline.  Monitor blood sugars closely    VTE Pharmacologic Prophylaxis:   Moderate Risk (Score 3-4) - Pharmacological DVT Prophylaxis Ordered: heparin drip.    Mobility:   Basic Mobility Inpatient Raw Score: 10  JH-HLM Goal: 4: Move to chair/commode  JH-HLM Achieved: 4: Move to chair/commode  JH-HLM Goal achieved. Continue to encourage appropriate mobility.    Patient Centered Rounds: I performed bedside rounds with nursing staff today.   Discussions with Specialists or Other Care Team Provider: Surgical team  and cardiology    Education and Discussions with Family / Patient: Updated  (daughter) at bedside.    Current Length of Stay: 7 day(s)  Current Patient Status: Inpatient   Certification Statement: The patient will continue to require additional inpatient hospital stay due to needing postop care.  NG tube just removed today however the patient did vomit.  Discharge Plan: Anticipate discharge in >72 hrs to discharge location to be determined pending rehab evaluations.    Code Status: Level 1 - Full Code    Subjective   Patient seen and examined.  States he was feeling a little bit better today.    Objective :  Temp:  [97.6 °F (36.4 °C)-99.3 °F (37.4 °C)] 97.9 °F (36.6 °C)  HR:  [] 148  BP: (131-180)/(67-93) 131/79  Resp:  [14-21] 16  SpO2:  [90 %-100 %] 98 %  O2 Device: Nasal cannula  Nasal Cannula O2 Flow Rate (L/min):  [6 L/min-8 L/min] 6 L/min  FiO2 (%):  [44] 44    Body mass index is 38.67 kg/m².     Input and Output Summary (last 24 hours):     Intake/Output Summary (Last 24 hours) at 11/5/2024 1337  Last data filed at 11/5/2024 0537  Gross per 24 hour   Intake 913.02 ml   Output 320 ml   Net 593.02 ml       Physical Exam  General Appearance:    Alert, cooperative, no distress, appears stated age                               Lungs:     Clear to auscultation bilaterally, respirations unlabored       Heart:    Regular rate and rhythm, S1 and S2 normal, no murmur, rub    or gallop   Abdomen:     Soft, non-tender, bowel sounds active all four quadrants,     no masses, no organomegaly           Extremities:   Extremities normal, atraumatic, no cyanosis or edema                         Lines/Drains:  Lines/Drains/Airways       Active Status       Name Placement date Placement time Site Days    NG/OG/Enteral Tube Nasogastric 18 Fr Left nare 10/29/24  2205  Left nare  6    Colostomy RLQ 10/31/24  1422  RLQ  5                      Telemetry:  Telemetry Orders (From admission, onward)               24  Hour Telemetry Monitoring  Continuous x 24 Hours (Telem)        Question:  Reason for 24 Hour Telemetry  Answer:  Metabolic/electrolyte disturbance with high probability of dysrhythmia. K level <3 or >6 OR KCL infusion >10mEq/hr                     Telemetry Reviewed: Normal Sinus Rhythm  Indication for Continued Telemetry Use: Arrthymias requiring medical therapy               Lab Results: I have reviewed the following results:   Results from last 7 days   Lab Units 11/05/24  0506 11/04/24  0458   WBC Thousand/uL 15.65* 14.35*   HEMOGLOBIN g/dL 8.5* 8.0*   HEMATOCRIT % 27.2* 25.3*   PLATELETS Thousands/uL 179 150   BANDS PCT % 6  --    SEGS PCT %  --  75   LYMPHO PCT % 5* 11*   MONO PCT % 2* 5   EOS PCT % 0 0     Results from last 7 days   Lab Units 11/05/24  0506 11/04/24  0458   SODIUM mmol/L 148* 148*   POTASSIUM mmol/L 3.6 3.4*   CHLORIDE mmol/L 113* 114*   CO2 mmol/L 27 22   BUN mg/dL 19 27*   CREATININE mg/dL 0.98 1.13   ANION GAP mmol/L 8 12   CALCIUM mg/dL 7.2* 7.1*   ALBUMIN g/dL  --  3.8   TOTAL BILIRUBIN mg/dL  --  1.56*   ALK PHOS U/L  --  49   ALT U/L  --  9   AST U/L  --  41*   GLUCOSE RANDOM mg/dL 119 131     Results from last 7 days   Lab Units 11/03/24  1707   INR  1.22*     Results from last 7 days   Lab Units 11/05/24  1130 11/05/24  0503 11/04/24  2325 11/04/24  1748 11/04/24  1225 11/03/24  2316 11/03/24  1706 11/03/24  1149 11/02/24  2329 11/02/24  1802 11/02/24  1752 11/02/24  1222   POC GLUCOSE mg/dl 138 124 120 127 133 119 104 91 103 108 78 137         Results from last 7 days   Lab Units 11/04/24  0458 11/03/24  0357 11/02/24  0425 10/31/24  2309 10/30/24  2210 10/30/24  1724 10/30/24  0455   LACTIC ACID mmol/L  --   --   --  1.2 1.5 2.0 2.6*   PROCALCITONIN ng/ml 6.89* 12.57* 20.81*  --   --   --  44.94*       Recent Cultures (last 7 days):   Results from last 7 days   Lab Units 11/01/24  0653 11/01/24  0647 10/29/24  2334 10/29/24  1836 10/29/24  1803   BLOOD CULTURE  No Growth After 4  Days. No Growth After 4 Days.  --  No Growth After 5 Days. Escherichia coli*   GRAM STAIN RESULT   --   --  No Polys or Bacteria seen  --  Gram negative rods*   BODY FLUID CULTURE, STERILE   --   --  Growth in Broth culture only Escherichia coli*  --   --        Imaging Results Review: I personally reviewed the following image studies in PACS and associated radiology reports: CT abdomen/pelvis. My interpretation of the radiology images/reports is: Improvement from previous.  Other Study Results Review: No additional pertinent studies reviewed.    Last 24 Hours Medication List:     Current Facility-Administered Medications:     acetaminophen (Ofirmev) injection 1,000 mg, Q6H JUAN PABLO, Last Rate: 1,000 mg (11/05/24 0505)    [COMPLETED] amiodarone 150 mg in dextrose 5 % 100 mL IV bolus, Once, Last Rate: 150 mg (11/05/24 1145) **FOLLOWED BY** amiodarone (CORDARONE) 900 mg in dextrose 5 % 500 mL infusion, Continuous, Last Rate: 1 mg/min (11/05/24 1259) **FOLLOWED BY** amiodarone (CORDARONE) 900 mg in dextrose 5 % 500 mL infusion, Continuous    heparin (porcine) 25,000 units in 0.45% NaCl 250 mL infusion (premix), Titrated, Last Rate: 13.1 Units/kg/hr (11/05/24 0637)    HYDROmorphone HCl (DILAUDID) injection 0.2 mg, Q3H PRN **AND** HYDROmorphone (DILAUDID) injection 0.5 mg, Q3H PRN    insulin lispro (HumALOG/ADMELOG) 100 units/mL subcutaneous injection 1-6 Units, Q6H JUAN PABLO **AND** Fingerstick Glucose (POCT), Q6H    levalbuterol (XOPENEX) inhalation solution 1.25 mg, Q6H PRN    metoprolol (LOPRESSOR) injection 5 mg, Q6H    ondansetron (ZOFRAN) injection 4 mg, Q4H PRN    pantoprazole (PROTONIX) injection 40 mg, Q12H JUAN PABLO    [COMPLETED] piperacillin-tazobactam (ZOSYN) 4.5 g in sodium chloride 0.9 % 100 mL IV LOADING DOSE, Once, Last Rate: Stopped (10/30/24 4288) **FOLLOWED BY** piperacillin-tazobactam (ZOSYN) 4.5 g in sodium chloride 0.9 % 100 mL IVPB (EXTENDED INFUSION), Q8H, Last Rate: 4.5 g (11/05/24 5764)    Administrative  Statements   Today, Patient Was Seen By: Anders Lockett MD  I have spent a total time of 40 minutes in caring for this patient on the day of the visit/encounter including Diagnostic results, Prognosis, Risks and benefits of tx options, Instructions for management, Patient and family education, Importance of tx compliance, Risk factor reductions, Impressions, Counseling / Coordination of care, Documenting in the medical record, Reviewing / ordering tests, medicine, procedures  , Obtaining or reviewing history  , and Communicating with other healthcare professionals .    **Please Note: This note may have been constructed using a voice recognition system.**

## 2024-11-05 NOTE — PLAN OF CARE
Recommendations:   Diet: thin liquids; advance solids to soft/dysphagia 3 texture once cleared by physician   Meds: crushed with puree (larger); can put small whole c puree   Feeding assistance: tray set up w/ close monitoring/supervision   Frequent Oral care: 2-4x/day  Aspiration precautions and compensatory swallowing strategies: upright posture, slow rate of feeding, small bites/sips, no straws, and small, single sip.  Respiratory monitoring - hold if changes occur.    Other Recommendations/ considerations: SLP will continue to follow to ensure adequate management of oral diet and adjust as clinically indicated x2-3

## 2024-11-05 NOTE — ASSESSMENT & PLAN NOTE
POD 6 s/p exploratory laparotomy with right hemicolectomy, abdominal left open w abtherra placement on 10/29/2024  POD 5 , s/p second look with wash out, abdominal wall closure, and loop ileostomy and mucous fistula creation, VAC placement to midline abdomen  Febrile overnight   WBC  15.65, 14.35, Hb 8.0   L/24hr,   Cr 0.98  VAC with 100 ml serosanguinous output holding adequate seal   200 ml of brown thin liquid stool  recorded from ileostomy  midline VAC in place holding adequate suction, ileostomy beefy red and patent, stool in bag, mucus fistula pink and patent with stool in bag       Plan:  Pt had speech bedside evaluation this am, recommendation is thin liquids with advance to solids to soft dysphagia 3 texture diet,   Start with thin clear liquid diet today   Discontinue NGT today   VAC change today.   Goal to transition to MWF VAC changes   Replete lytes as needed  Remove loop ileostomy  bar today

## 2024-11-05 NOTE — ASSESSMENT & PLAN NOTE
Septic shock/E. coli bacteremia tenure with current antibiotics.  Leukocytosis worsens may need to consider ID evaluation

## 2024-11-05 NOTE — ASSESSMENT & PLAN NOTE
-status post exploratory laparotomy with right hemicolectomy on 10/29/2024     -Second look and washout on 10/31/2024 with abdominal wall closure, ileostomy creation, mucous fistula creation, and wound VAC placement and abdominal wall subcutaneous tissue  Repeat CT scan noted  Discussed with surgery earlier today.  Okay to advance diet as tolerated.  Discontinue NG tube

## 2024-11-05 NOTE — PROGRESS NOTES
Progress Note - Surgery-General   Name: Bertha Brown 84 y.o. female I MRN: [id number]  Unit/Bed#: ICU 05 I Date of Admission: 10/29/2024   Date of Service: 11/5/2024 I Hospital Day: 7    Assessment & Plan  Ischemic colitis (HCC)  POD 6 s/p exploratory laparotomy with right hemicolectomy, abdominal left open w abtherra placement on 10/29/2024  POD 5 , s/p second look with wash out, abdominal wall closure, and loop ileostomy and mucous fistula creation, VAC placement to midline abdomen  Febrile overnight   WBC  15.65, 14.35, Hb 8.0   L/24hr,   Cr 0.98  VAC with 100 ml serosanguinous output holding adequate seal   200 ml of brown thin liquid stool  recorded from ileostomy  midline VAC in place holding adequate suction, ileostomy beefy red and patent, stool in bag, mucus fistula pink and patent with stool in bag       Plan:  Pt had speech bedside evaluation this am, recommendation is thin liquids with advance to solids to soft dysphagia 3 texture diet,   Start with thin clear liquid diet today   Discontinue NGT today   VAC change today.   Goal to transition to MWF VAC changes   Replete lytes as needed  Remove loop ileostomy  bar today    Paroxysmal atrial fibrillation (HCC)  - Monitor Hgb  - restart AC when stable    Acute respiratory failure with hypoxia ([facility])  - Monitor abdominal exams  - Remainder of care per prior medical team  Diabetes (HCC)  - SSI  - close monitoring and control of serum glucose for optimized healing    Lab Results   Component Value Date    HGBA1C 7.0 (H) 08/22/2024       Recent Labs     11/04/24  1225 11/04/24  1748 11/04/24  2325 11/05/24  0503   POCGLU 133 127 120 124       Blood Sugar Average: Last 72 hrs:  (P) 113.4141948766314534    Generalized abdominal pain    -Unclear etiology at this point time.  Patient with diffuse abdominal pain, worsening in nature, with positive rebound in the setting of worsening hypoxia and tachycardia.  In addition lactic acid continues to rise.   Initial CT scan this morning did not reveal any acute pathology that would explain this abdominal discomfort which is recently started in addition did not reveal any obvious bleeding.  Repeat CT scan ordered.  In addition repeat CBC showing elevation of white blood cell count from 12-19.  Lactic acid repeat showing 4.5.  Concern for possible ischemia given abdominal exam.  Based on patient's clinical worsening I do feel she needs exploration in the OR at least for diagnostic laparoscopy which could be converted to exploratory laparotomy if needed.  Will obtain CT scan to see if this offers any additional guidance as to the source of the problem.    Discussed at length with the patient who agreed to surgery and would like her daughter to sign consent.  Unfortunately her daughter is not present.  Did speak with daughter Linda by phone.  She understands the current worsening of her mother's clinical situation.  Furthermore she is in agreement for surgery as well.  Did explain that we will be taking her for diagnostic laparoscopy, possible exploratory laparotomy, possible bowel resection, possible ostomy, possible open abdomen with ABThera VAC.  We did explain the risk which include bleeding, infection, injury to surrounding structures in addition to worsening sepsis and potential for death.    Acute metabolic encephalopathy  Per [facility] management   S/P exploratory laparotomy  -status post exploratory laparotomy with right hemicolectomy on 10/29/2024     -Second look and washout on 10/31/2024 with abdominal wall closure, ileostomy creation, mucous fistula creation, and wound VAC placement and abdominal wall subcutaneous tissue       Anemia  8.5 stable         Subjective   I am just thirsty and want to drink water,   I do not have any pain,       Objective :  Temp:  [97.6 °F (36.4 °C)-99.3 °F (37.4 °C)] 98.3 °F (36.8 °C)  HR:  [65-88] 78  BP: (145-180)/(67-93) 180/85  Resp:  [14-22] 14  SpO2:  [90 %-100 %] 96 %  O2  Device: Nasal cannula  Nasal Cannula O2 Flow Rate (L/min):  [6 L/min-8 L/min] 6 L/min  FiO2 (%):  [44] 44    I/O         11/03 0701 11/04 0700 11/04 0701 11/05 0700 11/05 0701 11/06 0700    P.O.  0     I.V. (mL/kg) 585.8 (5.6) 986.7 (9.4)     NG/GT  660     IV Piggyback 200 452.1     Feedings  48     Total Intake(mL/kg) 785.8 (7.5) 2146.8 (20.4)     Urine (mL/kg/hr) 2750 (1.1) 500 (0.2)     Emesis/NG output  0     Drains  100     Stool 100 220     Total Output 2850 820     Net -2064.2 +1326.8            Unmeasured Urine Occurrence  3 x     Unmeasured Stool Occurrence 2 x 6 x           Lines/Drains/Airways       Active Status       Name Placement date Placement time Site Days    NG/OG/Enteral Tube Nasogastric 18 Fr Left nare 10/29/24  2205  Left nare  6    Colostomy RLQ 10/31/24  1422  RLQ  4                  Physical Exam  Cardiovascular:      Rate and Rhythm: Normal rate and regular rhythm.   Pulmonary:      Effort: Pulmonary effort is normal.      Breath sounds: Normal breath sounds.   Abdominal:      General: There is no distension.      Palpations: Abdomen is soft.      Tenderness: There is no abdominal tenderness.   Neurological:      General: No focal deficit present.      Mental Status: She is alert.   Psychiatric:         Mood and Affect: Mood normal.     ABD:  loop ileostomy is pink, with thin brown stool in bag, VAC dressing is intact, s/s drainage in canister,           Lab Results: I have reviewed the following results:  Recent Labs     11/03/24  1707 11/03/24  2317 11/04/24  0458 11/04/24  1109 11/05/24  0506   WBC  --   --  14.35*  --  15.65*   HGB  --   --  8.0*  --  8.5*   HCT  --   --  25.3*  --  27.2*   PLT  --   --  150  --  179   BANDSPCT  --   --   --   --  6   SODIUM  --   --  148*  --  148*   K  --   --  3.4*  --  3.6   CL  --   --  114*  --  113*   CO2  --   --  22  --  27   BUN  --   --  27*  --  19   CREATININE  --   --  1.13  --  0.98   GLUC  --   --  131  --  119   CAIONIZED  --   --   0.92*  --  0.96*   MG  --   --  2.4  --  2.3   PHOS  --   --  2.0*  --  1.5*   AST  --   --  41*  --   --    ALT  --   --  9  --   --    ALB  --   --  3.8  --   --    TBILI  --   --  1.56*  --   --    ALKPHOS  --   --  49  --   --    PTT 29   < > 75*   < > 100*   INR 1.22*  --   --   --   --     < > = values in this interval not displayed.

## 2024-11-05 NOTE — ASSESSMENT & PLAN NOTE
Present on admission with hypotension.  Blood pressure currently controlled.  Patient given couple doses of Lopressor.  I did ask cardiology to see.  Continue with scheduled IV beta-blocker for now

## 2024-11-05 NOTE — SPEECH THERAPY NOTE
Speech-Language Pathology Bedside Swallow Evaluation        Patient Name: Bertha Brown  Today's Date: 11/5/2024     Problem List  Principal Problem:    Septic shock (HCC)  Active Problems:    Paroxysmal atrial fibrillation (HCC)    Acute respiratory failure with hypoxia (HCC)    Hypothyroidism    Hypertension    Hyperlipidemia    Diabetes (HCC)    Obesity    Coffee ground emesis    RUKHSANA (acute kidney injury) (HCC)    Chronic idiopathic constipation    Generalized abdominal pain    SIRS (systemic inflammatory response syndrome) (HCC)    Ischemic colitis (HCC)    Acute metabolic encephalopathy    S/P exploratory laparotomy    Anemia       Past Medical History  Past Medical History:   Diagnosis Date    Asthma     Atrial fibrillation (HCC)     Diabetes (HCC)     Hyperlipidemia     Hypertension     Legionnaire's disease (HCC)        Past Surgical History  Past Surgical History:   Procedure Laterality Date    FL LUMBAR PUNCTURE DIAGNOSTIC  7/13/2014    IR EMBOLIZATION (SPECIFY VESSEL OR SITE)  12/12/2022    LAPAROTOMY N/A 10/29/2024    Procedure: LAPAROTOMY EXPLORATORY, EXTENDED RIGHT HEMICOLECTOMY, WITH abthera VAC placement;  Surgeon: Luke Medina DO;  Location: [facility];  Service: General    LAPAROTOMY N/A 10/31/2024    Procedure: LAPAROTOMY EXPLORATORY, 2nd look, washout, ileostomy, abdominal closure, placement of wound vac;  Surgeon: Hector Pinon MD;  Location: [facility];  Service: General    LUNG LOBECTOMY      SC LAPS ABD PRTM&OMENTUM DX W/WO SPEC BR/WA SPX N/A 10/29/2024    Procedure: LAPAROSCOPY DIAGNOSTIC CONVERTED TO OPEN;  Surgeon: Luke Medina DO;  Location: [facility];  Service: General       Summary/Impressions:    Pt presents with s/s oropharyngeal dysphagia characterized by intermittent cough and wet vocal quality w/ thin liquids by straw and in large, consecutive sips.  Suspect decreased oral control/coordination and ?mismanagement of thinner texture.  Note, impulsivity and presence of NG tube  appear to be contributing as well.  No s/s of aspiration or distress, O2 saturation remains WNL across all additional solid/ liquid trials, including thin liquids by small cup sip.     Recommendations:   Diet: thin liquids; advance solids to soft/dysphagia 3 texture once cleared by physician   Meds: crushed with puree (larger); can put small whole c puree   Feeding assistance: tray set up w/ close monitoring/supervision   Frequent Oral care: 2-4x/day  Aspiration precautions and compensatory swallowing strategies: upright posture, slow rate of feeding, small bites/sips, no straws, and small, single sip.  Respiratory monitoring - hold if changes occur.    Other Recommendations/ considerations: SLP will continue to follow to ensure adequate management of oral diet and adjust as clinically indicated x2-3       Current Medical Status  Pt is a 84 y.o. female who presented to [facility] with with past medical history of hypertension, diabetes type 2, paroxysmal A-fib who was admitted on 10-29 and ultimately required an ex lap with extended hemicolectomy for ischemic colitis.  Extubated to NC >1 day prior.  NG tube in place.  Patient continues to be followed by surgery and is cleared for PO trials for dysphagia evaluation.     Past medical history:  Please see H&P for details    Special Studies:  CT chest 11/4/24:  1.  There is no abnormal gas within either the vagina or bladder, to suggest a fistula. However, there is diffuse enhancement and thickening of the endometrial stripe, which is abnormal for a postmenopausal woman. Follow-up evaluation by pelvic   ultrasound is recommended.  2.  Postoperative changes and mild postoperative ileus, as described.  3.  New patchy opacities throughout the right lung, with increased small pleural effusions. This favors pulmonary alveolar edema over multifocal pneumonia.  4.  Wall thickening throughout the splenic flexure and descending colon, concerning for a persistent colitis.  No associated pneumatosis.    Social/Education/Vocational Hx:  Pt lives with family    Swallow Information   Current Risks for Dysphagia & Aspiration: recent intubation  Current Symptoms/Concerns: change in respiratory status  Current Diet: NPO with tube feeds / trickle  Baseline Diet: regular diet and thin liquids    Baseline Assessment   Behavior/Cognition: alert  Speech/Language Status: able to participate in conversation  Patient Positioning: upright in chair    Swallow Mechanism Exam   Facial: symmetrical  Labial: WFL  Lingual: WFL  Velum: symmetrical  Mandible: adequate ROM  Dentition: full dentures  Vocal quality:clear/adequate   Volitional Cough: strong/productive   Respiratory: NC    Consistencies Assessed and Performance   Consistencies Administered: thin liquids, nectar thick, puree, soft solids, and hard solids    Oral Stage: Adequate bolus retrieval, some impulsivity noted.  Mastication prolonged with incomplete bolus formation and prolonged transfer.  Diffuse oral retention (hard solids>soft) and patient continues to speak prior to clearing oral cavity.      Pharyngeal Stage: Laryngeal rise noted upon palpation.  Swallow initiation appears delayed, ?coordination w/ NG tube in place.  Overt coughing w/ liquids by straw, wet vocal quality following same.  No s/s of aspiration or distress, changes in vocal quality following small controlled cup sips.  O2 saturation remains WNL.        Esophageal Concerns: Pt w/o complaints      Results Reviewed with: patient, RN, and MD     Plan  Will continue to follow for x1-3    Dysphagia Goals: pt will tolerate thins/clears with soft solids without s/s of aspiration x1-3  Pt will participate in trials for advancement across x1-3 diagnostic sessions.          Michelle Cruz MS, CCC-SLP  Speech-Language Pathologist  [place] #[id number]  [place] #[id number]

## 2024-11-05 NOTE — ASSESSMENT & PLAN NOTE
84-year-old female patient with past medical history of rectus sheath hematoma causing hemorrhagic shock in 2022, A-fib currently on Coumadin presents to [facility] emergency room with complaining of coffee-ground emesis for last since 5 AM this morning associate with lower abdominal pain that started suddenly early this morning.      CBC, BMP reviewed current hemoglobin 13, creatinine 1.63.  WBC 12,000.  INR 2.05.  Lactic acid elevated 3.5 -- > 3.1  Patient had received IV Protonix, IV analgesics, IV fluids in the emergency room  CT high-volume GI hemorrhage report noted with no signs of high-volume GI bleed, moderate diffuse colonic distention and thickening, finding related to colitis, finding of gastritis and reflux,  CT chest report noted with mildly distended esophagus concerning of reflux/esophageal dysfunction    Unclear etiology.  Currently patient appears comfortable nondistressed.  Acutely nontoxic appearing.  Reports feeling better compared to earlier after getting analgesics, antiemetics  Currently on clear liquids per GI.  N.p.o. after midnight tonight.  Started on IV fluids normal saline 125 cc an hour.  Continue with IV Protonix drip.  Continue to trend hemoglobin.  Continue to trend lactic acid until normal.  Currently on IV Dilaudid for moderate to severe pain as well as breakthrough pain.  Leukocytosis likely reactive and monitor off antibiotics  Repeat CBC and BMP ordered for tomorrow.  Low threshold to start on IV antibiotics if develops fever, worsening lactic acid or worsening signs of infection.  GI consult pending.

## 2024-11-05 NOTE — ASSESSMENT & PLAN NOTE
- SSI  - close monitoring and control of serum glucose for optimized healing    Lab Results   Component Value Date    HGBA1C 7.0 (H) 08/22/2024       Recent Labs     11/04/24  1225 11/04/24  1748 11/04/24  2325 11/05/24  0503   POCGLU 133 127 120 124       Blood Sugar Average: Last 72 hrs:  (P) 113.6614417273393884

## 2024-11-05 NOTE — ASSESSMENT & PLAN NOTE
Lab Results   Component Value Date    HGBA1C 7.0 (H) 08/22/2024       Recent Labs     11/04/24  1748 11/04/24  2325 11/05/24  0503 11/05/24  1130   POCGLU 127 120 124 138       Blood Sugar Average: Last 72 hrs:  (P) 115.0143732295314138    Present on admission with history of diabetes.  Most recent A1c 7.0.  Patient placed on a clear liquid diet

## 2024-11-05 NOTE — ASSESSMENT & PLAN NOTE
Been stable postoperatively.  Monitor leukocytosis which is slightly worsening.  Repeat CT scan noted

## 2024-11-05 NOTE — RESPIRATORY THERAPY NOTE
11/05/24 0806   Respiratory Assessment   Assessment Type Assess only   General Appearance Awake;Alert   Respiratory Pattern Normal   Chest Assessment Chest expansion symmetrical   Bilateral Breath Sounds Diminished   Cough None   Resp Comments Pt transitioned to nasal cannula.  Will cont to wean as able.   O2 Device NC   Oxygen Therapy/Pulse Ox   O2 Device (S)  Nasal cannula   O2 Therapy Oxygen humidified   SpO2 96 %   SpO2 Activity At Rest   $ Pulse Oximetry Spot Check Charge Completed

## 2024-11-05 NOTE — RESPIRATORY THERAPY NOTE
11/04/24 2225   Respiratory Assessment   Assessment Type Assess only   General Appearance Awake;Drowsy   Respiratory Pattern Normal   Chest Assessment Chest expansion symmetrical   Bilateral Breath Sounds Diminished;Expiratory wheezes   Location Specific No   Cough None   Resp Comments Pt placed on bipap at this time   O2 Device V60   Non-Invasive Information   O2 Interface Device Face mask   Non-Invasive Ventilation Mode BiPAP   $ Intermittent NIV Yes   SpO2 95 %   $ Pulse Oximetry Spot Check Charge Completed   Non-Invasive Settings   FiO2 (%) 40   IPAP (cm) 14 cm   EPAP (cm) 6 cm   Rate (Set) 8   Pressure Support (cm H2O) 8   Rise Time 2   Non-Invasive Readings   Total Rate 12   Vt (mL) (Mech) 747   MV (Mech) 15   Peak Pressure (Obs) 15   Spontaneous Vt (mL) 711   Leak (lpm) 30   Non-Invasive Alarms   Insp Pressure High (cm H20) 25   Insp Pressure Low (cm H20) 5   Low Insp Pressure Time (sec) 20 sec   MV Low (L/min) 3   Vt High (mL) 1200   Vt Low (mL) 200   High Resp Rate (BPM) 50 BPM   Low Resp Rate (BPM) 8 BPM   Apnea Interval (sec) 30     RT Ventilator Management Note  Bertha Brown 84 y.o. female MRN: [id number]  Unit/Bed#: ICU 05 Encounter: [id number]      Daily Screen         11/2/2024  1430 11/3/2024  0800          Patient safety screen outcome:: -- Passed      RSBI: 45 55                Physical Exam:   Assessment Type: Assess only  General Appearance: Awake, Drowsy  Respiratory Pattern: Normal  Chest Assessment: Chest expansion symmetrical  Bilateral Breath Sounds: Diminished, Expiratory wheezes  Location Specific: No  Cough: None  O2 Device: V60      Resp Comments: Pt placed on bipap at this time

## 2024-11-05 NOTE — ASSESSMENT & PLAN NOTE
Suspected secondary aspiration event due to coffee-ground emesis versus atelectasis.  Encourage incentive spirometry use  Continue to wean off oxygen as able.

## 2024-11-05 NOTE — ASSESSMENT & PLAN NOTE
Lab Results   Component Value Date    HGBA1C 7.0 (H) 08/22/2024       Recent Labs     11/04/24  1225 11/04/24  1748 11/04/24  2325 11/05/24  0503   POCGLU 133 127 120 124       Blood Sugar Average: Last 72 hrs:  (P) 113.4984469306686105  Management per primary team

## 2024-11-05 NOTE — ASSESSMENT & PLAN NOTE
S/p ex-lap with extended right hemicolectomy (10/29/2024), s/p second look washout with ileostomy creation and abdominal closure (10/31/2024)  Management per primary team/general surgery

## 2024-11-05 NOTE — ASSESSMENT & PLAN NOTE
POD 6 s/p exploratory laparotomy with right hemicolectomy, abdominal left open w abtherra placement on 10/29/2024  POD 5 , s/p second look with wash out, abdominal wall closure, and loop ileostomy and mucous fistula creation, VAC placement to midline abdomen  Febrile overnight   WBC  15.65, 14.35, Hb 8.0   L/24hr,   Cr 0.98  VAC with 100 ml serosanguinous output holding adequate seal   200 ml of brown thin liquid stool  recorded from ileostomy  midline VAC in place holding adequate suction, ileostomy beefy red and patent, stool in bag, mucus fistula pink and patent with stool in bag       Plan:  Pt had speech bedside evaluation this am, recommendation is thin liquids with advance to solids to soft dysphagia 3 texture diet,   Start with thin clear liquid diet today   Discontinue NGT today   VAC change today.   Goal to transition to MWF VAC changes   Replete lytes as needed  Remove loop bar today

## 2024-11-05 NOTE — ASSESSMENT & PLAN NOTE
Present on admission history of paroxysmal A-fib.  Currently in sinus tachycardia with PVCs.  Patient in A-fib with RVR.  Anticoagulation is on hold given all of the patient's current issues.

## 2024-11-05 NOTE — ASSESSMENT & PLAN NOTE
She is in atrial fibrillation with RVR on telemetry.  Suspect this is secondary to recent surgery and current septic shock/bacteremia and ischemic colitis.  In order to avoid soft BPs in the setting of septic shock/bacteremia, begin amiodarone 150 mg IV bolus followed by gtt.  Her anticoagulation was briefly discontinued, however she is currently on heparin gtt.  Potential thromboembolic risk on amiodarone in the setting of recent anticoagulation interruption was discussed with patient and daughter, who expressed understanding of risks and indication for this medication to avoid soft BPs, and agree with the plan of care.  Continue Lopressor 5 mg IV every 6 hours.  Continue anticoagulation with heparin gtt.  [id number].  She has been deemed okay to resume anticoagulation once stable by general surgery, and was resumed on heparin gtt. 11/3. Continue to monitor hemoglobin closely and monitor closely for signs of bleeding.

## 2024-11-05 NOTE — PLAN OF CARE
Problem: PAIN - ADULT  Goal: Verbalizes/displays adequate comfort level or baseline comfort level  Description: Interventions:  - Encourage patient to monitor pain and request assistance  - Assess pain using appropriate pain scale  - Administer analgesics based on type and severity of pain and evaluate response  - Implement non-pharmacological measures as appropriate and evaluate response  - Consider cultural and social influences on pain and pain management  - Notify physician/advanced practitioner if interventions unsuccessful or patient reports new pain  Outcome: Progressing     Problem: Knowledge Deficit  Goal: Patient/family/caregiver demonstrates understanding of disease process, treatment plan, medications, and discharge instructions  Description: Complete learning assessment and assess knowledge base.  Interventions:  - Provide teaching at level of understanding  - Provide teaching via preferred learning methods  Outcome: Progressing     Problem: GASTROINTESTINAL - ADULT  Goal: Minimal or absence of nausea and/or vomiting  Description: INTERVENTIONS:  - Administer IV fluids if ordered to ensure adequate hydration  - Maintain NPO status until nausea and vomiting are resolved  - Nasogastric tube if ordered  - Administer ordered antiemetic medications as needed  - Provide nonpharmacologic comfort measures as appropriate  - Advance diet as tolerated, if ordered  - Consider nutrition services referral to assist patient with adequate nutrition and appropriate food choices  Outcome: Progressing     Problem: GASTROINTESTINAL - ADULT  Goal: Maintains or returns to baseline bowel function  Description: INTERVENTIONS:  - Assess bowel function  - Encourage oral fluids to ensure adequate hydration  - Administer IV fluids if ordered to ensure adequate hydration  - Administer ordered medications as needed  - Encourage mobilization and activity  - Consider nutritional services referral to assist patient with adequate  nutrition and appropriate food choices  Outcome: Progressing     Problem: GASTROINTESTINAL - ADULT  Goal: Maintains adequate nutritional intake  Description: INTERVENTIONS:  - Monitor percentage of each meal consumed  - Identify factors contributing to decreased intake, treat as appropriate  - Assist with meals as needed  - Monitor I&O, weight, and lab values if indicated  - Obtain nutrition services referral as needed  Outcome: Progressing     Problem: GASTROINTESTINAL - ADULT  Goal: Establish and maintain optimal ostomy function  Description: INTERVENTIONS:  - Assess bowel function  - Encourage oral fluids to ensure adequate hydration  - Administer IV fluids if ordered to ensure adequate hydration   - Administer ordered medications as needed  - Encourage mobilization and activity  - Nutrition services referral to assist patient with appropriate food choices  - Assess stoma site  - Consider wound care consult   Outcome: Progressing     Problem: GASTROINTESTINAL - ADULT  Goal: Oral mucous membranes remain intact  Description: INTERVENTIONS  - Assess oral mucosa and hygiene practices  - Implement preventative oral hygiene regimen  - Implement oral medicated treatments as ordered  - Initiate Nutrition services referral as needed  Outcome: Progressing

## 2024-11-05 NOTE — RESPIRATORY THERAPY NOTE
11/05/24 0356   Respiratory Assessment   Resp Comments Pt found off bipap. Back on [facility]   Non-Invasive Information   SpO2 90 %     RT Ventilator Management Note  Bertha Brown 84 y.o. female MRN: [id number]  Unit/Bed#: ICU 05 Encounter: [id number]      Daily Screen         11/2/2024  1430 11/3/2024  0800          Patient safety screen outcome:: -- Passed      RSBI: 45 55                Physical Exam:   Assessment Type: Assess only  General Appearance: Awake, Drowsy  Respiratory Pattern: Normal  Chest Assessment: Chest expansion symmetrical  Bilateral Breath Sounds: Diminished, Expiratory wheezes  Location Specific: No  Cough: None  O2 Device: V60      Resp Comments: Pt found off bipap. Back on NC

## 2024-11-05 NOTE — ASSESSMENT & PLAN NOTE
Management per primary team.  She was evaluated by GI earlier in hospital course, who recommended outpatient follow-up.

## 2024-11-06 LAB
ALBUMIN SERPL BCG-MCNC: 3.3 G/DL (ref 3.5–5)
ALP SERPL-CCNC: 60 U/L (ref 34–104)
ALT SERPL W P-5'-P-CCNC: 10 U/L (ref 7–52)
ANION GAP SERPL CALCULATED.3IONS-SCNC: 8 MMOL/L (ref 4–13)
AST SERPL W P-5'-P-CCNC: 28 U/L (ref 13–39)
BACTERIA BLD CULT: NORMAL
BACTERIA BLD CULT: NORMAL
BILIRUB SERPL-MCNC: 0.76 MG/DL (ref 0.2–1)
BUN SERPL-MCNC: 15 MG/DL (ref 5–25)
CALCIUM ALBUM COR SERPL-MCNC: 7 MG/DL (ref 8.3–10.1)
CALCIUM SERPL-MCNC: 6.4 MG/DL (ref 8.4–10.2)
CHLORIDE SERPL-SCNC: 110 MMOL/L (ref 96–108)
CO2 SERPL-SCNC: 23 MMOL/L (ref 21–32)
CREAT SERPL-MCNC: 0.85 MG/DL (ref 0.6–1.3)
ERYTHROCYTE [DISTWIDTH] IN BLOOD BY AUTOMATED COUNT: 15.7 % (ref 11.6–15.1)
GFR SERPL CREATININE-BSD FRML MDRD: 64 ML/MIN/1.73SQ M
GLUCOSE SERPL-MCNC: 141 MG/DL (ref 65–140)
GLUCOSE SERPL-MCNC: 143 MG/DL (ref 65–140)
GLUCOSE SERPL-MCNC: 144 MG/DL (ref 65–140)
GLUCOSE SERPL-MCNC: 153 MG/DL (ref 65–140)
GLUCOSE SERPL-MCNC: 171 MG/DL (ref 65–140)
GLUCOSE SERPL-MCNC: 220 MG/DL (ref 65–140)
HCT VFR BLD AUTO: 25 % (ref 34.8–46.1)
HGB BLD-MCNC: 7.9 G/DL (ref 11.5–15.4)
MAGNESIUM SERPL-MCNC: 1.8 MG/DL (ref 1.9–2.7)
MCH RBC QN AUTO: 30.5 PG (ref 26.8–34.3)
MCHC RBC AUTO-ENTMCNC: 31.6 G/DL (ref 31.4–37.4)
MCV RBC AUTO: 97 FL (ref 82–98)
PLATELET # BLD AUTO: 159 THOUSANDS/UL (ref 149–390)
PMV BLD AUTO: 11.4 FL (ref 8.9–12.7)
POTASSIUM SERPL-SCNC: 3.1 MMOL/L (ref 3.5–5.3)
PROT SERPL-MCNC: 5.4 G/DL (ref 6.4–8.4)
RBC # BLD AUTO: 2.59 MILLION/UL (ref 3.81–5.12)
SODIUM SERPL-SCNC: 141 MMOL/L (ref 135–147)
WBC # BLD AUTO: 11.07 THOUSAND/UL (ref 4.31–10.16)

## 2024-11-06 PROCEDURE — 99233 SBSQ HOSP IP/OBS HIGH 50: CPT | Performed by: STUDENT IN AN ORGANIZED HEALTH CARE EDUCATION/TRAINING PROGRAM

## 2024-11-06 PROCEDURE — 94660 CPAP INITIATION&MGMT: CPT

## 2024-11-06 PROCEDURE — 85027 COMPLETE CBC AUTOMATED: CPT | Performed by: FAMILY MEDICINE

## 2024-11-06 PROCEDURE — 99024 POSTOP FOLLOW-UP VISIT: CPT | Performed by: PHYSICIAN ASSISTANT

## 2024-11-06 PROCEDURE — 82948 REAGENT STRIP/BLOOD GLUCOSE: CPT

## 2024-11-06 PROCEDURE — 94760 N-INVAS EAR/PLS OXIMETRY 1: CPT

## 2024-11-06 PROCEDURE — 83735 ASSAY OF MAGNESIUM: CPT

## 2024-11-06 PROCEDURE — 92526 ORAL FUNCTION THERAPY: CPT

## 2024-11-06 PROCEDURE — 99232 SBSQ HOSP IP/OBS MODERATE 35: CPT | Performed by: FAMILY MEDICINE

## 2024-11-06 PROCEDURE — 80053 COMPREHEN METABOLIC PANEL: CPT

## 2024-11-06 RX ORDER — WARFARIN SODIUM 5 MG/1
5 TABLET ORAL
Status: DISCONTINUED | OUTPATIENT
Start: 2024-11-06 | End: 2024-11-06

## 2024-11-06 RX ORDER — MAGNESIUM SULFATE HEPTAHYDRATE 40 MG/ML
2 INJECTION, SOLUTION INTRAVENOUS ONCE
Status: COMPLETED | OUTPATIENT
Start: 2024-11-06 | End: 2024-11-06

## 2024-11-06 RX ORDER — AMIODARONE HYDROCHLORIDE 200 MG/1
400 TABLET ORAL
Status: DISCONTINUED | OUTPATIENT
Start: 2024-11-06 | End: 2024-11-07

## 2024-11-06 RX ORDER — POTASSIUM CHLORIDE 14.9 MG/ML
20 INJECTION INTRAVENOUS ONCE
Status: COMPLETED | OUTPATIENT
Start: 2024-11-06 | End: 2024-11-06

## 2024-11-06 RX ORDER — AMIODARONE HYDROCHLORIDE 200 MG/1
400 TABLET ORAL
Status: DISCONTINUED | OUTPATIENT
Start: 2024-11-06 | End: 2024-11-06

## 2024-11-06 RX ORDER — WARFARIN SODIUM 7.5 MG/1
7.5 TABLET ORAL
Status: DISCONTINUED | OUTPATIENT
Start: 2024-11-06 | End: 2024-11-07

## 2024-11-06 RX ADMIN — WARFARIN SODIUM 7.5 MG: 7.5 TABLET ORAL at 17:44

## 2024-11-06 RX ADMIN — METOROPROLOL TARTRATE 5 MG: 5 INJECTION, SOLUTION INTRAVENOUS at 08:45

## 2024-11-06 RX ADMIN — ACETAMINOPHEN 1000 MG: 10 INJECTION INTRAVENOUS at 13:47

## 2024-11-06 RX ADMIN — PIPERACILLIN AND TAZOBACTAM 4.5 G: 36; 4.5 INJECTION, POWDER, FOR SOLUTION INTRAVENOUS at 20:52

## 2024-11-06 RX ADMIN — DILTIAZEM HYDROCHLORIDE 1 MG/HR: 5 INJECTION INTRAVENOUS at 00:05

## 2024-11-06 RX ADMIN — POTASSIUM CHLORIDE 20 MEQ: 14.9 INJECTION, SOLUTION INTRAVENOUS at 15:05

## 2024-11-06 RX ADMIN — INSULIN LISPRO 2 UNITS: 100 INJECTION, SOLUTION INTRAVENOUS; SUBCUTANEOUS at 00:34

## 2024-11-06 RX ADMIN — PIPERACILLIN AND TAZOBACTAM 4.5 G: 36; 4.5 INJECTION, POWDER, FOR SOLUTION INTRAVENOUS at 05:48

## 2024-11-06 RX ADMIN — ACETAMINOPHEN 1000 MG: 10 INJECTION INTRAVENOUS at 00:10

## 2024-11-06 RX ADMIN — METOROPROLOL TARTRATE 5 MG: 5 INJECTION, SOLUTION INTRAVENOUS at 01:40

## 2024-11-06 RX ADMIN — PANTOPRAZOLE SODIUM 40 MG: 40 INJECTION, POWDER, FOR SOLUTION INTRAVENOUS at 20:52

## 2024-11-06 RX ADMIN — ACETAMINOPHEN 1000 MG: 10 INJECTION INTRAVENOUS at 17:47

## 2024-11-06 RX ADMIN — METOROPROLOL TARTRATE 5 MG: 5 INJECTION, SOLUTION INTRAVENOUS at 21:05

## 2024-11-06 RX ADMIN — METOROPROLOL TARTRATE 5 MG: 5 INJECTION, SOLUTION INTRAVENOUS at 14:04

## 2024-11-06 RX ADMIN — PANTOPRAZOLE SODIUM 40 MG: 40 INJECTION, POWDER, FOR SOLUTION INTRAVENOUS at 08:45

## 2024-11-06 RX ADMIN — ACETAMINOPHEN 1000 MG: 10 INJECTION INTRAVENOUS at 05:28

## 2024-11-06 RX ADMIN — AMIODARONE HYDROCHLORIDE 0.5 MG/MIN: 50 INJECTION, SOLUTION INTRAVENOUS at 07:33

## 2024-11-06 RX ADMIN — DEXTROSE AND SODIUM CHLORIDE 75 ML/HR: 5; .45 INJECTION, SOLUTION INTRAVENOUS at 17:32

## 2024-11-06 RX ADMIN — DEXTROSE AND SODIUM CHLORIDE 75 ML/HR: 5; .45 INJECTION, SOLUTION INTRAVENOUS at 03:11

## 2024-11-06 RX ADMIN — AMIODARONE HYDROCHLORIDE 400 MG: 200 TABLET ORAL at 17:44

## 2024-11-06 RX ADMIN — MAGNESIUM SULFATE HEPTAHYDRATE 2 G: 40 INJECTION, SOLUTION INTRAVENOUS at 17:38

## 2024-11-06 RX ADMIN — PIPERACILLIN AND TAZOBACTAM 4.5 G: 36; 4.5 INJECTION, POWDER, FOR SOLUTION INTRAVENOUS at 13:34

## 2024-11-06 RX ADMIN — POTASSIUM CHLORIDE 20 MEQ: 14.9 INJECTION, SOLUTION INTRAVENOUS at 18:05

## 2024-11-06 RX ADMIN — INSULIN LISPRO 1 UNITS: 100 INJECTION, SOLUTION INTRAVENOUS; SUBCUTANEOUS at 19:47

## 2024-11-06 NOTE — PROGRESS NOTES
Progress Note - Surgery-General   Name: Bertha Brown 84 y.o. female I MRN: [id number]  Unit/Bed#: ICU 05 I Date of Admission: 10/29/2024   Date of Service: 11/6/2024 I Hospital Day: 8    Assessment & Plan  Ischemic colitis (HCC)  POD 7 s/p exploratory laparotomy with right hemicolectomy, abdominal left open w abtherra placement on 10/29/2024  POD 6 , s/p second look with wash out, abdominal wall closure, and loop ileostomy and mucous fistula creation, VAC placement to midline abdomen  Afebrile >24h, intermittent tachycardia, O2 98% on NC  WBC  11.07, 7.9  Mucous fistula pink with no output  Loop ileostomy with liquid dark stool.  VAC at midline with good seal, minimal drainage into canister    Plan:  Advance diet as tolerated and monitor for continued bowel function, supplemental nutrition as recommended  IVF per primary  Replete electrolytes  Continue vAC changes, next change likey Friday.   Goal to transition to MWF VAC changes   Ostomy care per WOCN  DVT ppx  OOB and ambulation, PT/OT  CM for discharge plannning  Remainder of care per primary team  Surgery will continue to follow patient intermittently for VAC changes. Please contact with any further questions or concerns. Follow up outpatient.  Paroxysmal atrial fibrillation (HCC)  - Monitor Hgb  - restart AC when stable    Acute respiratory failure with hypoxia ([facility])  - Monitor abdominal exams  - Remainder of care per prior medical team  Diabetes (HCC)  - SSI  - close monitoring and control of serum glucose for optimized healing    Lab Results   Component Value Date    HGBA1C 7.0 (H) 08/22/2024       Recent Labs     11/05/24  0503 11/05/24  1130 11/05/24  1850 11/06/24  0034   POCGLU 124 138 157* 220*       Blood Sugar Average: Last 72 hrs:  (P) 133.3      Please contact the SecureChat role for the Surgery-General service with any questions/concerns.    Subjective   Patient feeling well. Denies nausea or vomiting with removal of NG and initiation on CLD  yesterday.    Objective :  Temp:  [97.9 °F (36.6 °C)-99.9 °F (37.7 °C)] 98.8 °F (37.1 °C)  HR:  [] 67  BP: ()/(57-87) 129/60  Resp:  [16-30] 18  SpO2:  [95 %-99 %] 98 %  O2 Device: Nasal cannula    I/O         11/04 0701 11/05 0700 11/05 0701 11/06 0700 11/06 0701 11/07 0700    P.O. 0 240     I.V. (mL/kg) 986.7 (9.4) 834.1 (7.9)     NG/      IV Piggyback 452.1 100     Feedings 48      Total Intake(mL/kg) 2146.8 (20.4) 1174.1 (11.2)     Urine (mL/kg/hr) 500 (0.2)      Emesis/NG output 0      Drains 100 25     Stool 220 0     Total Output 820 25     Net +1326.8 +1149.1            Unmeasured Urine Occurrence 3 x      Unmeasured Stool Occurrence 6 x 1 x           Lines/Drains/Airways       Active Status       Name Placement date Placement time Site Days    Colostomy RLQ 10/31/24  1422  RLQ  5                  Physical Exam  Vitals and nursing note reviewed.   Constitutional:       General: She is not in acute distress.     Appearance: She is well-developed. She is obese. She is ill-appearing. She is not toxic-appearing.   HENT:      Head: Normocephalic and atraumatic.   Eyes:      General: No scleral icterus.     Conjunctiva/sclera: Conjunctivae normal.   Cardiovascular:      Rate and Rhythm: Normal rate and regular rhythm.      Pulses: Normal pulses.      Heart sounds: Normal heart sounds.   Pulmonary:      Effort: Pulmonary effort is normal. No respiratory distress.      Breath sounds: Normal breath sounds. No wheezing.      Comments: Shallow effort  Abdominal:      General: Bowel sounds are normal. There is no distension.      Palpations: Abdomen is soft.      Tenderness: There is no abdominal tenderness. There is no guarding.      Comments: Mucous fistula pink with no output  Loop ileostomy with liquid dark stool.  VAC at midline with good seal, minimal drainage into canister   Musculoskeletal:         General: No swelling.      Cervical back: Neck supple.      Right lower leg: Edema present.       Left lower leg: Edema present.   Skin:     General: Skin is warm and dry.      Capillary Refill: Capillary refill takes less than 2 seconds.   Neurological:      General: No focal deficit present.      Mental Status: She is alert and oriented to person, place, and time.   Psychiatric:         Mood and Affect: Mood normal.         Behavior: Behavior normal.           Lab Results: I have reviewed the following results:  Recent Labs     11/03/24  1707 11/03/24  2317 11/05/24  0506 11/05/24  1325 11/06/24  0539   WBC  --    < > 15.65*  --  11.07*   HGB  --    < > 8.5*  --  7.9*   HCT  --    < > 27.2*  --  25.0*   PLT  --    < > 179  --  159   BANDSPCT  --   --  6  --   --    SODIUM  --    < > 148*  --  141   K  --    < > 3.6  --  3.1*   CL  --    < > 113*  --  110*   CO2  --    < > 27  --  23   BUN  --    < > 19  --  15   CREATININE  --    < > 0.98  --  0.85   GLUC  --    < > 119  --  143*   CAIONIZED  --    < > 0.96*  --   --    MG  --    < > 2.3  --   --    PHOS  --    < > 1.5*  --   --    AST  --    < >  --   --  28   ALT  --    < >  --   --  10   ALB  --    < >  --   --  3.3*   TBILI  --    < >  --   --  0.76   ALKPHOS  --    < >  --   --  60   PTT 29   < > 100* 65*  --    INR 1.22*  --   --   --   --     < > = values in this interval not displayed.       Imaging Results Review: No pertinent imaging studies reviewed.  Other Study Results Review: No additional pertinent studies reviewed.    VTE Pharmacologic Prophylaxis: VTE covered by:  heparin (porcine), Intravenous, Held at 11/05/24 2020     VTE Mechanical Prophylaxis: sequential compression device    Tiffany Yadav  11/6/2024

## 2024-11-06 NOTE — ASSESSMENT & PLAN NOTE
She is in predominantly in sinus rhythm with intermittent episodes of atrial fibrillation with RVR (rates 110s-120s) on telemetry. Suspect this is secondary to recent surgery and current septic shock/bacteremia and ischemic colitis, as well as electrolyte abnormalities.  Discontinue amiodarone gtt. and transition to amiodarone 400 mg 3 times daily x 5 days.  She has been cleared to resume p.o. medications.  Continue Lopressor 5 mg IV every 6 hours.  Continue anticoagulation with heparin to Coumadin bridge.  [id number].  She has been deemed okay to resume anticoagulation once stable by general surgery, and was resumed on heparin gtt. On 11/3. Continue to monitor hemoglobin closely and monitor closely for signs of bleeding.   Potassium ordered for repletion.  Magnesium level ordered.  Recommend close monitoring of electrolytes and maintaining K >4 and mag >2.

## 2024-11-06 NOTE — ASSESSMENT & PLAN NOTE
- SSI  - close monitoring and control of serum glucose for optimized healing    Lab Results   Component Value Date    HGBA1C 7.0 (H) 08/22/2024       Recent Labs     11/05/24  0503 11/05/24  1130 11/05/24  1850 11/06/24  0034   POCGLU 124 138 157* 220*       Blood Sugar Average: Last 72 hrs:  (P) 133.3

## 2024-11-06 NOTE — PLAN OF CARE
Problem: PAIN - ADULT  Goal: Verbalizes/displays adequate comfort level or baseline comfort level  Description: Interventions:  - Encourage patient to monitor pain and request assistance  - Assess pain using appropriate pain scale  - Administer analgesics based on type and severity of pain and evaluate response  - Implement non-pharmacological measures as appropriate and evaluate response  - Consider cultural and social influences on pain and pain management  - Notify physician/advanced practitioner if interventions unsuccessful or patient reports new pain  Outcome: Progressing     Problem: INFECTION - ADULT  Goal: Absence or prevention of progression during hospitalization  Description: INTERVENTIONS:  - Assess and monitor for signs and symptoms of infection  - Monitor lab/diagnostic results  - Monitor all insertion sites, i.e. indwelling lines, tubes, and drains  - Monitor endotracheal if appropriate and nasal secretions for changes in amount and color  - Jackson appropriate cooling/warming therapies per order  - Administer medications as ordered  - Instruct and encourage patient and family to use good hand hygiene technique  - Identify and instruct in appropriate isolation precautions for identified infection/condition  Outcome: Progressing  Goal: Absence of fever/infection during neutropenic period  Description: INTERVENTIONS:  - Monitor WBC    Outcome: Progressing     Problem: SAFETY ADULT  Goal: Patient will remain free of falls  Description: INTERVENTIONS:  - Educate patient/family on patient safety including physical limitations  - Instruct patient to call for assistance with activity   - Consult OT/PT to assist with strengthening/mobility   - Keep Call bell within reach  - Keep bed low and locked with side rails adjusted as appropriate  - Keep care items and personal belongings within reach  - Initiate and maintain comfort rounds  - Make Fall Risk Sign visible to staff  - Offer Toileting every 2 Hours,  in advance of need  - Initiate/Maintain bed alarm  - Apply yellow socks and bracelet for high fall risk patients  - Consider moving patient to room near nurses station  Outcome: Progressing  Goal: Maintain or return to baseline ADL function  Description: INTERVENTIONS:  -  Assess patient's ability to carry out ADLs; assess patient's baseline for ADL function and identify physical deficits which impact ability to perform ADLs (bathing, care of mouth/teeth, toileting, grooming, dressing, etc.)  - Assess/evaluate cause of self-care deficits   - Assess range of motion  - Assess patient's mobility; develop plan if impaired  - Assess patient's need for assistive devices and provide as appropriate  - Encourage maximum independence but intervene and supervise when necessary  - Involve family in performance of ADLs  - Assess for home care needs following discharge   - Consider OT consult to assist with ADL evaluation and planning for discharge  - Provide patient education as appropriate  Outcome: Progressing  Goal: Maintains/Returns to pre admission functional level  Description: INTERVENTIONS:  - Perform AM-PAC 6 Click Basic Mobility/ Daily Activity assessment daily.  - Set and communicate daily mobility goal to care team and patient/family/caregiver.   - Collaborate with rehabilitation services on mobility goals if consulted  - Perform Range of Motion 2 times a day.  - Reposition patient every 2 hours.  - Dangle patient 2 times a day  - Stand patient 2 times a day  - Ambulate patient 2 times a day  - Out of bed to chair 2 times a day   - Out of bed for meals 2 times a day  - Out of bed for toileting  - Record patient progress and toleration of activity level   Outcome: Progressing     Problem: DISCHARGE PLANNING  Goal: Discharge to home or other facility with appropriate resources  Description: INTERVENTIONS:  - Identify barriers to discharge w/patient and caregiver  - Arrange for needed discharge resources and  transportation as appropriate  - Identify discharge learning needs (meds, wound care, etc.)  - Arrange for interpretive services to assist at discharge as needed  - Refer to Case Management Department for coordinating discharge planning if the patient needs post-hospital services based on physician/advanced practitioner order or complex needs related to functional status, cognitive ability, or social support system  Outcome: Progressing     Problem: Knowledge Deficit  Goal: Patient/family/caregiver demonstrates understanding of disease process, treatment plan, medications, and discharge instructions  Description: Complete learning assessment and assess knowledge base.  Interventions:  - Provide teaching at level of understanding  - Provide teaching via preferred learning methods  Outcome: Progressing     Problem: NEUROSENSORY - ADULT  Goal: Achieves stable or improved neurological status  Description: INTERVENTIONS  - Monitor and report changes in neurological status  - Monitor vital signs such as temperature, blood pressure, glucose, and any other labs ordered   - Initiate measures to prevent increased intracranial pressure  - Monitor for seizure activity and implement precautions if appropriate      Outcome: Progressing  Goal: Remains free of injury related to seizures activity  Description: INTERVENTIONS  - Maintain airway, patient safety  and administer oxygen as ordered  - Monitor patient for seizure activity, document and report duration and description of seizure to physician/advanced practitioner  - If seizure occurs,  ensure patient safety during seizure  - Reorient patient post seizure  - Seizure pads on all 4 side rails  - Instruct patient/family to notify RN of any seizure activity including if an aura is experienced  - Instruct patient/family to call for assistance with activity based on nursing assessment  - Administer anti-seizure medications if ordered    Outcome: Progressing  Goal: Achieves maximal  functionality and self care  Description: INTERVENTIONS  - Monitor swallowing and airway patency with patient fatigue and changes in neurological status  - Encourage and assist patient to increase activity and self care.   - Encourage visually impaired, hearing impaired and aphasic patients to use assistive/communication devices  Outcome: Progressing     Problem: RESPIRATORY - ADULT  Goal: Achieves optimal ventilation and oxygenation  Description: INTERVENTIONS:  - Assess for changes in respiratory status  - Assess for changes in mentation and behavior  - Position to facilitate oxygenation and minimize respiratory effort  - Oxygen administered by appropriate delivery if ordered  - Initiate smoking cessation education as indicated  - Encourage broncho-pulmonary hygiene including cough, deep breathe, Incentive Spirometry  - Assess the need for suctioning and aspirate as needed  - Assess and instruct to report SOB or any respiratory difficulty  - Respiratory Therapy support as indicated  Outcome: Progressing     Problem: GASTROINTESTINAL - ADULT  Goal: Minimal or absence of nausea and/or vomiting  Description: INTERVENTIONS:  - Administer IV fluids if ordered to ensure adequate hydration  - Maintain NPO status until nausea and vomiting are resolved  - Nasogastric tube if ordered  - Administer ordered antiemetic medications as needed  - Provide nonpharmacologic comfort measures as appropriate  - Advance diet as tolerated, if ordered  - Consider nutrition services referral to assist patient with adequate nutrition and appropriate food choices  Outcome: Progressing  Goal: Maintains or returns to baseline bowel function  Description: INTERVENTIONS:  - Assess bowel function  - Encourage oral fluids to ensure adequate hydration  - Administer IV fluids if ordered to ensure adequate hydration  - Administer ordered medications as needed  - Encourage mobilization and activity  - Consider nutritional services referral to assist  patient with adequate nutrition and appropriate food choices  Outcome: Progressing  Goal: Maintains adequate nutritional intake  Description: INTERVENTIONS:  - Monitor percentage of each meal consumed  - Identify factors contributing to decreased intake, treat as appropriate  - Assist with meals as needed  - Monitor I&O, weight, and lab values if indicated  - Obtain nutrition services referral as needed  Outcome: Progressing  Goal: Establish and maintain optimal ostomy function  Description: INTERVENTIONS:  - Assess bowel function  - Encourage oral fluids to ensure adequate hydration  - Administer IV fluids if ordered to ensure adequate hydration   - Administer ordered medications as needed  - Encourage mobilization and activity  - Nutrition services referral to assist patient with appropriate food choices  - Assess stoma site  - Consider wound care consult   Outcome: Progressing  Goal: Oral mucous membranes remain intact  Description: INTERVENTIONS  - Assess oral mucosa and hygiene practices  - Implement preventative oral hygiene regimen  - Implement oral medicated treatments as ordered  - Initiate Nutrition services referral as needed  Outcome: Progressing     Problem: GENITOURINARY - ADULT  Goal: Maintains or returns to baseline urinary function  Description: INTERVENTIONS:  - Assess urinary function  - Encourage oral fluids to ensure adequate hydration if ordered  - Administer IV fluids as ordered to ensure adequate hydration  - Administer ordered medications as needed  - Offer frequent toileting  - Follow urinary retention protocol if ordered  Outcome: Progressing  Goal: Absence of urinary retention  Description: INTERVENTIONS:  - Assess patient's ability to void and empty bladder  - Monitor I/O  - Bladder scan as needed  - Discuss with physician/AP medications to alleviate retention as needed  - Discuss catheterization for long term situations as appropriate  Outcome: Progressing  Goal: Urinary catheter  remains patent  Description: INTERVENTIONS:  - Assess patency of urinary catheter  - If patient has a chronic james, consider changing catheter if non-functioning  - Follow guidelines for intermittent irrigation of non-functioning urinary catheter  Outcome: Progressing     Problem: METABOLIC, FLUID AND ELECTROLYTES - ADULT  Goal: Electrolytes maintained within normal limits  Description: INTERVENTIONS:  - Monitor labs and assess patient for signs and symptoms of electrolyte imbalances  - Administer electrolyte replacement as ordered  - Monitor response to electrolyte replacements, including repeat lab results as appropriate  - Instruct patient on fluid and nutrition as appropriate  Outcome: Progressing  Goal: Fluid balance maintained  Description: INTERVENTIONS:  - Monitor labs   - Monitor I/O and WT  - Instruct patient on fluid and nutrition as appropriate  - Assess for signs & symptoms of volume excess or deficit  Outcome: Progressing  Goal: Glucose maintained within target range  Description: INTERVENTIONS:  - Monitor Blood Glucose as ordered  - Assess for signs and symptoms of hyperglycemia and hypoglycemia  - Administer ordered medications to maintain glucose within target range  - Assess nutritional intake and initiate nutrition service referral as needed  Outcome: Progressing     Problem: SKIN/TISSUE INTEGRITY - ADULT  Goal: Skin Integrity remains intact(Skin Breakdown Prevention)  Description: Assess:  -Perform Jese assessment every shift  -Clean and moisturize skin every 4 hrs  -Inspect skin when repositioning, toileting, and assisting with ADLS  -Assess under medical devices such as bp cuff every hour  -Assess extremities for adequate circulation and sensation     Bed Management:  -Have minimal linens on bed & keep smooth, unwrinkled  -Change linens as needed when moist or perspiring  -Avoid sitting or lying in one position for more than 2 hours while in bed  -Keep HOB at 30degrees     Toileting:  -Offer  bedside commode  -Assess for incontinence every hour  -Use incontinent care products after each incontinent episode such as baby powder    Activity:  -Mobilize patient 2 times a day  -Encourage activity and walks on unit  -Encourage or provide ROM exercises   -Turn and reposition patient every 2 Hours  -Use appropriate equipment to lift or move patient in bed  -Instruct/ Assist with weight shifting every hour when out of bed in chair  -Consider limitation of chair time 4 hour intervals    Skin Care:  -Avoid use of baby powder, tape, friction and shearing, hot water or constrictive clothing  -Relieve pressure over bony prominences using pillows  -Do not massage red bony areas    Next Steps:  -Teach patient strategies to minimize risks such as repositioning    -Consider consults to  interdisciplinary teams such as pt  Outcome: Progressing  Goal: Incision(s), wounds(s) or drain site(s) healing without S/S of infection  Description: INTERVENTIONS  - Assess and document dressing, incision, wound bed, drain sites and surrounding tissue  - Provide patient and family education  - Perform skin care/dressing changes every sift  Outcome: Progressing  Goal: Pressure injury heals and does not worsen  Description: Interventions:  - Implement low air loss mattress or specialty surface (Criteria met)  - Apply silicone foam dressing  - Instruct/assist with weight shifting every 30 minutes when in chair   - Limit chair time to 4 hour intervals  - Use special pressure reducing interventions such as waffle cushion when in chair   - Apply fecal or urinary incontinence containment device   - Perform passive or active ROM every hour  - Turn and reposition patient & offload bony prominences every 2 hours   - Utilize friction reducing device or surface for transfers   - Consider consults to  interdisciplinary teams such as pt  - Use incontinent care products after each incontinent episode such as baby powder  - Consider nutrition services  referral as needed  Outcome: Progressing     Problem: HEMATOLOGIC - ADULT  Goal: Maintains hematologic stability  Description: INTERVENTIONS  - Assess for signs and symptoms of bleeding or hemorrhage  - Monitor labs  - Administer supportive blood products/factors as ordered and appropriate  Outcome: Progressing     Problem: MUSCULOSKELETAL - ADULT  Goal: Maintain or return mobility to safest level of function  Description: INTERVENTIONS:  - Assess patient's ability to carry out ADLs; assess patient's baseline for ADL function and identify physical deficits which impact ability to perform ADLs (bathing, care of mouth/teeth, toileting, grooming, dressing, etc.)  - Assess/evaluate cause of self-care deficits   - Assess range of motion  - Assess patient's mobility  - Assess patient's need for assistive devices and provide as appropriate  - Encourage maximum independence but intervene and supervise when necessary  - Involve family in performance of ADLs  - Assess for home care needs following discharge   - Consider OT consult to assist with ADL evaluation and planning for discharge  - Provide patient education as appropriate  Outcome: Progressing  Goal: Maintain proper alignment of affected body part  Description: INTERVENTIONS:  - Support, maintain and protect limb and body alignment  - Provide patient/ family with appropriate education  Outcome: Progressing     Problem: Nutrition/Hydration-ADULT  Goal: Nutrient/Hydration intake appropriate for improving, restoring or maintaining nutritional needs  Description: Monitor and assess patient's nutrition/hydration status for malnutrition. Collaborate with interdisciplinary team and initiate plan and interventions as ordered.  Monitor patient's weight and dietary intake as ordered or per policy. Utilize nutrition screening tool and intervene as necessary. Determine patient's food preferences and provide high-protein, high-caloric foods as appropriate.     INTERVENTIONS:  -  Monitor oral intake, urinary output, labs, and treatment plans  - Assess nutrition and hydration status and recommend course of action  - Evaluate amount of meals eaten  - Assist patient with eating if necessary   - Allow adequate time for meals  - Recommend/ encourage appropriate diets, oral nutritional supplements, and vitamin/mineral supplements  - Order, calculate, and assess calorie counts as needed  - Recommend, monitor, and adjust tube feedings and TPN/PPN based on assessed needs  - Assess need for intravenous fluids  - Provide specific nutrition/hydration education as appropriate  - Include patient/family/caregiver in decisions related to nutrition  Outcome: Progressing     Problem: Prexisting or High Potential for Compromised Skin Integrity  Goal: Skin integrity is maintained or improved  Description: INTERVENTIONS:  - Identify patients at risk for skin breakdown  - Assess and monitor skin integrity  - Assess and monitor nutrition and hydration status  - Monitor labs   - Assess for incontinence   - Turn and reposition patient  - Assist with mobility/ambulation  - Relieve pressure over bony prominences  - Avoid friction and shearing  - Provide appropriate hygiene as needed including keeping skin clean and dry  - Evaluate need for skin moisturizer/barrier cream  - Collaborate with interdisciplinary team   - Patient/family teaching  - Consider wound care consult   Outcome: Progressing

## 2024-11-06 NOTE — ASSESSMENT & PLAN NOTE
84-year-old female patient with past medical history of rectus sheath hematoma causing hemorrhagic shock in 2022, A-fib currently on Coumadin presents to [facility] emergency room with complaining of coffee-ground emesis for last since 5 AM this morning associate with lower abdominal pain that started suddenly early this morning.      CBC, BMP reviewed current hemoglobin 13, creatinine 1.63.  WBC 12,000.  INR 2.05.  Lactic acid elevated 3.5 -- > 3.1  Patient had received IV Protonix, IV analgesics, IV fluids in the emergency room  CT high-volume GI hemorrhage report noted with no signs of high-volume GI bleed, moderate diffuse colonic distention and thickening, finding related to colitis, finding of gastritis and reflux,  CT chest report noted with mildly distended esophagus concerning of reflux/esophageal dysfunction    Unclear etiology.  Currently patient appears comfortable nondistressed.  Acutely nontoxic appearing.  Reports feeling better compared to earlier after getting analgesics, antiemetics  Currently on clear liquids per GI.  N.p.o. after midnight tonight.  Started on IV fluids normal saline 125 cc an hour.  Continue with IV Protonix drip.  Continue to trend hemoglobin.  Continue to trend lactic acid until normal.  Currently on IV Dilaudid for moderate to severe pain as well as breakthrough pain.  No further episodes.  The patient's leukocytosis is improving.

## 2024-11-06 NOTE — WOUND OSTOMY CARE
Progress Note- Ostomy  Bertha Brown 84 y.o. female  [id number]  ICU 05-ICU 05 (MO CT SCAN)        Assessment:  Patient is POD # 6 loop ileostomy creation. Seen for ostomy education and teaching. Introduced self and role to patient. Patient is agreeable to visit. Patient's family at bedside are the primary learners. Today's visit consisted of education with the family. Family members asked appropriate questions and all questions and concerns at this time were answered. Next education session and learning how to perform a pouch change will be on Friday at 2 p.m with patient's daughter Dahlia.    Topics reviewed: how and when to empty (1/3 full) to prevent pulling/lifting of the barrier, importance & how to clean the end of the pouch to prevent odor, diet, frequency of changing of the pouch (every 3-4 days on a schedule), burping, Clothing- including avoiding placing belt-line/seat belts over the stoma, and how to obtain supplies.      Patient's family members verbalized understanding of education and teaching. All questions and concerns answered. Encouraged patient and family to read the educational materials provided at bedside.    Patient gave verbal permission to order sample kits from [employer], [employer], and [employer].      Contact through [employer] Secure Chat with any questions  Wound and Ostomy Care will continue to follow while inpatient    Gris SANCHEZ RN CWON  Wound and Ostomy care

## 2024-11-06 NOTE — RESPIRATORY THERAPY NOTE
11/06/24 0403   Respiratory Assessment   Resp Comments Pt founed off bipap   Non-Invasive Information   SpO2 95 %     RT Ventilator Management Note  Bertha Brown 84 y.o. female MRN: [id number]  Unit/Bed#: ICU 05 Encounter: [id number]      Daily Screen         11/2/2024  1430 11/3/2024  0800          Patient safety screen outcome:: -- Passed      RSBI: 45 55                Physical Exam:          Resp Comments: Pt founed off bipap

## 2024-11-06 NOTE — ASSESSMENT & PLAN NOTE
-status post exploratory laparotomy with right hemicolectomy on 10/29/2024     -Second look and washout on 10/31/2024 with abdominal wall closure, ileostomy creation, mucous fistula creation, and wound VAC placement and abdominal wall subcutaneous tissue  Repeat CT scan noted  Advance to dysphagia 3 with thins

## 2024-11-06 NOTE — SPEECH THERAPY NOTE
Speech Language/Pathology     Speech/Language Pathology Progress Note     Patient Name: Bertha Brown    Today's Date: 11/6/2024     Problem List  Principal Problem:    Septic shock (HCC)  Active Problems:    Paroxysmal atrial fibrillation (HCC)    Acute respiratory failure with hypoxia (HCC)    Hypothyroidism    Hypertension    Hyperlipidemia    Diabetes (HCC)    Obesity    Coffee ground emesis    RUKHSANA (acute kidney injury) (HCC)    Chronic idiopathic constipation    Generalized abdominal pain    SIRS (systemic inflammatory response syndrome) (HCC)    Ischemic colitis (HCC)    Acute metabolic encephalopathy    S/P exploratory laparotomy    Anemia    Hypernatremia     Subjective:  Patient received awake, daughter present and feeding breakfast.  Remains on clears.  Per d/w RN one episode emesis yesterday, suspected to be 2/2 impulsivity as pt noted to take large quantities of thin liquids at once.  Improved since that time.     Previous/current diet: clears     Objective:  The following consistencies were tested jell-o, thin liquids by cup sip.  NG removed.  Adequate retrieval from spoon and cup.  Coughing x1 evident w/ large bites.  Family education re: small bites/teaspoon quantities.  Takes small quantities of apple juice from cup, broth via single straw sip - no s/s of aspiration. Pt continues to demonstrate some impulsive feeding behavior.    Additional family education re: aspiration precautions/mealtime strategies and gradual diet advancement provided.        Assessment:  Pt continues to present with occasional signs of oropharyngeal dysphagia despite removal of NG tube.  Impulsivity noted to be a contributing factor.  Continue aspiration precautions.      Plan:  Clears at this time; minimal dysphagia w/ solid textures per initial evaluation.  See MD notes for advancement timeline.  Recommend dysphagia 3 once cleared.   Meds w/ puree if able  ST follow-up x1-3    Michelle Cruz MS, CCC-SLP  Speech-Language  Pathologist  [place] #[id number]  [place] #[id number]

## 2024-11-06 NOTE — PROGRESS NOTES
Progress Note - [facility]   Name: Bertha Brown 84 y.o. female I MRN: [id number]  Unit/Bed#: ICU 05 I Date of Admission: 10/29/2024   Date of Service: 11/6/2024 I Hospital Day: 8    Assessment & Plan  Coffee ground emesis  84-year-old female patient with past medical history of rectus sheath hematoma causing hemorrhagic shock in 2022, A-fib currently on Coumadin presents to [facility] emergency room with complaining of coffee-ground emesis for last since 5 AM this morning associate with lower abdominal pain that started suddenly early this morning.      CBC, BMP reviewed current hemoglobin 13, creatinine 1.63.  WBC 12,000.  INR 2.05.  Lactic acid elevated 3.5 -- > 3.1  Patient had received IV Protonix, IV analgesics, IV fluids in the emergency room  CT high-volume GI hemorrhage report noted with no signs of high-volume GI bleed, moderate diffuse colonic distention and thickening, finding related to colitis, finding of gastritis and reflux,  CT chest report noted with mildly distended esophagus concerning of reflux/esophageal dysfunction    Unclear etiology.  Currently patient appears comfortable nondistressed.  Acutely nontoxic appearing.  Reports feeling better compared to earlier after getting analgesics, antiemetics  Currently on clear liquids per GI.  N.p.o. after midnight tonight.  Started on IV fluids normal saline 125 cc an hour.  Continue with IV Protonix drip.  Continue to trend hemoglobin.  Continue to trend lactic acid until normal.  Currently on IV Dilaudid for moderate to severe pain as well as breakthrough pain.  No further episodes.  The patient's leukocytosis is improving.  Paroxysmal atrial fibrillation (HCC)  Present on admission history of paroxysmal A-fib.  Currently in sinus tachycardia with PVCs.  Patient in A-fib with RVR.  Anticoagulation is on hold given all of the patient's current issues.  However me to restart the patient on Coumadin at 7.5    Hypothyroidism  Present on admission with  past medical history of hypothyroidism.  Resume home dose of levothyroxine  Acute respiratory failure with hypoxia ([facility])    Suspected secondary aspiration event due to coffee-ground emesis versus atelectasis.  Encourage incentive spirometry use  Continue to wean off oxygen as able.      RUKHSANA (acute kidney injury) ([facility])  RUKHSANA POA with initial creatinine 1.63 and baseline 0.8-0.9  Suspect prerenal etiology  Improving    Plan:  Continue fluid resuscitation  Resolved continue to monitor  SIRS (systemic inflammatory response syndrome) ([facility])  Present on admission with tachycardia, tachypnea, leukocytosis, RUKHSANA thought to be due to volume loss due to coffee-ground emesis, elevated lactic acid, no clear source of infection however SIRS thought to be due to coffee-ground emesis.  Currently getting IV fluids resuscitation.  Low threshold to initiate infectious workup if develops worsening signs of infection and or not improving or worsening lactic acidosis and initiation of antibiotics.  Hypertension  Present on admission with hypotension.  Blood pressure currently controlled.  Patient given couple doses of Lopressor.  I did ask cardiology to see.  Continue with scheduled IV beta-blocker for now  Hyperlipidemia  Continue home dose of Lipitor 40 mg nightly  Diabetes ([facility])  Lab Results   Component Value Date    HGBA1C 7.0 (H) 08/22/2024       Recent Labs     11/05/24  1130 11/05/24  1850 11/06/24  0034 11/06/24  1224   POCGLU 138 157* 220* 144*       Blood Sugar Average: Last 72 hrs:  (P) [id number]    Present on admission with history of diabetes.  Most recent A1c 7.0.  Advance diet as tolerated per surgery's recommendations  Obesity  Present on admission with obesity with BMI 37.  Counseled on weight loss, lifestyle modification.  Chronic idiopathic constipation  May need bowel regimen once able to tolerate more p.o.  Generalized abdominal pain    -  Abdominal pain is improving.    S/p  LAPAROSCOPY DIAGNOSTIC CONVERTED  TO OPEN  LAPAROTOMY EXPLORATORY. EXTENDED RIGHT HEMICOLECTOMY. WITH abthera VAC placement  Ischemic colitis (HCC)  POD 6 s/p exploratory laparotomy with right hemicolectomy, abdominal left open w abtherra placement on 10/29/2024  POD 5 , s/p second look with wash out, abdominal wall closure, and loop ileostomy and mucous fistula creation, VAC placement to midline abdomen  Febrile overnight   WBC  15.65, 14.35, Hb 8.0   L/24hr,   Cr 0.98  VAC per surgery       Plan:  Pt had speech bedside evaluation this am, recommendation is thin liquids with advance to solids to soft dysphagia 3 texture diet,   Start with thin clear liquid diet today   Discontinue NGT today   VAC change today.   Goal to transition to MWF VAC changes   Replete lytes as needed  Remove loop bar today    Septic shock (HCC)  Septic shock/E. coli bacteremia tenure with current antibiotics. resolved  Acute metabolic encephalopathy  Patient with worsening lethargy prior to OR  Suspect secondary to significant metabolic derangements  Monitor neuro exam  S/P exploratory laparotomy  -status post exploratory laparotomy with right hemicolectomy on 10/29/2024     -Second look and washout on 10/31/2024 with abdominal wall closure, ileostomy creation, mucous fistula creation, and wound VAC placement and abdominal wall subcutaneous tissue  Repeat CT scan noted  Advance to dysphagia 3 with thins  Anemia  Been stable postoperatively.  Continue to monitor.  Hypernatremia  Resolved.  Stop IV fluids    VTE Pharmacologic Prophylaxis:   High Risk (Score >/= 5) - Pharmacological DVT Prophylaxis Ordered: heparin drip. Sequential Compression Devices Ordered.    Mobility:   Basic Mobility Inpatient Raw Score: 10  JH-HLM Goal: 4: Move to chair/commode  JH-HLM Achieved: 4: Move to chair/commode  JH-HLM Goal achieved. Continue to encourage appropriate mobility.    Patient Centered Rounds: I performed bedside rounds with nursing staff today.   Discussions with Specialists  or Other Care Team Provider: Surgery and cardiology    Education and Discussions with Family / Patient: Updated  (daughter) at bedside.    Current Length of Stay: 8 day(s)  Current Patient Status: Inpatient   Certification Statement: The patient will continue to require additional inpatient hospital stay due to needing to see how she tolerates a diet.  Also needing weaning off oxygen  Discharge Plan: Anticipate discharge in 48-72 hrs to discharge location to be determined pending rehab evaluations.    Code Status: Level 1 - Full Code    Subjective   Patient seen and examined.  States she is doing a little bit better today.    Objective :  Temp:  [98 °F (36.7 °C)-99.9 °F (37.7 °C)] 98.1 °F (36.7 °C)  HR:  [] 67  BP: (115-140)/(57-80) 126/61  Resp:  [17-30] 18  SpO2:  [94 %-99 %] 94 %  O2 Device: Nasal cannula    Body mass index is 38.67 kg/m².     Input and Output Summary (last 24 hours):     Intake/Output Summary (Last 24 hours) at 11/6/2024 1408  Last data filed at 11/6/2024 0900  Gross per 24 hour   Intake 2780.53 ml   Output 616 ml   Net 2164.53 ml       Physical Exam  General Appearance:    Alert, cooperative, no distress, appears stated age                               Lungs:     Clear to auscultation bilaterally, respirations unlabored       Heart:    Regular rate and rhythm, S1 and S2 normal, no murmur, rub    or gallop   Abdomen:     Soft, non-tender, bowel sounds active all four quadrants,     no masses, no organomegaly           Extremities:   Extremities normal, atraumatic, no cyanosis or edema                         Lines/Drains:  Lines/Drains/Airways       Active Status       Name Placement date Placement time Site Days    Colostomy RLQ 10/31/24  1422  RLQ  6                      Telemetry:  Telemetry Orders (From admission, onward)               24 Hour Telemetry Monitoring  Continuous x 24 Hours (Telem)        Question:  Reason for 24 Hour Telemetry  Answer:   Metabolic/electrolyte disturbance with high probability of dysrhythmia. K level <3 or >6 OR KCL infusion >10mEq/hr                     Telemetry Reviewed: Normal Sinus Rhythm and Atrial fibrillation. HR averaging 110  Indication for Continued Telemetry Use: Arrthymias requiring medical therapy               Lab Results: I have reviewed the following results:   Results from last 7 days   Lab Units 11/06/24  0539 11/05/24  0506 11/04/24  0458   WBC Thousand/uL 11.07* 15.65* 14.35*   HEMOGLOBIN g/dL 7.9* 8.5* 8.0*   HEMATOCRIT % 25.0* 27.2* 25.3*   PLATELETS Thousands/uL 159 179 150   BANDS PCT %  --  6  --    SEGS PCT %  --   --  75   LYMPHO PCT %  --  5* 11*   MONO PCT %  --  2* 5   EOS PCT %  --  0 0     Results from last 7 days   Lab Units 11/06/24  0539   SODIUM mmol/L 141   POTASSIUM mmol/L 3.1*   CHLORIDE mmol/L 110*   CO2 mmol/L 23   BUN mg/dL 15   CREATININE mg/dL 0.85   ANION GAP mmol/L 8   CALCIUM mg/dL 6.4*   ALBUMIN g/dL 3.3*   TOTAL BILIRUBIN mg/dL 0.76   ALK PHOS U/L 60   ALT U/L 10   AST U/L 28   GLUCOSE RANDOM mg/dL 143*     Results from last 7 days   Lab Units 11/03/24  1707   INR  1.22*     Results from last 7 days   Lab Units 11/06/24  1224 11/06/24  0034 11/05/24  1850 11/05/24  1130 11/05/24  0503 11/04/24  2325 11/04/24  1748 11/04/24  1225 11/03/24  2316 11/03/24  1706 11/03/24  1149 11/02/24  2329   POC GLUCOSE mg/dl 144* 220* 157* 138 124 120 127 133 119 104 91 103         Results from last 7 days   Lab Units 11/04/24  0458 11/03/24  0357 11/02/24  0425 10/31/24  2309 10/30/24  2210 10/30/24  1724   LACTIC ACID mmol/L  --   --   --  1.2 1.5 2.0   PROCALCITONIN ng/ml 6.89* 12.57* 20.81*  --   --   --        Recent Cultures (last 7 days):   Results from last 7 days   Lab Units 11/01/24  0653 11/01/24  0647   BLOOD CULTURE  No Growth After 5 Days. No Growth After 5 Days.       Imaging Results Review: No pertinent imaging studies reviewed.  Other Study Results Review: No additional pertinent  studies reviewed.    Last 24 Hours Medication List:     Current Facility-Administered Medications:     acetaminophen (Ofirmev) injection 1,000 mg, Q6H JUAN PABLO, Last Rate: 1,000 mg (24 1347)    [COMPLETED] amiodarone 150 mg in dextrose 5 % 100 mL IV bolus, Once, Last Rate: Stopped (24) **FOLLOWED BY** [] amiodarone (CORDARONE) 900 mg in dextrose 5 % 500 mL infusion, Continuous, Last Rate: Stopped (24) **FOLLOWED BY** amiodarone (CORDARONE) 900 mg in dextrose 5 % 500 mL infusion, Continuous, Last Rate: 0.5 mg/min (24 0733)    dextrose 5 % and sodium chloride 0.45 % infusion, Continuous, Last Rate: 75 mL/hr (24 031)    diltiazem (CARDIZEM) 125 mg in sodium chloride 0.9 % 125 mL infusion, Titrated, Last Rate: 5 mg/hr (24 0145)    heparin (porcine) 25,000 units in 0.45% NaCl 250 mL infusion (premix), Titrated, Last Rate: Stopped (24)    HYDROmorphone HCl (DILAUDID) injection 0.2 mg, Q3H PRN **AND** HYDROmorphone (DILAUDID) injection 0.5 mg, Q3H PRN    insulin lispro (HumALOG/ADMELOG) 100 units/mL subcutaneous injection 1-6 Units, Q6H JUAN PABLO **AND** Fingerstick Glucose (POCT), Q6H    levalbuterol (XOPENEX) inhalation solution 1.25 mg, Q6H PRN    metoprolol (LOPRESSOR) injection 5 mg, Q6H    ondansetron (ZOFRAN) injection 4 mg, Q4H PRN    pantoprazole (PROTONIX) injection 40 mg, Q12H JUAN PABLO    [COMPLETED] piperacillin-tazobactam (ZOSYN) 4.5 g in sodium chloride 0.9 % 100 mL IV LOADING DOSE, Once, Last Rate: Stopped (10/30/24 024) **FOLLOWED BY** piperacillin-tazobactam (ZOSYN) 4.5 g in sodium chloride 0.9 % 100 mL IVPB (EXTENDED INFUSION), Q8H, Last Rate: 4.5 g (24 1334)    potassium chloride 20 mEq IVPB (premix), Once **FOLLOWED BY** potassium chloride 20 mEq IVPB (premix), Once    warfarin (COUMADIN) tablet 7.5 mg, Daily (warfarin)    Administrative Statements   Today, Patient Was Seen By: Anders Lockett MD  I have spent a total time of 25 minutes in  caring for this patient on the day of the visit/encounter including Diagnostic results, Risks and benefits of tx options, Instructions for management, Patient and family education, Importance of tx compliance, Risk factor reductions, Impressions, Counseling / Coordination of care, Documenting in the medical record, and Obtaining or reviewing history  .    **Please Note: This note may have been constructed using a voice recognition system.**

## 2024-11-06 NOTE — ASSESSMENT & PLAN NOTE
POD 7 s/p exploratory laparotomy with right hemicolectomy, abdominal left open w abtherra placement on 10/29/2024  POD 6 , s/p second look with wash out, abdominal wall closure, and loop ileostomy and mucous fistula creation, VAC placement to midline abdomen  Afebrile >24h, intermittent tachycardia, O2 98% on NC  WBC  11.07, 7.9  Mucous fistula pink with no output  Loop ileostomy with liquid dark stool.  VAC at midline with good seal, minimal drainage into canister    Plan:  Advance diet as tolerated and monitor for continued bowel function, supplemental nutrition as recommended  IVF per primary  Replete electrolytes  Continue vAC changes, next change likey Friday.   Goal to transition to MWF VAC changes   Ostomy care per WOCN  DVT ppx  OOB and ambulation, PT/OT  CM for discharge plannning  Remainder of care per primary team  Surgery will continue to follow patient intermittently for VAC changes. Please contact with any further questions or concerns. Follow up outpatient.

## 2024-11-06 NOTE — ASSESSMENT & PLAN NOTE
-  Abdominal pain is improving.    S/p  LAPAROSCOPY DIAGNOSTIC CONVERTED TO OPEN  LAPAROTOMY EXPLORATORY. EXTENDED RIGHT HEMICOLECTOMY. WITH abthera VAC placement

## 2024-11-06 NOTE — PROGRESS NOTES
Cardiology Progress Note - Bertha Brown 84 y.o. female MRN: [id number]    Unit/Bed#: ICU 05 Encounter: [id number]      Assessment/Plan:   Assessment & Plan  Paroxysmal atrial fibrillation (HCC)  She is in predominantly in sinus rhythm with intermittent episodes of atrial fibrillation with RVR (rates 110s-120s) on telemetry. Suspect this is secondary to recent surgery and current septic shock/bacteremia and ischemic colitis, as well as electrolyte abnormalities.  Discontinue amiodarone gtt. and transition to amiodarone 400 mg 3 times daily x 5 days.  She has been cleared to resume p.o. medications.  Continue Lopressor 5 mg IV every 6 hours.  Continue anticoagulation with heparin to Coumadin bridge.  [id number].  She has been deemed okay to resume anticoagulation once stable by general surgery, and was resumed on heparin gtt. On 11/3. Continue to monitor hemoglobin closely and monitor closely for signs of bleeding.   Potassium ordered for repletion.  Magnesium level ordered.  Recommend close monitoring of electrolytes and maintaining K >4 and mag >2.    Septic shock (HCC)  Septic shock/E. coli bacteremia  Management per primary team.  On IV antibiotics.    Acute respiratory failure with hypoxia (HCC)  Suspected secondary to atelectasis, pulmonary edema, potential aspiration     Ischemic colitis (HCC)  S/p ex-lap with extended right hemicolectomy (10/29/2024), s/p second look washout with ileostomy creation and abdominal closure (10/31/2024)  Management per primary team/general surgery    RUKHSANA (acute kidney injury) (HCC)  Management per primary team    Coffee ground emesis  Management per primary team.  She was evaluated by GI earlier in hospital course, who recommended outpatient follow-up.    Acute metabolic encephalopathy  Her mental status appears to be improving.  Management per primary team    Anemia  Management per primary team    Hypertension  Continue Lopressor per above    Hyperlipidemia  Recommend  "resuming home atorvastatin as tolerated    Diabetes (HCC)  Lab Results   Component Value Date    HGBA1C 7.0 (H) 08/22/2024       Recent Labs     11/05/24  1130 11/05/24  1850 11/06/24  0034 11/06/24  1224   POCGLU 138 157* 220* 144*       Blood Sugar Average: Last 72 hrs:  (P) [id number]  Management per primary team        Subjective:   Patient seen and examined.  She denies chest pain, shortness of breath, lightheadedness, lower extremity edema.      Objective:     Vitals: Blood pressure 128/61, pulse 67, temperature 98.4 °F (36.9 °C), temperature source Oral, resp. rate 18, height 5' 5\" (1.651 m), weight 105 kg (232 lb 5.8 oz), SpO2 94%., Body mass index is 38.67 kg/m².,   Orthostatic Blood Pressures      Flowsheet Row Most Recent Value   Blood Pressure 128/61 filed at 11/06/2024 1454   Patient Position - Orthostatic VS Lying filed at 11/06/2024 0246              Intake/Output Summary (Last 24 hours) at 11/6/2024 1545  Last data filed at 11/6/2024 1200  Gross per 24 hour   Intake 2607.36 ml   Output 616 ml   Net 1991.36 ml         Physical Exam:   GEN: Alert and aware, in no acute distress.  Well appearing and well nourished.   HEENT: Sclera anicteric, conjunctivae pink, mucous membranes moist. Oropharynx clear.   NECK: Supple, no significant JVD. Trachea midline.   HEART: Regular rhythm, normal S1 and S2, no murmurs, clicks, gallops or rubs. PMI nondisplaced, no thrills.   LUNGS: Clear to auscultation bilaterally; no wheezes, rales, or rhonchi. No increased work of breathing or signs of respiratory distress.   EXTREMITIES: Skin warm and well perfused, no clubbing, cyanosis, or edema.  NEURO: No focal findings. Normal speech. Mood and affect normal.   SKIN: Normal without suspicious lesions on exposed skin.      Telemetry:  Telemetry Orders (From admission, onward)               24 Hour Telemetry Monitoring  Continuous x 24 Hours (Telem)        Question:  Reason for 24 Hour Telemetry  Answer:  " Metabolic/electrolyte disturbance with high probability of dysrhythmia. K level <3 or >6 OR KCL infusion >10mEq/hr                     Telemetry Reviewed:  Predominantly sinus rhythm with intermittent episodes of atrial fibrillation with RVR (rates 110s-120s)      Medications:      Current Facility-Administered Medications:     acetaminophen (Ofirmev) injection 1,000 mg, 1,000 mg, Intravenous, Q6H JUAN PABLO, JUAN Aggarwal, Last Rate: 400 mL/hr at 11/06/24 1347, 1,000 mg at 11/06/24 1347    amiodarone tablet 400 mg, 400 mg, Oral, TID With Meals, Brian Rivers PA-C    dextrose 5 % and sodium chloride 0.45 % infusion, 75 mL/hr, Intravenous, Continuous, Anders Lockett MD, Last Rate: 75 mL/hr at 11/06/24 0311, 75 mL/hr at 11/06/24 0311    diltiazem (CARDIZEM) 125 mg in sodium chloride 0.9 % 125 mL infusion, 1-15 mg/hr, Intravenous, Titrated, Issachal Lockett MD, Last Rate: 5 mL/hr at 11/06/24 0145, 5 mg/hr at 11/06/24 0145    heparin (porcine) 25,000 units in 0.45% NaCl 250 mL infusion (premix), 3-20 Units/kg/hr (Order-Specific), Intravenous, Titrated, JUAN Aggarwal, Held at 11/05/24 2020    HYDROmorphone HCl (DILAUDID) injection 0.2 mg, 0.2 mg, Intravenous, Q3H PRN, 0.2 mg at 11/04/24 0339 **AND** HYDROmorphone (DILAUDID) injection 0.5 mg, 0.5 mg, Intravenous, Q3H PRN, JUAN Aggarwal    insulin lispro (HumALOG/ADMELOG) 100 units/mL subcutaneous injection 1-6 Units, 1-6 Units, Subcutaneous, Q6H JUAN PABLO, 2 Units at 11/06/24 0034 **AND** Fingerstick Glucose (POCT), , , Q6H, JUAN Aggarwal    levalbuterol (XOPENEX) inhalation solution 1.25 mg, 1.25 mg, Nebulization, Q6H PRN, JUAN Aggarwal, 1.25 mg at 11/04/24 1104    metoprolol (LOPRESSOR) injection 5 mg, 5 mg, Intravenous, Q6H, JUAN Aggarwal, 5 mg at 11/06/24 1404    ondansetron (ZOFRAN) injection 4 mg, 4 mg, Intravenous, Q4H PRN, Anders Lockett MD    pantoprazole (PROTONIX) injection 40 mg, 40 mg, Intravenous, Q12H JUAN PABLO,  JUAN Aggarwal, 40 mg at 11/06/24 0845    [COMPLETED] piperacillin-tazobactam (ZOSYN) 4.5 g in sodium chloride 0.9 % 100 mL IV LOADING DOSE, 4.5 g, Intravenous, Once, Stopped at 10/30/24 0247 **FOLLOWED BY** piperacillin-tazobactam (ZOSYN) 4.5 g in sodium chloride 0.9 % 100 mL IVPB (EXTENDED INFUSION), 4.5 g, Intravenous, Q8H, JUAN Aggarwal, Last Rate: 25 mL/hr at 11/06/24 1334, 4.5 g at 11/06/24 1334    potassium chloride 20 mEq IVPB (premix), 20 mEq, Intravenous, Once, Last Rate: 50 mL/hr at 11/06/24 1505, 20 mEq at 11/06/24 1505 **FOLLOWED BY** potassium chloride 20 mEq IVPB (premix), 20 mEq, Intravenous, Once, Brian Rivers PA-C    warfarin (COUMADIN) tablet 7.5 mg, 7.5 mg, Oral, Daily (warfarin), Anders Lockett MD     Labs & Results:        Results from last 7 days   Lab Units 11/06/24  0539 11/05/24  0506 11/04/24  0458   WBC Thousand/uL 11.07* 15.65* 14.35*   HEMOGLOBIN g/dL 7.9* 8.5* 8.0*   HEMATOCRIT % 25.0* 27.2* 25.3*   PLATELETS Thousands/uL 159 179 150         Results from last 7 days   Lab Units 11/06/24  0539 11/05/24  0506 11/04/24  0458 11/03/24  0357   POTASSIUM mmol/L 3.1* 3.6 3.4* 3.7   CHLORIDE mmol/L 110* 113* 114* 114*   CO2 mmol/L 23 27 22 20*   BUN mg/dL 15 19 27* 31*   CREATININE mg/dL 0.85 0.98 1.13 1.41*   CALCIUM mg/dL 6.4* 7.2* 7.1* 7.2*   ALK PHOS U/L 60  --  49 41   ALT U/L 10  --  9 7   AST U/L 28  --  41* 46*     Results from last 7 days   Lab Units 11/05/24  1325 11/05/24  0506 11/04/24  1109 11/03/24  2317 11/03/24  1707 10/31/24  0833   INR   --   --   --   --  1.22* 1.68*   PTT seconds 65* 100* 66*   < > 29 36*    < > = values in this interval not displayed.     Results from last 7 days   Lab Units 11/06/24  1402 11/05/24  0506 11/04/24  0458   MAGNESIUM mg/dL 1.8* 2.3 2.4       ** Please Note: Fluency DirectDictation voice to text software may have been used in the creation of this document. **

## 2024-11-06 NOTE — CASE MANAGEMENT
Case Management Discharge Planning Note    Patient name Bertha Brown  Location ICU 05/ICU 05 MRN [id number]  : 1940 Date 2024       Current Admission Date: 10/29/2024  Current Admission Diagnosis:Septic shock (HCC)   Patient Active Problem List    Diagnosis Date Noted Date Diagnosed    Anemia 2024     Hypernatremia 2024     S/P exploratory laparotomy 2024     Ischemic colitis (HCC) 10/30/2024     Septic shock (HCC) 10/30/2024     Acute metabolic encephalopathy 10/30/2024     Coffee ground emesis 10/29/2024     RUKHSANA (acute kidney injury) (HCC) 10/29/2024     Chronic idiopathic constipation 10/29/2024     Generalized abdominal pain 10/29/2024     SIRS (systemic inflammatory response syndrome) (HCC) 10/29/2024     Constipation 2022     Hypoxia 12/15/2022     Rectus sheath hematoma 2022     Bronchospasm with bronchitis, acute 2022     Chest pain 2022     Leukocytosis 2022     Chest tightness 2022     Diabetes (HCC) 2022     Obesity 2022     Bilateral flank pain 2022     Hemarthrosis involving knee joint, right 10/01/2020     Ambulatory dysfunction 10/01/2020     Paroxysmal atrial fibrillation (HCC) 2020     Acute respiratory failure with hypoxia (HCC) 2020     Hypothyroidism 2020     Hypertension 2020     Hyperlipidemia 2020     Major depressive disorder, single episode, mild (HCC) 2019     Osteoarthritis of knee 2017       LOS (days): 8  Geometric Mean LOS (GMLOS) (days): 9.6  Days to GMLOS:1.5     OBJECTIVE:  Risk of Unplanned Readmission Score: 28.45         Current admission status: Inpatient   Preferred Pharmacy:   [id number] - [place] - [address]  [address]  [place] [id number]  Phone: [phone] Fax: [phone]    Primary Care Provider: Jarred Armstrong DO    Primary Insurance: [employer] RE[employer]  Secondary Insurance:     DISCHARGE DETAILS:                                           Other Referral/Resources/Interventions Provided:  Referral Comments: Per Isela at [facility] via t/c, pt needs to have planned PEG and trach and three failed weans before [employer] insurance will consider issuing LTACH authorization.  Pt does not meet criteria at this time.  Level II recommended by PT-OT team;  blanket referrals in place to home health and STR/SNF.  Per [facility] AM rounds, d/c anticipated in 48-72 hours.  Pt on IV ofirmev, IV lopressor, IV protonix, IV abx, IV amioderone gtt, IV cardizem gtt, IVF at 75, VAC, colostomy, abd wounds x 2.         Treatment Team Recommendation: Short Term Rehab, SNF, Home with Home Health Care  Discharge Destination Plan:: Home with Home Health Care, Short Term Rehab, SNF  Transport at Discharge : Other (Comment) (TBD)

## 2024-11-06 NOTE — PLAN OF CARE
Clears at this time; minimal dysphagia w/ solid textures per initial evaluation.  See MD notes for advancement timeline.  Recommend dysphagia 3 once cleared.   Meds w/ puree if able  ST follow-up x1-3

## 2024-11-06 NOTE — ASSESSMENT & PLAN NOTE
Lab Results   Component Value Date    HGBA1C 7.0 (H) 08/22/2024       Recent Labs     11/05/24  1130 11/05/24  1850 11/06/24  0034 11/06/24  1224   POCGLU 138 157* 220* 144*       Blood Sugar Average: Last 72 hrs:  (P) 134.1708156318514802  Management per primary team

## 2024-11-06 NOTE — ASSESSMENT & PLAN NOTE
Lab Results   Component Value Date    HGBA1C 7.0 (H) 08/22/2024       Recent Labs     11/05/24  1130 11/05/24  1850 11/06/24  0034 11/06/24  1224   POCGLU 138 157* 220* 144*       Blood Sugar Average: Last 72 hrs:  (P) 134.1618298015098533    Present on admission with history of diabetes.  Most recent A1c 7.0.  Advance diet as tolerated per surgery's recommendations

## 2024-11-06 NOTE — ASSESSMENT & PLAN NOTE
POD 6 s/p exploratory laparotomy with right hemicolectomy, abdominal left open w abtherra placement on 10/29/2024  POD 5 , s/p second look with wash out, abdominal wall closure, and loop ileostomy and mucous fistula creation, VAC placement to midline abdomen  Febrile overnight   WBC  15.65, 14.35, Hb 8.0   L/24hr,   Cr 0.98  VAC per surgery       Plan:  Pt had speech bedside evaluation this am, recommendation is thin liquids with advance to solids to soft dysphagia 3 texture diet,   Start with thin clear liquid diet today   Discontinue NGT today   VAC change today.   Goal to transition to MWF VAC changes   Replete lytes as needed  Remove loop bar today

## 2024-11-06 NOTE — ASSESSMENT & PLAN NOTE
RUKHSANA POA with initial creatinine 1.63 and baseline 0.8-0.9  Suspect prerenal etiology  Improving    Plan:  Continue fluid resuscitation  Resolved continue to monitor

## 2024-11-06 NOTE — ASSESSMENT & PLAN NOTE
Present on admission history of paroxysmal A-fib.  Currently in sinus tachycardia with PVCs.  Patient in A-fib with RVR.  Anticoagulation is on hold given all of the patient's current issues.  However me to restart the patient on Coumadin at 7.5

## 2024-11-06 NOTE — PROCEDURES
ODALIS Change Note    Date: 11/6/2024    Location of wound: midline abdomen    Dimensions of wound:  16 cm x 5 cm x 5.5 cm    Description of wound: Midline surgical wound, fascia intact, wound bed clean, no slough or exudate    Sponges removed:  1 Black Sponges  0 White Sponges    Sponges placed:  1 Black Sponges  0 White Sponges    VAC settings:  125 mmHg  Continuous    Tiffany Yadav PA-C   11/6/2024

## 2024-11-07 LAB
ANION GAP SERPL CALCULATED.3IONS-SCNC: 8 MMOL/L (ref 4–13)
APTT PPP: 28 SECONDS (ref 23–34)
BUN SERPL-MCNC: 11 MG/DL (ref 5–25)
CALCIUM SERPL-MCNC: 6.1 MG/DL (ref 8.4–10.2)
CHLORIDE SERPL-SCNC: 111 MMOL/L (ref 96–108)
CO2 SERPL-SCNC: 22 MMOL/L (ref 21–32)
CREAT SERPL-MCNC: 0.91 MG/DL (ref 0.6–1.3)
ERYTHROCYTE [DISTWIDTH] IN BLOOD BY AUTOMATED COUNT: 16 % (ref 11.6–15.1)
GFR SERPL CREATININE-BSD FRML MDRD: 58 ML/MIN/1.73SQ M
GLUCOSE SERPL-MCNC: 110 MG/DL (ref 65–140)
GLUCOSE SERPL-MCNC: 113 MG/DL (ref 65–140)
GLUCOSE SERPL-MCNC: 125 MG/DL (ref 65–140)
GLUCOSE SERPL-MCNC: 128 MG/DL (ref 65–140)
GLUCOSE SERPL-MCNC: 155 MG/DL (ref 65–140)
HCT VFR BLD AUTO: 24.6 % (ref 34.8–46.1)
HGB BLD-MCNC: 7.7 G/DL (ref 11.5–15.4)
INR PPP: 1.24 (ref 0.85–1.19)
MAGNESIUM SERPL-MCNC: 2 MG/DL (ref 1.9–2.7)
MCH RBC QN AUTO: 31.3 PG (ref 26.8–34.3)
MCHC RBC AUTO-ENTMCNC: 31.3 G/DL (ref 31.4–37.4)
MCV RBC AUTO: 100 FL (ref 82–98)
PLATELET # BLD AUTO: 149 THOUSANDS/UL (ref 149–390)
PMV BLD AUTO: 12.2 FL (ref 8.9–12.7)
POTASSIUM SERPL-SCNC: 3.2 MMOL/L (ref 3.5–5.3)
PROTHROMBIN TIME: 16.4 SECONDS (ref 12.3–15)
RBC # BLD AUTO: 2.46 MILLION/UL (ref 3.81–5.12)
SODIUM SERPL-SCNC: 141 MMOL/L (ref 135–147)
WBC # BLD AUTO: 8.53 THOUSAND/UL (ref 4.31–10.16)

## 2024-11-07 PROCEDURE — 97535 SELF CARE MNGMENT TRAINING: CPT

## 2024-11-07 PROCEDURE — 83735 ASSAY OF MAGNESIUM: CPT

## 2024-11-07 PROCEDURE — 99232 SBSQ HOSP IP/OBS MODERATE 35: CPT | Performed by: FAMILY MEDICINE

## 2024-11-07 PROCEDURE — 80048 BASIC METABOLIC PNL TOTAL CA: CPT | Performed by: FAMILY MEDICINE

## 2024-11-07 PROCEDURE — 85027 COMPLETE CBC AUTOMATED: CPT | Performed by: FAMILY MEDICINE

## 2024-11-07 PROCEDURE — 97530 THERAPEUTIC ACTIVITIES: CPT

## 2024-11-07 PROCEDURE — 99232 SBSQ HOSP IP/OBS MODERATE 35: CPT | Performed by: STUDENT IN AN ORGANIZED HEALTH CARE EDUCATION/TRAINING PROGRAM

## 2024-11-07 PROCEDURE — 97116 GAIT TRAINING THERAPY: CPT

## 2024-11-07 PROCEDURE — 82948 REAGENT STRIP/BLOOD GLUCOSE: CPT

## 2024-11-07 PROCEDURE — 85610 PROTHROMBIN TIME: CPT | Performed by: FAMILY MEDICINE

## 2024-11-07 PROCEDURE — 99233 SBSQ HOSP IP/OBS HIGH 50: CPT | Performed by: INTERNAL MEDICINE

## 2024-11-07 PROCEDURE — 85730 THROMBOPLASTIN TIME PARTIAL: CPT | Performed by: PHYSICIAN ASSISTANT

## 2024-11-07 RX ORDER — AMIODARONE HYDROCHLORIDE 200 MG/1
200 TABLET ORAL
Status: DISCONTINUED | OUTPATIENT
Start: 2024-11-12 | End: 2024-11-11 | Stop reason: HOSPADM

## 2024-11-07 RX ORDER — WARFARIN SODIUM 7.5 MG/1
7.5 TABLET ORAL
Status: DISCONTINUED | OUTPATIENT
Start: 2024-11-08 | End: 2024-11-08

## 2024-11-07 RX ORDER — METOPROLOL TARTRATE 25 MG/1
25 TABLET, FILM COATED ORAL EVERY 12 HOURS SCHEDULED
Status: DISCONTINUED | OUTPATIENT
Start: 2024-11-07 | End: 2024-11-11 | Stop reason: HOSPADM

## 2024-11-07 RX ORDER — AMIODARONE HYDROCHLORIDE 200 MG/1
400 TABLET ORAL
Status: DISCONTINUED | OUTPATIENT
Start: 2024-11-08 | End: 2024-11-11 | Stop reason: HOSPADM

## 2024-11-07 RX ORDER — POTASSIUM CHLORIDE 14.9 MG/ML
20 INJECTION INTRAVENOUS ONCE
Status: COMPLETED | OUTPATIENT
Start: 2024-11-07 | End: 2024-11-07

## 2024-11-07 RX ADMIN — PANTOPRAZOLE SODIUM 40 MG: 40 INJECTION, POWDER, FOR SOLUTION INTRAVENOUS at 21:27

## 2024-11-07 RX ADMIN — PIPERACILLIN AND TAZOBACTAM 4.5 G: 36; 4.5 INJECTION, POWDER, FOR SOLUTION INTRAVENOUS at 17:36

## 2024-11-07 RX ADMIN — METOROPROLOL TARTRATE 5 MG: 5 INJECTION, SOLUTION INTRAVENOUS at 08:12

## 2024-11-07 RX ADMIN — AMIODARONE HYDROCHLORIDE 400 MG: 200 TABLET ORAL at 08:12

## 2024-11-07 RX ADMIN — DEXTROSE AND SODIUM CHLORIDE 75 ML/HR: 5; .45 INJECTION, SOLUTION INTRAVENOUS at 06:26

## 2024-11-07 RX ADMIN — ACETAMINOPHEN 1000 MG: 10 INJECTION INTRAVENOUS at 00:15

## 2024-11-07 RX ADMIN — ACETAMINOPHEN 1000 MG: 10 INJECTION INTRAVENOUS at 06:04

## 2024-11-07 RX ADMIN — PANTOPRAZOLE SODIUM 40 MG: 40 INJECTION, POWDER, FOR SOLUTION INTRAVENOUS at 08:12

## 2024-11-07 RX ADMIN — METOPROLOL TARTRATE 25 MG: 25 TABLET, FILM COATED ORAL at 09:42

## 2024-11-07 RX ADMIN — METOPROLOL TARTRATE 25 MG: 25 TABLET, FILM COATED ORAL at 21:27

## 2024-11-07 RX ADMIN — METOROPROLOL TARTRATE 5 MG: 5 INJECTION, SOLUTION INTRAVENOUS at 02:43

## 2024-11-07 RX ADMIN — INSULIN LISPRO 1 UNITS: 100 INJECTION, SOLUTION INTRAVENOUS; SUBCUTANEOUS at 00:16

## 2024-11-07 RX ADMIN — ACETAMINOPHEN 1000 MG: 10 INJECTION INTRAVENOUS at 23:41

## 2024-11-07 RX ADMIN — PIPERACILLIN AND TAZOBACTAM 4.5 G: 36; 4.5 INJECTION, POWDER, FOR SOLUTION INTRAVENOUS at 04:22

## 2024-11-07 RX ADMIN — POTASSIUM CHLORIDE 20 MEQ: 14.9 INJECTION, SOLUTION INTRAVENOUS at 10:17

## 2024-11-07 RX ADMIN — PIPERACILLIN AND TAZOBACTAM 4.5 G: 36; 4.5 INJECTION, POWDER, FOR SOLUTION INTRAVENOUS at 21:26

## 2024-11-07 RX ADMIN — ACETAMINOPHEN 1000 MG: 10 INJECTION INTRAVENOUS at 17:21

## 2024-11-07 RX ADMIN — INSULIN LISPRO 1 UNITS: 100 INJECTION, SOLUTION INTRAVENOUS; SUBCUTANEOUS at 13:00

## 2024-11-07 RX ADMIN — AMIODARONE HYDROCHLORIDE 400 MG: 200 TABLET ORAL at 13:00

## 2024-11-07 RX ADMIN — POTASSIUM CHLORIDE 20 MEQ: 14.9 INJECTION, SOLUTION INTRAVENOUS at 08:03

## 2024-11-07 NOTE — RESPIRATORY THERAPY NOTE
11/06/24 5076   Respiratory Assessment   Resp Comments Pt placed on bipap for hours of sleep   O2 Device V60   Non-Invasive Information   O2 Interface Device Face mask   Non-Invasive Ventilation Mode BiPAP   $ Intermittent NIV Yes   $ Pulse Oximetry Spot Check Charge Completed   Non-Invasive Settings   FiO2 (%) 40   IPAP (cm) 14 cm   EPAP (cm) 6 cm   Rate (Set) 8   Pressure Support (cm H2O) 8   Rise Time 2   Inspiratory Time (Set) 1   Non-Invasive Readings   Skin Intervention Skin intact   Total Rate 25   MV (Mech) 20.2   Peak Pressure (Obs) 15   Spontaneous Vt (mL) 812   Leak (lpm) 10   Non-Invasive Alarms   Insp Pressure High (cm H20) 25   Insp Pressure Low (cm H20) 5   Low Insp Pressure Time (sec) 20 sec   MV Low (L/min) 3   Vt High (mL) 2100   Vt Low (mL) 200   High Resp Rate (BPM) 50 BPM   Low Resp Rate (BPM) 8 BPM   Apnea Interval (sec) 30

## 2024-11-07 NOTE — PROGRESS NOTES
"GI Progress Note - Bertha Brown 84 y.o. female MRN: [id number]    Unit/Bed#: ICU 05 Encounter: [id number]    Assessment and Plan:    Coffee Ground emesis  Acute Blood Loss Anemia  - Initially presented with 3 episodes of coffee ground emesis and 2 weeks of constipation, with a Hb drop from 13.2 to 9.6, but then decompensated and became septic developing ischemic colitis requiring emergent extended R hemicolectomy on 10/29  - Hb remains stable but still anemic at 7.7  - Will plan for EGD tomorrow to assess for source of coffee ground emesis initially prior to starting Eliquis v becoming therapeutic on coumadin; hold coumadin tonight  - INR tomorrow AM, today was 1.24  - Protonix 40 mg IV BID  - Further recommendations pending EGD findings        Subjective: She is feeling well today, no complaints. Minimal abdominal pain. GI asked to re-eval patient for EGD prior to initiating oral anticoagulants/becoming therapeutic.    Objective:     Vitals: Blood pressure 142/71, pulse 69, temperature 98.1 °F (36.7 °C), temperature source Oral, resp. rate 18, height 5' 5\" (1.651 m), weight 105 kg (232 lb 5.8 oz), SpO2 98%.,Body mass index is 38.67 kg/m².      Intake/Output Summary (Last 24 hours) at 11/7/2024 1525  Last data filed at 11/7/2024 0935  Gross per 24 hour   Intake 1986.37 ml   Output 2099 ml   Net -112.63 ml       Physical Exam:     General Appearance: Alert, appears stated age and cooperative  Lungs: Clear to auscultation bilaterally, no rales or rhonchi, no labored breathing/accessory muscle use  Heart: Regular rate and rhythm, S1, S2 normal, no murmur, click, rub or gallop  Abdomen: Soft, non-tender, non-distended; bowel sounds normal; no masses or no organomegaly  Extremities: No cyanosis, edema    Invasive Devices       Peripheral Intravenous Line  Duration             Peripheral IV 11/06/24 Left;Proximal;Ventral (anterior) Forearm 1 day    Peripheral IV 11/07/24 Right Antecubital <1 day              Drain  " Duration             Colostomy RLQ 7 days                    Lab Results:  Results from last 7 days   Lab Units 11/07/24  0619 11/06/24  0539 11/05/24  0506 11/04/24  0458   WBC Thousand/uL 8.53   < > 15.65* 14.35*   HEMOGLOBIN g/dL 7.7*   < > 8.5* 8.0*   HEMATOCRIT % 24.6*   < > 27.2* 25.3*   PLATELETS Thousands/uL 149   < > 179 150   SEGS PCT %  --   --   --  75   LYMPHO PCT %  --   --  5* 11*   MONO PCT %  --   --  2* 5   EOS PCT %  --   --  0 0    < > = values in this interval not displayed.     Results from last 7 days   Lab Units 11/07/24 0619 11/06/24  0539   POTASSIUM mmol/L 3.2* 3.1*   CHLORIDE mmol/L 111* 110*   CO2 mmol/L 22 23   BUN mg/dL 11 15   CREATININE mg/dL 0.91 0.85   CALCIUM mg/dL 6.1* 6.4*   ALK PHOS U/L  --  60   ALT U/L  --  10   AST U/L  --  28     Results from last 7 days   Lab Units 11/07/24  0619   INR  1.24*           Imaging Studies: I have personally reviewed pertinent imaging studies.    XR chest portable ICU    Result Date: 11/3/2024  Impression: Persistent bibasilar opacity, greater on the left, with loss of the diaphragm, which could be due to atelectasis and/or pneumonia. Left pleural effusion not excluded. Workstation performed: [id number]     X-ray chest 1 view    Result Date: 10/30/2024  Impression: 1. Linear atelectasis at the right lung base. 2. Retrocardiac density may represent infiltrate and/or atelectasis. Workstation performed: [id number]     CT abdomen pelvis wo contrast    Result Date: 10/29/2024  Impression: 1.  Redemonstrated diffuse thickening of the colon with increase in mesenteric fat stranding in the left lower quadrant and suggestion of pneumatosis in the hepatic flexure and proximal transverse colon concerning for ischemic colitis. 2.  The lower esophagus is patulous and fluid-filled which may be due to gastroesophageal reflux or esophageal dysmotility. Small to moderate hiatal hernia. The stomach is distended with fluid. Aspiration precautions is recommended.  I personally discussed this study with LEE WASSERMAN on 10/29/2024 9:12 PM. Workstation performed: [id number]     CT high volume bleeding scan abdomen pelvis    Result Date: 10/29/2024  Impression: 1. No CT evidence of active high-volume gastrointestinal hemorrhage. 2.  Moderate diffuse colonic distention with mild wall thickening suggested at the distal transverse and descending colon with air-fluid levels proximally. Findings may be related to colitis. 3. Mild distention of the esophagus with moderate gaseous distention. This is nonspecific and could be related to gastritis/reflux. 4. Low-attenuation in the uterus centrally, more prominent than expected for the patient's age. Recommend follow-up evaluation with nonemergent pelvic ultrasound to assess for abnormal endometrial thickening. The study was marked in EPIC for immediate notification. Workstation performed: [id number]     CT chest without contrast    Result Date: 10/29/2024  Impression: 1. New mild distention of the esophagus with fluid, question related to reflux or esophageal dysfunction. Workstation performed: [id number]     XR chest 1 view portable    Result Date: 10/29/2024  Impression: No acute cardiopulmonary disease. Workstation performed: [id number]

## 2024-11-07 NOTE — PROGRESS NOTES
Progress Note - [facility]   Name: Bertha Brown 84 y.o. female I MRN: [id number]  Unit/Bed#: ICU 05 I Date of Admission: 10/29/2024   Date of Service: 11/7/2024 I Hospital Day: 9    Assessment & Plan  Coffee ground emesis      Patient was seen by gastroenterology earlier on in the admission however given the patient's acuity they decided to hold off on EGD but now that the patient is stable I do think an EGD could be warranted.  I discussed this with Radha who will see the patient again in consultation.  Patient is going to need anticoagulation for which she was on before but given the coffee-ground emesis needs to reevaluate for possible EGD prior to starting Coumadin    Paroxysmal atrial fibrillation (HCC)  Present on admission history of paroxysmal A-fib.  Currently in sinus tachycardia with PVCs.  Going to continue the heparin for now.  I will price check Eliquis just in case it is covered but I do think the patient is going to need an EGD prior to initiating oral anticoagulation to him make sure that it is okay to continue given history of peptic ulcer disease and the hematemesis when she came in    Hypothyroidism  Present on admission with past medical history of hypothyroidism.  Resume home dose of levothyroxine  Acute respiratory failure with hypoxia (HCC)    Suspected secondary aspiration event due to coffee-ground emesis versus atelectasis.  Encourage incentive spirometry use  Continue to wean off oxygen as able.      RUKHSANA (acute kidney injury) (HCC)  Acute renal failure has resolved.  Patient is at baseline.  Replete potassium as needed  SIRS (systemic inflammatory response syndrome) (HCC)  Present on admission with tachycardia, tachypnea, leukocytosis, RUKHSANA thought to be due to volume loss due to coffee-ground emesis, elevated lactic acid, no clear source of infection however SIRS thought to be due to coffee-ground emesis.  Currently getting IV fluids resuscitation.  Low threshold to initiate  infectious workup if develops worsening signs of infection and or not improving or worsening lactic acidosis and initiation of antibiotics.  Hypertension  Present on admission with hypotension.  Blood pressure currently controlled.  Patient given couple doses of Lopressor.  I did ask cardiology to see.  Continue with scheduled IV beta-blocker for now  Hyperlipidemia  Continue home dose of Lipitor 40 mg nightly  Diabetes (HCC)  Lab Results   Component Value Date    HGBA1C 7.0 (H) 08/22/2024       Recent Labs     11/06/24  1944 11/06/24  2049 11/06/24  2337 11/07/24  0558   POCGLU 171* 141* 153* 113       Blood Sugar Average: Last 72 hrs:  (P) 145.4557558026455292    Present on admission with history of diabetes.  Most recent A1c 7.0.  Advance diet as tolerated per surgery's recommendations  Obesity  Present on admission with obesity with BMI 37.  Counseled on weight loss, lifestyle modification.  Chronic idiopathic constipation  Bowel regimen  Generalized abdominal pain    -Patient with possible very early peritonitis treated with antibiotics  Abdominal pain is improving.    S/p  LAPAROSCOPY DIAGNOSTIC CONVERTED TO OPEN  LAPAROTOMY EXPLORATORY. EXTENDED RIGHT HEMICOLECTOMY. WITH abthera VAC placement  Ischemic colitis (HCC)  POD 6 s/p exploratory laparotomy with right hemicolectomy, abdominal left open w abtherra placement on 10/29/2024  POD 5 , s/p second look with wash out, abdominal wall closure, and loop ileostomy and mucous fistula creation, VAC placement to midline abdomen  Febrile overnight   WBC  15.65, 14.35, Hb 8.0   L/24hr,   Cr 0.98  VAC per surgery       Plan:  Pt had speech bedside evaluation this am, recommendation is thin liquids with advance to solids to soft dysphagia 3 texture diet,   Tolerating modified diet  VAC change today.   Goal to transition to MWF VAC changes   Replete lytes as needed  Remove loop bar   Septic shock (HCC)  Septic shock/E. coli bacteremia tenure with current  antibiotics. resolved  Acute metabolic encephalopathy  Patient with worsening lethargy prior to OR  Suspect secondary to significant metabolic derangements  Monitor neuro exam.  Now appears to be stable.  Patient states she is having some nightmares at night.  I will give her some melatonin at night to help her sleep  S/P exploratory laparotomy  -status post exploratory laparotomy with right hemicolectomy on 10/29/2024     -Second look and washout on 10/31/2024 with abdominal wall closure, ileostomy creation, mucous fistula creation, and wound VAC placement and abdominal wall subcutaneous tissue  Repeat CT scan noted  Advance to dysphagia 3 with thins  Anemia  Been stable postoperatively.  Continue to monitor.  Hypernatremia  Resolved.  Stop IV fluids    VTE Pharmacologic Prophylaxis:   High Risk (Score >/= 5) - Pharmacological DVT Prophylaxis Ordered: heparin drip. Sequential Compression Devices Ordered.    Mobility:   Basic Mobility Inpatient Raw Score: 16  JH-HLM Goal: 5: Stand one or more mins  JH-HLM Achieved: 4: Move to chair/commode  JH-HLM Goal achieved. Continue to encourage appropriate mobility.    Patient Centered Rounds: I performed bedside rounds with nursing staff today.   Discussions with Specialists or Other Care Team Provider: Gastroenterology    Education and Discussions with Family / Patient: Updated  (daughter) via phone.    Current Length of Stay: 9 day(s)  Current Patient Status: Inpatient   Certification Statement: The patient will continue to require additional inpatient hospital stay due to needing EGD and monitoring of respiratory status  Discharge Plan: Anticipate discharge in 48-72 hrs to discharge location to be determined pending rehab evaluations.    Code Status: Level 1 - Full Code    Subjective   Patient seen and examined.  Doing okay.    Objective :  Temp:  [97.4 °F (36.3 °C)-98.6 °F (37 °C)] 98.6 °F (37 °C)  HR:  [62-74] 70  BP: (112-142)/(56-79) 142/79  Resp:   [18-25] 21  SpO2:  [96 %-100 %] 97 %  O2 Device: BiPAP    Body mass index is 38.67 kg/m².     Input and Output Summary (last 24 hours):     Intake/Output Summary (Last 24 hours) at 11/7/2024 1301  Last data filed at 11/7/2024 0935  Gross per 24 hour   Intake 1986.37 ml   Output 2099 ml   Net -112.63 ml       Physical Exam  General Appearance:    Alert, cooperative, no distress, appears stated age                               Lungs:     Clear to auscultation bilaterally, respirations unlabored       Heart:    Regular rate and rhythm, S1 and S2 normal, no murmur, rub    or gallop   Abdomen:     Soft, non-tender, bowel sounds active all four quadrants,     no masses, no organomegaly           Extremities:   Extremities normal, atraumatic, no cyanosis or edema                         Lines/Drains:  Lines/Drains/Airways       Active Status       Name Placement date Placement time Site Days    Colostomy RLQ 10/31/24  1422  RLQ  6                      Telemetry:  Telemetry Orders (From admission, onward)               24 Hour Telemetry Monitoring  Continuous x 24 Hours (Telem)        Question:  Reason for 24 Hour Telemetry  Answer:  Metabolic/electrolyte disturbance with high probability of dysrhythmia. K level <3 or >6 OR KCL infusion >10mEq/hr                     Telemetry Reviewed: Atrial fibrillation. HR averaging 110  Indication for Continued Telemetry Use: Arrthymias requiring medical therapy               Lab Results: I have reviewed the following results:   Results from last 7 days   Lab Units 11/07/24  0619 11/06/24  0539 11/05/24  0506 11/04/24  0458   WBC Thousand/uL 8.53   < > 15.65* 14.35*   HEMOGLOBIN g/dL 7.7*   < > 8.5* 8.0*   HEMATOCRIT % 24.6*   < > 27.2* 25.3*   PLATELETS Thousands/uL 149   < > 179 150   BANDS PCT %  --   --  6  --    SEGS PCT %  --   --   --  75   LYMPHO PCT %  --   --  5* 11*   MONO PCT %  --   --  2* 5   EOS PCT %  --   --  0 0    < > = values in this interval not displayed.      Results from last 7 days   Lab Units 11/07/24  0619 11/06/24  0539   SODIUM mmol/L 141 141   POTASSIUM mmol/L 3.2* 3.1*   CHLORIDE mmol/L 111* 110*   CO2 mmol/L 22 23   BUN mg/dL 11 15   CREATININE mg/dL 0.91 0.85   ANION GAP mmol/L 8 8   CALCIUM mg/dL 6.1* 6.4*   ALBUMIN g/dL  --  3.3*   TOTAL BILIRUBIN mg/dL  --  0.76   ALK PHOS U/L  --  60   ALT U/L  --  10   AST U/L  --  28   GLUCOSE RANDOM mg/dL 110 143*     Results from last 7 days   Lab Units 11/07/24  0619   INR  1.24*     Results from last 7 days   Lab Units 11/07/24  0558 11/06/24  2337 11/06/24  2049 11/06/24  1944 11/06/24  1224 11/06/24  0034 11/05/24  1850 11/05/24  1130 11/05/24  0503 11/04/24  2325 11/04/24  1748 11/04/24  1225   POC GLUCOSE mg/dl 113 153* 141* 171* 144* 220* 157* 138 124 120 127 133         Results from last 7 days   Lab Units 11/04/24  0458 11/03/24  0357 11/02/24  0425 10/31/24  2309   LACTIC ACID mmol/L  --   --   --  1.2   PROCALCITONIN ng/ml 6.89* 12.57* 20.81*  --        Recent Cultures (last 7 days):   Results from last 7 days   Lab Units 11/01/24  0653 11/01/24  0647   BLOOD CULTURE  No Growth After 5 Days. No Growth After 5 Days.       Imaging Results Review: No pertinent imaging studies reviewed.  Other Study Results Review: No additional pertinent studies reviewed.    Last 24 Hours Medication List:     Current Facility-Administered Medications:     acetaminophen (Ofirmev) injection 1,000 mg, Q6H JUAN PABLO, Last Rate: 1,000 mg (11/07/24 0604)    amiodarone tablet 400 mg, TID With Meals    HYDROmorphone HCl (DILAUDID) injection 0.2 mg, Q3H PRN **AND** HYDROmorphone (DILAUDID) injection 0.5 mg, Q3H PRN    insulin lispro (HumALOG/ADMELOG) 100 units/mL subcutaneous injection 1-6 Units, Q6H JUAN PABLO **AND** Fingerstick Glucose (POCT), Q6H    levalbuterol (XOPENEX) inhalation solution 1.25 mg, Q6H PRN    metoprolol tartrate (LOPRESSOR) tablet 25 mg, Q12H JUAN PABLO    ondansetron (ZOFRAN) injection 4 mg, Q4H PRN    pantoprazole (PROTONIX)  injection 40 mg, Q12H JUAN PABLO    [COMPLETED] piperacillin-tazobactam (ZOSYN) 4.5 g in sodium chloride 0.9 % 100 mL IV LOADING DOSE, Once, Last Rate: Stopped (10/30/24 0247) **FOLLOWED BY** piperacillin-tazobactam (ZOSYN) 4.5 g in sodium chloride 0.9 % 100 mL IVPB (EXTENDED INFUSION), Q8H, Last Rate: 4.5 g (11/07/24 0422)    warfarin (COUMADIN) tablet 7.5 mg, Daily (warfarin)    Administrative Statements   Today, Patient Was Seen By: Anders Lockett MD  I have spent a total time of 28 minutes in caring for this patient on the day of the visit/encounter including Diagnostic results, Prognosis, Risks and benefits of tx options, Instructions for management, Patient and family education, Impressions, Counseling / Coordination of care, Documenting in the medical record, Reviewing / ordering tests, medicine, procedures  , Obtaining or reviewing history  , and Communicating with other healthcare professionals .    **Please Note: This note may have been constructed using a voice recognition system.**

## 2024-11-07 NOTE — PLAN OF CARE
Problem: PHYSICAL THERAPY ADULT  Goal: Performs mobility at highest level of function for planned discharge setting.  See evaluation for individualized goals.  Description: Treatment/Interventions: Functional transfer training, LE strengthening/ROM, Elevations, Therapeutic exercise, Endurance training, Patient/family training, Equipment eval/education, Bed mobility, Gait training, Continued evaluation, OT  Equipment Recommended: Walker       See flowsheet documentation for full assessment, interventions and recommendations.  Outcome: Progressing  Note: Prognosis: Good  Problem List: Decreased strength, Decreased endurance, Impaired balance, Decreased mobility  Assessment: Chart review and two person identifiers were completed. Pt seen for PT treatment session this date, consisting of ther act focused on transfers, bed mobility and gt training on level surfaces to improve pt safety in household environment. Since previous session, pt has made good progress in terms of increased distance ambulated, decreased assistance required for transfers.  Pt greeted sitting in recliner and agreeable to PT session. Pt completed all STS well during session and denied dizziness. Pt ambulated well with no LOB and denied dizziness. Pt required increased time during ambulation. Pt completed seated rest break after ambulation, pt's SPO2 remained stable. Pt completed stand pivot to BSC. Pt maintained standing with CGA for ~2 minutes for bathroom hygiene with no LOB. Pt completed supine to sit with min A x1 for LE management and require assist x2 for boost up bed.  Pt ended session supine in bed with alarm activated and all needs within reach. Spoke to RN about session outcomes. Pertinent barriers during this session include  fatigue . Current goals and POC established on IE remain appropriate. Pt prognosis for achieving goals is good, pending pt progress with hospitalization/medical status improvements, and indicated by ability to  follow directions, responsive to cues/strategies, and previous response to intervention. Pt continues to be functioning below baseline level, and remains limited due to factors listed above. PT will continue to see pt during current hospitalization in order to address the deficits listed above and provide interventions consistent w/ POC in effort to achieve STGs. PT recommends level 2, moderate resource intensity upon discharge.  Barriers to Discharge: Other (Comment) (decline in functional mobility)     Rehab Resource Intensity Level, PT: II (Moderate Resource Intensity)    See flowsheet documentation for full assessment.

## 2024-11-07 NOTE — PLAN OF CARE
Problem: PAIN - ADULT  Goal: Verbalizes/displays adequate comfort level or baseline comfort level  Description: Interventions:  - Encourage patient to monitor pain and request assistance  - Assess pain using appropriate pain scale  - Administer analgesics based on type and severity of pain and evaluate response  - Implement non-pharmacological measures as appropriate and evaluate response  - Consider cultural and social influences on pain and pain management  - Notify physician/advanced practitioner if interventions unsuccessful or patient reports new pain  Outcome: Progressing     Problem: SAFETY ADULT  Goal: Patient will remain free of falls  Description: INTERVENTIONS:  - Educate patient/family on patient safety including physical limitations  - Instruct patient to call for assistance with activity   - Consult OT/PT to assist with strengthening/mobility   - Keep Call bell within reach  - Keep bed low and locked with side rails adjusted as appropriate  - Keep care items and personal belongings within reach  - Initiate and maintain comfort rounds  - Make Fall Risk Sign visible to staff  - Offer Toileting every 2 Hours, in advance of need  - Initiate/Maintain bed alarm  - Apply yellow socks and bracelet for high fall risk patients  - Consider moving patient to room near nurses station  Outcome: Progressing     Problem: SAFETY ADULT  Goal: Maintain or return to baseline ADL function  Description: INTERVENTIONS:  -  Assess patient's ability to carry out ADLs; assess patient's baseline for ADL function and identify physical deficits which impact ability to perform ADLs (bathing, care of mouth/teeth, toileting, grooming, dressing, etc.)  - Assess/evaluate cause of self-care deficits   - Assess range of motion  - Assess patient's mobility; develop plan if impaired  - Assess patient's need for assistive devices and provide as appropriate  - Encourage maximum independence but intervene and supervise when necessary  -  Involve family in performance of ADLs  - Assess for home care needs following discharge   - Consider OT consult to assist with ADL evaluation and planning for discharge  - Provide patient education as appropriate  Outcome: Progressing     Problem: NEUROSENSORY - ADULT  Goal: Achieves stable or improved neurological status  Description: INTERVENTIONS  - Monitor and report changes in neurological status  - Monitor vital signs such as temperature, blood pressure, glucose, and any other labs ordered   - Initiate measures to prevent increased intracranial pressure  - Monitor for seizure activity and implement precautions if appropriate      Outcome: Progressing     Problem: NEUROSENSORY - ADULT  Goal: Achieves maximal functionality and self care  Description: INTERVENTIONS  - Monitor swallowing and airway patency with patient fatigue and changes in neurological status  - Encourage and assist patient to increase activity and self care.   - Encourage visually impaired, hearing impaired and aphasic patients to use assistive/communication devices  Outcome: Progressing     Problem: RESPIRATORY - ADULT  Goal: Achieves optimal ventilation and oxygenation  Description: INTERVENTIONS:  - Assess for changes in respiratory status  - Assess for changes in mentation and behavior  - Position to facilitate oxygenation and minimize respiratory effort  - Oxygen administered by appropriate delivery if ordered  - Initiate smoking cessation education as indicated  - Encourage broncho-pulmonary hygiene including cough, deep breathe, Incentive Spirometry  - Assess the need for suctioning and aspirate as needed  - Assess and instruct to report SOB or any respiratory difficulty  - Respiratory Therapy support as indicated  Outcome: Progressing     Problem: GASTROINTESTINAL - ADULT  Goal: Minimal or absence of nausea and/or vomiting  Description: INTERVENTIONS:  - Administer IV fluids if ordered to ensure adequate hydration  - Maintain NPO status  until nausea and vomiting are resolved  - Nasogastric tube if ordered  - Administer ordered antiemetic medications as needed  - Provide nonpharmacologic comfort measures as appropriate  - Advance diet as tolerated, if ordered  - Consider nutrition services referral to assist patient with adequate nutrition and appropriate food choices  Outcome: Progressing     Problem: INFECTION - ADULT  Goal: Absence of fever/infection during neutropenic period  Description: INTERVENTIONS:  - Monitor WBC    Outcome: Progressing     Problem: GASTROINTESTINAL - ADULT  Goal: Maintains adequate nutritional intake  Description: INTERVENTIONS:  - Monitor percentage of each meal consumed  - Identify factors contributing to decreased intake, treat as appropriate  - Assist with meals as needed  - Monitor I&O, weight, and lab values if indicated  - Obtain nutrition services referral as needed  Outcome: Progressing     Problem: GASTROINTESTINAL - ADULT  Goal: Establish and maintain optimal ostomy function  Description: INTERVENTIONS:  - Assess bowel function  - Encourage oral fluids to ensure adequate hydration  - Administer IV fluids if ordered to ensure adequate hydration   - Administer ordered medications as needed  - Encourage mobilization and activity  - Nutrition services referral to assist patient with appropriate food choices  - Assess stoma site  - Consider wound care consult   Outcome: Progressing

## 2024-11-07 NOTE — ASSESSMENT & PLAN NOTE
Lab Results   Component Value Date    HGBA1C 7.0 (H) 08/22/2024       Recent Labs     11/06/24  1944 11/06/24 2049 11/06/24  2337 11/07/24  0558   POCGLU 171* 141* 153* 113       Blood Sugar Average: Last 72 hrs:  (P) [id number]  Management per primary team

## 2024-11-07 NOTE — PROGRESS NOTES
Cardiology Progress Note - Bertha Brown 84 y.o. female MRN: [id number]    Unit/Bed#: ICU 05 Encounter: [id number]      Assessment/Plan:   Assessment & Plan  Paroxysmal atrial fibrillation (HCC)  She is predominantly in sinus rhythm with infrequent episodes of atrial fibrillation with RVR on telemetry. Suspect this is secondary to septic shock/bacteremia, ischemic colitis, and recent surgery, as well as electrolyte abnormalities.  Continue amiodarone 400 mg 3 times daily x 5 days.  After this, she should be initiated on amiodarone 200 mg daily, with plan to reassess in the outpatient setting.  Transition from IV Lopressor to Lopressor 25 mg twice daily.  Will defer anticoagulation/risk versus benefit discussion to SLIM in the setting of anemia and concern for GI bleed. She is currently on anticoagulation with warfarin. [id number] 5.   Recommend close monitoring of electrolytes and maintaining K >4 and mag >2.    Septic shock (HCC)  Septic shock/E. coli bacteremia  Management per primary team.  On IV antibiotics.    Acute respiratory failure with hypoxia (HCC)  Suspected secondary to atelectasis, pulmonary edema, potential aspiration     Ischemic colitis (HCC)  S/p ex-lap with extended right hemicolectomy (10/29/2024), s/p second look washout with ileostomy creation and abdominal closure (10/31/2024)  Management per primary team/general surgery    RUKHSANA (acute kidney injury) ([facility])  Management per primary team    Coffee ground emesis  Management per primary team.  She was evaluated by GI earlier in hospital course, who have been re-consulted.    Anemia  Management per primary team    Hypertension  Continue Lopressor per above    Hyperlipidemia  Recommend resuming home atorvastatin as tolerated    Diabetes (HCC)  Lab Results   Component Value Date    HGBA1C 7.0 (H) 08/22/2024       Recent Labs     11/06/24  1944 11/06/24  2049 11/06/24  2337 11/07/24  0558   POCGLU 171* 141* 153* 113       Blood Sugar Average: Last  "72 hrs:  (P) [id number]  Management per primary team        Cardiology will sign-off. Please reach out with any further questions or concerns.      Subjective:   Patient seen and examined.  She offers no specific cardiac concerns or complaints today.  She denies chest pain, shortness of breath, palpitations, lightheadedness, leg swelling.    Objective:     Vitals: Blood pressure 142/79, pulse 70, temperature 98 °F (36.7 °C), temperature source Oral, resp. rate 21, height 5' 5\" (1.651 m), weight 105 kg (232 lb 5.8 oz), SpO2 97%., Body mass index is 38.67 kg/m².,   Orthostatic Blood Pressures      Flowsheet Row Most Recent Value   Blood Pressure 142/79 filed at 11/07/2024 0700   Patient Position - Orthostatic VS Lying filed at 11/07/2024 0700              Intake/Output Summary (Last 24 hours) at 11/7/2024 0858  Last data filed at 11/7/2024 0430  Gross per 24 hour   Intake 2589.03 ml   Output 2099 ml   Net 490.03 ml         Physical Exam:   GEN: Alert and aware, in no acute distress.  Well appearing and well nourished.   HEENT: Sclera anicteric, conjunctivae pink, mucous membranes moist. Oropharynx clear.   NECK: Supple, no significant JVD. Trachea midline.   HEART: Regular rhythm, normal S1 and S2. +1/6 murmur. No clicks, gallops or rubs. PMI nondisplaced, no thrills.   LUNGS: Minimal bibasilar crackles to auscultation consistent with atelectasis.  No wheezes or rhonchi appreciated.  No increased work of breathing or signs of respiratory distress.    ABDOMEN: Soft, nontender, non-distended.   EXTREMITIES: Skin warm and well perfused, no clubbing, cyanosis, or edema.  NEURO: No focal findings. Normal speech. Mood and affect normal.   SKIN: Normal without suspicious lesions on exposed skin.      Telemetry:  Telemetry Orders (From admission, onward)               24 Hour Telemetry Monitoring  Continuous x 24 Hours (Telem)        Expiring   Question:  Reason for 24 Hour Telemetry  Answer:  Metabolic/electrolyte " disturbance with high probability of dysrhythmia. K level <3 or >6 OR KCL infusion >10mEq/hr                     Telemetry Reviewed:  Predominantly sinus rhythm with infrequent episodes of atrial fibrillation with RVR.      Medications:      Current Facility-Administered Medications:     acetaminophen (Ofirmev) injection 1,000 mg, 1,000 mg, Intravenous, Q6H JUAN PABLOEileen CRNP, Last Rate: 400 mL/hr at 11/07/24 0604, 1,000 mg at 11/07/24 0604    amiodarone tablet 400 mg, 400 mg, Oral, TID With Meals, Brian Rivers PA-C, 400 mg at 11/07/24 0812    HYDROmorphone HCl (DILAUDID) injection 0.2 mg, 0.2 mg, Intravenous, Q3H PRN, 0.2 mg at 11/04/24 0339 **AND** HYDROmorphone (DILAUDID) injection 0.5 mg, 0.5 mg, Intravenous, Q3H PRN, JUAN Aggarwal    insulin lispro (HumALOG/ADMELOG) 100 units/mL subcutaneous injection 1-6 Units, 1-6 Units, Subcutaneous, Q6H JUAN PABLO, 1 Units at 11/07/24 0016 **AND** Fingerstick Glucose (POCT), , , Q6H, JUAN Aggarwal    levalbuterol (XOPENEX) inhalation solution 1.25 mg, 1.25 mg, Nebulization, Q6H PRN, JUAN Aggarwal, 1.25 mg at 11/04/24 1104    metoprolol (LOPRESSOR) injection 5 mg, 5 mg, Intravenous, Q6H, JUAN Aggarwal, 5 mg at 11/07/24 0812    ondansetron (ZOFRAN) injection 4 mg, 4 mg, Intravenous, Q4H PRN, Anders Lockett MD    pantoprazole (PROTONIX) injection 40 mg, 40 mg, Intravenous, Q12H JUAN PABLO, JUAN Aggarwal, 40 mg at 11/07/24 0812    [COMPLETED] piperacillin-tazobactam (ZOSYN) 4.5 g in sodium chloride 0.9 % 100 mL IV LOADING DOSE, 4.5 g, Intravenous, Once, Stopped at 10/30/24 0247 **FOLLOWED BY** piperacillin-tazobactam (ZOSYN) 4.5 g in sodium chloride 0.9 % 100 mL IVPB (EXTENDED INFUSION), 4.5 g, Intravenous, Q8H, JUAN Aggarwal, Last Rate: 25 mL/hr at 11/07/24 0422, 4.5 g at 11/07/24 0422    potassium chloride 20 mEq IVPB (premix), 20 mEq, Intravenous, Once, Last Rate: 50 mL/hr at 11/07/24 0803, 20 mEq at 11/07/24 0803 **FOLLOWED  BY** potassium chloride 20 mEq IVPB (premix), 20 mEq, Intravenous, Once, Anders Lockett MD    warfarin (COUMADIN) tablet 7.5 mg, 7.5 mg, Oral, Daily (warfarin), Anders Lockett MD, 7.5 mg at 11/06/24 1744     Labs & Results:        Results from last 7 days   Lab Units 11/07/24  0619 11/06/24  0539 11/05/24  0506   WBC Thousand/uL 8.53 11.07* 15.65*   HEMOGLOBIN g/dL 7.7* 7.9* 8.5*   HEMATOCRIT % 24.6* 25.0* 27.2*   PLATELETS Thousands/uL 149 159 179         Results from last 7 days   Lab Units 11/07/24  0619 11/06/24  0539 11/05/24  0506 11/04/24  0458 11/03/24  0357   POTASSIUM mmol/L 3.2* 3.1* 3.6 3.4* 3.7   CHLORIDE mmol/L 111* 110* 113* 114* 114*   CO2 mmol/L 22 23 27 22 20*   BUN mg/dL 11 15 19 27* 31*   CREATININE mg/dL 0.91 0.85 0.98 1.13 1.41*   CALCIUM mg/dL 6.1* 6.4* 7.2* 7.1* 7.2*   ALK PHOS U/L  --  60  --  49 41   ALT U/L  --  10  --  9 7   AST U/L  --  28  --  41* 46*     Results from last 7 days   Lab Units 11/07/24  0619 11/05/24  1325 11/05/24  0506 11/03/24  2317 11/03/24  1707   INR  1.24*  --   --   --  1.22*   PTT seconds 28 65* 100*   < > 29    < > = values in this interval not displayed.     Results from last 7 days   Lab Units 11/07/24  0619 11/06/24  1402 11/05/24  0506   MAGNESIUM mg/dL 2.0 1.8* 2.3       ** Please Note: Fluency DirectDictation voice to text software may have been used in the creation of this document. **

## 2024-11-07 NOTE — PHYSICAL THERAPY NOTE
PHYSICAL THERAPY NOTE          Patient Name: Bertha Brown  Today's Date: 11/7/2024 11/07/24 1236   PT Last Visit   PT Visit Date 11/07/24   Note Type   Note Type Treatment   Pain Assessment   Pain Assessment Tool 0-10   Pain Score No Pain   Restrictions/Precautions   Weight Bearing Precautions Per Order No   Other Precautions Chair Alarm;Bed Alarm;Multiple lines;Fall Risk;Telemetry;O2   General   Chart Reviewed Yes   Response to Previous Treatment Patient with no complaints from previous session.   Family/Caregiver Present No   Cognition   Overall Cognitive Status WFL   Arousal/Participation Alert;Cooperative   Attention Within functional limits   Orientation Level Oriented X4   Memory Within functional limits   Following Commands Follows all commands and directions without difficulty   Comments Pt agreeable to PT session   Bed Mobility   Sit to Supine 4  Minimal assistance   Additional items Assist x 1;Increased time required;Verbal cues;LE management;Bedrails   Transfers   Sit to Stand 4  Minimal assistance   Additional items Assist x 1;Increased time required;Armrests;Verbal cues   Stand to Sit 4  Minimal assistance   Additional items Assist x 1;Armrests;Increased time required;Verbal cues   Toilet transfer 4  Minimal assistance   Additional items Assist x 1;Armrests;Increased time required;Commode;Verbal cues   Additional Comments Pt denied dizziness with position change   Ambulation/Elevation   Gait pattern Wide NASIR;Decreased foot clearance;Forward Flexion;Shuffling;Step through pattern;Decreased toe off;Decreased heel strike;Decreased hip extension;Short stride;Excessively slow   Gait Assistance 4  Minimal assist   Additional items Assist x 1;Verbal cues   Assistive Device Rolling walker   Distance 35'   Balance   Static Sitting Good   Dynamic Sitting Fair +   Static Standing Fair   Dynamic Standing Fair -   Ambulatory  Fair -   Activity Tolerance   Activity Tolerance Patient limited by fatigue   Nurse Made Aware FARA [place]   Assessment   Prognosis Good   Problem List Decreased strength;Decreased endurance;Impaired balance;Decreased mobility   Assessment Chart review and two person identifiers were completed. Pt seen for PT treatment session this date, consisting of ther act focused on transfers, bed mobility and gt training on level surfaces to improve pt safety in household environment. Since previous session, pt has made good progress in terms of increased distance ambulated, decreased assistance required for transfers.  Pt greeted sitting in recliner and agreeable to PT session. Pt completed all STS well during session and denied dizziness. Pt ambulated well with no LOB and denied dizziness. Pt required increased time during ambulation. Pt completed seated rest break after ambulation, pt's SPO2 remained stable. Pt completed stand pivot to BSC. Pt maintained standing with CGA for ~2 minutes for bathroom hygiene with no LOB. Pt completed supine to sit with min A x1 for LE management and require assist x2 for boost up bed.  Pt ended session supine in bed with alarm activated and all needs within reach. Spoke to RN about session outcomes. Pertinent barriers during this session include  fatigue . Current goals and POC established on IE remain appropriate. Pt prognosis for achieving goals is good, pending pt progress with hospitalization/medical status improvements, and indicated by ability to follow directions, responsive to cues/strategies, and previous response to intervention. Pt continues to be functioning below baseline level, and remains limited due to factors listed above. PT will continue to see pt during current hospitalization in order to address the deficits listed above and provide interventions consistent w/ POC in effort to achieve STGs. PT recommends level 2, moderate resource intensity upon discharge.   Barriers to  Discharge Other (Comment)  (decline in functional mobility)   Goals   STG Expiration Date 11/14/24   PT Treatment Day 1   Plan   Treatment/Interventions Functional transfer training;LE strengthening/ROM;Elevations;Therapeutic exercise;Endurance training;Patient/family training;Bed mobility;Equipment eval/education;Gait training;Continued evaluation;OT   Progress Progressing toward goals   PT Frequency 3-5x/wk   Discharge Recommendation   Rehab Resource Intensity Level, PT II (Moderate Resource Intensity)   Equipment Recommended Walker   Walker Package Recommended Wheeled walker   AM-PAC Basic Mobility Inpatient   Turning in Flat Bed Without Bedrails 3   Lying on Back to Sitting on Edge of Flat Bed Without Bedrails 3   Moving Bed to Chair 3   Standing Up From Chair Using Arms 3   Walk in Room 3   Climb 3-5 Stairs With Railing 2   Basic Mobility Inpatient Raw Score 17   Basic Mobility Standardized Score 39.67   [facility] Highest Level Of Mobility   -HLM Goal 5: Stand one or more mins   -HLM Achieved 7: Walk 25 feet or more   Education   Education Provided Mobility training;Home exercise program;Assistive device   Patient Demonstrates acceptance/verbal understanding   End of Consult   Patient Position at End of Consult Bedside chair;All needs within reach;Bed/Chair alarm activated     Matthew Prado, PT, DPT

## 2024-11-07 NOTE — ASSESSMENT & PLAN NOTE
She is predominantly in sinus rhythm with infrequent episodes of atrial fibrillation with RVR on telemetry. Suspect this is secondary to septic shock/bacteremia, ischemic colitis, and recent surgery, as well as electrolyte abnormalities.  Continue amiodarone 400 mg 3 times daily x 5 days.  After this, she should be initiated on amiodarone 200 mg daily, with plan to reassess in the outpatient setting.  Transition from IV Lopressor to Lopressor 25 mg twice daily.  Will defer anticoagulation/risk versus benefit discussion to SLIM in the setting of anemia and concern for GI bleed. She is currently on anticoagulation with warfarin. [id number] 5.   Recommend close monitoring of electrolytes and maintaining K >4 and mag >2.

## 2024-11-07 NOTE — ASSESSMENT & PLAN NOTE
POD 6 s/p exploratory laparotomy with right hemicolectomy, abdominal left open w abtherra placement on 10/29/2024  POD 5 , s/p second look with wash out, abdominal wall closure, and loop ileostomy and mucous fistula creation, VAC placement to midline abdomen  Febrile overnight   WBC  15.65, 14.35, Hb 8.0   L/24hr,   Cr 0.98  VAC per surgery       Plan:  Pt had speech bedside evaluation this am, recommendation is thin liquids with advance to solids to soft dysphagia 3 texture diet,   Tolerating modified diet  VAC change today.   Goal to transition to MWF VAC changes   Replete lytes as needed  Remove loop bar

## 2024-11-07 NOTE — ASSESSMENT & PLAN NOTE
Present on admission history of paroxysmal A-fib.  Currently in sinus tachycardia with PVCs.  Going to continue the heparin for now.  I will price check Eliquis just in case it is covered but I do think the patient is going to need an EGD prior to initiating oral anticoagulation to him make sure that it is okay to continue given history of peptic ulcer disease and the hematemesis when she came in

## 2024-11-07 NOTE — ASSESSMENT & PLAN NOTE
Present on admission with obesity with BMI 37.  Counseled on weight loss, lifestyle modification.   90.7

## 2024-11-07 NOTE — ASSESSMENT & PLAN NOTE
Patient with worsening lethargy prior to OR  Suspect secondary to significant metabolic derangements  Monitor neuro exam.  Now appears to be stable.  Patient states she is having some nightmares at night.  I will give her some melatonin at night to help her sleep

## 2024-11-07 NOTE — PLAN OF CARE
Problem: OCCUPATIONAL THERAPY ADULT  Goal: Performs self-care activities at highest level of function for planned discharge setting.  See evaluation for individualized goals.  Description: Treatment Interventions: ADL retraining, Functional transfer training, Endurance training, Patient/family training, Equipment evaluation/education          See flowsheet documentation for full assessment, interventions and recommendations.   Outcome: Progressing  Note: Limitation: Decreased ADL status, Decreased Safe judgement during ADL, Decreased UE strength, Decreased endurance, Decreased self-care trans, Decreased high-level ADLs  Prognosis: Good  Assessment: Pt seen this date for skilled OT session focused on ADLs, functional transfers and mobility, safety education. The patient was received seated in bed side chair, NAD, PIV access, wound vac, ostomy, tele, on 2L O2 NC. She participated in sit to stand transfers, functional ambulation in the room, and LB dressing this date. Pt required Minimal Assistance x1 with RW for mobility and transfers, Maximal Assistance for toileting, Moderate Assistance for UB dressing, and total assistance for LB dressing. At end of session the patient was located supine in bed with call bell in reach and all needs met. Overall the patient remains below her functional baseline, and is primarily limited at this time due to generalized deconditioning, decreased balance, and decreased activity tolerance. OT will continue to follow while acute to address POC. At this time, recommend Level 2 Moderate Resource Intensity upon d/c.     Rehab Resource Intensity Level, OT: II (Moderate Resource Intensity)        Aspen Mitchell OTR/L

## 2024-11-07 NOTE — ASSESSMENT & PLAN NOTE
Patient was seen by gastroenterology earlier on in the admission however given the patient's acuity they decided to hold off on EGD but now that the patient is stable I do think an EGD could be warranted.  I discussed this with Radha who will see the patient again in consultation.  Patient is going to need anticoagulation for which she was on before but given the coffee-ground emesis needs to reevaluate for possible EGD prior to starting Coumadin

## 2024-11-07 NOTE — ASSESSMENT & PLAN NOTE
Management per primary team.  She was evaluated by GI earlier in hospital course, who have been re-consulted.

## 2024-11-07 NOTE — ASSESSMENT & PLAN NOTE
Lab Results   Component Value Date    HGBA1C 7.0 (H) 08/22/2024       Recent Labs     11/06/24  1944 11/06/24 2049 11/06/24  2337 11/07/24  0558   POCGLU 171* 141* 153* 113       Blood Sugar Average: Last 72 hrs:  (P) 145.3295492247542351    Present on admission with history of diabetes.  Most recent A1c 7.0.  Advance diet as tolerated per surgery's recommendations

## 2024-11-07 NOTE — OCCUPATIONAL THERAPY NOTE
Occupational Therapy Progress Note     Patient Name: Bertha Brown  Today's Date: 11/7/2024  Problem List  Principal Problem:    Septic shock (HCC)  Active Problems:    Paroxysmal atrial fibrillation (HCC)    Acute respiratory failure with hypoxia (HCC)    Hypothyroidism    Hypertension    Hyperlipidemia    Diabetes (HCC)    Obesity    Coffee ground emesis    RUKHSANA (acute kidney injury) (HCC)    Chronic idiopathic constipation    Generalized abdominal pain    SIRS (systemic inflammatory response syndrome) (HCC)    Ischemic colitis (HCC)    Acute metabolic encephalopathy    S/P exploratory laparotomy    Anemia    Hypernatremia          11/07/24 1303   OT Last Visit   OT Visit Date 11/07/24   Note Type   Note Type Treatment   Pain Assessment   Pain Assessment Tool 0-10   Pain Score No Pain   Restrictions/Precautions   Weight Bearing Precautions Per Order No   Other Precautions Chair Alarm;Bed Alarm;Multiple lines;Telemetry;Fall Risk;O2  (wound vac)   ADL   Where Assessed Other (Comment)  (Assist levels for some self care tasks are based on functional assessment of performance skills and deficits observed during session.)   Eating Assistance 5  Supervision/Setup   Eating Deficit Setup   Grooming Assistance 5  Supervision/Setup   Grooming Deficit Setup;Supervision/safety;Increased time to complete  (seated)   UB Dressing Assistance 3  Moderate Assistance   UB Dressing Deficit Setup;Supervision/safety;Increased time to complete  (seated)   LB Dressing Assistance 1  Total Assistance   Toileting Assistance  1  Total Assistance   Bed Mobility   Sit to Supine 4  Minimal assistance   Additional items Assist x 1;Increased time required;Verbal cues;LE management   Transfers   Sit to Stand 4  Minimal assistance   Additional items Assist x 1;Armrests;Increased time required;Verbal cues   Stand to Sit 4  Minimal assistance   Additional items Assist x 1;Armrests;Increased time required;Verbal cues   Stand pivot 4  Minimal  assistance   Additional items Assist x 1;Increased time required;Verbal cues   Toilet transfer 4  Minimal assistance   Additional items Assist x 1;Increased time required;Commode   Additional Comments with RW. Pt denied dizziness   Functional Mobility   Functional Mobility 4  Minimal assistance   Additional Comments assist x1   Additional items Rolling walker   Subjective   Subjective Pt agreeable to therapy session   Cognition   Overall Cognitive Status WFL   Arousal/Participation Alert;Cooperative   Attention Within functional limits   Orientation Level Oriented X4   Memory Within functional limits   Following Commands Follows all commands and directions without difficulty   Activity Tolerance   Activity Tolerance Patient tolerated treatment well   Medical Staff Made Aware Matthew PT  (Pt seen for co-treatment with Physical Therapist due to pt's medical complexity, functional limitations and limited activity tolerance.)   Assessment   Assessment Pt seen this date for skilled OT session focused on ADLs, functional transfers and mobility, safety education. The patient was received seated in bed side chair, NAD, PIV access, wound vac, ostomy, tele, on 2L O2 NC. She participated in sit to stand transfers, functional ambulation in the room, and LB dressing this date. Pt required Minimal Assistance x1 with RW for mobility and transfers, Maximal Assistance for toileting, Moderate Assistance for UB dressing, and total assistance for LB dressing. At end of session the patient was located supine in bed with call bell in reach and all needs met. Overall the patient remains below her functional baseline, and is primarily limited at this time due to generalized deconditioning, decreased balance, and decreased activity tolerance. OT will continue to follow while acute to address POC. At this time, recommend Level 2 Moderate Resource Intensity upon d/c.   Plan   Treatment Interventions ADL retraining;Functional transfer  training;Endurance training;Patient/family training;Equipment evaluation/education   Goal Expiration Date 11/17/24   OT Treatment Day 1   OT Frequency 3-5x/wk   Discharge Recommendation   Rehab Resource Intensity Level, OT II (Moderate Resource Intensity)   AM-PAC Daily Activity Inpatient   Lower Body Dressing 2   Bathing 2   Toileting 2   Upper Body Dressing 2   Grooming 3   Eating 3   Daily Activity Raw Score 14   Daily Activity Standardized Score (Calc for Raw Score >=11) 33.39   AM-PAC Applied Cognition Inpatient   Following a Speech/Presentation 4   Understanding Ordinary Conversation 4   Taking Medications 4   Remembering Where Things Are Placed or Put Away 4   Remembering List of 4-5 Errands 4   Taking Care of Complicated Tasks 4   Applied Cognition Raw Score 24   Applied Cognition Standardized Score 62.21         Aspen Mitchell OTR/L

## 2024-11-07 NOTE — ASSESSMENT & PLAN NOTE
-Patient with possible very early peritonitis treated with antibiotics  Abdominal pain is improving.    S/p  LAPAROSCOPY DIAGNOSTIC CONVERTED TO OPEN  LAPAROTOMY EXPLORATORY. EXTENDED RIGHT HEMICOLECTOMY. WITH abthera VAC placement

## 2024-11-08 ENCOUNTER — ANESTHESIA EVENT (INPATIENT)
Dept: GASTROENTEROLOGY | Facility: HOSPITAL | Age: 84
DRG: 853 | End: 2024-11-08
Payer: COMMERCIAL

## 2024-11-08 ENCOUNTER — ANESTHESIA (INPATIENT)
Dept: GASTROENTEROLOGY | Facility: HOSPITAL | Age: 84
DRG: 853 | End: 2024-11-08
Payer: COMMERCIAL

## 2024-11-08 ENCOUNTER — TELEPHONE (OUTPATIENT)
Dept: CARDIOLOGY CLINIC | Facility: CLINIC | Age: 84
End: 2024-11-08

## 2024-11-08 ENCOUNTER — TREATMENT (OUTPATIENT)
Dept: GASTROENTEROLOGY | Facility: CLINIC | Age: 84
End: 2024-11-08

## 2024-11-08 ENCOUNTER — APPOINTMENT (INPATIENT)
Dept: GASTROENTEROLOGY | Facility: HOSPITAL | Age: 84
DRG: 853 | End: 2024-11-08
Payer: COMMERCIAL

## 2024-11-08 DIAGNOSIS — K59.01 SLOW TRANSIT CONSTIPATION: ICD-10-CM

## 2024-11-08 DIAGNOSIS — R11.2 NAUSEA AND VOMITING, UNSPECIFIED VOMITING TYPE: Primary | ICD-10-CM

## 2024-11-08 LAB
ANION GAP SERPL CALCULATED.3IONS-SCNC: 6 MMOL/L (ref 4–13)
BUN SERPL-MCNC: 9 MG/DL (ref 5–25)
CALCIUM SERPL-MCNC: 6.4 MG/DL (ref 8.4–10.2)
CHLORIDE SERPL-SCNC: 112 MMOL/L (ref 96–108)
CO2 SERPL-SCNC: 23 MMOL/L (ref 21–32)
CREAT SERPL-MCNC: 0.84 MG/DL (ref 0.6–1.3)
ERYTHROCYTE [DISTWIDTH] IN BLOOD BY AUTOMATED COUNT: 16.1 % (ref 11.6–15.1)
GFR SERPL CREATININE-BSD FRML MDRD: 64 ML/MIN/1.73SQ M
GLUCOSE SERPL-MCNC: 102 MG/DL (ref 65–140)
GLUCOSE SERPL-MCNC: 122 MG/DL (ref 65–140)
GLUCOSE SERPL-MCNC: 125 MG/DL (ref 65–140)
GLUCOSE SERPL-MCNC: 89 MG/DL (ref 65–140)
GLUCOSE SERPL-MCNC: 91 MG/DL (ref 65–140)
HCT VFR BLD AUTO: 25.8 % (ref 34.8–46.1)
HGB BLD-MCNC: 8.1 G/DL (ref 11.5–15.4)
INR PPP: 1.37 (ref 0.85–1.19)
MCH RBC QN AUTO: 31 PG (ref 26.8–34.3)
MCHC RBC AUTO-ENTMCNC: 31.4 G/DL (ref 31.4–37.4)
MCV RBC AUTO: 99 FL (ref 82–98)
PLATELET # BLD AUTO: 165 THOUSANDS/UL (ref 149–390)
PMV BLD AUTO: 12.3 FL (ref 8.9–12.7)
POTASSIUM SERPL-SCNC: 3.4 MMOL/L (ref 3.5–5.3)
PROTHROMBIN TIME: 17.7 SECONDS (ref 12.3–15)
RBC # BLD AUTO: 2.61 MILLION/UL (ref 3.81–5.12)
SODIUM SERPL-SCNC: 141 MMOL/L (ref 135–147)
WBC # BLD AUTO: 10.57 THOUSAND/UL (ref 4.31–10.16)

## 2024-11-08 PROCEDURE — 85027 COMPLETE CBC AUTOMATED: CPT | Performed by: FAMILY MEDICINE

## 2024-11-08 PROCEDURE — 0DB68ZX EXCISION OF STOMACH, VIA NATURAL OR ARTIFICIAL OPENING ENDOSCOPIC, DIAGNOSTIC: ICD-10-PCS | Performed by: INTERNAL MEDICINE

## 2024-11-08 PROCEDURE — 80048 BASIC METABOLIC PNL TOTAL CA: CPT | Performed by: FAMILY MEDICINE

## 2024-11-08 PROCEDURE — 85610 PROTHROMBIN TIME: CPT | Performed by: FAMILY MEDICINE

## 2024-11-08 PROCEDURE — 99232 SBSQ HOSP IP/OBS MODERATE 35: CPT | Performed by: FAMILY MEDICINE

## 2024-11-08 PROCEDURE — 97606 NEG PRS WND THER DME>50 SQCM: CPT | Performed by: PHYSICIAN ASSISTANT

## 2024-11-08 PROCEDURE — 88305 TISSUE EXAM BY PATHOLOGIST: CPT | Performed by: PATHOLOGY

## 2024-11-08 PROCEDURE — 82948 REAGENT STRIP/BLOOD GLUCOSE: CPT

## 2024-11-08 PROCEDURE — 2W03X6Z CHANGE PRESSURE DRESSING ON ABDOMINAL WALL: ICD-10-PCS | Performed by: STUDENT IN AN ORGANIZED HEALTH CARE EDUCATION/TRAINING PROGRAM

## 2024-11-08 PROCEDURE — 94660 CPAP INITIATION&MGMT: CPT

## 2024-11-08 PROCEDURE — 43239 EGD BIOPSY SINGLE/MULTIPLE: CPT | Performed by: INTERNAL MEDICINE

## 2024-11-08 PROCEDURE — 94760 N-INVAS EAR/PLS OXIMETRY 1: CPT

## 2024-11-08 RX ORDER — LIDOCAINE HYDROCHLORIDE 10 MG/ML
INJECTION, SOLUTION EPIDURAL; INFILTRATION; INTRACAUDAL; PERINEURAL AS NEEDED
Status: DISCONTINUED | OUTPATIENT
Start: 2024-11-08 | End: 2024-11-08

## 2024-11-08 RX ORDER — VENLAFAXINE HYDROCHLORIDE 150 MG/1
150 CAPSULE, EXTENDED RELEASE ORAL DAILY
Status: DISCONTINUED | OUTPATIENT
Start: 2024-11-08 | End: 2024-11-08

## 2024-11-08 RX ORDER — BUSPIRONE HYDROCHLORIDE 5 MG/1
10 TABLET ORAL 2 TIMES DAILY
Status: DISCONTINUED | OUTPATIENT
Start: 2024-11-08 | End: 2024-11-11 | Stop reason: HOSPADM

## 2024-11-08 RX ORDER — NYSTATIN 100000 [USP'U]/G
POWDER TOPICAL 2 TIMES DAILY
Status: DISCONTINUED | OUTPATIENT
Start: 2024-11-08 | End: 2024-11-11 | Stop reason: HOSPADM

## 2024-11-08 RX ORDER — PROPOFOL 10 MG/ML
INJECTION, EMULSION INTRAVENOUS AS NEEDED
Status: DISCONTINUED | OUTPATIENT
Start: 2024-11-08 | End: 2024-11-08

## 2024-11-08 RX ORDER — POTASSIUM CHLORIDE 1500 MG/1
40 TABLET, EXTENDED RELEASE ORAL ONCE
Status: COMPLETED | OUTPATIENT
Start: 2024-11-08 | End: 2024-11-08

## 2024-11-08 RX ORDER — VENLAFAXINE HYDROCHLORIDE 75 MG/1
75 CAPSULE, EXTENDED RELEASE ORAL DAILY
Status: DISCONTINUED | OUTPATIENT
Start: 2024-11-09 | End: 2024-11-09

## 2024-11-08 RX ORDER — SODIUM CHLORIDE, SODIUM LACTATE, POTASSIUM CHLORIDE, CALCIUM CHLORIDE 600; 310; 30; 20 MG/100ML; MG/100ML; MG/100ML; MG/100ML
100 INJECTION, SOLUTION INTRAVENOUS CONTINUOUS
Status: DISCONTINUED | OUTPATIENT
Start: 2024-11-08 | End: 2024-11-09

## 2024-11-08 RX ADMIN — AMIODARONE HYDROCHLORIDE 400 MG: 200 TABLET ORAL at 16:10

## 2024-11-08 RX ADMIN — BUSPIRONE HYDROCHLORIDE 10 MG: 10 TABLET ORAL at 17:30

## 2024-11-08 RX ADMIN — PROPOFOL 100 MG: 10 INJECTION, EMULSION INTRAVENOUS at 13:09

## 2024-11-08 RX ADMIN — PANTOPRAZOLE SODIUM 40 MG: 40 INJECTION, POWDER, FOR SOLUTION INTRAVENOUS at 08:36

## 2024-11-08 RX ADMIN — PROPOFOL 50 MG: 10 INJECTION, EMULSION INTRAVENOUS at 13:14

## 2024-11-08 RX ADMIN — AMIODARONE HYDROCHLORIDE 400 MG: 200 TABLET ORAL at 08:35

## 2024-11-08 RX ADMIN — SODIUM CHLORIDE, SODIUM LACTATE, POTASSIUM CHLORIDE, AND CALCIUM CHLORIDE 100 ML/HR: .6; .31; .03; .02 INJECTION, SOLUTION INTRAVENOUS at 12:33

## 2024-11-08 RX ADMIN — PANTOPRAZOLE SODIUM 40 MG: 40 INJECTION, POWDER, FOR SOLUTION INTRAVENOUS at 22:34

## 2024-11-08 RX ADMIN — NYSTATIN: 100000 POWDER TOPICAL at 17:31

## 2024-11-08 RX ADMIN — PIPERACILLIN AND TAZOBACTAM 4.5 G: 36; 4.5 INJECTION, POWDER, FOR SOLUTION INTRAVENOUS at 20:42

## 2024-11-08 RX ADMIN — LIDOCAINE HYDROCHLORIDE 100 MG: 10 INJECTION, SOLUTION EPIDURAL; INFILTRATION; INTRACAUDAL; PERINEURAL at 13:09

## 2024-11-08 RX ADMIN — PROPOFOL 50 MG: 10 INJECTION, EMULSION INTRAVENOUS at 13:11

## 2024-11-08 RX ADMIN — METOPROLOL TARTRATE 25 MG: 25 TABLET, FILM COATED ORAL at 08:36

## 2024-11-08 RX ADMIN — PIPERACILLIN AND TAZOBACTAM 4.5 G: 36; 4.5 INJECTION, POWDER, FOR SOLUTION INTRAVENOUS at 05:46

## 2024-11-08 RX ADMIN — ACETAMINOPHEN 1000 MG: 10 INJECTION INTRAVENOUS at 05:47

## 2024-11-08 RX ADMIN — ACETAMINOPHEN 1000 MG: 10 INJECTION INTRAVENOUS at 17:31

## 2024-11-08 RX ADMIN — POTASSIUM CHLORIDE 40 MEQ: 1500 TABLET, EXTENDED RELEASE ORAL at 16:09

## 2024-11-08 RX ADMIN — APIXABAN 5 MG: 5 TABLET, FILM COATED ORAL at 18:01

## 2024-11-08 RX ADMIN — VENLAFAXINE HYDROCHLORIDE 150 MG: 37.5 CAPSULE, EXTENDED RELEASE ORAL at 08:36

## 2024-11-08 RX ADMIN — Medication 6 MG: at 22:34

## 2024-11-08 RX ADMIN — METOPROLOL TARTRATE 25 MG: 25 TABLET, FILM COATED ORAL at 20:46

## 2024-11-08 RX ADMIN — BUSPIRONE HYDROCHLORIDE 10 MG: 10 TABLET ORAL at 08:36

## 2024-11-08 NOTE — ASSESSMENT & PLAN NOTE
Patient is status post EGD.  Discussed with gastroenterology okay to start anticoagulation.  We are going to start the patient on Eliquis

## 2024-11-08 NOTE — TELEPHONE ENCOUNTER
----- Message from Brian Rivers PA-C sent at 11/7/2024  4:33 PM EST -----  Regarding: Hospital Follow-Up  Cardiology Follow-up:    Patient clinical visit in 2 week at the Cardio location: [place] office. .    Schedule visit with [facility] Providers: first available provider.    Type of Visit: VISIT TYPE: in-person office visit.

## 2024-11-08 NOTE — ASSESSMENT & PLAN NOTE
Lab Results   Component Value Date    HGBA1C 7.0 (H) 08/22/2024       Recent Labs     11/07/24  1751 11/07/24  2335 11/08/24  0532 11/08/24  1036   POCGLU 128 125 89 91       Blood Sugar Average: Last 72 hrs:  (P) 139.8733714370975616    Present on admission with history of diabetes.  Most recent A1c 7.0.  Advance diet as tolerated per surgery's recommendations

## 2024-11-08 NOTE — PROCEDURES
ODALIS Change Note    Date: 11/8/2024    Location of wound: Midline abdominal incision    Dimensions of wound:  14 cm x 5 cm x 4 cm    Description of wound: Base of wound covered in approximately 20% slough with viable subcutaneous fat and granulation tissue covering remainder of wound bed    Sponges removed:  1 Black Sponges  0 White Sponges    Sponges placed:  1 Black Sponges  0 White Sponges    VAC settings:  125 mmHg  Continuous    Delaney Vera PA-C   11/8/2024

## 2024-11-08 NOTE — DISCHARGE INSTR - OTHER ORDERS
"Ostomy Care:  1. Peel back pouch using push-pull method, may use non-alcohol adhesive remover(Purple and white package).  2. Use warm water only to cleanse skin around the stoma (cuca-stomal skin).  3. Make sure all adhesive residue is removed and skin is dry and not oily.  4. Measure stoma size using measuring guide and trace correct measurements onto back of pouch (Off set opening away from abdominal incision).   5. Then cut backing of pouch out to correct shape/size.  6. If there is cuca-stomal skin breakdown you can perform the crusting technique now to treat the skin breakdown (steps listed below, use nystatin powder if there is a rash.)  7. Apply Danna ring to pouch barrier around the cut-out opening.   8. Place pouch over stoma and onto skin.  9. Use warmth of hand to apply gentle pressure to help backing of pouch to adhere well to skin.    Pouch used while inpatient:    -[employer] [place] Coloplast Light Convexity barrier  REF # [id number]    -[employer] [place] Flex High Out-put pouch REF # [id number]    -Danna Ring REF # [id number]    -Belt attachment for Sensura [place] pouch REF # [id number]    TIPS:  *Empty pouch when 1/3 -1/2 full.  *Change pouch every 3-5 days or sooner if signs of leaking.  *Measure stoma weekly for next 6-8 weeks- stoma will decrease in size due to decrease in swelling  *Can shower with pouch on or off. Make sure to dry off pouch after shower.    Crusting as needed for moist, red, open skin around the stoma: (Done prior to pouch application)  -Apply stoma powder to skin breakdown, then dust away the excess powder.   -Then use skin barrier film to pat/dab over the powder and let dry to form \"crust\"  Repeat X2 before placing new ostomy pouch     Outpatient Ostomy Clinic    If having issues or concerns pertaining to colostomy;   or if having complications with pouch leaking or cuca-stomal skin breakdown please call:   Outpatient Wound and Ostomy Care Team @ [phone]   Option 1: [place], [place], [place], " [place]  Option 2: [facility] [place], [place]    Ostomy Education Resources:   www.ostomy.org   www.convatec.com   www.coloplast.us   [email or web address]    Wound VAC:  - Continue three times a week (MW or TT) wound vac changes  - The wound will be packed to with wet to dry dressing for transportation.   - Upon arrival to Rehab facility, remove wet to dry dressings and apply wound VAC  - If VAC dressing is not readily available upon arrival, continue with daily wet to dry packing changes over midline until a wound VAC can be placed. Pack midline wound with single piece of Kerlix and cover with clean/dry 4x4 and ABD. Change daily  - Once VAC dressings become available, Place single piece of black granufoam sponge cut to size in midline incision and secure in place with VAC dressings  - Place VAC on 125mmHg continuous suction

## 2024-11-08 NOTE — ANESTHESIA PREPROCEDURE EVALUATION
Procedure:  EGD    85 yo F admitted initially with coffee-ground emesis, constipation and acute blood loss anemia. Hospital course was c/b decompensation and findings of ischemic colitis with septic shock now s/p extended R hemicolectomy, ileostomy. Shock resolved. Now for EGD with previous coffee-ground emesis prior to restarting AC for pAfib.    Denies previous complications from anesthesia, denies GERD, MARC. Nonsmoker.    Relevant Problems   ANESTHESIA (within normal limits)      CARDIO   (+) Chest pain   (+) Hyperlipidemia   (+) Hypertension   (+) Paroxysmal atrial fibrillation (HCC)      ENDO   (+) Hypothyroidism      GI/HEPATIC   (+) Coffee ground emesis      /RENAL   (+) RUKHSANA (acute kidney injury) (HCC)      HEMATOLOGY   (+) Anemia      MUSCULOSKELETAL   (+) Bronchospasm with bronchitis, acute   (+) Osteoarthritis of knee      NEURO/PSYCH   (+) Major depressive disorder, single episode, mild (HCC)      PULMONARY   (+) Acute respiratory failure with hypoxia (HCC)      Lab Results   Component Value Date/Time    WBC 10.57 (H) 11/08/2024 06:43 AM    RBC 2.61 (L) 11/08/2024 06:43 AM    HGB 8.1 (L) 11/08/2024 06:43 AM    HCT 25.8 (L) 11/08/2024 06:43 AM     11/08/2024 06:43 AM    BANDSPCT 6 11/05/2024 05:06 AM     Lab Results   Component Value Date/Time    SODIUM 141 11/08/2024 06:43 AM    K 3.4 (L) 11/08/2024 06:43 AM     (H) 11/08/2024 06:43 AM    CO2 23 11/08/2024 06:43 AM    BUN 9 11/08/2024 06:43 AM    CREATININE 0.84 11/08/2024 06:43 AM    GLUC 102 11/08/2024 06:43 AM     Lab Results   Component Value Date/Time    ALKPHOS 60 11/06/2024 05:39 AM    ALT 10 11/06/2024 05:39 AM    AST 28 11/06/2024 05:39 AM    ALB 3.3 (L) 11/06/2024 05:39 AM    TBILI 0.76 11/06/2024 05:39 AM    INR 1.37 (H) 11/08/2024 06:30 AM     XR chest portable ICU  Result Date: 11/3/2024  Impression: Persistent bibasilar opacity, greater on the left, with loss of the diaphragm, which could be due to atelectasis and/or  pneumonia. Left pleural effusion not excluded. Workstation performed: [id number]     Type and Screen:  B    TTE (5/17/24):    Left Ventricle: Left ventricular cavity size is normal. Wall thickness is normal. The left ventricular ejection fraction is 70%. Systolic function is vigorous. Wall motion is normal. Diastolic function is mildly abnormal, consistent with grade I (abnormal) relaxation.    Right Ventricle: Right ventricular cavity size is normal. Systolic function is normal.    Left Atrium: The atrium is mildly dilated.    Physical Exam    Airway    Mallampati score: II  TM Distance: >3 FB  Neck ROM: full     Dental    lower dentures and upper dentures    Cardiovascular  Cardiovascular exam normal    Pulmonary  Pulmonary exam normal     Other Findings  post-pubertal.      Anesthesia Plan  ASA Score- 3     Anesthesia Type- IV sedation with anesthesia with ASA Monitors.         Additional Monitors:     Airway Plan:            Plan Factors-    Chart reviewed. EKG reviewed. Imaging results reviewed. Existing labs reviewed. Patient summary reviewed.    Patient is not a current smoker.      Obstructive sleep apnea risk education given perioperatively.        Induction- intravenous.    Postoperative Plan-     Perioperative Resuscitation Plan - Level 1 - Full Code.       Informed Consent- Anesthetic plan and risks discussed with patient.  I personally reviewed this patient with the CRNA. Discussed and agreed on the Anesthesia Plan with the CRNA..

## 2024-11-08 NOTE — SPEECH THERAPY NOTE
Speech Language/Pathology Missed Visit Note    Per review of chart, patient NPO pending EGD.  Dysphagia tx/follow-up deferred at this time.  Will re-attempt as patient status and scheduling permits.  Nursing aware.        Michelle Cruz MS, CCC-SLP  Speech-Language Pathologist  [place] #[id number]  [place] #[id number]

## 2024-11-08 NOTE — ASSESSMENT & PLAN NOTE
POD 6 s/p exploratory laparotomy with right hemicolectomy, abdominal left open w abtherra placement on 10/29/2024  POD 5 , s/p second look with wash out, abdominal wall closure, and loop ileostomy and mucous fistula creation, VAC placement to midline abdomen  Febrile overnight   WBC  15.65, 14.35, Hb 8.0   L/24hr,   Cr 0.98  VAC per surgery       Plan:  Pt had speech bedside evaluation this am, recommendation is thin liquids with advance to solids to soft dysphagia 3 texture diet,   Tolerating modified diet  VAC change today.   Goal to transition to MWF VAC changes   Replete lytes as needed  Removed loop bar

## 2024-11-08 NOTE — ASSESSMENT & PLAN NOTE
Septic shock/E. coli bacteremia tenure with current antibiotics. resolved.  Will likely discontinue antibiotics tomorrow

## 2024-11-08 NOTE — PLAN OF CARE
Problem: PAIN - ADULT  Goal: Verbalizes/displays adequate comfort level or baseline comfort level  Description: Interventions:  - Encourage patient to monitor pain and request assistance  - Assess pain using appropriate pain scale  - Administer analgesics based on type and severity of pain and evaluate response  - Implement non-pharmacological measures as appropriate and evaluate response  - Consider cultural and social influences on pain and pain management  - Notify physician/advanced practitioner if interventions unsuccessful or patient reports new pain  Outcome: Progressing     Problem: INFECTION - ADULT  Goal: Absence or prevention of progression during hospitalization  Description: INTERVENTIONS:  - Assess and monitor for signs and symptoms of infection  - Monitor lab/diagnostic results  - Monitor all insertion sites, i.e. indwelling lines, tubes, and drains  - Monitor endotracheal if appropriate and nasal secretions for changes in amount and color  - Imbler appropriate cooling/warming therapies per order  - Administer medications as ordered  - Instruct and encourage patient and family to use good hand hygiene technique  - Identify and instruct in appropriate isolation precautions for identified infection/condition  Outcome: Progressing  Goal: Absence of fever/infection during neutropenic period  Description: INTERVENTIONS:  - Monitor WBC    Outcome: Progressing     Problem: SAFETY ADULT  Goal: Patient will remain free of falls  Description: INTERVENTIONS:  - Educate patient/family on patient safety including physical limitations  - Instruct patient to call for assistance with activity   - Consult OT/PT to assist with strengthening/mobility   - Keep Call bell within reach  - Keep bed low and locked with side rails adjusted as appropriate  - Keep care items and personal belongings within reach  - Initiate and maintain comfort rounds  - Make Fall Risk Sign visible to staff  - Offer Toileting every 2 Hours,  in advance of need  - Initiate/Maintain bed alarm  - Apply yellow socks and bracelet for high fall risk patients  - Consider moving patient to room near nurses station  Outcome: Progressing  Goal: Maintain or return to baseline ADL function  Description: INTERVENTIONS:  -  Assess patient's ability to carry out ADLs; assess patient's baseline for ADL function and identify physical deficits which impact ability to perform ADLs (bathing, care of mouth/teeth, toileting, grooming, dressing, etc.)  - Assess/evaluate cause of self-care deficits   - Assess range of motion  - Assess patient's mobility; develop plan if impaired  - Assess patient's need for assistive devices and provide as appropriate  - Encourage maximum independence but intervene and supervise when necessary  - Involve family in performance of ADLs  - Assess for home care needs following discharge   - Consider OT consult to assist with ADL evaluation and planning for discharge  - Provide patient education as appropriate  Outcome: Progressing  Goal: Maintains/Returns to pre admission functional level  Description: INTERVENTIONS:  - Perform AM-PAC 6 Click Basic Mobility/ Daily Activity assessment daily.  - Set and communicate daily mobility goal to care team and patient/family/caregiver.   - Collaborate with rehabilitation services on mobility goals if consulted  - Perform Range of Motion 2 times a day.  - Reposition patient every 2 hours.  - Dangle patient 2 times a day  - Stand patient 2 times a day  - Ambulate patient 2 times a day  - Out of bed to chair 2 times a day   - Out of bed for meals 2 times a day  - Out of bed for toileting  - Record patient progress and toleration of activity level   Outcome: Progressing     Problem: DISCHARGE PLANNING  Goal: Discharge to home or other facility with appropriate resources  Description: INTERVENTIONS:  - Identify barriers to discharge w/patient and caregiver  - Arrange for needed discharge resources and  transportation as appropriate  - Identify discharge learning needs (meds, wound care, etc.)  - Arrange for interpretive services to assist at discharge as needed  - Refer to Case Management Department for coordinating discharge planning if the patient needs post-hospital services based on physician/advanced practitioner order or complex needs related to functional status, cognitive ability, or social support system  Outcome: Progressing     Problem: Knowledge Deficit  Goal: Patient/family/caregiver demonstrates understanding of disease process, treatment plan, medications, and discharge instructions  Description: Complete learning assessment and assess knowledge base.  Interventions:  - Provide teaching at level of understanding  - Provide teaching via preferred learning methods  Outcome: Progressing     Problem: NEUROSENSORY - ADULT  Goal: Achieves stable or improved neurological status  Description: INTERVENTIONS  - Monitor and report changes in neurological status  - Monitor vital signs such as temperature, blood pressure, glucose, and any other labs ordered   - Initiate measures to prevent increased intracranial pressure  - Monitor for seizure activity and implement precautions if appropriate      Outcome: Progressing  Goal: Remains free of injury related to seizures activity  Description: INTERVENTIONS  - Maintain airway, patient safety  and administer oxygen as ordered  - Monitor patient for seizure activity, document and report duration and description of seizure to physician/advanced practitioner  - If seizure occurs,  ensure patient safety during seizure  - Reorient patient post seizure  - Seizure pads on all 4 side rails  - Instruct patient/family to notify RN of any seizure activity including if an aura is experienced  - Instruct patient/family to call for assistance with activity based on nursing assessment  - Administer anti-seizure medications if ordered    Outcome: Progressing  Goal: Achieves maximal  functionality and self care  Description: INTERVENTIONS  - Monitor swallowing and airway patency with patient fatigue and changes in neurological status  - Encourage and assist patient to increase activity and self care.   - Encourage visually impaired, hearing impaired and aphasic patients to use assistive/communication devices  Outcome: Progressing     Problem: RESPIRATORY - ADULT  Goal: Achieves optimal ventilation and oxygenation  Description: INTERVENTIONS:  - Assess for changes in respiratory status  - Assess for changes in mentation and behavior  - Position to facilitate oxygenation and minimize respiratory effort  - Oxygen administered by appropriate delivery if ordered  - Initiate smoking cessation education as indicated  - Encourage broncho-pulmonary hygiene including cough, deep breathe, Incentive Spirometry  - Assess the need for suctioning and aspirate as needed  - Assess and instruct to report SOB or any respiratory difficulty  - Respiratory Therapy support as indicated  Outcome: Progressing     Problem: GASTROINTESTINAL - ADULT  Goal: Minimal or absence of nausea and/or vomiting  Description: INTERVENTIONS:  - Administer IV fluids if ordered to ensure adequate hydration  - Maintain NPO status until nausea and vomiting are resolved  - Nasogastric tube if ordered  - Administer ordered antiemetic medications as needed  - Provide nonpharmacologic comfort measures as appropriate  - Advance diet as tolerated, if ordered  - Consider nutrition services referral to assist patient with adequate nutrition and appropriate food choices  Outcome: Progressing  Goal: Maintains or returns to baseline bowel function  Description: INTERVENTIONS:  - Assess bowel function  - Encourage oral fluids to ensure adequate hydration  - Administer IV fluids if ordered to ensure adequate hydration  - Administer ordered medications as needed  - Encourage mobilization and activity  - Consider nutritional services referral to assist  patient with adequate nutrition and appropriate food choices  Outcome: Progressing  Goal: Maintains adequate nutritional intake  Description: INTERVENTIONS:  - Monitor percentage of each meal consumed  - Identify factors contributing to decreased intake, treat as appropriate  - Assist with meals as needed  - Monitor I&O, weight, and lab values if indicated  - Obtain nutrition services referral as needed  Outcome: Progressing  Goal: Establish and maintain optimal ostomy function  Description: INTERVENTIONS:  - Assess bowel function  - Encourage oral fluids to ensure adequate hydration  - Administer IV fluids if ordered to ensure adequate hydration   - Administer ordered medications as needed  - Encourage mobilization and activity  - Nutrition services referral to assist patient with appropriate food choices  - Assess stoma site  - Consider wound care consult   Outcome: Progressing  Goal: Oral mucous membranes remain intact  Description: INTERVENTIONS  - Assess oral mucosa and hygiene practices  - Implement preventative oral hygiene regimen  - Implement oral medicated treatments as ordered  - Initiate Nutrition services referral as needed  Outcome: Progressing     Problem: GENITOURINARY - ADULT  Goal: Maintains or returns to baseline urinary function  Description: INTERVENTIONS:  - Assess urinary function  - Encourage oral fluids to ensure adequate hydration if ordered  - Administer IV fluids as ordered to ensure adequate hydration  - Administer ordered medications as needed  - Offer frequent toileting  - Follow urinary retention protocol if ordered  Outcome: Progressing  Goal: Absence of urinary retention  Description: INTERVENTIONS:  - Assess patient's ability to void and empty bladder  - Monitor I/O  - Bladder scan as needed  - Discuss with physician/AP medications to alleviate retention as needed  - Discuss catheterization for long term situations as appropriate  Outcome: Progressing  Goal: Urinary catheter  remains patent  Description: INTERVENTIONS:  - Assess patency of urinary catheter  - If patient has a chronic james, consider changing catheter if non-functioning  - Follow guidelines for intermittent irrigation of non-functioning urinary catheter  Outcome: Progressing     Problem: METABOLIC, FLUID AND ELECTROLYTES - ADULT  Goal: Electrolytes maintained within normal limits  Description: INTERVENTIONS:  - Monitor labs and assess patient for signs and symptoms of electrolyte imbalances  - Administer electrolyte replacement as ordered  - Monitor response to electrolyte replacements, including repeat lab results as appropriate  - Instruct patient on fluid and nutrition as appropriate  Outcome: Progressing  Goal: Fluid balance maintained  Description: INTERVENTIONS:  - Monitor labs   - Monitor I/O and WT  - Instruct patient on fluid and nutrition as appropriate  - Assess for signs & symptoms of volume excess or deficit  Outcome: Progressing  Goal: Glucose maintained within target range  Description: INTERVENTIONS:  - Monitor Blood Glucose as ordered  - Assess for signs and symptoms of hyperglycemia and hypoglycemia  - Administer ordered medications to maintain glucose within target range  - Assess nutritional intake and initiate nutrition service referral as needed  Outcome: Progressing     Problem: SKIN/TISSUE INTEGRITY - ADULT  Goal: Skin Integrity remains intact(Skin Breakdown Prevention)  Description: Assess:  -Perform Jese assessment every shift  -Clean and moisturize skin every 4 hrs  -Inspect skin when repositioning, toileting, and assisting with ADLS  -Assess under medical devices such as bp cuff every hour  -Assess extremities for adequate circulation and sensation     Bed Management:  -Have minimal linens on bed & keep smooth, unwrinkled  -Change linens as needed when moist or perspiring  -Avoid sitting or lying in one position for more than 2 hours while in bed  -Keep HOB at 30degrees     Toileting:  -Offer  bedside commode  -Assess for incontinence every hour  -Use incontinent care products after each incontinent episode such as baby powder    Activity:  -Mobilize patient 2 times a day  -Encourage activity and walks on unit  -Encourage or provide ROM exercises   -Turn and reposition patient every 2 Hours  -Use appropriate equipment to lift or move patient in bed  -Instruct/ Assist with weight shifting every hour when out of bed in chair  -Consider limitation of chair time 4 hour intervals    Skin Care:  -Avoid use of baby powder, tape, friction and shearing, hot water or constrictive clothing  -Relieve pressure over bony prominences using pillows  -Do not massage red bony areas    Next Steps:  -Teach patient strategies to minimize risks such as repositioning    -Consider consults to  interdisciplinary teams such as pt  Outcome: Progressing  Goal: Incision(s), wounds(s) or drain site(s) healing without S/S of infection  Description: INTERVENTIONS  - Assess and document dressing, incision, wound bed, drain sites and surrounding tissue  - Provide patient and family education  - Perform skin care/dressing changes every sift  Outcome: Progressing  Goal: Pressure injury heals and does not worsen  Description: Interventions:  - Implement low air loss mattress or specialty surface (Criteria met)  - Apply silicone foam dressing  - Instruct/assist with weight shifting every 30 minutes when in chair   - Limit chair time to 4 hour intervals  - Use special pressure reducing interventions such as waffle cushion when in chair   - Apply fecal or urinary incontinence containment device   - Perform passive or active ROM every hour  - Turn and reposition patient & offload bony prominences every 2 hours   - Utilize friction reducing device or surface for transfers   - Consider consults to  interdisciplinary teams such as pt  - Use incontinent care products after each incontinent episode such as baby powder  - Consider nutrition services  referral as needed  Outcome: Progressing     Problem: HEMATOLOGIC - ADULT  Goal: Maintains hematologic stability  Description: INTERVENTIONS  - Assess for signs and symptoms of bleeding or hemorrhage  - Monitor labs  - Administer supportive blood products/factors as ordered and appropriate  Outcome: Progressing     Problem: MUSCULOSKELETAL - ADULT  Goal: Maintain or return mobility to safest level of function  Description: INTERVENTIONS:  - Assess patient's ability to carry out ADLs; assess patient's baseline for ADL function and identify physical deficits which impact ability to perform ADLs (bathing, care of mouth/teeth, toileting, grooming, dressing, etc.)  - Assess/evaluate cause of self-care deficits   - Assess range of motion  - Assess patient's mobility  - Assess patient's need for assistive devices and provide as appropriate  - Encourage maximum independence but intervene and supervise when necessary  - Involve family in performance of ADLs  - Assess for home care needs following discharge   - Consider OT consult to assist with ADL evaluation and planning for discharge  - Provide patient education as appropriate  Outcome: Progressing  Goal: Maintain proper alignment of affected body part  Description: INTERVENTIONS:  - Support, maintain and protect limb and body alignment  - Provide patient/ family with appropriate education  Outcome: Progressing     Problem: Nutrition/Hydration-ADULT  Goal: Nutrient/Hydration intake appropriate for improving, restoring or maintaining nutritional needs  Description: Monitor and assess patient's nutrition/hydration status for malnutrition. Collaborate with interdisciplinary team and initiate plan and interventions as ordered.  Monitor patient's weight and dietary intake as ordered or per policy. Utilize nutrition screening tool and intervene as necessary. Determine patient's food preferences and provide high-protein, high-caloric foods as appropriate.     INTERVENTIONS:  -  Monitor oral intake, urinary output, labs, and treatment plans  - Assess nutrition and hydration status and recommend course of action  - Evaluate amount of meals eaten  - Assist patient with eating if necessary   - Allow adequate time for meals  - Recommend/ encourage appropriate diets, oral nutritional supplements, and vitamin/mineral supplements  - Order, calculate, and assess calorie counts as needed  - Recommend, monitor, and adjust tube feedings and TPN/PPN based on assessed needs  - Assess need for intravenous fluids  - Provide specific nutrition/hydration education as appropriate  - Include patient/family/caregiver in decisions related to nutrition  Outcome: Progressing     Problem: Prexisting or High Potential for Compromised Skin Integrity  Goal: Skin integrity is maintained or improved  Description: INTERVENTIONS:  - Identify patients at risk for skin breakdown  - Assess and monitor skin integrity  - Assess and monitor nutrition and hydration status  - Monitor labs   - Assess for incontinence   - Turn and reposition patient  - Assist with mobility/ambulation  - Relieve pressure over bony prominences  - Avoid friction and shearing  - Provide appropriate hygiene as needed including keeping skin clean and dry  - Evaluate need for skin moisturizer/barrier cream  - Collaborate with interdisciplinary team   - Patient/family teaching  - Consider wound care consult   Outcome: Progressing

## 2024-11-08 NOTE — ASSESSMENT & PLAN NOTE
Present on admission history of paroxysmal A-fib.  Currently in sinus tachycardia with PVCs.  Discontinue the heparin.  That is post EGD.  Okay for anticoagulation.  Family is agreeable for Eliquis which is $47 a month

## 2024-11-08 NOTE — PLAN OF CARE
Problem: GASTROINTESTINAL - ADULT  Goal: Minimal or absence of nausea and/or vomiting  Description: INTERVENTIONS:  - Administer IV fluids if ordered to ensure adequate hydration  - Maintain NPO status until nausea and vomiting are resolved  - Nasogastric tube if ordered  - Administer ordered antiemetic medications as needed  - Provide nonpharmacologic comfort measures as appropriate  - Advance diet as tolerated, if ordered  - Consider nutrition services referral to assist patient with adequate nutrition and appropriate food choices  Outcome: Progressing  Goal: Maintains or returns to baseline bowel function  Description: INTERVENTIONS:  - Assess bowel function  - Encourage oral fluids to ensure adequate hydration  - Administer IV fluids if ordered to ensure adequate hydration  - Administer ordered medications as needed  - Encourage mobilization and activity  - Consider nutritional services referral to assist patient with adequate nutrition and appropriate food choices  Outcome: Progressing  Goal: Maintains adequate nutritional intake  Description: INTERVENTIONS:  - Monitor percentage of each meal consumed  - Identify factors contributing to decreased intake, treat as appropriate  - Assist with meals as needed  - Monitor I&O, weight, and lab values if indicated  - Obtain nutrition services referral as needed  Outcome: Progressing  Goal: Establish and maintain optimal ostomy function  Description: INTERVENTIONS:  - Assess bowel function  - Encourage oral fluids to ensure adequate hydration  - Administer IV fluids if ordered to ensure adequate hydration   - Administer ordered medications as needed  - Encourage mobilization and activity  - Nutrition ser  Problem: Nutrition/Hydration-ADULT  Goal: Nutrient/Hydration intake appropriate for improving, restoring or maintaining nutritional needs  Description: Monitor and assess patient's nutrition/hydration status for malnutrition. Collaborate with interdisciplinary team  and initiate plan and interventions as ordered.  Monitor patient's weight and dietary intake as ordered or per policy. Utilize nutrition screening tool and intervene as necessary. Determine patient's food preferences and provide high-protein, high-caloric foods as appropriate.     INTERVENTIONS:  - Monitor oral intake, urinary output, labs, and treatment plans  - Assess nutrition and hydration status and recommend course of action  - Evaluate amount of meals eaten  - Assist patient with eating if necessary   - Allow adequate time for meals  - Recommend/ encourage appropriate diets, oral nutritional supplements, and vitamin/mineral supplements  - Order, calculate, and assess calorie counts as needed  - Recommend, monitor, and adjust tube feedings and TPN/PPN based on assessed needs  - Assess need for intravenous fluids  - Provide specific nutrition/hydration education as appropriate  - Include patient/family/caregiver in decisions related to nutrition  Outcome: Progressing   vices referral to assist patient with appropriate food choices  - Assess stoma site  - Consider wound care consult   Outcome: Progressing  Goal: Oral mucous membranes remain intact  Description: INTERVENTIONS  - Assess oral mucosa and hygiene practices  - Implement preventative oral hygiene regimen  - Implement oral medicated treatments as ordered  - Initiate Nutrition services referral as needed  Outcome: Progressing

## 2024-11-08 NOTE — PROGRESS NOTES
Progress Note - [facility]   Name: Bertha Brown 84 y.o. female I MRN: [id number]  Unit/Bed#: -01 I Date of Admission: 10/29/2024   Date of Service: 11/8/2024 I Hospital Day: 10    Assessment & Plan  Coffee ground emesis    Patient is status post EGD.  Discussed with gastroenterology okay to start anticoagulation.  We are going to start the patient on Eliquis    Paroxysmal atrial fibrillation (HCC)  Present on admission history of paroxysmal A-fib.  Currently in sinus tachycardia with PVCs.  Discontinue the heparin.  That is post EGD.  Okay for anticoagulation.  Family is agreeable for Eliquis which is $47 a month    Hypothyroidism  Present on admission with past medical history of hypothyroidism.  Resume home dose of levothyroxine  Acute respiratory failure with hypoxia ([facility])    Suspected secondary aspiration event due to coffee-ground emesis versus atelectasis.  Encourage incentive spirometry use  Continue to wean off oxygen as able.      RUKHSANA (acute kidney injury) ([facility])  Acute renal failure has resolved.  Patient is at baseline.  Replete potassium as needed  SIRS (systemic inflammatory response syndrome) ([facility])  Present on admission with tachycardia, tachypnea, leukocytosis, RUKHSANA thought to be due to volume loss due to coffee-ground emesis, elevated lactic acid, no clear source of infection however SIRS thought to be due to coffee-ground emesis.  Currently getting IV fluids resuscitation.  Low threshold to initiate infectious workup if develops worsening signs of infection and or not improving or worsening lactic acidosis and initiation of antibiotics.  Hypertension  Present on admission with hypotension.  Blood pressure currently controlled.  Patient given couple doses of Lopressor.  I did ask cardiology to see.  Continue with scheduled IV beta-blocker for now  Hyperlipidemia  Continue home dose of Lipitor 40 mg nightly  Diabetes (HCC)  Lab Results   Component Value Date    HGBA1C 7.0 (H) 08/22/2024        Recent Labs     11/07/24  1751 11/07/24  2335 11/08/24  0532 11/08/24  1036   POCGLU 128 125 89 91       Blood Sugar Average: Last 72 hrs:  (P) [id number]    Present on admission with history of diabetes.  Most recent A1c 7.0.  Advance diet as tolerated per surgery's recommendations  Obesity  Present on admission with obesity with BMI 37.  Counseled on weight loss, lifestyle modification.  Chronic idiopathic constipation  Bowel regimen  Generalized abdominal pain    -Patient with possible very early peritonitis treated with antibiotics  Abdominal pain is improving.    S/p  LAPAROSCOPY DIAGNOSTIC CONVERTED TO OPEN  LAPAROTOMY EXPLORATORY. EXTENDED RIGHT HEMICOLECTOMY. WITH abthera VAC placement  Ischemic colitis (HCC)  POD 6 s/p exploratory laparotomy with right hemicolectomy, abdominal left open w abtherra placement on 10/29/2024  POD 5 , s/p second look with wash out, abdominal wall closure, and loop ileostomy and mucous fistula creation, VAC placement to midline abdomen  Febrile overnight   WBC  15.65, 14.35, Hb 8.0   L/24hr,   Cr 0.98  VAC per surgery       Plan:  Pt had speech bedside evaluation this am, recommendation is thin liquids with advance to solids to soft dysphagia 3 texture diet,   Tolerating modified diet  VAC change today.   Goal to transition to MWF VAC changes   Replete lytes as needed  Removed loop bar   Septic shock (HCC)  Septic shock/E. coli bacteremia tenure with current antibiotics. resolved.  Will likely discontinue antibiotics tomorrow  Acute metabolic encephalopathy  Patient with worsening lethargy prior to OR  Suspect secondary to significant metabolic derangements  Monitor neuro exam.  Now appears to be stable.  Patient states she is having some nightmares at night.  I will give her some melatonin at night to help her sleep  S/P exploratory laparotomy  -status post exploratory laparotomy with right hemicolectomy on 10/29/2024     -Second look and washout on  10/31/2024 with abdominal wall closure, ileostomy creation, mucous fistula creation, and wound VAC placement and abdominal wall subcutaneous tissue  Repeat CT scan noted  Advance to dysphagia 3 with thins  Anemia  Been stable postoperatively.  Continue to monitor.  Hypernatremia  Resolved.  Stop IV fluids    VTE Pharmacologic Prophylaxis:   High Risk (Score >/= 5) - Pharmacological DVT Prophylaxis Ordered: apixaban (Eliquis). Sequential Compression Devices Ordered.    Mobility:   Basic Mobility Inpatient Raw Score: 18  JH-HLM Goal: 6: Walk 10 steps or more  JH-HLM Achieved: 6: Walk 10 steps or more  JH-HLM Goal achieved. Continue to encourage appropriate mobility.    Patient Centered Rounds: I performed bedside rounds with nursing staff today.   Discussions with Specialists or Other Care Team Provider: Gastroenterology    Education and Discussions with Family / Patient: Updated  (daughter) at bedside.    Current Length of Stay: 10 day(s)  Current Patient Status: Inpatient   Certification Statement: The patient will continue to require additional inpatient hospital stay due to needing monitoring of labs and tolerance of diet  Discharge Plan: Anticipate discharge in 48 to 72 hours to rehab facility  Code Status: Level 1 - Full Code    Subjective   Patient seen and examined.  States that she is just a little anxious.  She feels she is depressed    Objective :  Temp:  [96.8 °F (36 °C)-98 °F (36.7 °C)] 96.8 °F (36 °C)  HR:  [66-76] 71  BP: (124-165)/(58-81) 161/81  Resp:  [18-24] 20  SpO2:  [94 %-99 %] 94 %  O2 Device: None (Room air)    Body mass index is 38.67 kg/m².     Input and Output Summary (last 24 hours):     Intake/Output Summary (Last 24 hours) at 11/8/2024 1542  Last data filed at 11/8/2024 0634  Gross per 24 hour   Intake 480 ml   Output 1478 ml   Net -998 ml       Physical Exam  General Appearance:    Alert, cooperative, no distress, appears stated age                               Lungs:      Clear to auscultation bilaterally, respirations unlabored       Heart:    Regular rate and rhythm, S1 and S2 normal, no murmur, rub    or gallop   Abdomen:     Soft, non-tender, bowel sounds active all four quadrants,     no masses, no organomegaly           Extremities:   Extremities normal, atraumatic, no cyanosis or edema                         Lines/Drains:  Lines/Drains/Airways       Active Status       Name Placement date Placement time Site Days    Colostomy RLQ 10/31/24  1422  RLQ  8                            Lab Results: I have reviewed the following results:   Results from last 7 days   Lab Units 11/08/24  0643 11/06/24  0539 11/05/24  0506 11/04/24  0458   WBC Thousand/uL 10.57*   < > 15.65* 14.35*   HEMOGLOBIN g/dL 8.1*   < > 8.5* 8.0*   HEMATOCRIT % 25.8*   < > 27.2* 25.3*   PLATELETS Thousands/uL 165   < > 179 150   BANDS PCT %  --   --  6  --    SEGS PCT %  --   --   --  75   LYMPHO PCT %  --   --  5* 11*   MONO PCT %  --   --  2* 5   EOS PCT %  --   --  0 0    < > = values in this interval not displayed.     Results from last 7 days   Lab Units 11/08/24  0643 11/07/24  0619 11/06/24  0539   SODIUM mmol/L 141   < > 141   POTASSIUM mmol/L 3.4*   < > 3.1*   CHLORIDE mmol/L 112*   < > 110*   CO2 mmol/L 23   < > 23   BUN mg/dL 9   < > 15   CREATININE mg/dL 0.84   < > 0.85   ANION GAP mmol/L 6   < > 8   CALCIUM mg/dL 6.4*   < > 6.4*   ALBUMIN g/dL  --   --  3.3*   TOTAL BILIRUBIN mg/dL  --   --  0.76   ALK PHOS U/L  --   --  60   ALT U/L  --   --  10   AST U/L  --   --  28   GLUCOSE RANDOM mg/dL 102   < > 143*    < > = values in this interval not displayed.     Results from last 7 days   Lab Units 11/08/24  0630   INR  1.37*     Results from last 7 days   Lab Units 11/08/24  1036 11/08/24  0532 11/07/24  2335 11/07/24  1751 11/07/24  1304 11/07/24  0558 11/06/24  2337 11/06/24  2049 11/06/24  1944 11/06/24  1224 11/06/24  0034 11/05/24  1850   POC GLUCOSE mg/dl 91 89 125 128 155* 113 153* 141* 171* 144*  220* 157*         Results from last 7 days   Lab Units 11/04/24  0458 11/03/24  0357 11/02/24  0425   PROCALCITONIN ng/ml 6.89* 12.57* 20.81*       Recent Cultures (last 7 days):         Imaging Results Review: No pertinent imaging studies reviewed.  Other Study Results Review: No additional pertinent studies reviewed.    Last 24 Hours Medication List:     Current Facility-Administered Medications:     acetaminophen (Ofirmev) injection 1,000 mg, Q6H JUAN PABLO, Last Rate: 1,000 mg (11/08/24 0547)    amiodarone tablet 400 mg, TID With Meals **FOLLOWED BY** [START ON 11/12/2024] amiodarone tablet 200 mg, Daily With Breakfast    apixaban (ELIQUIS) tablet 5 mg, BID    busPIRone (BUSPAR) tablet 10 mg, BID    HYDROmorphone HCl (DILAUDID) injection 0.2 mg, Q3H PRN **AND** HYDROmorphone (DILAUDID) injection 0.5 mg, Q3H PRN    insulin lispro (HumALOG/ADMELOG) 100 units/mL subcutaneous injection 1-6 Units, Q6H JUAN PABLO **AND** Fingerstick Glucose (POCT), Q6H    lactated ringers infusion, Continuous, Last Rate: 100 mL/hr (11/08/24 1233)    levalbuterol (XOPENEX) inhalation solution 1.25 mg, Q6H PRN    melatonin tablet 6 mg, HS    metoprolol tartrate (LOPRESSOR) tablet 25 mg, Q12H JUAN PABLO    nystatin (MYCOSTATIN) powder, BID    ondansetron (ZOFRAN) injection 4 mg, Q4H PRN    pantoprazole (PROTONIX) injection 40 mg, Q12H JUAN PABLO    [COMPLETED] piperacillin-tazobactam (ZOSYN) 4.5 g in sodium chloride 0.9 % 100 mL IV LOADING DOSE, Once, Last Rate: Stopped (10/30/24 0247) **FOLLOWED BY** piperacillin-tazobactam (ZOSYN) 4.5 g in sodium chloride 0.9 % 100 mL IVPB (EXTENDED INFUSION), Q8H, Last Rate: 4.5 g (11/08/24 0546)    potassium chloride (Klor-Con M20) CR tablet 40 mEq, Once    [START ON 11/9/2024] venlafaxine (EFFEXOR-XR) 24 hr capsule 75 mg, Daily    Administrative Statements   Today, Patient Was Seen By: Anders Lockett MD  I have spent a total time of 25 minutes in caring for this patient on the day of the visit/encounter including  Diagnostic results, Instructions for management, Patient and family education, Importance of tx compliance, Risk factor reductions, Impressions, Counseling / Coordination of care, Documenting in the medical record, Reviewing / ordering tests, medicine, procedures  , Obtaining or reviewing history  , and Communicating with other healthcare professionals .    **Please Note: This note may have been constructed using a voice recognition system.**

## 2024-11-08 NOTE — WOUND OSTOMY CARE
Progress Note- Ostomy  Bertha Brown 84 y.o. female  [id number]  --01 ([facility])        Assessment:  Patient is POD # 8 loop ileostomy creation. Seen for ostomy education and teaching. Introduced self and role to patient. Patient is agreeable to visit. Patient' daughters are the primary learners. All questions and concerns answered, patient's daughter was able to verbalize the pouch change steps. Will follow-up again on Monday if patient remains inpatient. Pouch change completed in conjunction with General surgery to change wound VAC.    Topics reviewed: normal stoma appearance, how and when to empty (1/3 full) to prevent pulling/lifting of the barrier, importance & how to clean the end of the pouch to prevent odor, frequency of changing of the pouch (every 3-4 days on a schedule), burping, one piece/two piece pouching systems differences, gas valve functionality of one piece, Clothing- including avoiding placing belt-line/seat belts over the stoma, bathing, and how to obtain supplies.    Step-by-step pouch change done using 2 piece [employer] soft convexity high out-put pouch. Reviewed with patient how and when to measure the stoma (weekly). Demonstrated how to trace & cut one piece barrier, and how to apply it to the skin using gentle pressure / warmth of the hands. Crusting to cuca-stomal skin done using Nystatin powder due to a fungal rash present and worsening since last assessment. Danna ring applied to barrier around cut-out. Good seal obtained.      -RLQ ileostomy: Stoma measures 2 inches side to side and 1 inch top to bottom, os is noted centrally, stoma is slightly budded, beefy red in color, and oval shape, mucocutaneous junction is intact. Cuca-stomal skin is intact with mild red rash with satellite lesions, no wounds or open aspects, there is a divot/creasing noted to skin around the stoma that was filled in with Danna ring. Effluent is moderate amount of soft brown stool.     -Mid  "upper quadrant mucus fistula (found at proximal end of surgical incision)- stoma is round in shape, 7/8 inch round, is flushed with skin, and beefy red in color, mucocutaneous junction is intact. Cuca-stomal skin is intact with mild red rash with satellite lesions. Effluent is scant amount of brown liquid stool.    Plan is for patient to have VNA at home.    Patient verbalized understanding of education and teaching. Patient is active learner. All questions and concerns answered. Encouraged patient to continue to read the educational materials provided.    Patient gave verbal permission to order sample kits from [employer], [employer], and [employer].    Ostomy Care:  1. Peel back pouch using push-pull method, may use non-alcohol adhesive remover(Purple and white package).  2. Use warm water only to cleanse skin around the stoma (cuca-stomal skin).  3. Make sure all adhesive residue is removed and skin is dry and not oily.  4. Measure stoma size using measuring guide and trace correct measurements onto back of pouch (Off set opening away from abdominal incision).   5. Then cut backing of pouch out to correct shape/size.  6. Apply Danna ring to the barrier around the entire cut-out.  7. Prior to applying pouch: Crust the skin using Nystatin powder applied to all areas with rash present.   8. Place pouch over stoma and onto skin.  9. Use warmth of hand to apply gentle pressure to help backing of pouch to adhere well to skin.     Ostomy Plan:  1. Encourage patient to read education material at bedside.  2. Encourage patient to ambulate.  3. Encourage patient to participate with emptying/burping pouch.  4. Empty pouch when 1/3 full.        Crusting as needed for moist, red, open skin around the stoma: (Done prior to pouch application)  -Apply stoma powder to skin breakdown, then dust away the excess powder.   -Then use skin barrier film to pat/dab over the powder and let dry to form \"crust\"  Repeat X2 before placing new " ostomy pouch     Colostomy RLQ (Active)   Stomal Appliance 2 piece;Changed 11/08/24 1440   Stoma Assessment Budded;Red 11/08/24 1440   Stoma size (in) 2 in 11/08/24 1440   Stoma Shape Oval;Budded 11/08/24 1440   Peristomal Assessment Other (Comment);Pink;Red;Intact 11/08/24 1440   Treatment Bag change;Site care 11/08/24 1440   Output (mL) 200 mL 11/08/24 1440   Number of days: 8       Contact through [employer] Secure Chat with any questions  Wound and Ostomy Care will continue to follow while inpatient    Gris GAMBLEN RN CWON  Wound and Ostomy care

## 2024-11-08 NOTE — ANESTHESIA POSTPROCEDURE EVALUATION
Post-Op Assessment Note    CV Status:  Stable    Pain management: adequate       Mental Status:  Alert and awake   Hydration Status:  Euvolemic   PONV Controlled:  Controlled   Airway Patency:  Patent     Post Op Vitals Reviewed: Yes    No anethesia notable event occurred.    Staff: CRNA           Vitals:    11/08/24 1518   BP: 161/81   Pulse: 71   Resp:    Temp: (!) 96.8 °F (36 °C)   SpO2: 94%       Modified Tomer:  Activity: 2 (11/8/2024  1:39 PM)  Respiration: 2 (11/8/2024  1:39 PM)  Circulation: 2 (11/8/2024  1:39 PM)  Consciousness: 2 (11/8/2024  1:39 PM)  Oxygen Saturation: 2 (11/8/2024  1:39 PM)  Modified Tomer Score: 10 (11/8/2024  1:39 PM)

## 2024-11-09 LAB
ANION GAP SERPL CALCULATED.3IONS-SCNC: 7 MMOL/L (ref 4–13)
BUN SERPL-MCNC: 8 MG/DL (ref 5–25)
CALCIUM SERPL-MCNC: 6.8 MG/DL (ref 8.4–10.2)
CHLORIDE SERPL-SCNC: 111 MMOL/L (ref 96–108)
CO2 SERPL-SCNC: 21 MMOL/L (ref 21–32)
CREAT SERPL-MCNC: 0.76 MG/DL (ref 0.6–1.3)
ERYTHROCYTE [DISTWIDTH] IN BLOOD BY AUTOMATED COUNT: 16.6 % (ref 11.6–15.1)
GFR SERPL CREATININE-BSD FRML MDRD: 73 ML/MIN/1.73SQ M
GLUCOSE SERPL-MCNC: 100 MG/DL (ref 65–140)
GLUCOSE SERPL-MCNC: 106 MG/DL (ref 65–140)
GLUCOSE SERPL-MCNC: 108 MG/DL (ref 65–140)
GLUCOSE SERPL-MCNC: 115 MG/DL (ref 65–140)
GLUCOSE SERPL-MCNC: 115 MG/DL (ref 65–140)
GLUCOSE SERPL-MCNC: 143 MG/DL (ref 65–140)
HCT VFR BLD AUTO: 27.1 % (ref 34.8–46.1)
HGB BLD-MCNC: 8.3 G/DL (ref 11.5–15.4)
MCH RBC QN AUTO: 30.4 PG (ref 26.8–34.3)
MCHC RBC AUTO-ENTMCNC: 30.6 G/DL (ref 31.4–37.4)
MCV RBC AUTO: 99 FL (ref 82–98)
PLATELET # BLD AUTO: 202 THOUSANDS/UL (ref 149–390)
PMV BLD AUTO: 12.3 FL (ref 8.9–12.7)
POTASSIUM SERPL-SCNC: 3.9 MMOL/L (ref 3.5–5.3)
RBC # BLD AUTO: 2.73 MILLION/UL (ref 3.81–5.12)
SODIUM SERPL-SCNC: 139 MMOL/L (ref 135–147)
WBC # BLD AUTO: 11.19 THOUSAND/UL (ref 4.31–10.16)

## 2024-11-09 PROCEDURE — 80048 BASIC METABOLIC PNL TOTAL CA: CPT | Performed by: FAMILY MEDICINE

## 2024-11-09 PROCEDURE — 85027 COMPLETE CBC AUTOMATED: CPT | Performed by: FAMILY MEDICINE

## 2024-11-09 PROCEDURE — 82948 REAGENT STRIP/BLOOD GLUCOSE: CPT

## 2024-11-09 PROCEDURE — 99232 SBSQ HOSP IP/OBS MODERATE 35: CPT | Performed by: FAMILY MEDICINE

## 2024-11-09 RX ORDER — ACETAMINOPHEN 325 MG/1
650 TABLET ORAL EVERY 6 HOURS PRN
Status: DISCONTINUED | OUTPATIENT
Start: 2024-11-09 | End: 2024-11-11 | Stop reason: HOSPADM

## 2024-11-09 RX ORDER — LEVOTHYROXINE SODIUM 88 UG/1
88 TABLET ORAL
Status: DISCONTINUED | OUTPATIENT
Start: 2024-11-10 | End: 2024-11-11

## 2024-11-09 RX ORDER — ARIPIPRAZOLE 2 MG/1
3 TABLET ORAL DAILY
Status: DISCONTINUED | OUTPATIENT
Start: 2024-11-09 | End: 2024-11-11 | Stop reason: HOSPADM

## 2024-11-09 RX ORDER — VENLAFAXINE HYDROCHLORIDE 150 MG/1
150 CAPSULE, EXTENDED RELEASE ORAL DAILY
Status: DISCONTINUED | OUTPATIENT
Start: 2024-11-10 | End: 2024-11-11 | Stop reason: HOSPADM

## 2024-11-09 RX ADMIN — APIXABAN 5 MG: 5 TABLET, FILM COATED ORAL at 17:40

## 2024-11-09 RX ADMIN — AMIODARONE HYDROCHLORIDE 400 MG: 200 TABLET ORAL at 09:06

## 2024-11-09 RX ADMIN — BUSPIRONE HYDROCHLORIDE 10 MG: 10 TABLET ORAL at 17:40

## 2024-11-09 RX ADMIN — METOPROLOL TARTRATE 25 MG: 25 TABLET, FILM COATED ORAL at 21:17

## 2024-11-09 RX ADMIN — APIXABAN 5 MG: 5 TABLET, FILM COATED ORAL at 09:06

## 2024-11-09 RX ADMIN — AMIODARONE HYDROCHLORIDE 400 MG: 200 TABLET ORAL at 11:27

## 2024-11-09 RX ADMIN — SODIUM CHLORIDE, SODIUM LACTATE, POTASSIUM CHLORIDE, AND CALCIUM CHLORIDE 100 ML/HR: .6; .31; .03; .02 INJECTION, SOLUTION INTRAVENOUS at 05:35

## 2024-11-09 RX ADMIN — PIPERACILLIN AND TAZOBACTAM 4.5 G: 36; 4.5 INJECTION, POWDER, FOR SOLUTION INTRAVENOUS at 05:47

## 2024-11-09 RX ADMIN — ACETAMINOPHEN 1000 MG: 10 INJECTION INTRAVENOUS at 11:27

## 2024-11-09 RX ADMIN — PANTOPRAZOLE SODIUM 40 MG: 40 INJECTION, POWDER, FOR SOLUTION INTRAVENOUS at 09:06

## 2024-11-09 RX ADMIN — ARIPIPRAZOLE 3 MG: 2 TABLET ORAL at 13:46

## 2024-11-09 RX ADMIN — PIPERACILLIN AND TAZOBACTAM 4.5 G: 36; 4.5 INJECTION, POWDER, FOR SOLUTION INTRAVENOUS at 21:16

## 2024-11-09 RX ADMIN — AMIODARONE HYDROCHLORIDE 400 MG: 200 TABLET ORAL at 17:40

## 2024-11-09 RX ADMIN — Medication 6 MG: at 21:17

## 2024-11-09 RX ADMIN — PIPERACILLIN AND TAZOBACTAM 4.5 G: 36; 4.5 INJECTION, POWDER, FOR SOLUTION INTRAVENOUS at 11:27

## 2024-11-09 RX ADMIN — METOPROLOL TARTRATE 25 MG: 25 TABLET, FILM COATED ORAL at 09:06

## 2024-11-09 RX ADMIN — BUSPIRONE HYDROCHLORIDE 10 MG: 10 TABLET ORAL at 09:06

## 2024-11-09 RX ADMIN — PANTOPRAZOLE SODIUM 40 MG: 40 INJECTION, POWDER, FOR SOLUTION INTRAVENOUS at 21:17

## 2024-11-09 RX ADMIN — NYSTATIN: 100000 POWDER TOPICAL at 09:07

## 2024-11-09 RX ADMIN — ACETAMINOPHEN 1000 MG: 10 INJECTION INTRAVENOUS at 05:22

## 2024-11-09 RX ADMIN — VENLAFAXINE HYDROCHLORIDE 75 MG: 75 CAPSULE, EXTENDED RELEASE ORAL at 09:06

## 2024-11-09 RX ADMIN — ACETAMINOPHEN 1000 MG: 10 INJECTION INTRAVENOUS at 01:31

## 2024-11-09 NOTE — PROGRESS NOTES
Progress Note - [facility]   Name: Bertha Brown 84 y.o. female I MRN: [id number]  Unit/Bed#: -01 I Date of Admission: 10/29/2024   Date of Service: 11/9/2024 I Hospital Day: 11    Assessment & Plan  Coffee ground emesis    Patient is status post EGD.  Discussed with gastroenterology okay to start anticoagulation.  We are going to start the patient on Eliquis    Paroxysmal atrial fibrillation (HCC)  Present on admission history of paroxysmal A-fib.  Currently in sinus tachycardia with PVCs.  Discontinue the heparin.  That is post EGD.  Okay for anticoagulation.  Family is agreeable for Eliquis which is $47 a month  Continue with the beta-blocker as well as the Eliquis    Hypothyroidism  Present on admission with past medical history of hypothyroidism.  Resume home dose of levothyroxine  Acute respiratory failure with hypoxia (HCC)    Suspected secondary aspiration event due to coffee-ground emesis versus atelectasis.  Encourage incentive spirometry use  Continue to wean off oxygen as able.  Doing okay from the standpoint.  Close to discharge will need to qualify for BiPAP if able    RUKHSANA (acute kidney injury) (HCC)  Acute renal failure has resolved.  Patient is at baseline.  Replete potassium as needed  SIRS (systemic inflammatory response syndrome) (HCC)  Present on admission with tachycardia, tachypnea, leukocytosis, RUKHSANA thought to be due to volume loss due to coffee-ground emesis, elevated lactic acid, no clear source of infection however SIRS thought to be due to coffee-ground emesis.  Currently getting IV fluids resuscitation.  Low threshold to initiate infectious workup if develops worsening signs of infection and or not improving or worsening lactic acidosis and initiation of antibiotics.  Hypertension  Present on admission with hypotension.  Blood pressure currently controlled.  Patient given couple doses of Lopressor.  I did ask cardiology to see.  Continue with scheduled IV beta-blocker for  now  Hyperlipidemia  Continue home dose of Lipitor 40 mg nightly  Diabetes (HCC)  Lab Results   Component Value Date    HGBA1C 7.0 (H) 08/22/2024       Recent Labs     11/08/24  2115 11/09/24  0011 11/09/24  0635 11/09/24  1116   POCGLU 122 115 108 100       Blood Sugar Average: Last 72 hrs:  (P) 131.25    Present on admission with history of diabetes.  Most recent A1c 7.0.  Advance diet as tolerated per surgery's recommendations  Obesity  Present on admission with obesity with BMI 37.  Counseled on weight loss, lifestyle modification.  Chronic idiopathic constipation  Bowel regimen  Generalized abdominal pain    -Patient with possible very early peritonitis treated with antibiotics  Abdominal pain is improving.    S/p  LAPAROSCOPY DIAGNOSTIC CONVERTED TO OPEN  LAPAROTOMY EXPLORATORY. EXTENDED RIGHT HEMICOLECTOMY. WITH abthera VAC placement.  No complaints of abdominal pain at this time  Ischemic colitis (HCC)  POD 6 s/p exploratory laparotomy with right hemicolectomy, abdominal left open w abtherra placement on 10/29/2024  POD 5 , s/p second look with wash out, abdominal wall closure, and loop ileostomy and mucous fistula creation, VAC placement to midline abdomen  Febrile overnight   WBC  15.65, 14.35, Hb 8.0   L/24hr,   Cr 0.98  VAC per surgery       Plan:  Pt had speech bedside evaluation this am, recommendation is thin liquids with advance to solids to soft dysphagia 3 texture diet,   Tolerating modified diet  VAC change today.   Goal to transition to MWF VAC changes   Replete lytes as needed  Removed loop bar   Septic shock (HCC)  Septic shock/E. coli bacteremia tenure with current antibiotics. resolved.  Will likely discontinue antibiotics tomorrow  Acute metabolic encephalopathy  Patient with worsening lethargy prior to OR  Suspect secondary to significant metabolic derangements  Monitor neuro exam.  Now appears to be stable.  Patient states she is having some nightmares at night.  I will give her  some melatonin at night to help her sleep.  Mental status is stable  S/P exploratory laparotomy  -status post exploratory laparotomy with right hemicolectomy on 10/29/2024     -Second look and washout on 10/31/2024 with abdominal wall closure, ileostomy creation, mucous fistula creation, and wound VAC placement and abdominal wall subcutaneous tissue  Repeat CT scan noted  Tolerating advancement in diet  Anemia  Been stable postoperatively.  Continue to monitor.  Hypernatremia  Resolved.  Stop IV fluids  Major depressive disorder, single episode, mild (HCC)  Patient's home medications with Effexor and Abilify were restarted    VTE Pharmacologic Prophylaxis:   High Risk (Score >/= 5) - Pharmacological DVT Prophylaxis Ordered: apixaban (Eliquis). Sequential Compression Devices Ordered.    Mobility:   Basic Mobility Inpatient Raw Score: 18  JH-HLM Goal: 6: Walk 10 steps or more  JH-HLM Achieved: 6: Walk 10 steps or more  JH-HLM Goal achieved. Continue to encourage appropriate mobility.    Patient Centered Rounds: I performed bedside rounds with nursing staff today.       Education and Discussions with Family / Patient: Updated  (daughter) at bedside.    Current Length of Stay: 11 day(s)  Current Patient Status: Inpatient   Certification Statement: The patient will continue to require additional inpatient hospital stay due to needing monitoring of clinical status .  Patient also needs back for rehab  Discharge Plan: Anticipate discharge in 24-48 hrs to home.    Code Status: Level 1 - Full Code    Subjective   Patient seen and examined.  Doing okay at this time.    Objective :  Temp:  [96.8 °F (36 °C)-98.3 °F (36.8 °C)] 98.3 °F (36.8 °C)  HR:  [66-79] 79  BP: (124-179)/(58-84) 145/84  Resp:  [18-20] 18  SpO2:  [94 %-96 %] 96 %  O2 Device: None (Room air)    Body mass index is 38.67 kg/m².     Input and Output Summary (last 24 hours):     Intake/Output Summary (Last 24 hours) at 11/9/2024 1328  Last data  filed at 11/9/2024 1016  Gross per 24 hour   Intake 360 ml   Output 1025 ml   Net -665 ml       Physical Exam  General Appearance:    Alert, cooperative, no distress, appears stated age                               Lungs:     Clear to auscultation bilaterally, respirations unlabored       Heart:    Regular rate and rhythm, S1 and S2 normal, no murmur, rub    or gallop   Abdomen:     Soft, non-tender, bowel sounds active all four quadrants,     no masses, no organomegaly           Extremities:   Extremities normal, atraumatic, no cyanosis or edema                         Lines/Drains:  Lines/Drains/Airways       Active Status       Name Placement date Placement time Site Days    Colostomy RLQ 10/31/24  1422  RLQ  9                            Lab Results: I have reviewed the following results:   Results from last 7 days   Lab Units 11/09/24  0550 11/06/24  0539 11/05/24  0506 11/04/24  0458   WBC Thousand/uL 11.19*   < > 15.65* 14.35*   HEMOGLOBIN g/dL 8.3*   < > 8.5* 8.0*   HEMATOCRIT % 27.1*   < > 27.2* 25.3*   PLATELETS Thousands/uL 202   < > 179 150   BANDS PCT %  --   --  6  --    SEGS PCT %  --   --   --  75   LYMPHO PCT %  --   --  5* 11*   MONO PCT %  --   --  2* 5   EOS PCT %  --   --  0 0    < > = values in this interval not displayed.     Results from last 7 days   Lab Units 11/09/24  0550 11/07/24  0619 11/06/24  0539   SODIUM mmol/L 139   < > 141   POTASSIUM mmol/L 3.9   < > 3.1*   CHLORIDE mmol/L 111*   < > 110*   CO2 mmol/L 21   < > 23   BUN mg/dL 8   < > 15   CREATININE mg/dL 0.76   < > 0.85   ANION GAP mmol/L 7   < > 8   CALCIUM mg/dL 6.8*   < > 6.4*   ALBUMIN g/dL  --   --  3.3*   TOTAL BILIRUBIN mg/dL  --   --  0.76   ALK PHOS U/L  --   --  60   ALT U/L  --   --  10   AST U/L  --   --  28   GLUCOSE RANDOM mg/dL 106   < > 143*    < > = values in this interval not displayed.     Results from last 7 days   Lab Units 11/08/24  0630   INR  1.37*     Results from last 7 days   Lab Units 11/09/24  1116  11/09/24  0635 11/09/24  0011 11/08/24  2115 11/08/24  1706 11/08/24  1036 11/08/24  0532 11/07/24  2335 11/07/24  1751 11/07/24  1304 11/07/24  0558 11/06/24  2337   POC GLUCOSE mg/dl 100 108 115 122 125 91 89 125 128 155* 113 153*         Results from last 7 days   Lab Units 11/04/24  0458 11/03/24  0357   PROCALCITONIN ng/ml 6.89* 12.57*       Recent Cultures (last 7 days):         Imaging Results Review: No pertinent imaging studies reviewed.  Other Study Results Review: No additional pertinent studies reviewed.    Last 24 Hours Medication List:     Current Facility-Administered Medications:     acetaminophen (Ofirmev) injection 1,000 mg, Q6H JUAN PABLO, Last Rate: 1,000 mg (11/09/24 1127)    amiodarone tablet 400 mg, TID With Meals **FOLLOWED BY** [START ON 11/12/2024] amiodarone tablet 200 mg, Daily With Breakfast    apixaban (ELIQUIS) tablet 5 mg, BID    ARIPiprazole (ABILIFY) tablet 3 mg, Daily    busPIRone (BUSPAR) tablet 10 mg, BID    HYDROmorphone HCl (DILAUDID) injection 0.2 mg, Q3H PRN **AND** HYDROmorphone (DILAUDID) injection 0.5 mg, Q3H PRN    insulin lispro (HumALOG/ADMELOG) 100 units/mL subcutaneous injection 1-6 Units, Q6H JUAN PABLO **AND** Fingerstick Glucose (POCT), Q6H    lactated ringers infusion, Continuous, Last Rate: 100 mL/hr (11/09/24 0535)    levalbuterol (XOPENEX) inhalation solution 1.25 mg, Q6H PRN    melatonin tablet 6 mg, HS    metoprolol tartrate (LOPRESSOR) tablet 25 mg, Q12H JUAN PABLO    nystatin (MYCOSTATIN) powder, BID    ondansetron (ZOFRAN) injection 4 mg, Q4H PRN    pantoprazole (PROTONIX) injection 40 mg, Q12H JUAN PABLO    [COMPLETED] piperacillin-tazobactam (ZOSYN) 4.5 g in sodium chloride 0.9 % 100 mL IV LOADING DOSE, Once, Last Rate: Stopped (10/30/24 0247) **FOLLOWED BY** piperacillin-tazobactam (ZOSYN) 4.5 g in sodium chloride 0.9 % 100 mL IVPB (EXTENDED INFUSION), Q8H, Last Rate: 4.5 g (11/09/24 1127)    venlafaxine (EFFEXOR-XR) 24 hr capsule 75 mg, Daily    Administrative Statements   Today,  Patient Was Seen By: Anders Lockett MD  I have spent a total time of 20 minutes in caring for this patient on the day of the visit/encounter including Diagnostic results, Impressions, Counseling / Coordination of care, Documenting in the medical record, Reviewing / ordering tests, medicine, procedures  , and Obtaining or reviewing history  .    **Please Note: This note may have been constructed using a voice recognition system.**

## 2024-11-09 NOTE — ASSESSMENT & PLAN NOTE
-Patient with possible very early peritonitis treated with antibiotics  Abdominal pain is improving.    S/p  LAPAROSCOPY DIAGNOSTIC CONVERTED TO OPEN  LAPAROTOMY EXPLORATORY. EXTENDED RIGHT HEMICOLECTOMY. WITH abthera VAC placement.  No complaints of abdominal pain at this time

## 2024-11-09 NOTE — ASSESSMENT & PLAN NOTE
Suspected secondary aspiration event due to coffee-ground emesis versus atelectasis.  Encourage incentive spirometry use  Continue to wean off oxygen as able.  Doing okay from the standpoint.  Close to discharge will need to qualify for BiPAP if able

## 2024-11-09 NOTE — ASSESSMENT & PLAN NOTE
Present on admission history of paroxysmal A-fib.  Currently in sinus tachycardia with PVCs.  Discontinue the heparin.  That is post EGD.  Okay for anticoagulation.  Family is agreeable for Eliquis which is $47 a month  Continue with the beta-blocker as well as the Eliquis

## 2024-11-09 NOTE — CASE MANAGEMENT
DCS received task from CM to submit SNF auth, however, therapy notes are required to be within 48 hours of requested SOC date. At this time, updated PT/OT notes are needed prior to submitting auth request to insurance. CM advised to request updated therapy notes and re-task DCS once requested therapy notes are in chart.     CM notified:  Marisa Diaz

## 2024-11-09 NOTE — ASSESSMENT & PLAN NOTE
Patient with worsening lethargy prior to OR  Suspect secondary to significant metabolic derangements  Monitor neuro exam.  Now appears to be stable.  Patient states she is having some nightmares at night.  I will give her some melatonin at night to help her sleep.  Mental status is stable

## 2024-11-09 NOTE — CASE MANAGEMENT
Case Management Discharge Planning Note    Patient name Bertha Brown  Location /-01 MRN [id number]  : 1940 Date 2024       Current Admission Date: 10/29/2024  Current Admission Diagnosis:Septic shock (HCC)   Patient Active Problem List    Diagnosis Date Noted Date Diagnosed    Anemia 2024     Hypernatremia 2024     S/P exploratory laparotomy 2024     Ischemic colitis (HCC) 10/30/2024     Septic shock (HCC) 10/30/2024     Acute metabolic encephalopathy 10/30/2024     Coffee ground emesis 10/29/2024     RUKHSANA (acute kidney injury) (HCC) 10/29/2024     Chronic idiopathic constipation 10/29/2024     Generalized abdominal pain 10/29/2024     SIRS (systemic inflammatory response syndrome) (HCC) 10/29/2024     Constipation 2022     Hypoxia 12/15/2022     Rectus sheath hematoma 2022     Bronchospasm with bronchitis, acute 2022     Chest pain 2022     Leukocytosis 2022     Chest tightness 2022     Diabetes (HCC) 2022     Obesity 2022     Bilateral flank pain 2022     Hemarthrosis involving knee joint, right 10/01/2020     Ambulatory dysfunction 10/01/2020     Paroxysmal atrial fibrillation (HCC) 2020     Acute respiratory failure with hypoxia (HCC) 2020     Hypothyroidism 2020     Hypertension 2020     Hyperlipidemia 2020     Major depressive disorder, single episode, mild (HCC) 2019     Osteoarthritis of knee 2017       LOS (days): 10  Geometric Mean LOS (GMLOS) (days): 9.6  Days to GMLOS:-0.8     OBJECTIVE:  Risk of Unplanned Readmission Score: 27.79         Current admission status: Inpatient   Preferred Pharmacy:   [id number] - [place] - [address]  [address]  [place] [id number]  Phone: [phone] Fax: [phone]    Primary Care Provider: Jarred Armstrong DO    Primary Insurance: [facility]  Secondary Insurance:     DISCHARGE DETAILS:                                           Other Referral/Resources/Interventions Provided:  Referral Comments: T/C to pt's daughter Linda to discuss d/c plan.  Daughter reports pt is weak and will need STR.  Additional referrals sent to IRFs, as daughter is interested in that level of care.  Sent to daughter via email ([email or web address]) the provider overview lists for home health and IRFs, and provider choice list for subacute rehab.  Encouraged her to review and identify preferences.  Made her aware of Medicare.gov as another resource, as she is asking for recommendations.  Advised daughter that CM is available on weekends and she should ask a nurse to contact CM if she has questions, concerns.         Treatment Team Recommendation: Home with Home Health Care, Short Term Rehab, SNF  Discharge Destination Plan:: Short Term Rehab, SNF  Transport at Discharge : Other (Comment) (TBD)

## 2024-11-09 NOTE — ASSESSMENT & PLAN NOTE
Lab Results   Component Value Date    HGBA1C 7.0 (H) 08/22/2024       Recent Labs     11/08/24  2115 11/09/24  0011 11/09/24  0635 11/09/24  1116   POCGLU 122 115 108 100       Blood Sugar Average: Last 72 hrs:  (P) 131.25    Present on admission with history of diabetes.  Most recent A1c 7.0.  Advance diet as tolerated per surgery's recommendations

## 2024-11-09 NOTE — ASSESSMENT & PLAN NOTE
-status post exploratory laparotomy with right hemicolectomy on 10/29/2024     -Second look and washout on 10/31/2024 with abdominal wall closure, ileostomy creation, mucous fistula creation, and wound VAC placement and abdominal wall subcutaneous tissue  Repeat CT scan noted  Tolerating advancement in diet

## 2024-11-09 NOTE — PLAN OF CARE
Problem: PAIN - ADULT  Goal: Verbalizes/displays adequate comfort level or baseline comfort level  Description: Interventions:  - Encourage patient to monitor pain and request assistance  - Assess pain using appropriate pain scale  - Administer analgesics based on type and severity of pain and evaluate response  - Implement non-pharmacological measures as appropriate and evaluate response  - Consider cultural and social influences on pain and pain management  - Notify physician/advanced practitioner if interventions unsuccessful or patient reports new pain  Outcome: Progressing     Problem: INFECTION - ADULT  Goal: Absence or prevention of progression during hospitalization  Description: INTERVENTIONS:  - Assess and monitor for signs and symptoms of infection  - Monitor lab/diagnostic results  - Monitor all insertion sites, i.e. indwelling lines, tubes, and drains  - Monitor endotracheal if appropriate and nasal secretions for changes in amount and color  - [place] appropriate cooling/warming therapies per order  - Administer medications as ordered  - Instruct and encourage patient and family to use good hand hygiene technique  - Identify and instruct in appropriate isolation precautions for identified infection/condition  Outcome: Progressing  Goal: Absence of fever/infection during neutropenic period  Description: INTERVENTIONS:  - Monitor WBC    Outcome: Progressing     Problem: SAFETY ADULT  Goal: Patient will remain free of falls  Description: INTERVENTIONS:  - Educate patient/family on patient safety including physical limitations  - Instruct patient to call for assistance with activity   - Consult OT/PT to assist with strengthening/mobility   - Keep Call bell within reach  - Keep bed low and locked with side rails adjusted as appropriate  - Keep care items and personal belongings within reach  - Initiate and maintain comfort rounds  - Make Fall Risk Sign visible to staff  - Offer Toileting every 2 Hours,  in advance of need  - Initiate/Maintain bed alarm  - Apply yellow socks and bracelet for high fall risk patients  - Consider moving patient to room near nurses station  Outcome: Progressing  Goal: Maintain or return to baseline ADL function  Description: INTERVENTIONS:  -  Assess patient's ability to carry out ADLs; assess patient's baseline for ADL function and identify physical deficits which impact ability to perform ADLs (bathing, care of mouth/teeth, toileting, grooming, dressing, etc.)  - Assess/evaluate cause of self-care deficits   - Assess range of motion  - Assess patient's mobility; develop plan if impaired  - Assess patient's need for assistive devices and provide as appropriate  - Encourage maximum independence but intervene and supervise when necessary  - Involve family in performance of ADLs  - Assess for home care needs following discharge   - Consider OT consult to assist with ADL evaluation and planning for discharge  - Provide patient education as appropriate  Outcome: Progressing  Goal: Maintains/Returns to pre admission functional level  Description: INTERVENTIONS:  - Perform AM-PAC 6 Click Basic Mobility/ Daily Activity assessment daily.  - Set and communicate daily mobility goal to care team and patient/family/caregiver.   - Collaborate with rehabilitation services on mobility goals if consulted  - Perform Range of Motion 2 times a day.  - Reposition patient every 2 hours.  - Dangle patient 2 times a day  - Stand patient 2 times a day  - Ambulate patient 2 times a day  - Out of bed to chair 2 times a day   - Out of bed for meals 2 times a day  - Out of bed for toileting  - Record patient progress and toleration of activity level   Outcome: Progressing     Problem: DISCHARGE PLANNING  Goal: Discharge to home or other facility with appropriate resources  Description: INTERVENTIONS:  - Identify barriers to discharge w/patient and caregiver  - Arrange for needed discharge resources and  transportation as appropriate  - Identify discharge learning needs (meds, wound care, etc.)  - Arrange for interpretive services to assist at discharge as needed  - Refer to Case Management Department for coordinating discharge planning if the patient needs post-hospital services based on physician/advanced practitioner order or complex needs related to functional status, cognitive ability, or social support system  Outcome: Progressing     Problem: Knowledge Deficit  Goal: Patient/family/caregiver demonstrates understanding of disease process, treatment plan, medications, and discharge instructions  Description: Complete learning assessment and assess knowledge base.  Interventions:  - Provide teaching at level of understanding  - Provide teaching via preferred learning methods  Outcome: Progressing     Problem: NEUROSENSORY - ADULT  Goal: Achieves stable or improved neurological status  Description: INTERVENTIONS  - Monitor and report changes in neurological status  - Monitor vital signs such as temperature, blood pressure, glucose, and any other labs ordered   - Initiate measures to prevent increased intracranial pressure  - Monitor for seizure activity and implement precautions if appropriate      Outcome: Progressing  Goal: Remains free of injury related to seizures activity  Description: INTERVENTIONS  - Maintain airway, patient safety  and administer oxygen as ordered  - Monitor patient for seizure activity, document and report duration and description of seizure to physician/advanced practitioner  - If seizure occurs,  ensure patient safety during seizure  - Reorient patient post seizure  - Seizure pads on all 4 side rails  - Instruct patient/family to notify RN of any seizure activity including if an aura is experienced  - Instruct patient/family to call for assistance with activity based on nursing assessment  - Administer anti-seizure medications if ordered    Outcome: Progressing  Goal: Achieves maximal  functionality and self care  Description: INTERVENTIONS  - Monitor swallowing and airway patency with patient fatigue and changes in neurological status  - Encourage and assist patient to increase activity and self care.   - Encourage visually impaired, hearing impaired and aphasic patients to use assistive/communication devices  Outcome: Progressing     Problem: RESPIRATORY - ADULT  Goal: Achieves optimal ventilation and oxygenation  Description: INTERVENTIONS:  - Assess for changes in respiratory status  - Assess for changes in mentation and behavior  - Position to facilitate oxygenation and minimize respiratory effort  - Oxygen administered by appropriate delivery if ordered  - Initiate smoking cessation education as indicated  - Encourage broncho-pulmonary hygiene including cough, deep breathe, Incentive Spirometry  - Assess the need for suctioning and aspirate as needed  - Assess and instruct to report SOB or any respiratory difficulty  - Respiratory Therapy support as indicated  Outcome: Progressing     Problem: GASTROINTESTINAL - ADULT  Goal: Minimal or absence of nausea and/or vomiting  Description: INTERVENTIONS:  - Administer IV fluids if ordered to ensure adequate hydration  - Maintain NPO status until nausea and vomiting are resolved  - Nasogastric tube if ordered  - Administer ordered antiemetic medications as needed  - Provide nonpharmacologic comfort measures as appropriate  - Advance diet as tolerated, if ordered  - Consider nutrition services referral to assist patient with adequate nutrition and appropriate food choices  Outcome: Progressing  Goal: Maintains or returns to baseline bowel function  Description: INTERVENTIONS:  - Assess bowel function  - Encourage oral fluids to ensure adequate hydration  - Administer IV fluids if ordered to ensure adequate hydration  - Administer ordered medications as needed  - Encourage mobilization and activity  - Consider nutritional services referral to assist  patient with adequate nutrition and appropriate food choices  Outcome: Progressing  Goal: Maintains adequate nutritional intake  Description: INTERVENTIONS:  - Monitor percentage of each meal consumed  - Identify factors contributing to decreased intake, treat as appropriate  - Assist with meals as needed  - Monitor I&O, weight, and lab values if indicated  - Obtain nutrition services referral as needed  Outcome: Progressing  Goal: Establish and maintain optimal ostomy function  Description: INTERVENTIONS:  - Assess bowel function  - Encourage oral fluids to ensure adequate hydration  - Administer IV fluids if ordered to ensure adequate hydration   - Administer ordered medications as needed  - Encourage mobilization and activity  - Nutrition services referral to assist patient with appropriate food choices  - Assess stoma site  - Consider wound care consult   Outcome: Progressing  Goal: Oral mucous membranes remain intact  Description: INTERVENTIONS  - Assess oral mucosa and hygiene practices  - Implement preventative oral hygiene regimen  - Implement oral medicated treatments as ordered  - Initiate Nutrition services referral as needed  Outcome: Progressing     Problem: GENITOURINARY - ADULT  Goal: Maintains or returns to baseline urinary function  Description: INTERVENTIONS:  - Assess urinary function  - Encourage oral fluids to ensure adequate hydration if ordered  - Administer IV fluids as ordered to ensure adequate hydration  - Administer ordered medications as needed  - Offer frequent toileting  - Follow urinary retention protocol if ordered  Outcome: Progressing  Goal: Absence of urinary retention  Description: INTERVENTIONS:  - Assess patient's ability to void and empty bladder  - Monitor I/O  - Bladder scan as needed  - Discuss with physician/AP medications to alleviate retention as needed  - Discuss catheterization for long term situations as appropriate  Outcome: Progressing  Goal: Urinary catheter  remains patent  Description: INTERVENTIONS:  - Assess patency of urinary catheter  - If patient has a chronic james, consider changing catheter if non-functioning  - Follow guidelines for intermittent irrigation of non-functioning urinary catheter  Outcome: Progressing     Problem: METABOLIC, FLUID AND ELECTROLYTES - ADULT  Goal: Electrolytes maintained within normal limits  Description: INTERVENTIONS:  - Monitor labs and assess patient for signs and symptoms of electrolyte imbalances  - Administer electrolyte replacement as ordered  - Monitor response to electrolyte replacements, including repeat lab results as appropriate  - Instruct patient on fluid and nutrition as appropriate  Outcome: Progressing  Goal: Fluid balance maintained  Description: INTERVENTIONS:  - Monitor labs   - Monitor I/O and WT  - Instruct patient on fluid and nutrition as appropriate  - Assess for signs & symptoms of volume excess or deficit  Outcome: Progressing  Goal: Glucose maintained within target range  Description: INTERVENTIONS:  - Monitor Blood Glucose as ordered  - Assess for signs and symptoms of hyperglycemia and hypoglycemia  - Administer ordered medications to maintain glucose within target range  - Assess nutritional intake and initiate nutrition service referral as needed  Outcome: Progressing     Problem: SKIN/TISSUE INTEGRITY - ADULT  Goal: Skin Integrity remains intact(Skin Breakdown Prevention)  Description: Assess:  -Perform Jese assessment every shift  -Clean and moisturize skin every 4 hrs  -Inspect skin when repositioning, toileting, and assisting with ADLS  -Assess under medical devices such as bp cuff every hour  -Assess extremities for adequate circulation and sensation     Bed Management:  -Have minimal linens on bed & keep smooth, unwrinkled  -Change linens as needed when moist or perspiring  -Avoid sitting or lying in one position for more than 2 hours while in bed  -Keep HOB at 30degrees     Toileting:  -Offer  bedside commode  -Assess for incontinence every hour  -Use incontinent care products after each incontinent episode such as baby powder    Activity:  -Mobilize patient 2 times a day  -Encourage activity and walks on unit  -Encourage or provide ROM exercises   -Turn and reposition patient every 2 Hours  -Use appropriate equipment to lift or move patient in bed  -Instruct/ Assist with weight shifting every hour when out of bed in chair  -Consider limitation of chair time 4 hour intervals    Skin Care:  -Avoid use of baby powder, tape, friction and shearing, hot water or constrictive clothing  -Relieve pressure over bony prominences using pillows  -Do not massage red bony areas    Next Steps:  -Teach patient strategies to minimize risks such as repositioning    -Consider consults to  interdisciplinary teams such as pt  Outcome: Progressing  Goal: Incision(s), wounds(s) or drain site(s) healing without S/S of infection  Description: INTERVENTIONS  - Assess and document dressing, incision, wound bed, drain sites and surrounding tissue  - Provide patient and family education  - Perform skin care/dressing changes every sift  Outcome: Progressing  Goal: Pressure injury heals and does not worsen  Description: Interventions:  - Implement low air loss mattress or specialty surface (Criteria met)  - Apply silicone foam dressing  - Instruct/assist with weight shifting every 30 minutes when in chair   - Limit chair time to 4 hour intervals  - Use special pressure reducing interventions such as waffle cushion when in chair   - Apply fecal or urinary incontinence containment device   - Perform passive or active ROM every hour  - Turn and reposition patient & offload bony prominences every 2 hours   - Utilize friction reducing device or surface for transfers   - Consider consults to  interdisciplinary teams such as pt  - Use incontinent care products after each incontinent episode such as baby powder  - Consider nutrition services  referral as needed  Outcome: Progressing     Problem: HEMATOLOGIC - ADULT  Goal: Maintains hematologic stability  Description: INTERVENTIONS  - Assess for signs and symptoms of bleeding or hemorrhage  - Monitor labs  - Administer supportive blood products/factors as ordered and appropriate  Outcome: Progressing     Problem: MUSCULOSKELETAL - ADULT  Goal: Maintain or return mobility to safest level of function  Description: INTERVENTIONS:  - Assess patient's ability to carry out ADLs; assess patient's baseline for ADL function and identify physical deficits which impact ability to perform ADLs (bathing, care of mouth/teeth, toileting, grooming, dressing, etc.)  - Assess/evaluate cause of self-care deficits   - Assess range of motion  - Assess patient's mobility  - Assess patient's need for assistive devices and provide as appropriate  - Encourage maximum independence but intervene and supervise when necessary  - Involve family in performance of ADLs  - Assess for home care needs following discharge   - Consider OT consult to assist with ADL evaluation and planning for discharge  - Provide patient education as appropriate  Outcome: Progressing  Goal: Maintain proper alignment of affected body part  Description: INTERVENTIONS:  - Support, maintain and protect limb and body alignment  - Provide patient/ family with appropriate education  Outcome: Progressing     Problem: Nutrition/Hydration-ADULT  Goal: Nutrient/Hydration intake appropriate for improving, restoring or maintaining nutritional needs  Description: Monitor and assess patient's nutrition/hydration status for malnutrition. Collaborate with interdisciplinary team and initiate plan and interventions as ordered.  Monitor patient's weight and dietary intake as ordered or per policy. Utilize nutrition screening tool and intervene as necessary. Determine patient's food preferences and provide high-protein, high-caloric foods as appropriate.     INTERVENTIONS:  -  Monitor oral intake, urinary output, labs, and treatment plans  - Assess nutrition and hydration status and recommend course of action  - Evaluate amount of meals eaten  - Assist patient with eating if necessary   - Allow adequate time for meals  - Recommend/ encourage appropriate diets, oral nutritional supplements, and vitamin/mineral supplements  - Order, calculate, and assess calorie counts as needed  - Recommend, monitor, and adjust tube feedings and TPN/PPN based on assessed needs  - Assess need for intravenous fluids  - Provide specific nutrition/hydration education as appropriate  - Include patient/family/caregiver in decisions related to nutrition  Outcome: Progressing     Problem: Prexisting or High Potential for Compromised Skin Integrity  Goal: Skin integrity is maintained or improved  Description: INTERVENTIONS:  - Identify patients at risk for skin breakdown  - Assess and monitor skin integrity  - Assess and monitor nutrition and hydration status  - Monitor labs   - Assess for incontinence   - Turn and reposition patient  - Assist with mobility/ambulation  - Relieve pressure over bony prominences  - Avoid friction and shearing  - Provide appropriate hygiene as needed including keeping skin clean and dry  - Evaluate need for skin moisturizer/barrier cream  - Collaborate with interdisciplinary team   - Patient/family teaching  - Consider wound care consult   Outcome: Progressing

## 2024-11-09 NOTE — CASE MANAGEMENT
Case Management Discharge Planning Note    Patient name Bertha Brown  Location /-01 MRN [id number]  : 1940 Date 2024       Current Admission Date: 10/29/2024  Current Admission Diagnosis:Septic shock (HCC)   Patient Active Problem List    Diagnosis Date Noted Date Diagnosed    Anemia 2024     Hypernatremia 2024     S/P exploratory laparotomy 2024     Ischemic colitis (HCC) 10/30/2024     Septic shock (HCC) 10/30/2024     Acute metabolic encephalopathy 10/30/2024     Coffee ground emesis 10/29/2024     RUKHSANA (acute kidney injury) (HCC) 10/29/2024     Chronic idiopathic constipation 10/29/2024     Generalized abdominal pain 10/29/2024     SIRS (systemic inflammatory response syndrome) (HCC) 10/29/2024     Constipation 2022     Hypoxia 12/15/2022     Rectus sheath hematoma 2022     Bronchospasm with bronchitis, acute 2022     Chest pain 2022     Leukocytosis 2022     Chest tightness 2022     Diabetes (HCC) 2022     Obesity 2022     Bilateral flank pain 2022     Hemarthrosis involving knee joint, right 10/01/2020     Ambulatory dysfunction 10/01/2020     Paroxysmal atrial fibrillation (HCC) 2020     Acute respiratory failure with hypoxia (HCC) 2020     Hypothyroidism 2020     Hypertension 2020     Hyperlipidemia 2020     Major depressive disorder, single episode, mild (HCC) 2019     Osteoarthritis of knee 2017       LOS (days): 11  Geometric Mean LOS (GMLOS) (days): 9.6  Days to GMLOS:-1.6     OBJECTIVE:  Risk of Unplanned Readmission Score: 28.09         Current admission status: Inpatient   [facility] Pharmacy[facility]   [id number] [place] - [address]  [address]  [place] [id number]  Phone: [phone] Fax: [phone]    Primary Care Provider: Jarred Armstrong DO    Primary Insurance: [facility] REP  Secondary Insurance:     DISCHARGE  DETAILS:    Discharge planning discussed with:: patient and daughter Dahlia at bedside  Freedom of Choice: Yes  Comments - Freedom of Choice: After review of STR list, pt and gabrielanina Villagomez have decided to accept [facility].   This facility was reserved in [facility] and message sent to  Discharge Support Team to initiate auth.  CM contacted family/caregiver?: Yes  Were Treatment Team discharge recommendations reviewed with patient/caregiver?: Yes  Did patient/caregiver verbalize understanding of patient care needs?: Yes  Were patient/caregiver advised of the risks associated with not following Treatment Team discharge recommendations?: Yes    Contacts  Patient Contacts: Dahlia  Relationship to Patient:: Family  Contact Method: In Person  Reason/Outcome: Discharge Planning    Requested Home Health Care         Is the patient interested in HHC at discharge?: No    DME Referral Provided  Referral made for DME?: No    Other Referral/Resources/Interventions Provided:  Interventions: Short Term Rehab  Referral Comments: [facility] accepted and reserved    Would you like to participate in our [facility] service program?  : No - Declined    Treatment Team Recommendation: Short Term Rehab  Discharge Destination Plan:: Short Term Rehab  Transport at Discharge : Marshall vidal

## 2024-11-09 NOTE — PLAN OF CARE
Problem: PAIN - ADULT  Goal: Verbalizes/displays adequate comfort level or baseline comfort level  Description: Interventions:  - Encourage patient to monitor pain and request assistance  - Assess pain using appropriate pain scale  - Administer analgesics based on type and severity of pain and evaluate response  - Implement non-pharmacological measures as appropriate and evaluate response  - Consider cultural and social influences on pain and pain management  - Notify physician/advanced practitioner if interventions unsuccessful or patient reports new pain  Outcome: Progressing     Problem: INFECTION - ADULT  Goal: Absence or prevention of progression during hospitalization  Description: INTERVENTIONS:  - Assess and monitor for signs and symptoms of infection  - Monitor lab/diagnostic results  - Monitor all insertion sites, i.e. indwelling lines, tubes, and drains  - Monitor endotracheal if appropriate and nasal secretions for changes in amount and color  - [place] appropriate cooling/warming therapies per order  - Administer medications as ordered  - Instruct and encourage patient and family to use good hand hygiene technique  - Identify and instruct in appropriate isolation precautions for identified infection/condition  Outcome: Progressing     Problem: SAFETY ADULT  Goal: Patient will remain free of falls  Description: INTERVENTIONS:  - Educate patient/family on patient safety including physical limitations  - Instruct patient to call for assistance with activity   - Consult OT/PT to assist with strengthening/mobility   - Keep Call bell within reach  - Keep bed low and locked with side rails adjusted as appropriate  - Keep care items and personal belongings within reach  - Initiate and maintain comfort rounds  - Make Fall Risk Sign visible to staff  - Offer Toileting every 2 Hours, in advance of need  - Initiate/Maintain bed alarm  - Apply yellow socks and bracelet for high fall risk patients  - Consider  moving patient to room near nurses station  Outcome: Progressing

## 2024-11-10 LAB
ANION GAP SERPL CALCULATED.3IONS-SCNC: 8 MMOL/L (ref 4–13)
BUN SERPL-MCNC: 7 MG/DL (ref 5–25)
CALCIUM SERPL-MCNC: 7.2 MG/DL (ref 8.4–10.2)
CHLORIDE SERPL-SCNC: 110 MMOL/L (ref 96–108)
CO2 SERPL-SCNC: 20 MMOL/L (ref 21–32)
CREAT SERPL-MCNC: 0.78 MG/DL (ref 0.6–1.3)
ERYTHROCYTE [DISTWIDTH] IN BLOOD BY AUTOMATED COUNT: 16.7 % (ref 11.6–15.1)
GFR SERPL CREATININE-BSD FRML MDRD: 71 ML/MIN/1.73SQ M
GLUCOSE SERPL-MCNC: 116 MG/DL (ref 65–140)
GLUCOSE SERPL-MCNC: 120 MG/DL (ref 65–140)
GLUCOSE SERPL-MCNC: 122 MG/DL (ref 65–140)
HCT VFR BLD AUTO: 28.6 % (ref 34.8–46.1)
HGB BLD-MCNC: 8.8 G/DL (ref 11.5–15.4)
MCH RBC QN AUTO: 31 PG (ref 26.8–34.3)
MCHC RBC AUTO-ENTMCNC: 30.8 G/DL (ref 31.4–37.4)
MCV RBC AUTO: 101 FL (ref 82–98)
PLATELET # BLD AUTO: 271 THOUSANDS/UL (ref 149–390)
PMV BLD AUTO: 11.6 FL (ref 8.9–12.7)
POTASSIUM SERPL-SCNC: 4 MMOL/L (ref 3.5–5.3)
RBC # BLD AUTO: 2.84 MILLION/UL (ref 3.81–5.12)
SODIUM SERPL-SCNC: 138 MMOL/L (ref 135–147)
T4 FREE SERPL-MCNC: 0.33 NG/DL (ref 0.61–1.12)
TSH SERPL DL<=0.05 MIU/L-ACNC: 23.07 UIU/ML (ref 0.45–4.5)
WBC # BLD AUTO: 12.83 THOUSAND/UL (ref 4.31–10.16)

## 2024-11-10 PROCEDURE — 84439 ASSAY OF FREE THYROXINE: CPT | Performed by: FAMILY MEDICINE

## 2024-11-10 PROCEDURE — 82948 REAGENT STRIP/BLOOD GLUCOSE: CPT

## 2024-11-10 PROCEDURE — 97116 GAIT TRAINING THERAPY: CPT

## 2024-11-10 PROCEDURE — 97110 THERAPEUTIC EXERCISES: CPT

## 2024-11-10 PROCEDURE — 80048 BASIC METABOLIC PNL TOTAL CA: CPT | Performed by: FAMILY MEDICINE

## 2024-11-10 PROCEDURE — 84443 ASSAY THYROID STIM HORMONE: CPT | Performed by: FAMILY MEDICINE

## 2024-11-10 PROCEDURE — 99232 SBSQ HOSP IP/OBS MODERATE 35: CPT | Performed by: FAMILY MEDICINE

## 2024-11-10 PROCEDURE — 85027 COMPLETE CBC AUTOMATED: CPT | Performed by: FAMILY MEDICINE

## 2024-11-10 PROCEDURE — 99223 1ST HOSP IP/OBS HIGH 75: CPT | Performed by: PHYSICIAN ASSISTANT

## 2024-11-10 RX ORDER — FUROSEMIDE 10 MG/ML
40 INJECTION INTRAMUSCULAR; INTRAVENOUS
Status: COMPLETED | OUTPATIENT
Start: 2024-11-10 | End: 2024-11-11

## 2024-11-10 RX ADMIN — APIXABAN 5 MG: 5 TABLET, FILM COATED ORAL at 09:33

## 2024-11-10 RX ADMIN — PIPERACILLIN AND TAZOBACTAM 4.5 G: 36; 4.5 INJECTION, POWDER, FOR SOLUTION INTRAVENOUS at 13:40

## 2024-11-10 RX ADMIN — PIPERACILLIN AND TAZOBACTAM 4.5 G: 36; 4.5 INJECTION, POWDER, FOR SOLUTION INTRAVENOUS at 22:00

## 2024-11-10 RX ADMIN — NYSTATIN: 100000 POWDER TOPICAL at 17:22

## 2024-11-10 RX ADMIN — BUSPIRONE HYDROCHLORIDE 10 MG: 10 TABLET ORAL at 09:33

## 2024-11-10 RX ADMIN — Medication 6 MG: at 21:44

## 2024-11-10 RX ADMIN — LEVOTHYROXINE SODIUM 88 MCG: 88 TABLET ORAL at 05:11

## 2024-11-10 RX ADMIN — APIXABAN 5 MG: 5 TABLET, FILM COATED ORAL at 17:22

## 2024-11-10 RX ADMIN — METOPROLOL TARTRATE 25 MG: 25 TABLET, FILM COATED ORAL at 21:44

## 2024-11-10 RX ADMIN — ARIPIPRAZOLE 3 MG: 2 TABLET ORAL at 09:33

## 2024-11-10 RX ADMIN — AMIODARONE HYDROCHLORIDE 400 MG: 200 TABLET ORAL at 17:21

## 2024-11-10 RX ADMIN — FUROSEMIDE 40 MG: 10 INJECTION, SOLUTION INTRAMUSCULAR; INTRAVENOUS at 17:21

## 2024-11-10 RX ADMIN — PIPERACILLIN AND TAZOBACTAM 4.5 G: 36; 4.5 INJECTION, POWDER, FOR SOLUTION INTRAVENOUS at 04:56

## 2024-11-10 RX ADMIN — VENLAFAXINE HYDROCHLORIDE 150 MG: 150 CAPSULE, EXTENDED RELEASE ORAL at 09:33

## 2024-11-10 RX ADMIN — AMIODARONE HYDROCHLORIDE 400 MG: 200 TABLET ORAL at 09:32

## 2024-11-10 RX ADMIN — PANTOPRAZOLE SODIUM 40 MG: 40 INJECTION, POWDER, FOR SOLUTION INTRAVENOUS at 21:44

## 2024-11-10 RX ADMIN — BUSPIRONE HYDROCHLORIDE 10 MG: 10 TABLET ORAL at 17:21

## 2024-11-10 RX ADMIN — NYSTATIN: 100000 POWDER TOPICAL at 09:33

## 2024-11-10 RX ADMIN — PANTOPRAZOLE SODIUM 40 MG: 40 INJECTION, POWDER, FOR SOLUTION INTRAVENOUS at 09:33

## 2024-11-10 RX ADMIN — METOPROLOL TARTRATE 25 MG: 25 TABLET, FILM COATED ORAL at 09:32

## 2024-11-10 RX ADMIN — AMIODARONE HYDROCHLORIDE 400 MG: 200 TABLET ORAL at 13:40

## 2024-11-10 NOTE — PLAN OF CARE
Problem: PHYSICAL THERAPY ADULT  Goal: Performs mobility at highest level of function for planned discharge setting.  See evaluation for individualized goals.  Description: Treatment/Interventions: Functional transfer training, LE strengthening/ROM, Elevations, Therapeutic exercise, Endurance training, Patient/family training, Equipment eval/education, Bed mobility, Gait training, Continued evaluation, OT  Equipment Recommended: Walker       See flowsheet documentation for full assessment, interventions and recommendations.  Outcome: Progressing  Note: Prognosis: Good  Problem List: Decreased strength, Decreased endurance, Impaired balance, Decreased mobility, Pain  Assessment: Chart reviewed. Patient was received supine in bed in NAD and agreeable to PT session. Today's PT treatment session consisted of therapeutic activity for facilitation of transitional movements and safe performance of correct technique for bed mobility and sit to stand transfers, therapeutic exercise to increase lower extremity muscle strength, and gait training to promote safe and functional ambulation on level surfaces. The patient continues to require assist of one for all functional mobility. Pt's ambulation distance was limited this session secondary to complaints of fatigue. Pt performed two ambulation trials with a seated rest break between each trial. When ambulating pt exhibits a wide base of support, with decreased maggie, and decreased stride length. Pt tolerated all therapeutic exercise well without complaints. Overall, patient tolerated today's session well and continues to be improving as expected towards achieving her STG's. Pt's STG's were updated this session. Patient's prognosis for achieving their STG's is good as evident by pt's motivation. PT intervention continues to be appropriate as the patient continues to be limited by pain, decreased lower extremity strength, impaired balance, decreased endurance, gait deviations,  and decreased functional mobility. Continue to recommend level 2, moderate resource intensity. PT to continue to see patient in order to address the deficits listed above and provide interventions consistent with the POC in order to achieve STG's and optimize the patient's independence with functional mobility.    Barriers to Discharge: Inaccessible home environment, Decreased caregiver support     Rehab Resource Intensity Level, PT: II (Moderate Resource Intensity)    See flowsheet documentation for full assessment.

## 2024-11-10 NOTE — PLAN OF CARE
Problem: PAIN - ADULT  Goal: Verbalizes/displays adequate comfort level or baseline comfort level  Description: Interventions:  - Encourage patient to monitor pain and request assistance  - Assess pain using appropriate pain scale  - Administer analgesics based on type and severity of pain and evaluate response  - Implement non-pharmacological measures as appropriate and evaluate response  - Consider cultural and social influences on pain and pain management  - Notify physician/advanced practitioner if interventions unsuccessful or patient reports new pain  Outcome: Progressing     Problem: INFECTION - ADULT  Goal: Absence or prevention of progression during hospitalization  Description: INTERVENTIONS:  - Assess and monitor for signs and symptoms of infection  - Monitor lab/diagnostic results  - Monitor all insertion sites, i.e. indwelling lines, tubes, and drains  - Monitor endotracheal if appropriate and nasal secretions for changes in amount and color  - Bath appropriate cooling/warming therapies per order  - Administer medications as ordered  - Instruct and encourage patient and family to use good hand hygiene technique  - Identify and instruct in appropriate isolation precautions for identified infection/condition  Outcome: Progressing  Goal: Absence of fever/infection during neutropenic period  Description: INTERVENTIONS:  - Monitor WBC    Outcome: Progressing     Problem: SAFETY ADULT  Goal: Patient will remain free of falls  Description: INTERVENTIONS:  - Educate patient/family on patient safety including physical limitations  - Instruct patient to call for assistance with activity   - Consult OT/PT to assist with strengthening/mobility   - Keep Call bell within reach  - Keep bed low and locked with side rails adjusted as appropriate  - Keep care items and personal belongings within reach  - Initiate and maintain comfort rounds  - Make Fall Risk Sign visible to staff  - Offer Toileting every 2 Hours,  in advance of need  - Initiate/Maintain bed alarm  - Apply yellow socks and bracelet for high fall risk patients  - Consider moving patient to room near nurses station  Outcome: Progressing  Goal: Maintain or return to baseline ADL function  Description: INTERVENTIONS:  -  Assess patient's ability to carry out ADLs; assess patient's baseline for ADL function and identify physical deficits which impact ability to perform ADLs (bathing, care of mouth/teeth, toileting, grooming, dressing, etc.)  - Assess/evaluate cause of self-care deficits   - Assess range of motion  - Assess patient's mobility; develop plan if impaired  - Assess patient's need for assistive devices and provide as appropriate  - Encourage maximum independence but intervene and supervise when necessary  - Involve family in performance of ADLs  - Assess for home care needs following discharge   - Consider OT consult to assist with ADL evaluation and planning for discharge  - Provide patient education as appropriate  Outcome: Progressing  Goal: Maintains/Returns to pre admission functional level  Description: INTERVENTIONS:  - Perform AM-PAC 6 Click Basic Mobility/ Daily Activity assessment daily.  - Set and communicate daily mobility goal to care team and patient/family/caregiver.   - Collaborate with rehabilitation services on mobility goals if consulted  - Perform Range of Motion 2 times a day.  - Reposition patient every 2 hours.  - Dangle patient 2 times a day  - Stand patient 2 times a day  - Ambulate patient 2 times a day  - Out of bed to chair 2 times a day   - Out of bed for meals 2 times a day  - Out of bed for toileting  - Record patient progress and toleration of activity level   Outcome: Progressing     Problem: DISCHARGE PLANNING  Goal: Discharge to home or other facility with appropriate resources  Description: INTERVENTIONS:  - Identify barriers to discharge w/patient and caregiver  - Arrange for needed discharge resources and  transportation as appropriate  - Identify discharge learning needs (meds, wound care, etc.)  - Arrange for interpretive services to assist at discharge as needed  - Refer to Case Management Department for coordinating discharge planning if the patient needs post-hospital services based on physician/advanced practitioner order or complex needs related to functional status, cognitive ability, or social support system  Outcome: Progressing     Problem: Knowledge Deficit  Goal: Patient/family/caregiver demonstrates understanding of disease process, treatment plan, medications, and discharge instructions  Description: Complete learning assessment and assess knowledge base.  Interventions:  - Provide teaching at level of understanding  - Provide teaching via preferred learning methods  Outcome: Progressing     Problem: NEUROSENSORY - ADULT  Goal: Achieves stable or improved neurological status  Description: INTERVENTIONS  - Monitor and report changes in neurological status  - Monitor vital signs such as temperature, blood pressure, glucose, and any other labs ordered   - Initiate measures to prevent increased intracranial pressure  - Monitor for seizure activity and implement precautions if appropriate      Outcome: Progressing  Goal: Remains free of injury related to seizures activity  Description: INTERVENTIONS  - Maintain airway, patient safety  and administer oxygen as ordered  - Monitor patient for seizure activity, document and report duration and description of seizure to physician/advanced practitioner  - If seizure occurs,  ensure patient safety during seizure  - Reorient patient post seizure  - Seizure pads on all 4 side rails  - Instruct patient/family to notify RN of any seizure activity including if an aura is experienced  - Instruct patient/family to call for assistance with activity based on nursing assessment  - Administer anti-seizure medications if ordered    Outcome: Progressing  Goal: Achieves maximal  functionality and self care  Description: INTERVENTIONS  - Monitor swallowing and airway patency with patient fatigue and changes in neurological status  - Encourage and assist patient to increase activity and self care.   - Encourage visually impaired, hearing impaired and aphasic patients to use assistive/communication devices  Outcome: Progressing     Problem: RESPIRATORY - ADULT  Goal: Achieves optimal ventilation and oxygenation  Description: INTERVENTIONS:  - Assess for changes in respiratory status  - Assess for changes in mentation and behavior  - Position to facilitate oxygenation and minimize respiratory effort  - Oxygen administered by appropriate delivery if ordered  - Initiate smoking cessation education as indicated  - Encourage broncho-pulmonary hygiene including cough, deep breathe, Incentive Spirometry  - Assess the need for suctioning and aspirate as needed  - Assess and instruct to report SOB or any respiratory difficulty  - Respiratory Therapy support as indicated  Outcome: Progressing     Problem: GASTROINTESTINAL - ADULT  Goal: Minimal or absence of nausea and/or vomiting  Description: INTERVENTIONS:  - Administer IV fluids if ordered to ensure adequate hydration  - Maintain NPO status until nausea and vomiting are resolved  - Nasogastric tube if ordered  - Administer ordered antiemetic medications as needed  - Provide nonpharmacologic comfort measures as appropriate  - Advance diet as tolerated, if ordered  - Consider nutrition services referral to assist patient with adequate nutrition and appropriate food choices  Outcome: Progressing  Goal: Maintains or returns to baseline bowel function  Description: INTERVENTIONS:  - Assess bowel function  - Encourage oral fluids to ensure adequate hydration  - Administer IV fluids if ordered to ensure adequate hydration  - Administer ordered medications as needed  - Encourage mobilization and activity  - Consider nutritional services referral to assist  patient with adequate nutrition and appropriate food choices  Outcome: Progressing  Goal: Maintains adequate nutritional intake  Description: INTERVENTIONS:  - Monitor percentage of each meal consumed  - Identify factors contributing to decreased intake, treat as appropriate  - Assist with meals as needed  - Monitor I&O, weight, and lab values if indicated  - Obtain nutrition services referral as needed  Outcome: Progressing  Goal: Establish and maintain optimal ostomy function  Description: INTERVENTIONS:  - Assess bowel function  - Encourage oral fluids to ensure adequate hydration  - Administer IV fluids if ordered to ensure adequate hydration   - Administer ordered medications as needed  - Encourage mobilization and activity  - Nutrition services referral to assist patient with appropriate food choices  - Assess stoma site  - Consider wound care consult   Outcome: Progressing  Goal: Oral mucous membranes remain intact  Description: INTERVENTIONS  - Assess oral mucosa and hygiene practices  - Implement preventative oral hygiene regimen  - Implement oral medicated treatments as ordered  - Initiate Nutrition services referral as needed  Outcome: Progressing     Problem: GENITOURINARY - ADULT  Goal: Maintains or returns to baseline urinary function  Description: INTERVENTIONS:  - Assess urinary function  - Encourage oral fluids to ensure adequate hydration if ordered  - Administer IV fluids as ordered to ensure adequate hydration  - Administer ordered medications as needed  - Offer frequent toileting  - Follow urinary retention protocol if ordered  Outcome: Progressing  Goal: Absence of urinary retention  Description: INTERVENTIONS:  - Assess patient's ability to void and empty bladder  - Monitor I/O  - Bladder scan as needed  - Discuss with physician/AP medications to alleviate retention as needed  - Discuss catheterization for long term situations as appropriate  Outcome: Progressing  Goal: Urinary catheter  remains patent  Description: INTERVENTIONS:  - Assess patency of urinary catheter  - If patient has a chronic james, consider changing catheter if non-functioning  - Follow guidelines for intermittent irrigation of non-functioning urinary catheter  Outcome: Progressing     Problem: METABOLIC, FLUID AND ELECTROLYTES - ADULT  Goal: Electrolytes maintained within normal limits  Description: INTERVENTIONS:  - Monitor labs and assess patient for signs and symptoms of electrolyte imbalances  - Administer electrolyte replacement as ordered  - Monitor response to electrolyte replacements, including repeat lab results as appropriate  - Instruct patient on fluid and nutrition as appropriate  Outcome: Progressing  Goal: Fluid balance maintained  Description: INTERVENTIONS:  - Monitor labs   - Monitor I/O and WT  - Instruct patient on fluid and nutrition as appropriate  - Assess for signs & symptoms of volume excess or deficit  Outcome: Progressing  Goal: Glucose maintained within target range  Description: INTERVENTIONS:  - Monitor Blood Glucose as ordered  - Assess for signs and symptoms of hyperglycemia and hypoglycemia  - Administer ordered medications to maintain glucose within target range  - Assess nutritional intake and initiate nutrition service referral as needed  Outcome: Progressing     Problem: SKIN/TISSUE INTEGRITY - ADULT  Goal: Skin Integrity remains intact(Skin Breakdown Prevention)  Description: Assess:  -Perform Jese assessment every shift  -Clean and moisturize skin every 4 hrs  -Inspect skin when repositioning, toileting, and assisting with ADLS  -Assess under medical devices such as bp cuff every hour  -Assess extremities for adequate circulation and sensation     Bed Management:  -Have minimal linens on bed & keep smooth, unwrinkled  -Change linens as needed when moist or perspiring  -Avoid sitting or lying in one position for more than 2 hours while in bed  -Keep HOB at 30degrees     Toileting:  -Offer  bedside commode  -Assess for incontinence every hour  -Use incontinent care products after each incontinent episode such as baby powder    Activity:  -Mobilize patient 2 times a day  -Encourage activity and walks on unit  -Encourage or provide ROM exercises   -Turn and reposition patient every 2 Hours  -Use appropriate equipment to lift or move patient in bed  -Instruct/ Assist with weight shifting every hour when out of bed in chair  -Consider limitation of chair time 4 hour intervals    Skin Care:  -Avoid use of baby powder, tape, friction and shearing, hot water or constrictive clothing  -Relieve pressure over bony prominences using pillows  -Do not massage red bony areas    Next Steps:  -Teach patient strategies to minimize risks such as repositioning    -Consider consults to  interdisciplinary teams such as pt  Outcome: Progressing  Goal: Incision(s), wounds(s) or drain site(s) healing without S/S of infection  Description: INTERVENTIONS  - Assess and document dressing, incision, wound bed, drain sites and surrounding tissue  - Provide patient and family education  - Perform skin care/dressing changes every sift  Outcome: Progressing  Goal: Pressure injury heals and does not worsen  Description: Interventions:  - Implement low air loss mattress or specialty surface (Criteria met)  - Apply silicone foam dressing  - Instruct/assist with weight shifting every 30 minutes when in chair   - Limit chair time to 4 hour intervals  - Use special pressure reducing interventions such as waffle cushion when in chair   - Apply fecal or urinary incontinence containment device   - Perform passive or active ROM every hour  - Turn and reposition patient & offload bony prominences every 2 hours   - Utilize friction reducing device or surface for transfers   - Consider consults to  interdisciplinary teams such as pt  - Use incontinent care products after each incontinent episode such as baby powder  - Consider nutrition services  referral as needed  Outcome: Progressing     Problem: HEMATOLOGIC - ADULT  Goal: Maintains hematologic stability  Description: INTERVENTIONS  - Assess for signs and symptoms of bleeding or hemorrhage  - Monitor labs  - Administer supportive blood products/factors as ordered and appropriate  Outcome: Progressing     Problem: MUSCULOSKELETAL - ADULT  Goal: Maintain or return mobility to safest level of function  Description: INTERVENTIONS:  - Assess patient's ability to carry out ADLs; assess patient's baseline for ADL function and identify physical deficits which impact ability to perform ADLs (bathing, care of mouth/teeth, toileting, grooming, dressing, etc.)  - Assess/evaluate cause of self-care deficits   - Assess range of motion  - Assess patient's mobility  - Assess patient's need for assistive devices and provide as appropriate  - Encourage maximum independence but intervene and supervise when necessary  - Involve family in performance of ADLs  - Assess for home care needs following discharge   - Consider OT consult to assist with ADL evaluation and planning for discharge  - Provide patient education as appropriate  Outcome: Progressing  Goal: Maintain proper alignment of affected body part  Description: INTERVENTIONS:  - Support, maintain and protect limb and body alignment  - Provide patient/ family with appropriate education  Outcome: Progressing     Problem: Nutrition/Hydration-ADULT  Goal: Nutrient/Hydration intake appropriate for improving, restoring or maintaining nutritional needs  Description: Monitor and assess patient's nutrition/hydration status for malnutrition. Collaborate with interdisciplinary team and initiate plan and interventions as ordered.  Monitor patient's weight and dietary intake as ordered or per policy. Utilize nutrition screening tool and intervene as necessary. Determine patient's food preferences and provide high-protein, high-caloric foods as appropriate.     INTERVENTIONS:  -  Monitor oral intake, urinary output, labs, and treatment plans  - Assess nutrition and hydration status and recommend course of action  - Evaluate amount of meals eaten  - Assist patient with eating if necessary   - Allow adequate time for meals  - Recommend/ encourage appropriate diets, oral nutritional supplements, and vitamin/mineral supplements  - Order, calculate, and assess calorie counts as needed  - Recommend, monitor, and adjust tube feedings and TPN/PPN based on assessed needs  - Assess need for intravenous fluids  - Provide specific nutrition/hydration education as appropriate  - Include patient/family/caregiver in decisions related to nutrition  Outcome: Progressing     Problem: Prexisting or High Potential for Compromised Skin Integrity  Goal: Skin integrity is maintained or improved  Description: INTERVENTIONS:  - Identify patients at risk for skin breakdown  - Assess and monitor skin integrity  - Assess and monitor nutrition and hydration status  - Monitor labs   - Assess for incontinence   - Turn and reposition patient  - Assist with mobility/ambulation  - Relieve pressure over bony prominences  - Avoid friction and shearing  - Provide appropriate hygiene as needed including keeping skin clean and dry  - Evaluate need for skin moisturizer/barrier cream  - Collaborate with interdisciplinary team   - Patient/family teaching  - Consider wound care consult   Outcome: Progressing

## 2024-11-10 NOTE — ASSESSMENT & PLAN NOTE
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

## 2024-11-10 NOTE — ASSESSMENT & PLAN NOTE
Suspected secondary aspiration event due to coffee-ground emesis versus atelectasis.  Encourage incentive spirometry use  Continue to wean off oxygen as able.  Doing okay from the standpoint.  Close to discharge will need to qualify for BiPAP if able.  Pulmonology evaluation appreciated.  Will give 2 doses of IV Lasix given concern for mild volume overload

## 2024-11-10 NOTE — CONSULTS
Consultation - Pulmonology   Name: Bertha Brown 84 y.o. female I MRN: [id number]  Unit/Bed#: -01 I Date of Admission: 10/29/2024   Date of Service: 11/10/2024 I Hospital Day: 12   Inpatient consult to Pulmonology  Consult performed by: Addy Villalobos PA-C  Consult ordered by: Anders Lockett MD        Physician Requesting Evaluation: Anders Lockett MD   Reason for Evaluation / Principal Problem: BiPAP qualification    Assessment & Plan  Septic shock (HCC)  Secondary to ischemic colitis, patient also with coffee-ground emesis and aspiration  She is postop day 6 and postop day 5 from hemicolectomy  Management per primary service and surgery  She has received a course of antibiotics  Acute respiratory failure with hypoxia (HCC)  Acute respiratory failure in the setting of aspiration of coffee-ground emesis, pulmonary edema, septic shock  She is now on room air with sats in the mid to high 90s  Obesity  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   Pulmonology service signing off.  Please contact the SecureChat role for the Pulmonology service with any questions/concerns.    History of Present Illness   Bertha Brown is a 84 y.o. female with past medical history of A-fib, diabetes, hypertension, hypothyroidism, depression who presents with coffee-ground emesis found to have ischemic colitis.  Patient underwent hemicolectomy, remained intubated postoperatively and was eventually extubated.  She was thought to  have an aspiration event initially on presentation.  She has now been titrated off O2 but has been on BiPAP at night while here in the hospital.     Review of Systems   Constitutional: Negative.    HENT: Negative.     Respiratory: Negative.     Cardiovascular: Negative.    Gastrointestinal:  Positive for abdominal pain.   Genitourinary: Negative.    Musculoskeletal: Negative.    Skin: Negative.    Allergic/Immunologic: Negative.    Neurological: Negative.    Psychiatric/Behavioral: Negative.         Historical Information   I have reviewed the patient's PMH, PSH, Social History, Family History, Meds, and Allergies    Current Facility-Administered Medications:     acetaminophen (TYLENOL) tablet 650 mg, Q6H PRN    amiodarone tablet 400 mg, TID With Meals **FOLLOWED BY** [START ON 11/12/2024] amiodarone tablet 200 mg, Daily With Breakfast    apixaban (ELIQUIS) tablet 5 mg, BID    ARIPiprazole (ABILIFY) tablet 3 mg, Daily    busPIRone (BUSPAR) tablet 10 mg, BID    HYDROmorphone HCl (DILAUDID) injection 0.2 mg, Q3H PRN **AND** HYDROmorphone (DILAUDID) injection 0.5 mg, Q3H PRN    insulin lispro (HumALOG/ADMELOG) 100 units/mL subcutaneous injection 1-6 Units, Q6H JUAN PABLO **AND** Fingerstick Glucose (POCT), Q6H    levalbuterol (XOPENEX) inhalation solution 1.25 mg, Q6H PRN    levothyroxine tablet 88 mcg, Early Morning    melatonin tablet 6 mg, HS    metoprolol tartrate (LOPRESSOR) tablet 25 mg, Q12H JUAN PABLO    nystatin (MYCOSTATIN) powder, BID    ondansetron (ZOFRAN) injection 4 mg, Q4H PRN    pantoprazole (PROTONIX) injection 40 mg, Q12H JUAN PABLO    [COMPLETED] piperacillin-tazobactam (ZOSYN) 4.5 g in sodium chloride 0.9 % 100 mL IV LOADING DOSE, Once, Last Rate: Stopped (10/30/24 3827) **FOLLOWED BY** piperacillin-tazobactam (ZOSYN) 4.5 g in sodium chloride 0.9 % 100 mL IVPB (EXTENDED INFUSION), Q8H, Last Rate: 4.5 g (11/10/24 9836)    venlafaxine (EFFEXOR-XR) 24 hr capsule 150 mg, Daily  Prior to Admission Medications    Prescriptions Last Dose Informant Patient Reported? Taking?   ARIPiprazole (ABILIFY) 2 mg tablet Past Week  Yes Yes   Sig: TAKE 1 & 1/2 (ONE & ONE-HALF) TABLETS BY MOUTH ONCE DAILY AS DIRECTED   albuterol (2.5 mg/3 mL) 0.083 % nebulizer solution Past Month  No Yes   Sig: Take 6 mL (5 mg total) by nebulization every 6 (six) hours as needed for wheezing   alendronate (FOSAMAX) 70 mg tablet Past Week  Yes Yes   Sig: TAKE 1 TABLET BY MOUTH ONCE A WEEK WITH 8 OUNCES OF WATER THIRTY MINUTES BEFORE THE FIRST MEAL OF THE DAY . REMAIN UPRIGHT FOR THIRTY MINUTES AFTER TAKING   atorvastatin (LIPITOR) 40 mg tablet 10/28/2024  Yes Yes   Sig: atorvastatin 40 mg tablet   busPIRone (BUSPAR) 10 mg tablet Past Week  Yes Yes   Sig: Take 10 mg by mouth 2 (two) times a day As directed   hydroCHLOROthiazide 25 mg tablet Past Week  Yes Yes   Sig: Take 25 mg by mouth daily   levothyroxine 100 mcg tablet 10/28/2024  Yes Yes   Sig: Take 88 mcg by mouth     lisinopril (ZESTRIL) 10 mg tablet Not Taking  No No   Sig: Take 1 tablet (10 mg total) by mouth daily   Patient not taking: Reported on 10/29/2024   metoprolol tartrate (LOPRESSOR) 25 mg tablet Not Taking  No No   Sig: Take 0.5 tablets (12.5 mg total) by mouth every 12 (twelve) hours   Patient not taking: Reported on 10/29/2024   pantoprazole (PROTONIX) 20 mg tablet Past Week  Yes Yes   Sig: Take 20 mg by mouth daily   traZODone (DESYREL) 50 mg tablet Past Week  Yes Yes   Sig: TAKE 1 1/2 TO 2 TABLETS BY MOUTH AT NIGHT AS NEEDED   venlafaxine (EFFEXOR-XR) 150 mg 24 hr capsule 10/28/2024  Yes Yes   Sig: venlafaxine  mg capsule,extended release 24 hr   warfarin (COUMADIN) 7.5 mg tablet 10/28/2024  Yes Yes   Sig: Take 7.5 mg by mouth daily      Facility-Administered Medications: None     Tobacco History: Nonsmoker  Occupational History:   Family History:History reviewed. No pertinent family history.    Objective :  Temp:  [98 °F (36.7 °C)-98.1 °F (36.7 °C)] 98.1 °F (36.7 °C)  HR:   [66-78] 78  BP: (135-161)/(54-72) 161/72  Resp:  [18-20] 18  SpO2:  [95 %-97 %] 95 %    Physical Exam  Constitutional:       General: She is not in acute distress.     Appearance: Normal appearance. She is obese. She is not ill-appearing.   HENT:      Head: Normocephalic and atraumatic.      Mouth/Throat:      Pharynx: Oropharynx is clear.   Eyes:      Conjunctiva/sclera: Conjunctivae normal.   Cardiovascular:      Rate and Rhythm: Normal rate and regular rhythm.   Pulmonary:      Effort: Pulmonary effort is normal. No respiratory distress.      Breath sounds: Normal breath sounds. No decreased breath sounds, wheezing, rhonchi or rales.   Abdominal:      Comments: Abdominal wound, ostomy   Musculoskeletal:         General: Normal range of motion.      Cervical back: Normal range of motion.      Right lower leg: No edema.      Left lower leg: No edema.   Skin:     General: Skin is warm and dry.   Neurological:      Mental Status: She is alert and oriented to person, place, and time.   Psychiatric:         Mood and Affect: Mood normal.         Behavior: Behavior normal.           Lab Results: I have reviewed the following results:  .     11/10/24  0510   WBC 12.83*   HGB 8.8*   HCT 28.6*      SODIUM 138   K 4.0   *   CO2 20*   BUN 7   CREATININE 0.78   GLUC 122     ABG: No new results in last 24 hours.    Imaging Results Review: I reviewed radiology reports from this admission including: chest xray and CT chest.  Other Study Results Review: Other studies reviewed include: Operative reports  PFT Results Reviewed: None available    VTE Prophylaxis: VTE covered by:  apixaban, Oral, 5 mg at 11/09/24 8448

## 2024-11-10 NOTE — ASSESSMENT & PLAN NOTE
Present on admission with tachycardia, tachypnea, leukocytosis, RUKHSANA thought to be due to volume loss due to coffee-ground emesis, elevated lactic acid, no clear source of infection however SIRS thought to be due to coffee-ground emesis.  Currently getting IV fluids resuscitation.  Low threshold to initiate infectious workup if develops worsening signs of infection and or not improving or worsening lactic acidosis   Discontinue antibiotics after today.  The patient is gotten 10 days

## 2024-11-10 NOTE — ASSESSMENT & PLAN NOTE
Septic shock/E. coli bacteremia tenure with current antibiotics. resolved.  Discontinue antibiotics

## 2024-11-10 NOTE — PLAN OF CARE
Problem: INFECTION - ADULT  Goal: Absence or prevention of progression during hospitalization  Description: INTERVENTIONS:  - Assess and monitor for signs and symptoms of infection  - Monitor lab/diagnostic results  - Monitor all insertion sites, i.e. indwelling lines, tubes, and drains  - Monitor endotracheal if appropriate and nasal secretions for changes in amount and color  - [place] appropriate cooling/warming therapies per order  - Administer medications as ordered  - Instruct and encourage patient and family to use good hand hygiene technique  - Identify and instruct in appropriate isolation precautions for identified infection/condition  Outcome: Progressing     Problem: DISCHARGE PLANNING  Goal: Discharge to home or other facility with appropriate resources  Description: INTERVENTIONS:  - Identify barriers to discharge w/patient and caregiver  - Arrange for needed discharge resources and transportation as appropriate  - Identify discharge learning needs (meds, wound care, etc.)  - Arrange for interpretive services to assist at discharge as needed  - Refer to Case Management Department for coordinating discharge planning if the patient needs post-hospital services based on physician/advanced practitioner order or complex needs related to functional status, cognitive ability, or social support system  Outcome: Progressing     Problem: Knowledge Deficit  Goal: Patient/family/caregiver demonstrates understanding of disease process, treatment plan, medications, and discharge instructions  Description: Complete learning assessment and assess knowledge base.  Interventions:  - Provide teaching at level of understanding  - Provide teaching via preferred learning methods  Outcome: Progressing     Problem: RESPIRATORY - ADULT  Goal: Achieves optimal ventilation and oxygenation  Description: INTERVENTIONS:  - Assess for changes in respiratory status  - Assess for changes in mentation and behavior  - Position to  facilitate oxygenation and minimize respiratory effort  - Oxygen administered by appropriate delivery if ordered  - Initiate smoking cessation education as indicated  - Encourage broncho-pulmonary hygiene including cough, deep breathe, Incentive Spirometry  - Assess the need for suctioning and aspirate as needed  - Assess and instruct to report SOB or any respiratory difficulty  - Respiratory Therapy support as indicated  Outcome: Progressing

## 2024-11-10 NOTE — ASSESSMENT & PLAN NOTE
Present on admission with hypotension.  Blood pressure currently controlled.  Continue with current antibiotics

## 2024-11-10 NOTE — ASSESSMENT & PLAN NOTE
Secondary to ischemic colitis, patient also with coffee-ground emesis and aspiration  She is postop day 6 and postop day 5 from hemicolectomy  Management per primary service and surgery  She has received a course of antibiotics

## 2024-11-10 NOTE — ASSESSMENT & PLAN NOTE
Lab Results   Component Value Date    HGBA1C 7.0 (H) 08/22/2024       Recent Labs     11/09/24  1745 11/09/24  2318 11/10/24  0616 11/10/24  1127   POCGLU 143* 115 116 120       Blood Sugar Average: Last 72 hrs:  (P) [id number]    Present on admission with history of diabetes.  Most recent A1c 7.0.  Advance diet as tolerated per surgery's recommendations

## 2024-11-10 NOTE — PHYSICAL THERAPY NOTE
"   Physical Therapy Treatment Note    Patient Name: Bertha Brown    Diagnosis: Vomiting [R11.10]  Constipation [K59.00]  Coffee ground emesis [K92.0]  RUKHSANA (acute kidney injury) (HCC) [N17.9]  Acute respiratory failure with hypoxia (HCC) [J96.01]     11/10/24 0932   PT Last Visit   PT Visit Date 11/10/24   Note Type   Note Type Treatment for insurance authorization   Pain Assessment   Pain Assessment Tool 0-10   Pain Score   (0/10 at rest; 4/10 with mobility)   Pain Location/Orientation Location: Abdomen   Pain Onset/Description Frequency: Intermittent   Hospital Pain Intervention(s) Repositioned;Ambulation/increased activity   Restrictions/Precautions   Weight Bearing Precautions Per Order No   Other Precautions Chair Alarm;Bed Alarm;Multiple lines;Fall Risk;Pain  (+wound vac)   General   Chart Reviewed Yes   Response to Previous Treatment Patient with no complaints from previous session.   Family/Caregiver Present No   Cognition   Overall Cognitive Status WFL   Arousal/Participation Alert;Responsive;Cooperative   Attention Within functional limits   Orientation Level Oriented X4   Memory Within functional limits   Following Commands Follows multistep commands without difficulty   Comments Pt agreeable to PT.   Subjective   Subjective \"I'm very weak.\"   Bed Mobility   Supine to Sit 3  Moderate assistance   Additional items Assist x 1;HOB elevated;Bedrails;Increased time required;Verbal cues;LE management   Transfers   Sit to Stand 4  Minimal assistance   Additional items Assist x 1;Increased time required;Verbal cues   Stand to Sit 4  Minimal assistance   Additional items Assist x 1;Armrests;Increased time required;Verbal cues   Ambulation/Elevation   Gait pattern Decreased toe off;Decreased heel strike;Decreased hip extension;Short stride;Shuffling;Wide NASIR   Gait Assistance 4  Minimal assist   Additional items Assist x 1;Verbal cues   Assistive Device Rolling walker   Distance 12 feet x 2 trials; seated rest " break between each trial   Ambulation/Elevation Additional Comments Pt's ambulation distance limited secondary to reports of fatigue.   Balance   Static Sitting Fair +   Dynamic Sitting Fair   Static Standing Fair -   Dynamic Standing Poor +   Ambulatory Poor +   Endurance Deficit   Endurance Deficit Yes   Endurance Deficit Description decreased activity tolerance   Activity Tolerance   Activity Tolerance Patient tolerated treatment well;Patient limited by fatigue   Nurse Made Aware RN Ed   Medical Staff Made Aware PT student Andres   Exercises   Quad Sets Sitting;10 reps;AROM;Bilateral  (long sitting)   Knee AROM Long Arc Quad Sitting;10 reps;AROM;Bilateral   Ankle Pumps Sitting;20 reps;AROM;Bilateral   Assessment   Prognosis Good   Problem List Decreased strength;Decreased endurance;Impaired balance;Decreased mobility;Pain   Assessment Chart reviewed. Patient was received supine in bed in NAD and agreeable to PT session. Today's PT treatment session consisted of therapeutic activity for facilitation of transitional movements and safe performance of correct technique for bed mobility and sit to stand transfers, therapeutic exercise to increase lower extremity muscle strength, and gait training to promote safe and functional ambulation on level surfaces. The patient continues to require assist of one for all functional mobility. Pt's ambulation distance was limited this session secondary to complaints of fatigue. Pt performed two ambulation trials with a seated rest break between each trial. When ambulating pt exhibits a wide base of support, with decreased maggie, and decreased stride length. Pt tolerated all therapeutic exercise well without complaints. Overall, patient tolerated today's session well and continues to be improving as expected towards achieving her STG's. Pt's STG's were updated this session. Patient's prognosis for achieving their STG's is good as evident by pt's motivation. PT intervention  continues to be appropriate as the patient continues to be limited by pain, decreased lower extremity strength, impaired balance, decreased endurance, gait deviations, and decreased functional mobility. Continue to recommend level 2, moderate resource intensity. PT to continue to see patient in order to address the deficits listed above and provide interventions consistent with the POC in order to achieve STG's and optimize the patient's independence with functional mobility.   Barriers to Discharge Inaccessible home environment;Decreased caregiver support   Goals   STG Expiration Date 11/20/24   Short Term Goal #1 In 10 days: Increase bilateral LE strength 1/2 grade to facilitate independent mobility, Perform all bed mobility tasks modified independent to decrease caregiver burden, Perform all transfers modified independent to improve independence, Ambulate > 150 ft. with least restrictive assistive device modified independent w/o LOB and w/ normalized gait pattern 100% of the time, Navigate 12 stair(s) modified independent with unilateral handrail to facilitate return to previous living environment, and Increase all balance 1/2 grade to decrease risk for falls   PT Treatment Day 2   Plan   Treatment/Interventions Functional transfer training;LE strengthening/ROM;Elevations;Therapeutic exercise;Endurance training;Patient/family training;Bed mobility;Gait training;Spoke to nursing;OT;Spoke to case management   Progress Improving as expected   PT Frequency 3-5x/wk   Discharge Recommendation   Rehab Resource Intensity Level, PT II (Moderate Resource Intensity)   Equipment Recommended Walker   Walker Package Recommended Wheeled walker   AM-PAC Basic Mobility Inpatient   Turning in Flat Bed Without Bedrails 2   Lying on Back to Sitting on Edge of Flat Bed Without Bedrails 2   Moving Bed to Chair 3   Standing Up From Chair Using Arms 3   Walk in Room 3   Climb 3-5 Stairs With Railing 1   Basic Mobility Inpatient Raw  Score 14   Basic Mobility Standardized Score 35.55   [facility] Highest Level Of Mobility   -[facility] Goal 4: Move to chair/commode   -HLM Achieved 6: Walk 10 steps or more   Education   Education Provided Mobility training;Home exercise program;Assistive device   Patient Demonstrates acceptance/verbal understanding;Reinforcement needed   End of Consult   Patient Position at End of Consult Bedside chair;Bed/Chair alarm activated;All needs within reach  (RN aware)     Paty Mattson, PT, DPT    Time of PT treatment session: 0932-0957  25 minutes

## 2024-11-10 NOTE — PROGRESS NOTES
Progress Note - [facility]   Name: Bertha Brown 84 y.o. female I MRN: [id number]  Unit/Bed#: -01 I Date of Admission: 10/29/2024   Date of Service: 11/10/2024 I Hospital Day: 12    Assessment & Plan  Coffee ground emesis    Patient is status post EGD.  Discussed with gastroenterology okay to start anticoagulation.  We are going to start the patient on Eliquis    Paroxysmal atrial fibrillation (HCC)  Present on admission history of paroxysmal A-fib.  Currently in sinus tachycardia with PVCs.  Discontinue the heparin.  That is post EGD.  Okay for anticoagulation.  Family is agreeable for Eliquis which is $47 a month  Continue with the beta-blocker as well as the Eliquis    Hypothyroidism  Present on admission with past medical history of hypothyroidism.  Resume home dose of levothyroxine  Acute respiratory failure with hypoxia (HCC)    Suspected secondary aspiration event due to coffee-ground emesis versus atelectasis.  Encourage incentive spirometry use  Continue to wean off oxygen as able.  Doing okay from the standpoint.  Close to discharge will need to qualify for BiPAP if able.  Pulmonology evaluation appreciated.  Will give 2 doses of IV Lasix given concern for mild volume overload    RUKHSANA (acute kidney injury) (HCC)  Acute renal failure has resolved.  Patient is at baseline.  Replete potassium as needed  SIRS (systemic inflammatory response syndrome) (HCC)  Present on admission with tachycardia, tachypnea, leukocytosis, RUKHSANA thought to be due to volume loss due to coffee-ground emesis, elevated lactic acid, no clear source of infection however SIRS thought to be due to coffee-ground emesis.  Currently getting IV fluids resuscitation.  Low threshold to initiate infectious workup if develops worsening signs of infection and or not improving or worsening lactic acidosis   Discontinue antibiotics after today.  The patient is gotten 10 days  Hypertension  Present on admission with hypotension.  Blood  pressure currently controlled.  Continue with current antibiotics  Hyperlipidemia  Continue home dose of Lipitor 40 mg nightly  Diabetes (HCC)  Lab Results   Component Value Date    HGBA1C 7.0 (H) 08/22/2024       Recent Labs     11/09/24  1745 11/09/24  2318 11/10/24  0616 11/10/24  1127   POCGLU 143* 115 116 120       Blood Sugar Average: Last 72 hrs:  (P) [id number]    Present on admission with history of diabetes.  Most recent A1c 7.0.  Advance diet as tolerated per surgery's recommendations  Obesity  Present on admission with obesity with BMI 37.  Counseled on weight loss, lifestyle modification.  Chronic idiopathic constipation  Bowel regimen  Generalized abdominal pain    -Patient with possible very early peritonitis treated with antibiotics  Abdominal pain is improving.    S/p  LAPAROSCOPY DIAGNOSTIC CONVERTED TO OPEN  LAPAROTOMY EXPLORATORY. EXTENDED RIGHT HEMICOLECTOMY. WITH abthera VAC placement.  No complaints of abdominal pain at this time  Ischemic colitis (HCC)  POD 6 s/p exploratory laparotomy with right hemicolectomy, abdominal left open w abtherra placement on 10/29/2024  POD 5 , s/p second look with wash out, abdominal wall closure, and loop ileostomy and mucous fistula creation, VAC placement to midline abdomen  Febrile overnight   WBC  15.65, 14.35, Hb 8.0   L/24hr,   Cr 0.98  VAC per surgery       Plan:  Pt had speech bedside evaluation this am, recommendation is thin liquids with advance to solids to soft dysphagia 3 texture diet,   Tolerating modified diet  VAC change today.   Goal to transition to MWF VAC changes   Replete lytes as needed  Removed loop bar   Septic shock (HCC)  Septic shock/E. coli bacteremia tenure with current antibiotics. resolved.  Discontinue antibiotics  Acute metabolic encephalopathy  Patient with worsening lethargy prior to OR  Suspect secondary to significant metabolic derangements  Monitor neuro exam.  Now appears to be stable.  Patient states she  is having some nightmares at night.  I will give her some melatonin at night to help her sleep.  Mental status is stable  S/P exploratory laparotomy  -status post exploratory laparotomy with right hemicolectomy on 10/29/2024     -Second look and washout on 10/31/2024 with abdominal wall closure, ileostomy creation, mucous fistula creation, and wound VAC placement and abdominal wall subcutaneous tissue  Repeat CT scan noted  Tolerating advancement in diet  Anemia  Been stable postoperatively.  Continue to monitor.  Hypernatremia  Resolved.  Stop IV fluids  Major depressive disorder, single episode, mild (HCC)  Patient's home medications with Effexor and Abilify were restarted    VTE Pharmacologic Prophylaxis:   High Risk (Score >/= 5) - Pharmacological DVT Prophylaxis Ordered: apixaban (Eliquis). Sequential Compression Devices Ordered.    Mobility:   Basic Mobility Inpatient Raw Score: 14  JH-HLM Goal: 4: Move to chair/commode  JH-HLM Achieved: 6: Walk 10 steps or more  JH-HLM Goal achieved. Continue to encourage appropriate mobility.    Patient Centered Rounds: I performed bedside rounds with nursing staff today.       Education and Discussions with Family / Patient:  Patient.     Current Length of Stay: 12 day(s)  Current Patient Status: Inpatient   Certification Statement: The patient will continue to require additional inpatient hospital stay due to needing placement and management of the wound with surgery  Discharge Plan: Anticipate discharge in 48-72 hrs to rehab facility.    Code Status: Level 1 - Full Code    Subjective   Patient seen and examined.  Was complaining of swelling    Objective :  Temp:  [98 °F (36.7 °C)-98.1 °F (36.7 °C)] 98.1 °F (36.7 °C)  HR:  [66-78] 78  BP: (135-161)/(54-72) 161/72  Resp:  [18-20] 18  SpO2:  [95 %-97 %] 95 %    Body mass index is 38.67 kg/m².     Input and Output Summary (last 24 hours):     Intake/Output Summary (Last 24 hours) at 11/10/2024 9034  Last data filed at  11/10/2024 0451  Gross per 24 hour   Intake 480 ml   Output 1000 ml   Net -520 ml       Physical Exam  General Appearance:    Alert, cooperative, no distress, appears stated age                               Lungs:     Clear to auscultation bilaterally, respirations unlabored       Heart:    Regular rate and rhythm, S1 and S2 normal, no murmur, rub    or gallop   Abdomen:     Soft, non-tender, bowel sounds active all four quadrants,     no masses, no organomegaly           Extremities: Edema                         Lines/Drains:  Lines/Drains/Airways       Active Status       Name Placement date Placement time Site Days    Colostomy RLQ 10/31/24  1422  RLQ  10                            Lab Results: I have reviewed the following results:   Results from last 7 days   Lab Units 11/10/24  0510 11/06/24  0539 11/05/24  0506 11/04/24  0458   WBC Thousand/uL 12.83*   < > 15.65* 14.35*   HEMOGLOBIN g/dL 8.8*   < > 8.5* 8.0*   HEMATOCRIT % 28.6*   < > 27.2* 25.3*   PLATELETS Thousands/uL 271   < > 179 150   BANDS PCT %  --   --  6  --    SEGS PCT %  --   --   --  75   LYMPHO PCT %  --   --  5* 11*   MONO PCT %  --   --  2* 5   EOS PCT %  --   --  0 0    < > = values in this interval not displayed.     Results from last 7 days   Lab Units 11/10/24  0510 11/07/24  0619 11/06/24  0539   SODIUM mmol/L 138   < > 141   POTASSIUM mmol/L 4.0   < > 3.1*   CHLORIDE mmol/L 110*   < > 110*   CO2 mmol/L 20*   < > 23   BUN mg/dL 7   < > 15   CREATININE mg/dL 0.78   < > 0.85   ANION GAP mmol/L 8   < > 8   CALCIUM mg/dL 7.2*   < > 6.4*   ALBUMIN g/dL  --   --  3.3*   TOTAL BILIRUBIN mg/dL  --   --  0.76   ALK PHOS U/L  --   --  60   ALT U/L  --   --  10   AST U/L  --   --  28   GLUCOSE RANDOM mg/dL 122   < > 143*    < > = values in this interval not displayed.     Results from last 7 days   Lab Units 11/08/24  0630   INR  1.37*     Results from last 7 days   Lab Units 11/10/24  1127 11/10/24  0616 11/09/24  2318 11/09/24  1745  11/09/24  1116 11/09/24  0635 11/09/24  0011 11/08/24  2115 11/08/24  1706 11/08/24  1036 11/08/24  0532 11/07/24  2335   POC GLUCOSE mg/dl 120 116 115 143* 100 108 115 122 125 91 89 125         Results from last 7 days   Lab Units 11/04/24  0458   PROCALCITONIN ng/ml 6.89*       Recent Cultures (last 7 days):         Imaging Results Review: No pertinent imaging studies reviewed.  Other Study Results Review: No additional pertinent studies reviewed.    Last 24 Hours Medication List:     Current Facility-Administered Medications:     acetaminophen (TYLENOL) tablet 650 mg, Q6H PRN    amiodarone tablet 400 mg, TID With Meals **FOLLOWED BY** [START ON 11/12/2024] amiodarone tablet 200 mg, Daily With Breakfast    apixaban (ELIQUIS) tablet 5 mg, BID    ARIPiprazole (ABILIFY) tablet 3 mg, Daily    busPIRone (BUSPAR) tablet 10 mg, BID    furosemide (LASIX) injection 40 mg, BID (diuretic)    HYDROmorphone HCl (DILAUDID) injection 0.2 mg, Q3H PRN **AND** HYDROmorphone (DILAUDID) injection 0.5 mg, Q3H PRN    levalbuterol (XOPENEX) inhalation solution 1.25 mg, Q6H PRN    levothyroxine tablet 88 mcg, Early Morning    melatonin tablet 6 mg, HS    metoprolol tartrate (LOPRESSOR) tablet 25 mg, Q12H JUAN PABLO    nystatin (MYCOSTATIN) powder, BID    ondansetron (ZOFRAN) injection 4 mg, Q4H PRN    pantoprazole (PROTONIX) injection 40 mg, Q12H JUAN PABLO    [COMPLETED] piperacillin-tazobactam (ZOSYN) 4.5 g in sodium chloride 0.9 % 100 mL IV LOADING DOSE, Once, Last Rate: Stopped (10/30/24 0247) **FOLLOWED BY** piperacillin-tazobactam (ZOSYN) 4.5 g in sodium chloride 0.9 % 100 mL IVPB (EXTENDED INFUSION), Q8H, Last Rate: 4.5 g (11/10/24 4926)    venlafaxine (EFFEXOR-XR) 24 hr capsule 150 mg, Daily    Administrative Statements   Today, Patient Was Seen By: Anders Lockett MD  I have spent a total time of 25 minutes in caring for this patient on the day of the visit/encounter including Diagnostic results, Instructions for management, Risk factor  reductions, Impressions, Counseling / Coordination of care, Documenting in the medical record, Reviewing / ordering tests, medicine, procedures  , and Obtaining or reviewing history  .    **Please Note: This note may have been constructed using a voice recognition system.**

## 2024-11-10 NOTE — ASSESSMENT & PLAN NOTE
Acute respiratory failure in the setting of aspiration of coffee-ground emesis, pulmonary edema, septic shock  She is now on room air with sats in the mid to high 90s

## 2024-11-11 ENCOUNTER — TELEPHONE (OUTPATIENT)
Dept: GASTROENTEROLOGY | Facility: CLINIC | Age: 84
End: 2024-11-11

## 2024-11-11 VITALS
WEIGHT: 232.37 LBS | HEART RATE: 72 BPM | SYSTOLIC BLOOD PRESSURE: 120 MMHG | HEIGHT: 65 IN | TEMPERATURE: 98 F | DIASTOLIC BLOOD PRESSURE: 58 MMHG | RESPIRATION RATE: 18 BRPM | OXYGEN SATURATION: 96 % | BODY MASS INDEX: 38.71 KG/M2

## 2024-11-11 PROBLEM — R65.21 SEPTIC SHOCK (HCC): Status: RESOLVED | Noted: 2024-10-30 | Resolved: 2024-11-11

## 2024-11-11 PROBLEM — J96.01 ACUTE RESPIRATORY FAILURE WITH HYPOXIA (HCC): Status: RESOLVED | Noted: 2020-02-22 | Resolved: 2024-11-11

## 2024-11-11 PROBLEM — G93.41 ACUTE METABOLIC ENCEPHALOPATHY: Status: RESOLVED | Noted: 2024-10-30 | Resolved: 2024-11-11

## 2024-11-11 PROBLEM — N17.9 AKI (ACUTE KIDNEY INJURY) (HCC): Status: RESOLVED | Noted: 2024-10-29 | Resolved: 2024-11-11

## 2024-11-11 PROBLEM — A41.9 SEPTIC SHOCK (HCC): Status: RESOLVED | Noted: 2024-10-30 | Resolved: 2024-11-11

## 2024-11-11 LAB
ANION GAP SERPL CALCULATED.3IONS-SCNC: 7 MMOL/L (ref 4–13)
BUN SERPL-MCNC: 8 MG/DL (ref 5–25)
CALCIUM SERPL-MCNC: 7.4 MG/DL (ref 8.4–10.2)
CHLORIDE SERPL-SCNC: 109 MMOL/L (ref 96–108)
CO2 SERPL-SCNC: 22 MMOL/L (ref 21–32)
CREAT SERPL-MCNC: 0.95 MG/DL (ref 0.6–1.3)
ERYTHROCYTE [DISTWIDTH] IN BLOOD BY AUTOMATED COUNT: 16.7 % (ref 11.6–15.1)
GFR SERPL CREATININE-BSD FRML MDRD: 55 ML/MIN/1.73SQ M
GLUCOSE SERPL-MCNC: 113 MG/DL (ref 65–140)
HCT VFR BLD AUTO: 25.8 % (ref 34.8–46.1)
HGB BLD-MCNC: 8 G/DL (ref 11.5–15.4)
MCH RBC QN AUTO: 31.4 PG (ref 26.8–34.3)
MCHC RBC AUTO-ENTMCNC: 31 G/DL (ref 31.4–37.4)
MCV RBC AUTO: 101 FL (ref 82–98)
PLATELET # BLD AUTO: 303 THOUSANDS/UL (ref 149–390)
PMV BLD AUTO: 11.1 FL (ref 8.9–12.7)
POTASSIUM SERPL-SCNC: 4 MMOL/L (ref 3.5–5.3)
RBC # BLD AUTO: 2.55 MILLION/UL (ref 3.81–5.12)
SODIUM SERPL-SCNC: 138 MMOL/L (ref 135–147)
WBC # BLD AUTO: 12.36 THOUSAND/UL (ref 4.31–10.16)

## 2024-11-11 PROCEDURE — 97535 SELF CARE MNGMENT TRAINING: CPT

## 2024-11-11 PROCEDURE — 85027 COMPLETE CBC AUTOMATED: CPT | Performed by: FAMILY MEDICINE

## 2024-11-11 PROCEDURE — 99239 HOSP IP/OBS DSCHRG MGMT >30: CPT | Performed by: FAMILY MEDICINE

## 2024-11-11 PROCEDURE — 80048 BASIC METABOLIC PNL TOTAL CA: CPT | Performed by: FAMILY MEDICINE

## 2024-11-11 PROCEDURE — 92526 ORAL FUNCTION THERAPY: CPT

## 2024-11-11 RX ORDER — METOPROLOL TARTRATE 25 MG/1
25 TABLET, FILM COATED ORAL EVERY 12 HOURS SCHEDULED
Start: 2024-11-11

## 2024-11-11 RX ORDER — LEVOTHYROXINE SODIUM 100 UG/1
100 TABLET ORAL
Status: DISCONTINUED | OUTPATIENT
Start: 2024-11-12 | End: 2024-11-11 | Stop reason: HOSPADM

## 2024-11-11 RX ORDER — AMIODARONE HYDROCHLORIDE 200 MG/1
200 TABLET ORAL DAILY
Start: 2024-11-11

## 2024-11-11 RX ORDER — LEVOTHYROXINE SODIUM 100 UG/1
100 TABLET ORAL
Start: 2024-11-12

## 2024-11-11 RX ORDER — NYSTATIN 100000 [USP'U]/G
POWDER TOPICAL 2 TIMES DAILY
Start: 2024-11-11

## 2024-11-11 RX ORDER — FUROSEMIDE 20 MG/1
20 TABLET ORAL DAILY
Start: 2024-11-11 | End: 2024-11-13

## 2024-11-11 RX ADMIN — ARIPIPRAZOLE 3 MG: 2 TABLET ORAL at 09:53

## 2024-11-11 RX ADMIN — AMIODARONE HYDROCHLORIDE 400 MG: 200 TABLET ORAL at 13:18

## 2024-11-11 RX ADMIN — AMIODARONE HYDROCHLORIDE 400 MG: 200 TABLET ORAL at 09:52

## 2024-11-11 RX ADMIN — NYSTATIN: 100000 POWDER TOPICAL at 09:54

## 2024-11-11 RX ADMIN — FUROSEMIDE 40 MG: 10 INJECTION, SOLUTION INTRAMUSCULAR; INTRAVENOUS at 09:52

## 2024-11-11 RX ADMIN — PANTOPRAZOLE SODIUM 40 MG: 40 INJECTION, POWDER, FOR SOLUTION INTRAVENOUS at 09:52

## 2024-11-11 RX ADMIN — APIXABAN 5 MG: 5 TABLET, FILM COATED ORAL at 09:53

## 2024-11-11 RX ADMIN — LEVOTHYROXINE SODIUM 88 MCG: 88 TABLET ORAL at 06:33

## 2024-11-11 RX ADMIN — METOPROLOL TARTRATE 25 MG: 25 TABLET, FILM COATED ORAL at 09:53

## 2024-11-11 RX ADMIN — VENLAFAXINE HYDROCHLORIDE 150 MG: 150 CAPSULE, EXTENDED RELEASE ORAL at 09:52

## 2024-11-11 RX ADMIN — BUSPIRONE HYDROCHLORIDE 10 MG: 10 TABLET ORAL at 09:53

## 2024-11-11 NOTE — CASE MANAGEMENT
Case Management Discharge Planning Note    Patient name Bertha Brown  Location /-01 MRN [id number]  : 1940 Date 2024       Current Admission Date: 10/29/2024  Current Admission Diagnosis:Septic shock (HCC)   Patient Active Problem List    Diagnosis Date Noted Date Diagnosed    Anemia 2024     Hypernatremia 2024     S/P exploratory laparotomy 2024     Ischemic colitis (HCC) 10/30/2024     Septic shock (HCC) 10/30/2024     Acute metabolic encephalopathy 10/30/2024     Coffee ground emesis 10/29/2024     RUKHSANA (acute kidney injury) (HCC) 10/29/2024     Chronic idiopathic constipation 10/29/2024     Generalized abdominal pain 10/29/2024     SIRS (systemic inflammatory response syndrome) (HCC) 10/29/2024     Constipation 2022     Hypoxia 12/15/2022     Rectus sheath hematoma 2022     Bronchospasm with bronchitis, acute 2022     Chest pain 2022     Leukocytosis 2022     Chest tightness 2022     Diabetes (HCC) 2022     Obesity 2022     Bilateral flank pain 2022     Hemarthrosis involving knee joint, right 10/01/2020     Ambulatory dysfunction 10/01/2020     Paroxysmal atrial fibrillation (HCC) 2020     Acute respiratory failure with hypoxia (HCC) 2020     Hypothyroidism 2020     Hypertension 2020     Hyperlipidemia 2020     Major depressive disorder, single episode, mild (HCC) 2019     Osteoarthritis of knee 2017       LOS (days): 13  Geometric Mean LOS (GMLOS) (days): 9.6  Days to GMLOS:-3.5     OBJECTIVE:  Risk of Unplanned Readmission Score: 25.5         Current admission status: Inpatient   [facility] Pharmacy:   [facility] [place] - [address]  [address]  [place] [address]  Phone: [phone] Fax: [phone]    Primary Care Provider: Jarred Armstrong DO    Primary Insurance: [facility]  Secondary Insurance:     DISCHARGE  DETAILS:    Discharge planning discussed with:: Pt and dtr Dahlia  Freedom of Choice: Yes  Comments - Freedom of Choice: As per SLIM rounds, pt is medically cleared for dc today. Auth was obtained and pt will be transfered to [facility] [place] @ 330pm via SV. Report # given and avs uploaded on AIDIN. Dtr is aware and in agreement.  CM contacted family/caregiver?: Yes  Were Treatment Team discharge recommendations reviewed with patient/caregiver?: Yes  Did patient/caregiver verbalize understanding of patient care needs?: N/A- going to facility  Were patient/caregiver advised of the risks associated with not following Treatment Team discharge recommendations?: Yes    Contacts  Patient Contacts: Dahlia  Relationship to Patient:: Family  Contact Method: In Person  Reason/Outcome: Continuity of Care, Emergency Contact, Discharge Planning, Referral    Requested Home Health Care         Is the patient interested in C at discharge?: No    DME Referral Provided  Referral made for DME?: No    Other Referral/Resources/Interventions Provided:  Interventions: Short Term Rehab    Would you like to participate in our [facility] service program?  : No - Declined    Treatment Team Recommendation: Short Term Rehab  Discharge Destination Plan:: Short Term Rehab  Transport at Discharge : [facility]     Number/Name of Dispatcher: "[employer] [phone]     ETA of Transport (Date): 11/11/24  ETA of Transport (Time): 1530              IMM Given (Date):: 11/11/24  IMM Given to:: Patient  Family notified:: IMM reviewed with pt at [facility]. Pt expressed full and complete understanding . Verbal consent obtianed. copy given and original in [facility] Facility Name, City & State : [facility] [address] [place] [address]  Receiving Facility/Agency Phone Number: [phone]  - ask for nurse sup  Facility/Agency Fax Number: Fax: [phone]

## 2024-11-11 NOTE — ARC ADMISSION
Notified by CM that patient/family prefer SNF/subacute rehab at this time, and insurance authorization is pending. Please notify with any changes.

## 2024-11-11 NOTE — ASSESSMENT & PLAN NOTE
Patient is status post EGD.  Discussed with gastroenterology okay to start anticoagulation.  We are going to start the patient on Eliquis     B positive

## 2024-11-11 NOTE — CASE MANAGEMENT
[facility] has received APPROVED authorization.  Insurance: [employer]     Auth obtained via Insurance Rep: Kandi GO#: [phone]  Authorization received for: SNF  Facility:  [facility]   Authorization #: [id number]   Start of Care: 11/11  Next Review Date: 2 Business Days   Submit next review to: [employer] / F#: [phone]    Care Manager notified: Laury Jefferson     Please reach out to  for updates on any clinical information.

## 2024-11-11 NOTE — PLAN OF CARE
Problem: PAIN - ADULT  Goal: Verbalizes/displays adequate comfort level or baseline comfort level  Description: Interventions:  - Encourage patient to monitor pain and request assistance  - Assess pain using appropriate pain scale  - Administer analgesics based on type and severity of pain and evaluate response  - Implement non-pharmacological measures as appropriate and evaluate response  - Consider cultural and social influences on pain and pain management  - Notify physician/advanced practitioner if interventions unsuccessful or patient reports new pain  Outcome: Progressing     Problem: INFECTION - ADULT  Goal: Absence or prevention of progression during hospitalization  Description: INTERVENTIONS:  - Assess and monitor for signs and symptoms of infection  - Monitor lab/diagnostic results  - Monitor all insertion sites, i.e. indwelling lines, tubes, and drains  - Monitor endotracheal if appropriate and nasal secretions for changes in amount and color  - Keiser appropriate cooling/warming therapies per order  - Administer medications as ordered  - Instruct and encourage patient and family to use good hand hygiene technique  - Identify and instruct in appropriate isolation precautions for identified infection/condition  Outcome: Progressing  Goal: Absence of fever/infection during neutropenic period  Description: INTERVENTIONS:  - Monitor WBC    Outcome: Progressing     Problem: SAFETY ADULT  Goal: Patient will remain free of falls  Description: INTERVENTIONS:  - Educate patient/family on patient safety including physical limitations  - Instruct patient to call for assistance with activity   - Consult OT/PT to assist with strengthening/mobility   - Keep Call bell within reach  - Keep bed low and locked with side rails adjusted as appropriate  - Keep care items and personal belongings within reach  - Initiate and maintain comfort rounds  - Make Fall Risk Sign visible to staff  - Offer Toileting every 2 Hours,  in advance of need  - Initiate/Maintain bed alarm  - Apply yellow socks and bracelet for high fall risk patients  - Consider moving patient to room near nurses station  Outcome: Progressing  Goal: Maintain or return to baseline ADL function  Description: INTERVENTIONS:  -  Assess patient's ability to carry out ADLs; assess patient's baseline for ADL function and identify physical deficits which impact ability to perform ADLs (bathing, care of mouth/teeth, toileting, grooming, dressing, etc.)  - Assess/evaluate cause of self-care deficits   - Assess range of motion  - Assess patient's mobility; develop plan if impaired  - Assess patient's need for assistive devices and provide as appropriate  - Encourage maximum independence but intervene and supervise when necessary  - Involve family in performance of ADLs  - Assess for home care needs following discharge   - Consider OT consult to assist with ADL evaluation and planning for discharge  - Provide patient education as appropriate  Outcome: Progressing  Goal: Maintains/Returns to pre admission functional level  Description: INTERVENTIONS:  - Perform AM-PAC 6 Click Basic Mobility/ Daily Activity assessment daily.  - Set and communicate daily mobility goal to care team and patient/family/caregiver.   - Collaborate with rehabilitation services on mobility goals if consulted  - Perform Range of Motion 2 times a day.  - Reposition patient every 2 hours.  - Dangle patient 2 times a day  - Stand patient 2 times a day  - Ambulate patient 2 times a day  - Out of bed to chair 2 times a day   - Out of bed for meals 2 times a day  - Out of bed for toileting  - Record patient progress and toleration of activity level   Outcome: Progressing     Problem: DISCHARGE PLANNING  Goal: Discharge to home or other facility with appropriate resources  Description: INTERVENTIONS:  - Identify barriers to discharge w/patient and caregiver  - Arrange for needed discharge resources and  transportation as appropriate  - Identify discharge learning needs (meds, wound care, etc.)  - Arrange for interpretive services to assist at discharge as needed  - Refer to Case Management Department for coordinating discharge planning if the patient needs post-hospital services based on physician/advanced practitioner order or complex needs related to functional status, cognitive ability, or social support system  Outcome: Progressing     Problem: RESPIRATORY - ADULT  Goal: Achieves optimal ventilation and oxygenation  Description: INTERVENTIONS:  - Assess for changes in respiratory status  - Assess for changes in mentation and behavior  - Position to facilitate oxygenation and minimize respiratory effort  - Oxygen administered by appropriate delivery if ordered  - Initiate smoking cessation education as indicated  - Encourage broncho-pulmonary hygiene including cough, deep breathe, Incentive Spirometry  - Assess the need for suctioning and aspirate as needed  - Assess and instruct to report SOB or any respiratory difficulty  - Respiratory Therapy support as indicated  Outcome: Progressing     Problem: GASTROINTESTINAL - ADULT  Goal: Minimal or absence of nausea and/or vomiting  Description: INTERVENTIONS:  - Administer IV fluids if ordered to ensure adequate hydration  - Maintain NPO status until nausea and vomiting are resolved  - Nasogastric tube if ordered  - Administer ordered antiemetic medications as needed  - Provide nonpharmacologic comfort measures as appropriate  - Advance diet as tolerated, if ordered  - Consider nutrition services referral to assist patient with adequate nutrition and appropriate food choices  Outcome: Progressing  Goal: Maintains or returns to baseline bowel function  Description: INTERVENTIONS:  - Assess bowel function  - Encourage oral fluids to ensure adequate hydration  - Administer IV fluids if ordered to ensure adequate hydration  - Administer ordered medications as needed  -  Encourage mobilization and activity  - Consider nutritional services referral to assist patient with adequate nutrition and appropriate food choices  Outcome: Progressing  Goal: Maintains adequate nutritional intake  Description: INTERVENTIONS:  - Monitor percentage of each meal consumed  - Identify factors contributing to decreased intake, treat as appropriate  - Assist with meals as needed  - Monitor I&O, weight, and lab values if indicated  - Obtain nutrition services referral as needed  Outcome: Progressing  Goal: Establish and maintain optimal ostomy function  Description: INTERVENTIONS:  - Assess bowel function  - Encourage oral fluids to ensure mariah  Problem: GENITOURINARY - ADULT  Goal: Maintains or returns to baseline urinary function  Description: INTERVENTIONS:  - Assess urinary function  - Encourage oral fluids to ensure adequate hydration if ordered  - Administer IV fluids as ordered to ensure adequate hydration  - Administer ordered medications as needed  - Offer frequent toileting  - Follow urinary retention protocol if ordered  Outcome: Progressing  Goal: Absence of urinary retention  Description: INTERVENTIONS:  - Assess patient's ability to void and empty bladder  - Monitor I/O  - Bladder scan as needed  - Discuss with physician/AP medications to alleviate retention as needed  - Discuss catheterization for long term situations as appropriate  Outcome: Progressing  Goal: Urinary catheter remains patent  Description: INTERVENTIONS:  - Assess patency of urinary catheter  - If patient has a chronic james, consider changing catheter if non-functioning  - Follow guidelines for intermittent irrigation of non-functioning urinary catheter  Outcome: Progressing     Problem: SKIN/TISSUE INTEGRITY - ADULT  Goal: Skin Integrity remains intact(Skin Breakdown Prevention)  Description: Assess:  -Perform Jese assessment every shift  -Clean and moisturize skin every 4 hrs  -Inspect skin when repositioning,  toileting, and assisting with ADLS  -Assess under medical devices such as bp cuff every hour  -Assess extremities for adequate circulation and sensation     Bed Management:  -Have minimal linens on bed & keep smooth, unwrinkled  -Change linens as needed when moist or perspiring  -Avoid sitting or lying in one position for more than 2 hours while in bed  -Keep HOB at 30degrees     Toileting:  -Offer bedside commode  -Assess for incontinence every hour  -Use incontinent care products after each incontinent episode such as baby powder    Activity:  -Mobilize patient 2 times a day  -Encourage activity and walks on unit  -Encourage or provide ROM exercises   -Turn and reposition patient every 2 Hours  -Use appropriate equipment to lift or move patient in bed  -Instruct/ Assist with weight shifting every hour when out of bed in chair  -Consider limitation of chair time 4 hour intervals    Skin Care:  -Avoid use of baby powder, tape, friction and shearing, hot water or constrictive clothing  -Relieve pressure over bony prominences using pillows  -Do not massage red bony areas    Next Steps:  -Teach patient strategies to minimize risks such as repositioning    -Consider consults to  interdisciplinary teams such as pt  Outcome: Progressing  Goal: Incision(s), wounds(s) or drain site(s) healing without S/S of infection  Description: INTERVENTIONS  - Assess and document dressing, incision, wound bed, drain sites and surrounding tissue  - Provide patient and family education  - Perform skin care/dressing changes every sift  Outcome: Progressing  Goal: Pressure injury heals and does not worsen  Description: Interventions:  - Implement low air loss mattress or specialty surface (Criteria met)  - Apply silicone foam dressing  - Instruct/assist with weight shifting every 30 minutes when in chair   - Limit chair time to 4 hour intervals  - Use special pressure reducing interventions such as waffle cushion when in chair   - Apply  fecal or urinary incontinence containment device   - Perform passive or active ROM every hour  - Turn and reposition patient & offload bony prominences every 2 hours   - Utilize friction reducing device or surface for transfers   - Consider consults to  interdisciplinary teams such as pt  - Use incontinent care products after each incontinent episode such as baby powder  - Consider nutrition services referral as n  Problem: Nutrition/Hydration-ADULT  Goal: Nutrient/Hydration intake appropriate for improving, restoring or maintaining nutritional needs  Description: Monitor and assess patient's nutrition/hydration status for malnutrition. Collaborate with interdisciplinary team and initiate plan and interventions as ordered.  Monitor patient's weight and dietary intake as ordered or per policy. Utilize nutrition screening tool and intervene as necessary. Determine patient's food preferences and provide high-protein, high-caloric foods as appropriate.     INTERVENTIONS:  - Monitor oral intake, urinary output, labs, and treatment plans  - Assess nutrition and hydration status and recommend course of action  - Evaluate amount of meals eaten  - Assist patient with eating if necessary   - Allow adequate time for meals  - Recommend/ encourage appropriate diets, oral nutritional supplements, and vitamin/mineral supplements  - Order, calculate, and assess calorie counts as needed  - Recommend, monitor, and adjust tube feedings and TPN/PPN based on assessed needs  - Assess need for intravenous fluids  - Provide specific nutrition/hydration education as appropriate  - Include patient/family/caregiver in decisions related to nutrition  Outcome: Progressing     Problem: Prexisting or High Potential for Compromised Skin Integrity  Goal: Skin integrity is maintained or improved  Description: INTERVENTIONS:  - Identify patients at risk for skin breakdown  - Assess and monitor skin integrity  - Assess and monitor nutrition and  hydration status  - Monitor labs   - Assess for incontinence   - Turn and reposition patient  - Assist with mobility/ambulation  - Relieve pressure over bony prominences  - Avoid friction and shearing  - Provide appropriate hygiene as needed including keeping skin clean and dry  - Evaluate need for skin moisturizer/barrier cream  - Collaborate with interdisciplinary team   - Patient/family teaching  - Consider wound care consult   Outcome: Progressing   eeded  Outcome: Progressing     Problem: HEMATOLOGIC - ADULT  Goal: Maintains hematologic stability  Description: INTERVENTIONS  - Assess for signs and symptoms of bleeding or hemorrhage  - Monitor labs  - Administer supportive blood products/factors as ordered and appropriate  Outcome: Progressing   quate hydration  - Administer IV fluids if ordered to ensure adequate hydration   - Administer ordered medications as needed  - Encourage mobilization and activity  - Nutrition services referral to assist patient with appropriate food choices  - Assess stoma site  - Consider wound care consult   Outcome: Progressing  Goal: Oral mucous membranes remain intact  Description: INTERVENTIONS  - Assess oral mucosa and hygiene practices  - Implement preventative oral hygiene regimen  - Implement oral medicated treatments as ordered  - Initiate Nutrition services referral as needed  Outcome: Progressing

## 2024-11-11 NOTE — OCCUPATIONAL THERAPY NOTE
Occupational Therapy Progress Note     Patient Name: Bertha Brown  Today's Date: 11/11/2024  Problem List  Principal Problem:    Septic shock (HCC)  Active Problems:    Paroxysmal atrial fibrillation (HCC)    Acute respiratory failure with hypoxia (HCC)    Hypothyroidism    Hypertension    Hyperlipidemia    Major depressive disorder, single episode, mild (HCC)    Diabetes (HCC)    Obesity    Coffee ground emesis    RUKHSANA (acute kidney injury) (HCC)    Chronic idiopathic constipation    Generalized abdominal pain    SIRS (systemic inflammatory response syndrome) (HCC)    Ischemic colitis (HCC)    Acute metabolic encephalopathy    S/P exploratory laparotomy    Anemia    Hypernatremia          11/11/24 1003   OT Last Visit   OT Visit Date 11/11/24   Note Type   Note Type Treatment for insurance authorization   Pain Assessment   Pain Assessment Tool 0-10   Pain Score No Pain   Pain Rating: FLACC (Rest) - Face 0   Pain Rating: FLACC (Rest) - Legs 0   Pain Rating: FLACC (Rest) - Activity 0   Pain Rating: FLACC (Rest) - Cry 0   Pain Rating: FLACC (Rest) - Consolability 0   Score: FLACC (Rest) 0   Pain Rating: FLACC (Activity) - Face 1   Pain Rating: FLACC (Activity) - Legs 0   Pain Rating: FLACC (Activity) - Activity 0   Pain Rating: FLACC (Activity) - Cry 1   Pain Rating: FLACC (Activity) - Consolability 0   Score: FLACC (Activity) 2   Restrictions/Precautions   Weight Bearing Precautions Per Order No   Other Precautions Chair Alarm;Bed Alarm;Multiple lines;Fall Risk  (post op abdominal precautions)   ADL   Where Assessed Other (Comment)  (Assist levels for some self care tasks are based on functional assessment of performance skills and deficits observed during session.)   Eating Assistance 5  Supervision/Setup   Eating Deficit Setup   Grooming Assistance   (CGA)   Grooming Deficit Setup;Steadying;Supervision/safety;Increased time to complete;Wash/dry hands;Wash/dry face;Teeth care  (at sink level)   UB Bathing  "Assistance 4  Minimal Assistance   UB Bathing Deficit Setup;Supervision/safety;Increased time to complete  (seated)   LB Bathing Assistance 2  Maximal Assistance   LB Bathing Deficit Setup;Supervision/safety;Increased time to complete   UB Dressing Assistance 4  Minimal Assistance   UB Dressing Deficit Setup;Supervision/safety;Increased time to complete  (seated)   LB Dressing Assistance 2  Maximal Assistance   LB Dressing Deficit Setup;Supervision/safety;Increased time to complete   Toileting Assistance  4  Minimal Assistance   Toileting Deficit Setup;Steadying;Supervison/safety;Increased time to complete;Clothing management up  (pt managed cuca hygiene. + ostomy)   Bed Mobility   Rolling R 5  Supervision   Additional items Assist x 1;HOB elevated;Bedrails;Increased time required   Supine to Sit 4  Minimal assistance   Additional items Assist x 1;HOB elevated;Bedrails;Increased time required;Verbal cues   Additional Comments VCs for log roll technique   Transfers   Sit to Stand 4  Minimal assistance   Additional items Assist x 1;Increased time required;Verbal cues   Stand to Sit 4  Minimal assistance   Additional items Assist x 1;Armrests;Increased time required   Stand pivot 4  Minimal assistance   Additional items Assist x 1;Increased time required;Verbal cues   Toilet transfer 4  Minimal assistance   Additional items Assist x 1  (BSC over toilet for increased height)   Additional Comments with RW. Pt was Min A x1 with RW for ambulation in the room. Pt did report significant fatigue with short distance ambulation   Functional Mobility   Functional Mobility 4  Minimal assistance   Additional Comments assist x1   Additional items Rolling walker   Subjective   Subjective \"I'm feeling better.\" Pt and daughter at bedside agreeable to therapy session   Cognition   Overall Cognitive Status WFL   Arousal/Participation Alert;Cooperative   Attention Within functional limits   Orientation Level Oriented X4   Memory Within " functional limits   Following Commands Follows all commands and directions without difficulty   Activity Tolerance   Activity Tolerance Patient limited by fatigue;Patient tolerated treatment well   Assessment   Assessment Pt seen this date for skilled OT session focused on ADLs, functional transfers and mobility, safety education. The patient was received supine in bed, NAD, PIV access, on RA, masimo, wound vac to midline abdomen. She participated in functional bed mobility, sit to stand transfers, functional ambulation in the room, toileting, and standing at sink for grooming ADLs. Pt required Minimal Assistance x1 with RW for mobility and transfers, and CGA during standing at sink for ADLs. Pt continues to benefit from verbal instructions for log roll technique for bed mobility. At end of session the patient was located seated in bed side chair with call bell in reach and all needs met. Overall the patient remains below her functional baseline, and is primarily limited at this time due to decreased activity tolerance, post op pain, and generalized deconditioning. OT will continue to follow while acute to address POC. At this time, recommend Level 2 Moderate Resource Intensity upon d/c.   Plan   Treatment Interventions ADL retraining;Functional transfer training;Endurance training;Patient/family training;Equipment evaluation/education   Goal Expiration Date 11/17/24   OT Treatment Day 2   OT Frequency 3-5x/wk   Discharge Recommendation   Rehab Resource Intensity Level, OT II (Moderate Resource Intensity)   AM-PAC Daily Activity Inpatient   Lower Body Dressing 2   Bathing 2   Toileting 2   Upper Body Dressing 3   Grooming 3   Eating 3   Daily Activity Raw Score 15   Daily Activity Standardized Score (Calc for Raw Score >=11) 34.69   AM-PAC Applied Cognition Inpatient   Following a Speech/Presentation 4   Understanding Ordinary Conversation 4   Taking Medications 4   Remembering Where Things Are Placed or Put Away 4    Remembering List of 4-5 Errands 4   Taking Care of Complicated Tasks 4   Applied Cognition Raw Score 24   Applied Cognition Standardized Score 62.21       Aspen Mitchell OTR/L

## 2024-11-11 NOTE — PLAN OF CARE
Problem: OCCUPATIONAL THERAPY ADULT  Goal: Performs self-care activities at highest level of function for planned discharge setting.  See evaluation for individualized goals.  Description: Treatment Interventions: ADL retraining, Functional transfer training, Endurance training, Patient/family training, Equipment evaluation/education          See flowsheet documentation for full assessment, interventions and recommendations.   Outcome: Progressing  Note: Limitation: Decreased ADL status, Decreased Safe judgement during ADL, Decreased UE strength, Decreased endurance, Decreased self-care trans, Decreased high-level ADLs  Prognosis: Good  Assessment: Pt seen this date for skilled OT session focused on ADLs, functional transfers and mobility, safety education. The patient was received supine in bed, NAD, PIV access, on RA, masimo, wound vac to midline abdomen. She participated in functional bed mobility, sit to stand transfers, functional ambulation in the room, toileting, and standing at sink for grooming ADLs. Pt required Minimal Assistance x1 with RW for mobility and transfers, and CGA during standing at sink for ADLs. Pt continues to benefit from verbal instructions for log roll technique for bed mobility. At end of session the patient was located seated in bed side chair with call bell in reach and all needs met. Overall the patient remains below her functional baseline, and is primarily limited at this time due to decreased activity tolerance, post op pain, and generalized deconditioning. OT will continue to follow while acute to address POC. At this time, recommend Level 2 Moderate Resource Intensity upon d/c.     Rehab Resource Intensity Level, OT: II (Moderate Resource Intensity)        Aspen Mitchell OTR/L

## 2024-11-11 NOTE — TELEPHONE ENCOUNTER
----- Message from Radha Herrera PA-C sent at 11/8/2024  2:05 PM EST -----  Please set her up for a follow up visit with any available PA in about 1 month, this is a hospital follow up visit.

## 2024-11-11 NOTE — WOUND OSTOMY CARE
Progress Note- Ostomy  Bertha Brown 84 y.o. female  [id number]  --01        Assessment:  Patient is POD # 11 loop ileostomy creation. Seen for ostomy education and teaching. Introduced self and role to patient. Patient is agreeable to visit. Patient' daughter is the primary learner and participated in pouch change today. All questions and concerns answered, patient's daughter was able to assist with cleansing the cuca-stomal skin, crusting with nystatin powder, measuring stoma size, cutting out pouch barrier, applying Danna ring to pouch barrier around cut-out, and applying pouch properly.     Topics reviewed: normal stoma appearance, how and when to empty (1/3 full) to prevent pulling/lifting of the barrier, importance & how to clean the end of the pouch to prevent odor, gas/odor producing foods, frequency of changing of the pouch (every 3-4 days on a schedule), burping, gas valve functionality of one piece, Clothing- including avoiding placing belt-line/seat belts over the stoma, bathing, and how to obtain supplies.    Step-by-step pouch change done using 2 piece [employer] convexity high out-put pouch. Reviewed with patient how and when to measure the stoma (weekly). Demonstrated how to trace & cut one piece barrier, crusting with nystatin powder completed to treat cuca-stomal skin with rash, then Danna ring applied to barrier around the cut-out to help fill in the large divot/creasing noted around the stoma circumferentially. Pouch was applied, warm gentle pressure held for about 4 minutes. Good seal obtained.      -RLQ ileostomy: Stoma measures 1.75 inches side to side and 1 inch top to bottom, os is noted centrally, stoma is slightly budded however is located within a crease/divot, beefy red in color, and oval shape, mucocutaneous junction is intact. Cuca-stomal skin is intact with mild red rash with satellite lesions, no wounds or open aspects, there is a divot/creasing noted to skin around the  stoma that was filled in with Danna ring. Effluent is moderate amount of soft brown stool.     -Mid upper quadrant mucus fistula (found at proximal end of surgical incision)- stoma is round in shape, 7/8 inch round, is flushed with skin, and beefy red in color, mucocutaneous junction is intact. Yamileth-stomal skin is intact with mild red rash with satellite lesions. No effluent noted in pouch. Applied dry dressing over mucus fistula.     Plan is for patient to have VNA at home after rehab.    Patient and daughter verbalized understanding of education and teaching. All questions and concerns answered.    Patient gave verbal permission to order sample kits from [employer], and [employer].    Ostomy Care:  1. Peel back pouch using push-pull method, may use non-alcohol adhesive remover(Purple and white package).  2. Use warm water only to cleanse skin around the stoma (yamileth-stomal skin).  3. Make sure all adhesive residue is removed and skin is dry and not oily.  4. Measure stoma size using measuring guide and trace correct measurements onto back of pouch (Off set opening away from abdominal incision).   5. Then cut backing of pouch out to correct shape/size.  6. Apply Danna ring to the barrier around the entire cut-out.  7. Prior to applying pouch: Crust the skin using Nystatin powder applied to all areas with rash present.   8. Place pouch over stoma and onto skin.  9. Use warmth of hand to apply gentle pressure to help backing of pouch to adhere well to skin.     Ostomy Plan:  1. Encourage patient to read education material at bedside.  2. Encourage patient to ambulate.  3. Encourage patient to participate with emptying/burping pouch.  4. Empty pouch when 1/3 full.        Crusting as needed for moist, red, open skin around the stoma: (Done prior to pouch application)  -Apply stoma powder to skin breakdown, then dust away the excess powder.   -Then use skin barrier film to pat/dab over the powder and let dry to  "form \"crust\"  Repeat X2 before placing new ostomy pouch         Colostomy RLQ (Active)   Stomal Appliance 2 piece;Changed 11/11/24 1430   Stoma Assessment Flushed well;Red 11/11/24 1430   Stoma size (in) 1.75 in 11/11/24 1430   Stoma Shape Oval;Flushed 11/11/24 1430   Peristomal Assessment Maceration;Other (Comment) 11/11/24 1430   Treatment Bag change;Site care 11/11/24 1430   Output (mL) 75 mL 11/11/24 1125   Number of days: 11     Contact through [employer] Secure Chat with any questions  Wound and Ostomy Care will continue to follow while inpatient    Gris GAMBLEN RN CWON  Wound and Ostomy care      "

## 2024-11-11 NOTE — ASSESSMENT & PLAN NOTE
POD 6 s/p exploratory laparotomy with right hemicolectomy, abdominal left open w abtherra placement on 10/29/2024  POD 5 , s/p second look with wash out, abdominal wall closure, and loop ileostomy and mucous fistula creation, VAC placement to midline abdomen  Febrile overnight   WBC  15.65, 14.35, Hb 8.0   L/24hr,   Cr 0.98  VAC per surgery       Plan:  Pt had speech bedside evaluation this am, recommendation is thin liquids with advance to solids to soft dysphagia 3 texture diet,   Tolerating modified diet  VAC changes per surgery recommendations.  Everything is in the discharge instructions  Replete lytes as needed  Removed loop bar

## 2024-11-11 NOTE — ASSESSMENT & PLAN NOTE
Present on admission with tachycardia, tachypnea, leukocytosis, RUKHSANA thought to be due to volume loss due to coffee-ground emesis, elevated lactic acid, no clear source of infection however SIRS thought to be due to coffee-ground emesis.  Currently getting IV fluids resuscitation.  Low threshold to initiate infectious workup if develops worsening signs of infection and or not improving or worsening lactic acidosis   Discontinue antibiotics after today.  The patient is gotten 10 days.  Check a CBC in 1 week

## 2024-11-11 NOTE — DISCHARGE SUMMARY
Discharge Summary - [facility]   Name: Bertha Brown 84 y.o. female I MRN: [id number]  Unit/Bed#: -01 I Date of Admission: 10/29/2024   Date of Service: 11/11/2024 I Hospital Day: 13     Assessment & Plan  Coffee ground emesis    Patient is status post EGD.  Discussed with gastroenterology okay to start anticoagulation.  We are going to start the patient on Eliquis    Paroxysmal atrial fibrillation (HCC)  Present on admission history of paroxysmal A-fib.  Currently in sinus tachycardia with PVCs.  Discontinue the heparin.  That is post EGD.  Okay for anticoagulation.  Family is agreeable for Eliquis which is $47 a month  Continue with the beta-blocker as well as the Eliquis    Hypothyroidism  Present on admission with past medical history of hypothyroidism.  Synthroid increased to 100 mcg.  Check TSH in 4 to 6 weeks  Acute respiratory failure with hypoxia (HCC) (Resolved: 11/11/2024)    Suspected secondary aspiration event due to coffee-ground emesis versus atelectasis.  Encourage incentive spirometry use  Continue to wean off oxygen as able.  Doing okay from the standpoint.      RUKHSANA (acute kidney injury) (HCC)  Acute renal failure has resolved.  Patient is at baseline.  Replete potassium as needed  SIRS (systemic inflammatory response syndrome) (HCC)  Present on admission with tachycardia, tachypnea, leukocytosis, RUKHSANA thought to be due to volume loss due to coffee-ground emesis, elevated lactic acid, no clear source of infection however SIRS thought to be due to coffee-ground emesis.  Currently getting IV fluids resuscitation.  Low threshold to initiate infectious workup if develops worsening signs of infection and or not improving or worsening lactic acidosis   Discontinue antibiotics after today.  The patient is gotten 10 days.  Check a CBC in 1 week  Hypertension  Present on admission with hypotension.  Blood pressure currently controlled.  Stable  Hyperlipidemia  Continue home dose of Lipitor 40 mg  nightly  Diabetes (HCC)  Lab Results   Component Value Date    HGBA1C 7.0 (H) 08/22/2024       Recent Labs     11/09/24  1745 11/09/24  2318 11/10/24  0616 11/10/24  1127   POCGLU 143* 115 116 120       Blood Sugar Average: Last 72 hrs:  (P) [id number]    Present on admission with history of diabetes.  Most recent A1c 7.0.  Blood sugars acceptable  Obesity  Present on admission with obesity with BMI 37.  Counseled on weight loss, lifestyle modification.  Chronic idiopathic constipation  Bowel regimen  Generalized abdominal pain    -Patient with possible very early peritonitis treated with antibiotics  Abdominal pain is improving.    S/p  LAPAROSCOPY DIAGNOSTIC CONVERTED TO OPEN  LAPAROTOMY EXPLORATORY. EXTENDED RIGHT HEMICOLECTOMY. WITH abthera VAC placement.  No complaints of abdominal pain at this time  Ischemic colitis (HCC)  POD 6 s/p exploratory laparotomy with right hemicolectomy, abdominal left open w abtherra placement on 10/29/2024  POD 5 , s/p second look with wash out, abdominal wall closure, and loop ileostomy and mucous fistula creation, VAC placement to midline abdomen  Febrile overnight   WBC  15.65, 14.35, Hb 8.0   L/24hr,   Cr 0.98  VAC per surgery       Plan:  Pt had speech bedside evaluation this am, recommendation is thin liquids with advance to solids to soft dysphagia 3 texture diet,   Tolerating modified diet  VAC changes per surgery recommendations.  Everything is in the discharge instructions  Replete lytes as needed  Removed loop bar   Septic shock (HCC)  Septic shock/E. coli bacteremia tenure with current antibiotics. resolved.  Discontinue antibiotics  Acute metabolic encephalopathy  Patient with worsening lethargy prior to OR  Suspect secondary to significant metabolic derangements  Monitor neuro exam.  Now appears to be stable.  Patient states she is having some nightmares at night.  I will give her some melatonin at night to help her sleep.  Mental status is  stable  S/P exploratory laparotomy  -status post exploratory laparotomy with right hemicolectomy on 10/29/2024     -Second look and washout on 10/31/2024 with abdominal wall closure, ileostomy creation, mucous fistula creation, and wound VAC placement and abdominal wall subcutaneous tissue  Repeat CT scan noted  Tolerating advancement in diet  Anemia  Been stable postoperatively.  Continue to monitor..  Relatively stable.  Check a CBC in 1 week  Hypernatremia  Resolved.  Stop IV fluids  Major depressive disorder, single episode, mild (HCC)  Patient's home medications with Effexor and Abilify were restarted     Medical Problems       Resolved Problems  Date Reviewed: 11/11/2024            Resolved    Acute respiratory failure with hypoxia (HCC) 11/11/2024     Resolved by  Anders Lockett MD        Discharging Physician / Practitioner: Anders Lockett MD  PCP: Jarred Armstrong DO  Admission Date:   Admission Orders (From admission, onward)       Ordered        10/29/24 1044  INPATIENT ADMISSION  Once                          Discharge Date: 11/11/24    Consultations During Hospital Stay:  Surgery  Gastroenterology  Critical care  Cardiology    Procedures Performed:   LAPAROSCOPY DIAGNOSTIC CONVERTED TO OPEN  LAPAROTOMY EXPLORATORY. EXTENDED RIGHT HEMICOLECTOMY. WITH abthera VAC placement  EGD: Biopsies were done.  There was ulcer seen in the fundus.  They stated that could have been the source of the patient's bleeding when she first came in with coffee-ground emesis.  Okay to start anticoagulation per their recommendations.    Significant Findings / Test Results:   CT scan: Showed no abnormal gas in the vagina over the bladder to suggest a fistula.  Postop changes were noted.    Incidental Findings:   None      Test Results Pending at Discharge (will require follow up):   None     Outpatient Tests Requested:  Recommend CBC and a BMP in 1 week    Complications: None    Reason for Admission: Abdominal  pain    Hospital Course:   Bertha Brown is a 84 y.o. female patient who originally presented to the hospital on 10/29/2024 due to abdominal pain.    History presenting illness:Bertha Brown is a 84 y.o. female with a PMH of  rectus sheath hematoma causing hemorrhagic shock 2022, paroxysmal A-fib currently on Coumadin, diabetes, obesity, hypothyroidism, hypertension, depression, who presents with complaining of coffee-ground emesis for few hours.  Patient reportedly was at normal state of health until last night and woke up at 5 AM this morning with vomiting of coffee-ground emesis.  Patient reportedly having multiple episodes of coffee-ground emesis since early morning associate with 2 weeks of constipation, lower abdominal pain.  Currently patient is complaining of lower abdominal pain 9-10 out of 10, nonradiating, no clear elevating or exacerbating factors identified.  Denies fever, chills, chest pain, cough, hematuria, melena, hematochezia, any other new complaints.  Remote history of colonoscopy 2009 noted with diverticulosis.  No other events reported.     Hospital course: Patient was admitted with above reasons.  Patient had this abdominal pain but then found up with worsening lactic acid level and was tachycardic.  She had multiple episodes of copious vomiting requiring IV Reglan and Zofran.  Patient was put on IV antibiotics and the off call surgical team saw the patient.  Patient went emergently to the OR and had  LAPAROSCOPY DIAGNOSTIC CONVERTED TO OPEN  LAPAROTOMY EXPLORATORY. EXTENDED RIGHT HEMICOLECTOMY. WITH abthera VAC placement  Postoperatively the patient was sent to the ICU.  Patient little likely had the abdominal pain secondary to the ischemic colitis.  She does have a history of A-fib and diabetes as well.  She had acute hypoxemic respiratory failure requiring hide mid flow at 1 point but that was weaned as able.  Patient was found to E. coli bacteremia which is likely secondary to GI source  "which did show up in the body fluid culture as well.  Patient was treated with 10 days of antibiotics.  Repeat blood cultures were negative.  Patient's oxygen was able to be weaned.  She was using BiPAP at night but now needs an outpatient sleep study.  Patient had an EGD done prior to restarting anticoagulation and patient was cleared to start in coagulation per discussion with GI.  I placed the patient on Eliquis.  The patient was on Coumadin prior however Eliquis was more convenient for use so that is what the patient was placed on.  Patient's TSH was markedly elevated.  I increased her Synthroid from 88 to 100 mcg.  Her TSH was 23.  I do recommend a repeat TSH in 4 to 6 weeks.  Patient has a VAC on for which surgery gave all the recommendations.  The patient did get 10days of IV antibiotics here.  .  I also gave the patient couple of doses of IV Lasix secondary to volume overload.  Patient was also seen by cardiology here secondary to A-fib with RVR.  Started on amiodarone.        Please see above list of diagnoses and related plan for additional information.     Condition at Discharge: fair    Discharge Day Visit / Exam:   Subjective: Patient seen and examined.  She states she is doing okay today.  Vitals: Blood Pressure: 120/58 (11/11/24 1002)  Pulse: 72 (11/11/24 1002)  Temperature: 98 °F (36.7 °C) (11/11/24 0711)  Temp Source: Oral (11/09/24 1542)  Respirations: 18 (11/11/24 0711)  Height: 5' 5\" (165.1 cm) (11/02/24 0827)  Weight - Scale: 105 kg (232 lb 5.8 oz) (10/31/24 0536)  SpO2: 96 % (11/11/24 1002)  Physical Exam   General Appearance:    Alert, cooperative, no distress, appears stated age                               Lungs:     Clear to auscultation bilaterally, respirations unlabored       Heart:    Regular rate and rhythm, S1 and S2 normal, no murmur, rub    or gallop   Abdomen:     Soft, non-tender, bowel sounds active all four quadrants,     no masses, no organomegaly           Extremities:   " Extremities normal, atraumatic, no cyanosis or edema                         Discussion with Family: Updated  (daughter) at bedside.    Discharge instructions/Information to patient and family:   See after visit summary for information provided to patient and family.      Provisions for Follow-Up Care:  See after visit summary for information related to follow-up care and any pertinent home health orders.      Mobility at time of Discharge:   Basic Mobility Inpatient Raw Score: 14  JH-HLM Goal: 4: Move to chair/commode  JH-HLM Achieved: 6: Walk 10 steps or more  HLM Goal achieved. Continue to encourage appropriate mobility.     Disposition:   Other Skilled Nursing Facility at [place]    Planned Readmission: Not anticipated    Discharge Medications:  See after visit summary for reconciled discharge medications provided to patient and/or family.      Administrative Statements   Discharge Statement:  I have spent a total time of 40 minutes in caring for this patient on the day of the visit/encounter. >30 minutes of time was spent on: Diagnostic results, Prognosis, Instructions for management, Patient and family education, Importance of tx compliance, Risk factor reductions, Impressions, Counseling / Coordination of care, Documenting in the medical record, Reviewing / ordering tests, medicine, procedures  , and Communicating with other healthcare professionals .    **Please Note: This note may have been constructed using a voice recognition system**

## 2024-11-11 NOTE — ASSESSMENT & PLAN NOTE
Present on admission with past medical history of hypothyroidism.  Synthroid increased to 100 mcg.  Check TSH in 4 to 6 weeks

## 2024-11-11 NOTE — CASE MANAGEMENT
[facility] received request for authorization from Care Manager.  Authorization request submitted for: SNF  Facility Name: [facility] NPI: [id number]  Facility MD: Yasmeen Wick NPI: [id number]     Authorization initiated by contacting insurance: [employer]   Via: [employer]   Clinicals submitted via Portal attachment   Pending Reference #: [id number]     Care Manager notified: Laury Jefferson     Updates to authorization status will be noted in chart. Please reach out to CM for updates on any clinical information.

## 2024-11-11 NOTE — CASE MANAGEMENT
Case Management Discharge Planning Note    Patient name Bertha Brown  Location /-01 MRN [id number]  : 1940 Date 2024       Current Admission Date: 10/29/2024  Current Admission Diagnosis:Septic shock (HCC)   Patient Active Problem List    Diagnosis Date Noted Date Diagnosed    Anemia 2024     Hypernatremia 2024     S/P exploratory laparotomy 2024     Ischemic colitis (HCC) 10/30/2024     Septic shock (HCC) 10/30/2024     Acute metabolic encephalopathy 10/30/2024     Coffee ground emesis 10/29/2024     RUKHSANA (acute kidney injury) (HCC) 10/29/2024     Chronic idiopathic constipation 10/29/2024     Generalized abdominal pain 10/29/2024     SIRS (systemic inflammatory response syndrome) (HCC) 10/29/2024     Constipation 2022     Hypoxia 12/15/2022     Rectus sheath hematoma 2022     Bronchospasm with bronchitis, acute 2022     Chest pain 2022     Leukocytosis 2022     Chest tightness 2022     Diabetes (HCC) 2022     Obesity 2022     Bilateral flank pain 2022     Hemarthrosis involving knee joint, right 10/01/2020     Ambulatory dysfunction 10/01/2020     Paroxysmal atrial fibrillation (HCC) 2020     Acute respiratory failure with hypoxia (HCC) 2020     Hypothyroidism 2020     Hypertension 2020     Hyperlipidemia 2020     Major depressive disorder, single episode, mild (HCC) 2019     Osteoarthritis of knee 2017       LOS (days): 13  Geometric Mean LOS (GMLOS) (days): 9.6  Days to GMLOS:-3.4     OBJECTIVE:  Risk of Unplanned Readmission Score: 26.11         Current admission status: Inpatient   Preferred Pharmacy:   [id number] - [place] - [address]  [address]  [place] [address]  Phone: [phone] Fax: [phone]    Primary Care Provider: Jarred Armstrong DO    Primary Insurance: [facility] RE[facility]  Secondary Insurance:     DISCHARGE DETAILS:                                                                                                                Facility Insurance Auth Number: [id number]

## 2024-11-11 NOTE — TELEPHONE ENCOUNTER
Patient scheduled on 12/3/2024 for hospital followup - Daughter requested appt after patient has been dx from rehab.

## 2024-11-11 NOTE — PLAN OF CARE
Problem: PAIN - ADULT  Goal: Verbalizes/displays adequate comfort level or baseline comfort level  Description: Interventions:  - Encourage patient to monitor pain and request assistance  - Assess pain using appropriate pain scale  - Administer analgesics based on type and severity of pain and evaluate response  - Implement non-pharmacological measures as appropriate and evaluate response  - Consider cultural and social influences on pain and pain management  - Notify physician/advanced practitioner if interventions unsuccessful or patient reports new pain  Outcome: Progressing     Problem: INFECTION - ADULT  Goal: Absence or prevention of progression during hospitalization  Description: INTERVENTIONS:  - Assess and monitor for signs and symptoms of infection  - Monitor lab/diagnostic results  - Monitor all insertion sites, i.e. indwelling lines, tubes, and drains  - Monitor endotracheal if appropriate and nasal secretions for changes in amount and color  - [place] appropriate cooling/warming therapies per order  - Administer medications as ordered  - Instruct and encourage patient and family to use good hand hygiene technique  - Identify and instruct in appropriate isolation precautions for identified infection/condition  Outcome: Progressing

## 2024-11-11 NOTE — SPEECH THERAPY NOTE
Speech Language/Pathology     Speech/Language Pathology Progress Note     Patient Name: Bertha Brown    Today's Date: 11/11/2024     Problem List  Principal Problem:    Septic shock (HCC)  Active Problems:    Paroxysmal atrial fibrillation (HCC)    Acute respiratory failure with hypoxia (HCC)    Hypothyroidism    Hypertension    Hyperlipidemia    Major depressive disorder, single episode, mild (HCC)    Diabetes (HCC)    Obesity    Coffee ground emesis    RUKHSANA (acute kidney injury) (HCC)    Chronic idiopathic constipation    Generalized abdominal pain    SIRS (systemic inflammatory response syndrome) (HCC)    Ischemic colitis (HCC)    Acute metabolic encephalopathy    S/P exploratory laparotomy    Anemia    Hypernatremia      Subjective:  Patient received awake, alert.  Daughter present and feeding pt breakfast; education re: reflux precautions provided as pt received laying flat while eating    Previous/current diet: reg/thin     Objective:  The following consistencies were tested reg, puree, thin by straw   Patient presents with WFL bolus containment, manipulation and control.  Mastication judged to be prolonged yet complete.     No overt s/s of aspiration or distress. Vocal quality noted to remain clear. No concerns reported by patient or family.  Intake is good.      Assessment:  Oropharyngeal swallow function appears baseline/WFL.  Education re: reflux and aspiration precautions as it relates to patient positioning during mealtime provided.  Understanding verbalized.        Plan:  Reg/thin  Meds as tolerated  Maintain reflux precautions   No further ST follow-up       Michelle Cruz MS, CCC-SLP  Speech-Language Pathologist  [place] #[id number]  [place] #[id number]

## 2024-11-11 NOTE — NURSING NOTE
Yunior and Ivs removed. AVS printed for receiving facility, report called. No questions at this time

## 2024-11-11 NOTE — ASSESSMENT & PLAN NOTE
Suspected secondary aspiration event due to coffee-ground emesis versus atelectasis.  Encourage incentive spirometry use  Continue to wean off oxygen as able.  Doing okay from the standpoint.

## 2024-11-11 NOTE — PLAN OF CARE
Problem: PAIN - ADULT  Goal: Verbalizes/displays adequate comfort level or baseline comfort level  Description: Interventions:  - Encourage patient to monitor pain and request assistance  - Assess pain using appropriate pain scale  - Administer analgesics based on type and severity of pain and evaluate response  - Implement non-pharmacological measures as appropriate and evaluate response  - Consider cultural and social influences on pain and pain management  - Notify physician/advanced practitioner if interventions unsuccessful or patient reports new pain  11/11/2024 1550 by Delmi Wellington RN  Outcome: Adequate for Discharge  11/11/2024 1456 by Delmi Wellington RN  Outcome: Progressing     Problem: INFECTION - ADULT  Goal: Absence or prevention of progression during hospitalization  Description: INTERVENTIONS:  - Assess and monitor for signs and symptoms of infection  - Monitor lab/diagnostic results  - Monitor all insertion sites, i.e. indwelling lines, tubes, and drains  - Monitor endotracheal if appropriate and nasal secretions for changes in amount and color  - [place] appropriate cooling/warming therapies per order  - Administer medications as ordered  - Instruct and encourage patient and family to use good hand hygiene technique  - Identify and instruct in appropriate isolation precautions for identified infection/condition  11/11/2024 1550 by Delmi Wellington RN  Outcome: Adequate for Discharge  11/11/2024 1456 by Delmi Wellington RN  Outcome: Progressing  Goal: Absence of fever/infection during neutropenic period  Description: INTERVENTIONS:  - Monitor WBC    11/11/2024 1550 by Delmi Wellington RN  Outcome: Adequate for Discharge  11/11/2024 1456 by Delmi Wellington RN  Outcome: Progressing     Problem: SAFETY ADULT  Goal: Patient will remain free of falls  Description: INTERVENTIONS:  - Educate patient/family on patient safety including physical limitations  - Instruct patient to call for  assistance with activity   - Consult OT/PT to assist with strengthening/mobility   - Keep Call bell within reach  - Keep bed low and locked with side rails adjusted as appropriate  - Keep care items and personal belongings within reach  - Initiate and maintain comfort rounds  - Make Fall Risk Sign visible to staff  - Offer Toileting every 2 Hours, in advance of need  - Initiate/Maintain bed alarm  - Apply yellow socks and bracelet for high fall risk patients  - Consider moving patient to room near nurses station  11/11/2024 1550 by Delmi Wellington RN  Outcome: Adequate for Discharge  11/11/2024 1456 by Delmi Wellington RN  Outcome: Progressing  Goal: Maintain or return to baseline ADL function  Description: INTERVENTIONS:  -  Assess patient's ability to carry out ADLs; assess patient's baseline for ADL function and identify physical deficits which impact ability to perform ADLs (bathing, care of mouth/teeth, toileting, grooming, dressing, etc.)  - Assess/evaluate cause of self-care deficits   - Assess range of motion  - Assess patient's mobility; develop plan if impaired  - Assess patient's need for assistive devices and provide as appropriate  - Encourage maximum independence but intervene and supervise when necessary  - Involve family in performance of ADLs  - Assess for home care needs following discharge   - Consider OT consult to assist with ADL evaluation and planning for discharge  - Provide patient education as appropriate  11/11/2024 1550 by Delmi Wellington RN  Outcome: Adequate for Discharge  11/11/2024 1456 by Delmi Wellington RN  Outcome: Progressing  Goal: Maintains/Returns to pre admission functional level  Description: INTERVENTIONS:  - Perform AM-PAC 6 Click Basic Mobility/ Daily Activity assessment daily.  - Set and communicate daily mobility goal to care team and patient/family/caregiver.   - Collaborate with rehabilitation services on mobility goals if consulted  - Perform Range of  Motion 2 times a day.  - Reposition patient every 2 hours.  - Dangle patient 2 times a day  - Stand patient 2 times a day  - Ambulate patient 2 times a day  - Out of bed to chair 2 times a day   - Out of bed for meals 2 times a day  - Out of bed for toileting  - Record patient progress and toleration of activity level   11/11/2024 1550 by Delmi Wellington RN  Outcome: Adequate for Discharge  11/11/2024 1456 by Delmi Wellington RN  Outcome: Progressing     Problem: DISCHARGE PLANNING  Goal: Discharge to home or other facility with appropriate resources  Description: INTERVENTIONS:  - Identify barriers to discharge w/patient and caregiver  - Arrange for needed discharge resources and transportation as appropriate  - Identify discharge learning needs (meds, wound care, etc.)  - Arrange for interpretive services to assist at discharge as needed  - Refer to Case Management Department for coordinating discharge planning if the patient needs post-hospital services based on physician/advanced practitioner order or complex needs related to functional status, cognitive ability, or social support system  11/11/2024 1550 by Delmi Wellington RN  Outcome: Adequate for Discharge  11/11/2024 1456 by Delmi Wellington RN  Outcome: Progressing     Problem: NEUROSENSORY - ADULT  Goal: Achieves stable or improved neurological status  Description: INTERVENTIONS  - Monitor and report changes in neurological status  - Monitor vital signs such as temperature, blood pressure, glucose, and any other labs ordered   - Initiate measures to prevent increased intracranial pressure  - Monitor for seizure activity and implement precautions if appropriate      11/11/2024 1550 by Delmi Wellington RN  Outcome: Adequate for Discharge  11/11/2024 1456 by Delmi Wellington RN  Outcome: Progressing  Goal: Remains free of injury related to seizures activity  Description: INTERVENTIONS  - Maintain airway, patient safety  and administer oxygen as  ordered  - Monitor patient for seizure activity, document and report duration and description of seizure to physician/advanced practitioner  - If seizure occurs,  ensure patient safety during seizure  - Reorient patient post seizure  - Seizure pads on all 4 side rails  - Instruct patient/family to notify RN of any seizure activity including if an aura is experienced  - Instruct patient/family to call for assistance with activity based on nursing assessment  - Administer anti-seizure medications if ordered    11/11/2024 1550 by Delmi Wellington RN  Outcome: Adequate for Discharge  11/11/2024 1456 by Delmi Wellington RN  Outcome: Progressing  Goal: Achieves maximal functionality and self care  Description: INTERVENTIONS  - Monitor swallowing and airway patency with patient fatigue and changes in neurological status  - Encourage and assist patient to increase activity and self care.   - Encourage visually impaired, hearing impaired and aphasic patients to use assistive/communication devices  11/11/2024 1550 by Delmi Wellington RN  Outcome: Adequate for Discharge  11/11/2024 1456 by Delmi Wellington RN  Outcome: Progressing     Problem: Knowledge Deficit  Goal: Patient/family/caregiver demonstrates understanding of disease process, treatment plan, medications, and discharge instructions  Description: Complete learning assessment and assess knowledge base.  Interventions:  - Provide teaching at level of understanding  - Provide teaching via preferred learning methods  11/11/2024 1550 by Delmi Wellington RN  Outcome: Adequate for Discharge  11/11/2024 1456 by Delmi Wellington RN  Outcome: Progressing     Problem: GASTROINTESTINAL - ADULT  Goal: Minimal or absence of nausea and/or vomiting  Description: INTERVENTIONS:  - Administer IV fluids if ordered to ensure adequate hydration  - Maintain NPO status until nausea and vomiting are resolved  - Nasogastric tube if ordered  - Administer ordered antiemetic  medications as needed  - Provide nonpharmacologic comfort measures as appropriate  - Advance diet as tolerated, if ordered  - Consider nutrition services referral to assist patient with adequate nutrition and appropriate food choices  11/11/2024 1550 by Delmi Wellington RN  Outcome: Adequate for Discharge  11/11/2024 1456 by Delmi Wellington RN  Outcome: Progressing  Goal: Maintains or returns to baseline bowel function  Description: INTERVENTIONS:  - Assess bowel function  - Encourage oral fluids to ensure adequate hydration  - Administer IV fluids if ordered to ensure adequate hydration  - Administer ordered medications as needed  - Encourage mobilization and activity  - Consider nutritional services referral to assist patient with adequate nutrition and appropriate food choices  11/11/2024 1550 by Delmi Wellington RN  Outcome: Adequate for Discharge  11/11/2024 1456 by Delmi Wellington RN  Outcome: Progressing  Goal: Maintains adequate nutritional intake  Description: INTERVENTIONS:  - Monitor percentage of each meal consumed  - Identify factors contributing to decreased intake, treat as appropriate  - Assist with meals as needed  - Monitor I&O, weight, and lab values if indicated  - Obtain nutrition services referral as needed  11/11/2024 1550 by Delmi Wellington RN  Outcome: Adequate for Discharge  11/11/2024 1456 by Delmi Wellington RN  Outcome: Progressing  Goal: Establish and maintain optimal ostomy function  Description: INTERVENTIONS:  - Assess bowel function  - Encourage oral fluids to ensure adequate hydration  - Administer IV fluids if ordered to ensure adequate hydration   - Administer ordered medications as needed  - Encourage mobilization and activity  - Nutrition services referral to assist patient with appropriate food choices  - Assess stoma site  - Consider wound care consult   11/11/2024 1550 by Delmi Wellington RN  Outcome: Adequate for Discharge  11/11/2024 1456 by Delmi  FARA Wellington  Outcome: Progressing  Goal: Oral mucous membranes remain intact  Description: INTERVENTIONS  - Assess oral mucosa and hygiene practices  - Implement preventative oral hygiene regimen  - Implement oral medicated treatments as ordered  - Initiate Nutrition services referral as needed  11/11/2024 1550 by Delmi Wellington RN  Outcome: Adequate for Discharge  11/11/2024 1456 by Delmi Wellington RN  Outcome: Progressing     Problem: METABOLIC, FLUID AND ELECTROLYTES - ADULT  Goal: Electrolytes maintained within normal limits  Description: INTERVENTIONS:  - Monitor labs and assess patient for signs and symptoms of electrolyte imbalances  - Administer electrolyte replacement as ordered  - Monitor response to electrolyte replacements, including repeat lab results as appropriate  - Instruct patient on fluid and nutrition as appropriate  11/11/2024 1550 by Delmi Wellington RN  Outcome: Adequate for Discharge  11/11/2024 1456 by Delmi Wellington RN  Outcome: Progressing  Goal: Fluid balance maintained  Description: INTERVENTIONS:  - Monitor labs   - Monitor I/O and WT  - Instruct patient on fluid and nutrition as appropriate  - Assess for signs & symptoms of volume excess or deficit  11/11/2024 1550 by Delmi Wellington RN  Outcome: Adequate for Discharge  11/11/2024 1456 by Delmi Wellington RN  Outcome: Progressing  Goal: Glucose maintained within target range  Description: INTERVENTIONS:  - Monitor Blood Glucose as ordered  - Assess for signs and symptoms of hyperglycemia and hypoglycemia  - Administer ordered medications to maintain glucose within target range  - Assess nutritional intake and initiate nutrition service referral as needed  11/11/2024 1550 by Delmi Wellington RN  Outcome: Adequate for Discharge  11/11/2024 1456 by Delmi Wellington RN  Outcome: Progressing     Problem: HEMATOLOGIC - ADULT  Goal: Maintains hematologic stability  Description: INTERVENTIONS  - Assess for signs and  symptoms of bleeding or hemorrhage  - Monitor labs  - Administer supportive blood products/factors as ordered and appropriate  11/11/2024 1550 by Delmi Wellington RN  Outcome: Adequate for Discharge  11/11/2024 1456 by Delmi Wellington RN  Outcome: Progressing     Problem: MUSCULOSKELETAL - ADULT  Goal: Maintain or return mobility to safest level of function  Description: INTERVENTIONS:  - Assess patient's ability to carry out ADLs; assess patient's baseline for ADL function and identify physical deficits which impact ability to perform ADLs (bathing, care of mouth/teeth, toileting, grooming, dressing, etc.)  - Assess/evaluate cause of self-care deficits   - Assess range of motion  - Assess patient's mobility  - Assess patient's need for assistive devices and provide as appropriate  - Encourage maximum independence but intervene and supervise when necessary  - Involve family in performance of ADLs  - Assess for home care needs following discharge   - Consider OT consult to assist with ADL evaluation and planning for discharge  - Provide patient education as appropriate  11/11/2024 1550 by Delmi Wellington RN  Outcome: Adequate for Discharge  11/11/2024 1456 by Delmi Wellington RN  Outcome: Progressing  Goal: Maintain proper alignment of affected body part  Description: INTERVENTIONS:  - Support, maintain and protect limb and body alignment  - Provide patient/ family with appropriate education  11/11/2024 1550 by Delmi Wellington RN  Outcome: Adequate for Discharge  11/11/2024 1456 by Delmi Wellington RN  Outcome: Progressing

## 2024-11-11 NOTE — ASSESSMENT & PLAN NOTE
Lab Results   Component Value Date    HGBA1C 7.0 (H) 08/22/2024       Recent Labs     11/09/24  1745 11/09/24  2318 11/10/24  0616 11/10/24  1127   POCGLU 143* 115 116 120       Blood Sugar Average: Last 72 hrs:  (P) [id number]    Present on admission with history of diabetes.  Most recent A1c 7.0.  Blood sugars acceptable

## 2024-11-12 ENCOUNTER — TELEPHONE (OUTPATIENT)
Dept: SURGERY | Facility: CLINIC | Age: 84
End: 2024-11-12

## 2024-11-12 NOTE — TELEPHONE ENCOUNTER
Spoke to patient's daughter Linda to schedule post op appointment. She would like to wait until her mom is out of rehab gave her office number to call back to schedule [phone]. She understood and confirmed.

## 2024-11-14 PROCEDURE — 88305 TISSUE EXAM BY PATHOLOGIST: CPT | Performed by: PATHOLOGY

## 2024-11-20 ENCOUNTER — RESULTS FOLLOW-UP (OUTPATIENT)
Dept: GASTROENTEROLOGY | Facility: CLINIC | Age: 84
End: 2024-11-20

## 2024-11-20 NOTE — TELEPHONE ENCOUNTER
----- Message from Vikram Naqvi DO sent at 11/20/2024  7:45 AM EST -----  Please call the patient with the biopsy results.  The biopsies of the stomach showed a benign polyp as well as no evidence of Helicobacter pylori and no evidence of cancer.

## 2024-11-25 ENCOUNTER — TELEPHONE (OUTPATIENT)
Age: 84
End: 2024-11-25

## 2024-11-25 NOTE — TELEPHONE ENCOUNTER
Patients GI provider:  Dr. Naqvi    Number to return call: [phone]    Reason for call: Pt's daughter calling to request script for ostomy supplies and hospital bed. Please note that pt is currently in rehab. Evangelist blanca/Bailee advised that typically rehab / would handle that. Relayed message to daughter, daughter confirmed understanding.     Scheduled procedure/appointment date if applicable: Apt 12/04/2024

## 2024-12-03 NOTE — TELEPHONE ENCOUNTER
Liaison from [facility] calling in regarding ostomy supplies. Advised of this previous message and provided daughter's phone number.

## (undated) DEVICE — TRAY FOLEY 16FR URIMETER SURESTEP

## (undated) DEVICE — SUT VICRYL 3-0 SH 27 IN J416H

## (undated) DEVICE — WOUND RETRACTOR AND PROTECTOR: Brand: ALEXIS O WOUND PROTECTOR-RETRACTOR

## (undated) DEVICE — POOLE SUCTION HANDLE: Brand: CARDINAL HEALTH

## (undated) DEVICE — TOWEL SURG XR DETECT GREEN STRL RFD

## (undated) DEVICE — ABTHERA OPEN ABDOMEN DRESSING WITH SENSATRAC PAD: Brand: ABTHERA™ SENSAT.R.A.C.™

## (undated) DEVICE — PLUMEPEN PRO 10FT

## (undated) DEVICE — PROXIMATE RELOADABLE LINEAR CUTTER WITH SAFETY LOCK-OUT, 75MM: Brand: PROXIMATE

## (undated) DEVICE — GLOVE SRG BIOGEL ECLIPSE 7.5

## (undated) DEVICE — DRAPE EQUIPMENT RF WAND

## (undated) DEVICE — LIGACLIP MCA MULTIPLE CLIP APPLIERS, 20 LARGE CLIPS: Brand: LIGACLIP

## (undated) DEVICE — BETHLEHEM MAJOR GENERAL PACK: Brand: CARDINAL HEALTH

## (undated) DEVICE — GAUZE SPONGES,16 PLY: Brand: CURITY

## (undated) DEVICE — VAC DRESSING SENSATRAC LRG

## (undated) DEVICE — VAC CANISTER 500ML

## (undated) DEVICE — 4-PORT MANIFOLD: Brand: NEPTUNE 2

## (undated) DEVICE — TIBURON LAPAROSCOPIC ABDOMINAL DRAPE: Brand: CONVERTORS

## (undated) DEVICE — INTENDED FOR TISSUE SEPARATION, AND OTHER PROCEDURES THAT REQUIRE A SHARP SURGICAL BLADE TO PUNCTURE OR CUT.: Brand: BARD-PARKER SAFETY BLADES SIZE 15, STERILE

## (undated) DEVICE — TUBING SUCTION 5MM X 12 FT

## (undated) DEVICE — INTENDED FOR TISSUE SEPARATION, AND OTHER PROCEDURES THAT REQUIRE A SHARP SURGICAL BLADE TO PUNCTURE OR CUT.: Brand: BARD-PARKER SAFETY BLADES SIZE 10, STERILE

## (undated) DEVICE — PROXIMATE SKIN STAPLERS (35 WIDE) CONTAINS 35 STAINLESS STEEL STAPLES (FIXED HEAD): Brand: PROXIMATE

## (undated) DEVICE — ENSEAL 20 CM SHAFT, LARGE JAW: Brand: ENSEAL X1

## (undated) DEVICE — PAD GROUNDING DUAL ADULT

## (undated) DEVICE — PROXIMATE LINEAR CUTTER RELOAD, BLUE, 75MM: Brand: PROXIMATE

## (undated) DEVICE — SUT STRATAFIX SMTR PDS PLUS 1 CT-1 30CM SXPP1A435

## (undated) DEVICE — DRESSING MEPILEX AG BORDER POST-OP 4 X 8 IN